# Patient Record
Sex: MALE | Race: WHITE | NOT HISPANIC OR LATINO | Employment: FULL TIME | URBAN - METROPOLITAN AREA
[De-identification: names, ages, dates, MRNs, and addresses within clinical notes are randomized per-mention and may not be internally consistent; named-entity substitution may affect disease eponyms.]

---

## 2017-08-14 ENCOUNTER — SURGERY - HEALTHEAST (OUTPATIENT)
Dept: SURGERY | Facility: HOSPITAL | Age: 37
End: 2017-08-14

## 2017-08-14 ENCOUNTER — ANESTHESIA - HEALTHEAST (OUTPATIENT)
Dept: SURGERY | Facility: HOSPITAL | Age: 37
End: 2017-08-14

## 2017-08-15 ASSESSMENT — MIFFLIN-ST. JEOR
SCORE: 1648.79
SCORE: 1648.79
SCORE: 2502.01

## 2017-08-28 ENCOUNTER — AMBULATORY - HEALTHEAST (OUTPATIENT)
Dept: LAB | Facility: HOSPITAL | Age: 37
End: 2017-08-28

## 2017-08-28 ENCOUNTER — OFFICE VISIT - HEALTHEAST (OUTPATIENT)
Dept: SURGERY | Facility: CLINIC | Age: 37
End: 2017-08-28

## 2017-08-28 DIAGNOSIS — R10.31 ABDOMINAL PAIN, RLQ: ICD-10-CM

## 2017-08-28 DIAGNOSIS — Z48.89 POSTOPERATIVE VISIT: ICD-10-CM

## 2017-08-29 ENCOUNTER — COMMUNICATION - HEALTHEAST (OUTPATIENT)
Dept: SURGERY | Facility: CLINIC | Age: 37
End: 2017-08-29

## 2018-01-31 ENCOUNTER — PRE VISIT (OUTPATIENT)
Dept: UROLOGY | Facility: CLINIC | Age: 38
End: 2018-01-31

## 2021-05-15 ENCOUNTER — TRANSFERRED RECORDS (OUTPATIENT)
Dept: HEALTH INFORMATION MANAGEMENT | Facility: CLINIC | Age: 41
End: 2021-05-15

## 2021-05-15 ENCOUNTER — OFFICE VISIT (OUTPATIENT)
Dept: URGENT CARE | Facility: URGENT CARE | Age: 41
End: 2021-05-15
Payer: COMMERCIAL

## 2021-05-15 VITALS
DIASTOLIC BLOOD PRESSURE: 80 MMHG | TEMPERATURE: 96.5 F | WEIGHT: 315 LBS | HEART RATE: 65 BPM | SYSTOLIC BLOOD PRESSURE: 148 MMHG | OXYGEN SATURATION: 97 %

## 2021-05-15 DIAGNOSIS — M79.89 SWELLING OF LOWER LEG: Primary | ICD-10-CM

## 2021-05-15 PROCEDURE — 99205 OFFICE O/P NEW HI 60 MIN: CPT | Performed by: PHYSICIAN ASSISTANT

## 2021-05-15 RX ORDER — IBUPROFEN 200 MG
1 CAPSULE ORAL
Status: ON HOLD | COMMUNITY
End: 2024-02-05

## 2021-05-15 RX ORDER — ASPIRIN 81 MG
1 TABLET, DELAYED RELEASE (ENTERIC COATED) ORAL
Status: ON HOLD | COMMUNITY
End: 2024-02-05

## 2021-05-15 NOTE — PROGRESS NOTES
SUBJECTIVE:  Cody Bolton is a 40 year old male who presents to the clinic today for a rash.  Onset of rash was 3 day(s) ago.   Rash is sudden onset.  Location of the rash: lower leg.  Quality/symptoms of rash: burning, painful, red and swollen   Symptoms are moderate and rash seems to be worsening.  Previous history of a similar rash? No  Recent exposure history: none known    Associated symptoms include: nothing.    Father with MI at 47 YOA    No past medical history on file.  Current Outpatient Medications   Medication Sig Dispense Refill     calcium citrate (CITRACAL) 950 (200 Ca) MG tablet Take 1 tablet by mouth       cholecalciferol 50 MCG (2000 UT) tablet Take 2,000 Units by mouth       cyanocobalamin 1000 MCG SUBL Place 1,000 mcg under the tongue       multivitamin, therapeutic with minerals (THERA-VIT-M) TABS tablet Take 1 tablet by mouth       Social History     Tobacco Use     Smoking status: Not on file   Substance Use Topics     Alcohol use: Not on file       ROS:  10 point ROS negative except as listed above    EXAM:   BP (!) 148/80   Pulse 65   Temp 96.5  F (35.8  C)   Wt (!) 180.5 kg (398 lb)   SpO2 97%   GENERAL: alert, no acute distress.  SKIN: javier discoloration bilateral lower legs, nonblanching  MS: Swollen LL, tender posterior calf, warm  CIRC: significant varicosities of RLL   GENERAL APPEARANCE: healthy, alert and no distress  NEURO: Normal strength and tone, sensory exam grossly normal,  normal speech and mentation    ASSESSMENT:  (M79.89) Swelling of lower leg  (primary encounter diagnosis)  Comment:tender, swollen, varicosities, family history of clots  Plan: TO ED for US and clot rule out

## 2021-05-31 VITALS — BODY MASS INDEX: 41.75 KG/M2 | HEIGHT: 73 IN | WEIGHT: 315 LBS

## 2021-06-12 NOTE — PROGRESS NOTES
HPI: Pt is here for follow up of a lap appendectomy.  Feeling fairly poor. Having pain llq now and rlq that is worsening. JUIDT drain usually around 20-50 cc per day. Feeling sweaty. Denies fever, chills. No nausea, vomiting. Poor appetite. Having loose diarrhea ongoing.       /71 (Patient Site: Right Arm, Patient Position: Sitting, Cuff Size: Adult Large)  Pulse (!) 53  SpO2 97%    EXAM:  GENERAL:Appears well  ABDOMEN:  Soft, +BS. ltender llq ad rlq. No rebound. JUDIT serous fluid removed the judit   SURGICAL WOUNDS:  Incisions healing well, no enduration or drainage.    .lastlab[CASEREPORT    Assessment/Plan: . Checked cbc which was wnl. C diff to be checked. If symptoms worsen to let us know.   Milton Miles PA-C  [facility] Department of Surgery

## 2021-06-12 NOTE — ANESTHESIA PREPROCEDURE EVALUATION
Anesthesia Evaluation      Patient summary reviewed   No history of anesthetic complications     Airway   Mallampati: III  Neck ROM: full   Pulmonary - normal exam    breath sounds clear to auscultation  (+) sleep apnea on CPAP, ,                          Cardiovascular - negative ROS and normal exam  Exercise tolerance: good  Rhythm: regular  Rate: normal,         Neuro/Psych - negative ROS     Endo/Other    (+) obesity,      GI/Hepatic/Renal - negative ROS      Other findings:         Dental    (+) poor dentition and chipped                       Anesthesia Plan  Planned anesthetic: general endotracheal  -- RSI with Succ  -- PONV prophylaxis with Decadron 10 mg and Zofran 4 mg  -- Equal volume reversal of relaxant (esmolol for iatrogenic tachycardia)    ASA 3 - emergent   Induction: intravenous   Anesthetic plan and risks discussed with: patient and spouse  Anesthesia plan special considerations: rapid sequence induction, antiemetics,   Post-op plan: routine recovery

## 2021-06-12 NOTE — ANESTHESIA POSTPROCEDURE EVALUATION
Patient: Cody Bolton  APPENDECTOMY, LAPAROSCOPIC  Anesthesia type: general    Patient location: PACU  Last vitals:   Vitals:    08/15/17 0220   BP: 134/78   Pulse: 83   Resp: 16   Temp: 37.8  C (100.1  F)   SpO2: 96%     Post vital signs: stable  Level of consciousness: awake and responds to simple questions  Post-anesthesia pain: pain controlled  Post-anesthesia nausea and vomiting: no  Pulmonary: unassisted, return to baseline  Cardiovascular: stable and blood pressure at baseline  Hydration: adequate  Anesthetic events: no    QCDR Measures:  ASA# 11 - Phoebe-op Cardiac Arrest: ASA11B - Patient did NOT experience unanticipated cardiac arrest  ASA# 12 - Phoebe-op Mortality Rate: ASA12B - Patient did NOT die  ASA# 13 - PACU Re-Intubation Rate: ASA13B - Patient did NOT require a new airway mgmt  ASA# 10 - Composite Anes Safety: ASA10A - No serious adverse event  ASA# 38 - New Corneal Injury: ASA38A - No new exposure keratitis or corneal abrasion in PACU    Additional Notes:

## 2021-06-12 NOTE — ANESTHESIA CARE TRANSFER NOTE
Last vitals:   Vitals:    08/15/17 0142   BP: 175/87   Pulse: 94   Resp: 18   Temp: 37  C (98.6  F)   SpO2: 100%     Patient's level of consciousness is drowsy  Spontaneous respirations: yes  Maintains airway independently: yes  Dentition unchanged: yes  Oropharynx: oropharynx clear of all foreign objects    QCDR Measures:  ASA# 20 - Surgical Safety Checklist: WHO surgical safety checklist completed prior to induction  PQRS# 430 - Adult PONV Prevention: 4558F - Pt received => 2 anti-emetic agents (different classes) preop & intraop  ASA# 8 - Peds PONV Prevention: NA - Not pediatric patient, not GA or 2 or more risk factors NOT present  PQRS# 424 - Phoebe-op Temp Management: 4559F - At least one body temp DOCUMENTED => 35.5C or 95.9F within required timeframe  PQRS# 426 - PACU Transfer Protocol: - Transfer of care checklist used  ASA# 14 - Acute Post-op Pain: ASA14B - Patient did NOT experience pain >= 7 out of 10

## 2021-06-27 ENCOUNTER — HEALTH MAINTENANCE LETTER (OUTPATIENT)
Age: 41
End: 2021-06-27

## 2021-10-17 ENCOUNTER — HEALTH MAINTENANCE LETTER (OUTPATIENT)
Age: 41
End: 2021-10-17

## 2022-07-24 ENCOUNTER — HEALTH MAINTENANCE LETTER (OUTPATIENT)
Age: 42
End: 2022-07-24

## 2022-10-02 ENCOUNTER — HEALTH MAINTENANCE LETTER (OUTPATIENT)
Age: 42
End: 2022-10-02

## 2023-08-12 ENCOUNTER — HEALTH MAINTENANCE LETTER (OUTPATIENT)
Age: 43
End: 2023-08-12

## 2023-12-02 ENCOUNTER — TRANSFERRED RECORDS (OUTPATIENT)
Dept: MULTI SPECIALTY CLINIC | Facility: CLINIC | Age: 43
End: 2023-12-02

## 2023-12-22 LAB — RETINOPATHY: NORMAL

## 2024-02-03 ENCOUNTER — HOSPITAL ENCOUNTER (EMERGENCY)
Facility: CLINIC | Age: 44
Discharge: HOME | End: 2024-02-03
Attending: EMERGENCY MEDICINE | Admitting: EMERGENCY MEDICINE
Payer: COMMERCIAL

## 2024-02-03 ENCOUNTER — APPOINTMENT (OUTPATIENT)
Dept: ULTRASOUND IMAGING | Facility: CLINIC | Age: 44
End: 2024-02-03
Attending: EMERGENCY MEDICINE
Payer: COMMERCIAL

## 2024-02-03 ENCOUNTER — APPOINTMENT (OUTPATIENT)
Dept: GENERAL RADIOLOGY | Facility: CLINIC | Age: 44
End: 2024-02-03
Attending: EMERGENCY MEDICINE
Payer: COMMERCIAL

## 2024-02-03 VITALS
HEART RATE: 67 BPM | DIASTOLIC BLOOD PRESSURE: 99 MMHG | OXYGEN SATURATION: 97 % | WEIGHT: 315 LBS | HEIGHT: 73 IN | TEMPERATURE: 99.2 F | SYSTOLIC BLOOD PRESSURE: 190 MMHG | BODY MASS INDEX: 41.75 KG/M2 | RESPIRATION RATE: 22 BRPM

## 2024-02-03 DIAGNOSIS — L03.115 CELLULITIS OF RIGHT LOWER LEG: ICD-10-CM

## 2024-02-03 DIAGNOSIS — I82.890 SUPERFICIAL VEIN THROMBOSIS: ICD-10-CM

## 2024-02-03 DIAGNOSIS — R07.9 CHEST PAIN, UNSPECIFIED TYPE: ICD-10-CM

## 2024-02-03 LAB
ANION GAP SERPL CALCULATED.3IONS-SCNC: 11 MMOL/L (ref 7–15)
BASOPHILS # BLD AUTO: 0.1 10E3/UL (ref 0–0.2)
BASOPHILS NFR BLD AUTO: 1 %
BUN SERPL-MCNC: 9.3 MG/DL (ref 6–20)
CALCIUM SERPL-MCNC: 8.3 MG/DL (ref 8.6–10)
CHLORIDE SERPL-SCNC: 101 MMOL/L (ref 98–107)
CREAT SERPL-MCNC: 0.81 MG/DL (ref 0.67–1.17)
DEPRECATED HCO3 PLAS-SCNC: 25 MMOL/L (ref 22–29)
EGFRCR SERPLBLD CKD-EPI 2021: >90 ML/MIN/1.73M2
EOSINOPHIL # BLD AUTO: 0.3 10E3/UL (ref 0–0.7)
EOSINOPHIL NFR BLD AUTO: 4 %
ERYTHROCYTE [DISTWIDTH] IN BLOOD BY AUTOMATED COUNT: 15.2 % (ref 10–15)
FLUAV RNA SPEC QL NAA+PROBE: NEGATIVE
FLUBV RNA RESP QL NAA+PROBE: NEGATIVE
GLUCOSE SERPL-MCNC: 176 MG/DL (ref 70–99)
HCT VFR BLD AUTO: 40.2 % (ref 40–53)
HGB BLD-MCNC: 12.8 G/DL (ref 13.3–17.7)
IMM GRANULOCYTES # BLD: 0.1 10E3/UL
IMM GRANULOCYTES NFR BLD: 1 %
INR PPP: 1.19 (ref 0.85–1.15)
LACTATE SERPL-SCNC: 2 MMOL/L (ref 0.7–2)
LYMPHOCYTES # BLD AUTO: 1.7 10E3/UL (ref 0.8–5.3)
LYMPHOCYTES NFR BLD AUTO: 24 %
MCH RBC QN AUTO: 27.4 PG (ref 26.5–33)
MCHC RBC AUTO-ENTMCNC: 31.8 G/DL (ref 31.5–36.5)
MCV RBC AUTO: 86 FL (ref 78–100)
MONOCYTES # BLD AUTO: 0.7 10E3/UL (ref 0–1.3)
MONOCYTES NFR BLD AUTO: 10 %
NEUTROPHILS # BLD AUTO: 4.3 10E3/UL (ref 1.6–8.3)
NEUTROPHILS NFR BLD AUTO: 60 %
NRBC # BLD AUTO: 0 10E3/UL
NRBC BLD AUTO-RTO: 0 /100
PLATELET # BLD AUTO: 220 10E3/UL (ref 150–450)
POTASSIUM SERPL-SCNC: 4 MMOL/L (ref 3.4–5.3)
RBC # BLD AUTO: 4.67 10E6/UL (ref 4.4–5.9)
RSV RNA SPEC NAA+PROBE: NEGATIVE
SARS-COV-2 RNA RESP QL NAA+PROBE: NEGATIVE
SODIUM SERPL-SCNC: 137 MMOL/L (ref 135–145)
TROPONIN T SERPL HS-MCNC: 10 NG/L
WBC # BLD AUTO: 7.1 10E3/UL (ref 4–11)

## 2024-02-03 PROCEDURE — 85025 COMPLETE CBC W/AUTO DIFF WBC: CPT | Performed by: EMERGENCY MEDICINE

## 2024-02-03 PROCEDURE — 84484 ASSAY OF TROPONIN QUANT: CPT | Performed by: EMERGENCY MEDICINE

## 2024-02-03 PROCEDURE — 36415 COLL VENOUS BLD VENIPUNCTURE: CPT | Performed by: EMERGENCY MEDICINE

## 2024-02-03 PROCEDURE — 99285 EMERGENCY DEPT VISIT HI MDM: CPT | Mod: 25

## 2024-02-03 PROCEDURE — 93971 EXTREMITY STUDY: CPT | Mod: RT

## 2024-02-03 PROCEDURE — 93005 ELECTROCARDIOGRAM TRACING: CPT

## 2024-02-03 PROCEDURE — 87040 BLOOD CULTURE FOR BACTERIA: CPT | Performed by: EMERGENCY MEDICINE

## 2024-02-03 PROCEDURE — 80048 BASIC METABOLIC PNL TOTAL CA: CPT | Performed by: EMERGENCY MEDICINE

## 2024-02-03 PROCEDURE — 85610 PROTHROMBIN TIME: CPT | Performed by: EMERGENCY MEDICINE

## 2024-02-03 PROCEDURE — 71046 X-RAY EXAM CHEST 2 VIEWS: CPT

## 2024-02-03 PROCEDURE — 83605 ASSAY OF LACTIC ACID: CPT | Performed by: EMERGENCY MEDICINE

## 2024-02-03 PROCEDURE — 87637 SARSCOV2&INF A&B&RSV AMP PRB: CPT | Performed by: EMERGENCY MEDICINE

## 2024-02-03 RX ORDER — CEPHALEXIN 500 MG/1
500 CAPSULE ORAL 4 TIMES DAILY
Qty: 28 CAPSULE | Refills: 0 | Status: SHIPPED | OUTPATIENT
Start: 2024-02-03 | End: 2024-02-10

## 2024-02-03 RX ORDER — LIDOCAINE 40 MG/G
CREAM TOPICAL
Status: DISCONTINUED | OUTPATIENT
Start: 2024-02-03 | End: 2024-02-03 | Stop reason: HOSPADM

## 2024-02-03 ASSESSMENT — ACTIVITIES OF DAILY LIVING (ADL): ADLS_ACUITY_SCORE: 35

## 2024-02-03 NOTE — ED PROVIDER NOTES
"  History     Chief Complaint:  Leg Swelling       The history is provided by the patient.      Cody Bolton is a 43 year old male with a history of DVT who presents due to right leg swelling that began last night. The patient describes his pain as a tightness sensation with warmth that is located primarily to his calf but he does note some inner thigh pain. He also notes cough, congestion, shortness of breath, and chest pain that began at approximately one week ago. He rates his chest pain a 3/10 and describes it as a finger poking sensation. The patient injured his right ankle approximately one week ago.     Independent Historian:   None - Patient Only    Review of External Notes:   I reviewed the family medicine note from 11/28/2023. The patient has a history of DVT and is not anticoagulated. DVT was believed to be caused by travel.       Medications:    Zocor  Metformin   Wellbutrin   Catapres  Trazodone     Past Medical History:    Diabetes Mellitus   Depression   Anxiety   Hyperlipidemia   Cervical radiculopathy   DVT   CARLIE     Past Surgical History:    Appendectomy   Gastric bypass   Gastric bypass revision   Vasectomy   Left hand surgery    Physical Exam   Patient Vitals for the past 24 hrs:   BP Temp Temp src Pulse Resp SpO2 Height Weight   02/03/24 1447 (!) 190/99 -- -- 67 -- 97 % -- --   02/03/24 1137 117/89 99.2  F (37.3  C) Oral 82 22 98 % 1.854 m (6' 1\") (!) 183.6 kg (404 lb 12.2 oz)        Physical Exam  General: The patient is alert, in no respiratory distress.    HENT: Mucous membranes moist.    Cardiovascular: Regular rate and rhythm. Good pulses in all four extremities. Normal capillary refill and skin turgor.     Respiratory: Lungs are clear. No nasal flaring. No retractions. No wheezing, no crackles.    Gastrointestinal: Abdomen soft. No guarding, no rebound. No palpable hernias.     Musculoskeletal: No gross deformity. Left parasternal tenderness to palpation.     Skin: No petechiae. " Swelling and erythema with warmth to the right calf.     Neurologic: The patient is alert and oriented x3. GCS 15. No testable cranial nerve deficit. Follows commands with clear and appropriate speech. Gives appropriate answers. Good strength in all extremities. No gross neurologic deficit. Gross sensation intact. Pupils are round and reactive. No meningismus.     Lymphatic: No cervical adenopathy. No lower extremity swelling.    Psychiatric: The patient is non-tearful.    Emergency Department Course   ECG  ECG taken at 1145, ECG read at 1209  Normal sinus rhythm   Incomplete right bundle branch block   Borderline ECG    Rate 73 bpm. IN interval 152 ms. QRS duration 102 ms. QT/QTc 384/423 ms. P-R-T axes 26 16 28.     Imaging:  XR Chest 2 Views   Final Result   IMPRESSION: Heart size is normal. Lungs are clear bilaterally. Mediastinum and visualized bony structures are unremarkable.      US Lower Extremity Venous Duplex Right   Final Result   IMPRESSION:      1.  No deep venous thrombosis in the right lower extremity.      2.  Occlusive superficial varicosity thrombus measuring over 5 cm in length from the right knee to the ankle.         Report per radiology.      Laboratory:  Labs Ordered and Resulted from Time of ED Arrival to Time of ED Departure   INR - Abnormal       Result Value    INR 1.19 (*)    BASIC METABOLIC PANEL - Abnormal    Sodium 137      Potassium 4.0      Chloride 101      Carbon Dioxide (CO2) 25      Anion Gap 11      Urea Nitrogen 9.3      Creatinine 0.81      GFR Estimate >90      Calcium 8.3 (*)     Glucose 176 (*)    CBC WITH PLATELETS AND DIFFERENTIAL - Abnormal    WBC Count 7.1      RBC Count 4.67      Hemoglobin 12.8 (*)     Hematocrit 40.2      MCV 86      MCH 27.4      MCHC 31.8      RDW 15.2 (*)     Platelet Count 220      % Neutrophils 60      % Lymphocytes 24      % Monocytes 10      % Eosinophils 4      % Basophils 1      % Immature Granulocytes 1      NRBCs per 100 WBC 0       Absolute Neutrophils 4.3      Absolute Lymphocytes 1.7      Absolute Monocytes 0.7      Absolute Eosinophils 0.3      Absolute Basophils 0.1      Absolute Immature Granulocytes 0.1      Absolute NRBCs 0.0     TROPONIN T, HIGH SENSITIVITY - Normal    Troponin T, High Sensitivity 10     LACTIC ACID WHOLE BLOOD - Normal    Lactic Acid 2.0     INFLUENZA A/B, RSV, & SARS-COV2 PCR - Normal    Influenza A PCR Negative      Influenza B PCR Negative      RSV PCR Negative      SARS CoV2 PCR Negative     BLOOD CULTURE        Procedures       Emergency Department Course & Assessments:         Interventions:  Medications - No data to display       Assessments:  1211 I obtained history and examined the patient as noted above.     Independent Interpretation (X-rays, CTs, rhythm strip):  I reviewed the patient's chest x-ray and do not see signs of infiltrate.          Disposition:  The patient was discharged.     Impression & Plan        Medical Decision Making:  The patient does have a history of a previous DVT however it was provoked by travel and he is not currently currently anticoagulated.  However in the exam of his leg it appears more erythematous painful warm and I was concerned that this is more of a cellulitis.  Due to his history however a ultrasound was performed.  There is no signs of a DVT there is a superficial thrombus which I discussed with the patient we will hold on anticoagulation.  I think cellulitis might of triggered this.  We discussed that with him having some chest discomfort if there was a DVT we would need to look further for a PE.  I discussed performing a CT scan of the chest but due to the risk of radiation and no DVT will hold off.  His chest x-ray is clear his troponin is reassuring.  At this point I think it is more likely costochondritis especially with the location of his tenderness.  I stressed that this condition was to worsen he would need to return.  The patient was started on antibiotics we  discussed monitoring for the thrombophlebitis and the patient was discharged home in good condition.      Diagnosis:    ICD-10-CM    1. Cellulitis of right lower leg  L03.115       2. Superficial vein thrombosis  I82.890       3. Chest pain, unspecified type  R07.9            Discharge Medications:  Discharge Medication List as of 2/3/2024  2:40 PM        START taking these medications    Details   cephALEXin (KEFLEX) 500 MG capsule Take 1 capsule (500 mg) by mouth 4 times daily for 7 days, Disp-28 capsule, R-0, Local Print            Scribe Disclosure:  I, Alayna Bronson, am serving as a scribe at 12:21 PM on 2/3/2024 to document services personally performed by Robert Perry MD based on my observations and the provider's statements to me.     2/3/2024   Robert Perry MD Farnan, Christopher M, MD  02/03/24 6302

## 2024-02-03 NOTE — ED TRIAGE NOTES
Pt presents to the ED with complaint of right leg swelling and cough starting last night. Pts leg is swollen from knee to toes. Pt called triage line and told he could have a blood clot. Pt states he's also had some diarrhea this morning, and he's had on and off chest pain for the last week.     Triage Assessment (Adult)       Row Name 02/03/24 1138          Triage Assessment    Airway WDL WDL        Respiratory WDL    Respiratory WDL WDL        Skin Circulation/Temperature WDL    Skin Circulation/Temperature WDL X  right leg swelling        Cardiac WDL    Cardiac WDL WDL        Peripheral/Neurovascular WDL    Peripheral Neurovascular WDL WDL        Cognitive/Neuro/Behavioral WDL    Cognitive/Neuro/Behavioral WDL WDL

## 2024-02-04 ENCOUNTER — APPOINTMENT (OUTPATIENT)
Dept: CT IMAGING | Facility: CLINIC | Age: 44
End: 2024-02-04
Attending: EMERGENCY MEDICINE
Payer: COMMERCIAL

## 2024-02-04 ENCOUNTER — APPOINTMENT (OUTPATIENT)
Dept: ULTRASOUND IMAGING | Facility: CLINIC | Age: 44
End: 2024-02-04
Attending: EMERGENCY MEDICINE
Payer: COMMERCIAL

## 2024-02-04 ENCOUNTER — HOSPITAL ENCOUNTER (OUTPATIENT)
Facility: CLINIC | Age: 44
Setting detail: OBSERVATION
Discharge: HOME | End: 2024-02-06
Attending: EMERGENCY MEDICINE | Admitting: HOSPITALIST
Payer: COMMERCIAL

## 2024-02-04 ENCOUNTER — NURSE TRIAGE (OUTPATIENT)
Dept: NURSING | Facility: CLINIC | Age: 44
End: 2024-02-04
Payer: COMMERCIAL

## 2024-02-04 DIAGNOSIS — I80.01 THROMBOPHLEBITIS OF SUPERFICIAL VEINS OF RIGHT LOWER EXTREMITY: ICD-10-CM

## 2024-02-04 DIAGNOSIS — I26.99 ACUTE PULMONARY EMBOLISM WITHOUT ACUTE COR PULMONALE, UNSPECIFIED PULMONARY EMBOLISM TYPE (H): ICD-10-CM

## 2024-02-04 LAB
ALBUMIN SERPL BCG-MCNC: 4 G/DL (ref 3.5–5.2)
ALP SERPL-CCNC: 123 U/L (ref 40–150)
ALT SERPL W P-5'-P-CCNC: 32 U/L (ref 0–70)
ANION GAP SERPL CALCULATED.3IONS-SCNC: 9 MMOL/L (ref 7–15)
AST SERPL W P-5'-P-CCNC: 31 U/L (ref 0–45)
BASOPHILS # BLD AUTO: 0.1 10E3/UL (ref 0–0.2)
BASOPHILS NFR BLD AUTO: 1 %
BILIRUB SERPL-MCNC: 0.2 MG/DL
BUN SERPL-MCNC: 16.9 MG/DL (ref 6–20)
CALCIUM SERPL-MCNC: 8.5 MG/DL (ref 8.6–10)
CHLORIDE SERPL-SCNC: 102 MMOL/L (ref 98–107)
CREAT SERPL-MCNC: 0.89 MG/DL (ref 0.67–1.17)
CRP SERPL-MCNC: 16.22 MG/L
DEPRECATED HCO3 PLAS-SCNC: 23 MMOL/L (ref 22–29)
EGFRCR SERPLBLD CKD-EPI 2021: >90 ML/MIN/1.73M2
EOSINOPHIL # BLD AUTO: 0.5 10E3/UL (ref 0–0.7)
EOSINOPHIL NFR BLD AUTO: 6 %
ERYTHROCYTE [DISTWIDTH] IN BLOOD BY AUTOMATED COUNT: 15.1 % (ref 10–15)
GLUCOSE SERPL-MCNC: 216 MG/DL (ref 70–99)
HCT VFR BLD AUTO: 41.1 % (ref 40–53)
HGB BLD-MCNC: 12.9 G/DL (ref 13.3–17.7)
HOLD SPECIMEN: NORMAL
IMM GRANULOCYTES # BLD: 0 10E3/UL
IMM GRANULOCYTES NFR BLD: 0 %
LACTATE SERPL-SCNC: 1.3 MMOL/L (ref 0.7–2)
LYMPHOCYTES # BLD AUTO: 2.2 10E3/UL (ref 0.8–5.3)
LYMPHOCYTES NFR BLD AUTO: 27 %
MCH RBC QN AUTO: 27.6 PG (ref 26.5–33)
MCHC RBC AUTO-ENTMCNC: 31.4 G/DL (ref 31.5–36.5)
MCV RBC AUTO: 88 FL (ref 78–100)
MONOCYTES # BLD AUTO: 0.8 10E3/UL (ref 0–1.3)
MONOCYTES NFR BLD AUTO: 9 %
NEUTROPHILS # BLD AUTO: 4.7 10E3/UL (ref 1.6–8.3)
NEUTROPHILS NFR BLD AUTO: 57 %
NRBC # BLD AUTO: 0 10E3/UL
NRBC BLD AUTO-RTO: 0 /100
NT-PROBNP SERPL-MCNC: 145 PG/ML (ref 0–450)
PLATELET # BLD AUTO: 225 10E3/UL (ref 150–450)
POTASSIUM SERPL-SCNC: 4.2 MMOL/L (ref 3.4–5.3)
PROT SERPL-MCNC: 7.3 G/DL (ref 6.4–8.3)
RBC # BLD AUTO: 4.67 10E6/UL (ref 4.4–5.9)
SODIUM SERPL-SCNC: 134 MMOL/L (ref 135–145)
TROPONIN T SERPL HS-MCNC: 7 NG/L
WBC # BLD AUTO: 8.2 10E3/UL (ref 4–11)

## 2024-02-04 PROCEDURE — 93005 ELECTROCARDIOGRAM TRACING: CPT

## 2024-02-04 PROCEDURE — 83605 ASSAY OF LACTIC ACID: CPT | Performed by: EMERGENCY MEDICINE

## 2024-02-04 PROCEDURE — 250N000009 HC RX 250: Performed by: EMERGENCY MEDICINE

## 2024-02-04 PROCEDURE — 36415 COLL VENOUS BLD VENIPUNCTURE: CPT | Performed by: EMERGENCY MEDICINE

## 2024-02-04 PROCEDURE — 71275 CT ANGIOGRAPHY CHEST: CPT

## 2024-02-04 PROCEDURE — 99285 EMERGENCY DEPT VISIT HI MDM: CPT | Mod: 25

## 2024-02-04 PROCEDURE — 250N000011 HC RX IP 250 OP 636: Performed by: EMERGENCY MEDICINE

## 2024-02-04 PROCEDURE — 82040 ASSAY OF SERUM ALBUMIN: CPT | Performed by: EMERGENCY MEDICINE

## 2024-02-04 PROCEDURE — 83036 HEMOGLOBIN GLYCOSYLATED A1C: CPT | Performed by: HOSPITALIST

## 2024-02-04 PROCEDURE — 84484 ASSAY OF TROPONIN QUANT: CPT | Performed by: EMERGENCY MEDICINE

## 2024-02-04 PROCEDURE — 85025 COMPLETE CBC W/AUTO DIFF WBC: CPT | Performed by: EMERGENCY MEDICINE

## 2024-02-04 PROCEDURE — 83880 ASSAY OF NATRIURETIC PEPTIDE: CPT | Performed by: EMERGENCY MEDICINE

## 2024-02-04 PROCEDURE — 86140 C-REACTIVE PROTEIN: CPT | Performed by: EMERGENCY MEDICINE

## 2024-02-04 PROCEDURE — 93970 EXTREMITY STUDY: CPT

## 2024-02-04 RX ORDER — IOPAMIDOL 755 MG/ML
500 INJECTION, SOLUTION INTRAVASCULAR ONCE
Status: COMPLETED | OUTPATIENT
Start: 2024-02-04 | End: 2024-02-04

## 2024-02-04 RX ORDER — HEPARIN SODIUM 10000 [USP'U]/100ML
0-5000 INJECTION, SOLUTION INTRAVENOUS CONTINUOUS
Status: DISCONTINUED | OUTPATIENT
Start: 2024-02-04 | End: 2024-02-05

## 2024-02-04 RX ADMIN — SODIUM CHLORIDE 100 ML: 9 INJECTION, SOLUTION INTRAVENOUS at 23:26

## 2024-02-04 RX ADMIN — IOPAMIDOL 90 ML: 755 INJECTION, SOLUTION INTRAVENOUS at 22:55

## 2024-02-04 RX ADMIN — IOPAMIDOL 90 ML: 755 INJECTION, SOLUTION INTRAVENOUS at 23:26

## 2024-02-04 RX ADMIN — SODIUM CHLORIDE 100 ML: 9 INJECTION, SOLUTION INTRAVENOUS at 22:55

## 2024-02-04 ASSESSMENT — ACTIVITIES OF DAILY LIVING (ADL): ADLS_ACUITY_SCORE: 35

## 2024-02-05 PROBLEM — I26.99 ACUTE PULMONARY EMBOLISM WITHOUT ACUTE COR PULMONALE, UNSPECIFIED PULMONARY EMBOLISM TYPE (H): Status: ACTIVE | Noted: 2024-02-05

## 2024-02-05 PROBLEM — I80.01 THROMBOPHLEBITIS OF SUPERFICIAL VEINS OF RIGHT LOWER EXTREMITY: Status: ACTIVE | Noted: 2024-02-05

## 2024-02-05 LAB
ATRIAL RATE - MUSE: 70 BPM
ATRIAL RATE - MUSE: 73 BPM
CREAT SERPL-MCNC: 0.84 MG/DL (ref 0.67–1.17)
DIASTOLIC BLOOD PRESSURE - MUSE: NORMAL MMHG
DIASTOLIC BLOOD PRESSURE - MUSE: NORMAL MMHG
EGFRCR SERPLBLD CKD-EPI 2021: >90 ML/MIN/1.73M2
GLUCOSE BLDC GLUCOMTR-MCNC: 146 MG/DL (ref 70–99)
GLUCOSE BLDC GLUCOMTR-MCNC: 158 MG/DL (ref 70–99)
GLUCOSE BLDC GLUCOMTR-MCNC: 165 MG/DL (ref 70–99)
GLUCOSE BLDC GLUCOMTR-MCNC: 169 MG/DL (ref 70–99)
GLUCOSE BLDC GLUCOMTR-MCNC: 174 MG/DL (ref 70–99)
GLUCOSE BLDC GLUCOMTR-MCNC: 186 MG/DL (ref 70–99)
HBA1C MFR BLD: 9.1 %
INR PPP: 1.15 (ref 0.85–1.15)
INR PPP: 1.19 (ref 0.85–1.15)
INTERPRETATION ECG - MUSE: NORMAL
INTERPRETATION ECG - MUSE: NORMAL
P AXIS - MUSE: 26 DEGREES
P AXIS - MUSE: 37 DEGREES
PR INTERVAL - MUSE: 152 MS
PR INTERVAL - MUSE: 152 MS
QRS DURATION - MUSE: 100 MS
QRS DURATION - MUSE: 102 MS
QT - MUSE: 380 MS
QT - MUSE: 384 MS
QTC - MUSE: 410 MS
QTC - MUSE: 423 MS
R AXIS - MUSE: 16 DEGREES
R AXIS - MUSE: 47 DEGREES
SYSTOLIC BLOOD PRESSURE - MUSE: NORMAL MMHG
SYSTOLIC BLOOD PRESSURE - MUSE: NORMAL MMHG
T AXIS - MUSE: 15 DEGREES
T AXIS - MUSE: 28 DEGREES
UFH PPP CHRO-ACNC: 0.23 IU/ML
UFH PPP CHRO-ACNC: 0.27 IU/ML
UFH PPP CHRO-ACNC: 0.43 IU/ML
VENTRICULAR RATE- MUSE: 70 BPM
VENTRICULAR RATE- MUSE: 73 BPM

## 2024-02-05 PROCEDURE — 250N000013 HC RX MED GY IP 250 OP 250 PS 637: Performed by: HOSPITALIST

## 2024-02-05 PROCEDURE — 96372 THER/PROPH/DIAG INJ SC/IM: CPT | Performed by: STUDENT IN AN ORGANIZED HEALTH CARE EDUCATION/TRAINING PROGRAM

## 2024-02-05 PROCEDURE — 85610 PROTHROMBIN TIME: CPT | Performed by: HOSPITALIST

## 2024-02-05 PROCEDURE — 82962 GLUCOSE BLOOD TEST: CPT

## 2024-02-05 PROCEDURE — 250N000013 HC RX MED GY IP 250 OP 250 PS 637: Performed by: STUDENT IN AN ORGANIZED HEALTH CARE EDUCATION/TRAINING PROGRAM

## 2024-02-05 PROCEDURE — 85520 HEPARIN ASSAY: CPT | Performed by: HOSPITALIST

## 2024-02-05 PROCEDURE — 250N000011 HC RX IP 250 OP 636: Performed by: HOSPITALIST

## 2024-02-05 PROCEDURE — 96365 THER/PROPH/DIAG IV INF INIT: CPT

## 2024-02-05 PROCEDURE — 36415 COLL VENOUS BLD VENIPUNCTURE: CPT | Performed by: STUDENT IN AN ORGANIZED HEALTH CARE EDUCATION/TRAINING PROGRAM

## 2024-02-05 PROCEDURE — 250N000012 HC RX MED GY IP 250 OP 636 PS 637: Performed by: HOSPITALIST

## 2024-02-05 PROCEDURE — 36415 COLL VENOUS BLD VENIPUNCTURE: CPT | Performed by: HOSPITALIST

## 2024-02-05 PROCEDURE — 82565 ASSAY OF CREATININE: CPT | Performed by: STUDENT IN AN ORGANIZED HEALTH CARE EDUCATION/TRAINING PROGRAM

## 2024-02-05 PROCEDURE — G0378 HOSPITAL OBSERVATION PER HR: HCPCS

## 2024-02-05 PROCEDURE — 250N000011 HC RX IP 250 OP 636: Performed by: STUDENT IN AN ORGANIZED HEALTH CARE EDUCATION/TRAINING PROGRAM

## 2024-02-05 PROCEDURE — 94660 CPAP INITIATION&MGMT: CPT

## 2024-02-05 PROCEDURE — 85610 PROTHROMBIN TIME: CPT | Performed by: STUDENT IN AN ORGANIZED HEALTH CARE EDUCATION/TRAINING PROGRAM

## 2024-02-05 PROCEDURE — 96366 THER/PROPH/DIAG IV INF ADDON: CPT

## 2024-02-05 PROCEDURE — 999N000157 HC STATISTIC RCP TIME EA 10 MIN

## 2024-02-05 PROCEDURE — 85520 HEPARIN ASSAY: CPT | Mod: 91 | Performed by: STUDENT IN AN ORGANIZED HEALTH CARE EDUCATION/TRAINING PROGRAM

## 2024-02-05 PROCEDURE — 99223 1ST HOSP IP/OBS HIGH 75: CPT | Mod: AI | Performed by: HOSPITALIST

## 2024-02-05 RX ORDER — CLONIDINE HYDROCHLORIDE 0.1 MG/1
0.1 TABLET ORAL AT BEDTIME
COMMUNITY
End: 2024-03-11

## 2024-02-05 RX ORDER — POLYETHYLENE GLYCOL 3350 17 G/17G
17 POWDER, FOR SOLUTION ORAL 2 TIMES DAILY PRN
Status: DISCONTINUED | OUTPATIENT
Start: 2024-02-05 | End: 2024-02-06 | Stop reason: HOSPADM

## 2024-02-05 RX ORDER — ONDANSETRON 4 MG/1
4 TABLET, ORALLY DISINTEGRATING ORAL EVERY 6 HOURS PRN
Status: DISCONTINUED | OUTPATIENT
Start: 2024-02-05 | End: 2024-02-06 | Stop reason: HOSPADM

## 2024-02-05 RX ORDER — AMOXICILLIN 250 MG
1 CAPSULE ORAL 2 TIMES DAILY PRN
Status: DISCONTINUED | OUTPATIENT
Start: 2024-02-05 | End: 2024-02-06 | Stop reason: HOSPADM

## 2024-02-05 RX ORDER — BUPROPION HYDROCHLORIDE 150 MG/1
150 TABLET ORAL EVERY MORNING
COMMUNITY
End: 2024-03-11

## 2024-02-05 RX ORDER — HEPARIN SODIUM 10000 [USP'U]/100ML
0-5000 INJECTION, SOLUTION INTRAVENOUS CONTINUOUS
Status: DISPENSED | OUTPATIENT
Start: 2024-02-05 | End: 2024-02-05

## 2024-02-05 RX ORDER — BUPROPION HYDROCHLORIDE 150 MG/1
150 TABLET ORAL EVERY MORNING
Status: DISCONTINUED | OUTPATIENT
Start: 2024-02-05 | End: 2024-02-06 | Stop reason: HOSPADM

## 2024-02-05 RX ORDER — SIMVASTATIN 20 MG
20 TABLET ORAL AT BEDTIME
Status: DISCONTINUED | OUTPATIENT
Start: 2024-02-05 | End: 2024-02-06 | Stop reason: HOSPADM

## 2024-02-05 RX ORDER — CLONIDINE HYDROCHLORIDE 0.1 MG/1
0.1 TABLET ORAL AT BEDTIME
Status: DISCONTINUED | OUTPATIENT
Start: 2024-02-05 | End: 2024-02-06 | Stop reason: HOSPADM

## 2024-02-05 RX ORDER — TRAZODONE HYDROCHLORIDE 100 MG/1
200 TABLET ORAL AT BEDTIME
Status: DISCONTINUED | OUTPATIENT
Start: 2024-02-05 | End: 2024-02-06 | Stop reason: HOSPADM

## 2024-02-05 RX ORDER — METFORMIN HCL 500 MG
1000 TABLET, EXTENDED RELEASE 24 HR ORAL
COMMUNITY
End: 2024-03-11

## 2024-02-05 RX ORDER — DEXTROSE MONOHYDRATE 25 G/50ML
25-50 INJECTION, SOLUTION INTRAVENOUS
Status: DISCONTINUED | OUTPATIENT
Start: 2024-02-05 | End: 2024-02-06 | Stop reason: HOSPADM

## 2024-02-05 RX ORDER — NICOTINE POLACRILEX 4 MG
15-30 LOZENGE BUCCAL
Status: DISCONTINUED | OUTPATIENT
Start: 2024-02-05 | End: 2024-02-06 | Stop reason: HOSPADM

## 2024-02-05 RX ORDER — SIMVASTATIN 20 MG
20 TABLET ORAL AT BEDTIME
COMMUNITY
End: 2024-03-11

## 2024-02-05 RX ORDER — WARFARIN SODIUM 5 MG/1
10 TABLET ORAL
Status: COMPLETED | OUTPATIENT
Start: 2024-02-05 | End: 2024-02-05

## 2024-02-05 RX ORDER — TRAZODONE HYDROCHLORIDE 100 MG/1
200 TABLET ORAL AT BEDTIME
COMMUNITY
End: 2024-03-11

## 2024-02-05 RX ORDER — ACETAMINOPHEN 650 MG/1
650 SUPPOSITORY RECTAL EVERY 4 HOURS PRN
Status: DISCONTINUED | OUTPATIENT
Start: 2024-02-05 | End: 2024-02-06 | Stop reason: HOSPADM

## 2024-02-05 RX ORDER — ENOXAPARIN SODIUM 150 MG/ML
0.75 INJECTION SUBCUTANEOUS EVERY 12 HOURS
Status: DISCONTINUED | OUTPATIENT
Start: 2024-02-05 | End: 2024-02-06 | Stop reason: HOSPADM

## 2024-02-05 RX ORDER — ACETAMINOPHEN 325 MG/1
650 TABLET ORAL EVERY 4 HOURS PRN
Status: DISCONTINUED | OUTPATIENT
Start: 2024-02-05 | End: 2024-02-06 | Stop reason: HOSPADM

## 2024-02-05 RX ORDER — OXYCODONE HYDROCHLORIDE 5 MG/1
5 TABLET ORAL EVERY 4 HOURS PRN
Status: DISCONTINUED | OUTPATIENT
Start: 2024-02-05 | End: 2024-02-06 | Stop reason: HOSPADM

## 2024-02-05 RX ORDER — AMOXICILLIN 250 MG
2 CAPSULE ORAL 2 TIMES DAILY PRN
Status: DISCONTINUED | OUTPATIENT
Start: 2024-02-05 | End: 2024-02-06 | Stop reason: HOSPADM

## 2024-02-05 RX ORDER — ONDANSETRON 2 MG/ML
4 INJECTION INTRAMUSCULAR; INTRAVENOUS EVERY 6 HOURS PRN
Status: DISCONTINUED | OUTPATIENT
Start: 2024-02-05 | End: 2024-02-06 | Stop reason: HOSPADM

## 2024-02-05 RX ADMIN — ACETAMINOPHEN 650 MG: 325 TABLET, FILM COATED ORAL at 00:27

## 2024-02-05 RX ADMIN — SIMVASTATIN 20 MG: 20 TABLET, FILM COATED ORAL at 22:09

## 2024-02-05 RX ADMIN — Medication 1 LOZENGE: at 05:54

## 2024-02-05 RX ADMIN — INSULIN ASPART 1 UNITS: 100 INJECTION, SOLUTION INTRAVENOUS; SUBCUTANEOUS at 11:48

## 2024-02-05 RX ADMIN — HEPARIN SODIUM 1950 UNITS/HR: 10000 INJECTION, SOLUTION INTRAVENOUS at 08:45

## 2024-02-05 RX ADMIN — ACETAMINOPHEN 650 MG: 325 TABLET, FILM COATED ORAL at 20:18

## 2024-02-05 RX ADMIN — OXYCODONE HYDROCHLORIDE 5 MG: 5 TABLET ORAL at 22:13

## 2024-02-05 RX ADMIN — Medication 1 LOZENGE: at 02:46

## 2024-02-05 RX ADMIN — BUPROPION 150 MG: 150 TABLET, EXTENDED RELEASE ORAL at 13:31

## 2024-02-05 RX ADMIN — INSULIN ASPART 1 UNITS: 100 INJECTION, SOLUTION INTRAVENOUS; SUBCUTANEOUS at 18:34

## 2024-02-05 RX ADMIN — TRAZODONE HYDROCHLORIDE 200 MG: 100 TABLET ORAL at 22:09

## 2024-02-05 RX ADMIN — ENOXAPARIN SODIUM 135 MG: 150 INJECTION SUBCUTANEOUS at 20:18

## 2024-02-05 RX ADMIN — HEPARIN SODIUM 1800 UNITS/HR: 10000 INJECTION, SOLUTION INTRAVENOUS at 00:20

## 2024-02-05 RX ADMIN — CLONIDINE HYDROCHLORIDE 0.1 MG: 0.1 TABLET ORAL at 22:09

## 2024-02-05 RX ADMIN — INSULIN ASPART 1 UNITS: 100 INJECTION, SOLUTION INTRAVENOUS; SUBCUTANEOUS at 08:49

## 2024-02-05 RX ADMIN — WARFARIN SODIUM 10 MG: 5 TABLET ORAL at 18:35

## 2024-02-05 RX ADMIN — OXYCODONE HYDROCHLORIDE 5 MG: 5 TABLET ORAL at 00:28

## 2024-02-05 RX ADMIN — Medication 1 LOZENGE: at 20:26

## 2024-02-05 ASSESSMENT — ACTIVITIES OF DAILY LIVING (ADL)
ADLS_ACUITY_SCORE: 23
ADLS_ACUITY_SCORE: 21
ADLS_ACUITY_SCORE: 23
ADLS_ACUITY_SCORE: 23
ADLS_ACUITY_SCORE: 22
ADLS_ACUITY_SCORE: 23
ADLS_ACUITY_SCORE: 35
ADLS_ACUITY_SCORE: 23
ADLS_ACUITY_SCORE: 21
ADLS_ACUITY_SCORE: 21
ADLS_ACUITY_SCORE: 23
ADLS_ACUITY_SCORE: 23

## 2024-02-05 NOTE — TELEPHONE ENCOUNTER
Nurse Triage SBAR    Situation: Leg pain    Background: Patient calling. Blood clot in his leg. Cellulitis of the right leg as well.     Assessment: Pain has traveled up his leg some. Leg swelling is still present - seems the same. Chest pain is present. Shortness of breath today - worse then yesterday. Congestion and cough.      Protocol Recommended Disposition: Emergency Department    Recommendation: According to the protocol, Patient should go to the ED now. Advised Patient that the patient needs to go to the ED now. Care advice given. Patient verbalizes understanding and agrees with plan of care. Reviewed concerning symptoms and when to call 911.      Manisha Fagan RN Nursing Advisor 2/4/2024 9:17 PM     Reason for Disposition   Chest pain   Difficulty breathing    Additional Information   Negative: Looks like a broken bone or dislocated joint (e.g., crooked or deformed)   Negative: Sounds like a life-threatening emergency to the triager   Negative: Followed a leg injury   Negative: Leg swelling is main symptom   Negative: Back pain radiating (shooting) into leg(s)   Negative: Knee pain is main symptom   Negative: Ankle pain is main symptom   Negative: Pregnant   Negative: Postpartum (from 0 to 6 weeks after delivery)    Protocols used: Leg Pain-A-

## 2024-02-05 NOTE — ED NOTES
"[facility]  ED Nurse Handoff Report    ED Chief complaint: Leg Pain and Shortness of Breath  . ED Diagnosis:   Final diagnoses:   Acute pulmonary embolism without acute cor pulmonale, unspecified pulmonary embolism type (H)   Thrombophlebitis of superficial veins of right lower extremity       Allergies: No Known Allergies    Code Status: Full Code    Activity level - Baseline/Home:  independent.  Activity Level - Current:   assist of 1.   Lift room needed: No.   Bariatric: Yes   Needed: No   Isolation: No.   Infection: Not Applicable.     Respiratory status: Room air    Vital Signs (within 30 minutes):   Vitals:    02/04/24 2146 02/04/24 2200 02/04/24 2216 02/04/24 2328   BP: (!) 198/103 (!) 180/101 (!) 173/90 (!) 175/78   Pulse: 81 72 73 68   Resp: 18      Temp: 98.2  F (36.8  C)      TempSrc: Oral      SpO2: 96% 96% 96% 95%   Weight: (!) 183.4 kg (404 lb 5.2 oz)      Height: 1.854 m (6' 1\")          Cardiac Rhythm:  ,      Pain level:    Patient confused: No.   Patient Falls Risk: nonskid shoes/slippers when out of bed and patient and family education.   Elimination Status: Has voided     Patient Report - Initial Complaint: leg pain/SOB.   Focused Assessment: Pt has known cellulitis and superficial blood clot in right lower leg, pt feels the pain is worse and visually the vein looks larger today to pt. Pt also endorses dyspnea on exertion, chest tightness that radiates into left arm, and pain spreading up from where blood clot is into upper thigh. Not on blood thinners. Is taking prescribed antibiotics. A&Ox4.   imaging shows mild bilateral PE.    Abnormal Results:   Labs Ordered and Resulted from Time of ED Arrival to Time of ED Departure   COMPREHENSIVE METABOLIC PANEL - Abnormal       Result Value    Sodium 134 (*)     Potassium 4.2      Carbon Dioxide (CO2) 23      Anion Gap 9      Urea Nitrogen 16.9      Creatinine 0.89      GFR Estimate >90      Calcium 8.5 (*)     Chloride " 102      Glucose 216 (*)     Alkaline Phosphatase 123      AST 31      ALT 32      Protein Total 7.3      Albumin 4.0      Bilirubin Total 0.2     CBC WITH PLATELETS AND DIFFERENTIAL - Abnormal    WBC Count 8.2      RBC Count 4.67      Hemoglobin 12.9 (*)     Hematocrit 41.1      MCV 88      MCH 27.6      MCHC 31.4 (*)     RDW 15.1 (*)     Platelet Count 225      % Neutrophils 57      % Lymphocytes 27      % Monocytes 9      % Eosinophils 6      % Basophils 1      % Immature Granulocytes 0      NRBCs per 100 WBC 0      Absolute Neutrophils 4.7      Absolute Lymphocytes 2.2      Absolute Monocytes 0.8      Absolute Eosinophils 0.5      Absolute Basophils 0.1      Absolute Immature Granulocytes 0.0      Absolute NRBCs 0.0     CRP INFLAMMATION - Abnormal    CRP Inflammation 16.22 (*)    TROPONIN T, HIGH SENSITIVITY - Normal    Troponin T, High Sensitivity 7     NT PROBNP INPATIENT - Normal    N terminal Pro BNP Inpatient 145     LACTIC ACID WHOLE BLOOD - Normal    Lactic Acid 1.3     GLUCOSE MONITOR NURSING POCT   HEMOGLOBIN A1C   GLUCOSE MONITOR NURSING POCT   GLUCOSE MONITOR NURSING POCT   GLUCOSE MONITOR NURSING POCT   GLUCOSE MONITOR NURSING POCT        CT Chest Pulmonary Embolism w Contrast   Final Result   IMPRESSION:   1.  Mild bilateral PE seen, please refer to above report for location of the PE.      2.  Mild prominence of central pulmonary artery measuring approximately 3.5 cm in greatest radial dimension (normal is 3.2 cm).      3.  No evidence for right heart strain.      4.  Mild mosaic perfusion seen in both lungs which is nonspecific, but most typical for air trapping associated with small airway inflammation.      5.  Pulmonary emboli is a critical finding and Dr. Ba was notified of the above findings at approximately 11:50 PM on 02/04/2024.      US Lower Extremity Venous Duplex Bilateral   Final Result   IMPRESSION:   1.  No deep venous thrombosis in the bilateral lower extremities.      2.   Occlusive superficial thrombophlebitis in the right calf unchanged.          Treatments provided: Labs/imaging/pain control/Heparin  Family Comments: no family present at this time  OBS brochure/video discussed/provided to patient:  Yes  ED Medications:   Medications   acetaminophen (TYLENOL) tablet 650 mg (650 mg Oral $Given 2/5/24 0027)     Or   acetaminophen (TYLENOL) Suppository 650 mg ( Rectal See Alternative 2/5/24 0027)   oxyCODONE IR (ROXICODONE) half-tab 2.5 mg (has no administration in time range)   oxyCODONE (ROXICODONE) tablet 5 mg (5 mg Oral $Given 2/5/24 0028)   senna-docusate (SENOKOT-S/PERICOLACE) 8.6-50 MG per tablet 1 tablet (has no administration in time range)     Or   senna-docusate (SENOKOT-S/PERICOLACE) 8.6-50 MG per tablet 2 tablet (has no administration in time range)   polyethylene glycol (MIRALAX) Packet 17 g (has no administration in time range)   ondansetron (ZOFRAN ODT) ODT tab 4 mg (has no administration in time range)     Or   ondansetron (ZOFRAN) injection 4 mg (has no administration in time range)   Patient is already receiving anticoagulation with heparin, enoxaparin (LOVENOX), warfarin (COUMADIN)  or other anticoagulant medication (has no administration in time range)   heparin 25,000 units in 0.45% NaCl 250 mL ANTICOAGULANT infusion (1,800 Units/hr Intravenous $New Bag 2/5/24 0020)   glucose gel 15-30 g (has no administration in time range)     Or   dextrose 50 % injection 25-50 mL (has no administration in time range)     Or   glucagon injection 1 mg (has no administration in time range)   insulin aspart (NovoLOG) injection (RAPID ACTING) (has no administration in time range)   insulin aspart (NovoLOG) injection (RAPID ACTING) (has no administration in time range)   iopamidol (ISOVUE-370) solution 500 mL (90 mLs Intravenous $Given 2/4/24 2255)   CT scan flush (100 mLs Intravenous $Given 2/4/24 2255)   iopamidol (ISOVUE-370) solution 500 mL (90 mLs Intravenous $Given 2/4/24 2326)    CT scan flush (100 mLs Intravenous $Given 2/4/24 6779)   heparin ANTICOAGULANT loading dose for  HIGH INTENSITY TREATMENT* Give BEFORE starting heparin infusion (8,000 Units Intravenous $Given 2/5/24 0019)       Drips infusing:  Yes  For the majority of the shift this patient was Green.   Interventions performed were none.    Sepsis treatment initiated: No    Cares/treatment/interventions/medications to be completed following ED care: see orders    ED Nurse Name: Echo Gonzales RN  12:31 AM     RECEIVING UNIT ED HANDOFF REVIEW    Above ED Nurse Handoff Report was reviewed: Yes  Reviewed by: Esau Avelar RN on February 5, 2024 at 1:25 AM

## 2024-02-05 NOTE — PHARMACY-ANTICOAGULATION SERVICE
Clinical Pharmacy - Warfarin Dosing Consult     Pharmacy has been consulted to manage this patient s warfarin therapy.  Indication: DVT/ PE Treatment  Therapy Goal: INR 2-3  Warfarin Prior to Admission: No    INR   Date Value Ref Range Status   02/05/2024 1.19 (H) 0.85 - 1.15 Final   02/05/2024 1.15 0.85 - 1.15 Final       Recommend warfarin 10 mg today.  Pharmacy will monitor Cody Bolton daily and order warfarin doses to achieve specified goal.      Please contact pharmacy as soon as possible if the warfarin needs to be held for a procedure or if the warfarin goals change.

## 2024-02-05 NOTE — H&P
[facility]  Hospitalist H&P    Name: Cody Bolton      MRN: [id number]  YOB: 1980    Age: 43 year old  Date of admission: 2/4/2024  Primary care provider: No Ref-Primary, Physician            Assessment and Plan:     Cody Bolton is a 43 year old male with a history of type 2 diabetes mellitus, obstructive sleep apnea, hyperlipidemia, depression and anxiety, and prior provoked DVT who presents with right lower extremity pain and swelling as well as chest pain and shortness of breath with workup notable for bilateral PE.    Problem list:  Unprovoked bilateral PE: Suspect he recently had a right lower extremity DVT that is now embolized and caused a bilateral PE.  CT shows mild bilateral PE with mild prominence of central pulmonary artery but no evidence of right heart strain.  He is not hypoxic nor hypotensive.  He is hemodynamically stable overall.  The standard of care in this situation is anticoagulation.  We will start IV heparin for now.  Ideally we would switch to a DOAC agent but he weighs greater than 400 pounds which could be a contraindication to use of these medications.  I would like to request pharmacy liaison consultation to price out the DOAC agents as well as enoxaparin/warfarin combination and see if his weight is appropriate for use of a DOAC.  His PE appears unprovoked.  He denies any history of tobacco use, recent surgery, personal history of malignancy, recent long travel, or family history of VTE.  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  I think a referral to hematology would be appropriate for genetic/hereditary testing.  I suspect he may require lifelong anticoagulation given the unprovoked nature of this clot and that it is his second VTE.  Possible right lower extremity cellulitis: Overall I suspect that his presumed recent DVT is causing the swelling and pain but he does report recent fevers and his leg is erythematous and  "warm.  He has no leukocytosis nor fever.  Nevertheless I think a short course of oral Keflex is appropriate to continue.  Diabetes mellitus: Hold metformin for now with recent contrast exposure.  Start medium sliding scale insulin.  Hyperlipidemia: Hold simvastatin with observation status.  Depression and anxiety: Resume psychiatric medication regimen.  Obstructive sleep apnea: Continue nocturnal CPAP.    Clinically Significant Risk Factors Present on Admission          # Hypocalcemia: Lowest Ca = 8.3 mg/dL in last 2 days, will monitor and replace as appropriate      # Coagulation Defect: INR = 1.19 (Ref range: 0.85 - 1.15) and/or PTT = N/A, will monitor for bleeding         # Severe Obesity: Estimated body mass index is 53.34 kg/m  as calculated from the following:    Height as of this encounter: 1.854 m (6' 1\").    Weight as of this encounter: 183.4 kg (404 lb 5.2 oz).              Code status: Full.  Admit to observation status.  Prophylaxis: Intravenous heparin.  Disposition: Home in 1 to 2 days.    80 MINUTES SPENT BY ME on the date of service doing chart review, history, exam, documentation & further activities per the note.          Chief Complaint:     Shortness of breath.         History of Present Illness:   Cody Bolton is a 43 year old male who presents with shortness of breath.  History was obtained from my discussion with the patient at the bedside.  I also discussed the case with the ED provider.  The electronic medical record was also reviewed.    The patient has a history of a prior DVT.  This was provoked in the context of the recent period of long travel.  He was on warfarin for several months but this was eventually discontinued and he is no longer on anticoagulation.  2 days ago the patient noticed right leg pain and swelling.  He also admits to some warmth and redness of the area.  He tells me that he had a fever but does not remember how high it was.  The discomfort is mostly in the calf.  " He was seen in the emergency department and diagnosed with cellulitis.  He was discharged home with antibiotics.    Earlier in the evening the patient noticed worsening shortness of breath.  He had a challenging time catching his breath.  With deep breathing he develops chest discomfort.  He denies any palpitations or coughing.  He does have some recent reports of congestion and rhinorrhea.  Ultimately due to his chest pain and shortness of breath he comes back to the hospital for evaluation.    Here in the ED his temperature is 98.2, heart rate 81, blood pressure 198/103, respirate 18 and oxygen saturation 96% on room air.  Labs show sodium of 134 but otherwise normal basic metabolic panel, liver function tests, lactate, BNP and troponin.  CBC only notable for hemoglobin of 12.9.  Lower extremity ultrasound shows an occlusive superficial thrombophlebitis in the right calf which is unchanged but no DVT.  CT of the chest shows mild bilateral PE with mild prominence of the central pulmonary artery without evidence of right heart strain.            Past Medical History:   DVT, provoked after a long period of travel  Type 2 diabetes mellitus  Anxiety and depression  Hyperlipidemia  Obstructive sleep apnea  Insomnia          Past Surgical History:     Past Surgical History:   Procedure Laterality Date    APPENDECTOMY  08/15/2017    Dr. Ferrer    GASTRIC BYPASS      DC LAP,APPENDECTOMY N/A 8/14/2017    Procedure: APPENDECTOMY, LAPAROSCOPIC;  Surgeon: Nba Ferrer MD;  Location: [facility];  Service: General    REVISION AKANKSHA-EN-Y  2014    Dr. Ranjeet Espinal @[facility]             Social History:     Social History     Tobacco Use    Smoking status: Never    Smokeless tobacco: Never   Substance Use Topics    Alcohol use: Yes     Comment: Alcoholic Drinks/day: occasionally             Family History:   The family history was fully reviewed and non-contributory in this case.         Allergies:   No Known  "Allergies          Medications:     Prior to Admission medications    Medication Sig Last Dose Taking? Auth Provider Long Term End Date   calcium citrate (CITRACAL) 950 (200 Ca) MG tablet Take 1 tablet by mouth   Reported, Patient     cephALEXin (KEFLEX) 500 MG capsule Take 1 capsule (500 mg) by mouth 4 times daily for 7 days   Robert Perry MD  2/10/24   cholecalciferol 50 MCG (2000 UT) tablet Take 2,000 Units by mouth   Reported, Patient     cyanocobalamin 1000 MCG SUBL Place 1,000 mcg under the tongue   Reported, Patient     multivitamin, therapeutic with minerals (THERA-VIT-M) TABS tablet Take 1 tablet by mouth   Reported, Patient               Review of Systems:     A Comprehensive greater than 10 system review of systems was carried out.  Pertinent positives and negatives are noted above.  Otherwise negative for contributory information.           Physical Exam:   Blood pressure (!) 175/78, pulse 68, temperature 98.2  F (36.8  C), temperature source Oral, resp. rate 18, height 1.854 m (6' 1\"), weight (!) 183.4 kg (404 lb 5.2 oz), SpO2 95%.  Wt Readings from Last 1 Encounters:   02/04/24 (!) 183.4 kg (404 lb 5.2 oz)     Exam:  GENERAL: No apparent distress. Awake, alert, and fully oriented.  HEENT: Normocephalic, atraumatic. Extraocular movements intact.  CARDIOVASCULAR: Regular rate and rhythm without murmurs or rubs. No S3.  PULMONARY: Clear to auscultation bilaterally.  ABDOMINAL: Soft, non-tender, non-distended. Bowel sounds normoactive.   EXTREMITIES: No cyanosis or clubbing. Bilateral lower extremity edema, right greater than left. Right lower extremity is red and slightly warm.  NEUROLOGICAL: CN 2-12 grossly intact, no focal neurological deficits.  DERMATOLOGICAL: No rash, ulcer, bruising, nor jaundice.          Data:   EKG:  Personally reviewed.   Normal sinus rhythm   Incomplete right bundle branch block   Borderline ECG    Rate 73 bpm. WA interval 152 ms. QRS duration 102 ms. QT/QTc 384/423 " "ms. P-R-T axes 26 16 28.     Laboratory:  Recent Labs   Lab 02/04/24 2209 02/03/24  1229   WBC 8.2 7.1   HGB 12.9* 12.8*   HCT 41.1 40.2   MCV 88 86    220     Recent Labs   Lab 02/04/24 2209 02/03/24  1229   * 137   POTASSIUM 4.2 4.0   CHLORIDE 102 101   CO2 23 25   ANIONGAP 9 11   * 176*   BUN 16.9 9.3   CR 0.89 0.81   GFRESTIMATED >90 >90   RAUL 8.5* 8.3*     Recent Labs   Lab 02/04/24  2209   AST 31   ALT 32   ALKPHOS 123   BILITOTAL 0.2     Recent Labs   Lab 02/04/24 2224 02/03/24  1229   LACT 1.3 2.0     No results for input(s): \"CULT\" in the last 168 hours.    Imaging:  Recent Results (from the past 24 hour(s))   US Lower Extremity Venous Duplex Bilateral    Narrative    EXAM: US LOWER EXTREMITY VENOUS DUPLEX BILATERAL  LOCATION: [facility]  DATE: 2/4/2024    INDICATION: leg swelling and pain  COMPARISON: 02/03/2024  TECHNIQUE: Venous Duplex ultrasound of bilateral lower extremities with and without compression, augmentation and duplex. Color flow and spectral Doppler with waveform analysis performed.    FINDINGS: Exam includes the common femoral, femoral, popliteal veins as well as segmentally visualized deep calf veins and greater saphenous vein.     RIGHT: No deep vein thrombosis. Superficial thrombophlebitis again seen in the varicosity in the proximal calf extending to the distal calf.. No popliteal cyst.    LEFT: No deep vein thrombosis. No superficial thrombophlebitis. No popliteal cyst.      Impression    IMPRESSION:  1.  No deep venous thrombosis in the bilateral lower extremities.    2.  Occlusive superficial thrombophlebitis in the right calf unchanged.   CT Chest Pulmonary Embolism w Contrast    Narrative    EXAM: CT CHEST PULMONARY EMBOLISM W CONTRAST  LOCATION: [facility]  DATE: 2/4/2024    INDICATION: chest pain, shortness of breath and lower ext swelling; shortness of breath and lower ext swelling  COMPARISON: " None.  TECHNIQUE: CT chest pulmonary angiogram during arterial phase injection of IV contrast. Multiplanar reformats and MIP reconstructions were performed. Dose reduction techniques were used.   CONTRAST: 180mL Isovue 370    FINDINGS:  ANGIOGRAM CHEST: There is some mild bilateral PE seen this would include a small/thin saddle embolus in the peripheral aspect of the left pulmonary artery which extends into the left lobar pulmonary arteries. There is mild PE seen in the right upper   lobar pulmonary artery and 2 segmental pulmonary arteries. There is mild PE seen involving the right lower lobe lobar pulmonary artery and 3 segmental and subsegmental pulmonary arteries. There is slight prominence of the main pulmonary artery measuring   approximately 3.5 cm in greatest radial dimension. There is no evidence for right heart strain.    LUNGS AND PLEURA: There is mild mosaic perfusion seen in both lungs which is nonspecific, but most typical for air trapping associated with small airway inflammation.    MEDIASTINUM/AXILLAE: Normal.    CORONARY ARTERY CALCIFICATION: Minimal    UPPER ABDOMEN: Prior postoperative changes of the GI tract with no complications identified.    MUSCULOSKELETAL: Mild to moderate scattered hypertrophic changes to include the spine.      Impression    IMPRESSION:  1.  Mild bilateral PE seen, please refer to above report for location of the PE.    2.  Mild prominence of central pulmonary artery measuring approximately 3.5 cm in greatest radial dimension (normal is 3.2 cm).    3.  No evidence for right heart strain.    4.  Mild mosaic perfusion seen in both lungs which is nonspecific, but most typical for air trapping associated with small airway inflammation.    5.  Pulmonary emboli is a critical finding and Dr. Ba was notified of the above findings at approximately 11:50 PM on 02/04/2024.       Zafar Vickers DO MPH  [facility] Hospitalist  [address] [facility] [place] [address]  02/05/2024

## 2024-02-05 NOTE — PROGRESS NOTES
A CPAP of  +10 @ 21% was applied to the pt via the mask for @noc use.  The bridge of the nose looks good and remains intact. Pt is tolerating it well. Will continue to monitor and assess the pt's current respiratory status and needs.

## 2024-02-05 NOTE — ED PROVIDER NOTES
"  History     Chief Complaint:  Leg Pain and Shortness of Breath    HPI   Cody Bolton is a 43 year old male with a history of diabetes mellitus, hyperlipidemia, and DVT who presents with shortness of breath and leg pain. The patient reports that he developed right leg pain on the evening of 2/2. He was seen in the ED yesterday for leg pain and was diagnosed with cellulitis of right lower leg and superficial vein thrombosis. He states that the pain is moving up towards his knee and is worse today. He has been taking abx as prescribed.  Although, he can still feel the same sensation in his ankle as yesterday. His ankle and his knee are moving okay. The patient notes that he sometimes gets medial left thigh pain. Alongside this, he has had some chest pain and tightness today that went into his arm.  He is also short of breath today. The patient denies falls, injuries, recent surgery, or recent travel. The patient reports that he had a DVT a couple of years ago.  He is not on anticoagulation.    Review of External notes    Medications:    Zocor  Metformin   Wellbutrin   Catapres  Trazodone    Past Medical History:    Diabetes Mellitus   Depression   Anxiety   Hyperlipidemia   Cervical radiculopathy   DVT   CARLIE     Past Surgical History:    Appendectomy   Gastric bypass   Gastric bypass revision   Vasectomy   Left hand surgery     Physical Exam   Patient Vitals for the past 24 hrs:   BP Temp Temp src Pulse Resp SpO2 Height Weight   02/04/24 2328 (!) 175/78 -- -- 68 -- 95 % -- --   02/04/24 2216 (!) 173/90 -- -- 73 -- 96 % -- --   02/04/24 2200 (!) 180/101 -- -- 72 -- 96 % -- --   02/04/24 2146 (!) 198/103 98.2  F (36.8  C) Oral 81 18 96 % 1.854 m (6' 1\") (!) 183.4 kg (404 lb 5.2 oz)     Physical Exam  Gen: alert  Neck: normal ROM  CV: RRR, 2+ distal pulses in all 4 extremities  Chest: no tenderness over the chest wall  Pulm: breath sounds equal, lungs clear  Abd: Soft, nontender  Back: no evidence of injury  MSK: " Right lower extremity redness and swelling of the lower leg. Full range of motion in ankle, knee, hip. RLE well perfused.   Skin: redness and warmth to right lower leg, no other rash   Neuro: alert, appropriate conversation and interaction    Emergency Department Course   ECG:  ECG taken at 2159, ECG read at 2207  Normal sinus rhythm  Incomplete right bundle branch block  Borderline ECG  No significant change compared to EKG dated 2/3/2024   Rate 70 bpm. MD interval 152 ms. QRS duration 100 ms. QT/QTc 380/410 ms. P-R-T axes 37 47 15.    Imaging:  CT Chest Pulmonary Embolism w Contrast   Final Result   IMPRESSION:   1.  Mild bilateral PE seen, please refer to above report for location of the PE.      2.  Mild prominence of central pulmonary artery measuring approximately 3.5 cm in greatest radial dimension (normal is 3.2 cm).      3.  No evidence for right heart strain.      4.  Mild mosaic perfusion seen in both lungs which is nonspecific, but most typical for air trapping associated with small airway inflammation.      5.  Pulmonary emboli is a critical finding and Dr. Ba was notified of the above findings at approximately 11:50 PM on 02/04/2024.      US Lower Extremity Venous Duplex Bilateral   Final Result   IMPRESSION:   1.  No deep venous thrombosis in the bilateral lower extremities.      2.  Occlusive superficial thrombophlebitis in the right calf unchanged.         Laboratory:  Labs Ordered and Resulted from Time of ED Arrival to Time of ED Departure   COMPREHENSIVE METABOLIC PANEL - Abnormal       Result Value    Sodium 134 (*)     Potassium 4.2      Carbon Dioxide (CO2) 23      Anion Gap 9      Urea Nitrogen 16.9      Creatinine 0.89      GFR Estimate >90      Calcium 8.5 (*)     Chloride 102      Glucose 216 (*)     Alkaline Phosphatase 123      AST 31      ALT 32      Protein Total 7.3      Albumin 4.0      Bilirubin Total 0.2     CBC WITH PLATELETS AND DIFFERENTIAL - Abnormal    WBC Count 8.2       RBC Count 4.67      Hemoglobin 12.9 (*)     Hematocrit 41.1      MCV 88      MCH 27.6      MCHC 31.4 (*)     RDW 15.1 (*)     Platelet Count 225      % Neutrophils 57      % Lymphocytes 27      % Monocytes 9      % Eosinophils 6      % Basophils 1      % Immature Granulocytes 0      NRBCs per 100 WBC 0      Absolute Neutrophils 4.7      Absolute Lymphocytes 2.2      Absolute Monocytes 0.8      Absolute Eosinophils 0.5      Absolute Basophils 0.1      Absolute Immature Granulocytes 0.0      Absolute NRBCs 0.0     CRP INFLAMMATION - Abnormal    CRP Inflammation 16.22 (*)    TROPONIN T, HIGH SENSITIVITY - Normal    Troponin T, High Sensitivity 7     NT PROBNP INPATIENT - Normal    N terminal Pro BNP Inpatient 145     LACTIC ACID WHOLE BLOOD - Normal    Lactic Acid 1.3        ED Course as of 02/05/24 0004   Sun Feb 04, 2024 2205 I obtained history and examined the patient as noted above.    2350 I spoke with [facility] who informed me of the patient's bilateral PE   Mon Feb 05, 2024   0001 I rechecked the patient and explained findings that he had blood clots in his lungs.    0004 I spoke with Dr. Vickers, hospitalist, regarding the patient's presentation and plan of care.        Interventions:  Medications   heparin ANTICOAGULANT loading dose for  HIGH INTENSITY TREATMENT* Give BEFORE starting heparin infusion (has no administration in time range)   heparin 25,000 units in 0.45% NaCl 250 mL ANTICOAGULANT infusion (has no administration in time range)   iopamidol (ISOVUE-370) solution 500 mL (90 mLs Intravenous $Given 2/4/24 2255)   CT scan flush (100 mLs Intravenous $Given 2/4/24 2255)   iopamidol (ISOVUE-370) solution 500 mL (90 mLs Intravenous $Given 2/4/24 2326)   CT scan flush (100 mLs Intravenous $Given 2/4/24 2326)     Impression & Plan    Independent Interpretation:      Medical Decision Making:  Patient resents for chest pain shortness of breath in the setting of known superficial thrombophlebitis but  evaluation today shows bilateral pulmonary emboli.  Troponin and BNP reassuring and no evidence of right heart strain on CT.  Patient, initial exam was tachypneic and complained of shortness of breath.  For this reason as well as patient morbidities, I feel he warrants admission for monitoring and heparinization.    Patient also with superficial thrombophlebitis to the right lower leg.  Diagnosis likely is yesterday however on my exam today I am less concerned for cellulitis and more suspicious that redness and symptoms are due to the superficial thrombophlebitis.  No fever or leukocytosis.  Minimally elevated CRP.  Will not continue antibiotics at this time as suspect less likely cellulitis.  Discussed with Dr. Vickers admitted for monitoring.    Disposition:  Admission was discussed with and accepted by Dr. Vickers    Diagnosis:    ICD-10-CM    1. Acute pulmonary embolism without acute cor pulmonale, unspecified pulmonary embolism type (H)  I26.99       2. Thrombophlebitis of superficial veins of right lower extremity  I80.01         Scribe Disclosure:  I, Jamie Bolton, am serving as a scribe at 11:11 PM on 2/4/2024 to document services personally performed by Concepcion Ba MD, based on my observations and the provider's statements to me.    Concepcion Ba  February 4, 2024     Concepcion Ba MD  02/05/24 0042

## 2024-02-05 NOTE — CONSULTS
Patient has [facility] ([facility]) through an employer.    Xarelto/Eliquis:  $75/mo. Upon receipt of RX Discharge Pharmacy can provide copay savings card from [employer] or [employer], respectively, to reduce this to $10/mo.    Please consult with floor pharmacist regarding contraindication questions.    Jenny Solis  Pharmacy Technician/Liaison, Discharge Pharmacy   [phone] (voice or text)  [email or web address]

## 2024-02-05 NOTE — PROGRESS NOTES
Patient seen and examined at bedside.  H&P reviewed.  Presented with unprovoked bilateral PE.  Given his weight he is not a candidate for DOACs and will transition him from heparin drip to Lovenox subcutaneous this evening and start Coumadin.  He will need to go home with Coumadin and Lovenox and will need lifelong anticoagulation.    Bharat Blake

## 2024-02-05 NOTE — PLAN OF CARE
Problem: Pain Acute  Goal: Optimal Pain Control and Function  Outcome: Progressing  Intervention: Prevent or Manage Pain  Recent Flowsheet Documentation  Taken 2/5/2024 0758 by Jesús Altamirano RN  Medication Review/Management: medications reviewed  Intervention: Optimize Psychosocial Wellbeing  Recent Flowsheet Documentation  Taken 2/5/2024 0758 by Jesús Altamirano RN  Supportive Measures:   active listening utilized   goal-setting facilitated   relaxation techniques promoted   self-care encouraged   self-reflection promoted     Problem: Comorbidity Management  Goal: Blood Glucose Levels Within Targeted Range  Outcome: Progressing  Intervention: Monitor and Manage Glycemia  Recent Flowsheet Documentation  Taken 2/5/2024 0758 by Jesús Altamirano RN  Glycemic Management: blood glucose monitored  Medication Review/Management: medications reviewed  Goal: Blood Pressure in Desired Range  Outcome: Progressing  Intervention: Maintain Blood Pressure Management  Recent Flowsheet Documentation  Taken 2/5/2024 0758 by Jesús Altamirano RN  Medication Review/Management: medications reviewed      Problem: Adult Inpatient Plan of Care  Goal: Absence of Hospital-Acquired Illness or Injury  Intervention: Identify and Manage Fall Risk  Recent Flowsheet Documentation  Taken 2/5/2024 0758 by Jesús Altamirano RN  Safety Promotion/Fall Prevention:   activity supervised   assistive device/personal items within reach   lighting adjusted   nonskid shoes/slippers when out of bed   patient and family education   safety round/check completed   room organization consistent  Intervention: Prevent Skin Injury  Recent Flowsheet Documentation  Taken 2/5/2024 0758 by Jesús Altamirano RN  Body Position: position changed independently  Device Skin Pressure Protection:   absorbent pad utilized/changed   positioning supports utilized  Intervention: Prevent and Manage VTE (Venous Thromboembolism) Risk  Recent Flowsheet  "Documentation  Taken 2/5/2024 0758 by Jesús Altamirano, RN  VTE Prevention/Management:   compression stockings off   SCDs (sequential compression devices) off   Goal Outcome Evaluation:  Vss A&Ox4 up A1, ambulates in room with staff. Up to chair with meals.  165. Insulin per iss. LS dim. Denies pain. RLE red/warm/hot. Cms intact. Tele SR. B/p elevated PRIMARY DIAGNOSIS: \"GENERIC\" NURSING  OUTPATIENT/OBSERVATION GOALS TO BE MET BEFORE DISCHARGE:  ADLs back to baseline: Yes    Activity and level of assistance: Up with standby assistance.    Pain status: Pain free.    Return to near baseline physical activity: Yes     Discharge Planner Nurse   Safe discharge environment identified: Yes  Barriers to discharge: Yes       Entered by: Jesús Altamirano RN 02/05/2024 1:57 PM     Please review provider order for any additional goals.   Nurse to notify provider when observation goals have been met and patient is ready for discharge.                        "

## 2024-02-05 NOTE — ED TRIAGE NOTES
Pt has known cellulitis and superficial blood clot in right lower leg, pt feels the pain is worse and visually the vein looks larger today to pt. Pt also endorses dyspnea on exertion, chest tightness that radiates into left arm, and pain spreading up from where blood clot is into upper thigh. Not on blood thinners. Is taking prescribed antibiotics. A&Ox4.

## 2024-02-05 NOTE — PROGRESS NOTES
PRIMARY DIAGNOSIS: DVT  OUTPATIENT/OBSERVATION GOALS TO BE MET BEFORE DISCHARGE:  1. Anticoagulant treatment initiated: Yes; Heparin gtt    2. Diagnostic testing complete (if applicable): Yes    3. Adequate home care or support arranged for LMWH administration: N/A    4. Return to near baseline physical activity: No    5. Pain Status: Improved-controlled with oral pain medications.    Discharge Planner Nurse   Safe discharge environment identified: No  Barriers to discharge: Yes       Entered by: Esau Avelar RN 02/05/2024 2:37 AM     Please review provider order for any additional goals.   Nurse to notify provider when observation goals have been met and patient is ready for discharge.

## 2024-02-05 NOTE — PHARMACY-ADMISSION MEDICATION HISTORY
Pharmacist Admission Medication History    Admission medication history is complete. The information provided in this note is only as accurate as the sources available at the time of the update.    Information Source(s): Patient and CareEverywhere/SureScripts via in-person    Pertinent Information: Patient recently refilled bupropion but had run out of it that is why the patient hadn't taken it for a couple of weeks.    Changes made to PTA medication list:  Added: all except Keflex  Deleted: calcium, vitamin D, cyanocobalamin, multivitamin  Changed: None    Allergies reviewed with patient and updates made in EHR: yes    Medication History Completed By: Reanna Bills [facility] 2/5/2024 10:04 AM    Prior to Admission medications    Medication Sig Last Dose Taking? Auth Provider Long Term End Date   buPROPion (WELLBUTRIN XL) 150 MG 24 hr tablet Take 150 mg by mouth every morning Past Month at - Yes Unknown, Entered By History Yes    cephALEXin (KEFLEX) 500 MG capsule Take 1 capsule (500 mg) by mouth 4 times daily for 7 days 2/4/2024 at x4 Yes Robert Perry MD  2/10/24   cloNIDine (CATAPRES) 0.1 MG tablet Take 0.1 mg by mouth at bedtime 2/3/2024 at pm Yes Unknown, Entered By History Yes    metFORMIN (GLUCOPHAGE XR) 500 MG 24 hr tablet Take 1,000 mg by mouth daily (with dinner) 2/3/2024 at pm Yes Unknown, Entered By History Yes    simvastatin (ZOCOR) 20 MG tablet Take 20 mg by mouth at bedtime 2/3/2024 at pm Yes Unknown, Entered By History Yes    traZODone (DESYREL) 100 MG tablet Take 200 mg by mouth at bedtime 2/3/2024 at pm Yes Unknown, Entered By History Yes

## 2024-02-05 NOTE — PLAN OF CARE
VSS except elevated BP on RA. A&Ox4. SBA. Refused bed alarm, call appropriately, c/o pain in right LE, warm and red. Also c/o slight tenderness in left LE and left flank. Report some SOB. LS dim. Heparin gtt 1800 units/ hr. CPAP at night. Tele, SR.     Plan: Observation, discharge TBD.     PRIMARY DIAGNOSIS: DVT  OUTPATIENT/OBSERVATION GOALS TO BE MET BEFORE DISCHARGE:  1. Anticoagulant treatment initiated: Yes; heparin gtt    2. Diagnostic testing complete (if applicable): Yes    3. Adequate home care or support arranged for LMWH administration: N/A    4. Return to near baseline physical activity: No    5. Pain Status: improved 2/10 pain, declined medication intervention    Discharge Planner Nurse   Safe discharge environment identified:  N/A  Barriers to discharge: Yes       Entered by: Esau Avelar RN 02/05/2024 6:15 AM     Please review provider order for any additional goals.   Nurse to notify provider when observation goals have been met and patient is ready for discharge.  Goal Outcome Evaluation:

## 2024-02-06 VITALS
HEART RATE: 66 BPM | TEMPERATURE: 97.8 F | WEIGHT: 315 LBS | OXYGEN SATURATION: 93 % | SYSTOLIC BLOOD PRESSURE: 155 MMHG | HEIGHT: 73 IN | BODY MASS INDEX: 41.75 KG/M2 | DIASTOLIC BLOOD PRESSURE: 79 MMHG | RESPIRATION RATE: 20 BRPM

## 2024-02-06 LAB
CREAT SERPL-MCNC: 0.93 MG/DL (ref 0.67–1.17)
EGFRCR SERPLBLD CKD-EPI 2021: >90 ML/MIN/1.73M2
GLUCOSE BLDC GLUCOMTR-MCNC: 146 MG/DL (ref 70–99)
GLUCOSE BLDC GLUCOMTR-MCNC: 154 MG/DL (ref 70–99)
GLUCOSE BLDC GLUCOMTR-MCNC: 158 MG/DL (ref 70–99)
HOLD SPECIMEN: NORMAL
INR PPP: 1.16 (ref 0.85–1.15)

## 2024-02-06 PROCEDURE — 250N000013 HC RX MED GY IP 250 OP 250 PS 637: Performed by: HOSPITALIST

## 2024-02-06 PROCEDURE — 82565 ASSAY OF CREATININE: CPT | Performed by: STUDENT IN AN ORGANIZED HEALTH CARE EDUCATION/TRAINING PROGRAM

## 2024-02-06 PROCEDURE — 250N000013 HC RX MED GY IP 250 OP 250 PS 637: Performed by: STUDENT IN AN ORGANIZED HEALTH CARE EDUCATION/TRAINING PROGRAM

## 2024-02-06 PROCEDURE — 96372 THER/PROPH/DIAG INJ SC/IM: CPT | Performed by: STUDENT IN AN ORGANIZED HEALTH CARE EDUCATION/TRAINING PROGRAM

## 2024-02-06 PROCEDURE — 99239 HOSP IP/OBS DSCHRG MGMT >30: CPT | Performed by: STUDENT IN AN ORGANIZED HEALTH CARE EDUCATION/TRAINING PROGRAM

## 2024-02-06 PROCEDURE — 250N000011 HC RX IP 250 OP 636: Performed by: STUDENT IN AN ORGANIZED HEALTH CARE EDUCATION/TRAINING PROGRAM

## 2024-02-06 PROCEDURE — 36415 COLL VENOUS BLD VENIPUNCTURE: CPT | Performed by: STUDENT IN AN ORGANIZED HEALTH CARE EDUCATION/TRAINING PROGRAM

## 2024-02-06 PROCEDURE — 82962 GLUCOSE BLOOD TEST: CPT

## 2024-02-06 PROCEDURE — G0378 HOSPITAL OBSERVATION PER HR: HCPCS

## 2024-02-06 PROCEDURE — 85610 PROTHROMBIN TIME: CPT | Performed by: STUDENT IN AN ORGANIZED HEALTH CARE EDUCATION/TRAINING PROGRAM

## 2024-02-06 RX ORDER — ENOXAPARIN SODIUM 150 MG/ML
0.75 INJECTION SUBCUTANEOUS EVERY 12 HOURS
Qty: 12.6 ML | Refills: 0 | Status: SHIPPED | OUTPATIENT
Start: 2024-02-06 | End: 2024-02-06

## 2024-02-06 RX ORDER — WARFARIN SODIUM 10 MG/1
10 TABLET ORAL DAILY
Qty: 10 TABLET | Refills: 0 | Status: SHIPPED | OUTPATIENT
Start: 2024-02-06 | End: 2024-02-06

## 2024-02-06 RX ORDER — ENOXAPARIN SODIUM 150 MG/ML
0.75 INJECTION SUBCUTANEOUS EVERY 12 HOURS
Qty: 12.6 ML | Refills: 0 | Status: SHIPPED | OUTPATIENT
Start: 2024-02-06 | End: 2024-02-09

## 2024-02-06 RX ORDER — WARFARIN SODIUM 10 MG/1
10 TABLET ORAL DAILY
Qty: 10 TABLET | Refills: 0 | Status: SHIPPED | OUTPATIENT
Start: 2024-02-06 | End: 2024-02-09

## 2024-02-06 RX ADMIN — BUPROPION 150 MG: 150 TABLET, EXTENDED RELEASE ORAL at 08:23

## 2024-02-06 RX ADMIN — ENOXAPARIN SODIUM 135 MG: 150 INJECTION SUBCUTANEOUS at 08:20

## 2024-02-06 RX ADMIN — INSULIN ASPART 1 UNITS: 100 INJECTION, SOLUTION INTRAVENOUS; SUBCUTANEOUS at 08:20

## 2024-02-06 RX ADMIN — ACETAMINOPHEN 650 MG: 325 TABLET, FILM COATED ORAL at 05:49

## 2024-02-06 RX ADMIN — ACETAMINOPHEN 650 MG: 325 TABLET, FILM COATED ORAL at 10:28

## 2024-02-06 ASSESSMENT — ACTIVITIES OF DAILY LIVING (ADL)
ADLS_ACUITY_SCORE: 23

## 2024-02-06 NOTE — PROGRESS NOTES
Alert and oriented x 4 VSS . Uses BiPAP/CPAP PRN . Reported right sided throbbing headache 6/10 that radiates to R jaw . Tylenol given for headache . Walked on the hallway with SBA and tolerated well . Denied chest pain , SOB or calf pain .   Tele : SR

## 2024-02-06 NOTE — DISCHARGE SUMMARY
[facility]  Discharge Summary  Name: Cody Bolton    MRN: [id number]  YOB: 1980    Age: 43 year old  Date of Discharge:  2/6/2024  Date of Admission: 2/4/2024  Primary Care Provider: No Ref-Primary, Physician  Discharge Physician:  Bharat Blake MD  Discharging Service:  Hospitalist      Hospital Course/Discharge Diagnoses:    Unprovoked bilateral PE  Right lower extremity cellulitis  Diabetes mellitus  Hyperlipidemia  Depression and anxiety  Sleep apnea, on CPAP.    Discharge Disposition:  Discharged to home     Allergies:  No Known Allergies     Discharge Medications:        Review of your medicines        START taking        Dose / Directions   enoxaparin ANTICOAGULANT 150 MG/ML syringe  Commonly known as: LOVENOX      Dose: 0.75 mg/kg  Inject 0.9 mLs (135 mg) Subcutaneous every 12 hours Stop Lovenox once INR is more than 2  Quantity: 12.6 mL  Refills: 0     warfarin ANTICOAGULANT 10 MG tablet  Commonly known as: COUMADIN      Dose: 10 mg  Take 1 tablet (10 mg) by mouth daily  Quantity: 10 tablet  Refills: 0            CONTINUE these medicines which have NOT CHANGED        Dose / Directions   buPROPion 150 MG 24 hr tablet  Commonly known as: WELLBUTRIN XL      Dose: 150 mg  Take 150 mg by mouth every morning  Refills: 0     cephALEXin 500 MG capsule  Commonly known as: KEFLEX      Dose: 500 mg  Take 1 capsule (500 mg) by mouth 4 times daily for 7 days  Quantity: 28 capsule  Refills: 0     cloNIDine 0.1 MG tablet  Commonly known as: CATAPRES      Dose: 0.1 mg  Take 0.1 mg by mouth at bedtime  Refills: 0     metFORMIN 500 MG 24 hr tablet  Commonly known as: GLUCOPHAGE XR      Dose: 1,000 mg  Take 1,000 mg by mouth daily (with dinner)  Refills: 0     simvastatin 20 MG tablet  Commonly known as: ZOCOR      Dose: 20 mg  Take 20 mg by mouth at bedtime  Refills: 0     traZODone 100 MG tablet  Commonly known as: DESYREL      Dose: 200 mg  Take 200 mg by mouth at bedtime  Refills: 0         "       Where to get your medicines        These medications were sent to [facility] [place], [place] - [address] [address]  [id number] [facility] [address]      Phone: [phone]   enoxaparin ANTICOAGULANT 150 MG/ML syringe  warfarin ANTICOAGULANT 10 MG tablet         Condition on Discharge:  Discharge condition: Stable       Code status on discharge: Full Code     History of Illness:    43-year-old male with history of diabetes mellitus type 2, sleep apnea, hyperlipidemia, depression and anxiety and previous provoked DVT who was seen in ER 2 days prior for right lower extremity leg swelling and pain and was diagnosed with cellulitis and was discharged with cephalexin.  Later patient noticed worsening shortness of breath with pleuritic chest pain and came back to ER.    Patient was hemodynamically stable but CT chest showed mild bilateral PE with prominence of the central pulmonary artery.  There was also occlusive superficial thrombophlebitis in the right calf but no DVT.  \  While in the hospital, the patient was treated with heparin drip and was transitioned to Lovenox/Coumadin for discharge.  Patient was not a candidate for DOAC's due to weight more than 120 kg.  Patient will need lifelong anticoagulation as this is a second DVT.    Patient has made an appointment with Lucrecia Kauffman on 2/6 and will get his INR checked.  He will be discharged with 1 week supply of Lovenox but he needs to stop Lovenox if his INR is more than 2.    He can finish his 1 week course of cephalexin.      Physical Exam:  Vital signs:  Temp: 97.8  F (36.6  C) Temp src: Oral BP: (!) 155/79 Pulse: 66   Resp: 20 SpO2: 93 % O2 Device: None (Room air)   Height: 185.4 cm (6' 1\") Weight: (!) 182.4 kg (402 lb 3.2 oz) (Simultaneous filing. User may not have seen previous data.)  Estimated body mass index is 53.06 kg/m  as calculated from the following:    Height as of this encounter: 1.854 m (6' 1\").    Weight " as of this encounter: 182.4 kg (402 lb 3.2 oz).    Wt Readings from Last 1 Encounters:   02/05/24 (!) 182.4 kg (402 lb 3.2 oz)     General: Alert, awake, no acute distress.  HEENT: NC/AT, eyes anicteric, external occular movements intact, face symmetric.  Dentition WNL, MM moist.  Cardiac: RRR, S1, S2.  No murmurs appreciated.  Pulmonary: Normal chest rise, normal work of breathing.  Lungs CTA BL  Abdomen: soft, non-tender, non-distended.  Bowel Sounds Present.  No guarding.  Extremities: no deformities.  Warm, well perfused.  Skin: no rashes or lesions noted.  Warm and Dry.  Neuro: No focal deficits noted.  Speech clear.  Coordination and strength grossly normal.  Psych: Appropriate affect.      Imaging:  Results for orders placed or performed during the hospital encounter of 02/04/24   CT Chest Pulmonary Embolism w Contrast    Narrative    EXAM: CT CHEST PULMONARY EMBOLISM W CONTRAST  LOCATION: [facility]  DATE: 2/4/2024    INDICATION: chest pain, shortness of breath and lower ext swelling; shortness of breath and lower ext swelling  COMPARISON: None.  TECHNIQUE: CT chest pulmonary angiogram during arterial phase injection of IV contrast. Multiplanar reformats and MIP reconstructions were performed. Dose reduction techniques were used.   CONTRAST: 180mL Isovue 370    FINDINGS:  ANGIOGRAM CHEST: There is some mild bilateral PE seen this would include a small/thin saddle embolus in the peripheral aspect of the left pulmonary artery which extends into the left lobar pulmonary arteries. There is mild PE seen in the right upper   lobar pulmonary artery and 2 segmental pulmonary arteries. There is mild PE seen involving the right lower lobe lobar pulmonary artery and 3 segmental and subsegmental pulmonary arteries. There is slight prominence of the main pulmonary artery measuring   approximately 3.5 cm in greatest radial dimension. There is no evidence for right heart strain.    LUNGS AND PLEURA:  There is mild mosaic perfusion seen in both lungs which is nonspecific, but most typical for air trapping associated with small airway inflammation.    MEDIASTINUM/AXILLAE: Normal.    CORONARY ARTERY CALCIFICATION: Minimal    UPPER ABDOMEN: Prior postoperative changes of the GI tract with no complications identified.    MUSCULOSKELETAL: Mild to moderate scattered hypertrophic changes to include the spine.      Impression    IMPRESSION:  1.  Mild bilateral PE seen, please refer to above report for location of the PE.    2.  Mild prominence of central pulmonary artery measuring approximately 3.5 cm in greatest radial dimension (normal is 3.2 cm).    3.  No evidence for right heart strain.    4.  Mild mosaic perfusion seen in both lungs which is nonspecific, but most typical for air trapping associated with small airway inflammation.    5.  Pulmonary emboli is a critical finding and Dr. Ba was notified of the above findings at approximately 11:50 PM on 02/04/2024.   US Lower Extremity Venous Duplex Bilateral    Narrative    EXAM: US LOWER EXTREMITY VENOUS DUPLEX BILATERAL  LOCATION: [facility]  DATE: 2/4/2024    INDICATION: leg swelling and pain  COMPARISON: 02/03/2024  TECHNIQUE: Venous Duplex ultrasound of bilateral lower extremities with and without compression, augmentation and duplex. Color flow and spectral Doppler with waveform analysis performed.    FINDINGS: Exam includes the common femoral, femoral, popliteal veins as well as segmentally visualized deep calf veins and greater saphenous vein.     RIGHT: No deep vein thrombosis. Superficial thrombophlebitis again seen in the varicosity in the proximal calf extending to the distal calf.. No popliteal cyst.    LEFT: No deep vein thrombosis. No superficial thrombophlebitis. No popliteal cyst.      Impression    IMPRESSION:  1.  No deep venous thrombosis in the bilateral lower extremities.    2.  Occlusive superficial thrombophlebitis  in the right calf unchanged.        Consultations:  No consultations were requested during this admission         Recent Lab Results:  Recent Labs   Lab 02/04/24  2209 02/03/24  1229   WBC 8.2 7.1   HGB 12.9* 12.8*   HCT 41.1 40.2   MCV 88 86    220          Lab Results   Component Value Date     02/04/2024     02/03/2024    Lab Results   Component Value Date    CHLORIDE 102 02/04/2024    CHLORIDE 101 02/03/2024    Lab Results   Component Value Date    BUN 16.9 02/04/2024    BUN 9.3 02/03/2024      Lab Results   Component Value Date    POTASSIUM 4.2 02/04/2024    POTASSIUM 4.0 02/03/2024    Lab Results   Component Value Date    CO2 23 02/04/2024    CO2 25 02/03/2024    Lab Results   Component Value Date    CR 0.93 02/06/2024    CR 0.84 02/05/2024    CR 0.89 02/04/2024             Pending Results:    Unresulted Labs Ordered in the Past 30 Days of this Admission       Date and Time Order Name Status Description    2/3/2024 12:19 PM Blood Culture Peripheral Blood Preliminary              Discharge Instructions and Follow-Up:   Discharge Procedure Orders   Reason for your hospital stay   Order Comments: Pulmonary embolism.     Follow-up and recommended labs and tests    Order Comments: Follow up with primary care provider, Physician No Ref-Primary, within 3 days for hospital follow- up.  The following labs/tests are recommended: CBC, BMP and INR.     Activity   Order Comments: Your activity upon discharge: activity as tolerated     Order Specific Question Answer Comments   Is discharge order? Yes      Diet   Order Comments: Follow this diet upon discharge: Orders Placed This Encounter      Regular Diet Adult     Order Specific Question Answer Comments   Is discharge order? Yes      Total time spent in face to face contact with the patient and coordinating discharge was:  35  Minutes.

## 2024-02-06 NOTE — CONSULTS
Care Management Discharge Note    Discharge Date: 02/06/2024       Discharge Disposition: home with spouse     Discharge Services:  follow up appt    Discharge DME:  none    Discharge Transportation: self     Patient/Family in Agreement with the Plan:  yes    Handoff Referral Completed: No    Additional Information:  CM following for discharge planning needs for new PCP. Per MD, patient needs follow up scheduled on Friday 2/9 to establish care and needs lab work. Met with patient at bedside to discuss need for follow up Friday, PCP and where he would like to follow up. He verified that he does not currently have a PCP. He states he has changed insurances and not sure where he wants to go. Explained that he needs follow up with labs scheduled on Friday. We discussed that he has [employer] insurance which is usually associated with [facility]. He would like to call his insurance to verify which clinic he should go to.     CM stopped back to verify clinic for follow up. Patient states he has made his own follow up clinic appt at [facility] in [place] on Friday 2/9 at 10:20am. He states he will call to establish PCP closer to home with [facility] on his own. Updated MD. Patient eager to discharge today.            Reanna Glover RN BSN CM  Inpatient Care Coordination  [facility]  [phone]

## 2024-02-06 NOTE — PHARMACY-ANTICOAGULATION SERVICE
Clinical Pharmacy- Warfarin Discharge Note  This patient is currently on warfarin for the treatment of  DVT/PE treatment .  INR Goal= 2-3  Expected length of therapy lifetime.    New start.      Anticoagulation Dose History          Latest Ref Rng & Units 2/3/2024 2/5/2024 2/6/2024   Recent Dosing and Labs   warfarin ANTICOAGULANT (COUMADIN) 5 MG tablet - - 10 mg, $Given -   INR 0.85 - 1.15 1.19  1.19  1.15  1.16        Vitamin K doses administered during the last 7 days: Zero  FFP administered during the last 7 days: Zero        Warfarin Discharge Plan:  Discussed with discharging provider, recommend to continue warfarin 10mg daily and enoxaparin 135mg BID (reduced weight-based dose d/t extreme obesity) and recheck INR to adjust dose in 2-3 days.  Appears an appointment was scheduled for Friday 02/09/24.      Mari Welsh.D., BCPS

## 2024-02-06 NOTE — PROGRESS NOTES
"PRIMARY DIAGNOSIS: \"DVT/PE  OUTPATIENT/OBSERVATION GOALS TO BE MET BEFORE DISCHARGE:  ADLs back to baseline: No    Activity and level of assistance: Up with standby assistance.    Pain status: Improved-controlled with oral pain medications.    Return to near baseline physical activity: Yes     Discharge Planner Nurse   Safe discharge environment identified: Yes  Barriers to discharge: No       Entered by: Makayla Elias RN 02/05/2024 7:01 PM     Please review provider order for any additional goals.   Nurse to notify provider when observation goals have been met and patient is ready for discharge.  "

## 2024-02-06 NOTE — DISCHARGE SUMMARY
[facility]  Discharge Summary  Name: Cody Bolton    MRN: [id number]  YOB: 1980    Age: 43 year old  Date of Discharge:  2/6/2024  Date of Admission: 2/4/2024  Primary Care Provider: No Ref-Primary, Physician  Discharge Physician:  Bharat Blake MD  Discharging Service:  Hospitalist      Hospital Course/Discharge Diagnoses:    Unprovoked bilateral PE  Right lower extremity cellulitis  Diabetes mellitus  Hyperlipidemia  Depression and anxiety  Sleep apnea, on CPAP.    Discharge Disposition:  Discharged to home     Allergies:  No Known Allergies     Discharge Medications:        Review of your medicines        START taking        Dose / Directions   enoxaparin ANTICOAGULANT 150 MG/ML syringe  Commonly known as: LOVENOX      Dose: 0.75 mg/kg  Inject 0.9 mLs (135 mg) Subcutaneous every 12 hours Stop Lovenox once INR is more than 2  Quantity: 12.6 mL  Refills: 0     warfarin ANTICOAGULANT 10 MG tablet  Commonly known as: COUMADIN      Dose: 10 mg  Take 1 tablet (10 mg) by mouth daily  Quantity: 10 tablet  Refills: 0            CONTINUE these medicines which have NOT CHANGED        Dose / Directions   buPROPion 150 MG 24 hr tablet  Commonly known as: WELLBUTRIN XL      Dose: 150 mg  Take 150 mg by mouth every morning  Refills: 0     cephALEXin 500 MG capsule  Commonly known as: KEFLEX      Dose: 500 mg  Take 1 capsule (500 mg) by mouth 4 times daily for 7 days  Quantity: 28 capsule  Refills: 0     cloNIDine 0.1 MG tablet  Commonly known as: CATAPRES      Dose: 0.1 mg  Take 0.1 mg by mouth at bedtime  Refills: 0     metFORMIN 500 MG 24 hr tablet  Commonly known as: GLUCOPHAGE XR      Dose: 1,000 mg  Take 1,000 mg by mouth daily (with dinner)  Refills: 0     simvastatin 20 MG tablet  Commonly known as: ZOCOR      Dose: 20 mg  Take 20 mg by mouth at bedtime  Refills: 0     traZODone 100 MG tablet  Commonly known as: DESYREL      Dose: 200 mg  Take 200 mg by mouth at bedtime  Refills: 0         "       Where to get your medicines        These medications were sent to [facility] [place], [place] - [address] [address]  [address] [address] [address]      Phone: [phone]   enoxaparin ANTICOAGULANT 150 MG/ML syringe  warfarin ANTICOAGULANT 10 MG tablet         Condition on Discharge:  Discharge condition: Stable       Code status on discharge: Full Code     History of Illness:    43-year-old male with history of diabetes mellitus type 2, sleep apnea, hyperlipidemia, depression and anxiety and previous provoked DVT who was seen in ER 2 days prior for right lower extremity leg swelling and pain and was diagnosed with cellulitis and was discharged with cephalexin.  Later patient noticed worsening shortness of breath with pleuritic chest pain and came back to ER.    Patient was hemodynamically stable but CT chest showed mild bilateral PE with prominence of the central pulmonary artery.  There was also occlusive superficial thrombophlebitis in the right calf but no DVT.  \  While in the hospital, the patient was treated with heparin drip and was transitioned to Lovenox/Coumadin for discharge.  Patient was not a candidate for DOAC's due to weight more than 120 kg.  Patient will need lifelong anticoagulation as this is a second DVT.    He can finish his 1 week course of cephalexin.      Physical Exam:  Vital signs:  Temp: 97.8  F (36.6  C) Temp src: Oral BP: (!) 155/79 Pulse: 66   Resp: 20 SpO2: 93 % O2 Device: None (Room air)   Height: 185.4 cm (6' 1\") Weight: (!) 182.4 kg (402 lb 3.2 oz) (Simultaneous filing. User may not have seen previous data.)  Estimated body mass index is 53.06 kg/m  as calculated from the following:    Height as of this encounter: 1.854 m (6' 1\").    Weight as of this encounter: 182.4 kg (402 lb 3.2 oz).    Wt Readings from Last 1 Encounters:   02/05/24 (!) 182.4 kg (402 lb 3.2 oz)     General: Alert, awake, no acute distress.  HEENT: NC/AT, eyes " anicteric, external occular movements intact, face symmetric.  Dentition WNL, MM moist.  Cardiac: RRR, S1, S2.  No murmurs appreciated.  Pulmonary: Normal chest rise, normal work of breathing.  Lungs CTA BL  Abdomen: soft, non-tender, non-distended.  Bowel Sounds Present.  No guarding.  Extremities: no deformities.  Warm, well perfused.  Skin: no rashes or lesions noted.  Warm and Dry.  Neuro: No focal deficits noted.  Speech clear.  Coordination and strength grossly normal.  Psych: Appropriate affect.      Imaging:  Results for orders placed or performed during the hospital encounter of 02/04/24   CT Chest Pulmonary Embolism w Contrast    Narrative    EXAM: CT CHEST PULMONARY EMBOLISM W CONTRAST  LOCATION: [facility]  DATE: 2/4/2024    INDICATION: chest pain, shortness of breath and lower ext swelling; shortness of breath and lower ext swelling  COMPARISON: None.  TECHNIQUE: CT chest pulmonary angiogram during arterial phase injection of IV contrast. Multiplanar reformats and MIP reconstructions were performed. Dose reduction techniques were used.   CONTRAST: 180mL Isovue 370    FINDINGS:  ANGIOGRAM CHEST: There is some mild bilateral PE seen this would include a small/thin saddle embolus in the peripheral aspect of the left pulmonary artery which extends into the left lobar pulmonary arteries. There is mild PE seen in the right upper   lobar pulmonary artery and 2 segmental pulmonary arteries. There is mild PE seen involving the right lower lobe lobar pulmonary artery and 3 segmental and subsegmental pulmonary arteries. There is slight prominence of the main pulmonary artery measuring   approximately 3.5 cm in greatest radial dimension. There is no evidence for right heart strain.    LUNGS AND PLEURA: There is mild mosaic perfusion seen in both lungs which is nonspecific, but most typical for air trapping associated with small airway inflammation.    MEDIASTINUM/AXILLAE: Normal.    CORONARY  ARTERY CALCIFICATION: Minimal    UPPER ABDOMEN: Prior postoperative changes of the GI tract with no complications identified.    MUSCULOSKELETAL: Mild to moderate scattered hypertrophic changes to include the spine.      Impression    IMPRESSION:  1.  Mild bilateral PE seen, please refer to above report for location of the PE.    2.  Mild prominence of central pulmonary artery measuring approximately 3.5 cm in greatest radial dimension (normal is 3.2 cm).    3.  No evidence for right heart strain.    4.  Mild mosaic perfusion seen in both lungs which is nonspecific, but most typical for air trapping associated with small airway inflammation.    5.  Pulmonary emboli is a critical finding and Dr. Ba was notified of the above findings at approximately 11:50 PM on 02/04/2024.   US Lower Extremity Venous Duplex Bilateral    Narrative    EXAM: US LOWER EXTREMITY VENOUS DUPLEX BILATERAL  LOCATION: [facility]  DATE: 2/4/2024    INDICATION: leg swelling and pain  COMPARISON: 02/03/2024  TECHNIQUE: Venous Duplex ultrasound of bilateral lower extremities with and without compression, augmentation and duplex. Color flow and spectral Doppler with waveform analysis performed.    FINDINGS: Exam includes the common femoral, femoral, popliteal veins as well as segmentally visualized deep calf veins and greater saphenous vein.     RIGHT: No deep vein thrombosis. Superficial thrombophlebitis again seen in the varicosity in the proximal calf extending to the distal calf.. No popliteal cyst.    LEFT: No deep vein thrombosis. No superficial thrombophlebitis. No popliteal cyst.      Impression    IMPRESSION:  1.  No deep venous thrombosis in the bilateral lower extremities.    2.  Occlusive superficial thrombophlebitis in the right calf unchanged.        Consultations:  No consultations were requested during this admission         Recent Lab Results:  Recent Labs   Lab 02/04/24  2209 02/03/24  1229   WBC 8.2  7.1   HGB 12.9* 12.8*   HCT 41.1 40.2   MCV 88 86    220          Lab Results   Component Value Date     02/04/2024     02/03/2024    Lab Results   Component Value Date    CHLORIDE 102 02/04/2024    CHLORIDE 101 02/03/2024    Lab Results   Component Value Date    BUN 16.9 02/04/2024    BUN 9.3 02/03/2024      Lab Results   Component Value Date    POTASSIUM 4.2 02/04/2024    POTASSIUM 4.0 02/03/2024    Lab Results   Component Value Date    CO2 23 02/04/2024    CO2 25 02/03/2024    Lab Results   Component Value Date    CR 0.93 02/06/2024    CR 0.84 02/05/2024    CR 0.89 02/04/2024             Pending Results:    Unresulted Labs Ordered in the Past 30 Days of this Admission       Date and Time Order Name Status Description    2/3/2024 12:19 PM Blood Culture Peripheral Blood Preliminary              Discharge Instructions and Follow-Up:   Discharge Procedure Orders   Reason for your hospital stay   Order Comments: Pulmonary embolism.     Follow-up and recommended labs and tests    Order Comments: Follow up with primary care provider, Physician No Ref-Primary, within 3 days for hospital follow- up.  The following labs/tests are recommended: CBC, BMP and INR.     Activity   Order Comments: Your activity upon discharge: activity as tolerated     Order Specific Question Answer Comments   Is discharge order? Yes      Diet   Order Comments: Follow this diet upon discharge: Orders Placed This Encounter      Regular Diet Adult     Order Specific Question Answer Comments   Is discharge order? Yes      Total time spent in face to face contact with the patient and coordinating discharge was:  35  Minutes.

## 2024-02-06 NOTE — PROGRESS NOTES
Discharge instructions reviewed with patient. New medications given to patient. Patient transported to [facility] via , patient able to drive himself home.

## 2024-02-06 NOTE — PLAN OF CARE
"  PRIMARY DIAGNOSIS: \" DVT/ PE  OUTPATIENT/OBSERVATION GOALS TO BE MET BEFORE DISCHARGE:  ADLs back to baseline: No    Activity and level of assistance: Up with standby assistance.    Pain status: Improved-controlled with oral pain medications.    Return to near baseline physical activity: Yes     Discharge Planner Nurse   Safe discharge environment identified: Yes  Barriers to discharge: No       Entered by: Makayla Elias RN 02/05/2024 11:21 PM     Please review provider order for any additional goals.   Nurse to notify provider when observation goals have been met and patient is ready for discharge.Goal Outcome Evaluation:    A&OX4. LS clear. VSS. Oxygen 94% on room air. R.Leg redness and hot to touch. Was on Heparin infusion at 2100 unit/hr. Heparin discontinued, Started patient on Lovenox and Coumadin.  at HS. Reg diet. Tylenol/Oxycodone given for headache. UP with SBA to the recliner. Continent of B&B. Tele SR/ST/BBB. Continue POC and monitoring.                         "

## 2024-02-07 ENCOUNTER — PATIENT OUTREACH (OUTPATIENT)
Dept: CARE COORDINATION | Facility: CLINIC | Age: 44
End: 2024-02-07
Payer: COMMERCIAL

## 2024-02-07 NOTE — PROGRESS NOTES
[facility]: [facility]: Post-Discharge Note  SITUATION                                                      Admission:    Admission Date: 02/04/24   Reason for Admission: worsening shortness of breath with pleuritic chest pain  Discharge:   Discharge Date: 02/06/24  Discharge Diagnosis: Unprovoked bilateral PE    BACKGROUND                                                      Per hospital discharge summary and inpatient provider notes:  Unprovoked bilateral PE  Right lower extremity cellulitis  Diabetes mellitus  Hyperlipidemia  Depression and anxiety  Sleep apnea, on CPAP      ASSESSMENT        Discharge Assessment  How are you doing now that you are home?: Okay.  Patient was quiet, gave mostly one word answers.  How are your symptoms? (Red Flag symptoms escalate to triage hotline per guidelines): Improved  Do you feel your condition is stable enough to be safe at home until your provider visit?: Yes  Does the patient have their discharge instructions? : Yes  Does the patient have questions regarding their discharge instructions? : No  Were you started on any new medications or were there changes to any of your previous medications? : Yes  Does the patient have all of their medications?: Yes  Do you have questions regarding any of your medications? : No  Do you have all of your needed medical supplies or equipment (DME)?  (i.e. oxygen tank, CPAP, cane, etc.): Yes  Discharge follow-up appointment scheduled within 14 calendar days? : Yes  Discharge Follow Up Appointment Date: 02/09/24  Discharge Follow Up Appointment Scheduled with?: Primary Care Provider    Post-op ([facility] Only)  If the patient had a surgery or procedure, do they have any questions for a nurse?: No      PLAN                                                      Outpatient Plan:  Follow up with primary care provider, Physician No Ref-Primary, within 3 days for hospital follow- up. The following labs/tests are recommended:  CBC, BMP and INR.     Future Appointments   Date Time Provider Department Center   2/9/2024 10:40 AM Fredy Veras MD [facility]         For any urgent concerns, please contact our 24 hour nurse triage line: [phone] ([phone])         BENSON Castelan  [phone]

## 2024-02-08 LAB — BACTERIA BLD CULT: NO GROWTH

## 2024-02-09 ENCOUNTER — ANTICOAGULATION THERAPY VISIT (OUTPATIENT)
Dept: ANTICOAGULATION | Facility: CLINIC | Age: 44
End: 2024-02-09

## 2024-02-09 ENCOUNTER — OFFICE VISIT (OUTPATIENT)
Dept: FAMILY MEDICINE | Facility: CLINIC | Age: 44
End: 2024-02-09
Payer: COMMERCIAL

## 2024-02-09 VITALS
TEMPERATURE: 98.4 F | WEIGHT: 315 LBS | RESPIRATION RATE: 20 BRPM | SYSTOLIC BLOOD PRESSURE: 144 MMHG | DIASTOLIC BLOOD PRESSURE: 82 MMHG | OXYGEN SATURATION: 98 % | HEART RATE: 62 BPM | HEIGHT: 73 IN | BODY MASS INDEX: 41.75 KG/M2

## 2024-02-09 DIAGNOSIS — I26.99 ACUTE PULMONARY EMBOLISM WITHOUT ACUTE COR PULMONALE, UNSPECIFIED PULMONARY EMBOLISM TYPE (H): ICD-10-CM

## 2024-02-09 DIAGNOSIS — Z09 HOSPITAL DISCHARGE FOLLOW-UP: Primary | ICD-10-CM

## 2024-02-09 DIAGNOSIS — E11.9 TYPE 2 DIABETES MELLITUS WITHOUT COMPLICATION, WITHOUT LONG-TERM CURRENT USE OF INSULIN (H): ICD-10-CM

## 2024-02-09 DIAGNOSIS — F10.10 ALCOHOL ABUSE, EPISODIC DRINKING BEHAVIOR: ICD-10-CM

## 2024-02-09 DIAGNOSIS — F33.1 MAJOR DEPRESSIVE DISORDER, RECURRENT EPISODE, MODERATE (H): ICD-10-CM

## 2024-02-09 DIAGNOSIS — R03.0 ELEVATED BP WITHOUT DIAGNOSIS OF HYPERTENSION: ICD-10-CM

## 2024-02-09 PROBLEM — E78.5 HYPERLIPIDEMIA: Status: ACTIVE | Noted: 2023-04-07

## 2024-02-09 PROBLEM — Z98.84 HISTORY OF GASTRIC BYPASS: Status: ACTIVE | Noted: 2022-08-05

## 2024-02-09 PROBLEM — G47.33 OBSTRUCTIVE SLEEP APNEA SYNDROME: Status: ACTIVE | Noted: 2017-08-15

## 2024-02-09 LAB
ANION GAP SERPL CALCULATED.3IONS-SCNC: 9 MMOL/L (ref 7–15)
BUN SERPL-MCNC: 9.3 MG/DL (ref 6–20)
CALCIUM SERPL-MCNC: 9 MG/DL (ref 8.6–10)
CHLORIDE SERPL-SCNC: 104 MMOL/L (ref 98–107)
CREAT SERPL-MCNC: 0.81 MG/DL (ref 0.67–1.17)
DEPRECATED HCO3 PLAS-SCNC: 25 MMOL/L (ref 22–29)
EGFRCR SERPLBLD CKD-EPI 2021: >90 ML/MIN/1.73M2
ERYTHROCYTE [DISTWIDTH] IN BLOOD BY AUTOMATED COUNT: 15 % (ref 10–15)
GLUCOSE SERPL-MCNC: 139 MG/DL (ref 70–99)
HCT VFR BLD AUTO: 40.4 % (ref 40–53)
HGB BLD-MCNC: 12.5 G/DL (ref 13.3–17.7)
INR BLD: 1.8 (ref 0.9–1.1)
MCH RBC QN AUTO: 27.5 PG (ref 26.5–33)
MCHC RBC AUTO-ENTMCNC: 30.9 G/DL (ref 31.5–36.5)
MCV RBC AUTO: 89 FL (ref 78–100)
PLATELET # BLD AUTO: 228 10E3/UL (ref 150–450)
POTASSIUM SERPL-SCNC: 4.5 MMOL/L (ref 3.4–5.3)
RBC # BLD AUTO: 4.55 10E6/UL (ref 4.4–5.9)
SODIUM SERPL-SCNC: 138 MMOL/L (ref 135–145)
WBC # BLD AUTO: 6.8 10E3/UL (ref 4–11)

## 2024-02-09 PROCEDURE — 36415 COLL VENOUS BLD VENIPUNCTURE: CPT | Performed by: FAMILY MEDICINE

## 2024-02-09 PROCEDURE — 85610 PROTHROMBIN TIME: CPT | Performed by: FAMILY MEDICINE

## 2024-02-09 PROCEDURE — 36416 COLLJ CAPILLARY BLOOD SPEC: CPT | Performed by: FAMILY MEDICINE

## 2024-02-09 PROCEDURE — 85027 COMPLETE CBC AUTOMATED: CPT | Performed by: FAMILY MEDICINE

## 2024-02-09 PROCEDURE — 99495 TRANSJ CARE MGMT MOD F2F 14D: CPT | Performed by: FAMILY MEDICINE

## 2024-02-09 PROCEDURE — 80048 BASIC METABOLIC PNL TOTAL CA: CPT | Performed by: FAMILY MEDICINE

## 2024-02-09 RX ORDER — ENOXAPARIN SODIUM 150 MG/ML
0.75 INJECTION SUBCUTANEOUS EVERY 12 HOURS
Qty: 7.2 ML | Refills: 0 | Status: SHIPPED | OUTPATIENT
Start: 2024-02-09 | End: 2024-02-13

## 2024-02-09 RX ORDER — TIRZEPATIDE 2.5 MG/.5ML
2.5 INJECTION, SOLUTION SUBCUTANEOUS WEEKLY
Qty: 2 ML | Refills: 0 | Status: SHIPPED | OUTPATIENT
Start: 2024-02-09 | End: 2024-03-02

## 2024-02-09 RX ORDER — WARFARIN SODIUM 10 MG/1
10 TABLET ORAL DAILY
Qty: 90 TABLET | Refills: 0 | Status: SHIPPED | OUTPATIENT
Start: 2024-02-09 | End: 2024-03-11 | Stop reason: DRUGHIGH

## 2024-02-09 NOTE — PROGRESS NOTES
Assessment & Plan   Problem List Items Addressed This Visit       Acute pulmonary embolism without acute cor pulmonale, unspecified pulmonary embolism type (H)     Second unprovoked thromboembolism.  Hospital team recommending chronic anticoagulation.  Novel anticoagulant medications not effective for an individual greater than 120 kg.  Warfarin initially tolerated.  INR subtherapeutic.  Will continue Lovenox dosing for an additional 4 days.  He will need an INR repeat on Monday or Tuesday next week.  I referred him for support from the anticoagulation team.  Will eventually need to transfer to a clinic in the [facility]         Relevant Medications    warfarin ANTICOAGULANT (COUMADIN) 10 MG tablet    enoxaparin ANTICOAGULANT (LOVENOX) 150 MG/ML syringe    Other Relevant Orders    INR    Anticoagulation Clinic Referral    CBC with platelets (Completed)    Basic metabolic panel  (Ca, Cl, CO2, Creat, Gluc, K, Na, BUN)    INR point of care (Completed)    Alcohol abuse, episodic drinking behavior     The patient describes episodes of excessive alcohol.  Leading up to his hospitalization he was drinking much heavier than he ever had before.  He is confident that at least in the short-term he can maintain sobriety.  Referral placed for psychotherapy.  Consider chemical dependency referral.  Patient was not interested in chemical dependency referral today.         Elevated BP without diagnosis of hypertension     Improved from hospitalization.  Recheck at establish care visit.         Relevant Orders    CBC with platelets (Completed)    Basic metabolic panel  (Ca, Cl, CO2, Creat, Gluc, K, Na, BUN)    Hospital discharge follow-up - Primary    Relevant Orders    INR    Anticoagulation Clinic Referral    CBC with platelets (Completed)    Basic metabolic panel  (Ca, Cl, CO2, Creat, Gluc, K, Na, BUN)    Type 2 diabetes mellitus without complication, without long-term current use of insulin (H)     History of  "diabetes that resolved with gastric bypass surgery a number of years ago.  His hemoglobin A1c has climbed.  This is likely contributory to the cellulitis he experienced as part of his hospitalization.  He takes a statin.  He is on an anticoagulant.  Blood pressure is not well-controlled.  Non-smoker.  - Given the weight that he needs to lose I think a GLP-1 receptor agonist is indicated.  He is concerned about cost.  Ozempic was prescribed last year but was prohibitively expensive.  He will meet his deductible given his recent health history.  Tirzepatide (Mounjaro) coverage unknown.  No personal/family history of thyroid cancer.  No personal history of pancreatitis.   -This patient needs primary care.  Referral to a more local clinic placed.  In the meantime, I will respond to any refill request.         Relevant Medications    tirzepatide (MOUNJARO) 2.5 MG/0.5ML pen    reason aspirin not prescribed, intentional,     Other Visit Diagnoses       Major depressive disorder, recurrent episode, moderate (H)        Relevant Orders    Adult Mental Health  Referral              MED REC REQUIRED  Post Medication Reconciliation Status:  Discharge medications reconciled and changed, see notes/orders  BMI  Estimated body mass index is 53 kg/m  as calculated from the following:    Height as of this encounter: 1.854 m (6' 1\").    Weight as of this encounter: 182.2 kg (401 lb 11.2 oz).   Weight management plan: Discussed healthy diet and exercise guidelines      Suhail Ross is a 43 year old, presenting for the following health issues:  Hospital F/U        2/9/2024    10:29 AM   Additional Questions   Roomed by josué         2/7/2024    12:10 PM   Post Discharge Outreach   Admission Date 2/4/2024   Reason for Admission worsening shortness of breath with pleuritic chest pain   Discharge Date 2/6/2024   Discharge Diagnosis Unprovoked bilateral PE   How are you doing now that you are home? Okay.  Patient was quiet, " "gave mostly one word answers.   How are your symptoms? (Red Flag symptoms escalate to triage hotline per guidelines) Improved   Do you feel your condition is stable enough to be safe at home until your provider visit? Yes   Does the patient have their discharge instructions?  Yes   Does the patient have questions regarding their discharge instructions?  No   Were you started on any new medications or were there changes to any of your previous medications?  Yes   Does the patient have all of their medications? Yes   Do you have questions regarding any of your medications?  No   Do you have all of your needed medical supplies or equipment (DME)?  (i.e. oxygen tank, CPAP, cane, etc.) Yes   Discharge follow-up appointment scheduled within 14 calendar days?  Yes   Discharge Follow Up Appointment Date 2/9/2024   Discharge Follow Up Appointment Scheduled with? Primary Care Provider     Hospital Follow-up Visit:    Hospital/Nursing Home/IP Rehab Facility: [facility]  Date of Admission: 2/4  Date of Discharge: 2/6  Reason(s) for Admission: PE, cellulitis    Was your hospitalization related to COVID-19? No   Problems taking medications regularly:  None  Medication changes since discharge: None  Problems adhering to non-medication therapy:  None    Summary of hospitalization:  [facility] discharge summary reviewed  Diagnostic Tests/Treatments reviewed.  Follow up needed: INR, BMP, CBC  Other Healthcare Providers Involved in Patient s Care:          psychotherapy  Update since discharge: improved.     Plan of care communicated with patient         Objective    BP (!) 144/82 (BP Location: Left arm, Patient Position: Sitting, Cuff Size: Adult Large)   Pulse 62   Temp 98.4  F (36.9  C) (Oral)   Resp 20   Ht 1.854 m (6' 1\")   Wt (!) 182.2 kg (401 lb 11.2 oz)   SpO2 98%   BMI 53.00 kg/m    Body mass index is 53 kg/m .  Physical Exam  Nursing note reviewed.   Constitutional:       " General: He is not in acute distress.     Appearance: Normal appearance. He is not ill-appearing.   HENT:      Head: Normocephalic and atraumatic.   Eyes:      Extraocular Movements: Extraocular movements intact.      Conjunctiva/sclera: Conjunctivae normal.   Pulmonary:      Effort: Pulmonary effort is normal.   Neurological:      Mental Status: He is alert and oriented to person, place, and time.   Psychiatric:         Attention and Perception: Attention normal.         Mood and Affect: Mood normal.         Speech: Speech normal.         Thought Content: Thought content normal.                  Signed Electronically by: Fredy Veras MD

## 2024-02-09 NOTE — PROGRESS NOTES
"ANTICOAGULATION  MANAGEMENT: NEW REFERRAL      SUBJECTIVE/OBJECTIVE     Cody Bolton, a 43 year old male  is newly referred to [facility].    Anticoagulation:    Previously on warfarin: Yes, does not recall dose (was over 2 years ago)  Warfarin initiation date (approximate): 2/5/24   Indication(s): PE   Goal Range: 2.0-3.0   Anticoagulation Bridge/Overlap: Yes, bridging with Enoxaparin until INR >= 2.0 per hospital discharge instructions   Referring provider: from PCP    General Dietary/Social Hx:    Typical vitamin K intake: consistent     Other dietary considerations: None     Social History:   Social History     Tobacco Use    Smoking status: Never    Smokeless tobacco: Never   Vaping Use    Vaping Use: Never used   Substance Use Topics    Alcohol use: Yes     Comment: Alcoholic Drinks/day: occasionally    Drug use: No       In the past 2 weeks, patient estimates taking medications as instructed % of time: 100%    Results:        Recent labs: (last 7 days)     02/09/24  1110   INR 1.8*       Wt Readings from Last 2 Encounters:   02/09/24 (!) 182.2 kg (401 lb 11.2 oz)   02/05/24 (!) 182.4 kg (402 lb 3.2 oz)      Estimated body mass index is 53 kg/m  as calculated from the following:    Height as of an earlier encounter on 2/9/24: 1.854 m (6' 1\").    Weight as of an earlier encounter on 2/9/24: 182.2 kg (401 lb 11.2 oz).  Lab Results   Component Value Date    AST 31 02/04/2024    ALT 32 02/04/2024    ALBUMIN 4.0 02/04/2024     Lab Results   Component Value Date    CR 0.93 02/06/2024     Estimated Creatinine Clearance: 171.4 mL/min (based on SCr of 0.93 mg/dL).    ASSESSMENT     Goal INR 2-3, standard for indication(s) above  Establishing initial warfarin maintenance dose (on warfarin < 30 days)   Factors that may increase sensitivity to warfarin: none  Factors that may reduce sensitivity to warfarin: Age <55, Male Gender, and Weight > 90 kg  Potential significant drug interactions " with home medications: None noted  subtherapeutic INR result 2/6/24   Warfarin doses taken: Warfarin taken as instructed  Diet: No new diet changes identified  Medication/supplement changes: Rx for Mounjaro sent to pharmacy for patient-- will start pending cost; if started, potential interaction with warfarin    Signs or symptoms of bleeding or clotting: No  Previous INR: Subtherapeutic      PLAN     Dosing Instructions: Continue your current warfarin dose Continue bridging with Enoxaparin with INR in 3 days       Summary  As of 2/9/2024      Full warfarin instructions:  10 mg every day   Next INR check:                 Education provided:   Taking warfarin: purpose of warfarin and how it works, take warfarin at same time each day; preferably in the evening, and prescribed tablet strength and color  Goal range and lab monitoring: goal range and significance of current result, Importance of therapeutic range, and frequency of lab work when starting warfarin and importance of following up when instructed (extends after stability established)  Dietary considerations: importance of consistent vitamin K intake and importance of notifying [facility] to changes in diet  Healthy lifestyle considerations: potential interaction between warfarin and alcohol  Interaction IS anticipated between warfarin and medications such as antibiotics and steroids; also anticipated interaction with Mounjaro if that is started  Symptom monitoring: monitoring for stroke signs and symptoms, when to seek medical attention/emergency care, and if you hit your head or have a bad fall seek emergency care  Importance of notifying anticoagulation clinic for: changes in medications; a sooner lab recheck maybe needed, diarrhea, nausea/vomiting, reduced intake, cold/flu, and/or infections; a sooner lab recheck maybe needed, upcoming surgeries and procedures 2 weeks in advance, and if you did not receive dosing instructions on the same day as your labs were  checked  Lovenox/Heparin education provided: monitoring for signs and symptoms of bruising and bleeding and monitoring for signs and symptoms of clotting   Contact [phone] with any changes, questions or concerns.     Education still needed:   None required      Telephone call with Cody who agrees to plan and repeated back plan correctly. New patient letter sent to patient via [employer]-- does not want a whole enrollment packet mailed.    Lab visit scheduled    Standing orders placed in Epic: Point of Care INR (Lab 5000)    Plan made per [facility] anticoagulation protocol    Rima Camilo, RN  Anticoagulation Clinic  2/9/2024

## 2024-02-09 NOTE — ASSESSMENT & PLAN NOTE
The patient describes episodes of excessive alcohol.  Leading up to his hospitalization he was drinking much heavier than he ever had before.  He is confident that at least in the short-term he can maintain sobriety.  Referral placed for psychotherapy.  Consider chemical dependency referral.  Patient was not interested in chemical dependency referral today.

## 2024-02-09 NOTE — ASSESSMENT & PLAN NOTE
History of diabetes that resolved with gastric bypass surgery a number of years ago.  His hemoglobin A1c has climbed.  This is likely contributory to the cellulitis he experienced as part of his hospitalization.  He takes a statin.  He is on an anticoagulant.  Blood pressure is not well-controlled.  Non-smoker.  - Given the weight that he needs to lose I think a GLP-1 receptor agonist is indicated.  He is concerned about cost.  Ozempic was prescribed last year but was prohibitively expensive.  He will meet his deductible given his recent health history.  Tirzepatide (Mounjaro) coverage unknown.  No personal/family history of thyroid cancer.  No personal history of pancreatitis.   -This patient needs primary care.  Referral to a more local clinic placed.  In the meantime, I will respond to any refill request.

## 2024-02-09 NOTE — ASSESSMENT & PLAN NOTE
Second unprovoked thromboembolism.  Hospital team recommending chronic anticoagulation.  Novel anticoagulant medications not effective for an individual greater than 120 kg.  Warfarin initially tolerated.  INR subtherapeutic.  Will continue Lovenox dosing for an additional 4 days.  He will need an INR repeat on Monday or Tuesday next week.  I referred him for support from the anticoagulation team.  Will eventually need to transfer to a clinic in the [facility] ([place]

## 2024-02-12 ENCOUNTER — LAB (OUTPATIENT)
Dept: LAB | Facility: CLINIC | Age: 44
End: 2024-02-12
Payer: COMMERCIAL

## 2024-02-12 ENCOUNTER — ANTICOAGULATION THERAPY VISIT (OUTPATIENT)
Dept: ANTICOAGULATION | Facility: CLINIC | Age: 44
End: 2024-02-12

## 2024-02-12 DIAGNOSIS — I26.99 ACUTE PULMONARY EMBOLISM WITHOUT ACUTE COR PULMONALE, UNSPECIFIED PULMONARY EMBOLISM TYPE (H): Primary | ICD-10-CM

## 2024-02-12 DIAGNOSIS — Z09 HOSPITAL DISCHARGE FOLLOW-UP: ICD-10-CM

## 2024-02-12 DIAGNOSIS — I26.99 ACUTE PULMONARY EMBOLISM WITHOUT ACUTE COR PULMONALE, UNSPECIFIED PULMONARY EMBOLISM TYPE (H): ICD-10-CM

## 2024-02-12 LAB — INR PPP: 2.33 (ref 0.85–1.15)

## 2024-02-12 PROCEDURE — 36415 COLL VENOUS BLD VENIPUNCTURE: CPT

## 2024-02-12 PROCEDURE — 85610 PROTHROMBIN TIME: CPT

## 2024-02-12 NOTE — PROGRESS NOTES
ANTICOAGULATION MANAGEMENT     Cody WALLACE Young 43 year old male is on warfarin with therapeutic INR result. (Goal INR 2.0-3.0)    Recent labs: (last 7 days)     02/12/24  0850   INR 2.33*       ASSESSMENT     Source(s): Chart Review and Patient/Caregiver Call     Warfarin doses taken: Warfarin taken as instructed  Diet: No new diet changes identified  Medication/supplement changes: None noted  New illness, injury, or hospitalization: No  Signs or symptoms of bleeding or clotting: No  Previous result: Subtherapeutic  Additional findings: New start on day 8 of warfarin and Bridging with Enoxaparin until INR >= 2.0       PLAN     Recommended plan for no diet, medication or health factor changes affecting INR     Dosing Instructions: Continue your current warfarin dose Stop bridging with Enoxaparin with next INR on Friday       Summary  As of 2/12/2024      Full warfarin instructions:  10 mg every day   Next INR check:  2/16/2024               Telephone call with Cody who verbalizes understanding and agrees to plan    Lab visit scheduled    Education provided:   Symptom monitoring: monitoring for bleeding signs and symptoms, monitoring for clotting signs and symptoms, and when to seek medical attention/emergency care  Contact [phone] with any changes, questions or concerns.     Plan made per [facility] anticoagulation protocol    Rima Camilo RN  Anticoagulation Clinic  2/12/2024    _______________________________________________________________________     Anticoagulation Episode Summary       Current INR goal:  2.0-3.0   TTR:  --   Target end date:  Indefinite   Send INR reminders to:  KEO WHITESIDE    Indications    Acute pulmonary embolism without acute cor pulmonale  unspecified pulmonary embolism type (H) [I26.99]             Comments:               Anticoagulation Care Providers       Provider Role Specialty Phone number    Fredy Veras MD Referring Family Medicine [phone]

## 2024-02-14 DIAGNOSIS — E11.9 TYPE 2 DIABETES MELLITUS WITHOUT COMPLICATION, WITHOUT LONG-TERM CURRENT USE OF INSULIN (H): ICD-10-CM

## 2024-02-14 RX ORDER — TIRZEPATIDE 5 MG/.5ML
5 INJECTION, SOLUTION SUBCUTANEOUS WEEKLY
Qty: 2 ML | Refills: 0 | Status: SHIPPED | OUTPATIENT
Start: 2024-02-14 | End: 2024-03-07

## 2024-02-16 ENCOUNTER — LAB (OUTPATIENT)
Dept: LAB | Facility: CLINIC | Age: 44
End: 2024-02-16
Payer: COMMERCIAL

## 2024-02-16 ENCOUNTER — ANTICOAGULATION THERAPY VISIT (OUTPATIENT)
Dept: ANTICOAGULATION | Facility: CLINIC | Age: 44
End: 2024-02-16

## 2024-02-16 DIAGNOSIS — I26.99 ACUTE PULMONARY EMBOLISM WITHOUT ACUTE COR PULMONALE, UNSPECIFIED PULMONARY EMBOLISM TYPE (H): Primary | ICD-10-CM

## 2024-02-16 DIAGNOSIS — I26.99 ACUTE PULMONARY EMBOLISM WITHOUT ACUTE COR PULMONALE, UNSPECIFIED PULMONARY EMBOLISM TYPE (H): ICD-10-CM

## 2024-02-16 LAB — INR PPP: 5.6 (ref 0.85–1.15)

## 2024-02-16 PROCEDURE — 85610 PROTHROMBIN TIME: CPT

## 2024-02-16 PROCEDURE — 36415 COLL VENOUS BLD VENIPUNCTURE: CPT

## 2024-02-16 RX ORDER — WARFARIN SODIUM 5 MG/1
TABLET ORAL
Qty: 60 TABLET | Refills: 1 | Status: SHIPPED | OUTPATIENT
Start: 2024-02-16 | End: 2024-03-11

## 2024-02-16 NOTE — PROGRESS NOTES
ANTICOAGULATION MANAGEMENT     Cody WALLACE Young 43 year old male is on warfarin with supratherapeutic INR result. (Goal INR 2.0-3.0)    Recent labs: (last 7 days)     02/16/24  1015   INR 5.60*       ASSESSMENT     Source(s): Chart Review and Patient/Caregiver Call     Warfarin doses taken: Warfarin taken as instructed, already took 10mg this AM  Diet: No new diet changes identified  Medication/supplement changes: None noted  New illness, injury, or hospitalization: 2/4-2/6 unprovoked bilateral PE  Signs or symptoms of bleeding or clotting: bruised from lovenox shots  Previous result: Therapeutic last visit; previously outside of goal range  Additional findings:  New start on 2/5/24.  Stopped Lovenox shots on 2/12/24.  Sending new script for Warfarin 5mg tabs.       PLAN     Recommended plan for no diet, medication or health factor changes affecting INR     Dosing Instructions: hold 1.5 doses then decrease your warfarin dose (14.3% change) with next INR in 3 days       Summary  As of 2/16/2024      Full warfarin instructions:  2/16: 10 mg; 2/17: Hold; 2/18: 5 mg; Otherwise 10 mg every Mon, Wed, Fri; 7.5 mg all other days   Next INR check:  2/19/2024               Telephone call with Cody who verbalizes understanding and agrees to plan.  Told patient to switch Warfarin to the evening.      Lab visit scheduled 2/19/24    Education provided:   Goal range and lab monitoring: goal range and significance of current result, Importance of therapeutic range, and Importance of following up at instructed interval  Symptom monitoring: monitoring for bleeding signs and symptoms, monitoring for clotting signs and symptoms, monitoring for stroke signs and symptoms, when to seek medical attention/emergency care, and if you hit your head or have a bad fall seek emergency care  Contact [phone] with any changes, questions or concerns.     Plan made with [facility] Pharmacist Tere Bradley RN  Anticoagulation  Clinic  2/16/2024    _______________________________________________________________________     Anticoagulation Episode Summary       Current INR goal:  2.0-3.0   TTR:  --   Target end date:  Indefinite   Send INR reminders to:  KEO WHITESIDE    Indications    Acute pulmonary embolism without acute cor pulmonale  unspecified pulmonary embolism type (H) [I26.99]             Comments:               Anticoagulation Care Providers       Provider Role Specialty Phone number    Fredy Veras MD Referring Family Medicine [phone]

## 2024-02-17 ENCOUNTER — OFFICE VISIT (OUTPATIENT)
Dept: URGENT CARE | Facility: URGENT CARE | Age: 44
End: 2024-02-17
Payer: COMMERCIAL

## 2024-02-17 VITALS
RESPIRATION RATE: 16 BRPM | DIASTOLIC BLOOD PRESSURE: 79 MMHG | HEART RATE: 79 BPM | TEMPERATURE: 97.9 F | SYSTOLIC BLOOD PRESSURE: 145 MMHG | OXYGEN SATURATION: 96 %

## 2024-02-17 DIAGNOSIS — L29.1 SCROTAL ITCHING: Primary | ICD-10-CM

## 2024-02-17 DIAGNOSIS — B37.2 CANDIDIASIS OF SKIN: ICD-10-CM

## 2024-02-17 DIAGNOSIS — R79.1 ELEVATED INR: ICD-10-CM

## 2024-02-17 PROCEDURE — 99213 OFFICE O/P EST LOW 20 MIN: CPT | Performed by: FAMILY MEDICINE

## 2024-02-17 RX ORDER — CLOTRIMAZOLE 1 %
CREAM (GRAM) TOPICAL 2 TIMES DAILY
Qty: 60 G | Refills: 0 | Status: SHIPPED | OUTPATIENT
Start: 2024-02-17 | End: 2024-03-11

## 2024-02-17 ASSESSMENT — PAIN SCALES - GENERAL: PAINLEVEL: NO PAIN (0)

## 2024-02-17 NOTE — PROGRESS NOTES
Chief Complaint   Patient presents with    Penis/Scrotum Problem     Skin swollen red itchy  pain no discharge for one week         Cody was seen today for penis/scrotum problem.    Diagnoses and all orders for this visit:    Scrotal itching    Candidiasis of skin  -     clotrimazole (LOTRIMIN) 1 % external cream; Apply topically 2 times daily    Elevated INR      D/d-candidiasis/cellulitis/balanitis/dermatitis    Reviewed with patient about the finding consider treating with clotrimazole twice a day alternate with bacitracin.  I did also discussed with patient the importance of tighter control of blood sugar.  Patient has not checked his blood sugar recently.  Reviewed recent elevated INR patient is aware of advised to seek help if notices any gum bleeding, rectal bleeding, or blood in the urine.        SUBJECTIVE:  Cody Bolton is a 43 year old male history of diabetes mellitus, recent PE and history of DVT on warfarin most recent INR 5.6 presents with a chief complaint of itching and some pain below the head of the penis he is circumcised.  Onset of symptoms was 1 week(s) ago.    Course of illness: worsening.  Severity moderate  Current and Associated symptoms: scrotal itching   Treatment measures tried include None tried.  Predisposing factors include None.    No past medical history on file.  Current Outpatient Medications   Medication Sig Dispense Refill    buPROPion (WELLBUTRIN XL) 150 MG 24 hr tablet Take 150 mg by mouth every morning      cloNIDine (CATAPRES) 0.1 MG tablet Take 0.1 mg by mouth at bedtime      clotrimazole (LOTRIMIN) 1 % external cream Apply topically 2 times daily 60 g 0    metFORMIN (GLUCOPHAGE XR) 500 MG 24 hr tablet Take 1,000 mg by mouth daily (with dinner)      simvastatin (ZOCOR) 20 MG tablet Take 20 mg by mouth at bedtime      tirzepatide (MOUNJARO) 2.5 MG/0.5ML pen Inject 2.5 mg Subcutaneous once a week for 4 doses 2 mL 0    traZODone (DESYREL) 100 MG tablet Take 200 mg by  mouth at bedtime      warfarin ANTICOAGULANT (COUMADIN) 5 MG tablet 10mg Monday, Wednesday, Friday and 7.5mg all other days as directed by INR clinic 60 tablet 1    reason aspirin not prescribed, intentional, Please choose reason not prescribed from choices below. (Patient not taking: Reported on 2/17/2024)      tirzepatide (MOUNJARO) 5 MG/0.5ML pen Inject 5 mg Subcutaneous once a week for 4 doses (Patient not taking: Reported on 2/17/2024) 2 mL 0    warfarin ANTICOAGULANT (COUMADIN) 10 MG tablet Take 1 tablet (10 mg) by mouth daily (Patient not taking: Reported on 2/17/2024) 90 tablet 0     Social History     Tobacco Use    Smoking status: Never    Smokeless tobacco: Never   Substance Use Topics    Alcohol use: Yes     Comment: Alcoholic Drinks/day: occasionally       ROS:  Review of systems negative except as stated above.    OBJECTIVE:   BP (!) 145/79 (BP Location: Right arm, Patient Position: Sitting, Cuff Size: Adult Large)   Pulse 79   Temp 97.9  F (36.6  C) (Oral)   Resp 16   SpO2 96%   GENERAL APPEARANCE: healthy, alert and no distress  EYES: EOMI,  PERRL, conjunctiva clear   mild erythema noted at the tip of the penis circumcised with no surrounding streaking or drainage noted.,  No penile discharge noted.  PSYCH: mentation appears normal    Ana Varela MD

## 2024-02-17 NOTE — PATIENT INSTRUCTIONS
Apply clotrimazole twice daily  Also apply bacitracin twice daily but not at the same time as clotrimazole   Do this for 7 days   Follow up if  symptoms fail to improve or worsens   Pt understood and agreed with plan      4

## 2024-02-19 ENCOUNTER — ANTICOAGULATION THERAPY VISIT (OUTPATIENT)
Dept: ANTICOAGULATION | Facility: CLINIC | Age: 44
End: 2024-02-19

## 2024-02-19 ENCOUNTER — DOCUMENTATION ONLY (OUTPATIENT)
Dept: LAB | Facility: CLINIC | Age: 44
End: 2024-02-19

## 2024-02-19 ENCOUNTER — LAB (OUTPATIENT)
Dept: LAB | Facility: CLINIC | Age: 44
End: 2024-02-19
Payer: COMMERCIAL

## 2024-02-19 ENCOUNTER — VIRTUAL VISIT (OUTPATIENT)
Dept: BEHAVIORAL HEALTH | Facility: CLINIC | Age: 44
End: 2024-02-19
Attending: FAMILY MEDICINE
Payer: COMMERCIAL

## 2024-02-19 DIAGNOSIS — F33.1 MAJOR DEPRESSIVE DISORDER, RECURRENT EPISODE, MODERATE (H): Primary | ICD-10-CM

## 2024-02-19 DIAGNOSIS — I26.99 ACUTE PULMONARY EMBOLISM WITHOUT ACUTE COR PULMONALE, UNSPECIFIED PULMONARY EMBOLISM TYPE (H): ICD-10-CM

## 2024-02-19 DIAGNOSIS — I26.99 ACUTE PULMONARY EMBOLISM WITHOUT ACUTE COR PULMONALE, UNSPECIFIED PULMONARY EMBOLISM TYPE (H): Primary | ICD-10-CM

## 2024-02-19 LAB — INR BLD: 3 (ref 0.9–1.1)

## 2024-02-19 PROCEDURE — 85610 PROTHROMBIN TIME: CPT

## 2024-02-19 PROCEDURE — 36416 COLLJ CAPILLARY BLOOD SPEC: CPT

## 2024-02-19 PROCEDURE — 90791 PSYCH DIAGNOSTIC EVALUATION: CPT | Mod: 95

## 2024-02-19 ASSESSMENT — PATIENT HEALTH QUESTIONNAIRE - PHQ9
SUM OF ALL RESPONSES TO PHQ QUESTIONS 1-9: 10
SUM OF ALL RESPONSES TO PHQ QUESTIONS 1-9: 10
10. IF YOU CHECKED OFF ANY PROBLEMS, HOW DIFFICULT HAVE THESE PROBLEMS MADE IT FOR YOU TO DO YOUR WORK, TAKE CARE OF THINGS AT HOME, OR GET ALONG WITH OTHER PEOPLE: SOMEWHAT DIFFICULT

## 2024-02-19 ASSESSMENT — COLUMBIA-SUICIDE SEVERITY RATING SCALE - C-SSRS
2. HAVE YOU ACTUALLY HAD ANY THOUGHTS OF KILLING YOURSELF?: NO
1. HAVE YOU WISHED YOU WERE DEAD OR WISHED YOU COULD GO TO SLEEP AND NOT WAKE UP?: YES
2. HAVE YOU ACTUALLY HAD ANY THOUGHTS OF KILLING YOURSELF?: YES
4. HAVE YOU HAD THESE THOUGHTS AND HAD SOME INTENTION OF ACTING ON THEM?: NO
REASONS FOR IDEATION PAST MONTH: DOES NOT APPLY
REASONS FOR IDEATION LIFETIME: MOSTLY TO END OR STOP THE PAIN (YOU COULDN'T GO ON LIVING WITH THE PAIN OR HOW YOU WERE FEELING)
5. HAVE YOU STARTED TO WORK OUT OR WORKED OUT THE DETAILS OF HOW TO KILL YOURSELF? DO YOU INTEND TO CARRY OUT THIS PLAN?: NO
3. HAVE YOU BEEN THINKING ABOUT HOW YOU MIGHT KILL YOURSELF?: NO
6. HAVE YOU EVER DONE ANYTHING, STARTED TO DO ANYTHING, OR PREPARED TO DO ANYTHING TO END YOUR LIFE?: NO
ATTEMPT LIFETIME: NO
TOTAL  NUMBER OF INTERRUPTED ATTEMPTS LIFETIME: NO
1. IN THE PAST MONTH, HAVE YOU WISHED YOU WERE DEAD OR WISHED YOU COULD GO TO SLEEP AND NOT WAKE UP?: NO
TOTAL  NUMBER OF ABORTED OR SELF INTERRUPTED ATTEMPTS LIFETIME: NO

## 2024-02-19 ASSESSMENT — ANXIETY QUESTIONNAIRES
7. FEELING AFRAID AS IF SOMETHING AWFUL MIGHT HAPPEN: SEVERAL DAYS
2. NOT BEING ABLE TO STOP OR CONTROL WORRYING: NOT AT ALL
GAD7 TOTAL SCORE: 2
GAD7 TOTAL SCORE: 2
3. WORRYING TOO MUCH ABOUT DIFFERENT THINGS: NOT AT ALL
7. FEELING AFRAID AS IF SOMETHING AWFUL MIGHT HAPPEN: SEVERAL DAYS
1. FEELING NERVOUS, ANXIOUS, OR ON EDGE: NOT AT ALL
GAD7 TOTAL SCORE: 2
IF YOU CHECKED OFF ANY PROBLEMS ON THIS QUESTIONNAIRE, HOW DIFFICULT HAVE THESE PROBLEMS MADE IT FOR YOU TO DO YOUR WORK, TAKE CARE OF THINGS AT HOME, OR GET ALONG WITH OTHER PEOPLE: NOT DIFFICULT AT ALL
5. BEING SO RESTLESS THAT IT IS HARD TO SIT STILL: SEVERAL DAYS
8. IF YOU CHECKED OFF ANY PROBLEMS, HOW DIFFICULT HAVE THESE MADE IT FOR YOU TO DO YOUR WORK, TAKE CARE OF THINGS AT HOME, OR GET ALONG WITH OTHER PEOPLE?: NOT DIFFICULT AT ALL
4. TROUBLE RELAXING: NOT AT ALL
6. BECOMING EASILY ANNOYED OR IRRITABLE: NOT AT ALL

## 2024-02-19 NOTE — PROGRESS NOTES
Patient has an upcoming lab only appt and currently has no future orders in their chart. Please place future orders for INR POC as needed. If this appointment is not needed please have your team contact patient to cancel.Thanks!

## 2024-02-19 NOTE — PROGRESS NOTES
ANTICOAGULATION MANAGEMENT     Cody WALLACE Young 43 year old male is on warfarin with therapeutic INR result. (Goal INR 2.0-3.0)    Recent labs: (last 7 days)     02/19/24  1131   INR 3.0*       ASSESSMENT     Source(s): Chart Review and Patient/Caregiver Call     Warfarin doses taken: Held for 1.5 doses  recently which may be affecting INR  Diet: No new diet changes identified  Medication/supplement changes:  Clotrimazole cream and Bacitracin to penis and scrotum bid x7 days - office visit 2/17/24.  No interaction. First dose of Mounjaro 2/13/24 which may cause altered absorption of Warfarin.  New illness, injury, or hospitalization: Yes: Penis/Scrotum itching and pain  Signs or symptoms of bleeding or clotting: Yes: Bruising from Lovenox and Mounjaro injections. No pain, redness, warmth; he will continue to monitor.  Previous result: Supratherapeutic-5.6 on 2/16/24. Advised to hold 1.5 doses and decrease dose 14%.  Additional findings: None       PLAN     Recommended plan for ongoing change(s) affecting INR     Dosing Instructions: Continue your current warfarin dose with next INR in 3 days       Summary  As of 2/19/2024      Full warfarin instructions:  10 mg every Mon, Wed, Fri; 7.5 mg all other days   Next INR check:  2/22/2024               Telephone call with Cody who verbalizes understanding and agrees to plan    Lab visit scheduled    Education provided:   Please call back if any changes to your diet, medications or how you've been taking warfarin  Goal range and lab monitoring: goal range and significance of current result  Symptom monitoring: monitoring for bleeding signs and symptoms  Written instructions provided  Contact [phone] with any changes, questions or concerns.     Plan made per [facility] anticoagulation protocol    Jeanie Desouza RN  Anticoagulation Clinic  2/19/2024    _______________________________________________________________________     Anticoagulation Episode Summary       Current INR  goal:  2.0-3.0   TTR:  --   Target end date:  Indefinite   Send INR reminders to:  KEO WHITESIDE    Indications    Acute pulmonary embolism without acute cor pulmonale  unspecified pulmonary embolism type (H) [I26.99]             Comments:               Anticoagulation Care Providers       Provider Role Specialty Phone number    Fredy Veras MD Referring Family Medicine [phone]

## 2024-02-19 NOTE — PROGRESS NOTES
"    [facility]      PATIENT'S NAME: Cody Bolton  PREFERRED NAME: Mu  PRONOUNS:    he/him   MRN: [id number]  : 1980  ADDRESS: [address] [place] [id number]  ACCT. NUMBER:  [id number]  DATE OF SERVICE: 24  START TIME: 1.01  END TIME: 1.36  PREFERRED PHONE: [phone]  May we leave a program related message: Yes  EMERGENCY CONTACT: was not obtained  .  SERVICE MODALITY:  Video Visit:      Provider verified identity through the following two step process.  Patient provided:  Patient  and Patient address    Telemedicine Visit: The patient's condition can be safely assessed and treated via synchronous audio and visual telemedicine encounter.      Reason for Telemedicine Visit: Patient has requested telehealth visit    Originating Site (Patient Location): Patient's home    Distant Site (Provider Location): [facility]    Consent:  The patient/guardian has verbally consented to: the potential risks and benefits of telemedicine (video visit) versus in person care; bill my insurance or make self-payment for services provided; and responsibility for payment of non-covered services.     Patient would like the video invitation sent by:  My Chart    Mode of Communication:  Video Conference via [facility]    Distant Location (Provider):  Off-site    As the provider I attest to compliance with applicable laws and regulations related to telemedicine.    UNIVERSAL ADULT Mental Health DIAGNOSTIC ASSESSMENT    Identifying Information:  Patient is a 43 year old,   individual.  Patient was referred for an assessment by [facility].  Patient attended the session alone.    Chief Complaint:   The reason for seeking services at this time is: \"depression\".  The problem(s) began 21.    Patient has attempted to resolve these concerns in the past through therapy and partial hospitalization .    Social/Family " "History:  Patient reported they grew up in other [facility].  They were raised by biological parents  .  Parents .  Patient reported that their childhood was \"some yelling and it wasn't that dysfunctional\".  Patient described their current relationships with family of origin as \"pretty close with my sister\".     The patient describes their cultural background as  and reports that [place] without [place] influences.  Cultural influences and impact on patient's life structure, values, norms, and healthcare: patient reports that he [place]..  Contextual influences on patient's health include: Contextual Factors: Community Factors  reports that he [place]. .    These factors will be addressed in the Preliminary Treatment plan. Patient identified their preferred language to be English. Patient reported they does not need the assistance of an  or other support involved in therapy.     Patient reported had no significant delays in developmental tasks.   Patient's highest education level was college graduate  .  Patient identified the following learning problems: none reported.  Modifications will not be used to assist communication in therapy.  Patient reports they are  able to understand written materials.    Patient reported the following relationship history including past dating and a current girlfriend.  Patient's current relationship status is  for 4 months.   Patient identified their sexual orientation as heterosexual.  Patient reported having 2 child(stephanie). Patient identified siblings; friends as part of their support system.  Patient identified the quality of these relationships as good,  .      Patient's current living/housing situation involves staying in own home/apartment.  The immediate members of family and household include Abraham Bolton, " 20,Son  and they report that housing is stable.    Patient is currently employed fulltime.  Patient reports their finances are obtained through employment. Patient does identify finances as a current stressor.      Patient reported that they have not been involved with the legal system.    . Patient does not report being under probation/ parole/ jurisdiction. They are not under any current court jurisdiction. .    Patient's Strengths and Limitations:  Patient identified the following strengths or resources that will help them succeed in treatment: insight and intelligence. Things that may interfere with the patient's success in treatment include: financial hardship.     Assessments:  The following assessments were completed by patient for this visit:  PHQ9:       2/19/2024    12:38 PM   PHQ-9 SCORE   PHQ-9 Total Score MyChart 10 (Moderate depression)   PHQ-9 Total Score 10     GAD7:       2/19/2024    12:39 PM   MERI-7 SCORE   Total Score 2 (minimal anxiety)   Total Score 2     PROMIS 10-Global Health (all questions and answers displayed):       2/19/2024    12:53 PM   PROMIS 10   In general, would you say your health is: Fair   In general, would you say your quality of life is: Good   In general, how would you rate your physical health? Fair   In general, how would you rate your mental health, including your mood and your ability to think? Good   In general, how would you rate your satisfaction with your social activities and relationships? Good   In general, please rate how well you carry out your usual social activities and roles Fair   To what extent are you able to carry out your everyday physical activities such as walking, climbing stairs, carrying groceries, or moving a chair? Completely   In the past 7 days, how often have you been bothered by emotional problems such as feeling anxious, depressed, or irritable? Sometimes   In the past 7 days, how would you rate your fatigue on average? Mild   In the past 7  "days, how would you rate your pain on average, where 0 means no pain, and 10 means worst imaginable pain? 5   In general, would you say your health is: 2   In general, would you say your quality of life is: 3   In general, how would you rate your physical health? 2   In general, how would you rate your mental health, including your mood and your ability to think? 3   In general, how would you rate your satisfaction with your social activities and relationships? 3   In general, please rate how well you carry out your usual social activities and roles. (This includes activities at home, at work and in your community, and responsibilities as a parent, child, spouse, employee, friend, etc.) 2   To what extent are you able to carry out your everyday physical activities such as walking, climbing stairs, carrying groceries, or moving a chair? 5   In the past 7 days, how often have you been bothered by emotional problems such as feeling anxious, depressed, or irritable? 3   In the past 7 days, how would you rate your fatigue on average? 2   In the past 7 days, how would you rate your pain on average, where 0 means no pain, and 10 means worst imaginable pain? 5   Global Mental Health Score 12    12   Global Physical Health Score 14    14   PROMIS TOTAL - SUBSCORES 26    26     [place] Suicide Severity Rating Scale (Lifetime/Recent)      2/19/2024     1:03 PM   [place] Suicide Severity Rating (Lifetime/Recent)   Q1 Wish to be Dead (Lifetime) Y   Wish to be Dead Description (Lifetime) \"I never attempted, but I kind of had a plan, but it comes and goes.\" Patient reports passive suicidal ideation.   1. Wish to be Dead (Past 1 Month) N   Q2 Non-Specific Active Suicidal Thoughts (Lifetime) Y   Non-Specific Active Suicidal Thought Description (Lifetime) \"I never attempted, but I kind of had a plan, but it comes and goes.\" Patient reports passive suicidal ideation.   2. Non-Specific Active Suicidal Thoughts (Past 1 Month) N   3. " Active Suicidal Ideation with any Methods (Not Plan) Without Intent to Act (Lifetime) N   Q4 Active Suicidal Ideation with Some Intent to Act, Without Specific Plan (Lifetime) N   Q5 Active Suicidal Ideation with Specific Plan and Intent (Lifetime) N   Most Severe Ideation Rating (Lifetime) 2   Description of Most Severe Ideation (Lifetime) Patient reports passive suicidal ideation.   Most Severe Ideation Rating (Past 1 Month) 1   Description of Most Severe Ideation (Past 1 Month) NA   Frequency (Lifetime) 1   Frequency (Past 1 Month) 1   Duration (Lifetime) 1   Duration (Past 1 Month) 1   Controllability (Lifetime) 2   Controllability (Past 1 Month) 1   Deterrents (Lifetime) 1   Deterrents (Past 1 Month) 1   Reasons for Ideation (Lifetime) 4   Reasons for Ideation (Past 1 Month) 0   Actual Attempt (Lifetime) N   Has subject engaged in non-suicidal self-injurious behavior? (Lifetime) N   Interrupted Attempts (Lifetime) N   Aborted or Self-Interrupted Attempt (Lifetime) N   Preparatory Acts or Behavior (Lifetime) N   Calculated C-SSRS Risk Score (Lifetime/Recent) No Risk Indicated       Personal and Family Medical History:  Patient does report a family history of mental health concerns.  Patient reports family history is not on file..     Patient does report Mental Health Diagnosis and/or Treatment.  Patient reported the following previous diagnoses which include(s): an anxiety disorder; depression .  Patient reported symptoms began in early adulthood.  Patient has received mental health services in the past:  therapy; day treatment; partial hospitalization program  .  Psychiatric Hospitalizations: none when   ,  ,  ,  ,  ,  ,  ,  ,  ,  ,  .    Patient denies a history of civil commitment.      Currently, patient none  is receiving other mental health services.  These include none.       Patient has had a physical exam to rule out medical causes for current symptoms.  Date of last physical exam was greater than a  year ago and client was encouraged to schedule an exam with PCP. The patient does not have a Primary Care Provider and was encouraged to establish care with a PCP..  Patient reports the following current medical concerns: asthma and diabetes .  Patient reports pain concerns including back and neck.  Patient does want help addressing pain concerns..   There are not significant appetite / nutritional concerns / weight changes.   Patient does not report a history of head injury / trauma / cognitive impairment.      Current Outpatient Medications   Medication    buPROPion (WELLBUTRIN XL) 150 MG 24 hr tablet    cloNIDine (CATAPRES) 0.1 MG tablet    clotrimazole (LOTRIMIN) 1 % external cream    metFORMIN (GLUCOPHAGE XR) 500 MG 24 hr tablet    reason aspirin not prescribed, intentional,    simvastatin (ZOCOR) 20 MG tablet    tirzepatide (MOUNJARO) 2.5 MG/0.5ML pen    tirzepatide (MOUNJARO) 5 MG/0.5ML pen    traZODone (DESYREL) 100 MG tablet    warfarin ANTICOAGULANT (COUMADIN) 10 MG tablet    warfarin ANTICOAGULANT (COUMADIN) 5 MG tablet     No current facility-administered medications for this visit.         Medication Adherence:  Patient reports taking.  taking prescribed medications as prescribed.    Patient Allergies:  No Known Allergies    Medical History:  No past medical history on file.      Current Mental Status Exam:   Appearance:  Appropriate    Eye Contact:  Good   Psychomotor:  Normal       Gait / station:  no problem  Attitude / Demeanor: Cooperative   Speech      Rate / Production: Normal/ Responsive      Volume:  Normal  volume      Language:  intact  Mood:   Depressed   Affect:   Appropriate    Thought Content: Clear   Thought Process: Logical       Associations: No loosening of associations  Insight:   Good   Judgment:  Intact   Orientation:  All  Attention/concentration: Fair    Substance Use:   Patient did not report a family history of substance use concerns; see medical history section for details.   "Patient has not received chemical dependency treatment in the past.  Patient has not ever been to detox.      Patient is not currently receiving any chemical dependency treatment.           Substance History of use Age of first use Date of last use     Pattern and duration of use (include amounts and frequency)   Alcohol used in the past   15 02/02/24 REPORTS SUBSTANCE USE: reports using substance 1 times per day and has 1 \"liter of 100 proof\" at a time.   Patient reports heaviest use was 2 weeks ago (late January 2024).   Cannabis   never used     REPORTS SUBSTANCE USE: N/A     Amphetamines   never used     REPORTS SUBSTANCE USE: N/A   Cocaine/crack    never used       REPORTS SUBSTANCE USE: N/A   Hallucinogens never used         REPORTS SUBSTANCE USE: N/A   Inhalants never used         REPORTS SUBSTANCE USE: N/A   Heroin never used         REPORTS SUBSTANCE USE: N/A   Other Opiates never used     REPORTS SUBSTANCE USE: N/A   Benzodiazepine   never used     REPORTS SUBSTANCE USE: N/A   Barbiturates never used     REPORTS SUBSTANCE USE: N/A   Over the counter meds never used     REPORTS SUBSTANCE USE: N/A   Caffeine currently use 8  2/19/24 REPORTS SUBSTANCE USE: reports using substance 1 times per day and has 3 cans of pop at a time.   Patient reports heaviest use is current use.   Nicotine  never used     REPORTS SUBSTANCE USE: N/A   Other substances not listed above:  Identify:  never used     REPORTS SUBSTANCE USE: N/A     Patient reported the following problems as a result of their substance use: family problems; relationship problems.    Substance Use: social problems related to substance use    Based on the positive CAGE score and clinical interview there  are indications of drug or alcohol abuse. Recommendation for substance abuse disorder evaluation with a substance use professional was given. Therapist did recommend client to reduce use or abstain from alcohol or substance use. Therapist did recommend " structured treatment and or community support (AA, 12 step group, etc.).   .    Significant Losses / Trauma / Abuse / Neglect Issues:   Patient did not  serve in the .  There are indications or report of significant loss, trauma, abuse or neglect issues related to: death of father when the patient was 15 and a friend who killed himself when the patient was in high school .  Concerns for possible neglect are not present.     Safety Assessment:   Patient denies current homicidal ideation and behaviors.  Patient denies current self-injurious ideation and behaviors.    Patient denied risk behaviors associated with substance use.   Patient denies any high risk behaviors associated with mental health symptoms.  Patient reports the following current concerns for their personal safety: None.  Patient reports there are not firearms in the house.       There are no firearms in the home..    History of Safety Concerns:  Patient denied a history of homicidal ideation.     Patient denied a history of personal safety concerns.    Patient denied a history of assaultive behaviors.    Patient denied a history of sexual assault behaviors.     Patient denied a history of risk behaviors associated with substance use.  Patient denies any history of high risk behaviors associated with mental health symptoms.  Patient reports the following protective factors: forward or future oriented thinking; dedication to family or friends; daily obligations; strong sense of self worth or esteem    Risk Plan:  See Recommendations for Safety and Risk Management Plan    Review of Symptoms per patient report:   Depression: Change in sleep, Lack of interest, Excessive or inappropriate guilt, Change in energy level, Difficulties concentrating, Feelings of hopelessness, and Feeling sad, down, or depressed  Pamela:  No Symptoms  Psychosis: No Symptoms  Anxiety: No Symptoms  Panic:  No symptoms  Post Traumatic Stress Disorder:  No Symptoms   Eating  Disorder: No Symptoms  ADD / ADHD:  No symptoms  Conduct Disorder: No symptoms  Autism Spectrum Disorder: No symptoms  Obsessive Compulsive Disorder: No Symptoms    Patient reports the following compulsive behaviors and treatment history:  none .      Diagnostic Criteria:   Major Depressive Disorder  CRITERIA (A-C) REPRESENT A MAJOR DEPRESSIVE EPISODE - SELECT THESE CRITERIA  A) Recurrent episode(s) - symptoms have been present during the same 2-week period and represent a change from previous functioning 5 or more symptoms (required for diagnosis)   - Depressed mood. Note: In children and adolescents, can be irritable mood.     - Diminished interest or pleasure in all, or almost all, activities.    - Decreased sleep.    - Fatigue or loss of energy.    - Feelings of worthlessness or excessive guilt.    - Diminished ability to think or concentrate, or indecisiveness.   B) The symptoms cause clinically significant distress or impairment in social, occupational, or other important areas of functioning  C) The episode is not attributable to the physiological effects of a substance or to another medical condition  D) The occurence of major depressive episode is not better explained by other thought / psychotic disorders  E) There has never been a manic episode or hypomanic episode    Functional Status:  Patient reports the following functional impairments:  relationship(s), self-care, and work / vocational responsibilities.     Nonprogrammatic care:  Patient is requesting basic services to address current mental health concerns.    Clinical Summary:  1. Psychosocial, Cultural and Contextual Factors: Patient identifies as a 43 year old heterosexual  male  .  2. Principal DSM5 Diagnoses  (Sustained by DSM5 Criteria Listed Above):   296.32 (F33.1) Major Depressive Disorder, Recurrent Episode, Moderate _.  3. Other Diagnoses that is relevant to services:   none  4. Provisional Diagnosis:  Client reports symptoms  that align with Alcohol Use Disorder. However, future clinicians will continue to evaluate for this diagnosis.  5. Prognosis: Expect Improvement.  6. Likely consequences of symptoms if not treated: Patient's condition will likely worsen, requiring a higher level of care..  7. Client strengths include:  insightful and intelligent .     Recommendations:     1. Plan for Safety and Risk Management:   Safety and Risk: Recommended that patient call 911 or go to the local ED should there be a change in any of these risk factors..          Report to child / adult protection services was NA.     2. Patient's identified  mental health concerns indicating individual therapy .     3. Initial Treatment will focus on:    Depressed Mood - reduce depression symptoms .     4. Resources/Service Plan:    services are not indicated.   Modifications to assist communication are not indicated.   Additional disability accommodations are not indicated.      5. Collaboration:   Collaboration / coordination of treatment will be initiated with the following  support professionals:  none .      6.  Referrals:   The following referral(s) will be initiated:  none .       A Release of Information has been obtained for the following:  none .     Clinical Substantiation/medical necessity for the above recommendations:  none.    7. MARCY:    MARCY:  Discussed the general effects of drugs and alcohol on health and well-being. Provider gave patient printed information about the  effects of chemical use on their health and well being. Recommendations:  Continue abstaining from alcohol, seek a substance use comprehensive assessment, and attend 12 step recovery groups as needed. Patient declined a substance use assessment at this time .     8. Records:   These were reviewed at time of assessment.   Information in this assessment was obtained from the medical record and  provided by patient who is a fair historian.    Patient will have open access  to their mental health medical record.    9.   Interactive Complexity: No    10. Safety Plan:  Patient has a past history of passive suicidal ideations, but denied any current history of suicidal ideation or behaviors and therefore declined creating a safety plan at this time.      Provider Name/ Credentials:  Lucy Rouse [facility]    February 19, 2024       Answers submitted by the patient for this visit:  Patient Health Questionnaire (Submitted on 2/19/2024)  If you checked off any problems, how difficult have these problems made it for you to do your work, take care of things at home, or get along with other people?: Somewhat difficult  PHQ9 TOTAL SCORE: 10  MERI-7 (Submitted on 2/19/2024)  MERI 7 TOTAL SCORE: 2

## 2024-02-22 ENCOUNTER — LAB (OUTPATIENT)
Dept: LAB | Facility: CLINIC | Age: 44
End: 2024-02-22
Payer: COMMERCIAL

## 2024-02-22 ENCOUNTER — ANTICOAGULATION THERAPY VISIT (OUTPATIENT)
Dept: ANTICOAGULATION | Facility: CLINIC | Age: 44
End: 2024-02-22

## 2024-02-22 DIAGNOSIS — I26.99 ACUTE PULMONARY EMBOLISM WITHOUT ACUTE COR PULMONALE, UNSPECIFIED PULMONARY EMBOLISM TYPE (H): ICD-10-CM

## 2024-02-22 DIAGNOSIS — I26.99 ACUTE PULMONARY EMBOLISM WITHOUT ACUTE COR PULMONALE, UNSPECIFIED PULMONARY EMBOLISM TYPE (H): Primary | ICD-10-CM

## 2024-02-22 LAB — INR BLD: 4 (ref 0.9–1.1)

## 2024-02-22 PROCEDURE — 36415 COLL VENOUS BLD VENIPUNCTURE: CPT

## 2024-02-22 PROCEDURE — 85610 PROTHROMBIN TIME: CPT

## 2024-02-22 NOTE — PROGRESS NOTES
ANTICOAGULATION MANAGEMENT     Cody WALLACE Young 43 year old male is on warfarin with supratherapeutic INR result. (Goal INR 2.0-3.0)    Recent labs: (last 7 days)     02/22/24  1316   INR 4.0*       ASSESSMENT     Source(s): Chart Review and Patient/Caregiver Call     Warfarin doses taken: Less warfarin taken than planned which may be affecting INR  Diet: No new diet changes identified  Medication/supplement changes: None noted  New illness, injury, or hospitalization: No  Signs or symptoms of bleeding or clotting: No  Previous result: Therapeutic last visit; previously outside of goal range  Additional findings: rapid rise of 0.33/day       PLAN     3:36 PM: consult with Carolina Adames [facility]       4:03 PM   Recommended plan for no diet, medication or health factor changes affecting INR     Dosing Instructions: hold dose then decrease your warfarin dose (28% change from last 7-day total) with next INR on Monday       Summary  As of 2/22/2024      Full warfarin instructions:  2/22: Hold; Otherwise 2.5 mg every Fri; 5 mg all other days   Next INR check:  2/26/2024               Telephone call with Cody who agrees to plan and repeated back plan correctly    Lab visit scheduled    Education provided:   Contact [phone] with any changes, questions or concerns.     Plan made with [facility] Pharmacist Carolina Camilo, RN  Anticoagulation Clinic  2/22/2024    _______________________________________________________________________     Anticoagulation Episode Summary       Current INR goal:  2.0-3.0   TTR:  0.0% (3 d)   Target end date:  Indefinite   Send INR reminders to:  KEO WHITESIDE    Indications    Acute pulmonary embolism without acute cor pulmonale  unspecified pulmonary embolism type (H) [I26.99]             Comments:               Anticoagulation Care Providers       Provider Role Specialty Phone number    Fredy Veras MD Referring Family Medicine [phone]

## 2024-02-26 ENCOUNTER — ANTICOAGULATION THERAPY VISIT (OUTPATIENT)
Dept: ANTICOAGULATION | Facility: CLINIC | Age: 44
End: 2024-02-26

## 2024-02-26 ENCOUNTER — VIRTUAL VISIT (OUTPATIENT)
Dept: BEHAVIORAL HEALTH | Facility: CLINIC | Age: 44
End: 2024-02-26
Payer: COMMERCIAL

## 2024-02-26 ENCOUNTER — LAB (OUTPATIENT)
Dept: LAB | Facility: CLINIC | Age: 44
End: 2024-02-26
Payer: COMMERCIAL

## 2024-02-26 DIAGNOSIS — I26.99 ACUTE PULMONARY EMBOLISM WITHOUT ACUTE COR PULMONALE, UNSPECIFIED PULMONARY EMBOLISM TYPE (H): Primary | ICD-10-CM

## 2024-02-26 DIAGNOSIS — F33.1 MAJOR DEPRESSIVE DISORDER, RECURRENT EPISODE, MODERATE (H): Primary | ICD-10-CM

## 2024-02-26 DIAGNOSIS — I26.99 ACUTE PULMONARY EMBOLISM WITHOUT ACUTE COR PULMONALE, UNSPECIFIED PULMONARY EMBOLISM TYPE (H): ICD-10-CM

## 2024-02-26 LAB — INR BLD: 4.6 (ref 0.9–1.1)

## 2024-02-26 PROCEDURE — 36416 COLLJ CAPILLARY BLOOD SPEC: CPT

## 2024-02-26 PROCEDURE — 90834 PSYTX W PT 45 MINUTES: CPT | Mod: 95

## 2024-02-26 PROCEDURE — 85610 PROTHROMBIN TIME: CPT

## 2024-02-26 NOTE — PROGRESS NOTES
ANTICOAGULATION MANAGEMENT     Cody WALLACE Young 43 year old male is on warfarin with supratherapeutic INR result. 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  Medication/supplement changes: None noted  New illness, injury, or hospitalization: No  Signs or symptoms of bleeding or clotting: No  Previous result: Supratherapeutic-1 day hold and 28% dose reduction  Additional findings: New start 2/5/24. Has had therapeutic INR only twice.       PLAN     Recommended plan for temporary change(s) affecting INR     Dosing Instructions: hold 1.5 doses then continue your current warfarin dose with next INR in 4 days       Summary  As of 2/26/2024      Full warfarin instructions:  2/26: Hold; 2/27: 2.5 mg; Otherwise 2.5 mg every Fri; 5 mg all other days   Next INR check:  3/1/2024               Telephone call with Cody who verbalizes understanding and agrees to plan and who agrees to plan and repeated back plan correctly  Sent "[employer] message with dosing and follow up instructions    Lab visit scheduled    Education provided:   Taking warfarin: prescribed tablet strength and color  Goal range and lab monitoring: goal range and significance of current result and Importance of following up at instructed interval  Healthy lifestyle considerations: potential interaction between warfarin and alcohol  Symptom monitoring: monitoring for bleeding signs and symptoms  Written instructions provided  Contact [phone] with any changes, questions or concerns.     Plan made per [facility] anticoagulation protocol    Jeanie Desouza RN  Anticoagulation Clinic  2/26/2024    _______________________________________________________________________     Anticoagulation Episode  Summary       Current INR goal:  2.0-3.0   TTR:  0.0% (1 wk)   Target end date:  Indefinite   Send INR reminders to:  KEO WHITESIDE    Indications    Acute pulmonary embolism without acute cor pulmonale  unspecified pulmonary embolism type (H) [I26.99]             Comments:               Anticoagulation Care Providers       Provider Role Specialty Phone number    Fredy Veras MD Referring Family Medicine [phone]

## 2024-02-26 NOTE — PROGRESS NOTES
[facility]                                     Progress Note    Patient Name: Cody Bolton  Date: 24           Service Type: Individual      Session Start Time: 8.00  Session End Time: 8.39     Session Length: 38-52 minutes    Session #: 1    Attendees: Client attended alone    Service Modality:  Video Visit:      Provider verified identity through the following two step process.  Patient provided:  Patient  and Patient address    Telemedicine Visit: The patient's condition can be safely assessed and treated via synchronous audio and visual telemedicine encounter.      Reason for Telemedicine Visit: Patient has requested telehealth visit    Originating Site (Patient Location): Patient's home    Distant Site (Provider Location): [facility]    Consent:  The patient/guardian has verbally consented to: the potential risks and benefits of telemedicine (video visit) versus in person care; bill my insurance or make self-payment for services provided; and responsibility for payment of non-covered services.     Patient would like the video invitation sent by:  My Chart    Mode of Communication:  Video Conference via [employer]    Distant Location (Provider):  Off-site    As the provider I attest to compliance with applicable laws and regulations related to telemedicine.    DATA  Interactive Complexity: No  Crisis: No        Progress Since Last Session (Related to Symptoms / Goals / Homework):   Symptoms: No change . Symptoms remain the same.    Homework: Partially completed      Episode of Care Goals: Satisfactory progress - CONTEMPLATION (Considering change and yet undecided); Intervened by assessing the negative and positive thinking (ambivalence) about behavior change     Current / Ongoing Stressors and Concerns:   Patient discussed relationship dynamics with his ex-wife who is also his landlord and neighbor. Patient discussed being a DJ.  Patient discussed a lack of sleep. Patient discussed his alcohol consumption. Patient discussed coping skills including going to the roller rink and calling friends. Patient discussed triggers to drinking including: feelings of hurt, self-blame, someone being mad at him, medical bills, the evening, being alone. Patient discussed relationship dynamics with his current partner and her struggles with trust and alcohol consumption.        Treatment Objective(s) Addressed in This Session:   Decrease frequency and intensity of feeling down, depressed, hopeless       Intervention:   Motivational Interviewing    MI Intervention: Expressed Empathy/Understanding, Supported Autonomy, Collaboration, Evocation, Open-ended questions, and Reflections: simple and complex     Change Talk Expressed by the Patient: Desire to change    Provider Response to Change Talk: E - Evoked more info from patient about behavior change, A - Affirmed patient's thoughts, decisions, or attempts at behavior change, and R - Reflected patient's change talk    Assessments completed prior to visit:  The following assessments were completed by patient for this visit:  PHQ9:       2/19/2024    12:38 PM   PHQ-9 SCORE   PHQ-9 Total Score MyChart 10 (Moderate depression)   PHQ-9 Total Score 10     GAD7:       2/19/2024    12:39 PM   MERI-7 SCORE   Total Score 2 (minimal anxiety)   Total Score 2     PROMIS 10-Global Health (all questions and answers displayed):       2/19/2024    12:53 PM   PROMIS 10   In general, would you say your health is: Fair   In general, would you say your quality of life is: Good   In general, how would you rate your physical health? Fair   In general, how would you rate your mental health, including your mood and your ability to think? Good   In general, how would you rate your satisfaction with your social activities and relationships? Good   In general, please rate how well you carry out your usual social activities and roles Fair  "  To what extent are you able to carry out your everyday physical activities such as walking, climbing stairs, carrying groceries, or moving a chair? Completely   In the past 7 days, how often have you been bothered by emotional problems such as feeling anxious, depressed, or irritable? Sometimes   In the past 7 days, how would you rate your fatigue on average? Mild   In the past 7 days, how would you rate your pain on average, where 0 means no pain, and 10 means worst imaginable pain? 5   In general, would you say your health is: 2   In general, would you say your quality of life is: 3   In general, how would you rate your physical health? 2   In general, how would you rate your mental health, including your mood and your ability to think? 3   In general, how would you rate your satisfaction with your social activities and relationships? 3   In general, please rate how well you carry out your usual social activities and roles. (This includes activities at home, at work and in your community, and responsibilities as a parent, child, spouse, employee, friend, etc.) 2   To what extent are you able to carry out your everyday physical activities such as walking, climbing stairs, carrying groceries, or moving a chair? 5   In the past 7 days, how often have you been bothered by emotional problems such as feeling anxious, depressed, or irritable? 3   In the past 7 days, how would you rate your fatigue on average? 2   In the past 7 days, how would you rate your pain on average, where 0 means no pain, and 10 means worst imaginable pain? 5   Global Mental Health Score 12    12   Global Physical Health Score 14    14   PROMIS TOTAL - SUBSCORES 26    26     [place] Suicide Severity Rating Scale (Lifetime/Recent)      2/19/2024     1:03 PM   [place] Suicide Severity Rating (Lifetime/Recent)   Q1 Wish to be Dead (Lifetime) Y   Wish to be Dead Description (Lifetime) \"I never attempted, but I kind of had a plan, but it comes and " "goes.\" Patient reports passive suicidal ideation.   1. Wish to be Dead (Past 1 Month) N   Q2 Non-Specific Active Suicidal Thoughts (Lifetime) Y   Non-Specific Active Suicidal Thought Description (Lifetime) \"I never attempted, but I kind of had a plan, but it comes and goes.\" Patient reports passive suicidal ideation.   2. Non-Specific Active Suicidal Thoughts (Past 1 Month) N   3. Active Suicidal Ideation with any Methods (Not Plan) Without Intent to Act (Lifetime) N   Q4 Active Suicidal Ideation with Some Intent to Act, Without Specific Plan (Lifetime) N   Q5 Active Suicidal Ideation with Specific Plan and Intent (Lifetime) N   Most Severe Ideation Rating (Lifetime) 2   Description of Most Severe Ideation (Lifetime) Patient reports passive suicidal ideation.   Most Severe Ideation Rating (Past 1 Month) 1   Description of Most Severe Ideation (Past 1 Month) NA   Frequency (Lifetime) 1   Frequency (Past 1 Month) 1   Duration (Lifetime) 1   Duration (Past 1 Month) 1   Controllability (Lifetime) 2   Controllability (Past 1 Month) 1   Deterrents (Lifetime) 1   Deterrents (Past 1 Month) 1   Reasons for Ideation (Lifetime) 4   Reasons for Ideation (Past 1 Month) 0   Actual Attempt (Lifetime) N   Has subject engaged in non-suicidal self-injurious behavior? (Lifetime) N   Interrupted Attempts (Lifetime) N   Aborted or Self-Interrupted Attempt (Lifetime) N   Preparatory Acts or Behavior (Lifetime) N   Calculated C-SSRS Risk Score (Lifetime/Recent) No Risk Indicated         ASSESSMENT: Current Emotional / Mental Status (status of significant symptoms):   Risk status (Self / Other harm or suicidal ideation)   Patient denies current fears or concerns for personal safety.   Patient denies current or recent suicidal ideation or behaviors.   Patient denies current or recent homicidal ideation or behaviors.   Patient denies current or recent self injurious behavior or ideation.   Patient denies other safety concerns.   Patient " "reports there has been no change in risk factors since their last session.     Patient reports there has been no change in protective factors since their last session.     Recommended that patient call 911 or go to the local ED should there be a change in any of these risk factors.     Appearance:   Appropriate    Eye Contact:   Good    Psychomotor Behavior: Normal    Attitude:   Cooperative    Orientation:   All   Speech    Rate / Production: Normal     Volume:  Normal    Mood:    Depressed    Affect:    Appropriate    Thought Content:  Clear    Thought Form:  Coherent  Logical    Insight:    Good      Medication Review:   No changes to current psychiatric medication(s)     Medication Compliance:   Yes     Changes in Health Issues:   None reported     Chemical Use Review:   Substance Use: Problem use continues with no change since last session, Provided encouragement towards sobriety        Tobacco Use: No current tobacco use.      Diagnosis:  1. Major depressive disorder, recurrent episode, moderate (H)        Collateral Reports Completed:   Not Applicable    PLAN: (Patient Tasks / Therapist Tasks / Other)   Patient will return in 2 weeks for next session. Patient will engage in self-care activities. Patient will work on incorporating more exercise and movement into his routine at least 2 times/week. Patient will work on coping skills to reduce his consumption of alcohol. Patient will work on making his living room brighter and \"less depressive\" feeling.        Lucy Rouse, [facility]                                                         ______________________________________________________________________    Individual Treatment Plan    Patient's Name: Cody Bolton  YOB: 1980    Date of Creation: 2/26/24  Date Treatment Plan Last Reviewed/Revised: 2/26/24    DSM5 Diagnoses:   Encounter Diagnosis   Name Primary?    Major depressive disorder, recurrent episode, moderate (H) Yes       Psychosocial / " Contextual Factors: Relationship stressors, occupational dynamics.  PROMIS (reviewed every 90 days):   Global Mental Health Score (P) 12   Global Physical Health Score (P) 14   PROMIS TOTAL - SUBSCORES (P) 26       Referral / Collaboration:  Referral to another professional/service is not indicated at this time..    Anticipated number of session for this episode of care: 9-12 sessions  Anticipation frequency of session: Weekly  Anticipated Duration of each session: 38-52 minutes  Treatment plan will be reviewed in 90 days or when goals have been changed.       MeasurableTreatment Goal(s) related to diagnosis / functional impairment(s)  Goal 1: Patient will reduce depression symptoms as evidenced by a reduction in PHQ9 score to a 7 or less in the next 2 months.    I will know I've met my goal when I'm not having the feeling as often that I just don't want to do life anymore.      Objective #A (Patient Action)    Patient will Decrease frequency and intensity of feeling down, depressed, hopeless.  Status: New - Date: 2/26/24      Intervention(s)  Therapist will teach  coping skills and self-care activities .    Objective #B  Patient will Feel less tired and more energy during the day .  Status: New - Date: 2/26/24      Intervention(s)  Therapist will  teach sleep hygiene and movement-based exercises .    Objective #C  Patient will Increase interest, engagement, and pleasure in doing things.  Status: New - Date: 2/26/24      Intervention(s)  Therapist will  teach distress-tolerance skills. .          Patient has reviewed and agreed to the above plan.      Lucy Rouse, [facility]  February 26, 2024

## 2024-03-01 ENCOUNTER — ANTICOAGULATION THERAPY VISIT (OUTPATIENT)
Dept: ANTICOAGULATION | Facility: CLINIC | Age: 44
End: 2024-03-01

## 2024-03-01 ENCOUNTER — LAB (OUTPATIENT)
Dept: LAB | Facility: CLINIC | Age: 44
End: 2024-03-01
Payer: COMMERCIAL

## 2024-03-01 DIAGNOSIS — I26.99 ACUTE PULMONARY EMBOLISM WITHOUT ACUTE COR PULMONALE, UNSPECIFIED PULMONARY EMBOLISM TYPE (H): ICD-10-CM

## 2024-03-01 DIAGNOSIS — I26.99 ACUTE PULMONARY EMBOLISM WITHOUT ACUTE COR PULMONALE, UNSPECIFIED PULMONARY EMBOLISM TYPE (H): Primary | ICD-10-CM

## 2024-03-01 LAB — INR BLD: 2.3 (ref 0.9–1.1)

## 2024-03-01 PROCEDURE — 36415 COLL VENOUS BLD VENIPUNCTURE: CPT

## 2024-03-01 PROCEDURE — 85610 PROTHROMBIN TIME: CPT

## 2024-03-01 NOTE — PROGRESS NOTES
ANTICOAGULATION MANAGEMENT     Cody WALLACE Young 43 year old male is on warfarin with therapeutic INR result. (Goal INR 2.0-3.0)    Recent labs: (last 7 days)     03/01/24  1143   INR 2.3*       ASSESSMENT     Source(s): Chart Review and Patient/Caregiver Call     Warfarin doses taken: More warfarin taken than planned which may be affecting INR  Diet: No new diet changes identified  Medication/supplement changes: None noted  New illness, injury, or hospitalization: No  Signs or symptoms of bleeding or clotting: No  Previous result: Supratherapeutic  Additional findings:  says he held Monday, then took 5 mg daily       PLAN     Recommended plan for no diet, medication or health factor changes affecting INR     Dosing Instructions: Continue your current warfarin dose with next INR in 5 days       Summary  As of 3/1/2024      Full warfarin instructions:  2.5 mg every Fri; 5 mg all other days   Next INR check:  3/6/2024               Telephone call with Cody who verbalizes understanding and agrees to plan    Lab visit scheduled    Education provided:   Please call back if any changes to your diet, medications or how you've been taking warfarin    Plan made per [facility] anticoagulation protocol    Caitlin Agosto RN  Anticoagulation Clinic  3/1/2024    _______________________________________________________________________     Anticoagulation Episode Summary       Current INR goal:  2.0-3.0   TTR:  10.3% (1.6 wk)   Target end date:  Indefinite   Send INR reminders to:  KEO WHITESIDE    Indications    Acute pulmonary embolism without acute cor pulmonale  unspecified pulmonary embolism type (H) [I26.99]             Comments:               Anticoagulation Care Providers       Provider Role Specialty Phone number    Fredy Veras MD Referring Family Medicine [phone]

## 2024-03-06 ENCOUNTER — DOCUMENTATION ONLY (OUTPATIENT)
Dept: ANTICOAGULATION | Facility: CLINIC | Age: 44
End: 2024-03-06

## 2024-03-06 ENCOUNTER — APPOINTMENT (OUTPATIENT)
Dept: LAB | Facility: CLINIC | Age: 44
End: 2024-03-06
Payer: COMMERCIAL

## 2024-03-06 ENCOUNTER — ANTICOAGULATION THERAPY VISIT (OUTPATIENT)
Dept: ANTICOAGULATION | Facility: CLINIC | Age: 44
End: 2024-03-06

## 2024-03-06 DIAGNOSIS — I26.99 ACUTE PULMONARY EMBOLISM WITHOUT ACUTE COR PULMONALE, UNSPECIFIED PULMONARY EMBOLISM TYPE (H): Primary | ICD-10-CM

## 2024-03-06 DIAGNOSIS — I80.01 THROMBOPHLEBITIS OF SUPERFICIAL VEINS OF RIGHT LOWER EXTREMITY: Primary | ICD-10-CM

## 2024-03-06 DIAGNOSIS — I26.99 ACUTE PULMONARY EMBOLISM WITHOUT ACUTE COR PULMONALE, UNSPECIFIED PULMONARY EMBOLISM TYPE (H): ICD-10-CM

## 2024-03-06 LAB — INR BLD: 2.6 (ref 0.9–1.1)

## 2024-03-06 PROCEDURE — 36415 COLL VENOUS BLD VENIPUNCTURE: CPT

## 2024-03-06 PROCEDURE — 85610 PROTHROMBIN TIME: CPT

## 2024-03-06 NOTE — PROGRESS NOTES
ANTICOAGULATION MANAGEMENT     Cody WALLACE Young 43 year old male is on warfarin with therapeutic INR result. (Goal INR 2.0-3.0)    Recent labs: (last 7 days)     03/06/24  1222   INR 2.6*       ASSESSMENT     Source(s): Chart Review and Patient/Caregiver Call     Warfarin doses taken: Missed dose(s) may be affecting INR-missed last night. Also took 5 mg daily (7.7% more than documented)  Diet:  Inconsistent alcohol and greens intake may be affecting diet and INR  Medication/supplement changes: None noted  New illness, injury, or hospitalization: No  Signs or symptoms of bleeding or clotting: No  Previous result: Therapeutic last visit; previously outside of goal range  Additional findings: [facility] referral was renewed 2/9/24. INR standing order renewed today.    [facility] is establishing care with [place] provider on 3/11/24. New referral and INR order should be signed by new provider after.       PLAN     Recommended plan for no diet, medication or health factor changes affecting INR     Dosing Instructions: Continue your current warfarin dose that's you've been taking with next INR in 5 days       Summary  As of 3/6/2024      Full warfarin instructions:  5 mg every day   Next INR check:  3/11/2024               Telephone call with Cody who verbalizes understanding and agrees to plan    Lab visit scheduled    Education provided:   Please call back if any changes to your diet, medications or how you've been taking warfarin  Taking warfarin: prescribed tablet strength and color and Importance of taking warfarin as instructed  Goal range and lab monitoring: goal range and significance of current result and Importance of following up at instructed interval  Dietary considerations: impact of vitamin K foods on INR  Healthy lifestyle considerations: potential interaction between warfarin and alcohol  Contact [phone] with any changes, questions or concerns.     Plan made per [facility] anticoagulation protocol    Jeanie Desouza  RN  Anticoagulation Clinic  3/6/2024    _______________________________________________________________________     Anticoagulation Episode Summary       Current INR goal:  2.0-3.0   TTR:  37.6% (2.3 wk)   Target end date:  Indefinite   Send INR reminders to:  KEO WHITESIDE    Indications    Acute pulmonary embolism without acute cor pulmonale  unspecified pulmonary embolism type (H) [I26.99]             Comments:               Anticoagulation Care Providers       Provider Role Specialty Phone number    rFedy Veras MD Referring Family Medicine [phone]

## 2024-03-06 NOTE — PROGRESS NOTES
One time INR order needed for today. Will send standing order request to provider.  Jeanie Desouza RN

## 2024-03-11 ENCOUNTER — OFFICE VISIT (OUTPATIENT)
Dept: FAMILY MEDICINE | Facility: CLINIC | Age: 44
End: 2024-03-11
Payer: COMMERCIAL

## 2024-03-11 ENCOUNTER — ORDERS ONLY (AUTO-RELEASED) (OUTPATIENT)
Dept: FAMILY MEDICINE | Facility: CLINIC | Age: 44
End: 2024-03-11

## 2024-03-11 ENCOUNTER — LAB (OUTPATIENT)
Dept: LAB | Facility: CLINIC | Age: 44
End: 2024-03-11
Payer: COMMERCIAL

## 2024-03-11 ENCOUNTER — ANTICOAGULATION THERAPY VISIT (OUTPATIENT)
Dept: ANTICOAGULATION | Facility: CLINIC | Age: 44
End: 2024-03-11

## 2024-03-11 VITALS
DIASTOLIC BLOOD PRESSURE: 90 MMHG | HEIGHT: 71 IN | HEART RATE: 79 BPM | WEIGHT: 315 LBS | TEMPERATURE: 98.2 F | BODY MASS INDEX: 44.1 KG/M2 | SYSTOLIC BLOOD PRESSURE: 148 MMHG | RESPIRATION RATE: 20 BRPM | OXYGEN SATURATION: 95 %

## 2024-03-11 DIAGNOSIS — I26.99 ACUTE PULMONARY EMBOLISM WITHOUT ACUTE COR PULMONALE, UNSPECIFIED PULMONARY EMBOLISM TYPE (H): ICD-10-CM

## 2024-03-11 DIAGNOSIS — E78.00 PURE HYPERCHOLESTEROLEMIA: ICD-10-CM

## 2024-03-11 DIAGNOSIS — F33.1 MAJOR DEPRESSIVE DISORDER, RECURRENT EPISODE, MODERATE (H): ICD-10-CM

## 2024-03-11 DIAGNOSIS — E11.9 TYPE 2 DIABETES MELLITUS WITHOUT COMPLICATION, WITHOUT LONG-TERM CURRENT USE OF INSULIN (H): ICD-10-CM

## 2024-03-11 DIAGNOSIS — Z11.59 NEED FOR HEPATITIS C SCREENING TEST: ICD-10-CM

## 2024-03-11 DIAGNOSIS — Z79.01 ANTICOAGULATED ON COUMADIN: ICD-10-CM

## 2024-03-11 DIAGNOSIS — I26.99 ACUTE PULMONARY EMBOLISM WITHOUT ACUTE COR PULMONALE, UNSPECIFIED PULMONARY EMBOLISM TYPE (H): Primary | ICD-10-CM

## 2024-03-11 DIAGNOSIS — I10 ESSENTIAL HYPERTENSION, BENIGN: ICD-10-CM

## 2024-03-11 DIAGNOSIS — Z11.4 SCREENING FOR HIV (HUMAN IMMUNODEFICIENCY VIRUS): ICD-10-CM

## 2024-03-11 DIAGNOSIS — G47.33 OBSTRUCTIVE SLEEP APNEA SYNDROME: ICD-10-CM

## 2024-03-11 DIAGNOSIS — Z12.11 SCREEN FOR COLON CANCER: ICD-10-CM

## 2024-03-11 DIAGNOSIS — F51.01 PRIMARY INSOMNIA: ICD-10-CM

## 2024-03-11 DIAGNOSIS — Z00.00 ROUTINE GENERAL MEDICAL EXAMINATION AT A HEALTH CARE FACILITY: Primary | ICD-10-CM

## 2024-03-11 DIAGNOSIS — Z86.711 HISTORY OF PULMONARY EMBOLISM: ICD-10-CM

## 2024-03-11 DIAGNOSIS — Z86.718 HISTORY OF DVT (DEEP VEIN THROMBOSIS): ICD-10-CM

## 2024-03-11 LAB
ALBUMIN SERPL BCG-MCNC: 4.1 G/DL (ref 3.5–5.2)
ALP SERPL-CCNC: 95 U/L (ref 40–150)
ALT SERPL W P-5'-P-CCNC: 36 U/L (ref 0–70)
ANION GAP SERPL CALCULATED.3IONS-SCNC: 9 MMOL/L (ref 7–15)
AST SERPL W P-5'-P-CCNC: 35 U/L (ref 0–45)
BILIRUB SERPL-MCNC: 0.4 MG/DL
BUN SERPL-MCNC: 11.1 MG/DL (ref 6–20)
CALCIUM SERPL-MCNC: 8.7 MG/DL (ref 8.6–10)
CHLORIDE SERPL-SCNC: 105 MMOL/L (ref 98–107)
CHOLEST SERPL-MCNC: 178 MG/DL
CREAT SERPL-MCNC: 0.94 MG/DL (ref 0.67–1.17)
CREAT UR-MCNC: 254 MG/DL
DEPRECATED HCO3 PLAS-SCNC: 25 MMOL/L (ref 22–29)
EGFRCR SERPLBLD CKD-EPI 2021: >90 ML/MIN/1.73M2
ERYTHROCYTE [DISTWIDTH] IN BLOOD BY AUTOMATED COUNT: 15 % (ref 10–15)
FASTING STATUS PATIENT QL REPORTED: YES
GLUCOSE SERPL-MCNC: 140 MG/DL (ref 70–99)
HCT VFR BLD AUTO: 41.2 % (ref 40–53)
HCV AB SERPL QL IA: NONREACTIVE
HDLC SERPL-MCNC: 47 MG/DL
HGB BLD-MCNC: 12.5 G/DL (ref 13.3–17.7)
HIV 1+2 AB+HIV1 P24 AG SERPL QL IA: NONREACTIVE
INR BLD: 1.9 (ref 0.9–1.1)
LDLC SERPL CALC-MCNC: 86 MG/DL
MCH RBC QN AUTO: 27.2 PG (ref 26.5–33)
MCHC RBC AUTO-ENTMCNC: 30.3 G/DL (ref 31.5–36.5)
MCV RBC AUTO: 90 FL (ref 78–100)
MICROALBUMIN UR-MCNC: 73.5 MG/L
MICROALBUMIN/CREAT UR: 28.94 MG/G CR (ref 0–17)
NONHDLC SERPL-MCNC: 131 MG/DL
PLATELET # BLD AUTO: 219 10E3/UL (ref 150–450)
POTASSIUM SERPL-SCNC: 4.4 MMOL/L (ref 3.4–5.3)
PROT SERPL-MCNC: 7.3 G/DL (ref 6.4–8.3)
RBC # BLD AUTO: 4.59 10E6/UL (ref 4.4–5.9)
SODIUM SERPL-SCNC: 139 MMOL/L (ref 135–145)
TRIGL SERPL-MCNC: 223 MG/DL
WBC # BLD AUTO: 5.5 10E3/UL (ref 4–11)

## 2024-03-11 PROCEDURE — 86803 HEPATITIS C AB TEST: CPT | Performed by: NURSE PRACTITIONER

## 2024-03-11 PROCEDURE — 82570 ASSAY OF URINE CREATININE: CPT | Performed by: NURSE PRACTITIONER

## 2024-03-11 PROCEDURE — 82043 UR ALBUMIN QUANTITATIVE: CPT | Performed by: NURSE PRACTITIONER

## 2024-03-11 PROCEDURE — 85027 COMPLETE CBC AUTOMATED: CPT | Performed by: NURSE PRACTITIONER

## 2024-03-11 PROCEDURE — 80053 COMPREHEN METABOLIC PANEL: CPT | Performed by: NURSE PRACTITIONER

## 2024-03-11 PROCEDURE — 80061 LIPID PANEL: CPT | Performed by: NURSE PRACTITIONER

## 2024-03-11 PROCEDURE — 36415 COLL VENOUS BLD VENIPUNCTURE: CPT | Performed by: NURSE PRACTITIONER

## 2024-03-11 PROCEDURE — 99396 PREV VISIT EST AGE 40-64: CPT | Performed by: NURSE PRACTITIONER

## 2024-03-11 PROCEDURE — 85610 PROTHROMBIN TIME: CPT

## 2024-03-11 PROCEDURE — 99214 OFFICE O/P EST MOD 30 MIN: CPT | Mod: 25 | Performed by: NURSE PRACTITIONER

## 2024-03-11 PROCEDURE — 87389 HIV-1 AG W/HIV-1&-2 AB AG IA: CPT | Performed by: NURSE PRACTITIONER

## 2024-03-11 RX ORDER — CLONIDINE HYDROCHLORIDE 0.1 MG/1
0.1 TABLET ORAL AT BEDTIME
Qty: 90 TABLET | Refills: 3 | Status: ON HOLD | OUTPATIENT
Start: 2024-03-11 | End: 2024-05-16

## 2024-03-11 RX ORDER — METFORMIN HCL 500 MG
1000 TABLET, EXTENDED RELEASE 24 HR ORAL
Qty: 180 TABLET | Refills: 3 | Status: SHIPPED | OUTPATIENT
Start: 2024-03-11

## 2024-03-11 RX ORDER — LISINOPRIL 20 MG/1
20 TABLET ORAL DAILY
Qty: 30 TABLET | Refills: 2 | Status: SHIPPED | OUTPATIENT
Start: 2024-03-11 | End: 2024-05-10

## 2024-03-11 RX ORDER — SIMVASTATIN 20 MG
20 TABLET ORAL AT BEDTIME
Qty: 90 TABLET | Refills: 3 | Status: SHIPPED | OUTPATIENT
Start: 2024-03-11

## 2024-03-11 RX ORDER — TRAZODONE HYDROCHLORIDE 100 MG/1
200 TABLET ORAL AT BEDTIME
Qty: 180 TABLET | Refills: 3 | Status: SHIPPED | OUTPATIENT
Start: 2024-03-11 | End: 2024-08-09

## 2024-03-11 RX ORDER — WARFARIN SODIUM 5 MG/1
5 TABLET ORAL DAILY
Qty: 90 TABLET | Refills: 3 | Status: ON HOLD | OUTPATIENT
Start: 2024-03-11 | End: 2024-05-16

## 2024-03-11 RX ORDER — BUPROPION HYDROCHLORIDE 150 MG/1
150 TABLET ORAL EVERY MORNING
Qty: 90 TABLET | Refills: 3 | Status: SHIPPED | OUTPATIENT
Start: 2024-03-11

## 2024-03-11 RX ORDER — TIRZEPATIDE 5 MG/.5ML
5 INJECTION, SOLUTION SUBCUTANEOUS
Qty: 2 ML | Refills: 3 | Status: ON HOLD | OUTPATIENT
Start: 2024-03-11 | End: 2024-05-16

## 2024-03-11 ASSESSMENT — PATIENT HEALTH QUESTIONNAIRE - PHQ9
10. IF YOU CHECKED OFF ANY PROBLEMS, HOW DIFFICULT HAVE THESE PROBLEMS MADE IT FOR YOU TO DO YOUR WORK, TAKE CARE OF THINGS AT HOME, OR GET ALONG WITH OTHER PEOPLE: SOMEWHAT DIFFICULT
SUM OF ALL RESPONSES TO PHQ QUESTIONS 1-9: 9
SUM OF ALL RESPONSES TO PHQ QUESTIONS 1-9: 9

## 2024-03-11 ASSESSMENT — PAIN SCALES - GENERAL: PAINLEVEL: MILD PAIN (2)

## 2024-03-11 NOTE — PROGRESS NOTES
ANTICOAGULATION MANAGEMENT     Cody WALLACE Young 43 year old male is on warfarin with subtherapeutic INR result. (Goal INR 2.0-3.0)    Recent labs: (last 7 days)     03/11/24  0900   INR 1.9*       ASSESSMENT     Source(s): Chart Review and Patient/Caregiver Call     Warfarin doses taken: Missed dose(s) may be affecting INR  Diet: No new diet changes identified  Medication/supplement changes: None noted  New illness, injury, or hospitalization: No  Signs or symptoms of bleeding or clotting: No  Previous result: Therapeutic last 2(+) visits  Additional findings: None       PLAN     Recommended plan for temporary change(s) affecting INR     Dosing Instructions: Continue your current warfarin dose with next INR in 2 weeks       Summary  As of 3/11/2024      Full warfarin instructions:  3/11: 7.5 mg; Otherwise 5 mg every day   Next INR check:  3/25/2024               Telephone call with Cody who verbalizes understanding and agrees to plan    Lab visit scheduled    Education provided:   Please call back if any changes to your diet, medications or how you've been taking warfarin  Goal range and lab monitoring: goal range and significance of current result, Importance of therapeutic range, and Importance of following up at instructed interval    Plan made per [facility] anticoagulation protocol    Anita Lainez, RN  Anticoagulation Clinic  3/11/2024    _______________________________________________________________________     Anticoagulation Episode Summary       Current INR goal:  2.0-3.0   TTR:  48.6% (3 wk)   Target end date:  Indefinite   Send INR reminders to:  KEO WHITESIDE    Indications    Acute pulmonary embolism without acute cor pulmonale  unspecified pulmonary embolism type (H) [I26.99]             Comments:               Anticoagulation Care Providers       Provider Role Specialty Phone number    Fredy Veras MD Referring Family Medicine [phone]

## 2024-03-11 NOTE — PROGRESS NOTES
Preventive Care Visit  [facility]  Gabrielle Chaudhary NP, Family Medicine  Mar 11, 2024    Assessment & Plan     Type 2 diabetes mellitus without complication, without long-term current use of insulin (H)  Last hgba1c uncontrolled, patient started on mounjaro, dose increased today, recheck in 3 month  - check labs  - medication refilled  - Albumin Random Urine Quantitative with Creat Ratio  - reason aspirin not prescribed, intentional,  - metFORMIN (GLUCOPHAGE XR) 500 MG 24 hr tablet  Dispense: 180 tablet; Refill: 3  - tirzepatide (MOUNJARO) 5 MG/0.5ML pen  Dispense: 2 mL; Refill: 3  - CBC with platelets  - Comprehensive metabolic panel (BMP + Alb, Alk Phos, ALT, AST, Total. Bili, TP)  - Albumin Random Urine Quantitative with Creat Ratio  - CBC with platelets  - Comprehensive metabolic panel (BMP + Alb, Alk Phos, ALT, AST, Total. Bili, TP)    Screen for colon cancer  Will do cologuard  - COLOGUARD(EX[employer] SCIENCES)    Screening for HIV (human immunodeficiency virus)  Check labs  - HIV Antigen Antibody Combo  - HIV Antigen Antibody Combo    Need for hepatitis C screening test  Check labs  - Hepatitis C Screen Reflex to HCV RNA Quant and Genotype  - Hepatitis C Screen Reflex to HCV RNA Quant and Genotype    Acute pulmonary embolism without acute cor pulmonale, unspecified pulmonary embolism type (H)  Hospital follow up complete, patient will be on coumadin for life due to 2 unprovoked DVT/PE, Followed by INR clinic  - warfarin ANTICOAGULANT (COUMADIN) 5 MG tablet  Dispense: 90 tablet; Refill: 3    Routine general medical examination at a health care facility  completed    Pure hypercholesterolemia  Check labs  Continue with medication  - simvastatin (ZOCOR) 20 MG tablet  Dispense: 90 tablet; Refill: 3  - Lipid panel reflex to direct LDL Fasting  - Lipid panel reflex to direct LDL Fasting    Obstructive sleep apnea syndrome  Stable on CPAP    History of pulmonary embolism  - INR and coumadin  -  Education given    History of DVT (deep vein thrombosis)  coumadin    Anticoagulated on Coumadin  coumadin    Primary insomnia  Medication refilled  - traZODone (DESYREL) 100 MG tablet  Dispense: 180 tablet; Refill: 3  - cloNIDine (CATAPRES) 0.1 MG tablet  Dispense: 90 tablet; Refill: 3    Major depressive disorder, recurrent episode, moderate (H)  Medication refilled  - buPROPion (WELLBUTRIN XL) 150 MG 24 hr tablet  Dispense: 90 tablet; Refill: 3    Essential hypertension, benign  New onset,   Will start medication and then patient is working on lifestyle changes, will follow up in   - discussed side effects of medication  - lisinopril (ZESTRIL) 20 MG tablet  Dispense: 30 tablet; Refill: 2      Suhail Ross is a 43 year old, presenting for the following:  Establish Care (Needs to establish care with new provider. Superficial clot in his right leg that passed to a pulmonary embolism back in the beginning of Feb. Continues to have swelling in his lower leg.  Past DVT in his right leg.  Would like to discuss future treatment options. )        3/11/2024     8:19 AM   Additional Questions   Roomed by Ekaterina Lindsey CMA   Accompanied by Self        Via the Health Maintenance questionnaire, the patient has reported the following services have been completed -Eye Exam, this information has been sent to the abstraction team.  Health Care Directive  Patient does not have a Health Care Directive or Living Will: Discussed advance care planning with patient; however, patient declined at this time.    History of Present Illness       Reason for visit:  Follow up from pulmonary embolisim    He eats 2-3 servings of fruits and vegetables daily.He consumes 0 sweetened beverage(s) daily.He exercises with enough effort to increase his heart rate 10 to 19 minutes per day.  He exercises with enough effort to increase his heart rate 3 or less days per week. He is missing 1 dose(s) of medications per week.          Hospital  Follow-up Visit:    Hospital/Nursing Home/IP Rehab Facility: [facility]  Date of Admission: 02/03/24  Date of Discharge: 02/04/24  Reason(s) for Admission: cellulitis, discharge pulmonary embolism     Was your hospitalization related to COVID-19? No   Problems taking medications regularly:  None  Medication changes since discharge: on warfarin and medication dose changes  Problems adhering to non-medication therapy:  None    Summary of hospitalization:  [facility] discharge summary reviewed  Diagnostic Tests/Treatments reviewed.  Follow up needed: none  Other Healthcare Providers Involved in Patient s Care:          INR clinic  Update since discharge: improved.         Plan of care communicated with patient                    No data to display                   No data to display                   No data to display                      No data to display                   Today's PHQ-9 Score:       3/11/2024     8:09 AM   PHQ-9 SCORE   PHQ-9 Total Score MyChart 9 (Mild depression)   PHQ-9 Total Score 9          No data to display              Social History     Tobacco Use    Smoking status: Never    Smokeless tobacco: Never   Vaping Use    Vaping Use: Never used   Substance Use Topics    Alcohol use: Yes     Comment: Alcoholic Drinks/day: occasionally    Drug use: No       ASCVD Risk   The ASCVD Risk score (Anastacia HASSAN, et al., 2019) failed to calculate for the following reasons:    Cannot find a previous HDL lab    Cannot find a previous total cholesterol lab         No data to display                 Reviewed and updated as needed this visit by Provider      Problems               No past medical history on file.  Past Surgical History:   Procedure Laterality Date    APPENDECTOMY  08/15/2017    Dr. Ferrer    GASTRIC BYPASS      [facility],APPEN[facility]OMY N/A 8/14/2017    Procedure: APPENDECTOMY, LAPAROSCOPIC;  Surgeon: Nba Ferrer MD;  Location: [facility]  "OR;  Service: General    REVISION AKANKSHA-EN-Y 2014    Dr. Ranjeet Espinal @[email or web address]     Lab work is in process      Review of Systems  Constitutional, HEENT, cardiovascular, pulmonary, gi and gu systems are negative, except as otherwise noted.     Objective    Exam  BP (!) 148/90 (BP Location: Right arm, Patient Position: Sitting, Cuff Size: Adult Large)   Pulse 79   Temp 98.2  F (36.8  C) (Oral)   Resp 20   Ht 1.803 m (5' 11\")   Wt (!) 176.9 kg (390 lb)   SpO2 95%   BMI 54.39 kg/m     Estimated body mass index is 54.39 kg/m  as calculated from the following:    Height as of this encounter: 1.803 m (5' 11\").    Weight as of this encounter: 176.9 kg (390 lb).    Physical Exam  GENERAL: alert and no distress  EYES: Eyes grossly normal to inspection, PERRL and conjunctivae and sclerae normal  HENT: ear canals and TM's normal, nose and mouth without ulcers or lesions  NECK: no adenopathy, no asymmetry, masses, or scars  RESP: lungs clear to auscultation - no rales, rhonchi or wheezes  CV: regular rate and rhythm, normal S1 S2, no S3 or S4, no murmur, click or rub, no peripheral edema  ABDOMEN: soft, nontender, no hepatosplenomegaly, no masses and bowel sounds normal  MS: no gross musculoskeletal defects noted, no edema  SKIN: no suspicious lesions or rashes  NEURO: Normal strength and tone, mentation intact and speech normal  PSYCH: mentation appears normal, affect normal/bright  Diabetic foot exam: normal DP and PT pulses, no trophic changes or ulcerative lesions, and normal sensory exam      Signed Electronically by: Gabrielle Chaudhary NP    "

## 2024-03-26 ENCOUNTER — VIRTUAL VISIT (OUTPATIENT)
Dept: BEHAVIORAL HEALTH | Facility: CLINIC | Age: 44
End: 2024-03-26
Payer: COMMERCIAL

## 2024-03-26 DIAGNOSIS — F33.1 MAJOR DEPRESSIVE DISORDER, RECURRENT EPISODE, MODERATE (H): Primary | ICD-10-CM

## 2024-03-26 PROCEDURE — 90834 PSYTX W PT 45 MINUTES: CPT | Mod: 95

## 2024-03-26 ASSESSMENT — PATIENT HEALTH QUESTIONNAIRE - PHQ9
10. IF YOU CHECKED OFF ANY PROBLEMS, HOW DIFFICULT HAVE THESE PROBLEMS MADE IT FOR YOU TO DO YOUR WORK, TAKE CARE OF THINGS AT HOME, OR GET ALONG WITH OTHER PEOPLE: SOMEWHAT DIFFICULT
SUM OF ALL RESPONSES TO PHQ QUESTIONS 1-9: 8
SUM OF ALL RESPONSES TO PHQ QUESTIONS 1-9: 8

## 2024-03-26 NOTE — PROGRESS NOTES
[facility]                                     Progress Note    Patient Name: Cody Bolton  Date: 3/26/24           Service Type: Individual      Session Start Time: 11.59  Session End Time: 12.40     Session Length: 38-52 minutes    Session #: 2    Attendees: Client attended alone    Service Modality:  Video Visit:      Provider verified identity through the following two step process.  Patient provided:  Patient  and Patient address    Telemedicine Visit: The patient's condition can be safely assessed and treated via synchronous audio and visual telemedicine encounter.      Reason for Telemedicine Visit: Patient has requested telehealth visit    Originating Site (Patient Location): Patient's home    Distant Site (Provider Location): [facility]    Consent:  The patient/guardian has verbally consented to: the potential risks and benefits of telemedicine (video visit) versus in person care; bill my insurance or make self-payment for services provided; and responsibility for payment of non-covered services.     Patient would like the video invitation sent by:  My Chart    Mode of Communication:  Video Conference via Amwell    Distant Location (Provider):  Off-site    As the provider I attest to compliance with applicable laws and regulations related to telemedicine.    DATA  Interactive Complexity: No  Crisis: No        Progress Since Last Session (Related to Symptoms / Goals / Homework):   Symptoms: No change . Symptoms remain the same.    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 Patient discussed binge drinking this weekend that was  triggered by relationship stressors. Patient discussed intimacy. Patient discussed communication struggles with his girlfriend. Patient discussed starting physical therapy to address back pain. Patient discussed finances as a trigger for depression. Patient and provider discussed the patient's passive suicidal ideations and co-created a Safety Plan on this date.        Treatment Objective(s) Addressed in This Session:   Decrease frequency and intensity of feeling down, depressed, hopeless       Intervention:   Motivational Interviewing    MI Intervention: Expressed Empathy/Understanding, Supported Autonomy, Collaboration, Evocation, Open-ended questions, and Reflections: simple and complex     Change Talk Expressed by the Patient: Desire to change    Provider Response to Change Talk: E - Evoked more info from patient about behavior change, A - Affirmed patient's thoughts, decisions, or attempts at behavior change, and R - Reflected patient's change talk    Assessments completed prior to visit:  The following assessments were completed by patient for this visit:  PHQ9:       2/19/2024    12:38 PM 3/11/2024     8:09 AM 3/26/2024    11:44 AM   PHQ-9 SCORE   PHQ-9 Total Score MyChart 10 (Moderate depression) 9 (Mild depression) 8 (Mild depression)   PHQ-9 Total Score 10 9 8     GAD7:       2/19/2024    12:39 PM   MERI-7 SCORE   Total Score 2 (minimal anxiety)   Total Score 2     PROMIS 10-Global Health (all questions and answers displayed):       2/19/2024    12:53 PM   PROMIS 10   In general, would you say your health is: Fair   In general, would you say your quality of life is: Good   In general, how would you rate your physical health? Fair   In general, how would you rate your mental health, including your mood and your ability to think? Good   In general, how would you rate your satisfaction with your social activities and relationships? Good   In general, please rate how well you carry out your usual social  "activities and roles Fair   To what extent are you able to carry out your everyday physical activities such as walking, climbing stairs, carrying groceries, or moving a chair? Completely   In the past 7 days, how often have you been bothered by emotional problems such as feeling anxious, depressed, or irritable? Sometimes   In the past 7 days, how would you rate your fatigue on average? Mild   In the past 7 days, how would you rate your pain on average, where 0 means no pain, and 10 means worst imaginable pain? 5   In general, would you say your health is: 2   In general, would you say your quality of life is: 3   In general, how would you rate your physical health? 2   In general, how would you rate your mental health, including your mood and your ability to think? 3   In general, how would you rate your satisfaction with your social activities and relationships? 3   In general, please rate how well you carry out your usual social activities and roles. (This includes activities at home, at work and in your community, and responsibilities as a parent, child, spouse, employee, friend, etc.) 2   To what extent are you able to carry out your everyday physical activities such as walking, climbing stairs, carrying groceries, or moving a chair? 5   In the past 7 days, how often have you been bothered by emotional problems such as feeling anxious, depressed, or irritable? 3   In the past 7 days, how would you rate your fatigue on average? 2   In the past 7 days, how would you rate your pain on average, where 0 means no pain, and 10 means worst imaginable pain? 5   Global Mental Health Score 12    12   Global Physical Health Score 14    14   PROMIS TOTAL - SUBSCORES 26    26     [place] Suicide Severity Rating Scale (Lifetime/Recent)      2/19/2024     1:03 PM   [place] Suicide Severity Rating (Lifetime/Recent)   Q1 Wish to be Dead (Lifetime) Y   Wish to be Dead Description (Lifetime) \"I never attempted, but I kind of had " "a plan, but it comes and goes.\" Patient reports passive suicidal ideation.   1. Wish to be Dead (Past 1 Month) N   Q2 Non-Specific Active Suicidal Thoughts (Lifetime) Y   Non-Specific Active Suicidal Thought Description (Lifetime) \"I never attempted, but I kind of had a plan, but it comes and goes.\" Patient reports passive suicidal ideation.   2. Non-Specific Active Suicidal Thoughts (Past 1 Month) N   3. Active Suicidal Ideation with any Methods (Not Plan) Without Intent to Act (Lifetime) N   Q4 Active Suicidal Ideation with Some Intent to Act, Without Specific Plan (Lifetime) N   Q5 Active Suicidal Ideation with Specific Plan and Intent (Lifetime) N   Most Severe Ideation Rating (Lifetime) 2   Description of Most Severe Ideation (Lifetime) Patient reports passive suicidal ideation.   Most Severe Ideation Rating (Past 1 Month) 1   Description of Most Severe Ideation (Past 1 Month) NA   Frequency (Lifetime) 1   Frequency (Past 1 Month) 1   Duration (Lifetime) 1   Duration (Past 1 Month) 1   Controllability (Lifetime) 2   Controllability (Past 1 Month) 1   Deterrents (Lifetime) 1   Deterrents (Past 1 Month) 1   Reasons for Ideation (Lifetime) 4   Reasons for Ideation (Past 1 Month) 0   Actual Attempt (Lifetime) N   Has subject engaged in non-suicidal self-injurious behavior? (Lifetime) N   Interrupted Attempts (Lifetime) N   Aborted or Self-Interrupted Attempt (Lifetime) N   Preparatory Acts or Behavior (Lifetime) N   Calculated C-SSRS Risk Score (Lifetime/Recent) No Risk Indicated         ASSESSMENT: Current Emotional / Mental Status (status of significant symptoms):   Risk status (Self / Other harm or suicidal ideation)   Patient denies current fears or concerns for personal safety.   Patient reports the following current or recent suicidal ideation or behaviors: Patient reports some passive suicidal ideation related to financial stressors.   Patient denies current or recent homicidal ideation or " behaviors.   Patient denies current or recent self injurious behavior or ideation.   Patient denies other safety concerns.   Patient reports there has been no change in risk factors since their last session.     Patient reports there has been no change in protective factors since their last session.     A safety and risk management plan has been developed including: Patient consented to co-developed safety plan.  Safety and risk management plan was completed - see below.  Patient agreed to use safety plan should any safety concerns arise.  A copy was given to the patient. [place] Safety Plan completed - see Screenings tab.     Appearance:   Appropriate    Eye Contact:   Good    Psychomotor Behavior: Normal    Attitude:   Cooperative    Orientation:   All   Speech    Rate / Production: Normal     Volume:  Normal    Mood:    Depressed    Affect:    Appropriate    Thought Content:  Clear    Thought Form:  Coherent  Logical    Insight:    Good      Medication Review:   No changes to current psychiatric medication(s)     Medication Compliance:   Yes     Changes in Health Issues:   None reported     Chemical Use Review:   Substance Use: Problem use continues with no change since last session, Reviewed information and resources for treatment and ongoing sobriety  Reviewed concerns related to health related substance abuse risk  Provided encouragement towards sobriety   . Patient declined a referral at this time for a chemical dependency evaluation and comprehensive assessment.     Tobacco Use: No current tobacco use.      Diagnosis:  1. Major depressive disorder, recurrent episode, moderate (H)        Collateral Reports Completed:   Not Applicable    PLAN: (Patient Tasks / Therapist Tasks / Other)  Patient will return in 2 weeks for next session. Patient will engage in self-care activities. Patient will work on incorporating more exercise and movement into his routine at least 2 times/week. Patient will work on open  "and direct communication skills, especially with his girlfriend. Patient will work on coping skills to reduce his consumption of alcohol. Patient will utilize the skill of \"delay, distract, decide\" when noticing alcohol cravings. Patient will work on making his living room brighter and \"less depressive\" feeling.       Lucy Rouse, [facility]                                                         ______________________________________________________________________    Individual Treatment Plan    Patient's Name: Cody Bolton  YOB: 1980    Date of Creation: 2/26/24  Date Treatment Plan Last Reviewed/Revised: 2/26/24    DSM5 Diagnoses:   Encounter Diagnosis   Name Primary?    Major depressive disorder, recurrent episode, moderate (H) Yes       Psychosocial / Contextual Factors: Relationship stressors, occupational dynamics.  PROMIS (reviewed every 90 days):   Global Mental Health Score (P) 12   Global Physical Health Score (P) 14   PROMIS TOTAL - SUBSCORES (P) 26       Referral / Collaboration:  Referral to another professional/service is not indicated at this time..    Anticipated number of session for this episode of care: 9-12 sessions  Anticipation frequency of session: Weekly  Anticipated Duration of each session: 38-52 minutes  Treatment plan will be reviewed in 90 days or when goals have been changed.       MeasurableTreatment Goal(s) related to diagnosis / functional impairment(s)  Goal 1: Patient will reduce depression symptoms as evidenced by a reduction in PHQ9 score to a 7 or less in the next 2 months.    I will know I've met my goal when I'm not having the feeling as often that I just don't want to do life anymore.      Objective #A (Patient Action)    Patient will Decrease frequency and intensity of feeling down, depressed, hopeless.  Status: New - Date: 2/26/24      Intervention(s)  Therapist will teach  coping skills and self-care activities .    Objective #B  Patient will Feel less " tired and more energy during the day .  Status: New - Date: 2/26/24      Intervention(s)  Therapist will  teach sleep hygiene and movement-based exercises .    Objective #C  Patient will Increase interest, engagement, and pleasure in doing things.  Status: New - Date: 2/26/24      Intervention(s)  Therapist will  teach distress-tolerance skills. .          Patient has reviewed and agreed to the above plan.      Lucy Rouse, [facility]  February 26, 2024

## 2024-03-27 ENCOUNTER — DOCUMENTATION ONLY (OUTPATIENT)
Dept: ANTICOAGULATION | Facility: CLINIC | Age: 44
End: 2024-03-27

## 2024-03-27 ENCOUNTER — LAB (OUTPATIENT)
Dept: LAB | Facility: CLINIC | Age: 44
End: 2024-03-27
Payer: COMMERCIAL

## 2024-03-27 ENCOUNTER — ANTICOAGULATION THERAPY VISIT (OUTPATIENT)
Dept: ANTICOAGULATION | Facility: CLINIC | Age: 44
End: 2024-03-27

## 2024-03-27 DIAGNOSIS — I26.99 ACUTE PULMONARY EMBOLISM WITHOUT ACUTE COR PULMONALE, UNSPECIFIED PULMONARY EMBOLISM TYPE (H): Primary | ICD-10-CM

## 2024-03-27 DIAGNOSIS — I80.01 THROMBOPHLEBITIS OF SUPERFICIAL VEINS OF RIGHT LOWER EXTREMITY: ICD-10-CM

## 2024-03-27 DIAGNOSIS — Z86.718 HISTORY OF DVT (DEEP VEIN THROMBOSIS): Primary | ICD-10-CM

## 2024-03-27 DIAGNOSIS — I26.99 ACUTE PULMONARY EMBOLISM WITHOUT ACUTE COR PULMONALE, UNSPECIFIED PULMONARY EMBOLISM TYPE (H): ICD-10-CM

## 2024-03-27 DIAGNOSIS — Z86.718 HISTORY OF DVT (DEEP VEIN THROMBOSIS): ICD-10-CM

## 2024-03-27 LAB — INR BLD: 2.7 (ref 0.9–1.1)

## 2024-03-27 PROCEDURE — 36415 COLL VENOUS BLD VENIPUNCTURE: CPT

## 2024-03-27 PROCEDURE — 85610 PROTHROMBIN TIME: CPT

## 2024-03-27 NOTE — PROGRESS NOTES
He just established care with ROLAN Chaudhary NP. New referral and INR order needs to be signed by her please.  Jeanie Desouza RN

## 2024-03-27 NOTE — PROGRESS NOTES
ANTICOAGULATION MANAGEMENT     Cody WALLACE Young 43 year old male is on warfarin with therapeutic INR result. (Goal INR 2.0-3.0)    Recent labs: (last 7 days)     03/27/24  1142   INR 2.7*       ASSESSMENT     Source(s): Chart Review     Warfarin doses taken: Reviewed in chart  Diet: No new diet changes identified  Medication/supplement changes: None noted  New illness, injury, or hospitalization: No  Signs or symptoms of bleeding or clotting: No  Previous result: Subtherapeutic  Additional findings: He established care with new PCP on 3/11/24. New referral and INR order sent to ROLAN Chaudhary NP to sign and further results will be managed by [facility].       PLAN     Recommended plan for no diet, medication or health factor changes affecting INR     Dosing Instructions: Continue your current warfarin dose with next INR in 3 weeks       Summary  As of 3/27/2024      Full warfarin instructions:  5 mg every day   Next INR check:  4/17/2024               Detailed voice message left for Cody with dosing instructions and follow up date.     Contact [phone] to schedule and with any changes, questions or concerns.     Education provided:   Please call back if any changes to your diet, medications or how you've been taking warfarin  Goal range and lab monitoring: goal range and significance of current result and Importance of following up at instructed interval  Contact [phone] with any changes, questions or concerns.     Plan made per [facility] anticoagulation protocol    Jeanie Desouza RN  Anticoagulation Clinic  3/27/2024    _______________________________________________________________________     Anticoagulation Episode Summary       Current INR goal:  2.0-3.0   TTR:  65.3% (1.2 mo)   Target end date:  Indefinite   Send INR reminders to:  KEO GARAY    Indications    Acute pulmonary embolism without acute cor pulmonale  unspecified pulmonary embolism type (H) [I26.99]  History of DVT (deep vein thrombosis)  [C64.345]             Comments:               Anticoagulation Care Providers       Provider Role Specialty Phone number    Fredy Veras MD Referring Family Medicine [phone]    Aaseby-Aguilera, Ramona Ann, PA-C Referring Family Medicine [phone]

## 2024-03-27 NOTE — PROGRESS NOTES
ANTICOAGULATION CLINIC REFERRAL RENEWAL REQUEST       An annual renewal order is required for all patients referred to [facility].?  Please review and sign the pended referral order for Cody Bolton.       ANTICOAGULATION SUMMARY      Warfarin indication(s)   DVT and PE    Mechanical heart valve present?  NO       Current goal range   INR: 2.0-3.0     Goal appropriate for indication? Goal INR 2-3, standard for indication(s) above     Time in Therapeutic Range (TTR)  (Goal > 60%) 65.3%       Office visit with referring provider's group within last year yes on 3/11/24       Jeanie Desouza RN  [facility]

## 2024-03-28 ENCOUNTER — OFFICE VISIT (OUTPATIENT)
Dept: URGENT CARE | Facility: URGENT CARE | Age: 44
End: 2024-03-28
Payer: COMMERCIAL

## 2024-03-28 VITALS
OXYGEN SATURATION: 98 % | SYSTOLIC BLOOD PRESSURE: 156 MMHG | TEMPERATURE: 97.9 F | RESPIRATION RATE: 14 BRPM | BODY MASS INDEX: 54.39 KG/M2 | WEIGHT: 315 LBS | DIASTOLIC BLOOD PRESSURE: 84 MMHG | HEART RATE: 77 BPM

## 2024-03-28 DIAGNOSIS — R03.0 ELEVATED BP WITHOUT DIAGNOSIS OF HYPERTENSION: ICD-10-CM

## 2024-03-28 DIAGNOSIS — R05.1 ACUTE COUGH: ICD-10-CM

## 2024-03-28 DIAGNOSIS — R07.0 THROAT PAIN: Primary | ICD-10-CM

## 2024-03-28 DIAGNOSIS — Z86.718 HISTORY OF DVT (DEEP VEIN THROMBOSIS): ICD-10-CM

## 2024-03-28 DIAGNOSIS — J01.90 ACUTE SINUSITIS WITH COEXISTING CONDITION REQUIRING PROPHYLACTIC TREATMENT: ICD-10-CM

## 2024-03-28 DIAGNOSIS — E11.9 TYPE 2 DIABETES MELLITUS WITHOUT COMPLICATION, WITHOUT LONG-TERM CURRENT USE OF INSULIN (H): ICD-10-CM

## 2024-03-28 LAB — DEPRECATED S PYO AG THROAT QL EIA: NEGATIVE

## 2024-03-28 PROCEDURE — 99214 OFFICE O/P EST MOD 30 MIN: CPT | Performed by: NURSE PRACTITIONER

## 2024-03-28 PROCEDURE — 87651 STREP A DNA AMP PROBE: CPT | Performed by: NURSE PRACTITIONER

## 2024-03-28 RX ORDER — BENZONATATE 200 MG/1
200 CAPSULE ORAL 3 TIMES DAILY PRN
Qty: 20 CAPSULE | Refills: 0 | Status: SHIPPED | OUTPATIENT
Start: 2024-03-28 | End: 2024-05-04

## 2024-03-28 NOTE — PATIENT INSTRUCTIONS
Results for orders placed or performed in visit on 03/28/24   Streptococcus A Rapid Screen w/Reflex to PCR - Clinic Collect     Status: Normal    Specimen: Throat; Swab   Result Value Ref Range    Group A Strep antigen Negative Negative   Group A Streptococcus PCR Throat Swab     Status: Normal    Specimen: Throat; Swab   Result Value Ref Range    Group A strep by PCR Not Detected Not Detected    Narrative    The Xpert Xpress Strep A test, performed on the [employer]ument Systems, is a rapid, qualitative in vitro diagnostic test for the detection of Streptococcus pyogenes (Group A ß-hemolytic Streptococcus, Strep A) in throat swab specimens from patients with signs and symptoms of pharyngitis. The Xpert Xpress Strep A test can be used as an aid in the diagnosis of Group A Streptococcal pharyngitis. The assay is not intended to monitor treatment for Group A Streptococcus infections. The Xpert Xpress Strep A test utilizes an automated real-time polymerase chain reaction (PCR) to detect Streptococcus pyogenes DNA.     RST negative  Augmentin twice a day for 7 days with diabetes and HTN and sinusitis.    TC  swab pending.    Push fluids  Lots of handwashing.   Ibuprofen as needed for fever or pain  Delsym or dayquil/nyquil for cough as needed     Rest as able.   Will call if any other labs positive.    F/u in the clinic if symptoms persist or worsen.

## 2024-03-28 NOTE — PROGRESS NOTES
Assessment & Plan     Throat pain  - Streptococcus A Rapid Screen w/Reflex to PCR - Clinic Collect  - Group A Streptococcus PCR Throat Swab    Type 2 diabetes mellitus without complication, without long-term current use of insulin (H)  - amoxicillin-clavulanate (AUGMENTIN) 875-125 MG tablet  Dispense: 14 tablet; Refill: 0    Elevated BP without diagnosis of hypertension  - amoxicillin-clavulanate (AUGMENTIN) 875-125 MG tablet  Dispense: 14 tablet; Refill: 0    History of DVT (deep vein thrombosis)  - amoxicillin-clavulanate (AUGMENTIN) 875-125 MG tablet  Dispense: 14 tablet; Refill: 0    Acute sinusitis with coexisting condition requiring prophylactic treatment  - amoxicillin-clavulanate (AUGMENTIN) 875-125 MG tablet  Dispense: 14 tablet; Refill: 0    Acute cough     Patient Instructions     Results for orders placed or performed in visit on 03/28/24   Streptococcus A Rapid Screen w/Reflex to PCR - Clinic Collect     Status: Normal    Specimen: Throat; Swab   Result Value Ref Range    Group A Strep antigen Negative Negative   Group A Streptococcus PCR Throat Swab     Status: Normal    Specimen: Throat; Swab   Result Value Ref Range    Group A strep by PCR Not Detected Not Detected    Narrative    The Xpert Xpress Strep A test, performed on the [employer], is a rapid, qualitative in vitro diagnostic test for the detection of Streptococcus pyogenes (Group A ß-hemolytic Streptococcus, Strep A) in throat swab specimens from patients with signs and symptoms of pharyngitis. The Xpert Xpress Strep A test can be used as an aid in the diagnosis of Group A Streptococcal pharyngitis. The assay is not intended to monitor treatment for Group A Streptococcus infections. The Xpert Xpress Strep A test utilizes an automated real-time polymerase chain reaction (PCR) to detect Streptococcus pyogenes DNA.     RST negative  Augmentin twice a day for 7 days with diabetes and HTN and sinusitis.    TC  swab pending.     Push fluids  Lots of handwashing.   Ibuprofen as needed for fever or pain  Delsym or dayquil/nyquil for cough as needed     Rest as able.   Will call if any other labs positive.    F/u in the clinic if symptoms persist or worsen.            Return in about 1 week (around 4/4/2024) for with regular provider if symptoms persist.    LUIS ALBERTO Farmer CNP  [facility] JARROD Ross is a 43 year old male who presents to clinic today for the following health issues:  Chief Complaint   Patient presents with    Sick     Sore throat/Cough x 7 days -pain some pain meds at 8AM today     HPI    URI Adult    Onset of symptoms was 1 week(s) ago.  Course of illness is worsening.    Severity moderately severe  Current and Associated symptoms: runny nose, stuffy nose, cough - non-productive, ear pain , sore throat, facial pain/pressure, and headache  Treatment measures tried include Tylenol/Ibuprofen, OTC Cough med, Fluids, and Rest.  Predisposing factors include diabetes, poorly controlled, last A1c over 9.  .  Sugars running high, over 250.      Review of Systems  Constitutional, HEENT, cardiovascular, pulmonary, GI, , musculoskeletal, neuro, skin, endocrine and psych systems are negative, except as otherwise noted.      Objective    BP (!) 156/84 (BP Location: Right arm, Patient Position: Chair, Cuff Size: Adult Large)   Pulse 77   Temp 97.9  F (36.6  C) (Oral)   Resp 14   Wt (!) 176.9 kg (390 lb)   SpO2 98%   BMI 54.39 kg/m    Physical Exam   GENERAL: alert and no distress  EYES: Eyes grossly normal to inspection, PERRL and conjunctivae and sclerae normal  HENT: normal cephalic/atraumatic, ear canals and TM's normal, nose and mouth without ulcers or lesions, oropharynx clear, oral mucous membranes moist, and sinuses: maxillary, frontal tenderness on both sides  NECK: no adenopathy, no asymmetry, masses, or scars  RESP: lungs clear to auscultation - no rales, rhonchi or  wheezes  CV: regular rate and rhythm, normal S1 S2, no S3 or S4, no murmur, click or rub, no peripheral edema  ABDOMEN: soft, nontender, no hepatosplenomegaly, no masses and bowel sounds normal  MS: no gross musculoskeletal defects noted, no edema

## 2024-03-29 LAB — GROUP A STREP BY PCR: NOT DETECTED

## 2024-04-09 ENCOUNTER — VIRTUAL VISIT (OUTPATIENT)
Dept: BEHAVIORAL HEALTH | Facility: CLINIC | Age: 44
End: 2024-04-09
Payer: COMMERCIAL

## 2024-04-09 DIAGNOSIS — F33.1 MAJOR DEPRESSIVE DISORDER, RECURRENT EPISODE, MODERATE (H): Primary | ICD-10-CM

## 2024-04-09 PROCEDURE — 90834 PSYTX W PT 45 MINUTES: CPT | Mod: 95

## 2024-04-09 NOTE — PROGRESS NOTES
[facility]                                     Progress Note    Patient Name: Cody Bolton  Date: 24           Service Type: Individual      Session Start Time: 8.00  Session End Time: 8.44     Session Length: 38-52 minutes    Session #: 3    Attendees: Client attended alone    Service Modality:  Video Visit:      Provider verified identity through the following two step process.  Patient provided:  Patient  and Patient address    Telemedicine Visit: The patient's condition can be safely assessed and treated via synchronous audio and visual telemedicine encounter.      Reason for Telemedicine Visit: Patient has requested telehealth visit    Originating Site (Patient Location): Patient's home    Distant Site (Provider Location): [facility]    Consent:  The patient/guardian has verbally consented to: the potential risks and benefits of telemedicine (video visit) versus in person care; bill my insurance or make self-payment for services provided; and responsibility for payment of non-covered services.     Patient would like the video invitation sent by:  My Chart    Mode of Communication:  Video Conference via [employer]well    Distant Location (Provider):  Off-site    As the provider I attest to compliance with applicable laws and regulations related to telemedicine.    DATA  Interactive Complexity: No  Crisis: No        Progress Since Last Session (Related to Symptoms / Goals / Homework):   Symptoms: Worsening . Patient reports a worsening of symptoms this past week after not getting a job he wanted.     Homework: Partially completed      Episode of Care Goals: Satisfactory progress - CONTEMPLATION (Considering change and yet undecided); Intervened by assessing the negative and positive thinking (ambivalence) about behavior change     Current / Ongoing Stressors and Concerns:  Patient discussed disappointment after not getting the job he was  interviewing for. Patient discussed binge drinking this past weekend after getting the news. Patient discussed relationship dynamics with his girlfriend. Patient discussed going to [place] at the end of the month. Patient discussed challenging relationship dynamics with his ex-wife. Patient discussed boundary setting. Patient discussed back pain and starting physical therapy soon. Patient discussed sleep struggles.        Treatment Objective(s) Addressed in This Session:   Decrease frequency and intensity of feeling down, depressed, hopeless       Intervention:   Motivational Interviewing    MI Intervention: Expressed Empathy/Understanding, Supported Autonomy, Collaboration, Evocation, Open-ended questions, and Reflections: simple and complex     Change Talk Expressed by the Patient: Desire to change    Provider Response to Change Talk: E - Evoked more info from patient about behavior change, A - Affirmed patient's thoughts, decisions, or attempts at behavior change, and R - Reflected patient's change talk    Assessments completed prior to visit:  The following assessments were completed by patient for this visit:  PHQ9:       2/19/2024    12:38 PM 3/11/2024     8:09 AM 3/26/2024    11:44 AM   PHQ-9 SCORE   PHQ-9 Total Score MyChart 10 (Moderate depression) 9 (Mild depression) 8 (Mild depression)   PHQ-9 Total Score 10 9 8     GAD7:       2/19/2024    12:39 PM   MERI-7 SCORE   Total Score 2 (minimal anxiety)   Total Score 2     PROMIS 10-Global Health (all questions and answers displayed):       2/19/2024    12:53 PM   PROMIS 10   In general, would you say your health is: Fair   In general, would you say your quality of life is: Good   In general, how would you rate your physical health? Fair   In general, how would you rate your mental health, including your mood and your ability to think? Good   In general, how would you rate your satisfaction with your social activities and relationships? Good   In general, please  rate how well you carry out your usual social activities and roles Fair   To what extent are you able to carry out your everyday physical activities such as walking, climbing stairs, carrying groceries, or moving a chair? Completely   In the past 7 days, how often have you been bothered by emotional problems such as feeling anxious, depressed, or irritable? Sometimes   In the past 7 days, how would you rate your fatigue on average? Mild   In the past 7 days, how would you rate your pain on average, where 0 means no pain, and 10 means worst imaginable pain? 5   In general, would you say your health is: 2   In general, would you say your quality of life is: 3   In general, how would you rate your physical health? 2   In general, how would you rate your mental health, including your mood and your ability to think? 3   In general, how would you rate your satisfaction with your social activities and relationships? 3   In general, please rate how well you carry out your usual social activities and roles. (This includes activities at home, at work and in your community, and responsibilities as a parent, child, spouse, employee, friend, etc.) 2   To what extent are you able to carry out your everyday physical activities such as walking, climbing stairs, carrying groceries, or moving a chair? 5   In the past 7 days, how often have you been bothered by emotional problems such as feeling anxious, depressed, or irritable? 3   In the past 7 days, how would you rate your fatigue on average? 2   In the past 7 days, how would you rate your pain on average, where 0 means no pain, and 10 means worst imaginable pain? 5   Global Mental Health Score 12    12   Global Physical Health Score 14    14   PROMIS TOTAL - SUBSCORES 26    26     Palmer Suicide Severity Rating Scale (Lifetime/Recent)      2/19/2024     1:03 PM   Palmer Suicide Severity Rating (Lifetime/Recent)   Q1 Wish to be Dead (Lifetime) Y   Wish to be Dead Description  "(Lifetime) \"I never attempted, but I kind of had a plan, but it comes and goes.\" Patient reports passive suicidal ideation.   1. Wish to be Dead (Past 1 Month) N   Q2 Non-Specific Active Suicidal Thoughts (Lifetime) Y   Non-Specific Active Suicidal Thought Description (Lifetime) \"I never attempted, but I kind of had a plan, but it comes and goes.\" Patient reports passive suicidal ideation.   2. Non-Specific Active Suicidal Thoughts (Past 1 Month) N   3. Active Suicidal Ideation with any Methods (Not Plan) Without Intent to Act (Lifetime) N   Q4 Active Suicidal Ideation with Some Intent to Act, Without Specific Plan (Lifetime) N   Q5 Active Suicidal Ideation with Specific Plan and Intent (Lifetime) N   Most Severe Ideation Rating (Lifetime) 2   Description of Most Severe Ideation (Lifetime) Patient reports passive suicidal ideation.   Most Severe Ideation Rating (Past 1 Month) 1   Description of Most Severe Ideation (Past 1 Month) NA   Frequency (Lifetime) 1   Frequency (Past 1 Month) 1   Duration (Lifetime) 1   Duration (Past 1 Month) 1   Controllability (Lifetime) 2   Controllability (Past 1 Month) 1   Deterrents (Lifetime) 1   Deterrents (Past 1 Month) 1   Reasons for Ideation (Lifetime) 4   Reasons for Ideation (Past 1 Month) 0   Actual Attempt (Lifetime) N   Has subject engaged in non-suicidal self-injurious behavior? (Lifetime) N   Interrupted Attempts (Lifetime) N   Aborted or Self-Interrupted Attempt (Lifetime) N   Preparatory Acts or Behavior (Lifetime) N   Calculated C-SSRS Risk Score (Lifetime/Recent) No Risk Indicated         ASSESSMENT: Current Emotional / Mental Status (status of significant symptoms):   Risk status (Self / Other harm or suicidal ideation)   Patient denies current fears or concerns for personal safety.   Patient reports the following current or recent suicidal ideation or behaviors: Patient continues to endorse some passive suicidal ideations.   Patient denies current or recent " homicidal ideation or behaviors.   Patient denies current or recent self injurious behavior or ideation.   Patient denies other safety concerns.   Patient reports there has been no change in risk factors since their last session.     Patient reports there has been no change in protective factors since their last session.     A safety and risk management plan has been developed including: Patient consented to co-developed safety plan.  Safety and risk management plan was completed - see below.  Patient agreed to use safety plan should any safety concerns arise.  A copy was given to the patient. [place] Safety Plan completed - see Screenings tab.     Appearance:   Appropriate    Eye Contact:   Good    Psychomotor Behavior: Normal    Attitude:   Cooperative    Orientation:   All   Speech    Rate / Production: Normal     Volume:  Normal    Mood:    Anxious  Depressed    Affect:    Appropriate    Thought Content:  Clear    Thought Form:  Coherent  Logical    Insight:    Good      Medication Review:   No changes to current psychiatric medication(s)     Medication Compliance:   Yes     Changes in Health Issues:   None reported     Chemical Use Review:   Substance Use: Problem use continues with no change since last session, Reviewed information and resources for treatment and ongoing sobriety  Reviewed concerns related to health related substance abuse risk  Provided encouragement towards sobriety   . Patient declined a referral at this time for a chemical dependency evaluation and comprehensive assessment.     Tobacco Use: No current tobacco use.      Diagnosis:  1. Major depressive disorder, recurrent episode, moderate (H)        Collateral Reports Completed:   Not Applicable    PLAN: (Patient Tasks / Therapist Tasks / Other)  Patient will return in 2 weeks for next session. Patient will engage in self-care activities. Patient will work on incorporating more exercise and movement into his routine at least 2  "times/week. Patient will work on open and direct communication skills, especially with his girlfriend. Patient will work on coping skills to reduce his consumption of alcohol, including listening to music. Patient will utilize the skills of \"delay, distract, decide\" when noticing alcohol cravings. Patient will work on adding more light to his living room to make it \"less depressive\" feeling.      Lucywiliam Rouse, [facility]                                                         ______________________________________________________________________    Individual Treatment Plan    Patient's Name: Cody Bolton  YOB: 1980    Date of Creation: 2/26/24  Date Treatment Plan Last Reviewed/Revised: 2/26/24    DSM5 Diagnoses:   Encounter Diagnosis   Name Primary?    Major depressive disorder, recurrent episode, moderate (H) Yes       Psychosocial / Contextual Factors: Relationship stressors, occupational dynamics.  PROMIS (reviewed every 90 days):   Global Mental Health Score (P) 12   Global Physical Health Score (P) 14   PROMIS TOTAL - SUBSCORES (P) 26       Referral / Collaboration:  Referral to another professional/service is not indicated at this time..    Anticipated number of session for this episode of care: 9-12 sessions  Anticipation frequency of session: Weekly  Anticipated Duration of each session: 38-52 minutes  Treatment plan will be reviewed in 90 days or when goals have been changed.       MeasurableTreatment Goal(s) related to diagnosis / functional impairment(s)  Goal 1: Patient will reduce depression symptoms as evidenced by a reduction in PHQ9 score to a 7 or less in the next 2 months.    I will know I've met my goal when I'm not having the feeling as often that I just don't want to do life anymore.      Objective #A (Patient Action)    Patient will Decrease frequency and intensity of feeling down, depressed, hopeless.  Status: New - Date: 2/26/24      Intervention(s)  Therapist will teach  " coping skills and self-care activities .    Objective #B  Patient will Feel less tired and more energy during the day .  Status: New - Date: 2/26/24      Intervention(s)  Therapist will  teach sleep hygiene and movement-based exercises .    Objective #C  Patient will Increase interest, engagement, and pleasure in doing things.  Status: New - Date: 2/26/24      Intervention(s)  Therapist will  teach distress-tolerance skills. .          Patient has reviewed and agreed to the above plan.      Lucy Rouse, [facility]  February 26, 2024

## 2024-04-23 ENCOUNTER — VIRTUAL VISIT (OUTPATIENT)
Dept: BEHAVIORAL HEALTH | Facility: CLINIC | Age: 44
End: 2024-04-23
Payer: COMMERCIAL

## 2024-04-23 DIAGNOSIS — F33.1 MAJOR DEPRESSIVE DISORDER, RECURRENT EPISODE, MODERATE (H): Primary | ICD-10-CM

## 2024-04-23 PROCEDURE — 90834 PSYTX W PT 45 MINUTES: CPT | Mod: 95

## 2024-04-23 NOTE — PROGRESS NOTES
[facility]                                     Progress Note    Patient Name: Cody Bolton  Date: 24           Service Type: Individual      Session Start Time: 12.58  Session End Time: 1.37     Session Length: 38-52 minutes    Session #: 4    Attendees: Client attended alone    Service Modality:  Video Visit:      Provider verified identity through the following two step process.  Patient provided:  Patient  and Patient address    Telemedicine Visit: The patient's condition can be safely assessed and treated via synchronous audio and visual telemedicine encounter.      Reason for Telemedicine Visit: Patient has requested telehealth visit    Originating Site (Patient Location): Patient's home    Distant Site (Provider Location): [facility]    Consent:  The patient/guardian has verbally consented to: the potential risks and benefits of telemedicine (video visit) versus in person care; bill my insurance or make self-payment for services provided; and responsibility for payment of non-covered services.     Patient would like the video invitation sent by:  My Chart    Mode of Communication:  Video Conference via Amwell    Distant Location (Provider):  Off-site    As the provider I attest to compliance with applicable laws and regulations related to telemedicine.    DATA  Interactive Complexity: No  Crisis: No        Progress Since Last Session (Related to Symptoms / Goals / Homework):   Symptoms: No change . Symptoms remain the same.    Homework: Partially completed      Episode of Care Goals: Satisfactory progress - CONTEMPLATION (Considering change and yet undecided); Intervened by assessing the negative and positive thinking (ambivalence) about behavior change     Current / Ongoing Stressors and Concerns:  Patient discussed relationship dynamics with his girlfriend, Becca. Patient discussed his upcoming 2 year anniversary with his  Orders extended, pt informed    girlfriend. Patient discussed his girlfriend's alcohol consumption. Patient discussed intimacy. Patient discussed communication struggles. Patient discussed boundary setting. Patient discussed going to [place] with his girlfriend this coming week. Patient discussed financial stressors. Patient discussed workplace dynamics. Patient discussed applying for a new position within his company.        Treatment Objective(s) Addressed in This Session:   Decrease frequency and intensity of feeling down, depressed, hopeless       Intervention:   Motivational Interviewing    MI Intervention: Expressed Empathy/Understanding, Supported Autonomy, Collaboration, Evocation, Open-ended questions, and Reflections: simple and complex     Change Talk Expressed by the Patient: Desire to change    Provider Response to Change Talk: E - Evoked more info from patient about behavior change, A - Affirmed patient's thoughts, decisions, or attempts at behavior change, and R - Reflected patient's change talk    Assessments completed prior to visit:  The following assessments were completed by patient for this visit:  PHQ9:       2/19/2024    12:38 PM 3/11/2024     8:09 AM 3/26/2024    11:44 AM   PHQ-9 SCORE   PHQ-9 Total Score MyChart 10 (Moderate depression) 9 (Mild depression) 8 (Mild depression)   PHQ-9 Total Score 10 9 8     GAD7:       2/19/2024    12:39 PM   MERI-7 SCORE   Total Score 2 (minimal anxiety)   Total Score 2     PROMIS 10-Global Health (all questions and answers displayed):       2/19/2024    12:53 PM   PROMIS 10   In general, would you say your health is: Fair   In general, would you say your quality of life is: Good   In general, how would you rate your physical health? Fair   In general, how would you rate your mental health, including your mood and your ability to think? Good   In general, how would you rate your satisfaction with your social activities and relationships? Good   In general, please rate how well you carry out  "your usual social activities and roles Fair   To what extent are you able to carry out your everyday physical activities such as walking, climbing stairs, carrying groceries, or moving a chair? Completely   In the past 7 days, how often have you been bothered by emotional problems such as feeling anxious, depressed, or irritable? Sometimes   In the past 7 days, how would you rate your fatigue on average? Mild   In the past 7 days, how would you rate your pain on average, where 0 means no pain, and 10 means worst imaginable pain? 5   In general, would you say your health is: 2   In general, would you say your quality of life is: 3   In general, how would you rate your physical health? 2   In general, how would you rate your mental health, including your mood and your ability to think? 3   In general, how would you rate your satisfaction with your social activities and relationships? 3   In general, please rate how well you carry out your usual social activities and roles. (This includes activities at home, at work and in your community, and responsibilities as a parent, child, spouse, employee, friend, etc.) 2   To what extent are you able to carry out your everyday physical activities such as walking, climbing stairs, carrying groceries, or moving a chair? 5   In the past 7 days, how often have you been bothered by emotional problems such as feeling anxious, depressed, or irritable? 3   In the past 7 days, how would you rate your fatigue on average? 2   In the past 7 days, how would you rate your pain on average, where 0 means no pain, and 10 means worst imaginable pain? 5   Global Mental Health Score 12    12   Global Physical Health Score 14    14   PROMIS TOTAL - SUBSCORES 26    26     [place] Suicide Severity Rating Scale (Lifetime/Recent)      2/19/2024     1:03 PM   [place] Suicide Severity Rating (Lifetime/Recent)   Q1 Wish to be Dead (Lifetime) Y   Wish to be Dead Description (Lifetime) \"I never attempted, " "but I kind of had a plan, but it comes and goes.\" Patient reports passive suicidal ideation.   1. Wish to be Dead (Past 1 Month) N   Q2 Non-Specific Active Suicidal Thoughts (Lifetime) Y   Non-Specific Active Suicidal Thought Description (Lifetime) \"I never attempted, but I kind of had a plan, but it comes and goes.\" Patient reports passive suicidal ideation.   2. Non-Specific Active Suicidal Thoughts (Past 1 Month) N   3. Active Suicidal Ideation with any Methods (Not Plan) Without Intent to Act (Lifetime) N   Q4 Active Suicidal Ideation with Some Intent to Act, Without Specific Plan (Lifetime) N   Q5 Active Suicidal Ideation with Specific Plan and Intent (Lifetime) N   Most Severe Ideation Rating (Lifetime) 2   Description of Most Severe Ideation (Lifetime) Patient reports passive suicidal ideation.   Most Severe Ideation Rating (Past 1 Month) 1   Description of Most Severe Ideation (Past 1 Month) NA   Frequency (Lifetime) 1   Frequency (Past 1 Month) 1   Duration (Lifetime) 1   Duration (Past 1 Month) 1   Controllability (Lifetime) 2   Controllability (Past 1 Month) 1   Deterrents (Lifetime) 1   Deterrents (Past 1 Month) 1   Reasons for Ideation (Lifetime) 4   Reasons for Ideation (Past 1 Month) 0   Actual Attempt (Lifetime) N   Has subject engaged in non-suicidal self-injurious behavior? (Lifetime) N   Interrupted Attempts (Lifetime) N   Aborted or Self-Interrupted Attempt (Lifetime) N   Preparatory Acts or Behavior (Lifetime) N   Calculated C-SSRS Risk Score (Lifetime/Recent) No Risk Indicated         ASSESSMENT: Current Emotional / Mental Status (status of significant symptoms):   Risk status (Self / Other harm or suicidal ideation)   Patient denies current fears or concerns for personal safety.   Patient reports the following current or recent suicidal ideation or behaviors: Patient continues to endorse some passive suicidal ideations.   Patient denies current or recent homicidal ideation or " behaviors.   Patient denies current or recent self injurious behavior or ideation.   Patient denies other safety concerns.   Patient reports there has been no change in risk factors since their last session.     Patient reports there has been no change in protective factors since their last session.     A safety and risk management plan has been developed including: Patient consented to co-developed safety plan.  Safety and risk management plan was completed - see below.  Patient agreed to use safety plan should any safety concerns arise.  A copy was given to the patient. [place] Safety Plan completed - see Screenings tab.     Appearance:   Appropriate    Eye Contact:   Good    Psychomotor Behavior: Normal    Attitude:   Cooperative    Orientation:   All   Speech    Rate / Production: Normal     Volume:  Normal    Mood:    Anxious  Depressed    Affect:    Appropriate    Thought Content:  Clear    Thought Form:  Coherent  Logical    Insight:    Good      Medication Review:   No changes to current psychiatric medication(s)     Medication Compliance:   Yes     Changes in Health Issues:   None reported     Chemical Use Review:   Substance Use: Problem use continues with no change since last session, Provided encouragement towards sobriety   . Patient declined a referral at this time for a chemical dependency evaluation and comprehensive assessment.     Tobacco Use: No current tobacco use.      Diagnosis:  1. Major depressive disorder, recurrent episode, moderate (H)        Collateral Reports Completed:   Not Applicable    PLAN: (Patient Tasks / Therapist Tasks / Other)  Patient will return in 2 weeks for next session. Patient will engage in self-care activities. Patient will work on incorporating more exercise and movement into his routine at least 2 times/week. Patient will work on open communication skills and boundary setting, especially with his girlfriend. Patient will work on coping skills to reduce his  "consumption of alcohol, including listening to music. Patient will utilize the skills of \"delay, distract, decide\" when noticing alcohol cravings. Patient will work on adding more light to his living room to make it \"less depressive\" feeling.      Lucy Rouse, [facility]                                                         ______________________________________________________________________    Individual Treatment Plan    Patient's Name: Cody Bolton  YOB: 1980    Date of Creation: 2/26/24  Date Treatment Plan Last Reviewed/Revised: 2/26/24    DSM5 Diagnoses:   Encounter Diagnosis   Name Primary?    Major depressive disorder, recurrent episode, moderate (H) Yes       Psychosocial / Contextual Factors: Relationship stressors, occupational dynamics.  PROMIS (reviewed every 90 days):   Global Mental Health Score (P) 12   Global Physical Health Score (P) 14   PROMIS TOTAL - SUBSCORES (P) 26       Referral / Collaboration:  Referral to another professional/service is not indicated at this time..    Anticipated number of session for this episode of care: 9-12 sessions  Anticipation frequency of session: Weekly  Anticipated Duration of each session: 38-52 minutes  Treatment plan will be reviewed in 90 days or when goals have been changed.       MeasurableTreatment Goal(s) related to diagnosis / functional impairment(s)  Goal 1: Patient will reduce depression symptoms as evidenced by a reduction in PHQ9 score to a 7 or less in the next 2 months.    I will know I've met my goal when I'm not having the feeling as often that I just don't want to do life anymore.      Objective #A (Patient Action)    Patient will Decrease frequency and intensity of feeling down, depressed, hopeless.  Status: New - Date: 2/26/24      Intervention(s)  Therapist will teach  coping skills and self-care activities .    Objective #B  Patient will Feel less tired and more energy during the day .  Status: New - Date: 2/26/24  "     Intervention(s)  Therapist will  teach sleep hygiene and movement-based exercises .    Objective #C  Patient will Increase interest, engagement, and pleasure in doing things.  Status: New - Date: 2/26/24      Intervention(s)  Therapist will  teach distress-tolerance skills. .          Patient has reviewed and agreed to the above plan.      Lucy Rouse, [facility]  February 26, 2024

## 2024-04-24 ENCOUNTER — OFFICE VISIT (OUTPATIENT)
Dept: URGENT CARE | Facility: URGENT CARE | Age: 44
End: 2024-04-24
Payer: COMMERCIAL

## 2024-04-24 ENCOUNTER — ANTICOAGULATION THERAPY VISIT (OUTPATIENT)
Dept: ANTICOAGULATION | Facility: CLINIC | Age: 44
End: 2024-04-24

## 2024-04-24 ENCOUNTER — LAB (OUTPATIENT)
Dept: LAB | Facility: CLINIC | Age: 44
End: 2024-04-24
Payer: COMMERCIAL

## 2024-04-24 ENCOUNTER — TELEPHONE (OUTPATIENT)
Dept: ANTICOAGULATION | Facility: CLINIC | Age: 44
End: 2024-04-24

## 2024-04-24 VITALS
WEIGHT: 315 LBS | HEIGHT: 73 IN | HEART RATE: 74 BPM | BODY MASS INDEX: 41.75 KG/M2 | TEMPERATURE: 99.1 F | DIASTOLIC BLOOD PRESSURE: 96 MMHG | OXYGEN SATURATION: 96 % | SYSTOLIC BLOOD PRESSURE: 150 MMHG

## 2024-04-24 DIAGNOSIS — J02.9 ACUTE SORE THROAT: ICD-10-CM

## 2024-04-24 DIAGNOSIS — I26.99 ACUTE PULMONARY EMBOLISM WITHOUT ACUTE COR PULMONALE, UNSPECIFIED PULMONARY EMBOLISM TYPE (H): ICD-10-CM

## 2024-04-24 DIAGNOSIS — I26.99 ACUTE PULMONARY EMBOLISM WITHOUT ACUTE COR PULMONALE, UNSPECIFIED PULMONARY EMBOLISM TYPE (H): Primary | ICD-10-CM

## 2024-04-24 DIAGNOSIS — G89.29 CHRONIC BILATERAL LOW BACK PAIN WITH RIGHT-SIDED SCIATICA: Primary | ICD-10-CM

## 2024-04-24 DIAGNOSIS — Z86.718 HISTORY OF DVT (DEEP VEIN THROMBOSIS): ICD-10-CM

## 2024-04-24 DIAGNOSIS — M54.41 CHRONIC BILATERAL LOW BACK PAIN WITH RIGHT-SIDED SCIATICA: Primary | ICD-10-CM

## 2024-04-24 DIAGNOSIS — Z79.01 ANTICOAGULATED ON WARFARIN: ICD-10-CM

## 2024-04-24 DIAGNOSIS — E11.9 TYPE 2 DIABETES MELLITUS WITHOUT COMPLICATION, WITHOUT LONG-TERM CURRENT USE OF INSULIN (H): ICD-10-CM

## 2024-04-24 LAB
DEPRECATED S PYO AG THROAT QL EIA: NEGATIVE
INR BLD: 2.3 (ref 0.9–1.1)

## 2024-04-24 PROCEDURE — 85610 PROTHROMBIN TIME: CPT

## 2024-04-24 PROCEDURE — 87651 STREP A DNA AMP PROBE: CPT | Performed by: PHYSICIAN ASSISTANT

## 2024-04-24 PROCEDURE — 36415 COLL VENOUS BLD VENIPUNCTURE: CPT

## 2024-04-24 PROCEDURE — 99214 OFFICE O/P EST MOD 30 MIN: CPT | Performed by: PHYSICIAN ASSISTANT

## 2024-04-24 RX ORDER — METHYLPREDNISOLONE 4 MG
TABLET, DOSE PACK ORAL
Qty: 21 TABLET | Refills: 0 | Status: SHIPPED | OUTPATIENT
Start: 2024-04-24 | End: 2024-05-04

## 2024-04-24 NOTE — PROGRESS NOTES
ANTICOAGULATION MANAGEMENT     Cody WALLACE Young 43 year old male is on warfarin with therapeutic INR result. (Goal INR 2.0-3.0)    Recent labs: (last 7 days)     04/24/24  1212   INR 2.3*       ASSESSMENT     Source(s): Chart Review and Patient/Caregiver Call     Warfarin doses taken: Warfarin taken as instructed  Diet: No new diet changes identified  Medication/supplement changes: Tylenol prn - not touching the pain   Augmentin 7 day course (3/28/24-4/4/24) which likely increased INR during use  New illness, injury, or hospitalization: Yes: worsening back and hip pain ~ has been working with chiropractor who recommended [facility] for physical therapy, starts next Friday and referred to Rheumatology - ACRN advised ov for pain control, especially given upcoming long travel + recent acute sinusitis (treated 3/28/24)  Signs or symptoms of bleeding or clotting: No  Previous result: Therapeutic last visit; previously outside of goal range  Additional findings: Traveling to [place] 4/29/24-5/3/24        PLAN     Recommended plan for temporary change(s) and ongoing change(s) affecting INR     Dosing Instructions: Continue your current warfarin dose with next INR in 2 weeks       Summary  As of 4/24/2024      Full warfarin instructions:  5 mg every day   Next INR check:  5/8/2024               Telephone call with Cody who verbalizes understanding and agrees to plan    Lab visit scheduled    Education provided:   Please call back if any changes to your diet, medications or how you've been taking warfarin  Symptom monitoring: monitoring for bleeding signs and symptoms, monitoring for clotting signs and symptoms, and travel related clotting risk and prevention  Importance of notifying anticoagulation clinic for: changes in medications; a sooner lab recheck maybe needed    Plan made per [facility] anticoagulation protocol    Kera Deras, [facility]  Clinic  4/24/2024    _______________________________________________________________________     Anticoagulation Episode Summary       Current INR goal:  2.0-3.0   TTR:  80.1% (2.2 mo)   Target end date:  Indefinite   Send INR reminders to:  [facility]    Indications    Acute pulmonary embolism without acute cor pulmonale  unspecified pulmonary embolism type (H) [I26.99]  History of DVT (deep vein thrombosis) [Z86.718]             Comments:               Anticoagulation Care Providers       Provider Role Specialty Phone number    Fredy Veras MD Referring Family Medicine [phone]    Aaseby-Aguilera, Ramona Ann, PA-C Referring Family Medicine [phone]

## 2024-04-24 NOTE — PROGRESS NOTES
Assessment & Plan     Chronic bilateral low back pain with right-sided sciatica  Patient with ongoing bilateral low back pain for the past couple months.  Patient already has a referral for physical therapy, his appointment is not until next week.  Low back pain has been worsening in the past couple days.  Patient is on Coumadin, cannot take NSAIDs.  He has been taking Tylenol which is not helping.  Medrol Dosepak is prescribed today.  Recommended heat pads to the affected area.  No strenuous activities.  Follow-up with physical therapy at the spine clinic as scheduled.  He agrees with the plan.  - methylPREDNISolone (MEDROL DOSEPAK) 4 MG tablet therapy pack  Dispense: 21 tablet; Refill: 0    Type 2 diabetes mellitus without complication, without long-term current use of insulin (H)  We discussed Medrol Dosepak can cause a transient increase in baseline blood sugars.  He agrees.    Anticoagulated on warfarin  INR checked today was at 2.3. Continue with current plan of care.    Acute sore throat  No evidence of PTA. Rapid strep is negative today.  Throat culture is pending.  Supportive care measures advised: tylenol, motrin, throat lozenges.  We will communicate any positive finding on the throat culture result.  Follow-up if any worsening symptoms.  Patient understands and agrees with the plan.     - Streptococcus A Rapid Screen w/Reflex to PCR - Clinic Collect  - Group A Streptococcus PCR Throat Swab     32 minutes spent on the date of the encounter doing chart review, history and exam, patient education, documentation, and further activities per the note.      Return in about 1 week (around 5/1/2024) for Symptoms failing to improve.    Magdalene Pendleton PA-C  [facility] JARROD Ross is a 43 year old male who presents to clinic today for the following health issues:  Chief Complaint   Patient presents with    Urgent Care     Low back/bilat hip pain x over the last two  "months.    RECHECK     3/28/24; cough still persistent.     HPI  Patient with medical history significant for HTN, PE on warfarin, type 2 diabetes, depression, HLD presenting to urgent care today with complaint of bilateral low back pain.  Ongoing symptoms for the past couple months.  Patient reports a history of degenerative disc disease, and DISH (diffuse idiopathic skeletal hyperostosis).  He reports occasional numbness and tingling in the right lower extremity. Patient notes today he is being followed by a chiropractor.  He reports he was referred to physical therapy at a spine clinic by his chiropractor.  Appointment is next week May 3rd.  Patient notes the back pain has been worsening in the past couple days.  No loss of bowel or bladder function.  Pain is worse with movement.  Treatment tried: Tylenol, not helping.  He denies any recent trauma or injury to the affected site.  Patient also has a referral to rheumatology.  INR checked today was 2.3.    Second complaint is a sore throat and cough.  Patient was evaluated in urgent care a month ago for a cough and sore throat.  He completed a course of Augmentin.  He notes Tessalon Perles have helped.  He still has a residual cough.  Sore throat seemed to be worsening in the past couple days.  He otherwise denies fevers or chills.  Treatment tried: Tylenol.      Review of Systems  Constitutional, HEENT, cardiovascular, pulmonary, GI, , musculoskeletal, neuro, skin, endocrine and psych systems are negative, except as otherwise noted.      Objective    BP (!) 150/96   Pulse 74   Temp 99.1  F (37.3  C) (Tympanic)   Ht 1.854 m (6' 1\")   Wt (!) 179.2 kg (395 lb)   SpO2 96%   BMI 52.11 kg/m    Physical Exam   GENERAL: alert and no distress  HENT: ear canals and TM's normal,  mouth without ulcers or lesions, throat is moist and pink, uvula is midline  RESP: lungs clear to auscultation - no rales, rhonchi or wheezes  CV: regular rate and rhythm, normal S1 S2  MS: " no gross musculoskeletal defects noted, no edema, back exam: No gross deformities, swelling or ecchymosis noted.  Tenderness to palpation over paraspinal lumbar muscles, range of motion is limited due to pain. Pain reproduced with positional change.    Results for orders placed or performed in visit on 04/24/24 (from the past 24 hour(s))   Streptococcus A Rapid Screen w/Reflex to PCR - Clinic Collect    Specimen: Throat; Swab   Result Value Ref Range    Group A Strep antigen Negative Negative

## 2024-04-24 NOTE — TELEPHONE ENCOUNTER
ANTICOAGULATION     Cody Bolton is overdue for an INR check.     Spoke with Cody and scheduled lab appointment on 4/24/24    Kera Deras RN

## 2024-04-25 LAB — GROUP A STREP BY PCR: NOT DETECTED

## 2024-05-03 ENCOUNTER — APPOINTMENT (OUTPATIENT)
Dept: GENERAL RADIOLOGY | Facility: CLINIC | Age: 44
End: 2024-05-03
Attending: EMERGENCY MEDICINE
Payer: COMMERCIAL

## 2024-05-03 ENCOUNTER — APPOINTMENT (OUTPATIENT)
Dept: CT IMAGING | Facility: CLINIC | Age: 44
End: 2024-05-03
Attending: EMERGENCY MEDICINE
Payer: COMMERCIAL

## 2024-05-03 ENCOUNTER — HOSPITAL ENCOUNTER (EMERGENCY)
Facility: CLINIC | Age: 44
Discharge: HOME | End: 2024-05-03
Attending: EMERGENCY MEDICINE | Admitting: EMERGENCY MEDICINE
Payer: COMMERCIAL

## 2024-05-03 ENCOUNTER — TELEPHONE (OUTPATIENT)
Dept: ANTICOAGULATION | Facility: CLINIC | Age: 44
End: 2024-05-03

## 2024-05-03 VITALS
RESPIRATION RATE: 18 BRPM | DIASTOLIC BLOOD PRESSURE: 67 MMHG | HEART RATE: 71 BPM | HEIGHT: 72 IN | SYSTOLIC BLOOD PRESSURE: 111 MMHG | OXYGEN SATURATION: 93 % | WEIGHT: 315 LBS | TEMPERATURE: 98.9 F | BODY MASS INDEX: 42.66 KG/M2

## 2024-05-03 DIAGNOSIS — Z86.718 HISTORY OF DVT (DEEP VEIN THROMBOSIS): ICD-10-CM

## 2024-05-03 DIAGNOSIS — I26.99 ACUTE PULMONARY EMBOLISM WITHOUT ACUTE COR PULMONALE, UNSPECIFIED PULMONARY EMBOLISM TYPE (H): Primary | ICD-10-CM

## 2024-05-03 DIAGNOSIS — T07.XXXA MULTIPLE CONTUSIONS: ICD-10-CM

## 2024-05-03 DIAGNOSIS — S92.535A CLOSED NONDISPLACED FRACTURE OF DISTAL PHALANX OF LESSER TOE OF LEFT FOOT, INITIAL ENCOUNTER: ICD-10-CM

## 2024-05-03 DIAGNOSIS — R79.1 SUBTHERAPEUTIC INTERNATIONAL NORMALIZED RATIO (INR): ICD-10-CM
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
## 2024-05-03 LAB
ANION GAP SERPL CALCULATED.3IONS-SCNC: 16 MMOL/L (ref 7–15)
ATRIAL RATE - MUSE: 74 BPM
BASOPHILS # BLD AUTO: 0.1 10E3/UL (ref 0–0.2)
BASOPHILS NFR BLD AUTO: 1 %
BUN SERPL-MCNC: 13.1 MG/DL (ref 6–20)
CALCIUM SERPL-MCNC: 8.9 MG/DL (ref 8.6–10)
CHLORIDE SERPL-SCNC: 98 MMOL/L (ref 98–107)
CREAT SERPL-MCNC: 0.79 MG/DL (ref 0.67–1.17)
DEPRECATED HCO3 PLAS-SCNC: 16 MMOL/L (ref 22–29)
DIASTOLIC BLOOD PRESSURE - MUSE: NORMAL MMHG
EGFRCR SERPLBLD CKD-EPI 2021: >90 ML/MIN/1.73M2
EOSINOPHIL # BLD AUTO: 0.2 10E3/UL (ref 0–0.7)
EOSINOPHIL NFR BLD AUTO: 3 %
ERYTHROCYTE [DISTWIDTH] IN BLOOD BY AUTOMATED COUNT: 15.9 % (ref 10–15)
ETHANOL SERPL-MCNC: <0.01 G/DL
GLUCOSE SERPL-MCNC: 145 MG/DL (ref 70–99)
HCT VFR BLD AUTO: 42.1 % (ref 40–53)
HGB BLD-MCNC: 13.2 G/DL (ref 13.3–17.7)
HOLD SPECIMEN: NORMAL
IMM GRANULOCYTES # BLD: 0 10E3/UL
IMM GRANULOCYTES NFR BLD: 0 %
INR PPP: 1.64 (ref 0.85–1.15)
INTERPRETATION ECG - MUSE: NORMAL
LYMPHOCYTES # BLD AUTO: 2.2 10E3/UL (ref 0.8–5.3)
LYMPHOCYTES NFR BLD AUTO: 24 %
MCH RBC QN AUTO: 27 PG (ref 26.5–33)
MCHC RBC AUTO-ENTMCNC: 31.4 G/DL (ref 31.5–36.5)
MCV RBC AUTO: 86 FL (ref 78–100)
MONOCYTES # BLD AUTO: 0.9 10E3/UL (ref 0–1.3)
MONOCYTES NFR BLD AUTO: 11 %
NEUTROPHILS # BLD AUTO: 5.5 10E3/UL (ref 1.6–8.3)
NEUTROPHILS NFR BLD AUTO: 61 %
NRBC # BLD AUTO: 0 10E3/UL
NRBC BLD AUTO-RTO: 0 /100
P AXIS - MUSE: 14 DEGREES
PLATELET # BLD AUTO: 256 10E3/UL (ref 150–450)
POTASSIUM SERPL-SCNC: 4.4 MMOL/L (ref 3.4–5.3)
PR INTERVAL - MUSE: 164 MS
QRS DURATION - MUSE: 102 MS
QT - MUSE: 396 MS
QTC - MUSE: 439 MS
R AXIS - MUSE: 12 DEGREES
RBC # BLD AUTO: 4.88 10E6/UL (ref 4.4–5.9)
SODIUM SERPL-SCNC: 130 MMOL/L (ref 135–145)
SYSTOLIC BLOOD PRESSURE - MUSE: NORMAL MMHG
T AXIS - MUSE: 6 DEGREES
VENTRICULAR RATE- MUSE: 74 BPM
WBC # BLD AUTO: 8.9 10E3/UL (ref 4–11)

## 2024-05-03 PROCEDURE — 73610 X-RAY EXAM OF ANKLE: CPT | Mod: 50

## 2024-05-03 PROCEDURE — 70450 CT HEAD/BRAIN W/O DYE: CPT

## 2024-05-03 PROCEDURE — 73562 X-RAY EXAM OF KNEE 3: CPT | Mod: 50

## 2024-05-03 PROCEDURE — 85610 PROTHROMBIN TIME: CPT | Performed by: EMERGENCY MEDICINE

## 2024-05-03 PROCEDURE — 82077 ASSAY SPEC XCP UR&BREATH IA: CPT | Performed by: EMERGENCY MEDICINE

## 2024-05-03 PROCEDURE — 28510 TREATMENT OF TOE FRACTURE: CPT | Mod: LT

## 2024-05-03 PROCEDURE — 36415 COLL VENOUS BLD VENIPUNCTURE: CPT | Performed by: EMERGENCY MEDICINE

## 2024-05-03 PROCEDURE — 96376 TX/PRO/DX INJ SAME DRUG ADON: CPT

## 2024-05-03 PROCEDURE — 80048 BASIC METABOLIC PNL TOTAL CA: CPT | Performed by: EMERGENCY MEDICINE

## 2024-05-03 PROCEDURE — 96375 TX/PRO/DX INJ NEW DRUG ADDON: CPT

## 2024-05-03 PROCEDURE — 73630 X-RAY EXAM OF FOOT: CPT | Mod: 50

## 2024-05-03 PROCEDURE — 99285 EMERGENCY DEPT VISIT HI MDM: CPT | Mod: 25

## 2024-05-03 PROCEDURE — 85025 COMPLETE CBC W/AUTO DIFF WBC: CPT | Performed by: EMERGENCY MEDICINE

## 2024-05-03 PROCEDURE — 96361 HYDRATE IV INFUSION ADD-ON: CPT

## 2024-05-03 PROCEDURE — 250N000013 HC RX MED GY IP 250 OP 250 PS 637: Performed by: EMERGENCY MEDICINE

## 2024-05-03 PROCEDURE — 258N000003 HC RX IP 258 OP 636: Performed by: EMERGENCY MEDICINE

## 2024-05-03 PROCEDURE — 93005 ELECTROCARDIOGRAM TRACING: CPT

## 2024-05-03 PROCEDURE — 96374 THER/PROPH/DIAG INJ IV PUSH: CPT | Mod: 59

## 2024-05-03 PROCEDURE — 250N000011 HC RX IP 250 OP 636: Performed by: EMERGENCY MEDICINE

## 2024-05-03 RX ORDER — HYDROCODONE BITARTRATE AND ACETAMINOPHEN 5; 325 MG/1; MG/1
2 TABLET ORAL ONCE
Status: COMPLETED | OUTPATIENT
Start: 2024-05-03 | End: 2024-05-03

## 2024-05-03 RX ORDER — DIAZEPAM 10 MG/2ML
5 INJECTION, SOLUTION INTRAMUSCULAR; INTRAVENOUS ONCE
Status: COMPLETED | OUTPATIENT
Start: 2024-05-03 | End: 2024-05-03

## 2024-05-03 RX ORDER — HYDROMORPHONE HYDROCHLORIDE 1 MG/ML
0.5 INJECTION, SOLUTION INTRAMUSCULAR; INTRAVENOUS; SUBCUTANEOUS
Status: DISCONTINUED | OUTPATIENT
Start: 2024-05-03 | End: 2024-05-03 | Stop reason: HOSPADM

## 2024-05-03 RX ORDER — HYDROCODONE BITARTRATE AND ACETAMINOPHEN 5; 325 MG/1; MG/1
1 TABLET ORAL EVERY 6 HOURS PRN
Qty: 8 TABLET | Refills: 0 | Status: ON HOLD | OUTPATIENT
Start: 2024-05-03 | End: 2024-05-06

## 2024-05-03 RX ORDER — ONDANSETRON HYDROCHLORIDE 4 MG/5ML
4 SOLUTION ORAL ONCE
Status: DISCONTINUED | OUTPATIENT
Start: 2024-05-03 | End: 2024-05-03 | Stop reason: HOSPADM

## 2024-05-03 RX ORDER — ONDANSETRON 2 MG/ML
4 INJECTION INTRAMUSCULAR; INTRAVENOUS ONCE
Status: COMPLETED | OUTPATIENT
Start: 2024-05-03 | End: 2024-05-03

## 2024-05-03 RX ADMIN — HYDROCODONE BITARTRATE AND ACETAMINOPHEN 2 TABLET: 5; 325 TABLET ORAL at 12:31

## 2024-05-03 RX ADMIN — DIAZEPAM 5 MG: 10 INJECTION, SOLUTION INTRAMUSCULAR; INTRAVENOUS at 10:49

## 2024-05-03 RX ADMIN — HYDROMORPHONE HYDROCHLORIDE 0.5 MG: 1 INJECTION, SOLUTION INTRAMUSCULAR; INTRAVENOUS; SUBCUTANEOUS at 10:31

## 2024-05-03 RX ADMIN — SODIUM CHLORIDE 1000 ML: 9 INJECTION, SOLUTION INTRAVENOUS at 09:14

## 2024-05-03 RX ADMIN — ONDANSETRON 4 MG: 2 INJECTION INTRAMUSCULAR; INTRAVENOUS at 09:11

## 2024-05-03 RX ADMIN — HYDROMORPHONE HYDROCHLORIDE 0.5 MG: 1 INJECTION, SOLUTION INTRAMUSCULAR; INTRAVENOUS; SUBCUTANEOUS at 09:12

## 2024-05-03 ASSESSMENT — ACTIVITIES OF DAILY LIVING (ADL)
ADLS_ACUITY_SCORE: 36
ADLS_ACUITY_SCORE: 34
ADLS_ACUITY_SCORE: 36

## 2024-05-03 ASSESSMENT — COLUMBIA-SUICIDE SEVERITY RATING SCALE - C-SSRS
1. IN THE PAST MONTH, HAVE YOU WISHED YOU WERE DEAD OR WISHED YOU COULD GO TO SLEEP AND NOT WAKE UP?: NO
6. HAVE YOU EVER DONE ANYTHING, STARTED TO DO ANYTHING, OR PREPARED TO DO ANYTHING TO END YOUR LIFE?: NO
2. HAVE YOU ACTUALLY HAD ANY THOUGHTS OF KILLING YOURSELF IN THE PAST MONTH?: NO

## 2024-05-03 NOTE — ED PROVIDER NOTES
Emergency Center Note      History of Present Illness     Chief Complaint  Fall    HPI  Cody Bolton is a 43 year old male on warfarin with a history of type 2 diabetes mellitus and hyperlipidemia who presents to the ED for evaluation after a fall. The patient reports that when he was in [place] yesterday, when his trazodone kicked in too fast and caused him to faint. He fell to the ground and then fell a second time when trying to stand back up. He doesn't think that he hit his head. He endorses pain bilaterally in his back, ankles, knees, and feet. He notes that his leg pain is worse on the left side. He took tylenol on the flight back for the pain and arrives to the ED straight from the airport.     Independent Historian  None    Review of External Notes  None  Past Medical History   Medical History and Problem List  Thrombophlebitis of superficial veins of right lower extremity  Acute pulmonary embolism without acute cor pulmonale  Type 2 diabetes mellitus   Hyperlipidemia  Obstructive sleep apnea syndrome  Elevated BP without diagnosis of hypertension  Alcohol abuse, episodic drinking behavior  Major depressive disorder, recurrent episode  DVT    Medications  Benzonatate  Bupropion  Clonidine  Lisinopril  Metformin  Methylprednisolone  Simvastatin  Tirzepatide  Trazodone  Warfarin    Surgical History   Appendectomy  Gastric bypass    Physical Exam   Patient Vitals for the past 24 hrs:   BP Temp Temp src Pulse Resp SpO2 Height Weight   05/03/24 1142 -- -- -- -- -- 98 % -- --   05/03/24 1130 -- -- -- -- -- 97 % -- --   05/03/24 1129 -- -- -- -- -- 98 % -- --   05/03/24 1127 -- -- -- -- -- 99 % -- --   05/03/24 1115 126/76 -- -- 69 -- 97 % -- --   05/03/24 1102 -- -- -- -- 18 97 % -- --   05/03/24 1101 -- -- -- -- 20 (!) 86 % -- --   05/03/24 1100 128/87 -- -- 70 -- 96 % -- --   05/03/24 1055 -- -- -- -- -- (!) 85 % -- --   05/03/24 1046 -- -- -- -- -- 94 % -- --   05/03/24 1037 -- -- -- -- -- 94 % -- --    05/03/24 0930 119/66 -- -- 74 -- 92 % -- --   05/03/24 0929 -- -- -- -- -- 91 % -- --   05/03/24 0851 (!) 152/100 98.9  F (37.2  C) Oral 76 20 95 % 1.829 m (6') (!) 176.9 kg (390 lb)     Physical Exam  Constitutional: Vital signs reviewed as above  General: Alert, pleasant. Uncomfortable appearing.  HEENT: Moist mucous membranes  Eyes: Pupils are equal, round, and reactive to light.   Neck: Normal range of motion  Cardiovascular: normal rate, Regular rhythm and normal heart sounds.  Mo MRG  Pulmonary/Chest: Effort normal and breath sounds normal. No respiratory distress. Patient has no wheezes. Patient has no rales.   Gastrointestinal: Soft. Positive bowel sounds. No MRG.  Musculoskeletal/Extremities: Full ROM. No midline neck or back tenderness. No bony tenderness to the upper extremities or hips. Tenderness over the paraspinal musculoskeletal on the left. Pain with swelling and contusions on bilateral knees, ankle, and feet.   Endo: No pitting edema  Neurological: A/O x 3. CN-II-XII intact bilaterally. No pronator drift. Normal strength and sensation throughout all 4 extremities. GCS 15  Skin: Skin is warm and dry.   Psychiatric: Pleasant    Diagnostics   Lab Results   Labs Ordered and Resulted from Time of ED Arrival to Time of ED Departure   BASIC METABOLIC PANEL - Abnormal       Result Value    Sodium 130 (*)     Potassium 4.4      Chloride 98      Carbon Dioxide (CO2) 16 (*)     Anion Gap 16 (*)     Urea Nitrogen 13.1      Creatinine 0.79      GFR Estimate >90      Calcium 8.9      Glucose 145 (*)    INR - Abnormal    INR 1.64 (*)    CBC WITH PLATELETS AND DIFFERENTIAL - Abnormal    WBC Count 8.9      RBC Count 4.88      Hemoglobin 13.2 (*)     Hematocrit 42.1      MCV 86      MCH 27.0      MCHC 31.4 (*)     RDW 15.9 (*)     Platelet Count 256      % Neutrophils 61      % Lymphocytes 24      % Monocytes 11      % Eosinophils 3      % Basophils 1      % Immature Granulocytes 0      NRBCs per 100 WBC 0       Absolute Neutrophils 5.5      Absolute Lymphocytes 2.2      Absolute Monocytes 0.9      Absolute Eosinophils 0.2      Absolute Basophils 0.1      Absolute Immature Granulocytes 0.0      Absolute NRBCs 0.0     ETHYL ALCOHOL LEVEL - Normal    Alcohol ethyl <0.01         Imaging  XR Knee Bilateral 3 Views   Final Result   IMPRESSION:    On the right, anatomic alignment. No acute displaced fracture. Mild   patellofemoral compartment right knee osteoarthritis. No right knee   joint effusion. Chronic ossicle is seen along the tibial tubercle.      On the left, anatomic alignment. No acute displaced fracture. Mild   patellofemoral compartment left knee osteoarthritis. No left knee   joint effusion.          SULEIMAN CACERES MD            SYSTEM ID:  [id number]      XR Foot Bilateral G/E 3 Views   Final Result   IMPRESSION: On the right, no acute displaced right foot fracture or   dislocation is identified. Multifocal mild arthrosis right foot.   Mainly ankle and proximal foot soft tissue swelling. Polyostotic   dorsal marginal spurring. Moderate to large sized enthesophyte at the   Achilles insertion on the calcaneus with heel spur and mineralization   in the region of the proximal plantar fascia.      On the left, comminuted intra-articular mildly displaced fracture at   the base of the proximal phalanx fifth toe extending into the fifth   MTP joint. No additional left foot fracture or dislocation identified.   Multifocal mild arthrosis left foot. Ankle and proximal foot soft   tissue edema. Heel spur with distal Achilles insertional enthesophyte.   Tiny ossicle in the region of the proximal plantar fascia.      NOTE: ABNORMAL REPORT      THE DICTATION ABOVE DESCRIBES AN ABNORMAL REPORT FOR WHICH FOLLOW-UP   IS NEEDED.      SULEIMAN CACERES MD            SYSTEM ID:  [id number]      XR Ankle Bilateral G/E 3 Views   Final Result   IMPRESSION: Bilateral distal leg and bimalleolar ankle soft tissue   swelling, right is slightly  worse than left. Each ankle mortise and   distal syndesmosis appears intact. No acute displaced ankle fracture   is identified on either side. Polyostotic dorsal spurring right   proximal foot involving at least the navicular and talar head with   multifocal arthrosis in each imaged foot. Bilateral heel spurs and   mineralization in the region of each proximal plantar fascia with   Achilles insertional enthesophytes on each side.          SULEIMAN CACERES MD            SYSTEM ID:  [id number]      Head CT w/o contrast   Final Result   IMPRESSION: Normal head CT.             KAROL GUARDADO MD            SYSTEM ID:  [id number]        EKG   ECG taken at 0901, ECG read at 0910  Normal sinus rhythm  Minimal voltage criteria for LVH, may be normal variant (R in aVL)  Borderline ECG   Rate 74 bpm. KS interval 164 ms. QRS duration 102 ms. QT/QTc 396/439 ms. P-R-T axes 14 12 6.    Independent Interpretation  Head CT was negative for any acute blood per my interpretation.  Bilateral knee x-rays were negative for any acute fracture or dislocation.  Bilateral ankle x-rays were negative for any acute fracture, dislocation or effusion  Right foot x-ray showed no acute fracture  Left foot x-ray shows a comminuted mildly displaced fracture of the base of the proximal fifth phalanx extending into the fifth MTP joint  ED Course    Medications Administered  Medications   ondansetron (ZOFRAN) solution 4 mg ( Oral Canceled Entry 5/3/24 0918)   HYDROmorphone (PF) (DILAUDID) injection 0.5 mg (0.5 mg Intravenous $Given 5/3/24 1031)   sodium chloride 0.9% BOLUS 1,000 mL (0 mLs Intravenous Stopped 5/3/24 1052)   ondansetron (ZOFRAN) injection 4 mg (4 mg Intravenous $Given 5/3/24 0911)   diazepam (VALIUM) injection 5 mg (5 mg Intravenous $Given 5/3/24 1049)     Procedures  Procedures     Discussion of Management  None    Social Determinants of Health adding to complexity of care  None    ED Course  ED Course as of 05/03/24 1221   Fri May 03, 2024    0905 I obtained history and examined the patient as noted above.      Medical Decision Making / Diagnosis   CMS Diagnoses: None    MIPS     None    [facility]  Cody Bolton is a 43 year old male who presents after sustaining a fall in [place] yesterday.  He took the redeye home and presents here.  He complains of headache as well as back pain and bilateral knee ankle and foot pain.  He has multiple contusions on the lower extremities.  He is having hard time putting weight on the left leg.  There is no midline neck or back tenderness.  His back tenderness is mostly in the paraspinous musculature.  CT scan of the head was negative for any evidence of fracture.  I was able to clinically clear his C-spine so scan was not needed.  He did receive pain medications here and is feeling much better.  EKG showed no acute concerning findings.  He is on warfarin and this came back subtherapeutic at 1.64.  He was notified of this finding.  Alcohol level was negative.  He has some mild electrolyte abnormalities.  CBC was nonconcerning.  He was feeling better after the above-mentioned interventions.  He does have a toe fracture as detailed above.  The rest of his x-rays showed no acute fractures.  He will be discharged home with a surgical shoe.  I will also give him a small amount of Norco for pain control.  Follow-up with his primary care doctor early next week.  He should also call his doctor regarding his low INR.    Disposition  The patient was discharged.     ICD-10 Codes:    ICD-10-CM    1. Closed nondisplaced fracture of distal phalanx of lesser toe of left foot, initial encounter  S92.535A Ankle/Foot Bracing Supplies Order Post-op Shoe; Left      2. Multiple contusions  T07.XXXA       3. Fall, initial encounter  W19.XXXA       4. Subtherapeutic international normalized ratio (INR)  R79.1            Discharge Medications  New Prescriptions    HYDROCODONE-ACETAMINOPHEN (NORCO) 5-325 MG TABLET    Take 1 tablet by mouth every 6  hours as needed for severe pain     Scribe Disclosure:  I, Jamie Hoang, am serving as a scribe at 9:13 AM on 5/3/2024 to document services personally performed by Albert Coon MD, based on my observations and the provider's statements to me.     [employer]      Albert Coon MD  05/03/24 [id number]

## 2024-05-03 NOTE — TELEPHONE ENCOUNTER
ANTICOAGULATION  MANAGEMENT: Discharge Review    Cody Bolton chart reviewed for anticoagulation continuity of care    Emergency room visit on 5/3/24 for toe fracture, post syncopal episode in [place].    Discharge disposition: Home    Results:    Recent labs: (last 7 days)     05/03/24  0908   INR 1.64*     Anticoagulation inpatient management:     not applicable     Anticoagulation discharge instructions:     Warfarin dosing: home regimen continued and instructed to call MD   Bridging: No   INR goal change: No      Medication changes affecting anticoagulation: Yes: Norco    Additional factors affecting anticoagulation: No     PLAN     Recommend to adjust dose to booster dose today  (he did miss a dose while traveling)    Spoke with Mu    Anticoagulation Calendar updated    Makayla Tamez RN

## 2024-05-03 NOTE — ED TRIAGE NOTES
Felt dizzy/lightheaded, fell and hit back on ground early Thursday morning. Reports he did pass out for a few seconds, unsure if he hit head. When trying to get up, he fell a second time. C/o neck, bilateral knees, low back, and right rib pain. Also reports left leg pain, knee down into toes, unable to bear weight. On thinners. ABCs intact. C-collar applied in triage.

## 2024-05-03 NOTE — ED NOTES
3  ED Notes Filed TH    Pain somewhat improved, pt resting on cot. Reports constant pain in back is less but there. A/o x 4.

## 2024-05-04 ENCOUNTER — APPOINTMENT (OUTPATIENT)
Dept: CT IMAGING | Facility: CLINIC | Age: 44
DRG: 917 | End: 2024-05-04
Attending: EMERGENCY MEDICINE
Payer: COMMERCIAL

## 2024-05-04 ENCOUNTER — HOSPITAL ENCOUNTER (INPATIENT)
Facility: CLINIC | Age: 44
LOS: 2 days | Discharge: HOME | DRG: 917 | End: 2024-05-06
Attending: EMERGENCY MEDICINE | Admitting: INTERNAL MEDICINE
Payer: COMMERCIAL

## 2024-05-04 DIAGNOSIS — N17.9 AKI (ACUTE KIDNEY INJURY) (H): ICD-10-CM

## 2024-05-04 DIAGNOSIS — M62.82 NON-TRAUMATIC RHABDOMYOLYSIS: ICD-10-CM

## 2024-05-04 DIAGNOSIS — R41.82 ALTERED MENTAL STATUS, UNSPECIFIED ALTERED MENTAL STATUS TYPE: ICD-10-CM

## 2024-05-04 LAB
ANION GAP SERPL CALCULATED.3IONS-SCNC: 16 MMOL/L (ref 7–15)
APAP SERPL-MCNC: <5 UG/ML (ref 10–30)
BASOPHILS # BLD AUTO: 0 10E3/UL (ref 0–0.2)
BASOPHILS NFR BLD AUTO: 0 %
BUN SERPL-MCNC: 14.8 MG/DL (ref 6–20)
CALCIUM SERPL-MCNC: 8.6 MG/DL (ref 8.6–10)
CHLORIDE SERPL-SCNC: 100 MMOL/L (ref 98–107)
CK SERPL-CCNC: 1413 U/L (ref 39–308)
CREAT SERPL-MCNC: 1.58 MG/DL (ref 0.67–1.17)
DEPRECATED HCO3 PLAS-SCNC: 20 MMOL/L (ref 22–29)
EGFRCR SERPLBLD CKD-EPI 2021: 55 ML/MIN/1.73M2
EOSINOPHIL # BLD AUTO: 0 10E3/UL (ref 0–0.7)
EOSINOPHIL NFR BLD AUTO: 0 %
ERYTHROCYTE [DISTWIDTH] IN BLOOD BY AUTOMATED COUNT: 15.9 % (ref 10–15)
ETHANOL SERPL-MCNC: <0.01 G/DL
GLUCOSE SERPL-MCNC: 218 MG/DL (ref 70–99)
HCT VFR BLD AUTO: 42.6 % (ref 40–53)
HGB BLD-MCNC: 12.8 G/DL (ref 13.3–17.7)
IMM GRANULOCYTES # BLD: 0.2 10E3/UL
IMM GRANULOCYTES NFR BLD: 1 %
INR PPP: 1.31 (ref 0.85–1.15)
INR PPP: 1.51 (ref 0.85–1.15)
LACTATE SERPL-SCNC: 1.2 MMOL/L (ref 0.7–2)
LYMPHOCYTES # BLD AUTO: 0.9 10E3/UL (ref 0.8–5.3)
LYMPHOCYTES NFR BLD AUTO: 8 %
MCH RBC QN AUTO: 27 PG (ref 26.5–33)
MCHC RBC AUTO-ENTMCNC: 30 G/DL (ref 31.5–36.5)
MCV RBC AUTO: 90 FL (ref 78–100)
MONOCYTES # BLD AUTO: 0.7 10E3/UL (ref 0–1.3)
MONOCYTES NFR BLD AUTO: 7 %
NEUTROPHILS # BLD AUTO: 9.1 10E3/UL (ref 1.6–8.3)
NEUTROPHILS NFR BLD AUTO: 84 %
NRBC # BLD AUTO: 0 10E3/UL
NRBC BLD AUTO-RTO: 0 /100
PLATELET # BLD AUTO: 273 10E3/UL (ref 150–450)
POTASSIUM SERPL-SCNC: 4.8 MMOL/L (ref 3.4–5.3)
RBC # BLD AUTO: 4.74 10E6/UL (ref 4.4–5.9)
SALICYLATES SERPL-MCNC: <0.3 MG/DL
SODIUM SERPL-SCNC: 136 MMOL/L (ref 135–145)
TROPONIN T SERPL HS-MCNC: 44 NG/L
TROPONIN T SERPL HS-MCNC: 53 NG/L
WBC # BLD AUTO: 10.9 10E3/UL (ref 4–11)

## 2024-05-04 PROCEDURE — 36415 COLL VENOUS BLD VENIPUNCTURE: CPT | Performed by: INTERNAL MEDICINE

## 2024-05-04 PROCEDURE — 82803 BLOOD GASES ANY COMBINATION: CPT

## 2024-05-04 PROCEDURE — 99285 EMERGENCY DEPT VISIT HI MDM: CPT | Mod: 25

## 2024-05-04 PROCEDURE — 84484 ASSAY OF TROPONIN QUANT: CPT | Performed by: EMERGENCY MEDICINE

## 2024-05-04 PROCEDURE — 96360 HYDRATION IV INFUSION INIT: CPT

## 2024-05-04 PROCEDURE — 99223 1ST HOSP IP/OBS HIGH 75: CPT | Mod: AI | Performed by: INTERNAL MEDICINE

## 2024-05-04 PROCEDURE — 70450 CT HEAD/BRAIN W/O DYE: CPT

## 2024-05-04 PROCEDURE — 82077 ASSAY SPEC XCP UR&BREATH IA: CPT | Performed by: EMERGENCY MEDICINE

## 2024-05-04 PROCEDURE — 93005 ELECTROCARDIOGRAM TRACING: CPT

## 2024-05-04 PROCEDURE — 120N000001 HC R&B MED SURG/OB

## 2024-05-04 PROCEDURE — 85610 PROTHROMBIN TIME: CPT | Performed by: INTERNAL MEDICINE

## 2024-05-04 PROCEDURE — 72125 CT NECK SPINE W/O DYE: CPT

## 2024-05-04 PROCEDURE — 83605 ASSAY OF LACTIC ACID: CPT | Performed by: INTERNAL MEDICINE

## 2024-05-04 PROCEDURE — 80047 BASIC METABLC PNL IONIZED CA: CPT

## 2024-05-04 PROCEDURE — 36415 COLL VENOUS BLD VENIPUNCTURE: CPT | Performed by: EMERGENCY MEDICINE

## 2024-05-04 PROCEDURE — 258N000003 HC RX IP 258 OP 636: Performed by: INTERNAL MEDICINE

## 2024-05-04 PROCEDURE — 258N000003 HC RX IP 258 OP 636: Performed by: EMERGENCY MEDICINE

## 2024-05-04 PROCEDURE — 80179 DRUG ASSAY SALICYLATE: CPT | Performed by: EMERGENCY MEDICINE

## 2024-05-04 PROCEDURE — 80143 DRUG ASSAY ACETAMINOPHEN: CPT | Performed by: EMERGENCY MEDICINE

## 2024-05-04 PROCEDURE — 96361 HYDRATE IV INFUSION ADD-ON: CPT

## 2024-05-04 PROCEDURE — 80048 BASIC METABOLIC PNL TOTAL CA: CPT | Performed by: EMERGENCY MEDICINE

## 2024-05-04 PROCEDURE — 85610 PROTHROMBIN TIME: CPT | Performed by: EMERGENCY MEDICINE

## 2024-05-04 PROCEDURE — 82550 ASSAY OF CK (CPK): CPT | Performed by: EMERGENCY MEDICINE

## 2024-05-04 PROCEDURE — 85025 COMPLETE CBC W/AUTO DIFF WBC: CPT | Performed by: EMERGENCY MEDICINE

## 2024-05-04 RX ORDER — LORAZEPAM 2 MG/ML
2 INJECTION INTRAMUSCULAR ONCE
Status: DISCONTINUED | OUTPATIENT
Start: 2024-05-04 | End: 2024-05-05

## 2024-05-04 RX ORDER — ONDANSETRON 2 MG/ML
4 INJECTION INTRAMUSCULAR; INTRAVENOUS EVERY 6 HOURS PRN
Status: DISCONTINUED | OUTPATIENT
Start: 2024-05-04 | End: 2024-05-06 | Stop reason: HOSPADM

## 2024-05-04 RX ORDER — ACETAMINOPHEN 325 MG/1
650 TABLET ORAL EVERY 4 HOURS PRN
Status: DISCONTINUED | OUTPATIENT
Start: 2024-05-04 | End: 2024-05-06 | Stop reason: HOSPADM

## 2024-05-04 RX ORDER — ONDANSETRON 4 MG/1
4 TABLET, ORALLY DISINTEGRATING ORAL EVERY 6 HOURS PRN
Status: DISCONTINUED | OUTPATIENT
Start: 2024-05-04 | End: 2024-05-06 | Stop reason: HOSPADM

## 2024-05-04 RX ORDER — AMOXICILLIN 250 MG
2 CAPSULE ORAL 2 TIMES DAILY PRN
Status: DISCONTINUED | OUTPATIENT
Start: 2024-05-04 | End: 2024-05-06 | Stop reason: HOSPADM

## 2024-05-04 RX ORDER — SODIUM CHLORIDE 9 MG/ML
INJECTION, SOLUTION INTRAVENOUS CONTINUOUS
Status: DISCONTINUED | OUTPATIENT
Start: 2024-05-04 | End: 2024-05-06

## 2024-05-04 RX ORDER — ACETAMINOPHEN 650 MG/1
650 SUPPOSITORY RECTAL EVERY 4 HOURS PRN
Status: DISCONTINUED | OUTPATIENT
Start: 2024-05-04 | End: 2024-05-06 | Stop reason: HOSPADM

## 2024-05-04 RX ORDER — AMOXICILLIN 250 MG
1 CAPSULE ORAL 2 TIMES DAILY PRN
Status: DISCONTINUED | OUTPATIENT
Start: 2024-05-04 | End: 2024-05-06 | Stop reason: HOSPADM

## 2024-05-04 RX ADMIN — SODIUM CHLORIDE 1000 ML: 9 INJECTION, SOLUTION INTRAVENOUS at 19:44

## 2024-05-04 RX ADMIN — SODIUM CHLORIDE 1000 ML: 9 INJECTION, SOLUTION INTRAVENOUS at 18:19

## 2024-05-04 RX ADMIN — SODIUM CHLORIDE: 9 INJECTION, SOLUTION INTRAVENOUS at 22:55

## 2024-05-04 ASSESSMENT — ACTIVITIES OF DAILY LIVING (ADL)
ADLS_ACUITY_SCORE: 36

## 2024-05-04 ASSESSMENT — COLUMBIA-SUICIDE SEVERITY RATING SCALE - C-SSRS
6. HAVE YOU EVER DONE ANYTHING, STARTED TO DO ANYTHING, OR PREPARED TO DO ANYTHING TO END YOUR LIFE?: YES
2. HAVE YOU ACTUALLY HAD ANY THOUGHTS OF KILLING YOURSELF IN THE PAST MONTH?: YES
1. IN THE PAST MONTH, HAVE YOU WISHED YOU WERE DEAD OR WISHED YOU COULD GO TO SLEEP AND NOT WAKE UP?: YES

## 2024-05-04 NOTE — ED NOTES
CG4 Performed:  Ph: 7.3  PCO2:45.2  PO2: 27  HCO3: 22.3  SO2% 44  Lac: 4.08    Chem 8 Performed:  Na: 138  K: 4.6  Cl: 104  Ica:4.9  TCO2: 23  Glu: 214  Bun: 16  Crea: 1.7  Hematocrit: 42  Hb: 14.3

## 2024-05-04 NOTE — ED TRIAGE NOTES
Arrives via EMS. LKWT 11 AM. Per EMS, Jessica MURPHY did compressions and gave narcan. Patient then became more alert and responsive. Reported taking his percocet in attempt to kill himself. 8 tablets in prescription         Preparing for Your Surgery      Name:  Reilly Borja   MRN:  [id number]   :  1953   Today's Date:  2023         Arriving for surgery:  Surgery date:  2023  Arrival time:  11:00 am    Restrictions due to COVID 19:    Please maintain social distance.  Masking is optional.      parking is available for anyone with mobility limitations or disabilities. (Monday- Friday 7 am- 5 pm)    Please come to:    [facility]  [address] [place] [phone]    Please check in on the 5th floor at the Ambulatory Surgery Center.      What can I eat or drink?    -  You may eat and drink normally until 8 hours prior to arrival  time. (Until 3 am)  -  You may have clear liquids until 2 hours prior to arrival  time. (Until 9 am)    Examples of clear liquids:  Water  Clear broth  Juices (apple, white grape, white cranberry  and cider) without pulp  Noncarbonated, powder based beverages  (lemonade and Gaetano-Aid)  Sodas (Sprite, 7-Up, ginger ale and seltzer)  Coffee or tea (without milk or cream)  Gatorade      Which medicines can I take?    Hold Aspirin for 7 days before surgery.   Hold Multivitamins for 7 days before surgery.  Hold Supplements for 7 days before surgery.  Hold Ibuprofen (Advil, Motrin) for 1 day before surgery--unless otherwise directed by surgeon.  Hold Naproxen (Aleve) for 4 days before surgery.    Hold Jardiance (empagliflozin) for 3 days before surgery     Hold Semaglutide for 7 days before surgery     No alcohol or cannabis products for 24 hours prior to procedure.      -  DO NOT take the following medications the day of surgery:  Metformin        -  PLEASE TAKE the following medications the day of surgery:   Acetaminophen (Tylenol) if needed  Hydrocodone if needed  Sacubitril-valsartan (Entresto)  Torsemide  Take 80% of usual am Lantus insulin dose ---(28 units)  Carvedilol  Spironolactone        How do I prepare myself?  - Please take 2 showers (one the  night prior to surgery and one the morning of surgery) using Scrubcare or Hibiclens soap.    Use this soap only from the neck to your toes.     Leave the soap on your skin for one minute--then rinse thoroughly.      You may use your own shampoo and conditioner. No other hair products.   - Please remove all jewelry and body piercings.  - No lotions, deodorants or fragrance.  - No makeup or fingernail polish.   - Bring your ID and insurance card.    -If you have a Deep Brain Stimulator, a Spinal Cord Stimulator, or any implanted Neuro Device, you must bring the remote to the Surgery Center.         ALL PATIENTS ARE REQUIRED TO HAVE A RESPONSIBLE ADULT TO DRIVE AND BE IN ATTENDANCE WITH THEM FOR 24 HOURS FOLLOWING SURGERY.     Covid testing policy as of 12/06/2022  Your surgeon will notify and schedule you for a COVID test if one is needed before surgery--please direct any questions or COVID symptoms to your surgeon      Questions or Concerns:    -For questions regarding the day of surgery, please contact the Ambulatory Surgery Center at [phone].    -If you have health changes between today and your surgery, please contact your surgeon.     - For questions after surgery, please contact your surgeon's office.

## 2024-05-04 NOTE — ED PROVIDER NOTES
History     Chief Complaint:  Ingestion       HPI   Cody Bolton is a 43 year old male, on warfarin, with a history of hyperlipidemia, DVT, PE, type 2 diabetes, and alcohol abuse who presents to the ED for evaluation of ingestion. Per EMS, police were called for a wellness check at about 1745 due to the patient not answering his phone and living alone. PD found the patient unresponsive and began compressions. During compressions a pulse was found. Upon EMS arrival, narcan was administered which resulted in emesis and snoring. Five doses of 0.5 mg narcan were given total before arrival to the ED and the patient remains only responsive to painful stimuli. En route a blood sugar of 165 was observed. Reports the patient was here yesterday for a toe fracture and was discharged with 8 hydrocodone tablets. The patient had 4 tablets left in the bottle when they arrived on scene. Notes a family history of heart disease and stroke. The patient's last known well was between 1100 and 1200.     Independent Historian:    EMS - They report as noted above.    Review of External Notes:  5/3/24: ED note reviewed      Medications:    Tessalon  Wellbutrin  Clonidine  Norco  Lisinopril  Metformin  Medrol Dosepak  Simvastatin  Trazodone  Warfarin     Past Medical History:    Acute PE  Diabetes, type 2  HLD  CARLIE  Alcohol abuse  Depression  DVT    Past Surgical History:    Appendectomy  Gastric bypass  Celestine-en-y revision    Physical Exam   Patient Vitals for the past 24 hrs:   BP Temp Temp src Pulse Resp SpO2 Weight   05/04/24 2212 (!) 148/66 98.9  F (37.2  C) Temporal 89 16 99 % (!) 168.4 kg (371 lb 4.1 oz)   05/04/24 2141 -- -- -- 75 (!) 9 100 % --   05/04/24 2140 110/72 -- -- 75 16 95 % --   05/04/24 2110 116/64 -- -- 79 13 95 % --   05/04/24 1930 114/79 -- -- 75 (!) 6 93 % --   05/04/24 1919 127/72 -- -- 82 11 95 % --   05/04/24 1910 (!) 97/92 -- -- 80 22 91 % --   05/04/24 1812 -- 100  F (37.8  C) -- -- -- -- --   05/04/24 1801  (!) 153/92 -- -- 101 14 94 % --        Physical Exam  General: No acute distress  Head: No obvious trauma to head.  Ears, Nose, Throat:  External ears normal.  Nose normal.  No pharyngeal erythema, swelling or exudate.  Midline uvula. Moist mucus membranes.  Eyes:  Conjunctivae clear. EOMI and PERRL. No nystagmus.   Neck: Normal range of motion.  Neck supple.   CV: Regular rate and rhythm.  No murmurs.      Respiratory: Effort normal and breath sounds normal.  No wheezing or crackles.   Gastrointestinal: Soft.  No distension. There is no tenderness.  There is no rigidity, no rebound and no guarding.   Musculoskeletal: Normal range of motion.  Non tender extremities to palpations. No lower extremity edema  Neuro: Alert. Moving upper extremities appropriately. Lower extremities are rigid bilaterally.  Pressured speech with frequent stuttering, difficult to understand. Following commands appropriately.   Skin: Skin is warm and dry.  No rash noted.   Psych: Normal mood and affect. Behavior is normal.      Emergency Department Course   ECG  ECG taken at 1807, ECG read at 1808  Sinus tachycardia   Nonspecific ST abnormality  When compared with prior ECG, dated 5/3/24, no significant change  Rate 103 bpm. CA interval 138 ms. QRS duration 108 ms. QT/QTc 342/448 ms. P-R-T axes 8 25 17.    Imaging:  CT Cervical Spine w/o Contrast   Final Result    IMPRESSION:   1.  No acute fracture.      2.  Degenerative changes described above.      Head CT w/o contrast   Final Result   IMPRESSION:   1.  No acute intracranial process.          Laboratory:  Labs Ordered and Resulted from Time of ED Arrival to Time of ED Departure   BASIC METABOLIC PANEL - Abnormal       Result Value    Sodium 136      Potassium 4.8      Chloride 100      Carbon Dioxide (CO2) 20 (*)     Anion Gap 16 (*)     Urea Nitrogen 14.8      Creatinine 1.58 (*)     GFR Estimate 55 (*)     Calcium 8.6      Glucose 218 (*)    TROPONIN T, HIGH SENSITIVITY - Abnormal     Troponin T, High Sensitivity 53 (*)    INR - Abnormal    INR 1.31 (*)    ACETAMINOPHEN LEVEL - Abnormal    Acetaminophen <5.0 (*)    CBC WITH PLATELETS AND DIFFERENTIAL - Abnormal    WBC Count 10.9      RBC Count 4.74      Hemoglobin 12.8 (*)     Hematocrit 42.6      MCV 90      MCH 27.0      MCHC 30.0 (*)     RDW 15.9 (*)     Platelet Count 273      % Neutrophils 84      % Lymphocytes 8      % Monocytes 7      % Eosinophils 0      % Basophils 0      % Immature Granulocytes 1      NRBCs per 100 WBC 0      Absolute Neutrophils 9.1 (*)     Absolute Lymphocytes 0.9      Absolute Monocytes 0.7      Absolute Eosinophils 0.0      Absolute Basophils 0.0      Absolute Immature Granulocytes 0.2      Absolute NRBCs 0.0     CK TOTAL - Abnormal    CK 1,413 (*)    TROPONIN T, HIGH SENSITIVITY - Abnormal    Troponin T, High Sensitivity 44 (*)    SALICYLATE LEVEL - Normal    Salicylate <0.3     ETHYL ALCOHOL LEVEL - Normal    Alcohol ethyl <0.01     ROUTINE UA WITH MICROSCOPIC REFLEX TO CULTURE          Emergency Department Course & Assessments:    Interventions:  Medications   LORazepam (ATIVAN) injection 2 mg (has no administration in time range)   senna-docusate (SENOKOT-S/PERICOLACE) 8.6-50 MG per tablet 1 tablet (has no administration in time range)     Or   senna-docusate (SENOKOT-S/PERICOLACE) 8.6-50 MG per tablet 2 tablet (has no administration in time range)   ondansetron (ZOFRAN ODT) ODT tab 4 mg (has no administration in time range)     Or   ondansetron (ZOFRAN) injection 4 mg (has no administration in time range)   Patient is already receiving anticoagulation with heparin, enoxaparin (LOVENOX), warfarin (COUMADIN)  or other anticoagulant medication (has no administration in time range)   Warfarin Dose Required Daily - Pharmacist Managed (has no administration in time range)   sodium chloride 0.9 % infusion ( Intravenous $New Bag 5/4/24 6916)   acetaminophen (TYLENOL) tablet 650 mg (has no administration in time range)      Or   acetaminophen (TYLENOL) Suppository 650 mg (has no administration in time range)   sodium chloride 0.9% BOLUS 1,000 mL (1,000 mLs Intravenous $New Bag 5/4/24 1819)   sodium chloride 0.9% BOLUS 1,000 mL (1,000 mLs Intravenous $New Bag 5/4/24 1944)        Assessments:  1802 Patient arrival to ED. I obtained history and performed a physical exam as noted above.   1924 I rechecked and updated the patient. The patient is more responsive but does not always respond appropriately to questions. He states he tripped and fell. He denies suicidal ideation.    Independent Interpretation (X-rays, CTs, rhythm strip):  Head CT negative for acute intracranial hemorrhage  CT C-spine negative for fracture    Consultations/Discussion of Management or Tests:  1952 I spoke with Dr. Mercer of the hospitalist service regarding admission.       Social Determinants of Health affecting care:  None     Disposition:  The patient was admitted to the hospital under the care of Dr. Mercer.    Impression & Plan         Medical Decision Making:  Patient presents via EMS after being found down.  Per report, CPR was briefly conducted.  Multiple doses of Narcan were given.  Per report, after the first dose, the patient became more alert, but he was still not acting appropriately.  After subsequent doses of Narcan, there was no greater improvement in his mentation.  EMS stated that they found 4 tablets of Percocet in the bottle near him.  He was just discharged yesterday with 8 tablets of Percocet.  It seems unlikely that his current clinical status is secondary to opioid overdose.  He is awake, but is not responding to questioning appropriately.  His lower extremities seem rigid.  This raises concern for possible serotonin syndrome, however he is not on any SSRIs or SNRIs.  EKG is unremarkable.  Tylenol, alcohol and salicylate levels are negative.  Head CT and CT C-spine are negative.  He does have an CATHLEEN and rhabdomyolysis.  Initial troponin is  elevated, and 2-hour recheck is slightly improved.  This is slightly elevated due to the CATHLEEN.  He is given 2 L normal saline bolus.  Upon reevaluation, he is more alert and is able to answer questions appropriately.  He denies any intentional overdose or any thoughts of self-harm.  I discussed the patient with the hospitalist, agrees to admit the patient to their service.  He remains in stable condition and is transferred to the floor.      Critical Care time:  was 10 minutes for this patient excluding procedures.    Diagnosis:    ICD-10-CM    1. Non-traumatic rhabdomyolysis  M62.82       2. CATHLEEN (acute kidney injury) (H24)  N17.9       3. Altered mental status, unspecified altered mental status type  R41.82            Discharge Medications:  Current Discharge Medication List             Scribe Disclosure:  I, Noemi Candelario, am serving as a scribe at 6:24 PM on 5/4/2024 to document services personally performed by Xu Chang MD based on my observations and the provider's statements to me.    5/4/2024   Xu Chang MD Peery, Stephen, MD  05/04/24 1055

## 2024-05-04 NOTE — ED NOTES
Bed: ED02  Expected date:   Expected time:   Means of arrival:   Comments:  BV 1 43 M unconscious/overdose

## 2024-05-05 ENCOUNTER — APPOINTMENT (OUTPATIENT)
Dept: CARDIOLOGY | Facility: CLINIC | Age: 44
DRG: 917 | End: 2024-05-05
Attending: INTERNAL MEDICINE
Payer: COMMERCIAL

## 2024-05-05 LAB
ALBUMIN SERPL BCG-MCNC: 3.5 G/DL (ref 3.5–5.2)
ALBUMIN UR-MCNC: 30 MG/DL
ALP SERPL-CCNC: 77 U/L (ref 40–150)
ALT SERPL W P-5'-P-CCNC: 45 U/L (ref 0–70)
AMPHETAMINES UR QL SCN: ABNORMAL
ANION GAP BLD CALCULATED.3IONS-SCNC: 17 MMOL/L (ref 3–14)
ANION GAP SERPL CALCULATED.3IONS-SCNC: 9 MMOL/L (ref 7–15)
APPEARANCE UR: CLEAR
AST SERPL W P-5'-P-CCNC: 138 U/L (ref 0–45)
ATRIAL RATE - MUSE: 103 BPM
BACTERIA #/AREA URNS HPF: ABNORMAL /HPF
BARBITURATES UR QL SCN: ABNORMAL
BENZODIAZ UR QL SCN: ABNORMAL
BILIRUB SERPL-MCNC: 0.3 MG/DL
BILIRUB UR QL STRIP: NEGATIVE
BUN SERPL-MCNC: 16 MG/DL (ref 7–30)
BUN SERPL-MCNC: 9.9 MG/DL (ref 6–20)
BZE UR QL SCN: ABNORMAL
CA-I BLD-MCNC: 4.9 MG/DL (ref 4.4–5.2)
CALCIUM SERPL-MCNC: 8.5 MG/DL (ref 8.6–10)
CANNABINOIDS UR QL SCN: ABNORMAL
CHLORIDE BLD-SCNC: 104 MMOL/L (ref 94–109)
CHLORIDE SERPL-SCNC: 107 MMOL/L (ref 98–107)
CO2 BLD-SCNC: 23 MMOL/L (ref 20–32)
COLOR UR AUTO: YELLOW
CREAT BLD-MCNC: 1.7 MG/DL (ref 0.7–1.3)
CREAT SERPL-MCNC: 0.95 MG/DL (ref 0.67–1.17)
DEPRECATED HCO3 PLAS-SCNC: 25 MMOL/L (ref 22–29)
DIASTOLIC BLOOD PRESSURE - MUSE: NORMAL MMHG
EGFRCR SERPLBLD CKD-EPI 2021: >90 ML/MIN/1.73M2
ERYTHROCYTE [DISTWIDTH] IN BLOOD BY AUTOMATED COUNT: 16 % (ref 10–15)
FENTANYL UR QL: ABNORMAL
GLUCOSE BLD-MCNC: 214 MG/DL (ref 70–99)
GLUCOSE BLDC GLUCOMTR-MCNC: 105 MG/DL (ref 70–99)
GLUCOSE SERPL-MCNC: 117 MG/DL (ref 70–99)
GLUCOSE UR STRIP-MCNC: 150 MG/DL
GRANULAR CAST: 3 /LPF
HCO3 BLDV-SCNC: 22 MMOL/L (ref 21–28)
HCT VFR BLD AUTO: 37.6 % (ref 40–53)
HCT VFR BLD CALC: 42 % (ref 40–53)
HGB BLD-MCNC: 11.3 G/DL (ref 13.3–17.7)
HGB BLD-MCNC: 14.3 G/DL (ref 13.3–17.7)
HGB UR QL STRIP: ABNORMAL
HYALINE CASTS: 32 /LPF
INR PPP: 1.64 (ref 0.85–1.15)
INTERPRETATION ECG - MUSE: NORMAL
KETONES UR STRIP-MCNC: NEGATIVE MG/DL
LACTATE BLD-SCNC: 4.1 MMOL/L
LEUKOCYTE ESTERASE UR QL STRIP: NEGATIVE
LVEF ECHO: NORMAL
MCH RBC QN AUTO: 27 PG (ref 26.5–33)
MCHC RBC AUTO-ENTMCNC: 30.1 G/DL (ref 31.5–36.5)
MCV RBC AUTO: 90 FL (ref 78–100)
MUCOUS THREADS #/AREA URNS LPF: PRESENT /LPF
NITRATE UR QL: NEGATIVE
OPIATES UR QL SCN: ABNORMAL
P AXIS - MUSE: 8 DEGREES
PCO2 BLDV: 45 MM HG (ref 40–50)
PCP QUAL URINE (ROCHE): ABNORMAL
PH BLDV: 7.3 [PH] (ref 7.32–7.43)
PH UR STRIP: 5.5 [PH] (ref 5–7)
PLATELET # BLD AUTO: 200 10E3/UL (ref 150–450)
PO2 BLDV: 27 MM HG (ref 25–47)
POTASSIUM BLD-SCNC: 4.6 MMOL/L (ref 3.4–5.3)
POTASSIUM SERPL-SCNC: 4.6 MMOL/L (ref 3.4–5.3)
PR INTERVAL - MUSE: 138 MS
PROT SERPL-MCNC: 6.2 G/DL (ref 6.4–8.3)
QRS DURATION - MUSE: 108 MS
QT - MUSE: 342 MS
QTC - MUSE: 448 MS
R AXIS - MUSE: 25 DEGREES
RBC # BLD AUTO: 4.19 10E6/UL (ref 4.4–5.9)
RBC URINE: 2 /HPF
SAO2 % BLDV: 44 % (ref 70–75)
SODIUM BLD-SCNC: 138 MMOL/L (ref 135–145)
SODIUM SERPL-SCNC: 141 MMOL/L (ref 135–145)
SP GR UR STRIP: 1.02 (ref 1–1.03)
SQUAMOUS EPITHELIAL: 1 /HPF
SYSTOLIC BLOOD PRESSURE - MUSE: NORMAL MMHG
T AXIS - MUSE: 17 DEGREES
UROBILINOGEN UR STRIP-MCNC: NORMAL MG/DL
VENTRICULAR RATE- MUSE: 103 BPM
WBC # BLD AUTO: 7.8 10E3/UL (ref 4–11)
WBC URINE: 6 /HPF

## 2024-05-05 PROCEDURE — 250N000013 HC RX MED GY IP 250 OP 250 PS 637: Performed by: INTERNAL MEDICINE

## 2024-05-05 PROCEDURE — 82040 ASSAY OF SERUM ALBUMIN: CPT | Performed by: INTERNAL MEDICINE

## 2024-05-05 PROCEDURE — 999N000208 ECHOCARDIOGRAM COMPLETE

## 2024-05-05 PROCEDURE — 93306 TTE W/DOPPLER COMPLETE: CPT | Mod: 26 | Performed by: INTERNAL MEDICINE

## 2024-05-05 PROCEDURE — 120N000001 HC R&B MED SURG/OB

## 2024-05-05 PROCEDURE — 255N000002 HC RX 255 OP 636: Performed by: INTERNAL MEDICINE

## 2024-05-05 PROCEDURE — 85027 COMPLETE CBC AUTOMATED: CPT | Performed by: INTERNAL MEDICINE

## 2024-05-05 PROCEDURE — 81001 URINALYSIS AUTO W/SCOPE: CPT | Performed by: EMERGENCY MEDICINE

## 2024-05-05 PROCEDURE — C8929 TTE W OR WO FOL WCON,DOPPLER: HCPCS

## 2024-05-05 PROCEDURE — 99233 SBSQ HOSP IP/OBS HIGH 50: CPT | Performed by: INTERNAL MEDICINE

## 2024-05-05 PROCEDURE — 80307 DRUG TEST PRSMV CHEM ANLYZR: CPT | Performed by: EMERGENCY MEDICINE

## 2024-05-05 PROCEDURE — 36415 COLL VENOUS BLD VENIPUNCTURE: CPT | Performed by: INTERNAL MEDICINE

## 2024-05-05 PROCEDURE — 85610 PROTHROMBIN TIME: CPT | Performed by: INTERNAL MEDICINE

## 2024-05-05 PROCEDURE — 258N000003 HC RX IP 258 OP 636: Performed by: INTERNAL MEDICINE

## 2024-05-05 RX ORDER — TRAZODONE HYDROCHLORIDE 100 MG/1
200 TABLET ORAL AT BEDTIME
Status: DISCONTINUED | OUTPATIENT
Start: 2024-05-05 | End: 2024-05-06 | Stop reason: HOSPADM

## 2024-05-05 RX ORDER — CLONIDINE HYDROCHLORIDE 0.1 MG/1
0.1 TABLET ORAL AT BEDTIME
Status: DISCONTINUED | OUTPATIENT
Start: 2024-05-05 | End: 2024-05-06 | Stop reason: HOSPADM

## 2024-05-05 RX ORDER — BUPROPION HYDROCHLORIDE 150 MG/1
150 TABLET ORAL EVERY MORNING
Status: DISCONTINUED | OUTPATIENT
Start: 2024-05-05 | End: 2024-05-06 | Stop reason: HOSPADM

## 2024-05-05 RX ORDER — LISINOPRIL 20 MG/1
20 TABLET ORAL DAILY
Status: DISCONTINUED | OUTPATIENT
Start: 2024-05-05 | End: 2024-05-06 | Stop reason: HOSPADM

## 2024-05-05 RX ORDER — WARFARIN SODIUM 5 MG/1
10 TABLET ORAL
Status: COMPLETED | OUTPATIENT
Start: 2024-05-05 | End: 2024-05-05

## 2024-05-05 RX ADMIN — SODIUM CHLORIDE: 9 INJECTION, SOLUTION INTRAVENOUS at 20:32

## 2024-05-05 RX ADMIN — BUPROPION HYDROCHLORIDE 150 MG: 150 TABLET, EXTENDED RELEASE ORAL at 09:01

## 2024-05-05 RX ADMIN — SODIUM CHLORIDE: 9 INJECTION, SOLUTION INTRAVENOUS at 09:09

## 2024-05-05 RX ADMIN — TRAZODONE HYDROCHLORIDE 200 MG: 100 TABLET ORAL at 22:46

## 2024-05-05 RX ADMIN — ACETAMINOPHEN 650 MG: 325 TABLET, FILM COATED ORAL at 01:18

## 2024-05-05 RX ADMIN — ACETAMINOPHEN 650 MG: 325 TABLET, FILM COATED ORAL at 11:30

## 2024-05-05 RX ADMIN — ACETAMINOPHEN 650 MG: 325 TABLET, FILM COATED ORAL at 20:38

## 2024-05-05 RX ADMIN — HUMAN ALBUMIN MICROSPHERES AND PERFLUTREN 3 ML: 10; .22 INJECTION, SOLUTION INTRAVENOUS at 10:10

## 2024-05-05 RX ADMIN — CLONIDINE HYDROCHLORIDE 0.1 MG: 0.1 TABLET ORAL at 22:46

## 2024-05-05 RX ADMIN — WARFARIN SODIUM 10 MG: 5 TABLET ORAL at 17:52

## 2024-05-05 RX ADMIN — LISINOPRIL 20 MG: 20 TABLET ORAL at 09:01

## 2024-05-05 ASSESSMENT — ACTIVITIES OF DAILY LIVING (ADL)
ADLS_ACUITY_SCORE: 39
ADLS_ACUITY_SCORE: 25
ADLS_ACUITY_SCORE: 36
ADLS_ACUITY_SCORE: 26
ADLS_ACUITY_SCORE: 25
ADLS_ACUITY_SCORE: 25
ADLS_ACUITY_SCORE: 36
ADLS_ACUITY_SCORE: 25
ADLS_ACUITY_SCORE: 25
ADLS_ACUITY_SCORE: 36
ADLS_ACUITY_SCORE: 25
ADLS_ACUITY_SCORE: 26
ADLS_ACUITY_SCORE: 25
ADLS_ACUITY_SCORE: 36
ADLS_ACUITY_SCORE: 26
ADLS_ACUITY_SCORE: 25

## 2024-05-05 NOTE — ED NOTES
DATE/TIME OF CALL RECEIVED FROM LAB:  05/04/24 at 7:29 PM   LAB TEST:  CK  LAB VALUE:  1413  PROVIDER NOTIFIED?: Yes  PROVIDER NAME: Bernardo  DATE/TIME LAB VALUE REPORTED TO PROVIDER: 5/4 1930  MECHANISM OF PROVIDER NOTIFICATION: Face-To-Face  PROVIDER RESPONSE: NA

## 2024-05-05 NOTE — PHARMACY-ANTICOAGULATION SERVICE
Clinical Pharmacy - Warfarin Dosing Consult     Pharmacy has been consulted to manage this patient s warfarin therapy.  Indication: DVT/ PE Treatment  Therapy Goal: INR 2-3  OP Anticoag Clinic: [facility]  Warfarin Prior to Admission: Yes  Warfarin PTA Regimen: 5mg daily  Recent documented change in oral intake/nutrition: No    INR   Date Value Ref Range Status   05/05/2024 1.64 (H) 0.85 - 1.15 Final   05/04/2024 1.51 (H) 0.85 - 1.15 Final       Of note, during medication history interview, patient mentioned he took 3x 5mg tablets on 05/04/24 due to missing doses prior 2 days.     Recommend warfarin 10 mg today.  Pharmacy will monitor Cody Bolton daily and order warfarin doses to achieve specified goal.      Please contact pharmacy as soon as possible if the warfarin needs to be held for a procedure or if the warfarin goals change.        Steve Bright, Pharm.D., BCPS

## 2024-05-05 NOTE — PLAN OF CARE
"Goal Outcome Evaluation:    Plan of Care Reviewed With: patient    Overall Patient Progress: improving    Outcome Evaluation: Ambulated to the bathroom with A2 GB/walker. PIV running NS at 100ml/hr. O2 at 1LPM via NC. Tele reading: SR, 70s. PRN tylenol given for pain.    Problem: Adult Inpatient Plan of Care  Goal: Plan of Care Review  Description: The Plan of Care Review/Shift note should be completed every shift.  The Outcome Evaluation is a brief statement about your assessment that the patient is improving, declining, or no change.  This information will be displayed automatically on your shift  note.  Outcome: Progressing  Flowsheets (Taken 5/5/2024 0702)  Outcome Evaluation: Ambulated to the bathroom with A2 GB/walker. PIV running NS at 100ml/hr. O2 at 1LPM via NC. Tele reading: SR, 70s. PRN tylenol given for pain.  Plan of Care Reviewed With: patient  Overall Patient Progress: improving  Goal: Patient-Specific Goal (Individualized)  Description: You can add care plan individualizations to a care plan. Examples of Individualization might be:  \"Parent requests to be called daily at 9am for status\", \"I have a hard time hearing out of my right ear\", or \"Do not touch me to wake me up as it startles  me\".  Outcome: Progressing  Goal: Absence of Hospital-Acquired Illness or Injury  Outcome: Progressing  Goal: Optimal Comfort and Wellbeing  Outcome: Progressing  Goal: Readiness for Transition of Care  Outcome: Progressing  Intervention: Mutually Develop Transition Plan  Recent Flowsheet Documentation  Taken 5/5/2024 0630 by Anant Anderson RN  Equipment Currently Used at Home: none     Problem: Suicide Risk  Goal: Absence of Self-Harm  Outcome: Progressing         "

## 2024-05-05 NOTE — PLAN OF CARE
"Goal Outcome Evaluation:    Patient A&O x4, lethargic, opens eyes spontaneously, arouses to voice, pleasant, denies suicidal thoughts/ideation  On 2 LPM NC not used at baseline  On continuous pulse oximetry   Pain managed with ice packs   Wife initially at bedside, left for evening, pt door open, bed alarm on high sensitivity  Diet order no caffeine, low fat, low sodium, with behavioral tray selection & safe tray/no utensils   NS running 100 mL/hr   On telemetry SR 71 per tele tech     Problem: Adult Inpatient Plan of Care  Goal: Plan of Care Review  Description: The Plan of Care Review/Shift note should be completed every shift.  The Outcome Evaluation is a brief statement about your assessment that the patient is improving, declining, or no change.  This information will be displayed automatically on your shift  note.  Outcome: Progressing  Flowsheets (Taken 5/4/2024 2246)  Plan of Care Reviewed With:   patient   spouse  Overall Patient Progress: no change  Goal: Patient-Specific Goal (Individualized)  Description: You can add care plan individualizations to a care plan. Examples of Individualization might be:  \"Parent requests to be called daily at 9am for status\", \"I have a hard time hearing out of my right ear\", or \"Do not touch me to wake me up as it startles  me\".  Outcome: Not Progressing  Goal: Absence of Hospital-Acquired Illness or Injury  Outcome: Not Progressing  Intervention: Identify and Manage Fall Risk  Recent Flowsheet Documentation  Taken 5/4/2024 2217 by Vicki Guy, RN  Safety Promotion/Fall Prevention:   room near nurse's station   room door open   increased rounding and observation  Intervention: Prevent Skin Injury  Recent Flowsheet Documentation  Taken 5/4/2024 2217 by Vicki Guy, RN  Skin Protection: adhesive use limited  Device Skin Pressure Protection: adhesive use limited  Taken 5/4/2024 2212 by Vicki Guy, RN  Body Position: position changed " independently  Intervention: Prevent and Manage VTE (Venous Thromboembolism) Risk  Recent Flowsheet Documentation  Taken 5/4/2024 2217 by Vicki Guy RN  VTE Prevention/Management: SCDs (sequential compression devices) off  Intervention: Prevent Infection  Recent Flowsheet Documentation  Taken 5/4/2024 2217 by Vicki uGy, RN  Infection Prevention: hand hygiene promoted  Goal: Optimal Comfort and Wellbeing  Outcome: Not Progressing  Goal: Readiness for Transition of Care  Outcome: Not Progressing         Plan of Care Reviewed With: patient, spouse    Overall Patient Progress: no changeOverall Patient Progress: no change

## 2024-05-05 NOTE — ED NOTES
RECEIVING UNIT ED HANDOFF REVIEW    Above ED Nurse Handoff Report was reviewed: Yes  Reviewed by: Vicki Guy RN on May 4, 2024 at 9:46 PM   EARLE Pool called the ED to inform them the note was read: Yes      [facility]  ED Nurse Handoff Report    ED Chief complaint: Ingestion  . ED Diagnosis:   Final diagnoses:   None       Allergies: No Known Allergies    Code Status: Full Code    Activity level - Baseline/Home:  walker. Has a hard sole shoe on the left foot due to a break.   Activity Level - Current:   assist of 2.   Lift room needed: No.   Bariatric: No   Needed: No   Isolation: No.   Infection: Not Applicable.     Respiratory status: Nasal cannula    Vital Signs (within 30 minutes):   Vitals:    05/04/24 1807 05/04/24 1812 05/04/24 1910   BP: (!) 153/92  (!) 97/92   Pulse: 101  80   Resp: 14  22   Temp:  100  F (37.8  C)    SpO2: 94%  91%       Cardiac Rhythm:  ,   Cardiac  Cardiac Rhythm: Sinus tachycardia  Pain level:    Patient confused: No.   Patient Falls Risk: patient and family education.   Elimination Status: Has voided     Patient Report - Initial Complaint: .Arrives via EMS. LKWT 11 AM. Per EMS, [place] PD did compressions and gave narcan. Patient then became more alert and responsive. Reported taking his percocet. 8 tablets in prescription    Focused Assessment: overdose on percocet. Pt reports at 1922 that he is not feeling suicidal now    Abnormal Results:   Labs Ordered and Resulted from Time of ED Arrival to Time of ED Departure   BASIC METABOLIC PANEL - Abnormal       Result Value    Sodium 136      Potassium 4.8      Chloride 100      Carbon Dioxide (CO2) 20 (*)     Anion Gap 16 (*)     Urea Nitrogen 14.8      Creatinine 1.58 (*)     GFR Estimate 55 (*)     Calcium 8.6      Glucose 218 (*)    TROPONIN T, HIGH SENSITIVITY - Abnormal    Troponin T, High Sensitivity 53 (*)    INR - Abnormal    INR 1.31 (*)    ACETAMINOPHEN LEVEL - Abnormal    Acetaminophen  <5.0 (*)    CBC WITH PLATELETS AND DIFFERENTIAL - Abnormal    WBC Count 10.9      RBC Count 4.74      Hemoglobin 12.8 (*)     Hematocrit 42.6      MCV 90      MCH 27.0      MCHC 30.0 (*)     RDW 15.9 (*)     Platelet Count 273      % Neutrophils 84      % Lymphocytes 8      % Monocytes 7      % Eosinophils 0      % Basophils 0      % Immature Granulocytes 1      NRBCs per 100 WBC 0      Absolute Neutrophils 9.1 (*)     Absolute Lymphocytes 0.9      Absolute Monocytes 0.7      Absolute Eosinophils 0.0      Absolute Basophils 0.0      Absolute Immature Granulocytes 0.2      Absolute NRBCs 0.0     SALICYLATE LEVEL - Normal    Salicylate <0.3     ETHYL ALCOHOL LEVEL - Normal    Alcohol ethyl <0.01     CK TOTAL        Head CT w/o contrast   Final Result   IMPRESSION:   1.  No acute intracranial process.      CT Cervical Spine w/o Contrast    (Results Pending)       Treatments provided: see MAR  Family Comments: ex-wife at bedside  OBS brochure/video discussed/provided to patient:  N/A  ED Medications:   Medications   LORazepam (ATIVAN) injection 2 mg (has no administration in time range)   sodium chloride 0.9% BOLUS 1,000 mL (1,000 mLs Intravenous $New Bag 5/4/24 7066)       Drips infusing:  No  For the majority of the shift this patient was Green.   Interventions performed were .    Sepsis treatment initiated: No    Cares/treatment/interventions/medications to be completed following ED care: all admit orders    ED Nurse Name: Laure Dietz RN  7:20 PM

## 2024-05-05 NOTE — CONSULTS
Psychiatry Initial Consultation    ID/HPI:   The patient is a 44 yo   male admitted after a second fall who is seen for evaluation of possible overdose.  The patient had been seen in ED , given a new prescription for hydrocodone and tells me he took an oxycodone of his  mother's he still had and fell again.  His ex-wife betty who is his landlord found him, had come over to tell him his sister was trying to reach him.  He had just returned from a 4 day trip to [place] with his girlfriend which he says went well.  Had dosed trazodone as usual in [place] and thinks the combination with the opioids were problematic.  Says Betty threw the older med away.  He has been in therapy and prior to this partial program in  for depression.  Denies acute depressive symptoms or any overdose or s/I.  Denies recent use of alcohol or abuse of his medications--has h/o heavy drinking in the past..  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  May look for a different apt after his lease is up-- ex wife and GF haven't met.  Understands that his ex and EMS were concerned about respiratory failure, etc.  He had positive response to the narcan.  He contracts for safety, says he has a good working relationship with therapist and can discuss these events with her.  He does not wish any change in his wellbutrin or trazodone but will discuss moving higher in the wellbutrin with his PMD as OP.    Past psychiatric history:  Denies suicide attempts, maxim or psychosis or CD.  See above.  States compliance.  Per chart has some h/o Alcohol abuse, no treatment. Has unspecified Eating Disorder with morbid obesity    PMH DISH syndrome, NSEMI II, CATHLEEN resolving, L toe fracture, Chronic back and joint pain, DVT    Allergies NKDA    FH  No suicides, known MI.    SH  as above.  No legal issues, no domestic abuse. Has children  19, 21 yo    VS /77, Temp 98.4, Pulse 71, RR 18, Weight 168 kg    MSE:  Alert, pleasant conversant 44 yo.  No agitation, acute pain today.  Fully oriented.  Remained cognitively clear.  Denies deathwish, s/I or SIB.  No hallucinations.  No paranoia.  I/J appears intact, regrets impulsive action.  Maintained good attention.  Gait not observed, has boot on left foot.  Normal intelligence. No memory disturbance. Mood is better, has some chronic sadness.  No panic attack symptoms      Diagnoses:  MDD, MERI, Eating Disorder, NOS, Chronic Pain Disorder, rule out Unspec. Personality Disorder, h/o remote Alcohol Abuse vs Use Disorder    Plan:  No change in PTA wellburin or trazodone.  No evidence of dangerousness to self, he denies suicide attempt and agrees to take meds compliantly.  F/U with PMD and therapist .  CAll prn.

## 2024-05-05 NOTE — H&P
[facility]    History and Physical - Hospitalist Service       Date of Admission:  5/4/2024    Assessment & Plan      Cody Bolton is a 43 year old male admitted on 5/4/2024.     Altered mental status  Probably on the basis of narcotic medication use improved with Narcan  Will repeat Narcan if the patient's mental status does not improve  Will monitor oxygen saturations closely    Acute kidney injury  IV fluids will be given and creatinine monitored    Anion gap metabolic acidosis  Probably on the basis of lactic acidosis will check lactic acid levels.  Acute kidney injury could also be the cause of acute anion gap metabolic acidosis  I doubt if this is due to DKA as the sugars are not very high    Type 2 diabetes mellitus  Metformin will be withheld that he gets 1000 mg daily at home until lactic acid is known  Sliding scale insulin will be given    Probable hypertension  Will hold lisinopril 20 mg daily that he gets at home  Patient is also on clonidine 0.1 mg at bedtime which will be started when the blood pressure is more than 140*6    Hypercholesterolemia  Will continue simvastatin after total CK and acute kidney injury associated creatinine improves    Pulmonary embolism  DVT  Will ask pharmacy to dose warfarin per protocol    Depression  On bupropion 150 mg by mouth in the morning  I doubt this is a suicide attempt nevertheless we will consult psychiatry in this regard  Will not continue trazodone 200 mg that he takes at nighttime for 1 day     will be consulted    Diet:  Diabetic diet  DVT Prophylaxis: Warfarin  Santos Catheter: Not present  Lines: None     Cardiac Monitoring: None  Code Status:  Full code    Clinically Significant Risk Factors Present on Admission               # Drug Induced Coagulation Defect: home medication list includes an anticoagulant medication   # Acute Kidney Injury, unspecified: based on a >150% or 0.3 mg/dL increase in last creatinine  compared to past 90 day average, will monitor renal function  # Hypertension: Home medication list includes antihypertensive(s)     # DMII: A1C = 9.1 % (Ref range: <5.7 %) within past 6 months    # Severe Obesity: Estimated body mass index is 52.89 kg/m  as calculated from the following:    Height as of 5/3/24: 1.829 m (6').    Weight as of 5/3/24: 176.9 kg (390 lb).              Disposition Plan     Medically Ready for Discharge: Anticipated Tomorrow           Renzo Mercer MD  Hospitalist Service  [facility]  Securely message with [employer] (more info)  Text page via [facility] Paging/Directory     ______________________________________________________________________    Chief Complaint   Altered mental status    History is obtained from the patient, his ex-wife Tiny who was present with him in the room, medical records and my discussion with emergency department physician      History of Present Illness   Cody Bolton is a morbidly obese 43 year old gentleman with history of obesity and gastric bypass, history of PE which was treated with warfarin, type 2 diabetes mellitus, hypercholesteremia, obstructive sleep apnea, history of episodic alcohol abuse, depression, as per record patient has [facility] safety plan in place    Patient was seen a day prior to admission in the emergency department following his arrival from [place] where he went with his girlfriend according to his ex-wife present in the room.  After coming to [facility] he drove to [facility] and was diagnosed with comminuted fracture of the left fourth as mentioned below and has a cast/brace on the left foot.  He was also prescribed 8 pills of Percocet.  Apparently patient states that he has pain in he has taken all the 8 Percocet pills.  His ex-wife says that he came to the house and found him unresponsive because of which she called 911 and the patient was brought to [facility] after [place]  "[employer] did chest compression and gave him Narcan.  He became more alert after receiving Narcan.  There is mention in the chart x 6 0 9 PM that \"he attempted to kill himself\" though the patient is denying this.  He is denying any other symptoms      Past Medical History    Thrombophlebitis of superficial veins of right lower extremity  Acute pulmonary embolism without acute cor pulmonale  Type 2 diabetes mellitus   Hyperlipidemia  Obstructive sleep apnea syndrome  Elevated BP without diagnosis of hypertension  Alcohol abuse, episodic drinking behavior  Major depressive disorder, recurrent episode  DVT    Past Surgical History   Past Surgical History:   Procedure Laterality Date    APPENDECTOMY  08/15/2017    Dr. Ferrer    GASTRIC BYPASS      HI LAP,APPENDECTOMY N/A 8/14/2017    Procedure: APPENDECTOMY, LAPAROSCOPIC;  Surgeon: Nba Ferrer MD;  Location: [facility];  Service: General    REVISION AKANKSHA-EN-Y  2014    Dr. Ranjeet Espinal [email or web address]       Prior to Admission Medications   Prior to Admission Medications   Prescriptions Last Dose Informant Patient Reported? Taking?   HYDROcodone-acetaminophen (NORCO) 5-325 MG tablet 5/4/2024 at ?  No Yes   Sig: Take 1 tablet by mouth every 6 hours as needed for severe pain   buPROPion (WELLBUTRIN XL) 150 MG 24 hr tablet 5/4/2024 at AM  No Yes   Sig: Take 1 tablet (150 mg) by mouth every morning   cloNIDine (CATAPRES) 0.1 MG tablet 5/3/2024 at HS  No Yes   Sig: Take 1 tablet (0.1 mg) by mouth at bedtime   lisinopril (ZESTRIL) 20 MG tablet 5/4/2024 at AM  No Yes   Sig: Take 1 tablet (20 mg) by mouth daily   metFORMIN (GLUCOPHAGE XR) 500 MG 24 hr tablet 5/3/2024 at PM  No Yes   Sig: Take 2 tablets (1,000 mg) by mouth daily (with dinner)   reason aspirin not prescribed, intentional,   Yes Yes   Sig: Please choose reason not prescribed from choices below.  Patient on warfarin lifelong   simvastatin (ZOCOR) 20 MG tablet 5/3/2024 at HS  No Yes   Sig: Take 1 " tablet (20 mg) by mouth at bedtime   tirzepatide (MOUNJARO) 5 MG/0.5ML pen Past Month at Ran out ~2 wks ago  No Yes   Sig: Inject 5 mg Subcutaneous every 7 days   traZODone (DESYREL) 100 MG tablet 5/3/2024 at HS  No Yes   Sig: Take 2 tablets (200 mg) by mouth at bedtime   warfarin ANTICOAGULANT (COUMADIN) 5 MG tablet 5/4/2024 at Took 3 tablets earlier today  No Yes   Sig: Take 1 tablet (5 mg) by mouth daily 5 MG DAILY - as directed by INR clinic      Facility-Administered Medications: None        Review of Systems    Unable to get reliable review of systems from the patient nevertheless he is denying any headache neck pain jaw pain or shoulder pain.  Denies any chest pain shortness of breath abdominal pain nausea or vomiting.  He has left foot pain    Social History   I have reviewed this patient's social history and updated it with pertinent information if needed.  Social History     Tobacco Use    Smoking status: Never    Smokeless tobacco: Never   Vaping Use    Vaping status: Never Used   Substance Use Topics    Alcohol use: Yes     Comment: Alcoholic Drinks/day: occasionally    Drug use: No         Family History   His father and brother had history of myocardial infarction/CVA mother had diabetes    Allergies   No Known Allergies     Physical Exam   Vital Signs: Temp: 100  F (37.8  C)   BP: 114/79 Pulse: 75   Resp: (!) 6 SpO2: 93 % O2 Device: None (Room air)    Weight: 0 lbs 0 oz    GENERAL: Patient does not look in any acute distress  HEENT: EOM+, Conjunctiva is clear pupils are round equal on both sides  NECK: I do not see a jugular Venous distention  HEART: S1 S2 regular  Rate and Rhythm,,    LUNGS: Respirations are not laboured, Lungs are clear to auscultation without Crepitations or Wheezing   ABDOMEN: Obese, there is no tenderness,  Bowel Sounds are Positive   LOWER LIMBS:  Left foot is in a brace no Pedal Edema Bilaterally   CNS: He is alert when awoken most of the time talking sentences but at times has  gibberish talk telling me numbers unrelated to the question, Moving all the Four Limbs          Data   Imaging results reviewed over the past 24 hrs:   Recent Results (from the past 24 hour(s))   Head CT w/o contrast    Narrative    EXAM: CT HEAD W/O CONTRAST  LOCATION: [facility]  DATE: 5/4/2024    INDICATION: found down, on warfarin  COMPARISON: None.  TECHNIQUE: Routine CT Head without IV contrast. Multiplanar reformats. Dose reduction techniques were used.    FINDINGS:  INTRACRANIAL CONTENTS: No intracranial hemorrhage, extraaxial collection, or mass effect.  No CT evidence of acute infarct. Normal parenchymal attenuation. Normal ventricles and sulci.     VISUALIZED ORBITS/SINUSES/MASTOIDS: No intraorbital abnormality. Mild to moderate mucosal thickening scattered about the paranasal sinuses. No middle ear or mastoid effusion.    BONES/SOFT TISSUES: No acute abnormality.      Impression    IMPRESSION:  1.  No acute intracranial process.   CT Cervical Spine w/o Contrast    Narrative    EXAM: CT CERVICAL SPINE W/O CONTRAST  LOCATION: [facility]  DATE: 5/4/2024    INDICATION: found down, on warfarin; Neck pain; Trauma; Significant trauma  COMPARISON: None.  TECHNIQUE: Routine CT Cervical Spine without IV contrast. Multiplanar reformats. Dose reduction techniques were used.    FINDINGS:  VERTEBRA: No acute fracture.  No acute post traumatic subluxations.   Normal vertebral body heights. No prevertebral soft tissue swelling.    CANAL/FORAMINA: Multilevel spondylosis. Superimposed diffuse idiopathic skeletal hyperostosis. Rigid spine with multiple bridging and near bridging osteophytes. Various levels and degrees of central canal stenosis.  C2-3, C3-4, C4-5 severe central canal   stenosis. Various levels and degrees of neural foraminal stenosis.  No significant subluxations.    PARASPINAL: No significant extraspinal abnormality.      Impression     IMPRESSION:  1.  No  acute fracture.    2.  Degenerative changes described above.     Most Recent 3 CBC's:  Recent Labs   Lab Test 05/04/24  1819 05/03/24  0908 03/11/24  0856   WBC 10.9 8.9 5.5   HGB 12.8* 13.2* 12.5*   MCV 90 86 90    256 219     Most Recent 3 BMP's:  Recent Labs   Lab Test 05/04/24 1819 05/03/24  0908 03/11/24  0856    130* 139   POTASSIUM 4.8 4.4 4.4   CHLORIDE 100 98 105   CO2 20* 16* 25   BUN 14.8 13.1 11.1   CR 1.58* 0.79 0.94   ANIONGAP 16* 16* 9   RAUL 8.6 8.9 8.7   * 145* 140*   Lactic acid 1.3,  CPK 1413  Troponin 53 and 44  Salicylate level less than 0.3  Acetaminophen level less than 5

## 2024-05-05 NOTE — PROGRESS NOTES
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  The patient presented to the emergency department on 5/3 after a near syncopal episode after taking his trazodone and was found to have a left toe fracture and discharged with hydrocodone.  EMS was called and the ex-wife performed CPR.  The patient was discharged with 8 hydrocodone tablets and 4 tablets were left in the bottle.  5 doses of Narcan were given with some improvement in his mentation.  In the emergency department, the patient was found to have a blood pressure of 153/92, heart rate 100  F, heart rate 101, respiratory rate 14, SpO2 94% on room air.  Initial lab work showed BUNs/creatinine 16/1.7, anion gap 17, lactic acid 4.1, CPK 1413, glucose 218, high-sensitivity troponin 53, hemoglobin 12.8, INR 1.31.  Blood alcohol level was less than 0.01.  EKG showed sinus tachycardia with ventricular rate 103.  CT head and CT cervical spine showed no acute issues.  The patient was started on IV fluids for the treatment of rhabdomyolysis.  Psychiatry was consulted to see the patient.    TODAY'S PLAN:  Pt doing well this morning.  Pleasant mood and cooperative.  Denies SI/SA.  Pt states he got home from the hospital and took a hydrocodone.  He did NOT take a trazodone yesterday.  Chest is quite sore today from the CPR his ex-wife and EMS did.  Trop elevated, likely secondary to rhabdo, though given his CPR and possible syncopal events, will check echocardiogram and monitor on telemetry.  IVF today.  Appreciate Psychiatry recommendations  "regarding medication doses and adjustments given his syncopal events.  Pt denies any alcohol use since coming home from [place].  Lives in a triplex with his ex-wife and son.  States overall the living situation is good.  Recommended he take Monday off work.  He works as a .    Problem List:   Altered Mental Status  Found Down  - EMS called by wife as pt found unresponsive.  CPR was performed by ex-wife and EMS, but pt found to have a pulse.  Pt given narcan in the field with some improvement in mentation.  Was still quite somnolent upon arrival to the ED.  - Improved    Rhabdomyolysis  NSTEMI - Likely type 2  - IVF today  - Check echocardiogram given CPR was performed.  Pt reports a near syncopal event after taking his trazodone on the way home from [place] and presented to the ED and found to have a L toe fx    Acute Kidney Injury  - Likely secondary to rhabdo  - Baseline Cr = 0.8  - IVF  - Daily BMP    Lactic Acidosis  - Likely secondary to unresponsive episode vs rhabdo  - IVF as above    L Toe Fracture  - Pain control  - Avoid narcotics as able    Hx of DVT/PE on Warfarin  Subtherapeutic INR  - Pt reports he was without his warfarin while in [place]  - Appreciate Pharmacy assistance with warfarin dosing  - Per outpatient PCP, pt on lifelong anticoagulation    Depression  Hx of Alcohol Use  - Resume PTA Bupropion and Trazodone for now  - Appreciate Psychiatry recommendations in regards to medication dosing adjustments, if needed  - Follows with Behavioral Health.  Pt denies any recent alcohol use stating \"there is no longer alcohol in the house\"    Chronic Medical Problems:  Type 2 Diabetes Mellitus  Hypertension  Morbid Obesity - BMI 50.35  CARLIE with CPAP  Insomnia    I spent 52 minutes in reviewing this patient's labs, imaging, medications, medical history.  In addition time was spent interviewing the patient, communicating with family, and medical decision making.  Time was also spent " reviewing notes of outpatient PCP, recent ED visit, behavioral health outpatient visit.    DVT Prophylaxis: Warfarin  Code Status: Full Code  Diet: Combination Diet No Caffeine Diet, Low Saturated Fat Na <2400mg Diet    Santos Catheter: Not present    Disposition: Medically Ready for Discharge: Anticipated Tomorrow    Goals to discharge include: mentation stable, echo normal, psychiatry evaluation complete  Family updated today: No     Interval History   Pt seen and examined.  Pt reports feeling well, apart from soreness in his chest and toe.  Denies SI/SA.    -Data reviewed today: I personally reviewed all new labs and imaging results over the last 24 hours.     Physical Exam   Temp: 98.4  F (36.9  C) Temp src: Temporal BP: (!) 164/77 Pulse: 71   Resp: 18 SpO2: 92 % O2 Device: Nasal cannula Oxygen Delivery: 1 LPM  Vitals:    05/04/24 2212   Weight: (!) 168.4 kg (371 lb 4.1 oz)     Vital Signs with Ranges  Temp:  [98.4  F (36.9  C)-100  F (37.8  C)] 98.4  F (36.9  C)  Pulse:  [] 71  Resp:  [6-22] 18  BP: ()/(64-92) 164/77  SpO2:  [91 %-100 %] 92 %  No intake/output data recorded.    GENERAL: No apparent distress. Awake, alert, and fully oriented.  HEENT: Normocephalic, atraumatic. Extraocular movements intact.  CARDIOVASCULAR: Regular rate and rhythm without murmurs or rubs. No S3.  PULMONARY: Clear bilaterally.  GASTROINTESTINAL: Soft, non-tender, non-distended. Bowel sounds normoactive.   EXTREMITIES: No cyanosis or clubbing. No edema.  NEUROLOGICAL: CN 2-12 grossly intact, no focal neurological deficits.  DERMATOLOGICAL: No rash, ulcer, bruising, nor jaundice.    Medications   Current Facility-Administered Medications   Medication Dose Route Frequency Provider Last Rate Last Admin    Patient is already receiving anticoagulation with heparin, enoxaparin (LOVENOX), warfarin (COUMADIN)  or other anticoagulant medication   Does not apply Continuous PRN Renzo Mercer MD        sodium chloride 0.9 % infusion  "  Intravenous Continuous Renzo Mercer  mL/hr at 05/04/24 2255 New Bag at 05/04/24 2255     Current Facility-Administered Medications   Medication Dose Route Frequency Provider Last Rate Last Admin    buPROPion (WELLBUTRIN XL) 24 hr tablet 150 mg  150 mg Oral QAM Jose Vickers DO        cloNIDine (CATAPRES) tablet 0.1 mg  0.1 mg Oral At Bedtime Jose Vickers DO        lisinopril (ZESTRIL) tablet 20 mg  20 mg Oral Daily Jose Vickers DO        traZODone (DESYREL) tablet 200 mg  200 mg Oral At Bedtime Jose Vickers DO        Warfarin Dose Required Daily - Pharmacist Managed  1 each Oral See Admin Instructions Renzo Mercer MD         Data     Laboratory:  Recent Labs   Lab 05/05/24  0638 05/04/24 1819 05/04/24 1809 05/03/24  0908   WBC 7.8 10.9  --  8.9   HGB 11.3* 12.8* 14.3 13.2*   HCT 37.6* 42.6 42 42.1   MCV 90 90  --  86    273  --  256     Recent Labs   Lab 05/05/24  0638 05/05/24  0154 05/04/24 1819 05/04/24 1809 05/03/24  0908     --  136 138 130*   POTASSIUM 4.6  --  4.8 4.6 4.4   CHLORIDE 107  --  100 104 98   CO2 25  --  20*  --  16*   ANIONGAP 9  --  16*  --  16*   * 105* 218* 214* 145*   BUN 9.9  --  14.8 16 13.1   CR 0.95  --  1.58* 1.7* 0.79   GFRESTIMATED >90  --  55*  --  >90   RAUL 8.5*  --  8.6  --  8.9     No results for input(s): \"CULT\" in the last 168 hours.  No results found for: \"TROPI\"    Imaging:  Recent Results (from the past 24 hour(s))   Head CT w/o contrast    Narrative    EXAM: CT HEAD W/O CONTRAST  LOCATION: [facility]  DATE: 5/4/2024    INDICATION: found down, on warfarin  COMPARISON: None.  TECHNIQUE: Routine CT Head without IV contrast. Multiplanar reformats. Dose reduction techniques were used.    FINDINGS:  INTRACRANIAL CONTENTS: No intracranial hemorrhage, extraaxial collection, or mass effect.  No CT evidence of acute infarct. Normal parenchymal attenuation. Normal ventricles and sulci. "     VISUALIZED ORBITS/SINUSES/MASTOIDS: No intraorbital abnormality. Mild to moderate mucosal thickening scattered about the paranasal sinuses. No middle ear or mastoid effusion.    BONES/SOFT TISSUES: No acute abnormality.      Impression    IMPRESSION:  1.  No acute intracranial process.   CT Cervical Spine w/o Contrast    Narrative    EXAM: CT CERVICAL SPINE W/O CONTRAST  LOCATION: [facility]  DATE: 5/4/2024    INDICATION: found down, on warfarin; Neck pain; Trauma; Significant trauma  COMPARISON: None.  TECHNIQUE: Routine CT Cervical Spine without IV contrast. Multiplanar reformats. Dose reduction techniques were used.    FINDINGS:  VERTEBRA: No acute fracture.  No acute post traumatic subluxations.   Normal vertebral body heights. No prevertebral soft tissue swelling.    CANAL/FORAMINA: Multilevel spondylosis. Superimposed diffuse idiopathic skeletal hyperostosis. Rigid spine with multiple bridging and near bridging osteophytes. Various levels and degrees of central canal stenosis.  C2-3, C3-4, C4-5 severe central canal   stenosis. Various levels and degrees of neural foraminal stenosis.  No significant subluxations.    PARASPINAL: No significant extraspinal abnormality.      Impression     IMPRESSION:  1.  No acute fracture.    2.  Degenerative changes described above.         Jose Vickers DO  [facility] Hospitalist  [address] [facility] [place] [address]  Securely message with Vocera (more info)  Text page via [employer] Paging/Directory   05/05/2024

## 2024-05-05 NOTE — PHARMACY-ADMISSION MEDICATION HISTORY
Pharmacist Admission Medication History    Admission medication history is complete. The information provided in this note is only as accurate as the sources available at the time of the update.    Information Source(s): Patient, Family member, and CareEverywhere/SureScripts via in-person    Pertinent Information: Patient missed warfarin doses on Thursday and Friday, so he mentioned he took all missed doses today (3 tablets = 15mg).     Changes made to PTA medication list:  Added: None  Deleted: Benzonatate, Medrol dosepak  Changed: None    Allergies reviewed with patient and updates made in EHR: yes    Medication History Completed By: Steve Bright [facility] 5/4/2024 7:47 PM    PTA Med List   Medication Sig Note Last Dose    buPROPion (WELLBUTRIN XL) 150 MG 24 hr tablet Take 1 tablet (150 mg) by mouth every morning  5/4/2024 at AM    cloNIDine (CATAPRES) 0.1 MG tablet Take 1 tablet (0.1 mg) by mouth at bedtime  5/3/2024 at HS    HYDROcodone-acetaminophen (NORCO) 5-325 MG tablet Take 1 tablet by mouth every 6 hours as needed for severe pain  5/4/2024 at ?    lisinopril (ZESTRIL) 20 MG tablet Take 1 tablet (20 mg) by mouth daily  5/4/2024 at AM    metFORMIN (GLUCOPHAGE XR) 500 MG 24 hr tablet Take 2 tablets (1,000 mg) by mouth daily (with dinner)  5/3/2024 at PM    reason aspirin not prescribed, intentional, Please choose reason not prescribed from choices below.  Patient on warfarin lifelong      simvastatin (ZOCOR) 20 MG tablet Take 1 tablet (20 mg) by mouth at bedtime  5/3/2024 at HS    tirzepatide (MOUNJARO) 5 MG/0.5ML pen Inject 5 mg Subcutaneous every 7 days 5/4/2024: Tuesday Past Month at Ran out ~2 wks ago    traZODone (DESYREL) 100 MG tablet Take 2 tablets (200 mg) by mouth at bedtime  5/3/2024 at HS    warfarin ANTICOAGULANT (COUMADIN) 5 MG tablet Take 1 tablet (5 mg) by mouth daily 5 MG DAILY - as directed by INR clinic  5/4/2024 at Took 3 tablets earlier today

## 2024-05-06 ENCOUNTER — TELEPHONE (OUTPATIENT)
Dept: ANTICOAGULATION | Facility: CLINIC | Age: 44
End: 2024-05-06
Payer: COMMERCIAL

## 2024-05-06 VITALS
RESPIRATION RATE: 16 BRPM | DIASTOLIC BLOOD PRESSURE: 69 MMHG | HEART RATE: 63 BPM | SYSTOLIC BLOOD PRESSURE: 135 MMHG | WEIGHT: 315 LBS | OXYGEN SATURATION: 97 % | BODY MASS INDEX: 50.35 KG/M2 | TEMPERATURE: 97.9 F

## 2024-05-06 DIAGNOSIS — Z86.718 HISTORY OF DVT (DEEP VEIN THROMBOSIS): ICD-10-CM

## 2024-05-06 DIAGNOSIS — I26.99 ACUTE PULMONARY EMBOLISM WITHOUT ACUTE COR PULMONALE, UNSPECIFIED PULMONARY EMBOLISM TYPE (H): Primary | ICD-10-CM

## 2024-05-06 LAB
ANION GAP SERPL CALCULATED.3IONS-SCNC: 9 MMOL/L (ref 7–15)
BUN SERPL-MCNC: 8 MG/DL (ref 6–20)
CALCIUM SERPL-MCNC: 8.6 MG/DL (ref 8.6–10)
CHLORIDE SERPL-SCNC: 106 MMOL/L (ref 98–107)
CREAT SERPL-MCNC: 0.73 MG/DL (ref 0.67–1.17)
DEPRECATED HCO3 PLAS-SCNC: 25 MMOL/L (ref 22–29)
EGFRCR SERPLBLD CKD-EPI 2021: >90 ML/MIN/1.73M2
ERYTHROCYTE [DISTWIDTH] IN BLOOD BY AUTOMATED COUNT: 15.9 % (ref 10–15)
GLUCOSE SERPL-MCNC: 116 MG/DL (ref 70–99)
HCT VFR BLD AUTO: 35.7 % (ref 40–53)
HGB BLD-MCNC: 10.9 G/DL (ref 13.3–17.7)
INR PPP: 1.93 (ref 0.85–1.15)
MCH RBC QN AUTO: 26.8 PG (ref 26.5–33)
MCHC RBC AUTO-ENTMCNC: 30.5 G/DL (ref 31.5–36.5)
MCV RBC AUTO: 88 FL (ref 78–100)
PLATELET # BLD AUTO: 204 10E3/UL (ref 150–450)
POTASSIUM SERPL-SCNC: 4.1 MMOL/L (ref 3.4–5.3)
RBC # BLD AUTO: 4.07 10E6/UL (ref 4.4–5.9)
SODIUM SERPL-SCNC: 140 MMOL/L (ref 135–145)
WBC # BLD AUTO: 6.7 10E3/UL (ref 4–11)

## 2024-05-06 PROCEDURE — 250N000013 HC RX MED GY IP 250 OP 250 PS 637: Performed by: INTERNAL MEDICINE

## 2024-05-06 PROCEDURE — 85610 PROTHROMBIN TIME: CPT | Performed by: INTERNAL MEDICINE

## 2024-05-06 PROCEDURE — 258N000003 HC RX IP 258 OP 636: Performed by: INTERNAL MEDICINE

## 2024-05-06 PROCEDURE — 36415 COLL VENOUS BLD VENIPUNCTURE: CPT | Performed by: INTERNAL MEDICINE

## 2024-05-06 PROCEDURE — 80048 BASIC METABOLIC PNL TOTAL CA: CPT | Performed by: INTERNAL MEDICINE

## 2024-05-06 PROCEDURE — 99239 HOSP IP/OBS DSCHRG MGMT >30: CPT | Performed by: INTERNAL MEDICINE

## 2024-05-06 PROCEDURE — 85027 COMPLETE CBC AUTOMATED: CPT | Performed by: INTERNAL MEDICINE

## 2024-05-06 RX ORDER — WARFARIN SODIUM 5 MG/1
5 TABLET ORAL
Status: DISCONTINUED | OUTPATIENT
Start: 2024-05-06 | End: 2024-05-06 | Stop reason: HOSPADM

## 2024-05-06 RX ADMIN — ACETAMINOPHEN 650 MG: 325 TABLET, FILM COATED ORAL at 10:03

## 2024-05-06 RX ADMIN — SODIUM CHLORIDE: 9 INJECTION, SOLUTION INTRAVENOUS at 08:00

## 2024-05-06 RX ADMIN — LISINOPRIL 20 MG: 20 TABLET ORAL at 10:03

## 2024-05-06 RX ADMIN — BUPROPION HYDROCHLORIDE 150 MG: 150 TABLET, EXTENDED RELEASE ORAL at 10:04

## 2024-05-06 RX ADMIN — ACETAMINOPHEN 650 MG: 325 TABLET, FILM COATED ORAL at 14:15

## 2024-05-06 ASSESSMENT — ACTIVITIES OF DAILY LIVING (ADL)
ADLS_ACUITY_SCORE: 26
ADLS_ACUITY_SCORE: 26
ADLS_ACUITY_SCORE: 25
ADLS_ACUITY_SCORE: 26

## 2024-05-06 NOTE — DISCHARGE SUMMARY
[facility]  Hospitalist Discharge Summary      Date of Admission:  5/4/2024  Date of Discharge:  5/6/2024  Discharging Provider: Preston Bah MD, MD  Discharge Service: Hospitalist Service    Discharge Diagnoses   Encephalopathy likely drug-induced improving resolved  Found to be unresponsive by EMS and underwent CPR at the field  High suspicion nonsuicidal, not unintentional opiate overdose  History of MDD  History of MERI  History of chronic pain disorder  History of remote alcohol abuse versus misuse disorder  Hypertension  Acute rhabdomyolysis  Type II AMI  Acute kidney injury  Lactic acidosis secondary to being unresponsive, rhabdomyolysis  Recent left toe fracture  Chronic anticoagulated state on warfarin with prior history of DVT and PE  Morbid obesity with a BMI of 50  CARLIE on appliance    Clinically Significant Risk Factors     # DMII: A1C = N/A within past 6 months  # Severe Obesity: Estimated body mass index is 50.35 kg/m  as calculated from the following:    Height as of 5/3/24: 1.829 m (6').    Weight as of this encounter: 168.4 kg (371 lb 4.1 oz).       Follow-ups Needed After Discharge   Follow-up Appointments     Follow-up and recommended labs and tests       Follow up with primary care provider, Physician No Ref-Primary, within 7   days to evaluate medication change, to evaluate treatment change, and for   hospital follow- up.  The following labs/tests are recommended: repeat INR   and adjust warfarin accordingly.            Unresulted Labs Ordered in the Past 30 Days of this Admission       No orders found from 4/4/2024 to 5/5/2024.        These results will be followed up by PCP    Discharge Disposition   Discharged to home  Condition at discharge: Stable    Hospital Course      I assumed medicine service care today.  Seen and examined.  Chart reviewed.  Case discussed with nursing service was gracious to be present at bedside during my encounter.  I met this young  individual while he is ambulating inside his room with assistance of a walking device.  He endorses no ongoing complaints overnight.  Continues to demonstrate stable hemodynamics.  Voiding freely.  Passing flatus.  Able to tolerate oral diet.  Mentation appears to be at baseline level as he is conversational, interactive, cooperative.  He is following simple verbal instructions.  No reported hypoxia.  Afebrile.  No bleeding tendencies.  Denies any suicidal intentions or homicidal ideations.  Recalled events that he has been having uncontrolled pain but cannot really recall events that transpired with EMS and and route to the hospital.  Still having high suspicion of underlying encephalopathy likely drug-induced from underlying unintentional opioid overdose due to uncontrolled pain.  Seen and optimized from psychiatry perspective.  No changes with his previous maintenance regimen.  Needs to follow-up closely with his established psychiatry service.  Needs to continue his warfarin for prior history of DVT and PE with follow-up with his PCP and adjust warfarin dosing accordingly.  Echocardiogram was pursued and showed no significant pathologies that needing urgent or continuous inpatient care.  Cardiac telemonitoring showed normal sinus rhythm with no malignant arrhythmias seen.    I believe he is optimized from medicine perspective for hospital disposition going home.  He is agreeable with this approach and discussed with him that no further opioids will be prescribed upon discharge given recent event and apparent unintentional drug overdose.  He will be continued on as needed APAP for pain control.  He is not also a candidate for NSAIDs given chronic anticoagulated state with warfarin.    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  The patient presented to the emergency department on 5/3 after a near syncopal episode after taking his trazodone and was found to have a left toe fracture and discharged with hydrocodone.  EMS was called and the ex-wife performed CPR.  The patient was discharged with 8 hydrocodone tablets and 4 tablets were left in the bottle.  5 doses of Narcan were given with some improvement in his mentation.  In the emergency department, the patient was found to have a blood pressure of 153/92, heart rate 100  F, heart rate 101, respiratory rate 14, SpO2 94% on room air.  Initial lab work showed BUNs/creatinine 16/1.7, anion gap 17, lactic acid 4.1, CPK 1413, glucose 218, high-sensitivity troponin 53, hemoglobin 12.8, INR 1.31.  Blood alcohol level was less than 0.01.  EKG showed sinus tachycardia with ventricular rate 103.  CT head and CT cervical spine showed no acute issues.  The patient was started on IV fluids for the treatment of rhabdomyolysis.  Psychiatry was consulted to see the patient.     TODAY'S PLAN:  Pt doing well this morning.  Pleasant mood and cooperative.  Denies SI/SA.  Pt states he got home from the hospital and took a hydrocodone.  He did NOT take a trazodone yesterday.  Chest is quite sore today from the CPR his ex-wife and EMS did.  Trop elevated, likely secondary to rhabdo, though given his CPR and possible syncopal events, will check echocardiogram and monitor on telemetry.  IVF today.  Appreciate Psychiatry recommendations regarding medication doses and adjustments given his syncopal events.  Pt denies any alcohol use since coming home from [place].  Lives in a triplex with his ex-wife and son.  States overall the living situation is good.  Recommended he take Monday off work.  He  "works as a .     Problem List:   Altered Mental Status  Found Down  - EMS called by wife as pt found unresponsive.  CPR was performed by ex-wife and EMS, but pt found to have a pulse.  Pt given narcan in the field with some improvement in mentation.  Was still quite somnolent upon arrival to the ED.  - Improved     Rhabdomyolysis  NSTEMI - Likely type 2  - IVF today  - Check echocardiogram given CPR was performed.  Pt reports a near syncopal event after taking his trazodone on the way home from [place] and presented to the ED and found to have a L toe fx     Acute Kidney Injury  - Likely secondary to rhabdo  - Baseline Cr = 0.8  - IVF  - Daily BMP     Lactic Acidosis  - Likely secondary to unresponsive episode vs rhabdo  - IVF as above     L Toe Fracture  - Pain control  - Avoid narcotics as able     Hx of DVT/PE on Warfarin  Subtherapeutic INR  - Pt reports he was without his warfarin while in [place]  - Appreciate Pharmacy assistance with warfarin dosing  - Per outpatient PCP, pt on lifelong anticoagulation     Depression  Hx of Alcohol Use  - Resume PTA Bupropion and Trazodone for now  - Appreciate Psychiatry recommendations in regards to medication dosing adjustments, if needed  - Follows with Behavioral Health.  Pt denies any recent alcohol use stating \"there is no longer alcohol in the house\"     Chronic Medical Problems:  Type 2 Diabetes Mellitus  Hypertension  Morbid Obesity - BMI 50.35  CARLIE with CPAP  Insomnia    Consultations This Hospital Stay   PSYCHIATRY IP CONSULT  PHARMACY TO DOSE WARFARIN    Code Status   Full Code    Time Spent on this Encounter   IPreston MD, MD, personally saw the patient today and spent greater than 30 minutes discharging this patient.       Preston Bah MD, MD  [facility]  [address] [place] [address]  Phone: [phone]  Fax: " [phone]  ______________________________________________________________________    Physical Exam   Vital Signs: Temp: 98.3  F (36.8  C) Temp src: Temporal BP: 136/72 Pulse: 59   Resp: 16 SpO2: 94 % O2 Device: None (Room air) Oxygen Delivery: 1 LPM  Weight: 371 lbs 4.07 oz  HEENT; Atraumatic, normocephalic, pinkish conjuctiva, pupils bilateral reactive   Morbidly obese  Skin: warm and moist, no rashes  Lymphatics: no cervical or axillary lymphandenopathy  Lungs: equal chest expansion, clear to auscultation, no wheezes, no stridor, no crackles,   Heart: normal rate, normal rhythm, no rubs or gallops.   Abdomen: normal bowel sounds, no tenderness, no peritoneal signs, no guarding  Extremities: no deformities, no edema   Presence of left foot boot  Neuro; follow commands, alert and oriented x3, spontaneous speech, coherent, moves all extremities spontaneously  Psych; no hallucination, euthymic mood, not agitated         Primary Care Physician   Physician No Ref-Primary    Discharge Orders      Reason for your hospital stay    Admitted in the hospital for AMS, found to be unresponsive with high suspicion of non suicidal opioid overdose.     Follow-up and recommended labs and tests     Follow up with primary care provider, Physician No Ref-Primary, within 7 days to evaluate medication change, to evaluate treatment change, and for hospital follow- up.  The following labs/tests are recommended: repeat INR and adjust warfarin accordingly.     Activity    Your activity upon discharge: activity as tolerated     Full Code     Diet    Follow this diet upon discharge: Regular       Significant Results and Procedures   Most Recent 3 CBC's:  Recent Labs   Lab Test 05/06/24  0620 05/05/24  0638 05/04/24  1819   WBC 6.7 7.8 10.9   HGB 10.9* 11.3* 12.8*   MCV 88 90 90    200 273     Most Recent 3 BMP's:  Recent Labs   Lab Test 05/06/24  0620 05/05/24  0638 05/05/24  0154 05/04/24  1819    141  --  136   POTASSIUM 4.1  4.6  --  4.8   CHLORIDE 106 107  --  100   CO2 25 25  --  20*   BUN 8.0 9.9  --  14.8   CR 0.73 0.95  --  1.58*   ANIONGAP 9 9  --  16*   RAUL 8.6 8.5*  --  8.6   * 117* 105* 218*     Most Recent 2 LFT's:  Recent Labs   Lab Test 05/05/24  0638 03/11/24  0856   * 35   ALT 45 36   ALKPHOS 77 95   BILITOTAL 0.3 0.4     Most Recent 3 INR's:  Recent Labs   Lab Test 05/06/24  0620 05/05/24  0638 05/04/24  2333   INR 1.93* 1.64* 1.51*     Most Recent Cholesterol Panel:  Recent Labs   Lab Test 03/11/24  0856   CHOL 178   LDL 86   HDL 47   TRIG 223*     7-Day Micro Results       No results found for the last 168 hours.          Most Recent TSH and T4:No lab results found.  Most Recent 6 glucoses:  Recent Labs   Lab Test 05/06/24  0620 05/05/24  0638 05/05/24  0154 05/04/24  1819 05/04/24  1809 05/03/24  0908   * 117* 105* 218* 214* 145*   ,   Results for orders placed or performed during the hospital encounter of 05/04/24   Head CT w/o contrast    Narrative    EXAM: CT HEAD W/O CONTRAST  LOCATION: [facility]  DATE: 5/4/2024    INDICATION: found down, on warfarin  COMPARISON: None.  TECHNIQUE: Routine CT Head without IV contrast. Multiplanar reformats. Dose reduction techniques were used.    FINDINGS:  INTRACRANIAL CONTENTS: No intracranial hemorrhage, extraaxial collection, or mass effect.  No CT evidence of acute infarct. Normal parenchymal attenuation. Normal ventricles and sulci.     VISUALIZED ORBITS/SINUSES/MASTOIDS: No intraorbital abnormality. Mild to moderate mucosal thickening scattered about the paranasal sinuses. No middle ear or mastoid effusion.    BONES/SOFT TISSUES: No acute abnormality.      Impression    IMPRESSION:  1.  No acute intracranial process.   CT Cervical Spine w/o Contrast    Narrative    EXAM: CT CERVICAL SPINE W/O CONTRAST  LOCATION: [facility]  DATE: 5/4/2024    INDICATION: found down, on warfarin; Neck pain; Trauma; Significant  trauma  COMPARISON: None.  TECHNIQUE: Routine CT Cervical Spine without IV contrast. Multiplanar reformats. Dose reduction techniques were used.    FINDINGS:  VERTEBRA: No acute fracture.  No acute post traumatic subluxations.   Normal vertebral body heights. No prevertebral soft tissue swelling.    CANAL/FORAMINA: Multilevel spondylosis. Superimposed diffuse idiopathic skeletal hyperostosis. Rigid spine with multiple bridging and near bridging osteophytes. Various levels and degrees of central canal stenosis.  C2-3, C3-4, C4-5 severe central canal   stenosis. Various levels and degrees of neural foraminal stenosis.  No significant subluxations.    PARASPINAL: No significant extraspinal abnormality.      Impression     IMPRESSION:  1.  No acute fracture.    2.  Degenerative changes described above.   Echocardiogram Complete     Value    LVEF  55-60%    Narrative    [id number]  [facility]  [id number]  [id number]^BERNADINE^WILLOW^ANNY     [facility]  Echocardiography Laboratory  [address] [place] [address]     Name: SAHARA HAMILTON  MRN: [id number]  : 1980  Study Date: 2024 09:16 AM  Age: 43 yrs  Gender: Male  Patient Location: [facility]  Reason For Study: MI  Ordering Physician: WILLOW COLINDRES  Performed By: Lindy Burris     BSA: 2.8 m2  Height: 72 in  Weight: 371 lb  BP: 130/69 mmHg  ______________________________________________________________________________  Procedure  Complete Portable Echo Adult. Optison (NDC #8411-4158) given intravenously.  ______________________________________________________________________________  Interpretation Summary     Left ventricular systolic function is normal. The visual ejection fraction is  55-60%. No regional wall motion abnormalities noted.  Right ventricular function and size cannot be assessed due to poor image  quality.  There is mild (1+) tricuspid regurgitation.  Mild aortic root dilatation is present (4.2 cm). Mild ascending  aorta  dilatation is present (4.3 cm).  No prior study for comparison. Technically adequate study.  ______________________________________________________________________________  Left Ventricle  The left ventricle is normal in size. There is normal left ventricular wall  thickness. Left ventricular systolic function is normal. The visual ejection  fraction is 55-60%. Diastolic Doppler findings (E/E' ratio and/or other  parameters) suggest left ventricular filling pressures are indeterminate. No  regional wall motion abnormalities noted.     Right Ventricle  The right ventricle is not well visualized. Right ventricular function cannot  be assessed due to poor image quality.     Atria  The left atrium is mildly dilated. Right atrial size is normal.     Mitral Valve  The mitral valve leaflets appear normal. There is no evidence of stenosis,  fluttering, or prolapse. There is trace mitral regurgitation.     Tricuspid Valve  Normal tricuspid valve. There is mild (1+) tricuspid regurgitation. The right  ventricular systolic pressure is approximated at 33.5 mmHg plus the right  atrial pressure.     Aortic Valve  Normal tricuspid aortic valve. No aortic stenosis is present.     Pulmonic Valve  The pulmonic valve is not well visualized. There is trace pulmonic valvular  regurgitation.     Vessels  Aortic root dilatation is present. Ascending aorta dilatation is present. IVC  diameter and respiratory changes fall into an intermediate range suggesting an  RA pressure of 8 mmHg.     Pericardium  There is no pericardial effusion.     Rhythm  Sinus rhythm was noted.  ______________________________________________________________________________  MMode/2D Measurements & Calculations  IVSd: 1.1 cm  LVIDd: 6.1 cm  LVIDs: 4.0 cm  LVPWd: 0.67 cm  FS: 35.0 %  LV mass(C)d: 220.9 grams  LV mass(C)dI: 79.7 grams/m2  Ao root diam: 4.2 cm  asc Aorta Diam: 4.3 cm  LVOT diam: 2.8 cm  LVOT area: 6.0 cm2  Ao root diam index Ht(cm/m): 2.3  Ao  root diam index BSA (cm/m2): 1.5  Asc Ao diam index BSA (cm/m2): 1.6  Asc Ao diam index Ht(cm/m): 2.4  LA Volume (BP): 105.0 ml     LA Volume Index (BP): 37.9 ml/m2  RWT: 0.22  TAPSE: 2.6 cm     Doppler Measurements & Calculations  MV E max tamiko: 88.0 cm/sec  MV A max tamiko: 70.7 cm/sec  MV E/A: 1.2  MV dec slope: 500.1 cm/sec2  MV dec time: 0.18 sec  PA acc time: 0.10 sec  TR max tamiko: 289.5 cm/sec  TR max P.5 mmHg  E/E' av.4  Lateral E/e': 4.8  Medial E/e': 10.0     ______________________________________________________________________________  Report approved by: Justin Correa 2024 12:03 PM             Discharge Medications   Current Discharge Medication List        CONTINUE these medications which have NOT CHANGED    Details   buPROPion (WELLBUTRIN XL) 150 MG 24 hr tablet Take 1 tablet (150 mg) by mouth every morning  Qty: 90 tablet, Refills: 3    Associated Diagnoses: Major depressive disorder, recurrent episode, moderate (H)      cloNIDine (CATAPRES) 0.1 MG tablet Take 1 tablet (0.1 mg) by mouth at bedtime  Qty: 90 tablet, Refills: 3    Associated Diagnoses: Primary insomnia      lisinopril (ZESTRIL) 20 MG tablet Take 1 tablet (20 mg) by mouth daily  Qty: 30 tablet, Refills: 2    Associated Diagnoses: Essential hypertension, benign      metFORMIN (GLUCOPHAGE XR) 500 MG 24 hr tablet Take 2 tablets (1,000 mg) by mouth daily (with dinner)  Qty: 180 tablet, Refills: 3    Associated Diagnoses: Type 2 diabetes mellitus without complication, without long-term current use of insulin (H)      simvastatin (ZOCOR) 20 MG tablet Take 1 tablet (20 mg) by mouth at bedtime  Qty: 90 tablet, Refills: 3    Associated Diagnoses: Pure hypercholesterolemia      tirzepatide (MOUNJARO) 5 MG/0.5ML pen Inject 5 mg Subcutaneous every 7 days  Qty: 2 mL, Refills: 3    Associated Diagnoses: Type 2 diabetes mellitus without complication, without long-term current use of insulin (H)      traZODone (DESYREL) 100 MG tablet  Take 2 tablets (200 mg) by mouth at bedtime  Qty: 180 tablet, Refills: 3    Associated Diagnoses: Primary insomnia      warfarin ANTICOAGULANT (COUMADIN) 5 MG tablet Take 1 tablet (5 mg) by mouth daily 5 MG DAILY - as directed by INR clinic  Qty: 90 tablet, Refills: 3    Associated Diagnoses: Acute pulmonary embolism without acute cor pulmonale, unspecified pulmonary embolism type (H)           STOP taking these medications       HYDROcodone-acetaminophen (NORCO) 5-325 MG tablet Comments:   Reason for Stopping:         reason aspirin not prescribed, intentional, Comments:   Reason for Stopping:             Allergies   No Known Allergies

## 2024-05-06 NOTE — TELEPHONE ENCOUNTER
ANTICOAGULATION  MANAGEMENT: Discharge Review    Cody Bolton chart reviewed for anticoagulation continuity of care    Hospital Admission on 5/4/24 - 5/6/24 for encephalopathy, acute rhabdomyolysis after accidental opiate OD.    Discharge disposition: Home    Results:    Recent labs: (last 7 days)     05/03/24  0908 05/04/24  1819 05/04/24  2333 05/05/24  0638 05/06/24  0620   INR 1.64* 1.31* 1.51* 1.64* 1.93*     Anticoagulation inpatient management:     anticoagulation calendar updated with inpatient dosing    Anticoagulation discharge instructions:     Warfarin dosing: home regimen continued   Bridging: No   INR goal change: No      Medication changes affecting anticoagulation: No    Additional factors affecting anticoagulation: Yes: Patient no longer to take opioid pain relievers for broken toe - Tylenol only. CPR performed and patient still sore from this.     PLAN     No adjustment to anticoagulation plan needed  Agree with dosing adjustment on discharge  Recommend to check INR on 5/8/24, as already scheduled    Patient not contacted    Anticoagulation Calendar updated    Catalina Begum RN

## 2024-05-06 NOTE — PHARMACY-ANTICOAGULATION SERVICE
Clinical Pharmacy- Warfarin Discharge Note    Patient is discharging on prior to admission warfarin dose.    Anticoagulation Dose History  More data exists         Latest Ref Rng & Units 3/11/2024 3/27/2024 4/24/2024 5/3/2024 5/4/2024 5/5/2024 5/6/2024   Recent Dosing and Labs   warfarin ANTICOAGULANT (COUMADIN) 5 MG tablet - - - - - - 10 mg, $Given -   INR 0.85 - 1.15 1.9  2.7  2.3  1.64  1.51  1.31  1.64  1.93

## 2024-05-06 NOTE — PLAN OF CARE
Goal Outcome Evaluation:    Plan of Care Reviewed With: patient    Overall Patient Progress: no change    Outcome Evaluation: Tolerating no caffeine/ low fat diet. Psychiatry consulted.    1L NC. Denies SI - precautions maintained.     Problem: Adult Inpatient Plan of Care  Goal: Plan of Care Review  Flowsheets (Taken 5/5/2024 1900)  Outcome Evaluation: Tolerating no caffeine/ low fat diet. Psychiatry consulted.  Plan of Care Reviewed With: patient  Overall Patient Progress: no change  Goal: Absence of Hospital-Acquired Illness or Injury  Intervention: Identify and Manage Fall Risk  Recent Flowsheet Documentation  Taken 5/5/2024 0901 by Giovanna Ochoa RN  Safety Promotion/Fall Prevention:   activity supervised   assistive device/personal items within reach   clutter free environment maintained   increased rounding and observation   increase visualization of patient   lighting adjusted   mobility aid in reach   nonskid shoes/slippers when out of bed   patient and family education   room near nurse's station   room organization consistent   safety round/check completed   supervised activity  Intervention: Prevent Skin Injury  Recent Flowsheet Documentation  Taken 5/5/2024 0901 by Giovanna Ochoa RN  Body Position: position changed independently  Intervention: Prevent and Manage VTE (Venous Thromboembolism) Risk  Recent Flowsheet Documentation  Taken 5/5/2024 0901 by Giovanna Ochoa RN  VTE Prevention/Management: SCDs (sequential compression devices) off  Goal: Optimal Comfort and Wellbeing  Intervention: Monitor Pain and Promote Comfort  Recent Flowsheet Documentation  Taken 5/5/2024 1215 by Giovanna Ochoa RN  Pain Management Interventions:   rest   repositioned  Taken 5/5/2024 1130 by Giovanna Ochoa RN  Pain Management Interventions: medication (see MAR)  Taken 5/5/2024 0901 by Giovanna Ochoa RN  Pain Management Interventions: medication offered but refused     Problem: Suicide Risk  Goal: Absence of  Self-Harm  Intervention: Assess Risk to Self and Maintain Safety  Recent Flowsheet Documentation  Taken 5/5/2024 0901 by Giovanna Ochoa RN  Enhanced Safety Measures: pain management

## 2024-05-06 NOTE — PLAN OF CARE
"1545 discharge to home with friend. VSS. Awake and alert, in agreement of discharge plan.     1325 verbalized understanding to all discharge instructions.   Plan is home today. Following plan of care. Denies being suicidal as \"I wanted to take my back pain away so I took some of my moms (she has ) liquid oxycodone.... it must have yared too much.\"    Continues to c/o back and buttocks pain.   Ambulated to bathroom with SBA, walker, Gb, ortho shoe to left foot (toe fracture and bruising to bilateral feet).     Recent PEs one year ago and on coumadin.   Sleeping in the chair between all cares today. SL. Food well. Voiding.     Patient vital signs are at baseline: Yes  Patient able to ambulate as they were prior to admission or with assist devices provided by therapies during their stay:  Yes, walker, ortho shoe. Has a walker at home.   Patient MUST void prior to discharge:  Yes  Patient able to tolerate oral intake:  Yes  Pain has adequate pain control using Oral analgesics:  Yes. tylenol  Does patient have an identified :  Yes. Ex wife lives next door and GF.  Has goal D/C date and time been discussed with patient:  Yes. Patient in agreement with discharge plan home.   Mild swelling to bilateral legs and moderate swelling and bruising to left foot.      Problem: Adult Inpatient Plan of Care  Goal: Plan of Care Review  Description: The Plan of Care Review/Shift note should be completed every shift.  The Outcome Evaluation is a brief statement about your assessment that the patient is improving, declining, or no change.  This information will be displayed automatically on your shift  note.  Outcome: Progressing  Flowsheets (Taken 2024 1314)  Plan of Care Reviewed With: patient  Overall Patient Progress: improving  Goal: Patient-Specific Goal (Individualized)  Description: You can add care plan individualizations to a care plan. Examples of Individualization might be:  \"Parent requests to be called daily at " "9am for status\", \"I have a hard time hearing out of my right ear\", or \"Do not touch me to wake me up as it startles  me\".  Outcome: Progressing  Goal: Absence of Hospital-Acquired Illness or Injury  Outcome: Progressing  Intervention: Identify and Manage Fall Risk  Recent Flowsheet Documentation  Taken 5/6/2024 0950 by Jessica Caraballo, RN  Safety Promotion/Fall Prevention:   activity supervised   assistive device/personal items within reach   clutter free environment maintained   nonskid shoes/slippers when out of bed   patient and family education   room near nurse's station   safety round/check completed  Intervention: Prevent Skin Injury  Recent Flowsheet Documentation  Taken 5/6/2024 0950 by Jessica Caraballo, RN  Body Position: position changed independently  Skin Protection: adhesive use limited  Device Skin Pressure Protection: adhesive use limited  Intervention: Prevent and Manage VTE (Venous Thromboembolism) Risk  Recent Flowsheet Documentation  Taken 5/6/2024 0950 by Jessica Caraballo RN  VTE Prevention/Management: SCDs (sequential compression devices) off  Intervention: Prevent Infection  Recent Flowsheet Documentation  Taken 5/6/2024 0950 by Jessica Caraballo, RN  Infection Prevention:   rest/sleep promoted   single patient room provided  Goal: Optimal Comfort and Wellbeing  Outcome: Progressing  Intervention: Monitor Pain and Promote Comfort  Recent Flowsheet Documentation  Taken 5/6/2024 1005 by Jessica Caraballo, RN  Pain Management Interventions: medication (see MAR)  Goal: Readiness for Transition of Care  Outcome: Progressing     Problem: Suicide Risk  Goal: Absence of Self-Harm  Outcome: Progressing  Intervention: Assess Risk to Self and Maintain Safety  Recent Flowsheet Documentation  Taken 5/6/2024 0950 by Jessica Caraballo, RN  Self-Harm Prevention: environmental self-harm risks assessed  Enhanced Safety Measures: pain management  Intervention: Promote " Psychosocial Wellbeing  Recent Flowsheet Documentation  Taken 5/6/2024 0950 by Jessica Caraballo, RN  Supportive Measures: active listening utilized   Goal Outcome Evaluation:      Plan of Care Reviewed With: patient    Overall Patient Progress: improvingOverall Patient Progress: improving

## 2024-05-06 NOTE — PLAN OF CARE
"Goal Outcome Evaluation:      Plan of Care Reviewed With: patient    Overall Patient Progress: no changeOverall Patient Progress: no change           Patient alert and oriented. VSS on room air. Pleasant. No reports of suicidal ideations or thoughts. When prompted when the last time he had those thoughts, the patient replied \"oh a long time ago, I can't even remember how long ago.\" Chatting with patient about his support system. Tylenol given for buttocks pain. CPAP use at night. Assist of 1 walker gait belt boot. Pending plans.            Problem: Adult Inpatient Plan of Care  Goal: Plan of Care Review  Description: The Plan of Care Review/Shift note should be completed every shift.  The Outcome Evaluation is a brief statement about your assessment that the patient is improving, declining, or no change.  This information will be displayed automatically on your shift  note.  Outcome: Progressing  Flowsheets (Taken 5/6/2024 0432)  Plan of Care Reviewed With: patient  Overall Patient Progress: no change  Goal: Patient-Specific Goal (Individualized)  Description: You can add care plan individualizations to a care plan. Examples of Individualization might be:  \"Parent requests to be called daily at 9am for status\", \"I have a hard time hearing out of my right ear\", or \"Do not touch me to wake me up as it startles  me\".  Outcome: Progressing  Goal: Absence of Hospital-Acquired Illness or Injury  Outcome: Progressing  Intervention: Identify and Manage Fall Risk  Recent Flowsheet Documentation  Taken 5/5/2024 2032 by Bridgette Lambert, RN  Safety Promotion/Fall Prevention:   activity supervised   assistive device/personal items within reach   clutter free environment maintained   nonskid shoes/slippers when out of bed   patient and family education   room near nurse's station   safety round/check completed  Intervention: Prevent Skin Injury  Recent Flowsheet Documentation  Taken 5/5/2024 2032 by Bridgette Lambert, RN  Body " Position: position changed independently  Skin Protection: adhesive use limited  Device Skin Pressure Protection: adhesive use limited  Intervention: Prevent and Manage VTE (Venous Thromboembolism) Risk  Recent Flowsheet Documentation  Taken 5/5/2024 2032 by Birdgette Lambert RN  VTE Prevention/Management: SCDs (sequential compression devices) off  Intervention: Prevent Infection  Recent Flowsheet Documentation  Taken 5/5/2024 2032 by Bridgette Lambert RN  Infection Prevention:   rest/sleep promoted   single patient room provided  Goal: Optimal Comfort and Wellbeing  Outcome: Progressing  Intervention: Monitor Pain and Promote Comfort  Recent Flowsheet Documentation  Taken 5/5/2024 2038 by Bridgette Lambert RN  Pain Management Interventions: (offered ice, refused)   medication (see MAR)   pillow support provided   repositioned   other (see comments)  Goal: Readiness for Transition of Care  Outcome: Progressing     Problem: Suicide Risk  Goal: Absence of Self-Harm  Outcome: Progressing  Intervention: Assess Risk to Self and Maintain Safety  Recent Flowsheet Documentation  Taken 5/5/2024 2032 by Bridgette Lambert RN  Self-Harm Prevention: environmental self-harm risks assessed  Enhanced Safety Measures: pain management  Intervention: Promote Psychosocial Wellbeing  Recent Flowsheet Documentation  Taken 5/5/2024 2032 by Brigdette Lambert RN  Supportive Measures: active listening utilized

## 2024-05-07 ENCOUNTER — VIRTUAL VISIT (OUTPATIENT)
Dept: BEHAVIORAL HEALTH | Facility: CLINIC | Age: 44
End: 2024-05-07
Payer: COMMERCIAL

## 2024-05-07 ENCOUNTER — PATIENT OUTREACH (OUTPATIENT)
Dept: CARE COORDINATION | Facility: CLINIC | Age: 44
End: 2024-05-07
Payer: COMMERCIAL

## 2024-05-07 DIAGNOSIS — F33.1 MAJOR DEPRESSIVE DISORDER, RECURRENT EPISODE, MODERATE (H): Primary | ICD-10-CM

## 2024-05-07 PROCEDURE — 90834 PSYTX W PT 45 MINUTES: CPT | Mod: 95

## 2024-05-07 ASSESSMENT — PATIENT HEALTH QUESTIONNAIRE - PHQ9
10. IF YOU CHECKED OFF ANY PROBLEMS, HOW DIFFICULT HAVE THESE PROBLEMS MADE IT FOR YOU TO DO YOUR WORK, TAKE CARE OF THINGS AT HOME, OR GET ALONG WITH OTHER PEOPLE: SOMEWHAT DIFFICULT
SUM OF ALL RESPONSES TO PHQ QUESTIONS 1-9: 5
SUM OF ALL RESPONSES TO PHQ QUESTIONS 1-9: 5

## 2024-05-07 NOTE — PROGRESS NOTES
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  The patient presented to the emergency department on 5/3 after a near syncopal episode after taking his trazodone and was found to have a left toe fracture and discharged with hydrocodone.  EMS was called and the ex-wife performed CPR.  The patient was discharged with 8 hydrocodone tablets and 4 tablets were left in the bottle.  5 doses of Narcan were given with some improvement in his mentation.  In the emergency department, the patient was found to have a blood pressure of 153/92, heart rate 100  F, heart rate 101, respiratory rate 14, SpO2 94% on room air.  Initial lab work showed BUNs/creatinine 16/1.7, anion gap 17, lactic acid 4.1, CPK 1413, glucose 218, high-sensitivity troponin 53, hemoglobin 12.8, INR 1.31.  Blood alcohol level was less than 0.01.  EKG showed sinus tachycardia with ventricular rate 103.  CT head and CT cervical spine showed no acute issues.  The patient was started on IV fluids for the treatment of rhabdomyolysis.  Psychiatry was consulted to see the patient.       ASSESSMENT           Discharge Assessment  How are you doing now that you are home?: \" Doing okay \"  How are your symptoms? (Red Flag symptoms escalate to triage hotline per guidelines): Improved  Do " you feel your condition is stable enough to be safe at home until your provider visit?: Yes  Does the patient have their discharge instructions? : Yes  Does the patient have questions regarding their discharge instructions? : No  Were you started on any new medications or were there changes to any of your previous medications? : Yes  Does the patient have all of their medications?: Yes  Do you have questions regarding any of your medications? : No  Do you have all of your needed medical supplies or equipment (DME)?  (i.e. oxygen tank, CPAP, cane, etc.): Yes  Discharge follow-up appointment scheduled within 14 calendar days? : Yes  Discharge Follow Up Appointment Date: 05/07/24  Discharge Follow Up Appointment Scheduled with?: Specialty Care Provider    Post-op ([facility] Only)  If the patient had a surgery or procedure, do they have any questions for a nurse?: No             PLAN                                                      Outpatient Plan:    Follow up with primary care provider, Physician No Ref-Primary, within 7 days to evaluate medication change, to evaluate treatment change, and for hospital follow- up.  The following labs/tests are recommended: repeat INR and adjust warfarin accordingly.          Activity     Your activity upon discharge: activity as tolerated      Full Code          Diet     Follow this diet upon discharge: Regular        Future Appointments   Date Time Provider Department Center   5/7/2024 12:00 PM Lucy Rouse [facility]   5/8/2024 11:45 AM [facility]   6/11/2024 10:00 AM Yovana Felipe MD [facility]         For any urgent concerns, please contact our 24 hour nurse triage line: [phone] ([phone])         Lina Huizar MA

## 2024-05-07 NOTE — PROGRESS NOTES
[facility]                                     Progress Note    Patient Name: Cody Bolton  Date: 24           Service Type: Individual      Session Start Time: 11.59  Session End Time: 12.38     Session Length: 38-52 minutes    Session #: 5    Attendees: Client attended alone    Service Modality:  Video Visit:      Provider verified identity through the following two step process.  Patient provided:  Patient  and Patient address    Telemedicine Visit: The patient's condition can be safely assessed and treated via synchronous audio and visual telemedicine encounter.      Reason for Telemedicine Visit: Patient has requested telehealth visit    Originating Site (Patient Location): Patient's home    Distant Site (Provider Location): [facility]    Consent:  The patient/guardian has verbally consented to: the potential risks and benefits of telemedicine (video visit) versus in person care; bill my insurance or make self-payment for services provided; and responsibility for payment of non-covered services.     Patient would like the video invitation sent by:  My Chart    Mode of Communication:  Video Conference via Amwell    Distant Location (Provider):  Off-site    As the provider I attest to compliance with applicable laws and regulations related to telemedicine.    DATA  Interactive Complexity: No  Crisis: No        Progress Since Last Session (Related to Symptoms / Goals / Homework):   Symptoms: Improving . Patient's PHQ9 score has reduced from a 8 to a 5.     Homework: Partially completed      Episode of Care Goals: Satisfactory progress - CONTEMPLATION (Considering change and yet undecided); Intervened by assessing the negative and positive thinking (ambivalence) about behavior change     Current / Ongoing Stressors and Concerns:  Patient discussed health and pain stressors. Patient discussed a recent fall that resulted in him  spraining both of his ankles, breaking his pinky toe, and stressing both of his knees. Patient reports currently using a walker for mobility. Patient discussed his recent vacation to [place]. Patient discussed relationship dynamics with his girlfriend, Becca. Patient discussed thoughts about ending this relationship.        Treatment Objective(s) Addressed in This Session:   Decrease frequency and intensity of feeling down, depressed, hopeless       Intervention:   Motivational Interviewing    MI Intervention: Expressed Empathy/Understanding, Supported Autonomy, Collaboration, Evocation, Open-ended questions, and Reflections: simple and complex     Change Talk Expressed by the Patient: Desire to change    Provider Response to Change Talk: E - Evoked more info from patient about behavior change, A - Affirmed patient's thoughts, decisions, or attempts at behavior change, and R - Reflected patient's change talk    Assessments completed prior to visit:  The following assessments were completed by patient for this visit:  PHQ9:       2/19/2024    12:38 PM 3/11/2024     8:09 AM 3/26/2024    11:44 AM 5/7/2024    11:49 AM   PHQ-9 SCORE   PHQ-9 Total Score MyChart 10 (Moderate depression) 9 (Mild depression) 8 (Mild depression) 5 (Mild depression)   PHQ-9 Total Score 10 9 8 5     GAD7:       2/19/2024    12:39 PM   MERI-7 SCORE   Total Score 2 (minimal anxiety)   Total Score 2     PROMIS 10-Global Health (all questions and answers displayed):       2/19/2024    12:53 PM   PROMIS 10   In general, would you say your health is: Fair   In general, would you say your quality of life is: Good   In general, how would you rate your physical health? Fair   In general, how would you rate your mental health, including your mood and your ability to think? Good   In general, how would you rate your satisfaction with your social activities and relationships? Good   In general, please rate how well you carry out your usual social activities  and roles Fair   To what extent are you able to carry out your everyday physical activities such as walking, climbing stairs, carrying groceries, or moving a chair? Completely   In the past 7 days, how often have you been bothered by emotional problems such as feeling anxious, depressed, or irritable? Sometimes   In the past 7 days, how would you rate your fatigue on average? Mild   In the past 7 days, how would you rate your pain on average, where 0 means no pain, and 10 means worst imaginable pain? 5   In general, would you say your health is: 2   In general, would you say your quality of life is: 3   In general, how would you rate your physical health? 2   In general, how would you rate your mental health, including your mood and your ability to think? 3   In general, how would you rate your satisfaction with your social activities and relationships? 3   In general, please rate how well you carry out your usual social activities and roles. (This includes activities at home, at work and in your community, and responsibilities as a parent, child, spouse, employee, friend, etc.) 2   To what extent are you able to carry out your everyday physical activities such as walking, climbing stairs, carrying groceries, or moving a chair? 5   In the past 7 days, how often have you been bothered by emotional problems such as feeling anxious, depressed, or irritable? 3   In the past 7 days, how would you rate your fatigue on average? 2   In the past 7 days, how would you rate your pain on average, where 0 means no pain, and 10 means worst imaginable pain? 5   Global Mental Health Score 12    12   Global Physical Health Score 14    14   PROMIS TOTAL - SUBSCORES 26    26     [place] Suicide Severity Rating Scale (Lifetime/Recent)      2/19/2024     1:03 PM 5/3/2024     8:51 AM 5/4/2024     6:08 PM   [place] Suicide Severity Rating (Lifetime/Recent)   Q1 Wished to be Dead (Past Month)  0-->no 1-->yes   Q2 Suicidal Thoughts (Past  "Month)  0-->no 1-->yes   Q6 Suicide Behavior (Lifetime)  0-->no 1-->yes   Within the Past 3 Months?   1-->yes   Level of Risk per Screen  no risks indicated high risk   Q1 Wish to be Dead (Lifetime) Y     Wish to be Dead Description (Lifetime) \"I never attempted, but I kind of had a plan, but it comes and goes.\" Patient reports passive suicidal ideation.     1. Wish to be Dead (Past 1 Month) N     Q2 Non-Specific Active Suicidal Thoughts (Lifetime) Y     Non-Specific Active Suicidal Thought Description (Lifetime) \"I never attempted, but I kind of had a plan, but it comes and goes.\" Patient reports passive suicidal ideation.     2. Non-Specific Active Suicidal Thoughts (Past 1 Month) N     3. Active Suicidal Ideation with any Methods (Not Plan) Without Intent to Act (Lifetime) N     Q4 Active Suicidal Ideation with Some Intent to Act, Without Specific Plan (Lifetime) N     Q5 Active Suicidal Ideation with Specific Plan and Intent (Lifetime) N     Most Severe Ideation Rating (Lifetime) 2     Description of Most Severe Ideation (Lifetime) Patient reports passive suicidal ideation.     Most Severe Ideation Rating (Past 1 Month) 1     Description of Most Severe Ideation (Past 1 Month) NA     Frequency (Lifetime) 1     Frequency (Past 1 Month) 1     Duration (Lifetime) 1     Duration (Past 1 Month) 1     Controllability (Lifetime) 2     Controllability (Past 1 Month) 1     Deterrents (Lifetime) 1     Deterrents (Past 1 Month) 1     Reasons for Ideation (Lifetime) 4     Reasons for Ideation (Past 1 Month) 0     Actual Attempt (Lifetime) N     Has subject engaged in non-suicidal self-injurious behavior? (Lifetime) N     Interrupted Attempts (Lifetime) N     Aborted or Self-Interrupted Attempt (Lifetime) N     Preparatory Acts or Behavior (Lifetime) N     Calculated C-SSRS Risk Score (Lifetime/Recent) No Risk Indicated           ASSESSMENT: Current Emotional / Mental Status (status of significant symptoms):   Risk status " (Self / Other harm or suicidal ideation)   Patient denies current fears or concerns for personal safety.   Patient reports the following current or recent suicidal ideation or behaviors: Patient continues to endorse some passive suicidal ideations, but denies any plan or intent.   Patient denies current or recent homicidal ideation or behaviors.   Patient denies current or recent self injurious behavior or ideation.   Patient denies other safety concerns.   Patient reports there has been no change in risk factors since their last session.     Patient reports there has been no change in protective factors since their last session.     A safety and risk management plan has been developed including: Patient consented to co-developed safety plan.  Safety and risk management plan was completed - see below.  Patient agreed to use safety plan should any safety concerns arise.  A copy was given to the patient. [place] Safety Plan completed - see Screenings tab.     Appearance:   Appropriate    Eye Contact:   Good    Psychomotor Behavior: Normal    Attitude:   Cooperative    Orientation:   All   Speech    Rate / Production: Normal     Volume:  Normal    Mood:    Anxious  Depressed    Affect:    Appropriate    Thought Content:  Clear    Thought Form:  Coherent  Logical    Insight:    Good      Medication Review:   No changes to current psychiatric medication(s)     Medication Compliance:   Yes     Changes in Health Issues:   Yes: patient reports a recent fall that sprained both of his ankles, broke his pinky toe, and stressed both of his knees.       Chemical Use Review:   Substance Use: Problem use continues with no change since last session, Provided encouragement towards sobriety   . Patient continues to decline a referral at this time for a chemical dependency evaluation and comprehensive assessment.       Tobacco Use: No current tobacco use.      Diagnosis:  1. Major depressive disorder, recurrent episode, moderate  "(H)        Collateral Reports Completed:   Not Applicable    PLAN: (Patient Tasks / Therapist Tasks / Other)  Patient will return in 3 weeks for next session. Patient will engage in self-care activities. Patient will work on incorporating more exercise and walking into his routine at least 2 times/ week for 30 minutes. Patient will work on open communication skills and boundary setting, especially with his girlfriend. Patient will work on coping skills to reduce his consumption of alcohol, including listening to music. Patient will utilize the skills of \"delay, distract, decide\" when noticing alcohol cravings. Patient will work on adding more light to his living room to make it \"less depressive\" feeling.      Lucy Rouse, [facility]                                                         ______________________________________________________________________    Individual Treatment Plan    Patient's Name: Cody Bolton  YOB: 1980    Date of Creation: 2/26/24  Date Treatment Plan Last Reviewed/Revised: 2/26/24    DSM5 Diagnoses:   Encounter Diagnosis   Name Primary?    Major depressive disorder, recurrent episode, moderate (H) Yes       Psychosocial / Contextual Factors: Relationship stressors, occupational dynamics.  PROMIS (reviewed every 90 days):   Global Mental Health Score (P) 12   Global Physical Health Score (P) 14   PROMIS TOTAL - SUBSCORES (P) 26       Referral / Collaboration:  Referral to another professional/service is not indicated at this time..    Anticipated number of session for this episode of care: 9-12 sessions  Anticipation frequency of session: Weekly  Anticipated Duration of each session: 38-52 minutes  Treatment plan will be reviewed in 90 days or when goals have been changed.       MeasurableTreatment Goal(s) related to diagnosis / functional impairment(s)  Goal 1: Patient will reduce depression symptoms as evidenced by a reduction in PHQ9 score to a 7 or less in the next 2 " months.    I will know I've met my goal when I'm not having the feeling as often that I just don't want to do life anymore.      Objective #A (Patient Action)    Patient will Decrease frequency and intensity of feeling down, depressed, hopeless.  Status: New - Date: 2/26/24      Intervention(s)  Therapist will teach  coping skills and self-care activities .    Objective #B  Patient will Feel less tired and more energy during the day .  Status: New - Date: 2/26/24      Intervention(s)  Therapist will  teach sleep hygiene and movement-based exercises .    Objective #C  Patient will Increase interest, engagement, and pleasure in doing things.  Status: New - Date: 2/26/24      Intervention(s)  Therapist will  teach distress-tolerance skills. .          Patient has reviewed and agreed to the above plan.      Lucy Rouse, [facility]  February 26, 2024

## 2024-05-09 ENCOUNTER — ANTICOAGULATION THERAPY VISIT (OUTPATIENT)
Dept: ANTICOAGULATION | Facility: CLINIC | Age: 44
End: 2024-05-09

## 2024-05-09 ENCOUNTER — LAB (OUTPATIENT)
Dept: LAB | Facility: CLINIC | Age: 44
End: 2024-05-09
Payer: COMMERCIAL

## 2024-05-09 DIAGNOSIS — Z86.718 HISTORY OF DVT (DEEP VEIN THROMBOSIS): ICD-10-CM

## 2024-05-09 DIAGNOSIS — I26.99 ACUTE PULMONARY EMBOLISM WITHOUT ACUTE COR PULMONALE, UNSPECIFIED PULMONARY EMBOLISM TYPE (H): ICD-10-CM

## 2024-05-09 DIAGNOSIS — I26.99 ACUTE PULMONARY EMBOLISM WITHOUT ACUTE COR PULMONALE, UNSPECIFIED PULMONARY EMBOLISM TYPE (H): Primary | ICD-10-CM

## 2024-05-09 LAB — INR BLD: 2.7 (ref 0.9–1.1)

## 2024-05-09 PROCEDURE — 36415 COLL VENOUS BLD VENIPUNCTURE: CPT

## 2024-05-09 PROCEDURE — 85610 PROTHROMBIN TIME: CPT

## 2024-05-09 NOTE — PROGRESS NOTES
"ANTICOAGULATION MANAGEMENT     Cody WALLACE Young 43 year old male is on warfarin with therapeutic INR result. (Goal INR 2.0-3.0)    Recent labs: (last 7 days)     05/09/24  1135   INR 2.7*       ASSESSMENT     Source(s): Chart Review and Patient/Caregiver Call     Warfarin doses taken: Warfarin taken as instructed  Diet: No new diet changes identified. Doesn't eat many green veggies per his usual and \"I haven't had any alcohol in a while\"  Medication/supplement changes: None noted, only using Tylenol for pain.  New illness, injury, or hospitalization: Yes: fell twice over the last week. Sprained both ankles, broke \"pinky\" toe, and stressed both knees - currently walking with walker. States his trazodone kicked in while walking to the bathroom.  Signs or symptoms of bleeding or clotting: No  Previous result: Subtherapeutic this past week, but prior two therapeutic on this dose.  Additional findings: None       PLAN     Recommended plan for temporary change(s) affecting INR     Dosing Instructions: Continue your current warfarin dose with next INR in 2 weeks       Summary  As of 5/9/2024      Full warfarin instructions:  5 mg every day   Next INR check:  5/23/2024               Telephone call with Cody who verbalizes understanding and agrees to plan    Lab visit scheduled    Education provided:   Please call back if any changes to your diet, medications or how you've been taking warfarin  Interaction IS anticipated between warfarin and Tylenol if taking 3000 mg/day or more  Contact [phone]  with any changes, questions or concerns.     Plan made per [facility] anticoagulation protocol    Catalina Begum RN  Anticoagulation Clinic  5/9/2024    _______________________________________________________________________     Anticoagulation Episode Summary       Current INR goal:  2.0-3.0   TTR:  74.6% (2.5 mo)   Target end date:  Indefinite   Send INR reminders to:  KEO GARAY    Indications    Acute pulmonary " embolism without acute cor pulmonale  unspecified pulmonary embolism type (H) [I26.99]  History of DVT (deep vein thrombosis) [Z86.718]             Comments:               Anticoagulation Care Providers       Provider Role Specialty Phone number    Fredy Veras MD Referring Family Medicine [phone]    Aaseby-Aguilera, Ramona Ann, PA-C Referring Family Medicine [phone]

## 2024-05-10 DIAGNOSIS — I10 ESSENTIAL HYPERTENSION, BENIGN: ICD-10-CM

## 2024-05-14 RX ORDER — LISINOPRIL 20 MG/1
20 TABLET ORAL DAILY
Qty: 30 TABLET | Refills: 0 | Status: SHIPPED | OUTPATIENT
Start: 2024-05-14 | End: 2024-08-09

## 2024-05-16 ENCOUNTER — HOSPITAL ENCOUNTER (OUTPATIENT)
Facility: CLINIC | Age: 44
Setting detail: OBSERVATION
Discharge: HOME | End: 2024-05-18
Attending: EMERGENCY MEDICINE | Admitting: HOSPITALIST
Payer: COMMERCIAL

## 2024-05-16 ENCOUNTER — APPOINTMENT (OUTPATIENT)
Dept: CT IMAGING | Facility: CLINIC | Age: 44
End: 2024-05-16
Attending: EMERGENCY MEDICINE
Payer: COMMERCIAL

## 2024-05-16 DIAGNOSIS — R79.89 ELEVATED LACTIC ACID LEVEL: ICD-10-CM

## 2024-05-16 DIAGNOSIS — S22.41XA CLOSED FRACTURE OF MULTIPLE RIBS OF RIGHT SIDE, INITIAL ENCOUNTER: ICD-10-CM

## 2024-05-16 LAB
ALBUMIN SERPL BCG-MCNC: 4.4 G/DL (ref 3.5–5.2)
ALP SERPL-CCNC: 209 U/L (ref 40–150)
ALT SERPL W P-5'-P-CCNC: 49 U/L (ref 0–70)
ANION GAP SERPL CALCULATED.3IONS-SCNC: 17 MMOL/L (ref 7–15)
AST SERPL W P-5'-P-CCNC: 36 U/L (ref 0–45)
ATRIAL RATE - MUSE: 79 BPM
BASOPHILS # BLD AUTO: 0 10E3/UL (ref 0–0.2)
BASOPHILS NFR BLD AUTO: 1 %
BILIRUB SERPL-MCNC: 0.3 MG/DL
BUN SERPL-MCNC: 11.3 MG/DL (ref 6–20)
CALCIUM SERPL-MCNC: 8.8 MG/DL (ref 8.6–10)
CHLORIDE SERPL-SCNC: 103 MMOL/L (ref 98–107)
CK SERPL-CCNC: 92 U/L (ref 39–308)
CREAT SERPL-MCNC: 0.78 MG/DL (ref 0.67–1.17)
DEPRECATED HCO3 PLAS-SCNC: 19 MMOL/L (ref 22–29)
DIASTOLIC BLOOD PRESSURE - MUSE: NORMAL MMHG
EGFRCR SERPLBLD CKD-EPI 2021: >90 ML/MIN/1.73M2
EOSINOPHIL # BLD AUTO: 0.2 10E3/UL (ref 0–0.7)
EOSINOPHIL NFR BLD AUTO: 3 %
ERYTHROCYTE [DISTWIDTH] IN BLOOD BY AUTOMATED COUNT: 16.1 % (ref 10–15)
ETHANOL SERPL-MCNC: 0.16 G/DL
GLUCOSE BLDC GLUCOMTR-MCNC: 125 MG/DL (ref 70–99)
GLUCOSE BLDC GLUCOMTR-MCNC: 143 MG/DL (ref 70–99)
GLUCOSE SERPL-MCNC: 114 MG/DL (ref 70–99)
HCO3 BLDV-SCNC: 22 MMOL/L (ref 21–28)
HCT VFR BLD AUTO: 44.1 % (ref 40–53)
HGB BLD-MCNC: 13.8 G/DL (ref 13.3–17.7)
HOLD SPECIMEN: NORMAL
HOLD SPECIMEN: NORMAL
IMM GRANULOCYTES # BLD: 0 10E3/UL
IMM GRANULOCYTES NFR BLD: 0 %
INR PPP: 2.25 (ref 0.85–1.15)
INTERPRETATION ECG - MUSE: NORMAL
LACTATE BLD-SCNC: 3.1 MMOL/L
LACTATE SERPL-SCNC: 2.6 MMOL/L (ref 0.7–2)
LACTATE SERPL-SCNC: 3.3 MMOL/L (ref 0.7–2)
LYMPHOCYTES # BLD AUTO: 1.1 10E3/UL (ref 0.8–5.3)
LYMPHOCYTES NFR BLD AUTO: 15 %
MCH RBC QN AUTO: 26.7 PG (ref 26.5–33)
MCHC RBC AUTO-ENTMCNC: 31.3 G/DL (ref 31.5–36.5)
MCV RBC AUTO: 86 FL (ref 78–100)
MONOCYTES # BLD AUTO: 0.4 10E3/UL (ref 0–1.3)
MONOCYTES NFR BLD AUTO: 5 %
NEUTROPHILS # BLD AUTO: 5.3 10E3/UL (ref 1.6–8.3)
NEUTROPHILS NFR BLD AUTO: 76 %
NRBC # BLD AUTO: 0 10E3/UL
NRBC BLD AUTO-RTO: 0 /100
P AXIS - MUSE: 6 DEGREES
PCO2 BLDV: 38 MM HG (ref 40–50)
PH BLDV: 7.38 [PH] (ref 7.32–7.43)
PLATELET # BLD AUTO: 275 10E3/UL (ref 150–450)
PO2 BLDV: 35 MM HG (ref 25–47)
POTASSIUM SERPL-SCNC: 4 MMOL/L (ref 3.4–5.3)
PR INTERVAL - MUSE: 168 MS
PROT SERPL-MCNC: 7.7 G/DL (ref 6.4–8.3)
QRS DURATION - MUSE: 98 MS
QT - MUSE: 378 MS
QTC - MUSE: 433 MS
R AXIS - MUSE: 11 DEGREES
RBC # BLD AUTO: 5.16 10E6/UL (ref 4.4–5.9)
SAO2 % BLDV: 66 % (ref 70–75)
SODIUM SERPL-SCNC: 139 MMOL/L (ref 135–145)
SYSTOLIC BLOOD PRESSURE - MUSE: NORMAL MMHG
T AXIS - MUSE: 14 DEGREES
TROPONIN T SERPL HS-MCNC: 14 NG/L
VENTRICULAR RATE- MUSE: 79 BPM
WBC # BLD AUTO: 7 10E3/UL (ref 4–11)

## 2024-05-16 PROCEDURE — 250N000009 HC RX 250: Performed by: EMERGENCY MEDICINE

## 2024-05-16 PROCEDURE — 96361 HYDRATE IV INFUSION ADD-ON: CPT

## 2024-05-16 PROCEDURE — 250N000013 HC RX MED GY IP 250 OP 250 PS 637: Performed by: PHYSICIAN ASSISTANT

## 2024-05-16 PROCEDURE — 36415 COLL VENOUS BLD VENIPUNCTURE: CPT | Performed by: EMERGENCY MEDICINE

## 2024-05-16 PROCEDURE — 84484 ASSAY OF TROPONIN QUANT: CPT | Performed by: EMERGENCY MEDICINE

## 2024-05-16 PROCEDURE — 93005 ELECTROCARDIOGRAM TRACING: CPT

## 2024-05-16 PROCEDURE — G0378 HOSPITAL OBSERVATION PER HR: HCPCS

## 2024-05-16 PROCEDURE — 84155 ASSAY OF PROTEIN SERUM: CPT | Performed by: EMERGENCY MEDICINE

## 2024-05-16 PROCEDURE — 82962 GLUCOSE BLOOD TEST: CPT

## 2024-05-16 PROCEDURE — 82077 ASSAY SPEC XCP UR&BREATH IA: CPT | Performed by: EMERGENCY MEDICINE

## 2024-05-16 PROCEDURE — 999N000157 HC STATISTIC RCP TIME EA 10 MIN

## 2024-05-16 PROCEDURE — 94150 VITAL CAPACITY TEST: CPT

## 2024-05-16 PROCEDURE — 250N000011 HC RX IP 250 OP 636: Performed by: EMERGENCY MEDICINE

## 2024-05-16 PROCEDURE — 258N000003 HC RX IP 258 OP 636: Performed by: EMERGENCY MEDICINE

## 2024-05-16 PROCEDURE — 85610 PROTHROMBIN TIME: CPT | Performed by: PHYSICIAN ASSISTANT

## 2024-05-16 PROCEDURE — 82550 ASSAY OF CK (CPK): CPT | Performed by: EMERGENCY MEDICINE

## 2024-05-16 PROCEDURE — 36415 COLL VENOUS BLD VENIPUNCTURE: CPT | Performed by: PHYSICIAN ASSISTANT

## 2024-05-16 PROCEDURE — 96360 HYDRATION IV INFUSION INIT: CPT | Mod: 59

## 2024-05-16 PROCEDURE — 250N000013 HC RX MED GY IP 250 OP 250 PS 637: Performed by: EMERGENCY MEDICINE

## 2024-05-16 PROCEDURE — 99223 1ST HOSP IP/OBS HIGH 75: CPT | Mod: AI | Performed by: PHYSICIAN ASSISTANT

## 2024-05-16 PROCEDURE — 82803 BLOOD GASES ANY COMBINATION: CPT

## 2024-05-16 PROCEDURE — 83605 ASSAY OF LACTIC ACID: CPT | Performed by: EMERGENCY MEDICINE

## 2024-05-16 PROCEDURE — 36415 COLL VENOUS BLD VENIPUNCTURE: CPT

## 2024-05-16 PROCEDURE — 83605 ASSAY OF LACTIC ACID: CPT

## 2024-05-16 PROCEDURE — 84460 ALANINE AMINO (ALT) (SGPT): CPT | Performed by: EMERGENCY MEDICINE

## 2024-05-16 PROCEDURE — 99204 OFFICE O/P NEW MOD 45 MIN: CPT | Performed by: SURGERY

## 2024-05-16 PROCEDURE — 71260 CT THORAX DX C+: CPT

## 2024-05-16 PROCEDURE — 99285 EMERGENCY DEPT VISIT HI MDM: CPT | Mod: 25

## 2024-05-16 PROCEDURE — 85025 COMPLETE CBC W/AUTO DIFF WBC: CPT | Performed by: EMERGENCY MEDICINE

## 2024-05-16 RX ORDER — FLUMAZENIL 0.1 MG/ML
0.2 INJECTION, SOLUTION INTRAVENOUS
Status: DISCONTINUED | OUTPATIENT
Start: 2024-05-16 | End: 2024-05-18 | Stop reason: HOSPADM

## 2024-05-16 RX ORDER — DEXTROSE MONOHYDRATE 25 G/50ML
25-50 INJECTION, SOLUTION INTRAVENOUS
Status: DISCONTINUED | OUTPATIENT
Start: 2024-05-16 | End: 2024-05-18 | Stop reason: HOSPADM

## 2024-05-16 RX ORDER — HALOPERIDOL 5 MG/ML
2.5-5 INJECTION INTRAMUSCULAR EVERY 6 HOURS PRN
Status: DISCONTINUED | OUTPATIENT
Start: 2024-05-16 | End: 2024-05-18 | Stop reason: HOSPADM

## 2024-05-16 RX ORDER — ONDANSETRON 4 MG/1
4 TABLET, ORALLY DISINTEGRATING ORAL EVERY 6 HOURS PRN
Status: DISCONTINUED | OUTPATIENT
Start: 2024-05-16 | End: 2024-05-18 | Stop reason: HOSPADM

## 2024-05-16 RX ORDER — ONDANSETRON 2 MG/ML
4 INJECTION INTRAMUSCULAR; INTRAVENOUS EVERY 6 HOURS PRN
Status: DISCONTINUED | OUTPATIENT
Start: 2024-05-16 | End: 2024-05-18 | Stop reason: HOSPADM

## 2024-05-16 RX ORDER — OLANZAPINE 5 MG/1
5-10 TABLET, ORALLY DISINTEGRATING ORAL EVERY 6 HOURS PRN
Status: DISCONTINUED | OUTPATIENT
Start: 2024-05-16 | End: 2024-05-18 | Stop reason: HOSPADM

## 2024-05-16 RX ORDER — AMOXICILLIN 250 MG
1 CAPSULE ORAL 2 TIMES DAILY PRN
Status: DISCONTINUED | OUTPATIENT
Start: 2024-05-16 | End: 2024-05-18 | Stop reason: HOSPADM

## 2024-05-16 RX ORDER — LORAZEPAM 1 MG/1
1-2 TABLET ORAL EVERY 30 MIN PRN
Status: DISCONTINUED | OUTPATIENT
Start: 2024-05-16 | End: 2024-05-18 | Stop reason: HOSPADM

## 2024-05-16 RX ORDER — TRAZODONE HYDROCHLORIDE 100 MG/1
200 TABLET ORAL AT BEDTIME
Status: DISCONTINUED | OUTPATIENT
Start: 2024-05-16 | End: 2024-05-18 | Stop reason: HOSPADM

## 2024-05-16 RX ORDER — CLONIDINE HYDROCHLORIDE 0.1 MG/1
0.1 TABLET ORAL PRN
COMMUNITY

## 2024-05-16 RX ORDER — AMOXICILLIN 250 MG
2 CAPSULE ORAL 2 TIMES DAILY PRN
Status: DISCONTINUED | OUTPATIENT
Start: 2024-05-16 | End: 2024-05-18 | Stop reason: HOSPADM

## 2024-05-16 RX ORDER — FOLIC ACID 1 MG/1
1 TABLET ORAL DAILY
Status: DISCONTINUED | OUTPATIENT
Start: 2024-05-16 | End: 2024-05-18 | Stop reason: HOSPADM

## 2024-05-16 RX ORDER — METFORMIN HCL 500 MG
1000 TABLET, EXTENDED RELEASE 24 HR ORAL
Status: DISCONTINUED | OUTPATIENT
Start: 2024-05-16 | End: 2024-05-18 | Stop reason: HOSPADM

## 2024-05-16 RX ORDER — NICOTINE POLACRILEX 4 MG
15-30 LOZENGE BUCCAL
Status: DISCONTINUED | OUTPATIENT
Start: 2024-05-16 | End: 2024-05-18 | Stop reason: HOSPADM

## 2024-05-16 RX ORDER — BUPROPION HYDROCHLORIDE 150 MG/1
150 TABLET ORAL EVERY MORNING
Status: DISCONTINUED | OUTPATIENT
Start: 2024-05-17 | End: 2024-05-18 | Stop reason: HOSPADM

## 2024-05-16 RX ORDER — LIDOCAINE 4 G/G
1 PATCH TOPICAL ONCE
Status: COMPLETED | OUTPATIENT
Start: 2024-05-16 | End: 2024-05-17

## 2024-05-16 RX ORDER — HYDRALAZINE HYDROCHLORIDE 10 MG/1
10 TABLET, FILM COATED ORAL EVERY 4 HOURS PRN
Status: DISCONTINUED | OUTPATIENT
Start: 2024-05-16 | End: 2024-05-18 | Stop reason: HOSPADM

## 2024-05-16 RX ORDER — HYDROXYZINE HYDROCHLORIDE 25 MG/1
25 TABLET, FILM COATED ORAL 3 TIMES DAILY PRN
Status: DISCONTINUED | OUTPATIENT
Start: 2024-05-16 | End: 2024-05-18 | Stop reason: HOSPADM

## 2024-05-16 RX ORDER — LIDOCAINE 40 MG/G
CREAM TOPICAL
Status: DISCONTINUED | OUTPATIENT
Start: 2024-05-16 | End: 2024-05-18 | Stop reason: HOSPADM

## 2024-05-16 RX ORDER — ACETAMINOPHEN 325 MG/1
975 TABLET ORAL 3 TIMES DAILY
Status: DISCONTINUED | OUTPATIENT
Start: 2024-05-16 | End: 2024-05-18 | Stop reason: HOSPADM

## 2024-05-16 RX ORDER — HYDRALAZINE HYDROCHLORIDE 20 MG/ML
10 INJECTION INTRAMUSCULAR; INTRAVENOUS EVERY 4 HOURS PRN
Status: DISCONTINUED | OUTPATIENT
Start: 2024-05-16 | End: 2024-05-18 | Stop reason: HOSPADM

## 2024-05-16 RX ORDER — MULTIPLE VITAMINS W/ MINERALS TAB 9MG-400MCG
1 TAB ORAL DAILY
Status: DISCONTINUED | OUTPATIENT
Start: 2024-05-16 | End: 2024-05-18 | Stop reason: HOSPADM

## 2024-05-16 RX ORDER — LORAZEPAM 2 MG/ML
1-2 INJECTION INTRAMUSCULAR EVERY 30 MIN PRN
Status: DISCONTINUED | OUTPATIENT
Start: 2024-05-16 | End: 2024-05-18 | Stop reason: HOSPADM

## 2024-05-16 RX ORDER — CLONIDINE HYDROCHLORIDE 0.1 MG/1
0.1 TABLET ORAL
Status: DISCONTINUED | OUTPATIENT
Start: 2024-05-16 | End: 2024-05-18 | Stop reason: HOSPADM

## 2024-05-16 RX ORDER — GABAPENTIN 300 MG/1
300 CAPSULE ORAL 3 TIMES DAILY
Status: DISCONTINUED | OUTPATIENT
Start: 2024-05-16 | End: 2024-05-18 | Stop reason: HOSPADM

## 2024-05-16 RX ORDER — LIDOCAINE 4 G/G
1 PATCH TOPICAL
Status: DISCONTINUED | OUTPATIENT
Start: 2024-05-17 | End: 2024-05-17

## 2024-05-16 RX ORDER — WARFARIN SODIUM 5 MG/1
5 TABLET ORAL ONCE
Status: COMPLETED | OUTPATIENT
Start: 2024-05-16 | End: 2024-05-16

## 2024-05-16 RX ORDER — LISINOPRIL 20 MG/1
20 TABLET ORAL DAILY
Status: DISCONTINUED | OUTPATIENT
Start: 2024-05-17 | End: 2024-05-18 | Stop reason: HOSPADM

## 2024-05-16 RX ORDER — ACETAMINOPHEN 500 MG
500 TABLET ORAL EVERY 4 HOURS PRN
Status: ON HOLD | COMMUNITY
End: 2024-05-18

## 2024-05-16 RX ORDER — IOPAMIDOL 755 MG/ML
500 INJECTION, SOLUTION INTRAVASCULAR ONCE
Status: COMPLETED | OUTPATIENT
Start: 2024-05-16 | End: 2024-05-16

## 2024-05-16 RX ORDER — WARFARIN SODIUM 5 MG/1
5 TABLET ORAL AT BEDTIME
Status: ON HOLD | COMMUNITY
End: 2024-08-20

## 2024-05-16 RX ORDER — METHOCARBAMOL 500 MG/1
500 TABLET, FILM COATED ORAL EVERY 6 HOURS PRN
Status: DISCONTINUED | OUTPATIENT
Start: 2024-05-16 | End: 2024-05-17

## 2024-05-16 RX ADMIN — SODIUM CHLORIDE 1000 ML: 9 INJECTION, SOLUTION INTRAVENOUS at 16:04

## 2024-05-16 RX ADMIN — FOLIC ACID 1 MG: 1 TABLET ORAL at 19:42

## 2024-05-16 RX ADMIN — Medication 1 TABLET: at 19:41

## 2024-05-16 RX ADMIN — SODIUM CHLORIDE 1000 ML: 9 INJECTION, SOLUTION INTRAVENOUS at 10:36

## 2024-05-16 RX ADMIN — SODIUM CHLORIDE 65 ML: 9 INJECTION, SOLUTION INTRAVENOUS at 11:40

## 2024-05-16 RX ADMIN — IOPAMIDOL 100 ML: 755 INJECTION, SOLUTION INTRAVENOUS at 11:40

## 2024-05-16 RX ADMIN — THIAMINE HCL TAB 100 MG 100 MG: 100 TAB at 19:42

## 2024-05-16 RX ADMIN — GABAPENTIN 300 MG: 300 CAPSULE ORAL at 19:41

## 2024-05-16 RX ADMIN — ACETAMINOPHEN 975 MG: 325 TABLET, FILM COATED ORAL at 18:45

## 2024-05-16 RX ADMIN — WARFARIN SODIUM 5 MG: 5 TABLET ORAL at 21:51

## 2024-05-16 RX ADMIN — TRAZODONE HYDROCHLORIDE 200 MG: 100 TABLET ORAL at 23:44

## 2024-05-16 RX ADMIN — SODIUM CHLORIDE 1000 ML: 9 INJECTION, SOLUTION INTRAVENOUS at 14:21

## 2024-05-16 RX ADMIN — LIDOCAINE 1 PATCH: 4 PATCH TOPICAL at 14:31

## 2024-05-16 ASSESSMENT — ACTIVITIES OF DAILY LIVING (ADL)
ADLS_ACUITY_SCORE: 38
ADLS_ACUITY_SCORE: 25
ADLS_ACUITY_SCORE: 38
ADLS_ACUITY_SCORE: 25
ADLS_ACUITY_SCORE: 38
ADLS_ACUITY_SCORE: 38
ADLS_ACUITY_SCORE: 25
ADLS_ACUITY_SCORE: 38

## 2024-05-16 ASSESSMENT — COLUMBIA-SUICIDE SEVERITY RATING SCALE - C-SSRS
2. HAVE YOU ACTUALLY HAD ANY THOUGHTS OF KILLING YOURSELF IN THE PAST MONTH?: NO
1. IN THE PAST MONTH, HAVE YOU WISHED YOU WERE DEAD OR WISHED YOU COULD GO TO SLEEP AND NOT WAKE UP?: NO
6. HAVE YOU EVER DONE ANYTHING, STARTED TO DO ANYTHING, OR PREPARED TO DO ANYTHING TO END YOUR LIFE?: NO

## 2024-05-16 NOTE — ED PROVIDER NOTES
History     Chief Complaint:  Fall       HPI   Cody Bolton is a 43 year old male presenting via EMS after falling out of bed this morning.  He endorses right-sided pain.  He denies headache, neck pain, numbness/tingling or weakness in the extremities.  He was recently admitted to the hospital after returning from [place] where it was presumed that he took too much of his narcotics that he was using for lower extremity pain.  He has been experiencing continued lower extremity pain after a fall while in [place].  He endorses drinking some alcohol to help with the pain.  He last drank last night.  He denies any history of alcohol withdrawal, and he denies drinking every day.  No fever, chills, chest pain, shortness of breath, nausea, vomiting.        Independent Historian:    Patient only    Review of External Notes:  5/4/24: ED note reviewed      Medications:    buPROPion (WELLBUTRIN XL) 150 MG 24 hr tablet  cloNIDine (CATAPRES) 0.1 MG tablet  lisinopril (ZESTRIL) 20 MG tablet  metFORMIN (GLUCOPHAGE XR) 500 MG 24 hr tablet  simvastatin (ZOCOR) 20 MG tablet  tirzepatide (MOUNJARO) 5 MG/0.5ML pen  traZODone (DESYREL) 100 MG tablet  warfarin ANTICOAGULANT (COUMADIN) 5 MG tablet        Past Medical History:    No past medical history on file.    Past Surgical History:    Past Surgical History:   Procedure Laterality Date    APPENDECTOMY  08/15/2017    Dr. Ferrer    GASTRIC BYPASS      MD LAP,APPENDECTOMY N/A 8/14/2017    Procedure: APPENDECTOMY, LAPAROSCOPIC;  Surgeon: Nba Ferrer MD;  Location: [facility];  Service: General    REVISION AKANKSHA-EN-Y  2014    Dr. Ranjeet Espinal @[email or web address]          Physical Exam   Patient Vitals for the past 24 hrs:   BP Temp Temp src Pulse Resp SpO2 Height Weight   05/16/24 1533 (!) 146/107 -- -- 89 -- -- -- --   05/16/24 1500 (!) 141/92 -- -- 86 -- 95 % -- --   05/16/24 1450 (!) 156/97 -- -- 86 -- 96 % -- --   05/16/24 1420 (!) 157/96 -- -- 77 -- 97 % -- --   05/16/24  "1345 -- -- -- -- -- 95 % -- --   05/16/24 1344 -- -- -- -- -- 95 % -- --   05/16/24 1336 -- -- -- -- -- 95 % -- --   05/16/24 1330 (!) 160/93 -- -- 82 -- 96 % -- --   05/16/24 1308 -- -- -- -- -- 96 % -- --   05/16/24 1303 -- -- -- -- -- 95 % -- --   05/16/24 1258 (!) 167/105 -- -- 81 -- 97 % -- --   05/16/24 1213 (!) 156/84 -- -- 80 -- 94 % -- --   05/16/24 1128 (!) 155/119 -- -- 81 -- 96 % -- --   05/16/24 1100 (!) 162/90 -- -- 73 -- 94 % -- --   05/16/24 1043 (!) 160/84 -- -- 77 -- 92 % -- --   05/16/24 1026 (!) 154/84 -- -- 81 -- 91 % -- --   05/16/24 1017 (!) 164/88 -- -- 82 -- 92 % -- --   05/16/24 1011 (!) 162/91 -- -- 79 -- 95 % -- --   05/16/24 0956 -- -- -- 81 -- 96 % -- --   05/16/24 0948 (!) 156/97 98.4  F (36.9  C) Oral 80 22 96 % 1.854 m (6' 1\") (!) 168.3 kg (371 lb)        Physical Exam  General: No acute distress  Head: No obvious trauma to head.  Ears, Nose, Throat:  External ears normal.  Nose normal.  No pharyngeal erythema, swelling or exudate.  Midline uvula. Moist mucus membranes.  Eyes:  Conjunctivae clear.   Neck: Normal range of motion.  Neck supple.   CV: Regular rate and rhythm.  No murmurs.      Respiratory: Effort normal and breath sounds normal.  No wheezing or crackles. Right chest wall tenderness to palpation with no crepitus.  Gastrointestinal: Soft.  No distension. There is no tenderness.  There is no rigidity, no rebound and no guarding.   Musculoskeletal: Normal range of motion.  Non tender extremities to palpations. No lower extremity edema  Neuro: Alert. Moving all extremities appropriately.  Normal speech.    Skin: Skin is warm and dry.  No rash noted.   Psych: Normal mood and affect. Behavior is normal.       Emergency Department Course   ECG    ECG results from 05/16/24   EKG 12 lead     Value    Systolic Blood Pressure     Diastolic Blood Pressure     Ventricular Rate 79    Atrial Rate 79    DE Interval 168    QRS Duration 98        QTc 433    P Axis 6    R AXIS 11    " T Axis 14    Interpretation ECG      Sinus rhythm  Normal ECG  When compared with ECG of 04-MAY-2024 18:07,  No significant change was found           Imaging:  CT Chest/Abdomen/Pelvis w Contrast   Final Result   IMPRESSION:   1.  Nondisplaced fractures of the lateral 7th-10th ribs.   2.  No acute process demonstrated in the abdomen and pelvis.      MILLICENT VIGIL MD            SYSTEM ID:  [id number]          Laboratory:  Labs Ordered and Resulted from Time of ED Arrival to Time of ED Departure   COMPREHENSIVE METABOLIC PANEL - Abnormal       Result Value    Sodium 139      Potassium 4.0      Carbon Dioxide (CO2) 19 (*)     Anion Gap 17 (*)     Urea Nitrogen 11.3      Creatinine 0.78      GFR Estimate >90      Calcium 8.8      Chloride 103      Glucose 114 (*)     Alkaline Phosphatase 209 (*)     AST 36      ALT 49      Protein Total 7.7      Albumin 4.4      Bilirubin Total 0.3     CBC WITH PLATELETS AND DIFFERENTIAL - Abnormal    WBC Count 7.0      RBC Count 5.16      Hemoglobin 13.8      Hematocrit 44.1      MCV 86      MCH 26.7      MCHC 31.3 (*)     RDW 16.1 (*)     Platelet Count 275      % Neutrophils 76      % Lymphocytes 15      % Monocytes 5      % Eosinophils 3      % Basophils 1      % Immature Granulocytes 0      NRBCs per 100 WBC 0      Absolute Neutrophils 5.3      Absolute Lymphocytes 1.1      Absolute Monocytes 0.4      Absolute Eosinophils 0.2      Absolute Basophils 0.0      Absolute Immature Granulocytes 0.0      Absolute NRBCs 0.0     ETHYL ALCOHOL LEVEL - Abnormal    Alcohol ethyl 0.16 (*)    ISTAT GASES LACTATE VENOUS POCT - Abnormal    Lactic Acid POCT 3.1 (*)     Bicarbonate Venous POCT 22      O2 Sat, Venous POCT 66 (*)     pCO2 Venous POCT 38 (*)     pH Venous POCT 7.38      pO2 Venous POCT 35     LACTIC ACID WHOLE BLOOD - Abnormal    Lactic Acid 3.3 (*)    LACTIC ACID WHOLE BLOOD - Abnormal    Lactic Acid 2.6 (*)    TROPONIN T, HIGH SENSITIVITY - Normal    Troponin T, High Sensitivity 14      CK TOTAL - Normal    CK 92     ISTAT GASES LACTATE VENOUS POCT   ISTAT GASES LACTATE VENOUS POCT        Procedures   NA    Emergency Department Course & Assessments:    Interventions:  Medications   Lidocaine (LIDOCARE) 4 % Patch 1 patch (1 patch Transdermal $Patch/Med Applied 5/16/24 1431)   sodium chloride 0.9% BOLUS 1,000 mL (1,000 mLs Intravenous $New Bag 5/16/24 1604)   sodium chloride 0.9% BOLUS 1,000 mL (0 mLs Intravenous Stopped 5/16/24 1120)   CT Saline Flush (65 mLs Intravenous $Given 5/16/24 1140)   iopamidol (ISOVUE-370) solution 500 mL (100 mLs Intravenous $Given 5/16/24 1140)   sodium chloride 0.9% BOLUS 1,000 mL (1,000 mLs Intravenous $New Bag 5/16/24 1421)        Assessments:  0942 initial evaluation and assessment of patient  1231 I reevaluated the patient  1402 I reevaluated the patient    Independent Interpretation (X-rays, CTs, rhythm strip):  None    Consultations/Discussion of Management or Tests:  I spoke to LOLA Toro, hospitalist        Social Determinants of Health affecting care:  None     Disposition:  The patient was admitted to the hospital under the care of Dr. Bolton.    Impression & Plan    CMS Diagnoses: The Lactic acid level is elevated due to likely dehydration, at this time there is no sign of severe sepsis or septic shock. and None       Medical Decision Making:  Patient presents with right-sided pain after falling out of bed this morning.  He is neurologically intact and is not on blood thinners.  CT of the chest/abdomen/pelvis is obtained and reveals for fractured ribs on the right.  He is hemodynamically stable and breathing comfortably on room air.  A lidocaine patch is placed for comfort.  Lactate is elevated to 3.3.  1 L normal saline bolus is given and lactate minimally improved to 3.1.  A second liter of IV fluids are given, and repeat lactate is 2.6.  Ethanol level is mildly elevated.  Is currently  unclear the cause of the elevated lactate.  He endorses drinking  alcohol, but states he does not do this daily.  He is otherwise stable.  A 3rd L of fluids is ordered.  Given the for fractured ribs and the elevated lactate, I believe that he would benefit from further monitoring and pain control.        Diagnosis:    ICD-10-CM    1. Closed fracture of multiple ribs of right side, initial encounter  S22.41XA       2. Elevated lactic acid level  R79.89            Discharge Medications:  New Prescriptions    No medications on file          5/16/2024   Xu Chang MD Peery, Stephen, MD  05/16/24 1619

## 2024-05-16 NOTE — ED NOTES
[facility]  ED Nurse Handoff Report    ED Chief complaint: Fall  . ED Diagnosis:   Final diagnoses:   Closed fracture of multiple ribs of right side, initial encounter       Allergies: No Known Allergies    Code Status: Full Code    Activity level - Baseline/Home:  independent.  Activity Level - Current:   assist of 1.   Lift room needed: No.   Bariatric: Yes   Needed: No   Isolation: No.   Infection: Not Applicable.     Respiratory status: Room air    Vital Signs (within 30 minutes):   Vitals:    05/16/24 1420 05/16/24 1450 05/16/24 1500 05/16/24 1533   BP: (!) 157/96 (!) 156/97 (!) 141/92 (!) 146/107   Pulse: 77 86 86 89   Resp:       Temp:       TempSrc:       SpO2: 97% 96% 95%    Weight:       Height:           Cardiac Rhythm:  ,      Pain level:    Patient confused: No.   Patient Falls Risk: activity supervised.   Elimination Status: Has voided     Patient Report - Initial Complaint: fall.   Focused Assessment:  Cody Bolton is a 43 year old male presenting via EMS after falling out of bed this morning.  He endorses right-sided pain.  He denies headache, neck pain, numbness/tingling or weakness in the extremities.  He was recently admitted to the hospital after returning from [place] where it was presumed that he took too much of his narcotics that he was using for lower extremity pain.  He has been experiencing continued lower extremity pain after a fall while in [place].  He endorses drinking some alcohol to help with the pain.  He last drank last night.  He denies any history of alcohol withdrawal, and he denies drinking every day.  No fever, chills, chest pain, shortness of breath, nausea, vomiting.       Abnormal Results:   Labs Ordered and Resulted from Time of ED Arrival to Time of ED Departure   COMPREHENSIVE METABOLIC PANEL - Abnormal       Result Value    Sodium 139      Potassium 4.0      Carbon Dioxide (CO2) 19 (*)     Anion Gap 17 (*)     Urea Nitrogen 11.3       Creatinine 0.78      GFR Estimate >90      Calcium 8.8      Chloride 103      Glucose 114 (*)     Alkaline Phosphatase 209 (*)     AST 36      ALT 49      Protein Total 7.7      Albumin 4.4      Bilirubin Total 0.3     CBC WITH PLATELETS AND DIFFERENTIAL - Abnormal    WBC Count 7.0      RBC Count 5.16      Hemoglobin 13.8      Hematocrit 44.1      MCV 86      MCH 26.7      MCHC 31.3 (*)     RDW 16.1 (*)     Platelet Count 275      % Neutrophils 76      % Lymphocytes 15      % Monocytes 5      % Eosinophils 3      % Basophils 1      % Immature Granulocytes 0      NRBCs per 100 WBC 0      Absolute Neutrophils 5.3      Absolute Lymphocytes 1.1      Absolute Monocytes 0.4      Absolute Eosinophils 0.2      Absolute Basophils 0.0      Absolute Immature Granulocytes 0.0      Absolute NRBCs 0.0     ETHYL ALCOHOL LEVEL - Abnormal    Alcohol ethyl 0.16 (*)    ISTAT GASES LACTATE VENOUS POCT - Abnormal    Lactic Acid POCT 3.1 (*)     Bicarbonate Venous POCT 22      O2 Sat, Venous POCT 66 (*)     pCO2 Venous POCT 38 (*)     pH Venous POCT 7.38      pO2 Venous POCT 35     LACTIC ACID WHOLE BLOOD - Abnormal    Lactic Acid 3.3 (*)    LACTIC ACID WHOLE BLOOD - Abnormal    Lactic Acid 2.6 (*)    TROPONIN T, HIGH SENSITIVITY - Normal    Troponin T, High Sensitivity 14     CK TOTAL - Normal    CK 92     ISTAT GASES LACTATE VENOUS POCT   ISTAT GASES LACTATE VENOUS POCT        CT Chest/Abdomen/Pelvis w Contrast   Final Result   IMPRESSION:   1.  Nondisplaced fractures of the lateral 7th-10th ribs.   2.  No acute process demonstrated in the abdomen and pelvis.      MILLICENT VIGIL MD            SYSTEM ID:  [id number]          Treatments provided: see above see mar   Family Comments: none   OBS brochure/video discussed/provided to patient:  Yes  ED Medications:   Medications   Lidocaine (LIDOCARE) 4 % Patch 1 patch (1 patch Transdermal $Patch/Med Applied 5/16/24 8193)   sodium chloride 0.9% BOLUS 1,000 mL (1,000 mLs Intravenous $New Bag  5/16/24 1604)   sodium chloride 0.9% BOLUS 1,000 mL (0 mLs Intravenous Stopped 5/16/24 1120)   CT Saline Flush (65 mLs Intravenous $Given 5/16/24 1140)   iopamidol (ISOVUE-370) solution 500 mL (100 mLs Intravenous $Given 5/16/24 1140)   sodium chloride 0.9% BOLUS 1,000 mL (1,000 mLs Intravenous $New Bag 5/16/24 1421)       Drips infusing:  No  For the majority of the shift this patient was Green.   Interventions performed were none .    Sepsis treatment initiated: No    Cares/treatment/interventions/medications to be completed following ED care: see above     ED Nurse Name: Luciana Mena RN  4:05 PM    RECEIVING UNIT ED HANDOFF REVIEW    Above ED Nurse Handoff Report was reviewed: Yes  Reviewed by: Lili Almonte RN on May 16, 2024 at 6:02 PM

## 2024-05-16 NOTE — ED NOTES
Bed: ED13  Expected date: 5/16/24  Expected time: 9:37 AM  Means of arrival:   Comments:  A525- 10Y

## 2024-05-16 NOTE — PLAN OF CARE
ROOM # 221    Living Situation (if not independent, order SW consult): home alone  Facility name:  : [facility]    Activity level at baseline: independent with walker  Activity level on admit: 1 assist, GB/W    Who will be transporting you at discharge: [facility]    Patient registered to observation; given Patient Bill of Rights; given the opportunity to ask questions about observation status and their plan of care.  Patient has been oriented to the observation room, bathroom and call light is in place.    Discussed discharge goals and expectations with patient/family.     OBSERVATION patient IN TIME: 1833 pm

## 2024-05-16 NOTE — H&P
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    Traumatic Rib fractures 2/2 Mechanical Fall  Patient presented by EMS after a fall out of his bed early the morning of 5/16.  He had immediate right-sided lateral and anterior chest wall pain from the fall.  No head injury, loss of consciousness.  At baseline requires a walker from previous foot fracture.  States that he fell due to mattress moving off of the bed frame when he had attempted to get out of bed.  Not hypoxia or respiratory distress on admission. Etoh level detectable. CT Chest abdomen pelvis with nondisplaced fractures of the right lateral 7th-10th ribs, no other acute trauma.  Poor candidate for opioids given recent unresponsive episode treated with Narcan thought to be opioid induced.  He is on warfarin which limits use of NSAIDs.  -[place] observation  -Rib fracture order set  -Consulted general surgery for trauma evaluation  -Repeat CXR in AM, currently ordered daily as per rib fracture protocol   -Multimodal analgesia: Scheduled Tylenol, ordered as needed muscle relaxer and Atarax with hold parameters.  Gabapentin 300 mg TID. Topical therapies.  If pain remains uncontrolled with these measures will consider opioid therapy but am hesitant to start as he is not a good candidate for use of these medications.   -Consulted anesthesiology for consideration of localized analgesia for rib fractures if this is a possibility here at  ridges can address anticoagulation to facilitate  -monitor puls ox  -continue CPAP use  -PT/OT/SW       Alcohol Intoxication  History of alcohol dependence  Patient had 750 cc hard alcohol the evening of 5/15.  He endorsed cutting down on daily alcohol use of 750 cc a day over the past month.  He currently has been drinking biweekly.  Binge alcohol use may that contributed to his fall and difficulty getting out of bed.  -Monitor on CIWA for tonight, anticipate will not have an issue with withdrawal and can discontinue this  -Counseled on alcohol cessation  -Continue gabapentin for pain will not start loading gabapentin protocol   -Recently seen by psychiatry during last hospitalization and medications were adjusted  -consider Chemical dependency evaluation prior to discharge    Elevated Lactic Acid  Lab work with Elevated lactic acid 3.1, 3.3, 2.6.  Given 3 L NS. Suspect this is due to chronic metformin use in conjunction with binge alcohol and not eating/drinking for almost 12 hours. VBG with normal pH, slightly low pCO2.  CMP otherwise with mildly elevated anion gap at 17, euglycemic. isolated elevation in alk phos. CBC normal.    No localizing infectious symptoms or fever.  No signs of sepsis.  -No need to recheck lactic acid at this time  -Hold metformin tonight and resume 5/17 if intake is normal  -no further IV fluids needed at this time    Borderline anion gap metabolic disturbance  VBG shows normal pH.  Glucose is 114.  Suspect due to low intake and binge alcohol use.  -Recheck BMP in a.m.    Hx of Recent Hospitalization for AMS, Unresponsive episode  Did receive CPR for unresponsive episode on 5/4 and Narcan.  Suspected that unresponsive episode was due to unintentional narcotic overdose and psychiatry was consulted during last hospital admission, medication adjustments were then made.  -Not a good candidate for narcotic use for pain     Chronic coagulopathy secondary to warfarin use for history of DVT and  "PE  Patient is maintained on chronic warfarin.  -Check INR  -Continue Coumadin for now, may need to hold or reverse if proceed with localized analgesia for rib fracture management    Type 2 diabetes mellitus  Glucose stable in ED.  -Point-of-care glucose checks, monitor on sliding scale insulin while we hold metformin for tonight    Hypertension  -resume lisinopril, clonidine at bedtime     Morbid Obesity - BMI 50.35    CARLIE  -continue CPAP     Insomnia  MDD  - Resume PTA medications pending pharmacy reconciliation  - Hold PTA trazodone for somnolence         Diet:  Regular     DVT Prophylaxis: Warfarin  Santos Catheter: Not present    Cardiac Monitoring: None    Code Status:  Full Code     Clinically Significant Risk Factors Present on Admission               # Drug Induced Coagulation Defect: home medication list includes an anticoagulant medication    # Hypertension: Home medication list includes antihypertensive(s)     # DMII: A1C = N/A within past 6 months    # Severe Obesity: Estimated body mass index is 48.95 kg/m  as calculated from the following:    Height as of this encounter: 1.854 m (6' 1\").    Weight as of this encounter: 168.3 kg (371 lb).              Disposition Plan      Expected Discharge Date: 05/17/2024                    Tati Toro PA-C    ______________________________________________________________________    Chief Complaint   Fall     History is obtained from the patient    History of Present Illness   Cody Bolton is a 43 year old male who was brought to ED from home via EMS after falling out of bed this morning.     He reports that the mattress on top of his bed frame at times falls off or collapses.  This occurred around 6 AM the morning prior to presentation when he was trying to get out of bed.  He fell to the ground injuring his right side.  He did not hit his head or lose consciousness.  He was unable to get up after the fall and was in pain.  His ex-wife who lives nearby " assisted him.  Ultimately police and EMS were called to help him be transferred to the ED to be evaluated.    Here in the ED he reports right-sided anterior and lateral chest wall pain.  His symptoms are worse with coughing or taking deep breaths, turning and bread.  He endorses a mild headache which he attributes to not being able to eat or drink today.  He has no neck pain, no numbness or tingling in his legs.    Of note he was recently hospitalized May 4-6, 2024 here at [facility] for encephalopathy and unresponsive episode by EMS where he underwent CPR in the field.  The cause was suspected to be secondary to unintentional narcotic overdose.  He was given Narcan with some improvement in mentation.  During hospitalization he was treated for rhabdomyolysis and psychiatry saw the patient.    During that hospitalization he did have chest pain from the CPR that he received although not as severe as the pain in his chest that he currently has.  He had been taking Tylenol alone since discharging from the hospital.    The night prior to the fall (5/15) he reported drinking 750 cc of hard alcohol.  He has been trying to cut down on alcohol use.  He drinks twice a week.  Denies history of alcohol withdrawal or withdrawal seizure.    VSS in ED on room air. Lab work with Elevated lactic acid 3.1, 3.3, 2.6.  VBG with normal pH, slightly low pCO2.  CMP otherwise with mildly elevated anion gap at 17, euglycemic. isolated elevation in alk phos. CBC normal. Etoh level detectable at 0.16. CT Chest abdomen pelvis with nondisplaced fractures of the right lateral 7th-10th ribs, no other acute trauma. EKG with NSR.    In the ED given 3 L Normal saline.     Admission was requested from hospitalist service for further cares and monitoring in the setting of multiple rib fractures, elevated lactic acid of unknown certain etiology.    Offered to update ex-wife with plan of care patient declined at this time.    Past Medical History    Reviewed  as above.     Past Surgical History   Past Surgical History:   Procedure Laterality Date    APPENDECTOMY  08/15/2017    Dr. Ferrer    GASTRIC BYPASS      IA LAP,APPENDECTOMY N/A 8/14/2017    Procedure: APPENDECTOMY, LAPAROSCOPIC;  Surgeon: Nba Ferrer MD;  Location: [facility];  Service: General    REVISION AKANKSHA-EN-Y  2014    Dr. Ranjeet Espinal @[email or web address]       Prior to Admission Medications   Prior to Admission Medications   Prescriptions Last Dose Informant Patient Reported? Taking?   buPROPion (WELLBUTRIN XL) 150 MG 24 hr tablet   No No   Sig: Take 1 tablet (150 mg) by mouth every morning   cloNIDine (CATAPRES) 0.1 MG tablet   No No   Sig: Take 1 tablet (0.1 mg) by mouth at bedtime   lisinopril (ZESTRIL) 20 MG tablet   No No   Sig: Take 1 tablet (20 mg) by mouth daily   metFORMIN (GLUCOPHAGE XR) 500 MG 24 hr tablet   No No   Sig: Take 2 tablets (1,000 mg) by mouth daily (with dinner)   simvastatin (ZOCOR) 20 MG tablet   No No   Sig: Take 1 tablet (20 mg) by mouth at bedtime   tirzepatide (MOUNJARO) 5 MG/0.5ML pen   No No   Sig: Inject 5 mg Subcutaneous every 7 days   traZODone (DESYREL) 100 MG tablet   No No   Sig: Take 2 tablets (200 mg) by mouth at bedtime   warfarin ANTICOAGULANT (COUMADIN) 5 MG tablet   No No   Sig: Take 1 tablet (5 mg) by mouth daily 5 MG DAILY - as directed by INR clinic      Facility-Administered Medications: None        Review of Systems    The 10 point Review of Systems is negative other than noted in the HPI or here.     Social History   I have reviewed this patient's social history and updated it with pertinent information if needed.  Lives with his ex-wife and son in a divided complex. He endorses   Social History     Tobacco Use    Smoking status: Never    Smokeless tobacco: Never   Vaping Use    Vaping status: Never Used   Substance Use Topics    Alcohol use: Yes     Comment: Alcoholic Drinks/day: occasionally    Drug use: No        Physical Exam   Vital Signs: Temp:  98.4  F (36.9  C) Temp src: Oral BP: (!) 146/107 Pulse: 89   Resp: 22 SpO2: 95 % O2 Device: None (Room air)    Weight: 371 lbs 0 oz    Constitutional: Awake, alert, uncomfortable with movement in bed.  Eyes: Conjunctiva and pupils examined and normal.  HEENT: Dry mucous membranes, normal dentition.  Respiratory: Clear to auscultation bilaterally, no crackles or wheezing.  Cardiovascular: Regular rate and rhythm, normal S1 and S2, and no murmur noted.  GI: Soft, non-distended, non-tender, bowel sounds present. No rebound tenderness or guarding.  Lymph/Hematologic: No anterior cervical or supraclavicular adenopathy.  Skin: No rashes, no cyanosis, no edema.  Musculoskeletal: Right-sided chest wall is tender to palpation of anterior and lateral ribs.  No ecchymosis or hematomas throughout back and abdomen.  Moving all extremities in bed. no deformities noted.    Neurologic: No focal deficits noted. Speech is clear. Coordination and strength grossly normal.   Psychiatric: Appropriate affect.       Medical Decision Making       75 MINUTES SPENT BY ME on the date of service doing chart review, history, exam, documentation & further activities per the note.      Data   ------------------------- PAST 24 HR DATA REVIEWED -----------------------------------------------

## 2024-05-16 NOTE — ED TRIAGE NOTES
Pt arrives from home by EMS. Rolled out of bed at 0630 this AM. Had fallen recently while traveling in [place] with BL knee and ankle pain, returned from [place] 5/02. Unintentionally overdosed on Oxycodone 05/04. Admitted 05/04-05/06. Has been drinking alcohol to deal with the pain. Last drink 12 hours PTA. Drank 175ml Rebel 100 proof. Denies HX of etoh withdrawal. Has been having ongoing chest pain since receiving chest compressions on 05/04. Pt c/o right sided abdominal pain after rolling out of bed. On warfarin. A&OX4.      Triage Assessment (Adult)       Row Name 05/16/24 0927          Triage Assessment    Airway WDL WDL        Respiratory WDL    Respiratory WDL X;rhythm/pattern     Rhythm/Pattern, Respiratory shortness of breath        Skin Circulation/Temperature WDL    Skin Circulation/Temperature WDL WDL        Cardiac WDL    Cardiac WDL X;chest pain        Peripheral/Neurovascular WDL    Peripheral Neurovascular WDL WDL        Cognitive/Neuro/Behavioral WDL    Cognitive/Neuro/Behavioral WDL X;mood/behavior     Level of Consciousness alert     Arousal Level opens eyes spontaneously     Orientation oriented x 4     Speech clear;spontaneous;logical     Mood/Behavior anxious

## 2024-05-17 ENCOUNTER — APPOINTMENT (OUTPATIENT)
Dept: GENERAL RADIOLOGY | Facility: CLINIC | Age: 44
End: 2024-05-17
Attending: PHYSICIAN ASSISTANT
Payer: COMMERCIAL

## 2024-05-17 ENCOUNTER — APPOINTMENT (OUTPATIENT)
Dept: PHYSICAL THERAPY | Facility: CLINIC | Age: 44
End: 2024-05-17
Attending: PHYSICIAN ASSISTANT
Payer: COMMERCIAL

## 2024-05-17 ENCOUNTER — APPOINTMENT (OUTPATIENT)
Dept: OCCUPATIONAL THERAPY | Facility: CLINIC | Age: 44
End: 2024-05-17
Attending: PHYSICIAN ASSISTANT
Payer: COMMERCIAL

## 2024-05-17 LAB
ANION GAP SERPL CALCULATED.3IONS-SCNC: 10 MMOL/L (ref 7–15)
BUN SERPL-MCNC: 11.8 MG/DL (ref 6–20)
CALCIUM SERPL-MCNC: 9.2 MG/DL (ref 8.6–10)
CHLORIDE SERPL-SCNC: 104 MMOL/L (ref 98–107)
CREAT SERPL-MCNC: 0.79 MG/DL (ref 0.67–1.17)
DEPRECATED HCO3 PLAS-SCNC: 24 MMOL/L (ref 22–29)
EGFRCR SERPLBLD CKD-EPI 2021: >90 ML/MIN/1.73M2
ERYTHROCYTE [DISTWIDTH] IN BLOOD BY AUTOMATED COUNT: 16.3 % (ref 10–15)
GLUCOSE BLDC GLUCOMTR-MCNC: 103 MG/DL (ref 70–99)
GLUCOSE BLDC GLUCOMTR-MCNC: 153 MG/DL (ref 70–99)
GLUCOSE BLDC GLUCOMTR-MCNC: 172 MG/DL (ref 70–99)
GLUCOSE BLDC GLUCOMTR-MCNC: 185 MG/DL (ref 70–99)
GLUCOSE SERPL-MCNC: 105 MG/DL (ref 70–99)
HCT VFR BLD AUTO: 40.3 % (ref 40–53)
HGB BLD-MCNC: 12.7 G/DL (ref 13.3–17.7)
INR PPP: 2.54 (ref 0.85–1.15)
MCH RBC QN AUTO: 27 PG (ref 26.5–33)
MCHC RBC AUTO-ENTMCNC: 31.5 G/DL (ref 31.5–36.5)
MCV RBC AUTO: 86 FL (ref 78–100)
PLATELET # BLD AUTO: 259 10E3/UL (ref 150–450)
POTASSIUM SERPL-SCNC: 3.9 MMOL/L (ref 3.4–5.3)
RBC # BLD AUTO: 4.7 10E6/UL (ref 4.4–5.9)
SODIUM SERPL-SCNC: 138 MMOL/L (ref 135–145)
WBC # BLD AUTO: 5.5 10E3/UL (ref 4–11)

## 2024-05-17 PROCEDURE — 97535 SELF CARE MNGMENT TRAINING: CPT | Mod: GO | Performed by: OCCUPATIONAL THERAPIST

## 2024-05-17 PROCEDURE — 85610 PROTHROMBIN TIME: CPT | Performed by: PHYSICIAN ASSISTANT

## 2024-05-17 PROCEDURE — 250N000013 HC RX MED GY IP 250 OP 250 PS 637: Performed by: PHYSICIAN ASSISTANT

## 2024-05-17 PROCEDURE — 97530 THERAPEUTIC ACTIVITIES: CPT | Mod: GO | Performed by: OCCUPATIONAL THERAPIST

## 2024-05-17 PROCEDURE — 94150 VITAL CAPACITY TEST: CPT

## 2024-05-17 PROCEDURE — G0378 HOSPITAL OBSERVATION PER HR: HCPCS

## 2024-05-17 PROCEDURE — 97166 OT EVAL MOD COMPLEX 45 MIN: CPT | Mod: GO | Performed by: OCCUPATIONAL THERAPIST

## 2024-05-17 PROCEDURE — 999N000157 HC STATISTIC RCP TIME EA 10 MIN

## 2024-05-17 PROCEDURE — 82962 GLUCOSE BLOOD TEST: CPT

## 2024-05-17 PROCEDURE — 99232 SBSQ HOSP IP/OBS MODERATE 35: CPT | Performed by: PHYSICIAN ASSISTANT

## 2024-05-17 PROCEDURE — 97162 PT EVAL MOD COMPLEX 30 MIN: CPT | Mod: GP

## 2024-05-17 PROCEDURE — 82374 ASSAY BLOOD CARBON DIOXIDE: CPT | Performed by: PHYSICIAN ASSISTANT

## 2024-05-17 PROCEDURE — 71045 X-RAY EXAM CHEST 1 VIEW: CPT

## 2024-05-17 PROCEDURE — 85027 COMPLETE CBC AUTOMATED: CPT | Performed by: PHYSICIAN ASSISTANT

## 2024-05-17 PROCEDURE — 36415 COLL VENOUS BLD VENIPUNCTURE: CPT | Performed by: PHYSICIAN ASSISTANT

## 2024-05-17 PROCEDURE — 97116 GAIT TRAINING THERAPY: CPT | Mod: GP

## 2024-05-17 RX ORDER — METHOCARBAMOL 500 MG/1
500 TABLET, FILM COATED ORAL 4 TIMES DAILY
Status: DISCONTINUED | OUTPATIENT
Start: 2024-05-17 | End: 2024-05-18 | Stop reason: HOSPADM

## 2024-05-17 RX ORDER — HYDROXYZINE HYDROCHLORIDE 25 MG/1
25 TABLET, FILM COATED ORAL 3 TIMES DAILY PRN
Status: DISCONTINUED | OUTPATIENT
Start: 2024-05-17 | End: 2024-05-17

## 2024-05-17 RX ORDER — LIDOCAINE 4 G/G
2-3 PATCH TOPICAL
Status: DISCONTINUED | OUTPATIENT
Start: 2024-05-17 | End: 2024-05-18 | Stop reason: HOSPADM

## 2024-05-17 RX ORDER — WARFARIN SODIUM 5 MG/1
5 TABLET ORAL
Status: COMPLETED | OUTPATIENT
Start: 2024-05-17 | End: 2024-05-17

## 2024-05-17 RX ADMIN — WARFARIN SODIUM 5 MG: 5 TABLET ORAL at 17:15

## 2024-05-17 RX ADMIN — LISINOPRIL 20 MG: 20 TABLET ORAL at 08:35

## 2024-05-17 RX ADMIN — ACETAMINOPHEN 975 MG: 325 TABLET, FILM COATED ORAL at 05:58

## 2024-05-17 RX ADMIN — Medication 1 TABLET: at 08:35

## 2024-05-17 RX ADMIN — ACETAMINOPHEN 975 MG: 325 TABLET, FILM COATED ORAL at 14:40

## 2024-05-17 RX ADMIN — BUPROPION HYDROCHLORIDE 150 MG: 150 TABLET, EXTENDED RELEASE ORAL at 08:35

## 2024-05-17 RX ADMIN — HYDROXYZINE HYDROCHLORIDE 25 MG: 25 TABLET, FILM COATED ORAL at 17:15

## 2024-05-17 RX ADMIN — METHOCARBAMOL 500 MG: 500 TABLET ORAL at 20:41

## 2024-05-17 RX ADMIN — GABAPENTIN 300 MG: 300 CAPSULE ORAL at 20:41

## 2024-05-17 RX ADMIN — FOLIC ACID 1 MG: 1 TABLET ORAL at 08:35

## 2024-05-17 RX ADMIN — THIAMINE HCL TAB 100 MG 100 MG: 100 TAB at 08:35

## 2024-05-17 RX ADMIN — GABAPENTIN 300 MG: 300 CAPSULE ORAL at 08:35

## 2024-05-17 RX ADMIN — GABAPENTIN 300 MG: 300 CAPSULE ORAL at 14:40

## 2024-05-17 RX ADMIN — LIDOCAINE 2 PATCH: 4 PATCH TOPICAL at 14:40

## 2024-05-17 RX ADMIN — TRAZODONE HYDROCHLORIDE 200 MG: 100 TABLET ORAL at 23:32

## 2024-05-17 RX ADMIN — METHOCARBAMOL 500 MG: 500 TABLET ORAL at 14:40

## 2024-05-17 RX ADMIN — LIDOCAINE 1 PATCH: 4 PATCH TOPICAL at 08:35

## 2024-05-17 RX ADMIN — ACETAMINOPHEN 975 MG: 325 TABLET, FILM COATED ORAL at 20:41

## 2024-05-17 ASSESSMENT — ACTIVITIES OF DAILY LIVING (ADL)
ADLS_ACUITY_SCORE: 27
ADLS_ACUITY_SCORE: 26
ADLS_ACUITY_SCORE: 27
ADLS_ACUITY_SCORE: 26
ADLS_ACUITY_SCORE: 27
ADLS_ACUITY_SCORE: 26
ADLS_ACUITY_SCORE: 27
ADLS_ACUITY_SCORE: 26
ADLS_ACUITY_SCORE: 27
ADLS_ACUITY_SCORE: 26
PREVIOUS_RESPONSIBILITIES: MEAL PREP;HOUSEKEEPING;LAUNDRY;SHOPPING;MEDICATION MANAGEMENT;FINANCES;DRIVING;WORK
ADLS_ACUITY_SCORE: 26
ADLS_ACUITY_SCORE: 27
ADLS_ACUITY_SCORE: 26
ADLS_ACUITY_SCORE: 26

## 2024-05-17 NOTE — PROGRESS NOTES
05/17/24 5267   Appointment Info   Signing Clinician's Name / Credentials (OT) Suzanne Vásquez OTR/L   Quick Adds   Quick Adds Certification   Living Environment   People in Home alone   Current Living Arrangements apartment   Home Accessibility stairs to enter home   Number of Stairs, Main Entrance 8   Stair Railings, Main Entrance railings on both sides of stairs   Transportation Anticipated car, drives self;family or friend will provide   Living Environment Comments Patient lives in a triplex. He lives alone but his ex-wife is in one unit and he has an adult son in the other. Patient has another son that is coming home from college today. Patient has 8 steps to unit, has bilateral rails but only able to reach one at a time. He has a walk-in shower but no grab bars in the bathroom.   Self-Care   Usual Activity Tolerance good   Current Activity Tolerance moderate   Regular Exercise No   Equipment Currently Used at Home dressing device;walker, rolling   Fall history within last six months yes   Number of times patient has fallen within last six months 5   Activity/Exercise/Self-Care Comment Patient is typically independent for dressing but reports some difficult donning socks. He is able to toilet independently but with difficulty. Reports some difficulty showering. Patient recently began using FWW for mobility after injuring L foot. Typically ambulates independently. Patient works full-time from home.   Instrumental Activities of Daily Living (IADL)   Previous Responsibilities meal prep;housekeeping;laundry;shopping;medication management;finances;driving;work   IADL Comments pt reports family can assist, prefers to complete himself   General Information   Onset of Illness/Injury or Date of Surgery 05/16/24   Referring Physician Tati Toro PA-C   Patient/Family Therapy Goal Statement (OT) home   Additional Occupational Profile Info/Pertinent History of Current Problem per chart: 43 year old male who  presents after a fall out of bed. He also had a fall two weeks ago and then CPR for an episode of unresponsiveness 2 weeks ago. The patient suspects that his rib fractures may actually be from the CPR because he has been having chest pain for ~ 2 weeks.   General Observations and Info activity order: ambulate with assist 4x daily   Cognitive Status Examination   Affect/Mental Status (Cognitive) WFL   Follows Commands follows two-step commands;over 90% accuracy   Pain Assessment   Patient Currently in Pain Yes, see Vital Sign flowsheet   Posture   Posture forward head position;protracted shoulders   Range of Motion Comprehensive   Comment, General Range of Motion BUE WFL, limited by body habitus   Strength Comprehensive (MMT)   Comment, General Manual Muscle Testing (MMT) Assessment BUE WFL, limited by rib pain   Coordination   Upper Extremity Coordination No deficits were identified   Bed Mobility   Comment (Bed Mobility) NT   Transfers   Transfers sit-stand transfer;toilet transfer;shower transfer   Sit-Stand Transfer   Sit-Stand [place] (Transfers) contact guard   Assistive Device (Sit-Stand Transfers) walker, front-wheeled   Shower Transfer   [place] Level (Shower Transfer) contact guard   Toilet Transfer   [place] Level (Toilet Transfer) contact guard   Balance   Balance Comments good seated; good ambulating in room with 2WW   Activities of Daily Living   BADL Assessment/Intervention bathing;lower body dressing;grooming;toileting   Bathing Assessment/Intervention   [place] Level (Bathing) minimum assist (75% patient effort)   Lower Body Dressing Assessment/Training   [place] Level (Lower Body Dressing) minimum assist (75% patient effort)   Grooming Assessment/Training   [place] Level (Grooming) supervision   Toileting   [place] Level (Toileting) contact guard assist   Clinical Impression   Criteria for Skilled Therapeutic Interventions Met (OT) Yes, treatment indicated   OT  Diagnosis impaired ADLs   OT Problem List-Impairments impacting ADL problems related to;activity tolerance impaired;range of motion (ROM);strength;pain   Assessment of Occupational Performance 3-5 Performance Deficits   Identified Performance Deficits dressing, toilet transfer, toileting, bathing, home chores   Planned Therapy Interventions (OT) ADL retraining;IADL retraining;transfer training   Clinical Decision Making Complexity (OT) detailed assessment/moderate complexity   Risk & Benefits of therapy have been explained evaluation/treatment results reviewed;patient   OT Total Evaluation Time   OT Eval, Moderate Complexity Minutes (35826) 8   Therapy Certification   Medical Diagnosis fall, pain   Start of Care Date 05/17/24   Certification date from 05/17/24   Certification date to 05/17/24   OT Goals   Therapy Frequency (OT) One time eval and treatment   OT Predicted Duration/Target Date for Goal Attainment 05/17/24   OT Goals Lower Body Dressing;Lower Body Bathing;Toilet Transfer/Toileting;OT Goal 1;OT Goal 2   OT: Lower Body Dressing Modified independent;using adaptive equipment   OT: Lower Body Bathing Modified independent;using adaptive equipment   OT: Toilet Transfer/Toileting Modified independent;toilet transfer;cleaning and garment management;using adaptive equipment   OT: Goal 1 George bathing   OT: Goal 2 Pt will identify 3 modifications to facilitate home chores George   Interventions   Interventions Quick Adds Therapeutic Activity   Self-Care/Home Management   Self-Care/Home Mgmt/ADL, Compensatory, Meal Prep Minutes (04236) 32   Treatment Detail/Skilled Intervention Pt able to dress R sock, but with increased effort/pain, using partial side-sitting on EOB.  Ed pt in using sock aide, pt completes [place] following. Pt dons R crock and L off-loading shoes seated EOB.  Pt able to don shorts seated EOB, dangling shorts.  Ed pt in using reacher (pt owns) for underwear, pt verbalizes understanding.  Pt ed in toilet  transfer/hand placement options, and pt completes [place]ola following.  Pt ed in toileting aides, tongs v cony v bidet, pt verbalizes understanding. Pt ed in grab bars, shower chair prn, for shower, LH brush/sponge, pt verbalizes understanding.  Ed pt in modifications for home chores to complete safely and with reduced pain, including groceries, laundry, bed linens, and pt able to identify 3 options he can incorporate.  AE/DME handout issued, community sources listed.   Therapeutic Activities   Therapeutic Activity Minutes (74407) 8   Treatment Detail/Skilled Intervention Pt completes STS SBA, ambulates in room a few feet 2x, SBA.  Pt uses 2WW for most room mobility and hallway, navigates hallway obstacles, SBA.   OT Discharge Planning   OT Plan dc   OT Discharge Recommendation (DC Rec) home with assist   OT Rationale for DC Rec Pt progressing well, anticipate he will be able to discharge to home with assist for IADLs prn, once medically stable.   OT Brief overview of current status Nola toilet transfer, Nola LE dressing   Total Session Time   Timed Code Treatment Minutes 40   Total Session Time (sum of timed and untimed services) 48        [facility]  OUTPATIENT OCCUPATIONAL THERAPY  EVALUATION  PLAN OF TREATMENT FOR OUTPATIENT REHABILITATION  (COMPLETE FOR INITIAL CLAIMS ONLY)  Patient's Last Name, First Name, M.I.  YOB: 1980  Cody Bolton                          Provider's Name  [facility] Medical Record No.  [id number]                             Onset Date:  05/16/24   Start of Care Date:  05/17/24   Type:     ___PT   _X_OT   ___SLP Medical Diagnosis:  fall, pain                    OT Diagnosis:  impaired ADLs Visits from SOC:  1     See note for plan of treatment, functional goals and certification details    I CERTIFY THE NEED FOR THESE SERVICES FURNISHED UNDER        THIS PLAN OF TREATMENT AND WHILE UNDER MY CARE     (Physician  co-signature of this document indicates review and certification of the therapy plan).

## 2024-05-17 NOTE — PLAN OF CARE
PRIMARY DIAGNOSIS: Fall/ Pain  OUTPATIENT/OBSERVATION GOALS TO BE MET BEFORE DISCHARGE:  ADLs back to baseline: No     Activity and level of assistance: Up with standby assistance.     Pain status: Improved-controlled with oral pain medications.     Return to near baseline physical activity: Yes             Discharge Planner Nurse  Safe discharge environment identified: Yes  Barriers to discharge: Yes       Entered by: Elissa Milian RN 05/17/2024         Pt is A&Ox4. VSS on RA. CIWA of 0. Regular diet. Pt wearing CPAP.  Pt has R side chest pain, feet/ankle pain. Scheduled Gabapentin given.

## 2024-05-17 NOTE — PROGRESS NOTES
[facility]    General Surgery Progress Note          Assessment and Plan:   Assessment/Plan:    Cody Bolton is a 43 year old male who presents after a fall out of bed. He also had a fall two weeks ago and then CPR for an episode of unresponsiveness 2 weeks ago. The patient suspects that his rib fractures may actually be from the CPR because he has been having chest pain for ~ 2 weeks.      Acute traumatic injuries include:    1. Rib fractures: Nondisplaced fractures of right lateral 7th-10th ribs.  - Ambulate 3x daily and out of bed to chair daily  - IS and Acapella performed by patient Q1H while awake  - Continuous pulse ox  - Pain control with ice packs, lidoderm patch, scheduled tylenol, gabapentin, robaxin, Trying to avoid NSAIDS and Opioids given anticoagulation and recent unresponsive episode from narcotics.     - CXR 5/17 - Negative chest. Nondisplaced right lateral seventh 10th rib fractures seen on prior CT are not demonstrated by this study     No other traumatic injuries identified, trauma service will sign off           Interval History:   Pt resting in chair, waiting for PT.  No new complaints.  Has mild pain right mid back/chest,currently well managed.  Has pain in bilateral knees, ankles, toes from previous fall 2 weeks ago, well controlled. Intermittent mild anterior chest wall pain, well controlled. Breathing comfortably, talking in full sentences.  Wanting to go home today.          Physical Exam:   Temp: 97.6  F (36.4  C) Temp src: Oral BP: (!) 154/95 Pulse: 84   Resp: 22   SpO2: 94 % O2 Device: None (Room air)      I/O last 3 completed shifts:  In: 800 [P.O.:800]  Out: -     Constitutional: alert and no distress             Data:   Data   Recent Labs   Lab 05/17/24  0658 05/17/24  0654 05/16/24  2146 05/16/24  1852 05/16/24  1842 05/16/24  1009   WBC 5.5  --   --   --   --  7.0   HGB 12.7*  --   --   --   --  13.8   MCV 86  --   --   --   --  86     --   --   --    --  275   INR 2.54*  --   --  2.25*  --   --      --   --   --   --  139   POTASSIUM 3.9  --   --   --   --  4.0   CHLORIDE 104  --   --   --   --  103   CO2 24  --   --   --   --  19*   BUN 11.8  --   --   --   --  11.3   CR 0.79  --   --   --   --  0.78   ANIONGAP 10  --   --   --   --  17*   RAUL 9.2  --   --   --   --  8.8   * 103* 143*  --    < > 114*   ALBUMIN  --   --   --   --   --  4.4   PROTTOTAL  --   --   --   --   --  7.7   BILITOTAL  --   --   --   --   --  0.3   ALKPHOS  --   --   --   --   --  209*   ALT  --   --   --   --   --  49   AST  --   --   --   --   --  36    < > = values in this interval not displayed.        LOLA Rodriguez [facility]  Text page: [phone]  8-4 pm   After 4 pm call [phone]

## 2024-05-17 NOTE — PROGRESS NOTES
05/17/24 1100   Appointment Info   Signing Clinician's Name / Credentials (PT) Virgie Ornelas, PT, DPT   Quick Adds   Quick Adds Certification   Living Environment   People in Home alone   Current Living Arrangements apartment   Home Accessibility stairs to enter home   Number of Stairs, Main Entrance 8   Stair Railings, Main Entrance railings on both sides of stairs   Transportation Anticipated car, drives self;family or friend will provide   Living Environment Comments Patient lives in a triplex.  He lives alone but his ex-wife is in one unit and he has an adult son in the other.  Patient has another son that is coming home from college today.  Patient has 8 steps to unit, has bilateral rails but only able to reach one at a time.  He has a walk-in shower but no grab bars in the bathroom.   Self-Care   Usual Activity Tolerance good   Current Activity Tolerance moderate   Regular Exercise No   Equipment Currently Used at Home walker, rolling   Fall history within last six months yes   Number of times patient has fallen within last six months 5  (Took sleeping pill while in [place], fell trying to get to bathroom.  Recently fell out of bed.)   Activity/Exercise/Self-Care Comment Patient is typically independent for dressing but reports some difficult donning socks.  He is able to toilet independently.  Reports some difficulty showering.  Patient recently began using FWW for mobility after injuring L foot.  Typically ambulates independently.  Patient works full-time from home.   General Information   Onset of Illness/Injury or Date of Surgery 05/16/24   Referring Physician Tati Toro PA-C   Patient/Family Therapy Goals Statement (PT) Patient anticipates returning home at discharge.   Pertinent History of Current Problem (include personal factors and/or comorbidities that impact the POC) 43 year old male who presents after a fall out of bed. He also had a fall two weeks ago and then CPR for an episode of  unresponsiveness 2 weeks ago. The patient suspects that his rib fractures may actually be from the CPR because he has been having chest pain for ~ 2 weeks.   Existing Precautions/Restrictions fall   Cognition   Affect/Mental Status (Cognition) WFL   Orientation Status (Cognition) oriented x 4   Follows Commands (Cognition) WFL   Pain Assessment   Patient Currently in Pain Yes, see Vital Sign flowsheet  (4/10 rib pain at rest, pain at L toe)   Integumentary/Edema   Integumentary/Edema Comments BLE edema   Posture    Posture Forward head position   Range of Motion (ROM)   Range of Motion ROM is WFL   Strength (Manual Muscle Testing)   Strength (Manual Muscle Testing) Able to perform R SLR;Able to perform L SLR;Deficits observed during functional mobility   Bed Mobility   Comment, (Bed Mobility) Not assessed, starting and ending session in chair.   Transfers   Comment, (Transfers) IND sit <> stand from recliner chair, B hands pushing from armrest to facilitate.   Gait/Stairs (Locomotion)   Comment, (Gait/Stairs) SBA 15' ambulation with doubled-wheeled front-wheeled walker.   Balance   Balance Comments Impaired dynamic balance, 5 falls in past month.   Sensory Examination   Sensory Perception Comments Describes tingling in fingers and numbness at R thigh   Clinical Impression   Criteria for Skilled Therapeutic Intervention Yes, treatment indicated   PT Diagnosis (PT) Impaired functional mobility   Influenced by the following impairments Pain from rib fractures and fractured toe L foot, generalized weakness and deconditioning, impaired balance, decreased activity tolerance   Functional limitations due to impairments Impaired independence with bed mobility, transfers, gait, and stairs   Clinical Presentation (PT Evaluation Complexity) evolving   Clinical Presentation Rationale Clinical judgement, PMH, social support   Clinical Decision Making (Complexity) moderate complexity   Planned Therapy Interventions (PT) balance  training;bed mobility training;cryotherapy;gait training;home exercise program;neuromuscular re-education;patient/family education;postural re-education;stair training;strengthening;transfer training;progressive activity/exercise   Risk & Benefits of therapy have been explained evaluation/treatment results reviewed;care plan/treatment goals reviewed;risks/benefits reviewed;participants voiced agreement with care plan;participants included;patient   PT Total Evaluation Time   PT Eval, Moderate Complexity Minutes (44665) 10   Therapy Certification   Start of care date 05/17/24   Certification date from 05/17/24   Certification date to 05/24/24   Medical Diagnosis Fall, pain   Physical Therapy Goals   PT Frequency Daily   PT Predicted Duration/Target Date for Goal Attainment 05/18/24   PT Goals Bed Mobility;Transfers;Gait;Stairs   PT: Bed Mobility Independent;Supine to/from sit;Rolling   PT: Transfers Modified independent;Sit to/from stand;Bed to/from chair;Assistive device   PT: Gait Modified independent;150 feet;Rolling walker   PT: Stairs Supervision/stand-by assist;8 stairs;Rail on left   Interventions   Interventions Quick Adds Therapeutic Activity;Gait Training   Therapeutic Activity   Therapeutic Activities: dynamic activities to improve functional performance Minutes (97267) 3   Symptoms Noted During/After Treatment Increased pain   Treatment Detail/Skilled Intervention Patient seated in recliner chair on arrival, NST recording vitals.  Rating rib pain 4/10 at rest.  Time needed to retrieve evita FWW due to weight >300 lbs.  Educated on ideal walker height to promote upright posture.  Patient IND for sit <> stand from recliner chair, requires UE support to come to standing.  Increased pain with use of UEs.  Returning to chair at end of session, call light in reach.   Gait Training   Gait Training Minutes (82749) 24   Symptoms Noted During/After Treatment (Gait Training) increased pain   Treatment Detail/Skilled  Intervention Patient ambulates total of 125' with FWW and SBA.  Patient ambulates with step-through gait pattern, slow gait speed but no overt loss of balance.  Patient able to walk and talk with no shortness of breath.  Increased rib pain with deep breathing and coughing.  Patient negotiates x10 steps with B hands on L rail, step-to pattern with cues to ascend leading with R foot to offload painful L toe.  Patient descending stairs with L rail plus light support at R rail, step-to pattern with cues to descend with painful L foot.  Patient encouraged to use post-surgical shoe on L foot and consistently use walker at discharge to offload LLE and maximize gait stability.   Distance in Feet 125'   PT Discharge Planning   PT Plan Assess bed mob, repeated STS from various surface heights, progress gait with walker, review flight of stairs.   PT Discharge Recommendation (DC Rec) home with assist   PT Rationale for DC Rec Patient presents below his independent baseline, limited by rib and L toe pain from recent falls.  Patient lives in [facility], alone in his unit but with ex-wife and adult son living in other two units.  Anticipate with assist from family, patient appropriate to discharge home.  Recommend use of surgical shoe on L foot and consistent use of FWW to maximize gait stability while recovering.   PT Brief overview of current status SBA FWW   PT Equipment Needed at Discharge walker, rolling  (Owns FWW)   Total Session Time   Timed Code Treatment Minutes 27   Total Session Time (sum of timed and untimed services) 37     [facility]  OUTPATIENT PHYSICAL THERAPY EVALUATION  PLAN OF TREATMENT FOR OUTPATIENT REHABILITATION  (COMPLETE FOR INITIAL CLAIMS ONLY)  Patient's Last Name, First Name, M.I.  YOB: 1980  Cody Bolton                        Provider's Name  [facility] Medical Record No.  [id number]                             Onset  Date:  05/16/24   Start of Care Date:  05/17/24   Type:     _X_PT   ___OT   ___SLP Medical Diagnosis:  Fall, pain              PT Diagnosis:  Impaired functional mobility Visits from SOC:  1     See note for plan of treatment, functional goals and certification details    I CERTIFY THE NEED FOR THESE SERVICES FURNISHED UNDER        THIS PLAN OF TREATMENT AND WHILE UNDER MY CARE     (Physician co-signature of this document indicates review and certification of the therapy plan).

## 2024-05-17 NOTE — PLAN OF CARE
Acute Traumatic Pain       1.  Pain controlled with oral analgesia: Yes      2.  Vital signs stable: Yes      3.  Diagnotic testing complete: Yes      4.  Cleared from consultants (if applicable): No      5. Return to near baseline physical activity: Yes    Goal Outcome Evaluation:    Plan of Care Reviewed With: patient    Overall Patient Progress: improving    Outcome Evaluation: Continues to report pain in knee, foot, and upper back/ribs. PT/OT OKd for home. Respiratory following.

## 2024-05-17 NOTE — PLAN OF CARE
"PRIMARY DIAGNOSIS: Fall/ Pain  OUTPATIENT/OBSERVATION GOALS TO BE MET BEFORE DISCHARGE:  ADLs back to baseline: No    Activity and level of assistance: Up with standby assistance.    Pain status: Improved-controlled with oral pain medications.    Return to near baseline physical activity: Yes     Discharge Planner Nurse   Safe discharge environment identified: Yes  Barriers to discharge: Yes       Entered by: Elissa Milian RN 05/17/2024       Pt is A&Ox4. VSS on RA. CIWA of 1. Regular diet.     Please review provider order for any additional goals.   Nurse to notify provider when observation goals have been met and patient is ready for discharge.  Problem: Adult Inpatient Plan of Care  Goal: Plan of Care Review  Description: The Plan of Care Review/Shift note should be completed every shift.  The Outcome Evaluation is a brief statement about your assessment that the patient is improving, declining, or no change.  This information will be displayed automatically on your shift  note.  5/17/2024 0054 by Elissa Milian RN  Outcome: Progressing  5/17/2024 0054 by Elissa Milian RN  Outcome: Progressing  Goal: Patient-Specific Goal (Individualized)  Description: You can add care plan individualizations to a care plan. Examples of Individualization might be:  \"Parent requests to be called daily at 9am for status\", \"I have a hard time hearing out of my right ear\", or \"Do not touch me to wake me up as it startles  me\".  5/17/2024 0054 by Elissa Milian RN  Outcome: Progressing  5/17/2024 0054 by Elissa Milian RN  Outcome: Progressing  Goal: Absence of Hospital-Acquired Illness or Injury  5/17/2024 0054 by Elissa Milian RN  Outcome: Progressing  5/17/2024 0054 by Elissa Milian RN  Outcome: Progressing  Intervention: Identify and Manage Fall Risk  Recent Flowsheet Documentation  Taken 5/16/2024 1932 by Elissa Milian RN  Safety Promotion/Fall Prevention: safety round/check completed  Intervention: Prevent and Manage VTE (Venous " Thromboembolism) Risk  Recent Flowsheet Documentation  Taken 5/16/2024 1932 by Elissa Milian, RN  VTE Prevention/Management: SCDs (sequential compression devices) off  Goal: Optimal Comfort and Wellbeing  5/17/2024 0054 by Elissa Milian, RN  Outcome: Progressing  5/17/2024 0054 by Elissa Milian, RN  Outcome: Progressing  Goal: Readiness for Transition of Care  5/17/2024 0054 by Elissa Milian, RN  Outcome: Progressing  5/17/2024 0054 by Elissa Milian, RN  Outcome: Progressing

## 2024-05-17 NOTE — CONSULTS
"[facility]   Trauma Surgical Consultation           Assessment and Plan:   Assessment/Plan:   Cody Bolton is a 43 year old male who presents after a fall out of bed. He also had a fall two weeks ago and then CPR for an episode of unresponsiveness 2 weeks ago. The patient suspects that his rib fractures may actually be from the CPR because he has been having chest pain for ~ 2 weeks.     Acute traumatic injuries include:    1. Rib fractures: Nondisplaced fractures of right lateral 7th-10th ribs.  - Ambulate 3x daily and out of bed to chair daily  - Elevate HOB 30 degrees  - Encourage PO intake  - Pain assessments and IS assessment Q2H while awake, notify provider if pain >7 or IS volume less than 1500 ml. If patient cannot acheive this volume: CPAP treatment  - IS and Acapella performed by patient Q1H while awake  - Continuous pulse ox  - Pain control with ice packs, lidoderm patch, scheduled tylenol, gabapentin, robaxin, Trying to avoid NSAIDS and Opioids given anticoagulation and recent unresponsive episode from narcotics.   - Daily CXR  - PT, OT, and nutition consults    Chronic medical condtions include:  DM2   Chronic coagulopathy with history of VTE  Obesity  CARLIE  Depression  Hypertension     Will be ready for discharge when   - Pain controlled on oral medications     - IS > 1500 ml  - Adequate mobility   - PT/OT Evaluations complete and has safe disposition   - Oxygenation at baseline   - Likely in 1 day     Melissa Baltazar MD          Chief Complaint:   Fall from bed, right chest pain     History is obtained from the patient.         History of Present Illness:   Cody Bolton is a 43 year old male who presented to the ED after fall out of his bed this morning.  He reports that he actually injured himself 2 weeks ago when he was on vacation in [place].  He had taken his sleeping medication and it \"hit him really hard\" to the point where he fell over when he tried to stand up he injured his.  " Foot as he fell and actually broke a bone in his left fifth toe.  He strained bilateral knees and ankles during this fall as well.  He did not seek medical attention at this time because his flight was the next day.  He was able to make at home and came to the hospital on 5/3/2024 for evaluation.  X-rays confirmed a broken fifth digit on his left foot and there were no bony fractures of either ankle or knee.  He has been using a walker since that time.  He was given narcotic pain medication and sent home.  The next day he was found unresponsive despite using his narcotics reportedly as prescribed.  EMS was initially unable to obtain a pulse and CPR was performed.  Narcan was administered which resulted in emesis and snoring.  A total of 5 doses of Narcan were given prior to arrival to the ED on 5/4/2024.  He has had chest pain in his right anterior lateral chest since that time.      This morning, he reports that he was too close to the edge of the bed and that his mattress slipped off the edge of the bed and he fell off the bed.  He had increased pain in his right chest. due to the pain in his feet and knees from his previous injury 2 weeks ago he was not able to get up on his own and call EMS for assistance.  He did have some alcohol last night which he was using to try to help him with his acute on chronic lower extremity pain.     Denies shortness of breath, abdominal pain, nausea, emesis, dizziness, visual changes, headache, neck pain, back pain.          Past Medical History:   DM2   Chronic coagulopathy with history of VTE  Obesity  CARLIE  Depression  Hypertension         Past Surgical History:     Past Surgical History:   Procedure Laterality Date    APPENDECTOMY  08/15/2017    Dr. Ferrer    GASTRIC BYPASS      PA LAP,APPENDECTOMY N/A 8/14/2017    Procedure: APPENDECTOMY, LAPAROSCOPIC;  Surgeon: Nba Ferrer MD;  Location: [facility] OR;  Service: General    REVISION AKANKSHA-EN-Y  2014    Dr. Pollack  [email or web address]            Social History:     Social History     Tobacco Use    Smoking status: Never    Smokeless tobacco: Never   Substance Use Topics    Alcohol use: Yes     Comment: Alcoholic Drinks/day: occasionally             Family History:   Family history reviewed and not pertinent.         Allergies:   No Known Allergies          Medications:     Current Facility-Administered Medications   Medication Dose Route Frequency Provider Last Rate Last Admin    acetaminophen (TYLENOL) tablet 975 mg  975 mg Oral TID Tati Toro PA-C   975 mg at 05/16/24 1845    [START ON 5/17/2024] buPROPion (WELLBUTRIN XL) 24 hr tablet 150 mg  150 mg Oral QAM Tati Toro PA-C        cloNIDine (CATAPRES) tablet 0.1 mg  0.1 mg Oral QPM PRN Tati Toro PA-C        glucose gel 15-30 g  15-30 g Oral Q15 Min PRN Tati Toro PA-C        Or    dextrose 50 % injection 25-50 mL  25-50 mL Intravenous Q15 Min PRN Tati Toro PA-C        Or    glucagon injection 1 mg  1 mg Subcutaneous Q15 Min PRN Tati Toro PA-C        flumazenil (ROMAZICON) injection 0.2 mg  0.2 mg Intravenous q1 min prn Tati Toro PA-C        folic acid (FOLVITE) tablet 1 mg  1 mg Oral Daily Tati Toro PA-C   1 mg at 05/16/24 1942    gabapentin (NEURONTIN) capsule 300 mg  300 mg Oral TID Tati Toro PA-C   300 mg at 05/16/24 1941    OLANZapine zydis (zyPREXA) ODT tab 5-10 mg  5-10 mg Oral Q6H PRN Tati Toro PA-C        Or    haloperidol lactate (HALDOL) injection 2.5-5 mg  2.5-5 mg Intravenous Q6H PRN Tati Toro PA-C        hydrALAZINE (APRESOLINE) tablet 10 mg  10 mg Oral Q4H PRN Tati Toro PA-C        Or    hydrALAZINE (APRESOLINE) injection 10 mg  10 mg Intravenous Q4H PRN Tati Toro PA-C        hydrOXYzine HCl (ATARAX) tablet 25 mg  25 mg Oral TID PRN Tati Toro PA-C        insulin aspart (NovoLOG) injection (RAPID  ACTING)  1-7 Units Subcutaneous TID AC Tati Toro PA-C        insulin aspart (NovoLOG) injection (RAPID ACTING)  1-5 Units Subcutaneous At Bedtime Tati Toro PA-C        Lidocaine (LIDOCARE) 4 % Patch 1 patch  1 patch Transdermal Once Xu Chang MD   1 patch at 05/16/24 1431    [START ON 5/17/2024] Lidocaine (LIDOCARE) 4 % Patch 1 patch  1 patch Transdermal Q24H Tati Toro PA-C        lidocaine (LMX4) cream   Topical Q1H PRN Tati Toro PA-C        lidocaine 1 % 0.1-1 mL  0.1-1 mL Other Q1H PRN Tati Toro PA-C        [START ON 5/17/2024] lisinopril (ZESTRIL) tablet 20 mg  20 mg Oral Daily Tati Toro PA-C        LORazepam (ATIVAN) tablet 1-2 mg  1-2 mg Oral Q30 Min PRN Tati Toro PA-C        Or    LORazepam (ATIVAN) injection 1-2 mg  1-2 mg Intravenous Q30 Min PRN Tati Toro PA-C        melatonin tablet 5 mg  5 mg Oral QPM PRN Tati Toro PA-C        [Held by provider] metFORMIN (GLUCOPHAGE XR) 24 hr tablet 1,000 mg  1,000 mg Oral Daily with supper Tati Toro PA-C        methocarbamol (ROBAXIN) tablet 500 mg  500 mg Oral Q6H PRN Tati Toro PA-C        multivitamin w/minerals (THERA-VIT-M) tablet 1 tablet  1 tablet Oral Daily Tati Toro PA-C   1 tablet at 05/16/24 1941    ondansetron (ZOFRAN ODT) ODT tab 4 mg  4 mg Oral Q6H PRN Tati Toro PA-C        Or    ondansetron (ZOFRAN) injection 4 mg  4 mg Intravenous Q6H PRN Tati Toro PA-C        Patient is already receiving anticoagulation with heparin, enoxaparin (LOVENOX), warfarin (COUMADIN)  or other anticoagulant medication   Does not apply Continuous PRN Tati Toro PA-C        senna-docusate (SENOKOT-S/PERICOLACE) 8.6-50 MG per tablet 1 tablet  1 tablet Oral BID PRN Tati Toro PA-C        Or    senna-docusate (SENOKOT-S/PERICOLACE) 8.6-50 MG per tablet 2 tablet  2 tablet Oral BID PRWILDA Toro,  Tati Ramos PA-C        sodium chloride (PF) 0.9% PF flush 3 mL  3 mL Intracatheter q1 min prn Tati Toro PA-C        sodium chloride (PF) 0.9% PF flush 3 mL  3 mL Intracatheter Q8H Tati Toro PA-C        thiamine (B-1) tablet 100 mg  100 mg Oral Daily Tati Toro PA-C   100 mg at 05/16/24 1942    traZODone (DESYREL) tablet 200 mg  200 mg Oral At Bedtime Tati Toro PA-C        warfarin ANTICOAGULANT (COUMADIN) tablet 5 mg  5 mg Oral Once Tati Toro PA-C        Warfarin Dose Required Daily - Pharmacist Managed  1 each Oral See Admin Instructions Tati Toro PA-C         Current Facility-Administered Medications   Medication Dose Route Frequency Provider Last Rate Last Admin    acetaminophen (TYLENOL) tablet 975 mg  975 mg Oral TID Tati Toro PA-C   975 mg at 05/16/24 1845    [START ON 5/17/2024] buPROPion (WELLBUTRIN XL) 24 hr tablet 150 mg  150 mg Oral QAM Tati Toro PA-C        folic acid (FOLVITE) tablet 1 mg  1 mg Oral Daily Tati Toro PA-C   1 mg at 05/16/24 1942    gabapentin (NEURONTIN) capsule 300 mg  300 mg Oral TID Tati Toro PA-C   300 mg at 05/16/24 1941    insulin aspart (NovoLOG) injection (RAPID ACTING)  1-7 Units Subcutaneous TID AC Tati Toro PA-C        insulin aspart (NovoLOG) injection (RAPID ACTING)  1-5 Units Subcutaneous At Bedtime Tati Toro PA-C        Lidocaine (LIDOCARE) 4 % Patch 1 patch  1 patch Transdermal Once Xu Chang MD   1 patch at 05/16/24 1431    [START ON 5/17/2024] Lidocaine (LIDOCARE) 4 % Patch 1 patch  1 patch Transdermal Q24H Tati Toro PA-C        [START ON 5/17/2024] lisinopril (ZESTRIL) tablet 20 mg  20 mg Oral Daily Tati Toro PA-C        [Held by provider] metFORMIN (GLUCOPHAGE XR) 24 hr tablet 1,000 mg  1,000 mg Oral Daily with supper Tati Toro PA-C        multivitamin w/minerals (THERA-VIT-M) tablet 1  "tablet  1 tablet Oral Daily Tati Toro PA-C   1 tablet at 05/16/24 1941    sodium chloride (PF) 0.9% PF flush 3 mL  3 mL Intracatheter Q8H Tati Toro PA-C        thiamine (B-1) tablet 100 mg  100 mg Oral Daily Tati Toro PA-C   100 mg at 05/16/24 1942    traZODone (DESYREL) tablet 200 mg  200 mg Oral At Bedtime Tati Toro PA-C        warfarin ANTICOAGULANT (COUMADIN) tablet 5 mg  5 mg Oral Once Tati Toro PA-C        Warfarin Dose Required Daily - Pharmacist Managed  1 each Oral See Admin Instructions Tati Toro PA-C                Review of Systems:   The 10 point review of systems is negative other than noted in the HPI.           Physical Exam:   BP (!) 145/90 (BP Location: Right arm)   Pulse 88   Temp 98.6  F (37  C) (Oral)   Resp 20   Ht 1.854 m (6' 1\")   Wt (!) 165 kg (363 lb 11.2 oz)   SpO2 92%   BMI 47.98 kg/m    General appearance: well-nourished, no apparent distress  HEENT: Pupils are equal and round.  Head is normocephalic and atraumatic.  Neck is without thyromegaly, masses, swelling, ecchymosis.  No tenderness of c spine  Chest:  Clear to auscultation bilaterally.  Heart with regular rate and rhythm, no murmurs.  No palpable swelling, masses, ecchymosis, no crepitus, no visible deformity. Tender to palpation right anteriolateral chest.  Abdomen:  obese, Nondistended, soft, nontender to palpation.  No masses.  Back: no palpable masses or visible deformity, nontender  Extremities: Strength intact. Mild tenderness with palpation to bilateral knees which patient reports is not new. Ecchymosis of left 5th toe.  Neurologic: nonfocal, grossly intact times four extremities, alert and oriented times three.  Cranial nerves II through XII intact grossly.  Psychiatric: mood and affect are appropriate.  Skin: without jaundice, lesions, rashes. Ecchymosis as noted above.           Data:   WBC -   WBC Count   Date Value Ref Range Status "   05/16/2024 7.0 4.0 - 11.0 10e3/uL Final   ], HgB -   Hemoglobin   Date Value Ref Range Status   05/16/2024 13.8 13.3 - 17.7 g/dL Final   ]   Liver Function Studies -   Recent Labs   Lab Test 05/16/24  1009   PROTTOTAL 7.7   ALBUMIN 4.4   BILITOTAL 0.3   ALKPHOS 209*   AST 36   ALT 49         IMAGING:  Results for orders placed or performed during the hospital encounter of 05/16/24   CT Chest/Abdomen/Pelvis w Contrast    Narrative    CT CHEST/ABDOMEN/PELVIS WITH CONTRAST 5/16/2024 12:02 PM    CLINICAL HISTORY: Fall, right-sided pain.    TECHNIQUE: CT scan of the chest, abdomen, and pelvis was performed  following injection of IV contrast. Multiplanar reformats were  obtained. Dose reduction techniques were used.     CONTRAST: 100mL [id number]    COMPARISON: February 4, 2024    FINDINGS:   LUNGS AND PLEURA: Minimal dependent probable atelectasis which is less  likely infiltrate. No pneumothorax. No effusions.    MEDIASTINUM/AXILLAE: No adenopathy or aneurysm.    CORONARY ARTERY CALCIFICATIONS: Mild.    HEPATOBILIARY: No significant mass or bile duct dilatation. No  calcified gallstones.     PANCREAS: No significant mass, duct dilatation, or inflammatory  change.    SPLEEN: Normal size.    ADRENAL GLANDS: No significant nodules.    KIDNEYS/BLADDER: No significant mass, stones, or hydronephrosis.    BOWEL: No obstruction or inflammatory change.    PELVIC ORGANS: No pelvic masses.    ADDITIONAL FINDINGS: Nonaneurysmal aorta. No adenopathy or free fluid.    MUSCULOSKELETAL: No frankly destructive bony lesions. Nondisplaced  fractures of the lateral 7th-10th ribs.      Impression    IMPRESSION:  1.  Nondisplaced fractures of the lateral 7th-10th ribs.  2.  No acute process demonstrated in the abdomen and pelvis.    MILLICENT VIGIL MD         SYSTEM ID:  [id number]       This note was created using voice recognition software. Undetected word substitutions or other errors may have occurred.     Melissa Baltazar MD    Time  spent with the patient, reviewing the EMR, reviewing laboratory and imaging studies, more than 50% of which was counseling and coordinating care:  40 minutes.

## 2024-05-17 NOTE — PLAN OF CARE
Occupational Therapy Discharge Summary    Reason for therapy discharge:    Goals met.     Progress towards therapy goal(s). See goals on Care Plan in [facility] electronic health record for goal details.  Goals met    Therapy recommendation(s):    No further therapy is recommended.

## 2024-05-17 NOTE — PHARMACY-ANTICOAGULATION SERVICE
Clinical Pharmacy - Warfarin Dosing Consult     Pharmacy has been consulted to manage this patient s warfarin therapy.  Indication: DVT/PE Prophylaxis  Therapy Goal: INR 2-3  Warfarin Prior to Admission: Yes  Warfarin PTA Regimen: 5 mg daily  Recent documented change in oral intake/nutrition: Unknown  Dose Comments: INR 2.25, warfarin 5 mg today    INR   Date Value Ref Range Status   05/16/2024 2.25 (H) 0.85 - 1.15 Final   05/09/2024 2.7 (H) 0.9 - 1.1 Final       Recommend warfarin 5 mg today.  Pharmacy will monitor Cody WALLACE Hoang daily and order warfarin doses to achieve specified goal.      Please contact pharmacy as soon as possible if the warfarin needs to be held for a procedure or if the warfarin goals change.

## 2024-05-17 NOTE — PHARMACY-ADMISSION MEDICATION HISTORY
Pharmacy Intern Admission Medication History    Admission medication history is complete. The information provided in this note is only as accurate as the sources available at the time of the update.    Information Source(s): Patient and CareEverywhere/SureScripts via in-person    Pertinent Information: None    Changes made to PTA medication list:  Added: Tylenol  Deleted: None  Changed: Clonidine    Allergies reviewed with patient and updates made in EHR: yes    Medication History Completed By: Aidan Rodriguez 5/16/2024 7:46 PM    PTA Med List   Medication Sig Last Dose    acetaminophen (TYLENOL) 500 MG tablet Take 500 mg by mouth every 4 hours as needed for mild pain 5/15/2024 at pm    buPROPion (WELLBUTRIN XL) 150 MG 24 hr tablet Take 1 tablet (150 mg) by mouth every morning Past Week at -    cloNIDine (CATAPRES) 0.1 MG tablet Take 0.1 mg by mouth as needed (Sleep) Past Month at -    lisinopril (ZESTRIL) 20 MG tablet Take 1 tablet (20 mg) by mouth daily Past Week at -    metFORMIN (GLUCOPHAGE XR) 500 MG 24 hr tablet Take 2 tablets (1,000 mg) by mouth daily (with dinner) 5/15/2024 at pm    simvastatin (ZOCOR) 20 MG tablet Take 1 tablet (20 mg) by mouth at bedtime 5/15/2024 at pm    tirzepatide (MOUNJARO) 5 MG/0.5ML pen Inject 5 mg Subcutaneous every 7 days On Tuesdays Past Month at OUT    traZODone (DESYREL) 100 MG tablet Take 2 tablets (200 mg) by mouth at bedtime 5/15/2024 at PM    warfarin ANTICOAGULANT (COUMADIN) 5 MG tablet Take 5 mg by mouth at bedtime 5/15/2024 at pm

## 2024-05-17 NOTE — CONSULTS
Care Management Discharge Note    Discharge Date: 05/17/2024     Discharge Disposition: Home    Discharge Services: None anticipated     Discharge Transportation: Family or friend will provide    Private pay costs discussed: Not applicable    Does the patient's insurance plan have a 3 day qualifying hospital stay waiver?  No    Patient/Family in Agreement with the Plan: Yes     Handoff Referral Completed: No    Additional Information:  SW consulted for discharge planning. Patient identified as an elevated unplanned readmission risk of 24%. Patient was admitted under observation status after a fall, rib fractures. Surgery consulted. PT evaluated patient and recommend home. Pt lives alone in an apartment but patient's ex-spouse and son live in units within the building. No CC/SW needs identified this admission. Consult cleared. Please call if urgent needs arise prior to discharge.     JOCE Gillette, [facility]   Inpatient Care Coordination  [facility]   [phone]

## 2024-05-17 NOTE — PLAN OF CARE
Acute Traumatic Pain       1.  Pain controlled with oral analgesia: Yes      2.  Vital signs stable: Yes      3.  Diagnotic testing complete: Yes      4.  Cleared from consultants (if applicable): No      5. Return to near baseline physical activity: Yes    Vitals: /81  Pulse 73  Temp 98.3  F  Resp 21  SpO2 95%      Pain managed with gabapentin, lidocaine patches, atarax, tylenol. Patient reports improvement in pain with current regimen, but does continue to have aching pain in knees, upper back / rib cage. Wearing CPAP for sleep including naps or when drowsy. Incentive spirometer encouraged; patient able to get to 3500. Up with SBA, GB, post-op shoe, walker. Tolerating diet. , 172, 153 - patient refused sliding scale insulin as he does not use at home. PT &OT saw, recommending home with assist.     Goal Outcome Evaluation:    Plan of Care Reviewed With: patient    Overall Patient Progress: improving    Outcome Evaluation: Continues to report pain in knee, foot, and upper back/ribs. PT/OT OKd for home. Respiratory following.

## 2024-05-17 NOTE — PLAN OF CARE
PRIMARY DIAGNOSIS: Fall/ Rib Fx  OUTPATIENT/OBSERVATION GOALS TO BE MET BEFORE DISCHARGE:  ADLs back to baseline: No     Activity and level of assistance: Up with standby assistance.     Pain status: Improved-controlled with oral pain medications.     Return to near baseline physical activity: Yes             Discharge Planner Nurse  Safe discharge environment identified: Yes  Barriers to discharge: Yes       Entered by: Elissa Milian RN 05/17/2024         Pt is A&Ox4. Ax1 w/ walker. VSS on RA. CIWA of 0. Regular diet.  Pt wearing CPAP while sleeping. Consults: PT/OT/SW/ General Surgery/ Anesthesiology.  Repeat Chest X-ray in AM.

## 2024-05-17 NOTE — CONSULTS
"Pt seen at bedside at request of Dr. Toro. Asked to eval pt for poss. intercostal nerve blocks.    Pt has hx of acute rib fxs R side 7 - 10. Pt had been c/o significant chest pain over site of rib fxs.     Pt was sitting up in chair and on room air. O2 sats were not being monitored.Pt says pain is 5/10 on deep breaths. He stated \"overall I feel much better.\"    Pts INR was 2.54. This is an absolute contraindication to intercostal nerve blocks as well as any kind of neuraxial anesthesia. The risk of correcting his INR for any kind of intervention is outweighed by the benefit (if any) that he would experience.     We recommend and agree with the multimodal pain control approach (sans nerve blocks) outlined by Dr. Toro in the note entered on 5/16/2024 @ 4:14pm.    Thank you for the consult.    Josiah Tian M.D.  "

## 2024-05-17 NOTE — PROGRESS NOTES
[facility]    Medicine Progress Note - Hospitalist Service    Date of Admission:  5/16/2024    Assessment & Plan   Cody Bolton is a 43 year old male with past medical history of depression, alcohol dependence, VTE on lifelong anticoagulation with warfarin, type 2 diabetes mellitus, hyperlipidemia, hypertension, morbid obesity, CARLIE with CPAP, insomnia, recent admission here at [facility] for AMS/unresponsiveness thought to be drug induced, who was admitted on 5/16/2024 after presented to the ED with right-sided chest wall pain after a mechanical fall.     VSS in ED on room air. Lab work with Elevated lactic acid 3.1, 3.3, 2.6.  VBG with normal pH, slightly low pCO2.  CMP otherwise with mildly elevated anion gap at 17, euglycemic. isolated elevation in alk phos. CBC normal. Etoh level detectable at 0.16. CT Chest abdomen pelvis with nondisplaced fractures of the right lateral 7th-10th ribs, no other acute trauma. EKG with NSR. In the ED given 3 L Normal saline. Admission was requested from hospitalist service for further cares and monitoring in the setting of multiple rib fractures and elevated lactic acid of unknown certain etiology.    Since admission we have started multimodal pain regimen non including narcotics given recent respiratory arrest. He was seen by anesthesiology for consideration of local nerve block but they do not recommend given proximity to arteries/veins. Seen by general surgery for trauma evaluation and seen by PT who is recommending home when pain is controlled. Patient would like to stay tonight to further optimize pain control.    #Traumatic Rib fractures 2/2 Mechanical Fall  -Patient presented by EMS after a fall out of his bed early the morning of 5/16.  He had immediate right-sided lateral and anterior chest wall pain from the fall.  No head injury, loss of consciousness.  At baseline requires a walker from previous foot fracture.  States that he fell due to mattress  moving off of the bed frame when he had attempted to get out of bed.  -Rib fracture order set  -General surgery following given trauma  -Daily CXR  -Multimodal analgesia: Scheduled Tylenol, muscle relaxer and as needed Atarax with hold parameters.  Gabapentin 300 mg TID. Topical therapies.  If pain remains uncontrolled with these measures will consider opioid therapy but am hesitant to start as he is not a good candidate for use of these medications given unresponsive episode  -Anesthesiology consulted for consideration of local nerve block but since he is on Warfarin they do not think this would be safe given nerve proximity to vein/artery  -monitor puls ox  -continue CPAP use  -PT/OT/SW      #Alcohol Intoxication  #History of alcohol dependence  -Patient had 750 cc hard alcohol the evening of 5/15.  He endorsed cutting down on daily alcohol use of 750 cc a day over the past month.  He currently has been drinking biweekly.  Binge alcohol use may that contributed to his fall and difficulty getting out of bed.  -Counseled on alcohol cessation  -Continue gabapentin for pain will not start loading gabapentin protocol unless he is in withdrawal  -Recently seen by psychiatry during last hospitalization and medications were adjusted  -consider Chemical dependency evaluation prior to discharge    #Hx of Recent Hospitalization for AMS, Unresponsive episode  -Did receive CPR for unresponsive episode on 5/4 and Narcan.  Suspected that unresponsive episode was due to unintentional narcotic overdose and psychiatry was consulted during last hospital admission, medication adjustments were then made.  -Not a good candidate for narcotic use for pain      #Elevated Lactic Acid  -Lab work with Elevated lactic acid 3.1, 3.3, 2.6.  Given 3 L NS. Suspect this is due to chronic metformin use in conjunction with binge alcohol and not eating/drinking for almost 12 hours. VBG with normal pH, slightly low pCO2.  CMP otherwise with mildly  "elevated anion gap at 17, euglycemic. isolated elevation in alk phos. CBC normal.    No localizing infectious symptoms or fever.  No signs of sepsis.  -No need to recheck lactic acid at this time  -Hold metformin. Can resume at discharge  -no further IV fluids needed at this time     #Borderline anion gap metabolic disturbance-resolved  -VBG shows normal pH.  Glucose is 114.  -Suspect due to low intake and binge alcohol use.  -Recheck BMP in a.m.     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  -Check INR  -Continue Coumadin for now, may need to hold or reverse if proceed with localized analgesia for rib fracture management     #Type 2 diabetes mellitus  Glucose stable in ED.  -Point-of-care glucose checks, monitor on sliding scale insulin while we hold metformin for tonight     #Hypertension  -resume lisinopril, clonidine at bedtime      #Morbid Obesity   - BMI 50.35     #CARLIE  -continue CPAP      #Insomnia  #MDD  - Resume PTA medications pending pharmacy reconciliation             Observation Goals: -diagnostic tests and consults completed and resulted, -vital signs normal or at patient baseline, -adequate pain control on oral analgesics, Nurse to notify provider when observation goals have been met and patient is ready for discharge.  Diet: Regular Diet Adult    DVT Prophylaxis: Warfarin  Santos Catheter: Not present  Lines: None     Cardiac Monitoring: None  Code Status: Full Code      Clinically Significant Risk Factors Present on Admission               # Drug Induced Coagulation Defect: home medication list includes an anticoagulant medication    # Hypertension: Home medication list includes antihypertensive(s)     # DMII: A1C = N/A within past 6 months    # Severe Obesity: Estimated body mass index is 47.98 kg/m  as calculated from the following:    Height as of this encounter: 1.854 m (6' 1\").    Weight as of this encounter: 165 kg (363 " lb 11.2 oz).              Disposition Plan     Medically Ready for Discharge: Anticipated Tomorrow           The patient's care was discussed with the Bedside Nurse, Patient, and Anesthesiology Consultant(s).    Zeinab Martines PA-C  Hospitalist Service  [facility]  Securely message with [employer] (more info)  Text page via [facility] Paging/Directory   ______________________________________________________________________    Interval History   Patient reports pain on the center of his chest that has been present since he had respiratory arrest and received compressions. He also has pain in the right mid back that is worse with movement and deep breaths. Denies fever, chills, nausea, vomiting, diarrhea, shortness of breath and cough.     Physical Exam   Vital Signs: Temp: 97.9  F (36.6  C) Temp src: Oral BP: (!) 140/79 Pulse: 77   Resp: 20 SpO2: 98 % O2 Device: None (Room air)    Weight: 363 lbs 11.2 oz    General Appearance: Alert and orientated x 3  Respiratory: CTAB  Cardiovascular: RRR without murmur. No ecchymosis of chest wall  GI: Bowel sounds are present without tenderness  Skin: No rash, open sores are areas of bruising/ecchymosis      Medical Decision Making       45 MINUTES SPENT BY ME on the date of service doing chart review, history, exam, documentation & further activities per the note.      Data     I have personally reviewed the following data over the past 24 hrs:    5.5  \   12.7 (L)   / 259     138 104 11.8 /  172 (H)   3.9 24 0.79 \     Procal: N/A CRP: N/A Lactic Acid: 2.6 (H)       INR:  2.54 (H) PTT:  N/A   D-dimer:  N/A Fibrinogen:  N/A       Imaging results reviewed over the past 24 hrs:   Recent Results (from the past 24 hour(s))   XR Chest 1 View    Narrative    EXAM: XR CHEST 1 VIEW  LOCATION: [facility]  DATE: 5/17/2024    INDICATION: Trauma Patient with Rib Fracture(s)  COMPARISON: CT on 5/16/2024      Impression    IMPRESSION: Negative  chest. Nondisplaced right lateral seventh 10th rib fractures seen on prior CT are not demonstrated by this study.

## 2024-05-18 ENCOUNTER — HEALTH MAINTENANCE LETTER (OUTPATIENT)
Age: 44
End: 2024-05-18

## 2024-05-18 ENCOUNTER — APPOINTMENT (OUTPATIENT)
Dept: GENERAL RADIOLOGY | Facility: CLINIC | Age: 44
End: 2024-05-18
Attending: PHYSICIAN ASSISTANT
Payer: COMMERCIAL

## 2024-05-18 VITALS
DIASTOLIC BLOOD PRESSURE: 65 MMHG | WEIGHT: 315 LBS | SYSTOLIC BLOOD PRESSURE: 112 MMHG | HEIGHT: 73 IN | OXYGEN SATURATION: 94 % | HEART RATE: 63 BPM | BODY MASS INDEX: 41.75 KG/M2 | TEMPERATURE: 97.5 F | RESPIRATION RATE: 18 BRPM

## 2024-05-18 LAB
GLUCOSE BLDC GLUCOMTR-MCNC: 108 MG/DL (ref 70–99)
GLUCOSE BLDC GLUCOMTR-MCNC: 117 MG/DL (ref 70–99)
INR PPP: 2.86 (ref 0.85–1.15)

## 2024-05-18 PROCEDURE — 250N000013 HC RX MED GY IP 250 OP 250 PS 637: Performed by: PHYSICIAN ASSISTANT

## 2024-05-18 PROCEDURE — 85610 PROTHROMBIN TIME: CPT | Performed by: PHYSICIAN ASSISTANT

## 2024-05-18 PROCEDURE — G0378 HOSPITAL OBSERVATION PER HR: HCPCS

## 2024-05-18 PROCEDURE — 82962 GLUCOSE BLOOD TEST: CPT

## 2024-05-18 PROCEDURE — 71045 X-RAY EXAM CHEST 1 VIEW: CPT

## 2024-05-18 PROCEDURE — 36415 COLL VENOUS BLD VENIPUNCTURE: CPT | Performed by: PHYSICIAN ASSISTANT

## 2024-05-18 PROCEDURE — 99239 HOSP IP/OBS DSCHRG MGMT >30: CPT | Performed by: PHYSICIAN ASSISTANT

## 2024-05-18 RX ORDER — GABAPENTIN 300 MG/1
300 CAPSULE ORAL 3 TIMES DAILY
Qty: 45 CAPSULE | Refills: 1 | Status: SHIPPED | OUTPATIENT
Start: 2024-05-18 | End: 2024-08-09

## 2024-05-18 RX ORDER — LIDOCAINE 4 G/G
2-3 PATCH TOPICAL EVERY 24 HOURS
Qty: 30 PATCH | Refills: 2 | Status: SHIPPED | OUTPATIENT
Start: 2024-05-18 | End: 2024-06-11

## 2024-05-18 RX ORDER — HYDROXYZINE HYDROCHLORIDE 25 MG/1
25 TABLET, FILM COATED ORAL 3 TIMES DAILY PRN
Qty: 30 TABLET | Refills: 1 | Status: SHIPPED | OUTPATIENT
Start: 2024-05-18 | End: 2024-08-09

## 2024-05-18 RX ORDER — ACETAMINOPHEN 500 MG
1000 TABLET ORAL 3 TIMES DAILY
Status: SHIPPED
Start: 2024-05-18

## 2024-05-18 RX ORDER — METHOCARBAMOL 500 MG/1
500 TABLET, FILM COATED ORAL 4 TIMES DAILY
Qty: 56 TABLET | Refills: 1 | Status: SHIPPED | OUTPATIENT
Start: 2024-05-18 | End: 2024-08-09

## 2024-05-18 RX ADMIN — LIDOCAINE 2 PATCH: 4 PATCH TOPICAL at 08:56

## 2024-05-18 RX ADMIN — HYDROXYZINE HYDROCHLORIDE 25 MG: 25 TABLET, FILM COATED ORAL at 11:00

## 2024-05-18 RX ADMIN — THIAMINE HCL TAB 100 MG 100 MG: 100 TAB at 08:56

## 2024-05-18 RX ADMIN — Medication 1 TABLET: at 08:56

## 2024-05-18 RX ADMIN — HYDROXYZINE HYDROCHLORIDE 25 MG: 25 TABLET, FILM COATED ORAL at 03:06

## 2024-05-18 RX ADMIN — FOLIC ACID 1 MG: 1 TABLET ORAL at 08:56

## 2024-05-18 RX ADMIN — GABAPENTIN 300 MG: 300 CAPSULE ORAL at 07:19

## 2024-05-18 RX ADMIN — METHOCARBAMOL 500 MG: 500 TABLET ORAL at 07:19

## 2024-05-18 RX ADMIN — LISINOPRIL 20 MG: 20 TABLET ORAL at 08:56

## 2024-05-18 RX ADMIN — METHOCARBAMOL 500 MG: 500 TABLET ORAL at 11:00

## 2024-05-18 RX ADMIN — ACETAMINOPHEN 975 MG: 325 TABLET, FILM COATED ORAL at 07:19

## 2024-05-18 RX ADMIN — BUPROPION HYDROCHLORIDE 150 MG: 150 TABLET, EXTENDED RELEASE ORAL at 08:56

## 2024-05-18 ASSESSMENT — ACTIVITIES OF DAILY LIVING (ADL)
ADLS_ACUITY_SCORE: 28
ADLS_ACUITY_SCORE: 26
ADLS_ACUITY_SCORE: 28
ADLS_ACUITY_SCORE: 26

## 2024-05-18 NOTE — PLAN OF CARE
Patient's After Visit Summary was reviewed with patient.   Patient verbalized understanding of After Visit Summary, recommended follow up and was given an opportunity to ask questions.   Discharge medications sent home with patient/family: to be picked up at [facility]  Discharged with: ex-wife Betty    OBSERVATION patient END time: 1150

## 2024-05-18 NOTE — DISCHARGE SUMMARY
"[facility]  Hospitalist Discharge Summary      Date of Admission:  5/16/2024  Date of Discharge:  5/18/2024  Discharging Provider: Zeinab Martines PA-C  Discharge Service: Hospitalist Service    Discharge Diagnoses   Right lateral rib fractures after fall    Clinically Significant Risk Factors     # DMII: A1C = N/A within past 6 months  # Severe Obesity: Estimated body mass index is 47.98 kg/m  as calculated from the following:    Height as of this encounter: 1.854 m (6' 1\").    Weight as of this encounter: 165 kg (363 lb 11.2 oz).       Follow-ups Needed After Discharge   Follow-up Appointments     Follow-up and recommended labs and tests       Follow up with primary care provider, Physician No Ref-Primary, within 7   days for hospital follow- up.  No follow up labs or test are needed.            Unresulted Labs Ordered in the Past 30 Days of this Admission       No orders found from 4/16/2024 to 5/17/2024.            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     He reports that the mattress on top of his bed frame at times falls off or collapses.  This occurred around 6 AM the morning prior to presentation when he was trying to get out of bed.  He fell to the ground injuring his right side.  He did not hit his head or lose consciousness.  He was unable to get up after the fall and was in pain.  His ex-wife who lives nearby assisted him.  Ultimately police and EMS were called to help him be " transferred to the ED to be evaluated.     Here in the ED he reports right-sided anterior and lateral chest wall pain.  His symptoms are worse with coughing or taking deep breaths, turning and bread.  He endorses a mild headache which he attributes to not being able to eat or drink today.  He has no neck pain, no numbness or tingling in his legs.     Of note he was recently hospitalized May 4-6, 2024 here at [facility] for encephalopathy and unresponsive episode by EMS where he underwent CPR in the field.  The cause was suspected to be secondary to unintentional narcotic overdose.  He was given Narcan with some improvement in mentation.  During hospitalization he was treated for rhabdomyolysis and psychiatry saw the patient.    During that hospitalization he did have chest pain from the CPR that he received although not as severe as the pain in his chest that he currently has.  He had been taking Tylenol alone since discharging from the hospital.     The night prior to the fall (5/15) he reported drinking 750 cc of hard alcohol.  He has been trying to cut down on alcohol use.  He drinks twice a week.  Denies history of alcohol withdrawal or withdrawal seizure.    VSS in ED on room air. Lab work with Elevated lactic acid 3.1, 3.3, 2.6.  VBG with normal pH, slightly low pCO2.  CMP otherwise with mildly elevated anion gap at 17, euglycemic. isolated elevation in alk phos. CBC normal. Etoh level detectable at 0.16. CT Chest abdomen pelvis with nondisplaced fractures of the right lateral 7th-10th ribs, no other acute trauma. EKG with NSR. In the ED given 3 L Normal saline. Admission was requested from hospitalist service for further cares and monitoring in the setting of multiple rib fractures and elevated lactic acid of unknown certain etiology.     Since admission we have started multimodal pain regimen not including narcotics given recent respiratory arrest. He was seen by anesthesiology for consideration of local nerve  block but they do not recommend given proximity to arteries/veins. Seen by general surgery for trauma evaluation and seen by PT who is recommending home when pain is controlled. Discharged with plans to use Tylenol, gabapentin, hydroxyzine, Robaxin and Lidoderm patches.    Suspect  elevated lactic acid related to metformin use as well as chronic alcohol use.  His metformin has been resumed at discharge.        Consultations This Hospital Stay   SURGERY GENERAL IP CONSULT  ANESTHESIOLOGY IP CONSULT  CARE MANAGEMENT / SOCIAL WORK IP CONSULT  PHYSICAL THERAPY ADULT IP CONSULT  OCCUPATIONAL THERAPY ADULT IP CONSULT  PHARMACY TO DOSE WARFARIN    Code Status   Full Code    Time Spent on this Encounter   Zeinab OG PA-C, personally saw the patient today and spent greater than 30 minutes discharging this patient.       Zeinab Martines PA-C  [facility] OBSERVATION DEPT  [address] [place] [address]  Phone: [phone]  ______________________________________________________________________    Physical Exam   Vital Signs: Temp: 97.5  F (36.4  C) Temp src: Oral BP: 112/65 Pulse: 63   Resp: 18 SpO2: 94 % O2 Device: None (Room air)    Weight: 363 lbs 11.2 oz  General Appearance: Alert and oriented x 3  Respiratory: Clear to auscultation bilaterally  Cardiovascular: RRR without murmur  GI: Bowel sounds are present without tenderness  Skin: No rashes or open sores are noted  Other: No significant ecchymosis noted on anterior chest wall or right lateral chest wall       Primary Care Physician   Physician No Ref-Primary    Discharge Orders      Reason for your hospital stay    You were admitted for concerns of right sided back/lateral chest pain after a fall out of bed. You  fractures multiple ribs and likely bruised your middle chest wall a couple of weeks ago when you have chest compressions.    For pain we would like you to do the following:  -Ice frequently (20 minutes 4-5  times a day)  -Schedule Tylenol 1000 mg three times a day for the next 1 week and then start using as needed  -Schedule Robaxin for the next week and then start using as needed  -Schedule Gabapentin for the next 2 weeks and then start slowly reducing the dose to two times a day, then one time a day and then done  -Use Lidocaine patches as needed    Please avoid drinking alcohol     Follow-up and recommended labs and tests     Follow up with primary care provider, Physician No Ref-Primary, within 7 days for hospital follow- up.  No follow up labs or test are needed.     Activity    Your activity upon discharge: activity as tolerated     Diet    Follow this diet upon discharge: Regular       Significant Results and Procedures   Most Recent 3 CBC's:  Recent Labs   Lab Test 05/17/24  0658 05/16/24  1009 05/06/24  0620   WBC 5.5 7.0 6.7   HGB 12.7* 13.8 10.9*   MCV 86 86 88    275 204     Most Recent 3 BMP's:  Recent Labs   Lab Test 05/18/24  0751 05/18/24  0223 05/17/24  2109 05/17/24  1126 05/17/24  0658 05/16/24  1842 05/16/24  1009 05/06/24  0620   NA  --   --   --   --  138  --  139 140   POTASSIUM  --   --   --   --  3.9  --  4.0 4.1   CHLORIDE  --   --   --   --  104  --  103 106   CO2  --   --   --   --  24  --  19* 25   BUN  --   --   --   --  11.8  --  11.3 8.0   CR  --   --   --   --  0.79  --  0.78 0.73   ANIONGAP  --   --   --   --  10  --  17* 9   RAUL  --   --   --   --  9.2  --  8.8 8.6   * 108* 185*   < > 105*   < > 114* 116*    < > = values in this interval not displayed.   ,   Results for orders placed or performed during the hospital encounter of 05/16/24   CT Chest/Abdomen/Pelvis w Contrast    Narrative    CT CHEST/ABDOMEN/PELVIS WITH CONTRAST 5/16/2024 12:02 PM    CLINICAL HISTORY: Fall, right-sided pain.    TECHNIQUE: CT scan of the chest, abdomen, and pelvis was performed  following injection of IV contrast. Multiplanar reformats were  obtained. Dose reduction techniques were used.      CONTRAST: 100mL [id number]    COMPARISON: February 4, 2024    FINDINGS:   LUNGS AND PLEURA: Minimal dependent probable atelectasis which is less  likely infiltrate. No pneumothorax. No effusions.    MEDIASTINUM/AXILLAE: No adenopathy or aneurysm.    CORONARY ARTERY CALCIFICATIONS: Mild.    HEPATOBILIARY: No significant mass or bile duct dilatation. No  calcified gallstones.     PANCREAS: No significant mass, duct dilatation, or inflammatory  change.    SPLEEN: Normal size.    ADRENAL GLANDS: No significant nodules.    KIDNEYS/BLADDER: No significant mass, stones, or hydronephrosis.    BOWEL: No obstruction or inflammatory change.    PELVIC ORGANS: No pelvic masses.    ADDITIONAL FINDINGS: Nonaneurysmal aorta. No adenopathy or free fluid.    MUSCULOSKELETAL: No frankly destructive bony lesions. Nondisplaced  fractures of the lateral 7th-10th ribs.      Impression    IMPRESSION:  1.  Nondisplaced fractures of the lateral 7th-10th ribs.  2.  No acute process demonstrated in the abdomen and pelvis.    MILLICENT VIGIL MD         SYSTEM ID:  [id number]   XR Chest 1 View    Narrative    EXAM: XR CHEST 1 VIEW  LOCATION: [facility]  DATE: 5/17/2024    INDICATION: Trauma Patient with Rib Fracture(s)  COMPARISON: CT on 5/16/2024      Impression    IMPRESSION: Negative chest. Nondisplaced right lateral seventh 10th rib fractures seen on prior CT are not demonstrated by this study.   XR Chest Port 1 View    Narrative    EXAM: XR CHEST PORT 1 VIEW  LOCATION: [facility]  DATE: 5/18/2024    INDICATION: Trauma Patient with Rib Fracture(s)  COMPARISON: 5/17/2024.    FINDINGS: The heart size is normal. The lungs are clear. There is no pneumothorax. Known rib fractures not seen.      Impression    IMPRESSION: No pneumothorax.       Discharge Medications   Current Discharge Medication List        START taking these medications    Details   gabapentin (NEURONTIN) 300 MG capsule Take 1  capsule (300 mg) by mouth 3 times daily  Qty: 45 capsule, Refills: 1    Associated Diagnoses: Closed fracture of multiple ribs of right side, initial encounter      hydrOXYzine HCl (ATARAX) 25 MG tablet Take 1 tablet (25 mg) by mouth 3 times daily as needed for anxiety or other (pain)  Qty: 30 tablet, Refills: 1    Associated Diagnoses: Closed fracture of multiple ribs of right side, initial encounter      Lidocaine (LIDOCARE) 4 % Patch Place 2-3 patches onto the skin every 24 hours To prevent lidocaine toxicity, patient should be patch free for 12 hrs daily.  Qty: 30 patch, Refills: 2    Associated Diagnoses: Closed fracture of multiple ribs of right side, initial encounter      methocarbamol (ROBAXIN) 500 MG tablet Take 1 tablet (500 mg) by mouth 4 times daily  Qty: 56 tablet, Refills: 1    Associated Diagnoses: Closed fracture of multiple ribs of right side, initial encounter           CONTINUE these medications which have CHANGED    Details   acetaminophen (TYLENOL) 500 MG tablet Take 2 tablets (1,000 mg) by mouth 3 times daily    Associated Diagnoses: Closed fracture of multiple ribs of right side, initial encounter           CONTINUE these medications which have NOT CHANGED    Details   buPROPion (WELLBUTRIN XL) 150 MG 24 hr tablet Take 1 tablet (150 mg) by mouth every morning  Qty: 90 tablet, Refills: 3    Associated Diagnoses: Major depressive disorder, recurrent episode, moderate (H)      cloNIDine (CATAPRES) 0.1 MG tablet Take 0.1 mg by mouth as needed (Sleep)      lisinopril (ZESTRIL) 20 MG tablet Take 1 tablet (20 mg) by mouth daily  Qty: 30 tablet, Refills: 0    Associated Diagnoses: Essential hypertension, benign      metFORMIN (GLUCOPHAGE XR) 500 MG 24 hr tablet Take 2 tablets (1,000 mg) by mouth daily (with dinner)  Qty: 180 tablet, Refills: 3    Associated Diagnoses: Type 2 diabetes mellitus without complication, without long-term current use of insulin (H)      simvastatin (ZOCOR) 20 MG tablet Take  1 tablet (20 mg) by mouth at bedtime  Qty: 90 tablet, Refills: 3    Associated Diagnoses: Pure hypercholesterolemia      tirzepatide (MOUNJARO) 5 MG/0.5ML pen Inject 5 mg Subcutaneous every 7 days On Tuesdays      traZODone (DESYREL) 100 MG tablet Take 2 tablets (200 mg) by mouth at bedtime  Qty: 180 tablet, Refills: 3    Associated Diagnoses: Primary insomnia      warfarin ANTICOAGULANT (COUMADIN) 5 MG tablet Take 5 mg by mouth at bedtime           Allergies   No Known Allergies

## 2024-05-18 NOTE — PLAN OF CARE
"PRIMARY DIAGNOSIS: ACUTE PAIN  OUTPATIENT/OBSERVATION GOALS TO BE MET BEFORE DISCHARGE:  1. Pain Status: Improved with use of alternative comfort measures i.e.: ice, repositioning     2. Return to near baseline physical activity: Yes    3. Cleared for discharge by consultants (if involved): Yes    Discharge Planner Nurse   Safe discharge environment identified: Yes  Barriers to discharge: No       Entered by: Maine Blanco RN 05/18/2024 8:30AM     Please review provider order for any additional goals.   Nurse to notify provider when observation goals have been met and patient is ready for discharge.      Goal Outcome Evaluation:      Plan of Care Reviewed With: patient    Overall Patient Progress: improvingOverall Patient Progress: improving    Outcome Evaluation: reports pain in right upper back/ribs - ice pack given, on scheduled meds, prn to be given when available and before dc, A&Ox4, up with SBA with walker/GB, ok to DC per provider. POC reviewed.      Problem: Adult Inpatient Plan of Care  Goal: Plan of Care Review  Description: The Plan of Care Review/Shift note should be completed every shift.  The Outcome Evaluation is a brief statement about your assessment that the patient is improving, declining, or no change.  This information will be displayed automatically on your shift  note.  Outcome: Progressing  Flowsheets (Taken 5/18/2024 0830)  Outcome Evaluation: reports pain in right upper back/ribs - ice pack given, on scheduled meds, prn to be given when available and before dc, A&Ox4, up with SBA with walker/GB, ok to DC per provider. POC reviewed.  Plan of Care Reviewed With: patient  Overall Patient Progress: improving  Goal: Patient-Specific Goal (Individualized)  Description: You can add care plan individualizations to a care plan. Examples of Individualization might be:  \"Parent requests to be called daily at 9am for status\", \"I have a hard time hearing out of my right ear\", or \"Do not touch " "me to wake me up as it startles  me\".  Outcome: Progressing  Goal: Absence of Hospital-Acquired Illness or Injury  Outcome: Progressing  Intervention: Identify and Manage Fall Risk  Recent Flowsheet Documentation  Taken 5/18/2024 0830 by Maine Blanco RN  Safety Promotion/Fall Prevention:   patient and family education   nonskid shoes/slippers when out of bed   mobility aid in reach   lighting adjusted   room near nurse's station   safety round/check completed   supervised activity  Intervention: Prevent Skin Injury  Recent Flowsheet Documentation  Taken 5/18/2024 0830 by Maine Blanco RN  Body Position: position changed independently  Intervention: Prevent and Manage VTE (Venous Thromboembolism) Risk  Recent Flowsheet Documentation  Taken 5/18/2024 0830 by Maine Blanco RN  VTE Prevention/Management: SCDs (sequential compression devices) off  Intervention: Prevent Infection  Recent Flowsheet Documentation  Taken 5/18/2024 0830 by Maine Blanco RN  Infection Prevention: rest/sleep promoted  Goal: Optimal Comfort and Wellbeing  Outcome: Progressing  Intervention: Monitor Pain and Promote Comfort  Recent Flowsheet Documentation  Taken 5/18/2024 0830 by Maine Blanco RN  Pain Management Interventions: cold applied  Goal: Readiness for Transition of Care  Outcome: Progressing     "

## 2024-05-18 NOTE — PLAN OF CARE
PRIMARY DIAGNOSIS: ACUTE PAIN  OUTPATIENT/OBSERVATION GOALS TO BE MET BEFORE DISCHARGE:  1. Pain Status: Improved-controlled with oral pain medications.    2. Return to near baseline physical activity: No    3. Cleared for discharge by consultants (if involved): No    Discharge Planner Nurse   Safe discharge environment identified: Yes  Barriers to discharge: Yes       Entered by: Tenisha Strong RN 05/18/2024 6:56 AM     Asleep in between cares. PRN Atarax given. CPAP while asleep. OT and PT following.    Please review provider order for any additional goals.   Nurse to notify provider when observation goals have been met and patient is ready for discharge.

## 2024-05-18 NOTE — PLAN OF CARE
Physical Therapy Discharge Summary    Reason for therapy discharge:    Discharged to home.    Progress towards therapy goal(s). See goals on Care Plan in [facility] electronic health record for goal details.  Goals partially met.  Barriers to achieving goals:   discharge from facility.    Therapy recommendation(s):    Patient presents below his independent baseline, limited by rib and L toe pain from recent falls. Patient lives in [facility], alone in his unit but with ex-wife and adult son living in other two units. Anticipate with assist from family, patient appropriate to discharge home. Recommend use of surgical shoe on L foot and consistent use of FWW to maximize gait stability while recovering.

## 2024-05-18 NOTE — PLAN OF CARE
PRIMARY DIAGNOSIS: ACUTE PAIN  OUTPATIENT/OBSERVATION GOALS TO BE MET BEFORE DISCHARGE:  1. Pain Status: Improved-controlled with oral pain medications.    2. Return to near baseline physical activity: Yes    3. Cleared for discharge by consultants (if involved): No    Discharge Planner Nurse   Safe discharge environment identified: Yes  Barriers to discharge: Yes       Entered by: Tenisha Strong RN 05/18/2024 3:23 AM     Scheduled pain medication given. Independent with IS. Blood sugar checked no coverage needed.     Please review provider order for any additional goals.   Nurse to notify provider when observation goals have been met and patient is ready for discharge.

## 2024-05-18 NOTE — PLAN OF CARE
PRIMARY DIAGNOSIS: ACUTE PAIN  OUTPATIENT/OBSERVATION GOALS TO BE MET BEFORE DISCHARGE:  1. Pain Status: Improved-controlled with oral pain medications.    2. Return to near baseline physical activity: Yes    3. Cleared for discharge by consultants (if involved): No    Discharge Planner Nurse   Safe discharge environment identified: Yes  Barriers to discharge: Yes       Entered by: Tenisha Strong RN 05/18/2024 3:21 AM     Alert and oriented. Siting on the chair.     Please review provider order for any additional goals.   Nurse to notify provider when observation goals have been met and patient is ready for discharge.

## 2024-05-18 NOTE — PHARMACY-AMINOGLYCOSIDE DOSING SERVICE
Clinical Pharmacy- Warfarin Discharge Note  This patient is currently on warfarin for the treatment of  DVT/PE prophylaxis .  INR Goal= 2-3  Expected length of therapy  Per PCP .    Warfarin PTA Regimen: 5 mg daily      Anticoagulation Dose History  More data exists         Latest Ref Rng & Units 5/4/2024 5/5/2024 5/6/2024 5/9/2024 5/16/2024 5/17/2024 5/18/2024   Recent Dosing and Labs   warfarin ANTICOAGULANT (COUMADIN) 5 MG tablet - - 10 mg, $Given - - 5 mg, $Given 5 mg, $Given -   INR 0.85 - 1.15 1.51  1.31  1.64  1.93  2.7  2.25  2.54  2.86          Agree with provider on discharging the patient on a warfarin regimen of 5 mg, patient to rsumed PTA supply.    The patient should have an INR checked on 5/23/2024.     Huong Rausch, PharmD  May 18, 2024

## 2024-05-20 ENCOUNTER — DOCUMENTATION ONLY (OUTPATIENT)
Dept: ANTICOAGULATION | Facility: CLINIC | Age: 44
End: 2024-05-20
Payer: COMMERCIAL

## 2024-05-20 DIAGNOSIS — Z86.718 HISTORY OF DVT (DEEP VEIN THROMBOSIS): ICD-10-CM

## 2024-05-20 DIAGNOSIS — I26.99 ACUTE PULMONARY EMBOLISM WITHOUT ACUTE COR PULMONALE, UNSPECIFIED PULMONARY EMBOLISM TYPE (H): Primary | ICD-10-CM

## 2024-05-20 NOTE — PROGRESS NOTES
ANTICOAGULATION  MANAGEMENT: Discharge Review    Cody Bolton chart reviewed for anticoagulation continuity of care    Hospital Admission on 5/16-5/18/24 for right lateral rib fracture.    Discharge disposition: Home    Results:    Recent labs: (last 7 days)     05/16/24  1852 05/17/24  0658 05/18/24  0542   INR 2.25* 2.54* 2.86*     Anticoagulation inpatient management:     home regimen continued    Anticoagulation discharge instructions:     Warfarin dosing: home regimen continued   Bridging: No   INR goal change: No      Medication changes affecting anticoagulation: Yes: 3000mg tylenol daily    Additional factors affecting anticoagulation: Yes: pain/inflammation/alcohol dependence     PLAN     No adjustment to anticoagulation plan needed    Recommended follow up is scheduled  Patient not contacted    Anticoagulation Calendar updated    Corrine Randall RN

## 2024-05-23 ENCOUNTER — LAB (OUTPATIENT)
Dept: LAB | Facility: CLINIC | Age: 44
End: 2024-05-23
Payer: COMMERCIAL

## 2024-05-23 ENCOUNTER — ANTICOAGULATION THERAPY VISIT (OUTPATIENT)
Dept: ANTICOAGULATION | Facility: CLINIC | Age: 44
End: 2024-05-23

## 2024-05-23 DIAGNOSIS — Z86.718 HISTORY OF DVT (DEEP VEIN THROMBOSIS): ICD-10-CM

## 2024-05-23 DIAGNOSIS — I26.99 ACUTE PULMONARY EMBOLISM WITHOUT ACUTE COR PULMONALE, UNSPECIFIED PULMONARY EMBOLISM TYPE (H): Primary | ICD-10-CM

## 2024-05-23 DIAGNOSIS — I26.99 ACUTE PULMONARY EMBOLISM WITHOUT ACUTE COR PULMONALE, UNSPECIFIED PULMONARY EMBOLISM TYPE (H): ICD-10-CM

## 2024-05-23 LAB — INR BLD: 1.9 (ref 0.9–1.1)

## 2024-05-23 PROCEDURE — 36415 COLL VENOUS BLD VENIPUNCTURE: CPT

## 2024-05-23 PROCEDURE — 85610 PROTHROMBIN TIME: CPT

## 2024-05-23 NOTE — PROGRESS NOTES
ANTICOAGULATION MANAGEMENT     Cody WALLACE Young 43 year old male is on warfarin with subtherapeutic INR result. (Goal INR 2.0-3.0)    Recent labs: (last 7 days)     05/23/24  1128   INR 1.9*       ASSESSMENT     Source(s): Chart Review and Patient/Caregiver Call     Warfarin doses taken: Missed dose(s) may be affecting INR  Diet: No new diet changes identified  Medication/supplement changes:  prescribed Tylenol 3000 mg/day x1 week, then PRN, Robaxin scheduled x1 week then PRN, gabapentin taper, and lidocaine patches   New illness, injury, or hospitalization: Yes: in hospital 5/16-5/18 after fall, multiple rib fractures on right side, no head strike  Signs or symptoms of bleeding or clotting: Yes: bruising right arm, but resolving as to be expected.   Previous result: Therapeutic last 2(+) visits  Additional findings:  ETOH use from night before the fall may have been a factor, patient was advised in hospital to avoid.       PLAN     Recommended plan for temporary change(s) affecting INR     Dosing Instructions: Continue your current warfarin dose with next INR in 2 weeks       Summary  As of 5/23/2024      Full warfarin instructions:  5/23: 10 mg; Otherwise 5 mg every day   Next INR check:  6/6/2024               Telephone call with Cody who verbalizes understanding and agrees to plan    Lab visit scheduled    Education provided:   Please call back if any changes to your diet, medications or how you've been taking warfarin  Healthy lifestyle considerations: avoid excessive alcohol drinking (binge drinking) while on warfarin due to increased risk of bleeding from effect on INR and fall risk  Symptom monitoring: monitoring for bleeding signs and symptoms, when to seek medical attention/emergency care, and if you hit your head or have a bad fall seek emergency care  Contact [phone]  with any changes, questions or concerns.     Plan made per [facility] anticoagulation protocol    Catalina Begum RN  Anticoagulation  Clinic  5/23/2024    _______________________________________________________________________     Anticoagulation Episode Summary       Current INR goal:  2.0-3.0   TTR:  78.0% (3 mo)   Target end date:  Indefinite   [facility]    Indications    Acute pulmonary embolism without acute cor pulmonale  unspecified pulmonary embolism type (H) [I26.99]  History of DVT (deep vein thrombosis) [Z86.718]             Comments:               Anticoagulation Care Providers       Provider Role Specialty Phone number    Fredy Veras MD Referring Family Medicine [phone]    Aaseby-Aguilera, Ramona Ann, PA-C Referring Family Medicine [phone]

## 2024-05-28 ENCOUNTER — TRANSFERRED RECORDS (OUTPATIENT)
Dept: HEALTH INFORMATION MANAGEMENT | Facility: CLINIC | Age: 44
End: 2024-05-28
Payer: COMMERCIAL

## 2024-06-04 ENCOUNTER — VIRTUAL VISIT (OUTPATIENT)
Dept: BEHAVIORAL HEALTH | Facility: CLINIC | Age: 44
End: 2024-06-04
Payer: COMMERCIAL

## 2024-06-04 DIAGNOSIS — F33.1 MAJOR DEPRESSIVE DISORDER, RECURRENT EPISODE, MODERATE (H): Primary | ICD-10-CM

## 2024-06-04 PROCEDURE — 90834 PSYTX W PT 45 MINUTES: CPT | Mod: 95

## 2024-06-04 ASSESSMENT — COLUMBIA-SUICIDE SEVERITY RATING SCALE - C-SSRS
SUICIDE, SINCE LAST CONTACT: NO
TOTAL  NUMBER OF INTERRUPTED ATTEMPTS SINCE LAST CONTACT: NO
6. HAVE YOU EVER DONE ANYTHING, STARTED TO DO ANYTHING, OR PREPARED TO DO ANYTHING TO END YOUR LIFE?: NO
1. SINCE LAST CONTACT, HAVE YOU WISHED YOU WERE DEAD OR WISHED YOU COULD GO TO SLEEP AND NOT WAKE UP?: NO
2. HAVE YOU ACTUALLY HAD ANY THOUGHTS OF KILLING YOURSELF?: NO
TOTAL  NUMBER OF ABORTED OR SELF INTERRUPTED ATTEMPTS SINCE LAST CONTACT: NO
ATTEMPT SINCE LAST CONTACT: NO

## 2024-06-04 ASSESSMENT — ANXIETY QUESTIONNAIRES
1. FEELING NERVOUS, ANXIOUS, OR ON EDGE: SEVERAL DAYS
5. BEING SO RESTLESS THAT IT IS HARD TO SIT STILL: SEVERAL DAYS
3. WORRYING TOO MUCH ABOUT DIFFERENT THINGS: SEVERAL DAYS
7. FEELING AFRAID AS IF SOMETHING AWFUL MIGHT HAPPEN: SEVERAL DAYS
GAD7 TOTAL SCORE: 7
7. FEELING AFRAID AS IF SOMETHING AWFUL MIGHT HAPPEN: SEVERAL DAYS
IF YOU CHECKED OFF ANY PROBLEMS ON THIS QUESTIONNAIRE, HOW DIFFICULT HAVE THESE PROBLEMS MADE IT FOR YOU TO DO YOUR WORK, TAKE CARE OF THINGS AT HOME, OR GET ALONG WITH OTHER PEOPLE: SOMEWHAT DIFFICULT
8. IF YOU CHECKED OFF ANY PROBLEMS, HOW DIFFICULT HAVE THESE MADE IT FOR YOU TO DO YOUR WORK, TAKE CARE OF THINGS AT HOME, OR GET ALONG WITH OTHER PEOPLE?: SOMEWHAT DIFFICULT
2. NOT BEING ABLE TO STOP OR CONTROL WORRYING: SEVERAL DAYS
GAD7 TOTAL SCORE: 7
4. TROUBLE RELAXING: SEVERAL DAYS
6. BECOMING EASILY ANNOYED OR IRRITABLE: SEVERAL DAYS
GAD7 TOTAL SCORE: 7

## 2024-06-04 NOTE — PROGRESS NOTES
[facility]                                     Progress Note    Patient Name: Cody Bolton  Date: 24           Service Type: Individual      Session Start Time: 12.00    Session End Time: 12.40     Session Length: 38-52 minutes    Session #: 6    Attendees: Client attended alone    Service Modality:  Video Visit:      Provider verified identity through the following two step process.  Patient provided:  Patient  and Patient address    Telemedicine Visit: The patient's condition can be safely assessed and treated via synchronous audio and visual telemedicine encounter.      Reason for Telemedicine Visit: Patient has requested telehealth visit    Originating Site (Patient Location): Patient's home    Distant Site (Provider Location): [facility]    Consent:  The patient/guardian has verbally consented to: the potential risks and benefits of telemedicine (video visit) versus in person care; bill my insurance or make self-payment for services provided; and responsibility for payment of non-covered services.     Patient would like the video invitation sent by:  My Chart    Mode of Communication:  Video Conference via [employer]    Distant Location (Provider):  Off-site    As the provider I attest to compliance with applicable laws and regulations related to telemedicine.    DATA  Interactive Complexity: No  Crisis: No        Progress Since Last Session (Related to Symptoms / Goals / Homework):   Symptoms: Improving . Patient's PHQ9 score has reduced from a 8 to a 5.     Homework: Partially completed      Episode of Care Goals: Satisfactory progress - CONTEMPLATION (Considering change and yet undecided); Intervened by assessing the negative and positive thinking (ambivalence) about behavior change     Current / Ongoing Stressors and Concerns:  Patient reports a recent fall out of bed that resulted in a trip to the emergency room. Patient  self-rated their current pain level at a 4 out of 10. Patient and provider updated patient's safety plan. Patient discussed his alcohol use and feeling like its worsening. Patient discussed using alcohol as a form of escapism. Patient discussed boredom and loneliness as triggers to alcohol use. Provider offered resources for 12 step recovery meetings and encouraged the patient to get a chemical dependency assessment, however patient declined at this time. Patient discussed relationship dynamics with his girlfriend, Becca. Patient discussed going to Spiritism recently.        Treatment Objective(s) Addressed in This Session:   Decrease frequency and intensity of feeling down, depressed, hopeless       Intervention:   Motivational Interviewing    MI Intervention: Expressed Empathy/Understanding, Supported Autonomy, Collaboration, Evocation, Open-ended questions, and Reflections: simple and complex     Change Talk Expressed by the Patient: Desire to change    Provider Response to Change Talk: E - Evoked more info from patient about behavior change, A - Affirmed patient's thoughts, decisions, or attempts at behavior change, and R - Reflected patient's change talk    Assessments completed prior to visit:  The following assessments were completed by patient for this visit:  PHQ9:       2/19/2024    12:38 PM 3/11/2024     8:09 AM 3/26/2024    11:44 AM 5/7/2024    11:49 AM   PHQ-9 SCORE   PHQ-9 Total Score MyChart 10 (Moderate depression) 9 (Mild depression) 8 (Mild depression) 5 (Mild depression)   PHQ-9 Total Score 10 9 8 5     GAD7:       2/19/2024    12:39 PM 6/4/2024    11:39 AM   MERI-7 SCORE   Total Score 2 (minimal anxiety) 7 (mild anxiety)   Total Score 2 7     PROMIS 10-Global Health (all questions and answers displayed):       2/19/2024    12:53 PM 6/4/2024    11:40 AM   PROMIS 10   In general, would you say your health is: Fair Fair   In general, would you say your quality of life is: Good Poor   In general, how  would you rate your physical health? Fair Fair   In general, how would you rate your mental health, including your mood and your ability to think? Good Fair   In general, how would you rate your satisfaction with your social activities and relationships? Good Fair   In general, please rate how well you carry out your usual social activities and roles Fair Poor   To what extent are you able to carry out your everyday physical activities such as walking, climbing stairs, carrying groceries, or moving a chair? Completely A little   In the past 7 days, how often have you been bothered by emotional problems such as feeling anxious, depressed, or irritable? Sometimes Often   In the past 7 days, how would you rate your fatigue on average? Mild Severe   In the past 7 days, how would you rate your pain on average, where 0 means no pain, and 10 means worst imaginable pain? 5 8   In general, would you say your health is: 2 2   In general, would you say your quality of life is: 3 1   In general, how would you rate your physical health? 2 2   In general, how would you rate your mental health, including your mood and your ability to think? 3 2   In general, how would you rate your satisfaction with your social activities and relationships? 3 2   In general, please rate how well you carry out your usual social activities and roles. (This includes activities at home, at work and in your community, and responsibilities as a parent, child, spouse, employee, friend, etc.) 2 1   To what extent are you able to carry out your everyday physical activities such as walking, climbing stairs, carrying groceries, or moving a chair? 5 2   In the past 7 days, how often have you been bothered by emotional problems such as feeling anxious, depressed, or irritable? 3 4   In the past 7 days, how would you rate your fatigue on average? 2 4   In the past 7 days, how would you rate your pain on average, where 0 means no pain, and 10 means worst  "imaginable pain? 5 8   Global Mental Health Score 12    12 7   Global Physical Health Score 14    14 8   PROMIS TOTAL - SUBSCORES 26    26 15     [place] Suicide Severity Rating Scale (Lifetime/Recent)      2/19/2024     1:03 PM 5/3/2024     8:51 AM 5/4/2024     6:08 PM 5/16/2024     9:52 AM   [place] Suicide Severity Rating (Lifetime/Recent)   Q1 Wished to be Dead (Past Month)  0-->no 1-->yes 0-->no   Q2 Suicidal Thoughts (Past Month)  0-->no 1-->yes 0-->no   Q6 Suicide Behavior (Lifetime)  0-->no 1-->yes 0-->no   Within the Past 3 Months?   1-->yes    Level of Risk per Screen  no risks indicated high risk no risks indicated   Q1 Wish to be Dead (Lifetime) Y      Wish to be Dead Description (Lifetime) \"I never attempted, but I kind of had a plan, but it comes and goes.\" Patient reports passive suicidal ideation.      1. Wish to be Dead (Past 1 Month) N      Q2 Non-Specific Active Suicidal Thoughts (Lifetime) Y      Non-Specific Active Suicidal Thought Description (Lifetime) \"I never attempted, but I kind of had a plan, but it comes and goes.\" Patient reports passive suicidal ideation.      2. Non-Specific Active Suicidal Thoughts (Past 1 Month) N      3. Active Suicidal Ideation with any Methods (Not Plan) Without Intent to Act (Lifetime) N      Q4 Active Suicidal Ideation with Some Intent to Act, Without Specific Plan (Lifetime) N      Q5 Active Suicidal Ideation with Specific Plan and Intent (Lifetime) N      Most Severe Ideation Rating (Lifetime) 2      Description of Most Severe Ideation (Lifetime) Patient reports passive suicidal ideation.      Most Severe Ideation Rating (Past 1 Month) 1      Description of Most Severe Ideation (Past 1 Month) NA      Frequency (Lifetime) 1      Frequency (Past 1 Month) 1      Duration (Lifetime) 1      Duration (Past 1 Month) 1      Controllability (Lifetime) 2      Controllability (Past 1 Month) 1      Deterrents (Lifetime) 1      Deterrents (Past 1 Month) 1      Reasons " for Ideation (Lifetime) 4      Reasons for Ideation (Past 1 Month) 0      Actual Attempt (Lifetime) N      Has subject engaged in non-suicidal self-injurious behavior? (Lifetime) N      Interrupted Attempts (Lifetime) N      Aborted or Self-Interrupted Attempt (Lifetime) N      Preparatory Acts or Behavior (Lifetime) N      Calculated C-SSRS Risk Score (Lifetime/Recent) No Risk Indicated            ASSESSMENT: Current Emotional / Mental Status (status of significant symptoms):   Risk status (Self / Other harm or suicidal ideation)   Patient denies current fears or concerns for personal safety.   Patient reports the following current or recent suicidal ideation or behaviors: Patient continues to endorse some passive suicidal ideations, but denies any plan or intent. Patient and provider updated patient's safety plan and patient committed to safety.   Patient denies current or recent homicidal ideation or behaviors.   Patient denies current or recent self injurious behavior or ideation.   Patient denies other safety concerns.   Patient reports there has been no change in risk factors since their last session.     Patient reports there has been no change in protective factors since their last session.     A safety and risk management plan has been developed including: Patient consented to co-developed safety plan.  Safety and risk management plan was completed - see below.  Patient agreed to use safety plan should any safety concerns arise.  A copy was given to the patient. [facility] Safety Plan completed - see Screenings tab.     Appearance:   Appropriate    Eye Contact:   Fair    Psychomotor Behavior: Normal    Attitude:   Cooperative  Friendly   Orientation:   All   Speech    Rate / Production: Normal/ Responsive    Volume:  Normal    Mood:    Anxious  Depressed    Affect:    Appropriate    Thought Content:  Clear    Thought Form:  Coherent  Logical    Insight:    Fair      Medication Review:   No changes to  "current psychiatric medication(s)     Medication Compliance:   Yes     Changes in Health Issues:   Yes: patient reports a recent fall out of bed that resulted in a trip to the emergency room. Patient rated his current level of pain at a 4/10.       Chemical Use Review:   Substance Use: Problem use continues with no change since last session, Reviewed concerns related to health related substance abuse risk  Provided encouragement towards sobriety     . Patient continues to decline a referral at this time for a chemical dependency evaluation and comprehensive assessment.       Tobacco Use: No current tobacco use.      Diagnosis:  1. Major depressive disorder, recurrent episode, moderate (H)        Collateral Reports Completed:   Not Applicable    PLAN: (Patient Tasks / Therapist Tasks / Other)  Patient will return in 2 weeks for next session. Patient will engage in self-care activities. Patient will work on incorporating more exercise and walking into his routine at least 2 times/ week for 30 minutes. Patient will work on open communication skills and boundary setting, especially with his girlfriend. Patient will work on coping skills to reduce his consumption of alcohol, including listening to music. Patient will utilize the skills of \"delay, distract, decide\" when noticing alcohol cravings. Patient will evaluate his HALT (hungry, angry, lonely, tired) cues that lead to alcohol cravings.       Lucy Rouse, [facility]                                                         ______________________________________________________________________    Individual Treatment Plan    Patient's Name: Cody Bolton  YOB: 1980    Date of Creation: 2/26/24  Date Treatment Plan Last Reviewed/Revised: 6/4/24    DSM5 Diagnoses:   Encounter Diagnosis   Name Primary?    Major depressive disorder, recurrent episode, moderate (H) Yes       Psychosocial / Contextual Factors: Relationship stressors, occupational " dynamics.  PROMIS-10 from encounters over the past 365 days    Encounter date  Last reading 6/4/24  11:40 AM 2/26/24 2/19/2024 12:53 PM 2/19/24  12:53 PM   Global Mental Health Score (P) 7 (P) 12 (P) 12   Global Physical Health Score (P) 8 (P) 14 (P) 14   PROMIS TOTAL - SUBSCORES (P) 15 (P) 26 (P) 26       Referral / Collaboration:  Referral to another professional/service is not indicated at this time..    Anticipated number of session for this episode of care: 9-12 sessions  Anticipation frequency of session: Weekly  Anticipated Duration of each session: 38-52 minutes  Treatment plan will be reviewed in 90 days or when goals have been changed.       MeasurableTreatment Goal(s) related to diagnosis / functional impairment(s)  Goal 1: Patient will reduce depression symptoms as evidenced by a reduction in PHQ9 score to a 7 or less in the next 2 months.    I will know I've met my goal when I'm not having the feeling as often that I just don't want to do life anymore.      Objective #A (Patient Action)    Patient will Decrease frequency and intensity of feeling down, depressed, hopeless.  Status: Continued - Date(s):6/4/24     Intervention(s)  Therapist will teach  coping skills and self-care activities .    Objective #B  Patient will Feel less tired and more energy during the day .  Status: Continued - Date(s):6/4/24     Intervention(s)  Therapist will  teach sleep hygiene and movement-based exercises .    Objective #C  Patient will Increase interest, engagement, and pleasure in doing things.  Status: Continued - Date(s):6/4/24     Intervention(s)  Therapist will  teach distress-tolerance skills. .          Patient has reviewed and agreed to the above plan.      Lucy Rouse, KATRIN on 6/4/2024 at 12:02 PM

## 2024-06-06 ENCOUNTER — LAB (OUTPATIENT)
Dept: LAB | Facility: CLINIC | Age: 44
End: 2024-06-06
Payer: COMMERCIAL

## 2024-06-06 ENCOUNTER — ANTICOAGULATION THERAPY VISIT (OUTPATIENT)
Dept: ANTICOAGULATION | Facility: CLINIC | Age: 44
End: 2024-06-06

## 2024-06-06 DIAGNOSIS — I26.99 ACUTE PULMONARY EMBOLISM WITHOUT ACUTE COR PULMONALE, UNSPECIFIED PULMONARY EMBOLISM TYPE (H): ICD-10-CM

## 2024-06-06 DIAGNOSIS — Z86.718 HISTORY OF DVT (DEEP VEIN THROMBOSIS): ICD-10-CM

## 2024-06-06 DIAGNOSIS — I26.99 ACUTE PULMONARY EMBOLISM WITHOUT ACUTE COR PULMONALE, UNSPECIFIED PULMONARY EMBOLISM TYPE (H): Primary | ICD-10-CM

## 2024-06-06 LAB — INR BLD: 1.2 (ref 0.9–1.1)

## 2024-06-06 PROCEDURE — 36416 COLLJ CAPILLARY BLOOD SPEC: CPT

## 2024-06-06 PROCEDURE — 85610 PROTHROMBIN TIME: CPT

## 2024-06-06 NOTE — PROGRESS NOTES
ANTICOAGULATION MANAGEMENT     Cody WALLACE Young 43 year old male is on warfarin with subtherapeutic INR result. (Goal INR 2.0-3.0)    Recent labs: (last 7 days)     06/06/24  1143   INR 1.2*       ASSESSMENT     Source(s): Chart Review and Patient/Caregiver Call     Warfarin doses taken: Missed dose(s) may be affecting INR  Diet: No new diet changes identified  Medication/supplement changes: None noted  New illness, injury, or hospitalization: No  Signs or symptoms of bleeding or clotting: No  Previous result: Subtherapeutic  Additional findings: None       PLAN     Recommended plan for temporary change(s) affecting INR     Dosing Instructions: booster dose then continue your current warfarin dose with next INR in 5 days       Summary  As of 6/6/2024      Full warfarin instructions:  6/6: 10 mg; Otherwise 5 mg every day   Next INR check:  6/11/2024               Telephone call with Cody who verbalizes understanding and agrees to plan    Check at provider office visit    Education provided:   Taking warfarin: Importance of taking warfarin as instructed  Goal range and lab monitoring: goal range and significance of current result  Contact [phone]  with any changes, questions or concerns.     Plan made per [facility] anticoagulation protocol    Deedee Mccord, RN  Anticoagulation Clinic  6/6/2024    _______________________________________________________________________     Anticoagulation Episode Summary       Current INR goal:  2.0-3.0   TTR:  67.4% (3.4 mo)   Target end date:  Indefinite   Send INR reminders to:  [facility]    Indications    Acute pulmonary embolism without acute cor pulmonale  unspecified pulmonary embolism type (H) [I26.99]  History of DVT (deep vein thrombosis) [Z86.718]             Comments:               Anticoagulation Care Providers       Provider Role Specialty Phone number    Fredy Veras MD Referring Family Medicine [phone]    Aaseby-Aguilera, Ramona Ann, PA-C  [facility] [phone]

## 2024-06-08 ENCOUNTER — APPOINTMENT (OUTPATIENT)
Dept: ULTRASOUND IMAGING | Facility: CLINIC | Age: 44
End: 2024-06-08
Attending: EMERGENCY MEDICINE
Payer: COMMERCIAL

## 2024-06-08 ENCOUNTER — HOSPITAL ENCOUNTER (EMERGENCY)
Facility: CLINIC | Age: 44
Discharge: HOME | End: 2024-06-08
Attending: EMERGENCY MEDICINE | Admitting: EMERGENCY MEDICINE
Payer: COMMERCIAL

## 2024-06-08 VITALS
HEIGHT: 72 IN | TEMPERATURE: 98 F | RESPIRATION RATE: 20 BRPM | BODY MASS INDEX: 42.66 KG/M2 | HEART RATE: 84 BPM | OXYGEN SATURATION: 96 % | WEIGHT: 315 LBS | SYSTOLIC BLOOD PRESSURE: 148 MMHG | DIASTOLIC BLOOD PRESSURE: 101 MMHG

## 2024-06-08 DIAGNOSIS — Z86.718 PERSONAL HISTORY OF DVT (DEEP VEIN THROMBOSIS): ICD-10-CM

## 2024-06-08 DIAGNOSIS — I80.01 THROMBOPHLEBITIS OF SUPERFICIAL VEINS OF RIGHT LOWER EXTREMITY: ICD-10-CM

## 2024-06-08 DIAGNOSIS — R79.1 SUBTHERAPEUTIC INTERNATIONAL NORMALIZED RATIO (INR): ICD-10-CM

## 2024-06-08 LAB
HOLD SPECIMEN: NORMAL
HOLD SPECIMEN: NORMAL
INR PPP: 1.09 (ref 0.85–1.15)

## 2024-06-08 PROCEDURE — 36415 COLL VENOUS BLD VENIPUNCTURE: CPT | Performed by: EMERGENCY MEDICINE

## 2024-06-08 PROCEDURE — 96372 THER/PROPH/DIAG INJ SC/IM: CPT | Performed by: EMERGENCY MEDICINE

## 2024-06-08 PROCEDURE — 85610 PROTHROMBIN TIME: CPT | Performed by: EMERGENCY MEDICINE

## 2024-06-08 PROCEDURE — 93970 EXTREMITY STUDY: CPT

## 2024-06-08 PROCEDURE — 250N000011 HC RX IP 250 OP 636: Mod: JZ | Performed by: EMERGENCY MEDICINE

## 2024-06-08 PROCEDURE — 99285 EMERGENCY DEPT VISIT HI MDM: CPT | Mod: 25

## 2024-06-08 RX ORDER — ENOXAPARIN SODIUM 150 MG/ML
1 INJECTION SUBCUTANEOUS ONCE
Status: COMPLETED | OUTPATIENT
Start: 2024-06-08 | End: 2024-06-08

## 2024-06-08 RX ADMIN — ENOXAPARIN SODIUM 150 MG: 150 INJECTION SUBCUTANEOUS at 05:49

## 2024-06-08 ASSESSMENT — COLUMBIA-SUICIDE SEVERITY RATING SCALE - C-SSRS
1. IN THE PAST MONTH, HAVE YOU WISHED YOU WERE DEAD OR WISHED YOU COULD GO TO SLEEP AND NOT WAKE UP?: NO
2. HAVE YOU ACTUALLY HAD ANY THOUGHTS OF KILLING YOURSELF IN THE PAST MONTH?: NO
6. HAVE YOU EVER DONE ANYTHING, STARTED TO DO ANYTHING, OR PREPARED TO DO ANYTHING TO END YOUR LIFE?: NO

## 2024-06-08 ASSESSMENT — ACTIVITIES OF DAILY LIVING (ADL)
ADLS_ACUITY_SCORE: 38
ADLS_ACUITY_SCORE: 38

## 2024-06-08 NOTE — ED TRIAGE NOTES
Pt reports bilateral swelling in his ankle, sharp stabbing pain in the bottom of his feet and pain in his knees.

## 2024-06-08 NOTE — ED PROVIDER NOTES
"    History     Chief Complaint:  Leg pain and swelling       HPI   Cody Bolton is a 43 year old male with history of DVT on Coumadin and superficial thrombophlebitis of the right lower leg who presents with right lower leg swelling and pain.  He notes that he has been ambulating more frequently and has felt soreness in his ankles, but that in the last several days he has noted right lower leg swelling and pain.  He notes that he has missed \"a few doses\" of Coumadin but has otherwise been compliant.  He denies chest pain, shortness of breath, or any other concerns.        Independent Historian:   none    Review of External Notes:   Reviewed 5/16/2024 hospitalization    ROS:  Review of Systems    Allergies:  No Known Allergies     Medications:    acetaminophen (TYLENOL) 500 MG tablet  buPROPion (WELLBUTRIN XL) 150 MG 24 hr tablet  cloNIDine (CATAPRES) 0.1 MG tablet  gabapentin (NEURONTIN) 300 MG capsule  hydrOXYzine HCl (ATARAX) 25 MG tablet  Lidocaine (LIDOCARE) 4 % Patch  lisinopril (ZESTRIL) 20 MG tablet  metFORMIN (GLUCOPHAGE XR) 500 MG 24 hr tablet  methocarbamol (ROBAXIN) 500 MG tablet  simvastatin (ZOCOR) 20 MG tablet  tirzepatide (MOUNJARO) 5 MG/0.5ML pen  traZODone (DESYREL) 100 MG tablet  warfarin ANTICOAGULANT (COUMADIN) 5 MG tablet        Past History:    Rib fractures  VTE      Physical Exam     Patient Vitals for the past 24 hrs:  Vitals:    06/08/24 0311   BP: (!) 148/101   Pulse: 84   Resp: 20   Temp: 98  F (36.7  C)   TempSrc: Temporal   SpO2: 96%   Weight: (!) 176.6 kg (389 lb 5.3 oz)   Height: 1.829 m (6')         Physical Exam  Constitutional: Alert, attentive  HENT:    Nose: Nose normal.    Mouth/Throat: Oropharynx is clear, mucous membranes are moist  Eyes: EOM are normal.    CV: regular rate and rhythm; no murmurs, rubs or gallups; 2+ DP and PT pulses, brisk distal cap refill  Chest: Effort normal and breath sounds normal.   GI:  There is no tenderness. No distension. Normal bowel " sounds  MSK: Normal range of motion.  Trace pedal edema bilaterally, slightly more prominent on the right lower extremity including the lower leg.  No asymmetric warmth or erythema.  No tenderness to palpation.  No palpable cord.  Neurological: Alert, attentive  5/5 strength to the DF, PF, EHL and FHL motor functions; sensation intact to the DP, SP, T, S and S distributions  Skin: Skin is warm and dry.      Emergency Department Course       Results for orders placed or performed during the hospital encounter of 06/08/24   US Lower Extremity Venous Duplex Bilateral     Status: None    Narrative    EXAM: US LOWER EXTREMITY VENOUS DUPLEX BILATERAL  LOCATION: [facility]  DATE: 6/8/2024    INDICATION: bilateral leg swelling, subtherapeutic INR  COMPARISON: None.  TECHNIQUE: Venous Duplex ultrasound of bilateral lower extremities with and without compression, augmentation and duplex. Color flow and spectral Doppler with waveform analysis performed.    FINDINGS: Exam includes the common femoral, femoral, popliteal veins as well as segmentally visualized deep calf veins and greater saphenous vein.     RIGHT: No deep vein thrombosis. Partially thrombosed superficial venous varicosity in the right lower leg, now appearing partially compressible. No popliteal cyst.    LEFT: No deep vein thrombosis. No superficial thrombophlebitis. No popliteal cyst.      Impression    IMPRESSION:  1.  No deep venous thrombosis in the bilateral lower extremities.   2.  Residual but less bulky right lower leg superficial venous thrombus.   INR     Status: Normal   Result Value Ref Range    INR 1.09 0.85 - 1.15   [place] Draw     Status: None (In process)    Narrative    The following orders were created for panel order [place] Draw.  Procedure                               Abnormality         Status                     ---------                               -----------         ------                     Extra Green Top  (Lithium...[[id number]]                      In process                 Extra Purple Top Tube[[id number]]                            In process                   Please view results for these tests on the individual orders.       Emergency Department Course & Assessments:             Interventions:  Medications   enoxaparin ANTICOAGULANT (LOVENOX) injection 150 mg (has no administration in time range)         Disposition:  The patient was discharged to home.     Impression & Plan        Medical Decision Making:  This a pleasant 43-year-old male with history of VTE on Coumadin who presents for evaluation of right lower extremity swelling and pain.  Exam shows asymmetric swelling to the right lower leg but no erythema, warmth, or other findings suggest cellulitis.  Lower extremity ultrasound is negative for DVT but does show ongoing right lower extremity superficial thrombophlebitis.  INR is clearly subtherapeutic.  I emphasized the importance of compliance with this medication and the dangerous nature of VTE.  Lovenox given in the emergency department.  Plan continue supportive cares for superficial thrombophlebitis and resuming Coumadin at home.  Primary care follow-up in 3 to 5 days return precaution for worse pain, swelling, or any other concerns.      Diagnosis:  Visit Diagnosis, Associated Orders, and Comments     ICD-10-CM    1. Thrombophlebitis of superficial veins of right lower extremity  I80.01       2. Subtherapeutic international normalized ratio (INR)  R79.1       3. Personal history of DVT (deep vein thrombosis)  Z86.718            Zafar Anne MD  06/08/24 0458

## 2024-06-08 NOTE — DISCHARGE INSTRUCTIONS
It was a pleasure taking care of you today. I hope you feel much better soon.  You have a superficial clot in the right lower leg.  Your INR was below therapeutic levels. Please restart coumadin today.  Please follow-up with your primary care doctor in 1 week.  Return immediately for worse pain, swelling, or any other concerns.

## 2024-06-10 ENCOUNTER — DOCUMENTATION ONLY (OUTPATIENT)
Dept: ANTICOAGULATION | Facility: CLINIC | Age: 44
End: 2024-06-10

## 2024-06-10 DIAGNOSIS — I26.99 ACUTE PULMONARY EMBOLISM WITHOUT ACUTE COR PULMONALE, UNSPECIFIED PULMONARY EMBOLISM TYPE (H): Primary | ICD-10-CM

## 2024-06-10 DIAGNOSIS — Z86.718 HISTORY OF DVT (DEEP VEIN THROMBOSIS): ICD-10-CM

## 2024-06-10 NOTE — PROGRESS NOTES
ANTICOAGULATION  MANAGEMENT: Discharge Review    Cody Bolton chart reviewed for anticoagulation continuity of care    Emergency room visit on 6/6/24 for leg pain.    Discharge disposition: Home    Results:    Recent labs: (last 7 days)     06/06/24  1143 06/08/24  0349   INR 1.2* 1.09     Anticoagulation inpatient management:     Single dose of lovenox given    Anticoagulation discharge instructions:     Warfarin dosing: home regimen continued   Bridging: No   INR goal change: No      Medication changes affecting anticoagulation: No    Additional factors affecting anticoagulation: Yes: ongoing right lower extremity superficial thrombophlebitis (negative for DVT and cellulitis)    3 missed doses this week leading to 1.2 POC INR on 6/6 (booster advised this date). Venous INR in ED reading slightly lower 2 days later. Follow up already scheduled for 6/11. Not advising an additional boost as result is already 2 days old at time of ADT completion and boost should be taking effect at this time.      PLAN     No adjustment to anticoagulation plan needed    Recommended follow up is scheduled  Patient not contacted    No adjustment to Anticoagulation Calendar was required    Rosangela Martines RN

## 2024-06-11 ENCOUNTER — ANTICOAGULATION THERAPY VISIT (OUTPATIENT)
Dept: ANTICOAGULATION | Facility: CLINIC | Age: 44
End: 2024-06-11

## 2024-06-11 ENCOUNTER — OFFICE VISIT (OUTPATIENT)
Dept: FAMILY MEDICINE | Facility: CLINIC | Age: 44
End: 2024-06-11
Payer: COMMERCIAL

## 2024-06-11 VITALS
WEIGHT: 315 LBS | OXYGEN SATURATION: 96 % | BODY MASS INDEX: 42.66 KG/M2 | HEART RATE: 73 BPM | TEMPERATURE: 99.2 F | DIASTOLIC BLOOD PRESSURE: 88 MMHG | RESPIRATION RATE: 16 BRPM | SYSTOLIC BLOOD PRESSURE: 138 MMHG | HEIGHT: 72 IN

## 2024-06-11 DIAGNOSIS — E66.01 MORBID OBESITY (H): ICD-10-CM

## 2024-06-11 DIAGNOSIS — F10.10 ALCOHOL ABUSE, EPISODIC DRINKING BEHAVIOR: ICD-10-CM

## 2024-06-11 DIAGNOSIS — Z86.711 HISTORY OF PULMONARY EMBOLISM: ICD-10-CM

## 2024-06-11 DIAGNOSIS — N17.9 AKI (ACUTE KIDNEY INJURY) (H): ICD-10-CM

## 2024-06-11 DIAGNOSIS — Z86.718 HISTORY OF DVT (DEEP VEIN THROMBOSIS): ICD-10-CM

## 2024-06-11 DIAGNOSIS — E78.2 MIXED HYPERLIPIDEMIA: ICD-10-CM

## 2024-06-11 DIAGNOSIS — E11.9 TYPE 2 DIABETES MELLITUS WITHOUT COMPLICATION, WITHOUT LONG-TERM CURRENT USE OF INSULIN (H): Primary | ICD-10-CM

## 2024-06-11 DIAGNOSIS — Z86.718 HISTORY OF DVT (DEEP VEIN THROMBOSIS): Primary | ICD-10-CM

## 2024-06-11 PROBLEM — M62.82 NON-TRAUMATIC RHABDOMYOLYSIS: Status: RESOLVED | Noted: 2024-05-04 | Resolved: 2024-06-11

## 2024-06-11 PROBLEM — Z09 HOSPITAL DISCHARGE FOLLOW-UP: Status: RESOLVED | Noted: 2024-02-09 | Resolved: 2024-06-11

## 2024-06-11 PROBLEM — I26.99 ACUTE PULMONARY EMBOLISM WITHOUT ACUTE COR PULMONALE, UNSPECIFIED PULMONARY EMBOLISM TYPE (H): Status: RESOLVED | Noted: 2024-02-05 | Resolved: 2024-06-11

## 2024-06-11 PROBLEM — S22.41XA CLOSED FRACTURE OF MULTIPLE RIBS OF RIGHT SIDE, INITIAL ENCOUNTER: Status: RESOLVED | Noted: 2024-05-16 | Resolved: 2024-06-11

## 2024-06-11 PROBLEM — I80.01 THROMBOPHLEBITIS OF SUPERFICIAL VEINS OF RIGHT LOWER EXTREMITY: Status: RESOLVED | Noted: 2024-02-05 | Resolved: 2024-06-11

## 2024-06-11 PROBLEM — R79.89 ELEVATED LACTIC ACID LEVEL: Status: RESOLVED | Noted: 2024-05-16 | Resolved: 2024-06-11

## 2024-06-11 PROBLEM — R41.82 ALTERED MENTAL STATUS, UNSPECIFIED ALTERED MENTAL STATUS TYPE: Status: RESOLVED | Noted: 2024-05-04 | Resolved: 2024-06-11

## 2024-06-11 LAB
HBA1C MFR BLD: 7.2 % (ref 0–5.6)
INR BLD: 2 (ref 0.9–1.1)

## 2024-06-11 PROCEDURE — 85610 PROTHROMBIN TIME: CPT | Performed by: FAMILY MEDICINE

## 2024-06-11 PROCEDURE — 36415 COLL VENOUS BLD VENIPUNCTURE: CPT | Performed by: FAMILY MEDICINE

## 2024-06-11 PROCEDURE — 80053 COMPREHEN METABOLIC PANEL: CPT | Performed by: FAMILY MEDICINE

## 2024-06-11 PROCEDURE — 99214 OFFICE O/P EST MOD 30 MIN: CPT | Performed by: FAMILY MEDICINE

## 2024-06-11 PROCEDURE — 83036 HEMOGLOBIN GLYCOSYLATED A1C: CPT | Performed by: FAMILY MEDICINE

## 2024-06-11 PROCEDURE — 80061 LIPID PANEL: CPT | Performed by: FAMILY MEDICINE

## 2024-06-11 RX ORDER — NALTREXONE HYDROCHLORIDE 50 MG/1
50 TABLET, FILM COATED ORAL DAILY
Qty: 90 TABLET | Refills: 3 | Status: SHIPPED | OUTPATIENT
Start: 2024-06-11 | End: 2024-08-09

## 2024-06-11 NOTE — PROGRESS NOTES
ANTICOAGULATION MANAGEMENT     Cody WALLACE Young 43 year old male is on warfarin with therapeutic INR result. (Goal INR 2.0-3.0)    Recent labs: (last 7 days)     06/11/24  1037   INR 2.0*       ASSESSMENT     Source(s): Chart Review and Patient/Caregiver Call     Warfarin doses taken: Warfarin taken as instructed  Diet: No new diet changes identified  Medication/supplement changes:  Has not been able to obtain mounjaro so RX sent for Ozempic today. Both can affect INR in unpredictable ways. RX also sent for Naltrexone to help with alcohol  cravings, no interaction anticipated  New illness, injury, or hospitalization: Yes: ED visit on 6/8 for leg pain which is an ongoing issue  Signs or symptoms of bleeding or clotting: No  Previous result: Subtherapeutic  Additional findings: None       PLAN     Recommended plan for temporary change(s) and ongoing change(s) affecting INR     Dosing Instructions: Continue your current warfarin dose with next INR in 1-2 weeks       Summary  As of 6/11/2024      Full warfarin instructions:  5 mg every day   Next INR check:                 Telephone call with Cody who verbalizes understanding and agrees to plan    Lab visit scheduled    Education provided:   Healthy lifestyle considerations: potential interaction between warfarin and alcohol  Interaction IS anticipated between warfarin and ozempic, but it is unpredictable  Contact [phone]  with any changes, questions or concerns.     Plan made per [facility] anticoagulation protocol    Renuka Rocha RN  Anticoagulation Clinic  6/11/2024    _______________________________________________________________________     Anticoagulation Episode Summary       Current INR goal:  2.0-3.0   TTR:  64.4% (3.6 mo)   Target end date:  Indefinite   Send INR reminders to:  [facility]    Indications    Acute pulmonary embolism without acute cor pulmonale  unspecified pulmonary embolism type (H) (Resolved) [I26.99]  History of DVT (deep vein  thrombosis) [Z86.768]             Comments:               Anticoagulation Care Providers       Provider Role Specialty Phone number    Fredy Veras MD Referring Family Medicine [phone]    Aaseby-Aguilera, Ramona Ann, PA-C Referring Family Medicine [phone]

## 2024-06-11 NOTE — PROGRESS NOTES
Assessment & Plan     Type 2 diabetes mellitus without complication, without long-term current use of insulin (H) - much improved. Continue metformin, resume GLP1 - sent ozempic since he has been unable to obtain mounjaro.   - semaglutide (OZEMPIC) 2 MG/3ML pen; Inject 0.5 mg Subcutaneous every 7 days for 90 days  - Hemoglobin A1c; Future  - Hemoglobin A1c    Alcohol abuse, episodic drinking behavior - offered naltrexone to help with alcohol cravings. Currently, he has no alcohol in his home.   - naltrexone (DEPADE/REVIA) 50 MG tablet; Take 1 tablet (50 mg) by mouth daily    CATHLEEN (acute kidney injury) (H24) - surveillance labs to ensure this has corrected.   - Comprehensive metabolic panel (BMP + Alb, Alk Phos, ALT, AST, Total. Bili, TP); Future  - Comprehensive metabolic panel (BMP + Alb, Alk Phos, ALT, AST, Total. Bili, TP)    Mixed hyperlipidemia  - Lipid panel reflex to direct LDL Fasting; Future  - Lipid panel reflex to direct LDL Fasting    History of DVT (deep vein thrombosis)  - INR point of care    History of pulmonary embolism  - INR point of care    Morbid obesity - encouraged weight loss to help with his chronic health conditions      Suhail Ross is a 43 year old, presenting for the following health issues:  RECHECK        6/11/2024     9:44 AM   Additional Questions   Roomed by Monico Cramer   Accompanied by self     History of Present Illness       Back Pain:  He presents for follow up of back pain. Patient's back pain is a chronic problem.  Location of back pain:  Left lower back, right upper back, right side of neck, left side of neck and right shoulder  Description of back pain: dull ache, sharp, shooting and stabbing  Back pain spreads: left thigh, right shoulder, right side of neck and left side of neck    Since patient first noticed back pain, pain is: gradually worsening  Does back pain interfere with his job:  Yes       Mental Health Follow-up:  Patient presents to follow-up on  Depression & Anxiety.Patient's depression since last visit has been:  Worse  The patient is not having other symptoms associated with depression.  Patient's anxiety since last visit has been:  Better  The patient is not having other symptoms associated with anxiety.  Any significant life events: relationship concerns, job concerns, financial concerns, grief or loss and health concerns  Patient is not feeling anxious or having panic attacks.  Patient has concerns about alcohol or drug use.    Diabetes:   He presents for follow up of diabetes.    He is not checking blood glucose.         He has no concerns regarding his diabetes at this time.  He is having numbness in feet.            Hyperlipidemia:  He presents for follow up of hyperlipidemia.   He is taking medication to lower cholesterol. He is not having myalgia or other side effects to statin medications.    Hypertension: He presents for follow up of hypertension.  He does not check blood pressure  regularly outside of the clinic. Outside blood pressures have been over 140/90. He does not follow a low salt diet.     Reason for visit:  Follow up on continuing care and hospital visits    He eats 0-1 servings of fruits and vegetables daily.He consumes 0 sweetened beverage(s) daily.He exercises with enough effort to increase his heart rate 9 or less minutes per day.  He exercises with enough effort to increase his heart rate 3 or less days per week. He is missing 2 dose(s) of medications per week.          Review of Systems  Constitutional, HEENT, cardiovascular, pulmonary, gi and gu systems are negative, except as otherwise noted.      Objective    /88 (BP Location: Right arm, Patient Position: Chair, Cuff Size: Adult Large)   Pulse 73   Temp 99.2  F (37.3  C) (Oral)   Resp 16   Ht 1.829 m (6')   Wt (!) 171.9 kg (379 lb)   SpO2 96%   BMI 51.40 kg/m    Body mass index is 51.4 kg/m .  Physical Exam   GENERAL: alert and no distress        Lab Results    Component Value Date    A1C 7.2 06/11/2024    A1C 9.1 02/04/2024    A1C 5.6 08/15/2017       Signed Electronically by: Yovana Felipe MD

## 2024-06-12 LAB
ALBUMIN SERPL BCG-MCNC: 4.1 G/DL (ref 3.5–5.2)
ALP SERPL-CCNC: 132 U/L (ref 40–150)
ALT SERPL W P-5'-P-CCNC: 33 U/L (ref 0–70)
ANION GAP SERPL CALCULATED.3IONS-SCNC: 10 MMOL/L (ref 7–15)
AST SERPL W P-5'-P-CCNC: 31 U/L (ref 0–45)
BILIRUB SERPL-MCNC: 0.4 MG/DL
BUN SERPL-MCNC: 11.6 MG/DL (ref 6–20)
CALCIUM SERPL-MCNC: 8.9 MG/DL (ref 8.6–10)
CHLORIDE SERPL-SCNC: 106 MMOL/L (ref 98–107)
CHOLEST SERPL-MCNC: 183 MG/DL
CREAT SERPL-MCNC: 0.76 MG/DL (ref 0.67–1.17)
DEPRECATED HCO3 PLAS-SCNC: 23 MMOL/L (ref 22–29)
EGFRCR SERPLBLD CKD-EPI 2021: >90 ML/MIN/1.73M2
FASTING STATUS PATIENT QL REPORTED: YES
FASTING STATUS PATIENT QL REPORTED: YES
GLUCOSE SERPL-MCNC: 138 MG/DL (ref 70–99)
HDLC SERPL-MCNC: 49 MG/DL
LDLC SERPL CALC-MCNC: 107 MG/DL
NONHDLC SERPL-MCNC: 134 MG/DL
POTASSIUM SERPL-SCNC: 4.1 MMOL/L (ref 3.4–5.3)
PROT SERPL-MCNC: 7 G/DL (ref 6.4–8.3)
SODIUM SERPL-SCNC: 139 MMOL/L (ref 135–145)
TRIGL SERPL-MCNC: 134 MG/DL

## 2024-06-12 NOTE — PROGRESS NOTES
Spoke with patient, he had to call back , if calls please help schedule follow up in 3 months with Dr Destinee Gutierrez/

## 2024-06-18 ENCOUNTER — VIRTUAL VISIT (OUTPATIENT)
Dept: BEHAVIORAL HEALTH | Facility: CLINIC | Age: 44
End: 2024-06-18
Payer: COMMERCIAL

## 2024-06-18 DIAGNOSIS — F33.1 MAJOR DEPRESSIVE DISORDER, RECURRENT EPISODE, MODERATE (H): Primary | ICD-10-CM

## 2024-06-18 PROCEDURE — 90834 PSYTX W PT 45 MINUTES: CPT | Mod: 95

## 2024-06-18 NOTE — PROGRESS NOTES
[facility]                                     Progress Note    Patient Name: Cody Bolton  Date: 24           Service Type: Individual      Session Start Time: 1.58    Session End Time: 2.41     Session Length: 38-52 minutes    Session #: 7    Attendees: Client attended alone    Service Modality:  Video Visit:      Provider verified identity through the following two step process.  Patient provided:  Patient  and Patient address    Telemedicine Visit: The patient's condition can be safely assessed and treated via synchronous audio and visual telemedicine encounter.      Reason for Telemedicine Visit: Patient has requested telehealth visit    Originating Site (Patient Location): Patient's home    Distant Site (Provider Location): [facility]    Consent:  The patient/guardian has verbally consented to: the potential risks and benefits of telemedicine (video visit) versus in person care; bill my insurance or make self-payment for services provided; and responsibility for payment of non-covered services.     Patient would like the video invitation sent by:  My Chart    Mode of Communication:  Video Conference via [employer]well    Distant Location (Provider):  Off-site    As the provider I attest to compliance with applicable laws and regulations related to telemedicine.    DATA  Interactive Complexity: No  Crisis: No        Progress Since Last Session (Related to Symptoms / Goals / Homework):   Symptoms: Worsening . Patient's PHQ9 score has recently increased from a 5 to a 8.    Homework: Partially completed      Episode of Care Goals: Satisfactory progress - CONTEMPLATION (Considering change and yet undecided); Intervened by assessing the negative and positive thinking (ambivalence) about behavior change     Current / Ongoing Stressors and Concerns:  Patient discussed boredom and loneliness being a trigger to alcohol use. Patient discussed  using alcohol as a form of escapism. Patient discussed starting Naltrexone for his alcohol use. Patient discussed attending physical therapy for his back pain. Patient discussed sleep struggles. Patient discussed ending his relationship with his girlfriend, Becca. Patient discussed continuing to text message his now ex-girlfriend. Patient discussed going to [employer] recently. Patient discussed workplace dynamics. Patient discussed his son being home from college for the Summer.       Treatment Objective(s) Addressed in This Session:   Decrease frequency and intensity of feeling down, depressed, hopeless       Intervention:   Motivational Interviewing    MI Intervention: Expressed Empathy/Understanding, Supported Autonomy, Collaboration, Evocation, Open-ended questions, and Reflections: simple and complex     Change Talk Expressed by the Patient: Desire to change    Provider Response to Change Talk: E - Evoked more info from patient about behavior change, A - Affirmed patient's thoughts, decisions, or attempts at behavior change, and R - Reflected patient's change talk    Assessments completed prior to visit:  The following assessments were completed by patient for this visit:  PHQ9:       2/19/2024    12:38 PM 3/11/2024     8:09 AM 3/26/2024    11:44 AM 5/7/2024    11:49 AM 6/18/2024     1:50 PM   PHQ-9 SCORE   PHQ-9 Total Score MyChart 10 (Moderate depression) 9 (Mild depression) 8 (Mild depression) 5 (Mild depression) 8 (Mild depression)   PHQ-9 Total Score 10 9 8 5 8     GAD7:       2/19/2024    12:39 PM 6/4/2024    11:39 AM   MERI-7 SCORE   Total Score 2 (minimal anxiety) 7 (mild anxiety)   Total Score 2 7     PROMIS 10-Global Health (all questions and answers displayed):       2/19/2024    12:53 PM 6/4/2024    11:40 AM   PROMIS 10   In general, would you say your health is: Fair Fair   In general, would you say your quality of life is: Good Poor   In general, how would you rate your physical health? Fair Fair   In  general, how would you rate your mental health, including your mood and your ability to think? Good Fair   In general, how would you rate your satisfaction with your social activities and relationships? Good Fair   In general, please rate how well you carry out your usual social activities and roles Fair Poor   To what extent are you able to carry out your everyday physical activities such as walking, climbing stairs, carrying groceries, or moving a chair? Completely A little   In the past 7 days, how often have you been bothered by emotional problems such as feeling anxious, depressed, or irritable? Sometimes Often   In the past 7 days, how would you rate your fatigue on average? Mild Severe   In the past 7 days, how would you rate your pain on average, where 0 means no pain, and 10 means worst imaginable pain? 5 8   In general, would you say your health is: 2 2   In general, would you say your quality of life is: 3 1   In general, how would you rate your physical health? 2 2   In general, how would you rate your mental health, including your mood and your ability to think? 3 2   In general, how would you rate your satisfaction with your social activities and relationships? 3 2   In general, please rate how well you carry out your usual social activities and roles. (This includes activities at home, at work and in your community, and responsibilities as a parent, child, spouse, employee, friend, etc.) 2 1   To what extent are you able to carry out your everyday physical activities such as walking, climbing stairs, carrying groceries, or moving a chair? 5 2   In the past 7 days, how often have you been bothered by emotional problems such as feeling anxious, depressed, or irritable? 3 4   In the past 7 days, how would you rate your fatigue on average? 2 4   In the past 7 days, how would you rate your pain on average, where 0 means no pain, and 10 means worst imaginable pain? 5 8   Global Mental Health Score 12    12  "7   Global Physical Health Score 14    14 8   PROMIS TOTAL - SUBSCORES 26    26 15     [place] Suicide Severity Rating Scale (Lifetime/Recent)      2/19/2024     1:03 PM 5/3/2024     8:51 AM 5/4/2024     6:08 PM 5/16/2024     9:52 AM 6/8/2024     3:11 AM   [place] Suicide Severity Rating (Lifetime/Recent)   Q1 Wished to be Dead (Past Month)  0-->no 1-->yes 0-->no 0-->no   Q2 Suicidal Thoughts (Past Month)  0-->no 1-->yes 0-->no 0-->no   Q6 Suicide Behavior (Lifetime)  0-->no 1-->yes 0-->no 0-->no   Within the Past 3 Months?   1-->yes     Level of Risk per Screen  no risks indicated high risk no risks indicated no risks indicated   Q1 Wish to be Dead (Lifetime) Y       Wish to be Dead Description (Lifetime) \"I never attempted, but I kind of had a plan, but it comes and goes.\" Patient reports passive suicidal ideation.       1. Wish to be Dead (Past 1 Month) N       Q2 Non-Specific Active Suicidal Thoughts (Lifetime) Y       Non-Specific Active Suicidal Thought Description (Lifetime) \"I never attempted, but I kind of had a plan, but it comes and goes.\" Patient reports passive suicidal ideation.       2. Non-Specific Active Suicidal Thoughts (Past 1 Month) N       3. Active Suicidal Ideation with any Methods (Not Plan) Without Intent to Act (Lifetime) N       Q4 Active Suicidal Ideation with Some Intent to Act, Without Specific Plan (Lifetime) N       Q5 Active Suicidal Ideation with Specific Plan and Intent (Lifetime) N       Most Severe Ideation Rating (Lifetime) 2       Description of Most Severe Ideation (Lifetime) Patient reports passive suicidal ideation.       Most Severe Ideation Rating (Past 1 Month) 1       Description of Most Severe Ideation (Past 1 Month) NA       Frequency (Lifetime) 1       Frequency (Past 1 Month) 1       Duration (Lifetime) 1       Duration (Past 1 Month) 1       Controllability (Lifetime) 2       Controllability (Past 1 Month) 1       Deterrents (Lifetime) 1       Deterrents (Past 1 " Month) 1       Reasons for Ideation (Lifetime) 4       Reasons for Ideation (Past 1 Month) 0       Actual Attempt (Lifetime) N       Has subject engaged in non-suicidal self-injurious behavior? (Lifetime) N       Interrupted Attempts (Lifetime) N       Aborted or Self-Interrupted Attempt (Lifetime) N       Preparatory Acts or Behavior (Lifetime) N       Calculated C-SSRS Risk Score (Lifetime/Recent) No Risk Indicated             ASSESSMENT: Current Emotional / Mental Status (status of significant symptoms):   Risk status (Self / Other harm or suicidal ideation)   Patient denies current fears or concerns for personal safety.   Patient reports the following current or recent suicidal ideation or behaviors: Patient continues to endorse some passive suicidal ideations, but denies any plan or intent. Patient and provider updated patient's safety plan and patient committed to safety.   Patient denies current or recent homicidal ideation or behaviors.   Patient denies current or recent self injurious behavior or ideation.   Patient denies other safety concerns.   Patient reports there has been no change in risk factors since their last session.     Patient reports there has been no change in protective factors since their last session.     A safety and risk management plan has been developed including: Patient consented to co-developed safety plan.  Safety and risk management plan was completed - see below.  Patient agreed to use safety plan should any safety concerns arise.  A copy was given to the patient. [facility] Safety Plan completed - see Screenings tab.     Appearance:   Appropriate    Eye Contact:   Fair    Psychomotor Behavior: Normal    Attitude:   Cooperative  Friendly   Orientation:   All   Speech    Rate / Production: Normal/ Responsive    Volume:  Normal    Mood:    Anxious  Depressed    Affect:    Appropriate    Thought Content:  Clear    Thought Form:  Coherent  Logical    Insight:    Fair  "     Medication Review:   Changes to psychiatric medications, see updated Medication List in EPIC.      Medication Compliance:   Yes     Changes in Health Issues:   None reported     Chemical Use Review:   Substance Use: decrease in alcohol .  Patient reports frequency of use : Patient reports that he has been sober from alcohol since 6/11/24 since starting his Naltrexone medication.  Stage of Change: Contemplation  Provided encouragement towards sobriety  Provided support and affirmation for steps taken towards sobriety    . Patient continues to decline a referral at this time for a chemical dependency evaluation and comprehensive assessment.       Tobacco Use: No current tobacco use.      Diagnosis:  1. Major depressive disorder, recurrent episode, moderate (H)        Collateral Reports Completed:   Not Applicable    PLAN: (Patient Tasks / Therapist Tasks / Other)  Patient will return in 2 weeks for next session. Patient will engage in self-care activities. Patient will work on incorporating more exercise and walking into his routine at least 2 times/ week for 30 minutes. Patient will work on open communication skills and boundary setting. Patient will work on coping skills to reduce his consumption of alcohol, including listening to music. Patient will utilize the skills of \"delay, distract, decide\" when noticing alcohol cravings. Patient will evaluate his HALT (hungry, angry, lonely, tired) cues that lead to alcohol cravings.      Lucy Rouse, [facility]                                                         ______________________________________________________________________    Individual Treatment Plan    Patient's Name: Cody Bolton  YOB: 1980    Date of Creation: 2/26/24  Date Treatment Plan Last Reviewed/Revised: 6/4/24    DSM5 Diagnoses:   Encounter Diagnosis   Name Primary?    Major depressive disorder, recurrent episode, moderate (H) Yes       Psychosocial / Contextual Factors: " Relationship stressors, occupational dynamics.  PROMIS-10 from encounters over the past 365 days    Encounter date  Last reading 6/4/24  11:40 AM 2/26/24 2/19/2024 12:53 PM 2/19/24  12:53 PM   Global Mental Health Score (P) 7 (P) 12 (P) 12   Global Physical Health Score (P) 8 (P) 14 (P) 14   PROMIS TOTAL - SUBSCORES (P) 15 (P) 26 (P) 26       Referral / Collaboration:  Referral to another professional/service is not indicated at this time..    Anticipated number of session for this episode of care: 9-12 sessions  Anticipation frequency of session: Weekly  Anticipated Duration of each session: 38-52 minutes  Treatment plan will be reviewed in 90 days or when goals have been changed.       MeasurableTreatment Goal(s) related to diagnosis / functional impairment(s)  Goal 1: Patient will reduce depression symptoms as evidenced by a reduction in PHQ9 score to a 7 or less in the next 2 months.    I will know I've met my goal when I'm not having the feeling as often that I just don't want to do life anymore.      Objective #A (Patient Action)    Patient will Decrease frequency and intensity of feeling down, depressed, hopeless.  Status: Continued - Date(s):6/4/24     Intervention(s)  Therapist will teach  coping skills and self-care activities .    Objective #B  Patient will Feel less tired and more energy during the day .  Status: Continued - Date(s):6/4/24     Intervention(s)  Therapist will  teach sleep hygiene and movement-based exercises .    Objective #C  Patient will Increase interest, engagement, and pleasure in doing things.  Status: Continued - Date(s):6/4/24     Intervention(s)  Therapist will  teach distress-tolerance skills. .          Patient has reviewed and agreed to the above plan.      Lucy Rouse, [facility] on 6/4/2024 at 12:02 PM

## 2024-06-25 ENCOUNTER — LAB (OUTPATIENT)
Dept: LAB | Facility: CLINIC | Age: 44
End: 2024-06-25
Payer: COMMERCIAL

## 2024-06-25 ENCOUNTER — ANTICOAGULATION THERAPY VISIT (OUTPATIENT)
Dept: ANTICOAGULATION | Facility: CLINIC | Age: 44
End: 2024-06-25

## 2024-06-25 DIAGNOSIS — Z86.718 HISTORY OF DVT (DEEP VEIN THROMBOSIS): Primary | ICD-10-CM

## 2024-06-25 DIAGNOSIS — Z86.711 HISTORY OF PULMONARY EMBOLISM: ICD-10-CM

## 2024-06-25 DIAGNOSIS — Z86.718 HISTORY OF DVT (DEEP VEIN THROMBOSIS): ICD-10-CM

## 2024-06-25 LAB — INR BLD: 1.9 (ref 0.9–1.1)

## 2024-06-25 PROCEDURE — 36416 COLLJ CAPILLARY BLOOD SPEC: CPT

## 2024-06-25 PROCEDURE — 85610 PROTHROMBIN TIME: CPT

## 2024-06-25 NOTE — PROGRESS NOTES
ANTICOAGULATION MANAGEMENT     Cody WALLACE Young 43 year old male is on warfarin with subtherapeutic INR result. (Goal INR 2.0-3.0)    Recent labs: (last 7 days)     06/25/24  1142   INR 1.9*       ASSESSMENT     Source(s): Chart Review and Patient/Caregiver Call     Warfarin doses taken: Warfarin taken as instructed  Diet: Change in alcohol intake may be affecting INR. Has not had alcohol in 2 weeks, plans to continue abstaining.    Medication/supplement changes: None noted  New illness, injury, or hospitalization: No  Signs or symptoms of bleeding or clotting: No  Previous result: Therapeutic last visit; previously outside of goal range  Additional findings: None       PLAN     Recommended plan for ongoing change(s) affecting INR     Dosing Instructions: Increase your warfarin dose (7.1% change) with next INR in 2 weeks       Summary  As of 6/25/2024      Full warfarin instructions:  7.5 mg every Tue; 5 mg all other days   Next INR check:  7/9/2024               Telephone call with Cody who verbalizes understanding and agrees to plan    Lab visit scheduled    Education provided:   Please call back if any changes to your diet, medications or how you've been taking warfarin    Plan made per [facility] anticoagulation protocol    Gerson Baker RN  Anticoagulation Clinic  6/25/2024    _______________________________________________________________________     Anticoagulation Episode Summary       Current INR goal:  2.0-3.0   TTR:  57.0% (4.1 mo)   Target end date:  Indefinite   Send INR reminders to:  [facility]    Indications    Acute pulmonary embolism without acute cor pulmonale  unspecified pulmonary embolism type (H) (Resolved) [I26.99]  History of DVT (deep vein thrombosis) [Z86.718]             Comments:               Anticoagulation Care Providers       Provider Role Specialty Phone number    Fredy Veras MD Referring Family Medicine [phone]    Aaseby-Aguilera, Ramona Ann, PA-C Referring  Family Medicine [phone]

## 2024-07-02 ENCOUNTER — VIRTUAL VISIT (OUTPATIENT)
Dept: BEHAVIORAL HEALTH | Facility: CLINIC | Age: 44
End: 2024-07-02
Payer: COMMERCIAL

## 2024-07-02 DIAGNOSIS — F33.1 MAJOR DEPRESSIVE DISORDER, RECURRENT EPISODE, MODERATE (H): Primary | ICD-10-CM

## 2024-07-02 PROCEDURE — 90834 PSYTX W PT 45 MINUTES: CPT | Mod: 95

## 2024-07-02 ASSESSMENT — PATIENT HEALTH QUESTIONNAIRE - PHQ9
SUM OF ALL RESPONSES TO PHQ QUESTIONS 1-9: 14
10. IF YOU CHECKED OFF ANY PROBLEMS, HOW DIFFICULT HAVE THESE PROBLEMS MADE IT FOR YOU TO DO YOUR WORK, TAKE CARE OF THINGS AT HOME, OR GET ALONG WITH OTHER PEOPLE: EXTREMELY DIFFICULT
SUM OF ALL RESPONSES TO PHQ QUESTIONS 1-9: 14

## 2024-07-02 NOTE — PROGRESS NOTES
[facility]                                     Progress Note    Patient Name: Cody Bolton  Date: 24           Service Type: Individual      Session Start Time: 11.55    Session End Time: 12.33     Session Length: 38-52 minutes    Session #: 8    Attendees: Client attended alone    Service Modality:  Video Visit:      Provider verified identity through the following two step process.  Patient provided:  Patient  and Patient address    Telemedicine Visit: The patient's condition can be safely assessed and treated via synchronous audio and visual telemedicine encounter.      Reason for Telemedicine Visit: Patient has requested telehealth visit    Originating Site (Patient Location): Patient's home    Distant Site (Provider Location): [facility]    Consent:  The patient/guardian has verbally consented to: the potential risks and benefits of telemedicine (video visit) versus in person care; bill my insurance or make self-payment for services provided; and responsibility for payment of non-covered services.     Patient would like the video invitation sent by:  My Chart    Mode of Communication:  Video Conference via [employer]well    Distant Location (Provider):  Off-site    As the provider I attest to compliance with applicable laws and regulations related to telemedicine.    DATA  Interactive Complexity: No  Crisis: No        Progress Since Last Session (Related to Symptoms / Goals / Homework):   Symptoms: Worsening . Patient's PHQ9 score has recently increased to a 14.    Homework: Partially completed      Episode of Care Goals: Satisfactory progress - CONTEMPLATION (Considering change and yet undecided); Intervened by assessing the negative and positive thinking (ambivalence) about behavior change     Current / Ongoing Stressors and Concerns:  Patient discussed the recent one year anniversary of his mother's passing. Patient discussed signing  up for an [employer] dating profile. Patient discussed continuing communication with his ex-girlfriend, Becca. Patient discussed boredom and loneliness being a trigger to alcohol use. Patient discussed returning to alcohol use. Patient discussed his back pain peaking at a 9/10. Patient discussed sleep struggles. Patient discussed his son being home from college until 7/13.        Treatment Objective(s) Addressed in This Session:   Decrease frequency and intensity of feeling down, depressed, hopeless       Intervention:   Motivational Interviewing    MI Intervention: Expressed Empathy/Understanding, Supported Autonomy, Collaboration, Evocation, Open-ended questions, and Reflections: simple and complex     Change Talk Expressed by the Patient: Desire to change    Provider Response to Change Talk: E - Evoked more info from patient about behavior change, A - Affirmed patient's thoughts, decisions, or attempts at behavior change, and R - Reflected patient's change talk    Assessments completed prior to visit:  The following assessments were completed by patient for this visit:  PHQ9:       2/19/2024    12:38 PM 3/11/2024     8:09 AM 3/26/2024    11:44 AM 5/7/2024    11:49 AM 6/18/2024     1:50 PM 7/2/2024    11:46 AM   PHQ-9 SCORE   PHQ-9 Total Score MyChart 10 (Moderate depression) 9 (Mild depression) 8 (Mild depression) 5 (Mild depression) 8 (Mild depression) 14 (Moderate depression)   PHQ-9 Total Score 10 9 8 5 8 14     GAD7:       2/19/2024    12:39 PM 6/4/2024    11:39 AM   MERI-7 SCORE   Total Score 2 (minimal anxiety) 7 (mild anxiety)   Total Score 2 7     PROMIS 10-Global Health (all questions and answers displayed):       2/19/2024    12:53 PM 6/4/2024    11:40 AM   PROMIS 10   In general, would you say your health is: Fair Fair   In general, would you say your quality of life is: Good Poor   In general, how would you rate your physical health? Fair Fair   In general, how would you rate your mental health, including  your mood and your ability to think? Good Fair   In general, how would you rate your satisfaction with your social activities and relationships? Good Fair   In general, please rate how well you carry out your usual social activities and roles Fair Poor   To what extent are you able to carry out your everyday physical activities such as walking, climbing stairs, carrying groceries, or moving a chair? Completely A little   In the past 7 days, how often have you been bothered by emotional problems such as feeling anxious, depressed, or irritable? Sometimes Often   In the past 7 days, how would you rate your fatigue on average? Mild Severe   In the past 7 days, how would you rate your pain on average, where 0 means no pain, and 10 means worst imaginable pain? 5 8   In general, would you say your health is: 2 2   In general, would you say your quality of life is: 3 1   In general, how would you rate your physical health? 2 2   In general, how would you rate your mental health, including your mood and your ability to think? 3 2   In general, how would you rate your satisfaction with your social activities and relationships? 3 2   In general, please rate how well you carry out your usual social activities and roles. (This includes activities at home, at work and in your community, and responsibilities as a parent, child, spouse, employee, friend, etc.) 2 1   To what extent are you able to carry out your everyday physical activities such as walking, climbing stairs, carrying groceries, or moving a chair? 5 2   In the past 7 days, how often have you been bothered by emotional problems such as feeling anxious, depressed, or irritable? 3 4   In the past 7 days, how would you rate your fatigue on average? 2 4   In the past 7 days, how would you rate your pain on average, where 0 means no pain, and 10 means worst imaginable pain? 5 8   Global Mental Health Score 12    12 7   Global Physical Health Score 14    14 8   PROMIS TOTAL  "- SUBSCORES 26    26 15     [place] Suicide Severity Rating Scale (Lifetime/Recent)      2/19/2024     1:03 PM 5/3/2024     8:51 AM 5/4/2024     6:08 PM 5/16/2024     9:52 AM 6/8/2024     3:11 AM   [place] Suicide Severity Rating (Lifetime/Recent)   Q1 Wished to be Dead (Past Month)  0-->no 1-->yes 0-->no 0-->no   Q2 Suicidal Thoughts (Past Month)  0-->no 1-->yes 0-->no 0-->no   Q6 Suicide Behavior (Lifetime)  0-->no 1-->yes 0-->no 0-->no   If yes to Q6, within past 3 months?   1-->yes     Level of Risk per Screen  no risks indicated high risk no risks indicated no risks indicated   Q1 Wish to be Dead (Lifetime) Y       Wish to be Dead Description (Lifetime) \"I never attempted, but I kind of had a plan, but it comes and goes.\" Patient reports passive suicidal ideation.       1. Wish to be Dead (Past 1 Month) N       Q2 Non-Specific Active Suicidal Thoughts (Lifetime) Y       Non-Specific Active Suicidal Thought Description (Lifetime) \"I never attempted, but I kind of had a plan, but it comes and goes.\" Patient reports passive suicidal ideation.       2. Non-Specific Active Suicidal Thoughts (Past 1 Month) N       3. Active Suicidal Ideation with any Methods (Not Plan) Without Intent to Act (Lifetime) N       Q4 Active Suicidal Ideation with Some Intent to Act, Without Specific Plan (Lifetime) N       Q5 Active Suicidal Ideation with Specific Plan and Intent (Lifetime) N       Most Severe Ideation Rating (Lifetime) 2       Description of Most Severe Ideation (Lifetime) Patient reports passive suicidal ideation.       Most Severe Ideation Rating (Past 1 Month) 1       Description of Most Severe Ideation (Past 1 Month) NA       Frequency (Lifetime) 1       Frequency (Past 1 Month) 1       Duration (Lifetime) 1       Duration (Past 1 Month) 1       Controllability (Lifetime) 2       Controllability (Past 1 Month) 1       Deterrents (Lifetime) 1       Deterrents (Past 1 Month) 1       Reasons for Ideation (Lifetime) 4  "      Reasons for Ideation (Past 1 Month) 0       Actual Attempt (Lifetime) N       Has subject engaged in non-suicidal self-injurious behavior? (Lifetime) N       Interrupted Attempts (Lifetime) N       Aborted or Self-Interrupted Attempt (Lifetime) N       Preparatory Acts or Behavior (Lifetime) N       Calculated C-SSRS Risk Score (Lifetime/Recent) No Risk Indicated             ASSESSMENT: Current Emotional / Mental Status (status of significant symptoms):   Risk status (Self / Other harm or suicidal ideation)   Patient denies current fears or concerns for personal safety.   Patient reports the following current or recent suicidal ideation or behaviors: Patient continues to endorse some passive suicidal ideations, but denies any plan or intent. Patient and provider updated patient's safety plan and patient committed to safety.   Patient denies current or recent homicidal ideation or behaviors.   Patient denies current or recent self injurious behavior or ideation.   Patient denies other safety concerns.   Patient reports there has been no change in risk factors since their last session.     Patient reports there has been no change in protective factors since their last session.     A safety and risk management plan has been developed including: Patient consented to co-developed safety plan.  Safety and risk management plan was completed - see below.  Patient agreed to use safety plan should any safety concerns arise.  A copy was given to the patient. [facility] - see Screenings tab.     Appearance:   Appropriate    Eye Contact:   Fair    Psychomotor Behavior: Normal    Attitude:   Cooperative  Friendly   Orientation:   All   Speech    Rate / Production: Normal/ Responsive    Volume:  Normal    Mood:    Anxious  Depressed    Affect:    Appropriate    Thought Content:  Clear    Thought Form:  Coherent  Logical    Insight:    Fair      Medication Review:   No changes to current psychiatric  "medication(s)     Medication Compliance:   Yes     Changes in Health Issues:   None reported     Chemical Use Review:   Substance Use: increase in alcohol .  Patient reports frequency of use : Patient reports that he recently started drinking again after 2 weeks of sobriety. Patient reports starting Naltrexone medication on 6/11/24.  Stage of Change: Contemplation  Reviewed information and resources for treatment and ongoing sobriety  Provided encouragement towards sobriety     . Provider encouraged patient to attend a 12 step meeting, such as AA. Patient continues to decline a referral at this time for a chemical dependency evaluation and comprehensive assessment.       Tobacco Use: No current tobacco use.      Diagnosis:  1. Major depressive disorder, recurrent episode, moderate (H)        Collateral Reports Completed:   Not Applicable    PLAN: (Patient Tasks / Therapist Tasks / Other)  Patient will return in 3 weeks for next session. Patient will engage in self-care activities. Patient will work on incorporating more exercise and walking into his routine at least 2x/ week for 30 minutes. Patient will work on open communication skills and boundary setting. Patient will work on coping skills to reduce his consumption of alcohol, including listening to music. Patient will evaluate his HALT (hungry, angry, lonely, tired) cues that lead to alcohol cravings. Patient will utilize the \"delay, distract, decide\" skill when noticing alcohol cravings. Patient will look into taking a daily Vitamin D supplement as approved by his PCP.       Lucy Rouse, [facility]                                                         ______________________________________________________________________    Individual Treatment Plan    Patient's Name: Cody Bolton  YOB: 1980    Date of Creation: 2/26/24  Date Treatment Plan Last Reviewed/Revised: 6/4/24    DSM5 Diagnoses:   Encounter Diagnosis   Name Primary?    Major depressive " disorder, recurrent episode, moderate (H) Yes       Psychosocial / Contextual Factors: Relationship stressors, occupational dynamics.  PROMIS-10 from encounters over the past 365 days    Encounter date  Last reading 6/4/24  11:40 AM 2/26/24 2/19/2024 12:53 PM 2/19/24  12:53 PM   Global Mental Health Score (P) 7 (P) 12 (P) 12   Global Physical Health Score (P) 8 (P) 14 (P) 14   PROMIS TOTAL - SUBSCORES (P) 15 (P) 26 (P) 26       Referral / Collaboration:  Referral to another professional/service is not indicated at this time..    Anticipated number of session for this episode of care: 9-12 sessions  Anticipation frequency of session: Weekly  Anticipated Duration of each session: 38-52 minutes  Treatment plan will be reviewed in 90 days or when goals have been changed.       MeasurableTreatment Goal(s) related to diagnosis / functional impairment(s)  Goal 1: Patient will reduce depression symptoms as evidenced by a reduction in PHQ9 score to a 7 or less in the next 2 months.    I will know I've met my goal when I'm not having the feeling as often that I just don't want to do life anymore.      Objective #A (Patient Action)    Patient will Decrease frequency and intensity of feeling down, depressed, hopeless.  Status: Continued - Date(s):6/4/24     Intervention(s)  Therapist will teach  coping skills and self-care activities .    Objective #B  Patient will Feel less tired and more energy during the day .  Status: Continued - Date(s):6/4/24     Intervention(s)  Therapist will  teach sleep hygiene and movement-based exercises .    Objective #C  Patient will Increase interest, engagement, and pleasure in doing things.  Status: Continued - Date(s):6/4/24     Intervention(s)  Therapist will  teach distress-tolerance skills. .          Patient has reviewed and agreed to the above plan.      KATRIN Salvador on 6/4/2024 at 12:02 PM

## 2024-07-11 ENCOUNTER — LAB (OUTPATIENT)
Dept: LAB | Facility: CLINIC | Age: 44
End: 2024-07-11
Payer: COMMERCIAL

## 2024-07-11 ENCOUNTER — ANTICOAGULATION THERAPY VISIT (OUTPATIENT)
Dept: ANTICOAGULATION | Facility: CLINIC | Age: 44
End: 2024-07-11

## 2024-07-11 DIAGNOSIS — Z86.718 HISTORY OF DVT (DEEP VEIN THROMBOSIS): Primary | ICD-10-CM

## 2024-07-11 DIAGNOSIS — Z86.711 HISTORY OF PULMONARY EMBOLISM: ICD-10-CM

## 2024-07-11 DIAGNOSIS — Z86.718 HISTORY OF DVT (DEEP VEIN THROMBOSIS): ICD-10-CM

## 2024-07-11 LAB — INR BLD: 2 (ref 0.9–1.1)

## 2024-07-11 PROCEDURE — 36416 COLLJ CAPILLARY BLOOD SPEC: CPT

## 2024-07-11 PROCEDURE — 85610 PROTHROMBIN TIME: CPT

## 2024-07-11 NOTE — PROGRESS NOTES
ANTICOAGULATION MANAGEMENT     Cody WALLACE Young 43 year old male is on warfarin with therapeutic INR result. (Goal INR 2.0-3.0)    Recent labs: (last 7 days)     07/11/24  1021   INR 2.0*       ASSESSMENT     Source(s): Chart Review  Previous INR was Subtherapeutic  Medication, diet, health changes since last INR chart reviewed; none identified         PLAN     Unable to reach Mu today.    Left message to continue current dose of warfarin 5 mg tonight. Request call back for assessment.    Follow up required to confirm warfarin dose taken and assess for changes    Kera Deras RN  Anticoagulation Clinic  7/11/2024

## 2024-07-11 NOTE — PROGRESS NOTES
ANTICOAGULATION MANAGEMENT     Cody WALLACE Young 43 year old male is on warfarin with therapeutic INR result. (Goal INR 2.0-3.0)    Recent labs: (last 7 days)     07/11/24  1021   INR 2.0*       ASSESSMENT     Source(s): Chart Review and Patient/Caregiver Call     Warfarin doses taken: Warfarin taken as instructed + MD previously increased by 7.1%  Diet:  Intermittent alcohol use - had a few drinks on Tuesday 7/9/24  Medication/supplement changes: None noted  New illness, injury, or hospitalization: No  Signs or symptoms of bleeding or clotting: No  Previous result: Subtherapeutic  Additional findings: None       PLAN     Recommended plan for ongoing change(s) affecting INR     Dosing Instructions: Continue your current warfarin dose with next INR in 3 weeks       Summary  As of 7/11/2024      Full warfarin instructions:  7.5 mg every Tue; 5 mg all other days   Next INR check:  8/1/2024               Telephone call with Cody who verbalizes understanding and agrees to plan    Lab visit scheduled    Education provided: Please call back if any changes to your diet, medications or how you've been taking warfarin  Symptom monitoring: monitoring for bleeding signs and symptoms and monitoring for clotting signs and symptoms    Plan made per [facility] anticoagulation protocol    Kera Deras RN  Anticoagulation Clinic  7/11/2024    _______________________________________________________________________     Anticoagulation Episode Summary       Current INR goal:  2.0-3.0   TTR:  50.4% (4.6 mo)   Target end date:  Indefinite   Send INR reminders to:  [facility]    Indications    Acute pulmonary embolism without acute cor pulmonale  unspecified pulmonary embolism type (H) (Resolved) [I26.99]  History of DVT (deep vein thrombosis) [Z86.718]             Comments:               Anticoagulation Care Providers       Provider Role Specialty Phone number    Fredy Veras MD Referring Family Medicine [phone]     Aaseby-Aguilera, Ramona Ann, PA-C [facility] [phone]

## 2024-07-16 ENCOUNTER — NURSE TRIAGE (OUTPATIENT)
Dept: FAMILY MEDICINE | Facility: CLINIC | Age: 44
End: 2024-07-16
Payer: COMMERCIAL

## 2024-07-16 ENCOUNTER — HOSPITAL ENCOUNTER (EMERGENCY)
Facility: CLINIC | Age: 44
Discharge: HOME | End: 2024-07-16
Attending: EMERGENCY MEDICINE | Admitting: EMERGENCY MEDICINE
Payer: COMMERCIAL

## 2024-07-16 VITALS
RESPIRATION RATE: 22 BRPM | BODY MASS INDEX: 42.66 KG/M2 | TEMPERATURE: 97.1 F | HEIGHT: 72 IN | HEART RATE: 91 BPM | WEIGHT: 315 LBS | OXYGEN SATURATION: 97 % | DIASTOLIC BLOOD PRESSURE: 94 MMHG | SYSTOLIC BLOOD PRESSURE: 158 MMHG

## 2024-07-16 DIAGNOSIS — I48.0 PAROXYSMAL ATRIAL FIBRILLATION (H): ICD-10-CM

## 2024-07-16 LAB
ANION GAP SERPL CALCULATED.3IONS-SCNC: 15 MMOL/L (ref 7–15)
BASOPHILS # BLD AUTO: 0.1 10E3/UL (ref 0–0.2)
BASOPHILS NFR BLD AUTO: 1 %
BUN SERPL-MCNC: 10.7 MG/DL (ref 6–20)
CALCIUM SERPL-MCNC: 8.6 MG/DL (ref 8.8–10.4)
CHLORIDE SERPL-SCNC: 104 MMOL/L (ref 98–107)
CREAT SERPL-MCNC: 0.71 MG/DL (ref 0.67–1.17)
EGFRCR SERPLBLD CKD-EPI 2021: >90 ML/MIN/1.73M2
EOSINOPHIL # BLD AUTO: 0.3 10E3/UL (ref 0–0.7)
EOSINOPHIL NFR BLD AUTO: 4 %
ERYTHROCYTE [DISTWIDTH] IN BLOOD BY AUTOMATED COUNT: 15 % (ref 10–15)
GLUCOSE SERPL-MCNC: 151 MG/DL (ref 70–99)
HCO3 SERPL-SCNC: 20 MMOL/L (ref 22–29)
HCT VFR BLD AUTO: 42.7 % (ref 40–53)
HGB BLD-MCNC: 13.8 G/DL (ref 13.3–17.7)
HOLD SPECIMEN: NORMAL
HOLD SPECIMEN: NORMAL
IMM GRANULOCYTES # BLD: 0 10E3/UL
IMM GRANULOCYTES NFR BLD: 0 %
INR PPP: 2.09 (ref 0.85–1.15)
LYMPHOCYTES # BLD AUTO: 2.4 10E3/UL (ref 0.8–5.3)
LYMPHOCYTES NFR BLD AUTO: 31 %
MAGNESIUM SERPL-MCNC: 1.9 MG/DL (ref 1.7–2.3)
MCH RBC QN AUTO: 27.5 PG (ref 26.5–33)
MCHC RBC AUTO-ENTMCNC: 32.3 G/DL (ref 31.5–36.5)
MCV RBC AUTO: 85 FL (ref 78–100)
MONOCYTES # BLD AUTO: 0.6 10E3/UL (ref 0–1.3)
MONOCYTES NFR BLD AUTO: 8 %
NEUTROPHILS # BLD AUTO: 4.4 10E3/UL (ref 1.6–8.3)
NEUTROPHILS NFR BLD AUTO: 56 %
NRBC # BLD AUTO: 0 10E3/UL
NRBC BLD AUTO-RTO: 0 /100
PLATELET # BLD AUTO: 264 10E3/UL (ref 150–450)
POTASSIUM SERPL-SCNC: 3.8 MMOL/L (ref 3.4–5.3)
RBC # BLD AUTO: 5.01 10E6/UL (ref 4.4–5.9)
SODIUM SERPL-SCNC: 139 MMOL/L (ref 135–145)
TROPONIN T SERPL HS-MCNC: 13 NG/L
TSH SERPL DL<=0.005 MIU/L-ACNC: 3.23 UIU/ML (ref 0.3–4.2)
WBC # BLD AUTO: 7.8 10E3/UL (ref 4–11)

## 2024-07-16 PROCEDURE — 250N000011 HC RX IP 250 OP 636: Performed by: EMERGENCY MEDICINE

## 2024-07-16 PROCEDURE — 85025 COMPLETE CBC W/AUTO DIFF WBC: CPT | Performed by: EMERGENCY MEDICINE

## 2024-07-16 PROCEDURE — 84443 ASSAY THYROID STIM HORMONE: CPT | Performed by: EMERGENCY MEDICINE

## 2024-07-16 PROCEDURE — 250N000009 HC RX 250: Performed by: EMERGENCY MEDICINE

## 2024-07-16 PROCEDURE — 999N000157 HC STATISTIC RCP TIME EA 10 MIN

## 2024-07-16 PROCEDURE — 85610 PROTHROMBIN TIME: CPT | Performed by: EMERGENCY MEDICINE

## 2024-07-16 PROCEDURE — 36415 COLL VENOUS BLD VENIPUNCTURE: CPT | Performed by: EMERGENCY MEDICINE

## 2024-07-16 PROCEDURE — 99291 CRITICAL CARE FIRST HOUR: CPT | Mod: 25

## 2024-07-16 PROCEDURE — 84484 ASSAY OF TROPONIN QUANT: CPT | Performed by: EMERGENCY MEDICINE

## 2024-07-16 PROCEDURE — 99152 MOD SED SAME PHYS/QHP 5/>YRS: CPT

## 2024-07-16 PROCEDURE — 82374 ASSAY BLOOD CARBON DIOXIDE: CPT | Performed by: EMERGENCY MEDICINE

## 2024-07-16 PROCEDURE — 999N000055 HC STATISTIC END TITIAL CO2 MONITORING

## 2024-07-16 PROCEDURE — 258N000003 HC RX IP 258 OP 636: Performed by: EMERGENCY MEDICINE

## 2024-07-16 PROCEDURE — 92960 CARDIOVERSION ELECTRIC EXT: CPT

## 2024-07-16 PROCEDURE — 272N000240 HC CARDIOVERT/DEFIB/PACER SUPP

## 2024-07-16 PROCEDURE — 93005 ELECTROCARDIOGRAM TRACING: CPT | Mod: XU

## 2024-07-16 PROCEDURE — 96361 HYDRATE IV INFUSION ADD-ON: CPT

## 2024-07-16 PROCEDURE — 96374 THER/PROPH/DIAG INJ IV PUSH: CPT

## 2024-07-16 PROCEDURE — 83735 ASSAY OF MAGNESIUM: CPT | Performed by: EMERGENCY MEDICINE

## 2024-07-16 RX ORDER — SODIUM CHLORIDE 9 MG/ML
INJECTION, SOLUTION INTRAVENOUS CONTINUOUS
Status: DISCONTINUED | OUTPATIENT
Start: 2024-07-16 | End: 2024-07-16 | Stop reason: HOSPADM

## 2024-07-16 RX ORDER — PROPOFOL 10 MG/ML
200 INJECTION, EMULSION INTRAVENOUS ONCE
Status: COMPLETED | OUTPATIENT
Start: 2024-07-16 | End: 2024-07-16

## 2024-07-16 RX ORDER — METOPROLOL TARTRATE 1 MG/ML
5 INJECTION, SOLUTION INTRAVENOUS ONCE
Status: COMPLETED | OUTPATIENT
Start: 2024-07-16 | End: 2024-07-16

## 2024-07-16 RX ADMIN — SODIUM CHLORIDE: 9 INJECTION, SOLUTION INTRAVENOUS at 17:11

## 2024-07-16 RX ADMIN — METOPROLOL TARTRATE 5 MG: 5 INJECTION INTRAVENOUS at 17:12

## 2024-07-16 RX ADMIN — PROPOFOL 100 MG: 10 INJECTION, EMULSION INTRAVENOUS at 18:13

## 2024-07-16 ASSESSMENT — COLUMBIA-SUICIDE SEVERITY RATING SCALE - C-SSRS
6. HAVE YOU EVER DONE ANYTHING, STARTED TO DO ANYTHING, OR PREPARED TO DO ANYTHING TO END YOUR LIFE?: NO
2. HAVE YOU ACTUALLY HAD ANY THOUGHTS OF KILLING YOURSELF IN THE PAST MONTH?: NO
1. IN THE PAST MONTH, HAVE YOU WISHED YOU WERE DEAD OR WISHED YOU COULD GO TO SLEEP AND NOT WAKE UP?: NO

## 2024-07-16 ASSESSMENT — ACTIVITIES OF DAILY LIVING (ADL)
ADLS_ACUITY_SCORE: 38

## 2024-07-16 NOTE — ED TRIAGE NOTES
Presents to triage with c/o palpitations and SOB that started earlier today. Upon entering the triage room patient became very dizzy. Hx PE, currently on warfarin. Patient radial pulse very irregular, rates .

## 2024-07-16 NOTE — SEDATION DOCUMENTATION
Pt reports that the last time he ate any solid food was 11am today. Pt states that he sipped some water on the way to the ED around 3:30 pm.

## 2024-07-16 NOTE — ED PROVIDER NOTES
Emergency Department Note      History of Present Illness     Chief Complaint   Palpitations, Dizziness, and Shortness of Breath      HPI   Cody Bolton is a 43 year old male with a history of type 2 diabetes, hyperlipidemia, PE, DVT on warfarin who presents with palpitations, dizziness and shortness of breath. The patient reports that his heart palpitations and shortness of breath started today while sitting on the couch.  Since the onset, his symptoms have been present and are exacerbated with exertion but still present at rest.  He also endorses dizziness, weakness in his legs and poor sleep over the past week. He denies fevers, loss of consciousness, cough, and diarrhea. He reports his last alcoholic drink was 2 days ago on 7-14-24. He also reports having sleep apnea and uses a CPAP at night.  No history of atrial fibrillation.  No missed anticoagulant doses.  INR has been around 2 the last 2 checks.       Independent Historian   None    Review of External Notes   I reviewed the primary care triage note sending the patient to the ER today.  I reviewed the primary care office visit from 6/11/2024 reviewing his chronic medical problems including diabetes, alcohol abuse, CATHLEEN, hyperlipidemia, DVT/PE, obesity.    Past Medical History     Medical History and Problem List   Type 2 diabetes  Gastric bypass  Hyperlipidemia  Obstructive sleep apnea  Alcohol abuse  Depression  DVT  Pulmonary embolism  Morbid obesity    Medications   Wellbutrin  Neurontin  Atarax  Zestril  Glucophage  Robaxin  Depade  Ozempic  Zocor  Desyrel  Catapres  Coumadin     Surgical History   Laparoscopic Appendectomy  Gastric bypass  Revision breanne-en-y    Physical Exam     Patient Vitals for the past 24 hrs:   BP Temp Temp src Pulse Resp SpO2 Height Weight   07/16/24 1956 -- -- -- 91 22 -- -- --   07/16/24 1955 -- -- -- 90 25 -- -- --   07/16/24 1900 (!) 158/94 -- -- 76 13 97 % -- --   07/16/24 1813 (!) 143/95 -- -- 73 20 97 % -- --   07/16/24  1800 (!) 128/113 -- -- (!) 124 19 98 % -- --   07/16/24 1754 -- -- -- (!) 121 23 96 % -- --   07/16/24 1744 -- -- -- (!) 128 14 96 % -- --   07/16/24 1734 (!) 131/106 -- -- (!) 124 30 96 % -- --   07/16/24 1700 129/88 -- -- (!) 142 (!) 8 95 % -- --   07/16/24 1659 -- -- -- (!) 128 20 95 % -- --   07/16/24 1658 -- -- -- (!) 124 11 96 % -- --   07/16/24 1657 -- -- -- (!) 126 11 95 % -- --   07/16/24 1630 -- -- -- (!) 129 15 98 % -- --   07/16/24 1629 -- -- -- (!) 128 17 97 % -- --   07/16/24 1620 -- -- -- (!) 152 15 97 % -- --   07/16/24 1619 (!) 173/93 -- -- (!) 156 22 97 % -- --   07/16/24 1608 (!) 145/100 97.1  F (36.2  C) Temporal (!) 130 20 97 % 1.829 m (6') (!) 171.9 kg (378 lb 15.5 oz)     Physical Exam  Vitals and nursing note reviewed.   Constitutional:       General: He is not in acute distress.     Appearance: He is obese. He is not ill-appearing.   HENT:      Head: Normocephalic and atraumatic.      Right Ear: External ear normal.      Left Ear: External ear normal.      Nose: Nose normal.   Eyes:      Conjunctiva/sclera: Conjunctivae normal.   Cardiovascular:      Rate and Rhythm: Tachycardia present. Rhythm irregular.      Heart sounds: No murmur heard.  Pulmonary:      Effort: Pulmonary effort is normal. No respiratory distress.      Breath sounds: No wheezing, rhonchi or rales.   Abdominal:      General: Abdomen is flat. There is no distension.      Palpations: Abdomen is soft.      Tenderness: There is no abdominal tenderness. There is no guarding or rebound.   Musculoskeletal:         General: Swelling (BLE) present. No deformity.      Cervical back: Normal range of motion and neck supple.   Skin:     General: Skin is warm and dry.      Findings: No rash.   Neurological:      Mental Status: He is alert and oriented to person, place, and time.   Psychiatric:         Behavior: Behavior normal.           Diagnostics     Lab Results   Labs Ordered and Resulted from Time of ED Arrival to Time of ED  Departure   BASIC METABOLIC PANEL - Abnormal       Result Value    Sodium 139      Potassium 3.8      Chloride 104      Carbon Dioxide (CO2) 20 (*)     Anion Gap 15      Urea Nitrogen 10.7      Creatinine 0.71      GFR Estimate >90      Calcium 8.6 (*)     Glucose 151 (*)    INR - Abnormal    INR 2.09 (*)    TROPONIN T, HIGH SENSITIVITY - Normal    Troponin T, High Sensitivity 13     TSH WITH FREE T4 REFLEX - Normal    TSH 3.23     MAGNESIUM - Normal    Magnesium 1.9     CBC WITH PLATELETS AND DIFFERENTIAL    WBC Count 7.8      RBC Count 5.01      Hemoglobin 13.8      Hematocrit 42.7      MCV 85      MCH 27.5      MCHC 32.3      RDW 15.0      Platelet Count 264      % Neutrophils 56      % Lymphocytes 31      % Monocytes 8      % Eosinophils 4      % Basophils 1      % Immature Granulocytes 0      NRBCs per 100 WBC 0      Absolute Neutrophils 4.4      Absolute Lymphocytes 2.4      Absolute Monocytes 0.6      Absolute Eosinophils 0.3      Absolute Basophils 0.1      Absolute Immature Granulocytes 0.0      Absolute NRBCs 0.0       Imaging   No orders to display     ECG  ECG results from 07/16/24   EKG 12-lead, tracing only     Value    Systolic Blood Pressure     Diastolic Blood Pressure     Ventricular Rate 142    Atrial Rate     SD Interval     QRS Duration 96        QTc 479    P Axis     R AXIS 24    T Axis -28    Interpretation ECG      Atrial fibrillation with rapid ventricular response  Incomplete right bundle branch block  Nonspecific ST abnormality    Read by: Dr. Henry Caal MD       ECG  ECG taken at 1804, ECG read at 1809  Normal sinus rhythm  Incomplete right bundle branch block  Normal sinus from A-fib as compared to today  Rate 67 bpm. SD interval 150 ms. QRS duration 96 ms. QT/QTc 374/395 ms. P-R-T axes 4 11 20.       Independent Interpretation   None    ED Course      Medications Administered   Medications   sodium chloride 0.9 % infusion (0 mLs Intravenous Stopped 7/16/24 2001)   metoprolol  (LOPRESSOR) injection 5 mg (5 mg Intravenous $Given 7/16/24 1712)   propofol (DIPRIVAN) injection 10 mg/mL vial (100 mg Intravenous $Given 7/16/24 1813)       Procedures     Sedation     Procedure: Procedural Sedation    Sedation Level: Moderate    Indication: Cardioversion    Consent: Written from Patient     Universal protocol: Universal protocol was followed and time out conducted just prior to starting procedure, confirming patient identity, site/side, procedure, patient position, and availability of correct equipment and implants.     Last PO Intake: Emergent procedure    ASA Class: Class 3 - A patient with severe systemic disease     Exam:  Mallampati:  Grade 1 - Soft palate, uvula, and pillars visible    Lungs: Clear   Heart: irregularly irregular rate and rhythm    Medication: Propofol  Dose: 100 mg     Monitoring:  Monitoring consisted of heart rate, cardiac, continuous pulse oximetry, frequent blood pressure checks.   There was constant attendance by RN until patient recovered and constant attendance by physician until patient stable.   Intubation and emergency airway equipment available.     Response: Vital signs stable, oxygen saturations greater than 92%       Patient Status: Post procedure patient was alert.     Total Physician Drug Administration / Monitoring Time: 6 minutes.     Patient was monitored during recovery and returned to pre-procedure baseline.     Electrical Cardioversion         Procedure: Electrical Cardioversion        Indication: Atrial Fibrillation     Consent: Written from Patient     Universal Protocol: Universal protocol was followed and time out conducted just prior to starting procedure, confirming patient identity, site/side, procedure, patient position, and availability of correct equipment and implants.      Anesthesia/Sedation: Sedation: The patient was sedated, see separate procedure note for details.      Procedure Detail:   Electrodes were placed:  Anterior/posterior  Trial #1: Synchronized Cardioversion at 200 Joules   Cardioversion was successful      Patient Status:  The patient tolerated the procedure well: Yes. There were no complications.      Discussion of Management   None    ED Course   ED Course as of 07/16/24 2020 Tue Jul 16, 2024   1640 I obtained history and examined the patient as noted above     1759 Rechecked.  Performed cardioversion     Optional/Additional Documentation  None    Medical Decision Making / Diagnosis     CMS Diagnoses: None    MIPS   None    MDM   Cody Bolton is a 43 year old male who presents with palpitations, dizziness, shortness of breath, fatigue.  He is noted to be in atrial fibrillation with rapid ventricular rate which I suspect is the etiology of his symptoms.  His symptoms do not sound consistent with ACS and he has no ischemic changes on his EKG.  His troponin is normal.  Low suspicion for PE given that he is already on warfarin and is therapeutic on this.  Workup shows no signs of infectious etiology or symptomatic anemia.  His electrolytes are also fairly unremarkable.  He is a good candidate for cardioversion given that he is anticoagulated and he had the symptoms for less than 24 hours.  I consented the patient to the procedure and we sedated him with propofol and performed electrical cardioversion as noted above.  This was successful on the first attempt and he remained in sinus rhythm while he is in the ED.  Once he was fully recovered from the sedation, he was able to tolerate p.o. and ambulate and he noted the symptoms had significantly improved and essentially resolved.  We will plan for discharge and referred to cardiology for follow-up.  We discussed return precautions.    Disposition   The patient was discharged.     Diagnosis     ICD-10-CM    1. Paroxysmal atrial fibrillation (H)  I48.0 Adult Cardiology al [facility] Referral           Discharge Medications   Discharge Medication List as of  7/16/2024  8:01 PM        Scribe Disclosure:  I, Javier Darinel, am serving as a scribe at 4:45 PM on 7/16/2024 to document services personally performed by Henry Caal MD based on my observations and the provider's statements to me.     Scribe Disclosure:  I, CATHERINE BLACKSER, am serving as a scribe  at 4:52 PM on 7/16/2024 to document services personally performed by Henry Caal MD based on my observations and the provider's statements to me.         Henry Caal MD  07/16/24 2023

## 2024-07-16 NOTE — TELEPHONE ENCOUNTER
Patient states he has been short of breath all day.  He also feels hot and having trouble cooling down.    He has not been outside.  No chest pain.  Patient audibly sounded short of breath to triager.    Per protocol patient advised to go to the ER. Patient agrees with the plan.    Sarah Blanc RN on 7/16/2024 at 3:29 PM    Reason for Disposition   MODERATE difficulty breathing (e.g., speaks in phrases, SOB even at rest, pulse 100-120) of new-onset or worse than normal    Additional Information   Negative: SEVERE difficulty breathing (e.g., struggling for each breath, speaks in single words, pulse > 120)   Negative: Breathing stopped and hasn't returned   Negative: Choking on something   Negative: Bluish (or gray) lips or face   Negative: Difficult to awaken or acting confused (e.g., disoriented, slurred speech)   Negative: Passed out (i.e., fainted, collapsed and was not responding)   Negative: Wheezing started suddenly after medicine, an allergic food, or bee sting   Negative: Stridor (harsh sound while breathing in)   Negative: Slow, shallow and weak breathing   Negative: Sounds like a life-threatening emergency to the triager   Negative: Chest pain   Negative: Wheezing (high pitched whistling sound) and previous asthma attacks or use of asthma medicines   Negative: Difficulty breathing and within 14 days of COVID-19 Exposure   Negative: Difficulty breathing and only present when coughing   Negative: Difficulty breathing and only from stuffy nose   Negative: Difficulty breathing and only from stuffy nose or runny nose from common cold    Protocols used: Breathing Difficulty-A-[place]

## 2024-07-16 NOTE — PROGRESS NOTES
Assisted with conscious sedation. An ETCO2 monitor was placed on the pt with 3 LPM bled in. The Ambu bag, suction, and airways were setup and present in the room. Pt was able to maintain airway throughout the procedure with no intervention needed. Respirations, heart rate, SpO2 97-99% and EtCO2 30-32 monitored throughout procedure, respiratory status maintained without incident.     Asmita Pedraza, RT, RT  7/16/2024 6:17 PM

## 2024-07-17 ENCOUNTER — TELEPHONE (OUTPATIENT)
Dept: ANTICOAGULATION | Facility: CLINIC | Age: 44
End: 2024-07-17
Payer: COMMERCIAL

## 2024-07-17 DIAGNOSIS — Z86.718 HISTORY OF DVT (DEEP VEIN THROMBOSIS): Primary | ICD-10-CM

## 2024-07-17 LAB
ATRIAL RATE - MUSE: 67 BPM
ATRIAL RATE - MUSE: NORMAL BPM
DIASTOLIC BLOOD PRESSURE - MUSE: NORMAL MMHG
DIASTOLIC BLOOD PRESSURE - MUSE: NORMAL MMHG
INTERPRETATION ECG - MUSE: NORMAL
INTERPRETATION ECG - MUSE: NORMAL
P AXIS - MUSE: 4 DEGREES
P AXIS - MUSE: NORMAL DEGREES
PR INTERVAL - MUSE: 150 MS
PR INTERVAL - MUSE: NORMAL MS
QRS DURATION - MUSE: 96 MS
QRS DURATION - MUSE: 96 MS
QT - MUSE: 312 MS
QT - MUSE: 374 MS
QTC - MUSE: 395 MS
QTC - MUSE: 479 MS
R AXIS - MUSE: 11 DEGREES
R AXIS - MUSE: 24 DEGREES
SYSTOLIC BLOOD PRESSURE - MUSE: NORMAL MMHG
SYSTOLIC BLOOD PRESSURE - MUSE: NORMAL MMHG
T AXIS - MUSE: -28 DEGREES
T AXIS - MUSE: 20 DEGREES
VENTRICULAR RATE- MUSE: 142 BPM
VENTRICULAR RATE- MUSE: 67 BPM

## 2024-07-17 NOTE — TELEPHONE ENCOUNTER
ANTICOAGULATION  MANAGEMENT: Discharge Review    Cody Bolton chart reviewed for anticoagulation continuity of care    Emergency room visit on 7/16/24 for palpitations, dizziness, shortness of breath.    Discharge disposition: Home    Prior to Admission:     Summary  As of 7/11/2024      Full warfarin instructions:  7.5 mg every Tue; 5 mg all other days   Next INR check:  8/1/2024          Recent Results:  Recent labs: (last 7 days)     07/11/24  1021 07/16/24  1626   INR 2.0* 2.09*       Anticoagulation inpatient management:     not applicable     Anticoagulation discharge instructions:     Warfarin dosing: home regimen continued   Bridging: No    INR goal change: No      Medication changes affecting anticoagulation: No    Additional factors affecting anticoagulation: No    ASSESSMENT/ PLAN     Agree with continuing warfarin maintenance dose at discharge    Check INR next in 3-4 weeks. Lab visit scheduled    Spoke with Cody who verbalizes understanding and agrees to plan    Anticoagulation Calendar updated       Rima Camilo RN

## 2024-07-23 ENCOUNTER — OFFICE VISIT (OUTPATIENT)
Dept: CARDIOLOGY | Facility: CLINIC | Age: 44
End: 2024-07-23
Attending: EMERGENCY MEDICINE
Payer: COMMERCIAL

## 2024-07-23 VITALS
WEIGHT: 315 LBS | SYSTOLIC BLOOD PRESSURE: 160 MMHG | BODY MASS INDEX: 42.66 KG/M2 | HEART RATE: 80 BPM | DIASTOLIC BLOOD PRESSURE: 84 MMHG | HEIGHT: 72 IN

## 2024-07-23 DIAGNOSIS — I10 BENIGN ESSENTIAL HYPERTENSION: Primary | ICD-10-CM

## 2024-07-23 DIAGNOSIS — I48.0 PAROXYSMAL ATRIAL FIBRILLATION (H): ICD-10-CM

## 2024-07-23 PROCEDURE — 99203 OFFICE O/P NEW LOW 30 MIN: CPT | Performed by: INTERNAL MEDICINE

## 2024-07-23 PROCEDURE — 93000 ELECTROCARDIOGRAM COMPLETE: CPT | Performed by: INTERNAL MEDICINE

## 2024-07-23 RX ORDER — CARVEDILOL 12.5 MG/1
12.5 TABLET ORAL 2 TIMES DAILY WITH MEALS
Qty: 60 TABLET | Refills: 11 | Status: SHIPPED | OUTPATIENT
Start: 2024-07-23

## 2024-07-23 NOTE — PROGRESS NOTES
HISTORY:    Cody Bolton is a pleasant 43-year-old gentleman who works at a sedentary job in customer relations.  He has a history of morbid obesity with previous gastric bypass, hyperlipidemia, previous pulmonary embolism on long-term warfarin therapy, hypertension, obstructive sleep apnea using CPAP, and type 2 diabetes.  Recently experienced an episode of atrial fibrillation and was asked to see cardiology for follow-up.    Cody has been struggling with some binge drinking.  On the weekend of July 13 and July 14 he drank heavily, consuming by his own report 2 L of vodka.  He presented to the emergency room on 16 July with a sense of dizziness shortness of breath and a mild sense of palpitations.  He reported that he had been sleeping poorly and had some dizziness and weakness in his legs dating back a week.  He was found to be in atrial fibrillation with rapid ventricular response and underwent successful cardioversion in the emergency room.  His symptoms have resolved.  Cody states that since that visit a week ago he has had a couple of drinks a day, but has not completed a full additional liter of vodka.    An echocardiogram was completed and showed normal LV function with normal valves and some mild enlargement of his aortic root and ascending aorta, probably normal for his size.    Today's ECG shows that he remains in sinus rhythm.  His examination is essentially normal other than his obesity and elevated blood pressure 160/84.    Cody denies exertional chest arm neck shoulder or jaw discomfort, palpitations, PND/orthopnea, syncope or near syncope, or symptoms of claudication.      ASSESSMENT/PLAN:    1.  Atrial fibrillation.  The exact onset of this was unclear with gradual increase in symptoms over at least a couple of days.  This was almost certainly associated with his binge drinking on the weekend, consuming a liter of vodka per day over a Saturday and Sunday.  Of course his baseline  hypertension and obesity are also contributing factors.  He is fully anticoagulated because of a history of DVTs.  There is no particular action we need to take after a single atrial fibrillation episode.  I warned him to try to avoid heavy alcohol use.  We will also start him on a beta-blocker to try to suppress future episodes of atrial fibs and slow down heart rate should A-fib occur.  2.  Hypertension.  Blood pressure is poorly controlled.  This is at least in part because of medical noncompliance.  The patient reports that he frequently forgets his blood pressure occasions and estimates he probably takes more no more than half of them.  He has not used blood pressure medicines today.  However, none of his blood pressure readings in the recent past are in the normal range.  Today I have started him on carvedilol using 12.5 mg twice daily.  His target dose would be 50 mg twice daily if tolerated and I will leave that to his primary care doctor to adjust that dose upward.  3.  Excessive alcohol use.  The patient admits to binge drinking and the fact that he wants to stop but is not having difficulty.  He will work with his primary caregiver on this issue as well.    Thank you for inviting me to participate in the care of your patient.  Please don't hesitate to call if I can be of further assistance.  38 minutes were spent today reviewing the chart and other records, interviewing and examining the patient, and documenting our visit.  At this point I am going to leave further cardiology visits up in the air.  If he does not have further episodes of atrial fibrillation he does not need to follow-up with me and can see his primary caregiver for management of his alcohol issues and his hypertension.  Of course I would be happy to see him again should atrial fibrillation become a recurring issue.    Chart documentation was completed, in part, with [employer] voice-recognition software. Even though reviewed, some  grammatical, spelling, and word errors may remain.       Orders Placed This Encounter   Procedures    EKG 12-lead complete w/read - Clinics (performed today)     Orders Placed This Encounter   Medications    carvedilol (COREG) 12.5 MG tablet     Sig: Take 1 tablet (12.5 mg) by mouth 2 times daily (with meals)     Dispense:  60 tablet     Refill:  11     There are no discontinued medications.    10 year ASCVD risk: The ASCVD Risk score (Anastacia DK, et al., 2019) failed to calculate for the following reasons:    The patient has a prior MI or stroke diagnosis    Encounter Diagnoses   Name Primary?    Paroxysmal atrial fibrillation (H)     Benign essential hypertension Yes       CURRENT MEDICATIONS:  Current Outpatient Medications   Medication Sig Dispense Refill    acetaminophen (TYLENOL) 500 MG tablet Take 2 tablets (1,000 mg) by mouth 3 times daily (Patient taking differently: Take 1,000 mg by mouth every 8 hours as needed)      buPROPion (WELLBUTRIN XL) 150 MG 24 hr tablet Take 1 tablet (150 mg) by mouth every morning 90 tablet 3    carvedilol (COREG) 12.5 MG tablet Take 1 tablet (12.5 mg) by mouth 2 times daily (with meals) 60 tablet 11    cloNIDine (CATAPRES) 0.1 MG tablet Take 0.1 mg by mouth as needed (Sleep)      lisinopril (ZESTRIL) 20 MG tablet Take 1 tablet (20 mg) by mouth daily 30 tablet 0    metFORMIN (GLUCOPHAGE XR) 500 MG 24 hr tablet Take 2 tablets (1,000 mg) by mouth daily (with dinner) 180 tablet 3    naltrexone (DEPADE/REVIA) 50 MG tablet Take 1 tablet (50 mg) by mouth daily 90 tablet 3    semaglutide (OZEMPIC) 2 MG/3ML pen Inject 0.5 mg Subcutaneous every 7 days for 90 days 9 mL 0    simvastatin (ZOCOR) 20 MG tablet Take 1 tablet (20 mg) by mouth at bedtime 90 tablet 3    traZODone (DESYREL) 100 MG tablet Take 2 tablets (200 mg) by mouth at bedtime (Patient taking differently: Take 200 mg by mouth as needed) 180 tablet 3    warfarin ANTICOAGULANT (COUMADIN) 5 MG tablet Take 5 mg by mouth at bedtime       gabapentin (NEURONTIN) 300 MG capsule Take 1 capsule (300 mg) by mouth 3 times daily (Patient not taking: Reported on 7/23/2024) 45 capsule 1    hydrOXYzine HCl (ATARAX) 25 MG tablet Take 1 tablet (25 mg) by mouth 3 times daily as needed for anxiety or other (pain) (Patient not taking: Reported on 7/23/2024) 30 tablet 1    methocarbamol (ROBAXIN) 500 MG tablet Take 1 tablet (500 mg) by mouth 4 times daily (Patient not taking: Reported on 7/23/2024) 56 tablet 1       ALLERGIES   No Known Allergies    PAST MEDICAL HISTORY:  No past medical history on file.    PAST SURGICAL HISTORY:  Past Surgical History:   Procedure Laterality Date    APPENDECTOMY  08/15/2017    Dr. Ferrer    GASTRIC BYPASS      RI LAP,APPENDECTOMY N/A 8/14/2017    Procedure: APPENDECTOMY, LAPAROSCOPIC;  Surgeon: Nba Ferrer MD;  Location: [facility];  Service: General    REVISION AKANKSHA-EN-Y  2014    Dr. Ranjeet Espinal [facility]       FAMILY HISTORY:  No family history on file.    SOCIAL HISTORY:  Social History     Socioeconomic History    Marital status:      Spouse name: None    Number of children: None    Years of education: None    Highest education level: None   Tobacco Use    Smoking status: Never    Smokeless tobacco: Never   Vaping Use    Vaping status: Never Used   Substance and Sexual Activity    Alcohol use: Yes     Comment: Alcoholic Drinks/day: occasionally, trying to quit    Drug use: No     Social Determinants of Health     Financial Resource Strain: Low Risk  (11/28/2023)    [facility]    Financial Resource Strain     Difficulty of Paying Living Expenses: 3   Food Insecurity: No Food Insecurity (11/28/2023)    [facility], [facility]    Food Insecurity     Worried About Running Out of Food in the Last Year: 1   Transportation Needs: No  Transportation Needs (11/28/2023)    Received from [facility], [facility]    Transportation Needs     Lack of Transportation (Medical): 1   Social Connections: Socially Integrated (11/28/2023)    Received from [facility], [facility]    Social Connections     Frequency of Communication with Friends and Family: 0   Interpersonal Safety: Low Risk  (2/9/2024)    Interpersonal Safety     Do you feel physically and emotionally safe where you currently live?: Yes     Within the past 12 months, have you been hit, slapped, kicked or otherwise physically hurt by someone?: No     Within the past 12 months, have you been humiliated or emotionally abused in other ways by your partner or ex-partner?: No   Housing Stability: Low Risk  (11/28/2023)    Received from [facility], [facility]    Housing Stability     Unable to Pay for Housing in the Last Year: 1       Review of Systems:  Skin:  not assessed     Eyes:  not assessed    ENT:  not assessed    Respiratory:  Positive for sleep apnea;CPAP  Cardiovascular:    palpitations;Positive for;edema;lightheadedness;dizziness  Gastroenterology: not assessed    Genitourinary:  not assessed    Musculoskeletal:  not assessed    Neurologic:  not assessed    Psychiatric:  not assessed    Heme/Lymph/Imm:  not assessed    Endocrine:  not assessed      Physical Exam:  Vitals: BP (!) 160/84 (BP Location: Right arm, Patient Position: Sitting, Cuff Size: Adult Large)   Pulse 80   Ht 1.829 m (6')   Wt (!) 170.5 kg (375 lb 12.8 oz)   BMI 50.97 kg/m      Constitutional:           Skin:           Head:           Eyes:           ENT:           Neck:           Chest:           Cardiac:                    Abdomen:           Vascular:                                        Extremities and Muscular  Skeletal:              Neurological:           Psych:        Recent Lab Results:  LIPID RESULTS:  Lab Results   Component Value Date    CHOL 183 06/11/2024    HDL 49 06/11/2024     (H) 06/11/2024    TRIG 134 06/11/2024       LIVER ENZYME RESULTS:  Lab Results   Component Value Date    AST 31 06/11/2024    ALT 33 06/11/2024       CBC RESULTS:  Lab Results   Component Value Date    WBC 7.8 07/16/2024    RBC 5.01 07/16/2024    HGB 13.8 07/16/2024    HCT 42.7 07/16/2024    MCV 85 07/16/2024    MCH 27.5 07/16/2024    MCHC 32.3 07/16/2024    RDW 15.0 07/16/2024     07/16/2024       BMP RESULTS:  Lab Results   Component Value Date     07/16/2024    POTASSIUM 3.8 07/16/2024    POTASSIUM 4.6 05/04/2024    CHLORIDE 104 07/16/2024    CHLORIDE 104 05/04/2024    CO2 20 (L) 07/16/2024    ANIONGAP 15 07/16/2024     (H) 07/16/2024     (H) 05/18/2024    BUN 10.7 07/16/2024    BUN 16 05/04/2024    CR 0.71 07/16/2024    GFRESTIMATED >90 07/16/2024    RAUL 8.6 (L) 07/16/2024        A1C RESULTS:  Lab Results   Component Value Date    A1C 7.2 (H) 06/11/2024       INR RESULTS:  Lab Results   Component Value Date    INR 2.09 (H) 07/16/2024    INR 2.0 (H) 07/11/2024    INR 1.9 (H) 06/25/2024    INR 1.09 06/08/2024         Cody Harvey MD, FACC    CC  Henry Caal MD  [facility]  [address]  [place] [address]

## 2024-07-23 NOTE — LETTER
7/23/2024    Yovana Felipe MD  [id number]  [place] [id number]    RE: Cody Bolton       Dear Colleague,     I had the pleasure of seeing Cody Bolton in the [facility].  HISTORY:    Cody Bolton is a pleasant 43-year-old gentleman who works at a sedentary job in customer relations.  He has a history of morbid obesity with previous gastric bypass, hyperlipidemia, previous pulmonary embolism on long-term warfarin therapy, hypertension, obstructive sleep apnea using CPAP, and type 2 diabetes.  Recently experienced an episode of atrial fibrillation and was asked to see cardiology for follow-up.    Cody has been struggling with some binge drinking.  On the weekend of July 13 and July 14 he drank heavily, consuming by his own report 2 L of vodka.  He presented to the emergency room on 16 July with a sense of dizziness shortness of breath and a mild sense of palpitations.  He reported that he had been sleeping poorly and had some dizziness and weakness in his legs dating back a week.  He was found to be in atrial fibrillation with rapid ventricular response and underwent successful cardioversion in the emergency room.  His symptoms have resolved.  Cody states that since that visit a week ago he has had a couple of drinks a day, but has not completed a full additional liter of vodka.    An echocardiogram was completed and showed normal LV function with normal valves and some mild enlargement of his aortic root and ascending aorta, probably normal for his size.    Today's ECG shows that he remains in sinus rhythm.  His examination is essentially normal other than his obesity and elevated blood pressure 160/84.    Cody denies exertional chest arm neck shoulder or jaw discomfort, palpitations, PND/orthopnea, syncope or near syncope, or symptoms of claudication.      ASSESSMENT/PLAN:    1.  Atrial fibrillation.  The exact onset of this was unclear with gradual increase in  symptoms over at least a couple of days.  This was almost certainly associated with his binge drinking on the weekend, consuming a liter of vodka per day over a Saturday and Sunday.  Of course his baseline hypertension and obesity are also contributing factors.  He is fully anticoagulated because of a history of DVTs.  There is no particular action we need to take after a single atrial fibrillation episode.  I warned him to try to avoid heavy alcohol use.  We will also start him on a beta-blocker to try to suppress future episodes of atrial fibs and slow down heart rate should A-fib occur.  2.  Hypertension.  Blood pressure is poorly controlled.  This is at least in part because of medical noncompliance.  The patient reports that he frequently forgets his blood pressure occasions and estimates he probably takes more no more than half of them.  He has not used blood pressure medicines today.  However, none of his blood pressure readings in the recent past are in the normal range.  Today I have started him on carvedilol using 12.5 mg twice daily.  His target dose would be 50 mg twice daily if tolerated and I will leave that to his primary care doctor to adjust that dose upward.  3.  Excessive alcohol use.  The patient admits to binge drinking and the fact that he wants to stop but is not having difficulty.  He will work with his primary caregiver on this issue as well.    Thank you for inviting me to participate in the care of your patient.  Please don't hesitate to call if I can be of further assistance.  38 minutes were spent today reviewing the chart and other records, interviewing and examining the patient, and documenting our visit.  At this point I am going to leave further cardiology visits up in the air.  If he does not have further episodes of atrial fibrillation he does not need to follow-up with me and can see his primary caregiver for management of his alcohol issues and his hypertension.  Of course I  would be happy to see him again should atrial fibrillation become a recurring issue.    Chart documentation was completed, in part, with [employer] voice-recognition software. Even though reviewed, some grammatical, spelling, and word errors may remain.       Orders Placed This Encounter   Procedures    EKG 12-lead complete w/read - Clinics (performed today)     Orders Placed This Encounter   Medications    carvedilol (COREG) 12.5 MG tablet     Sig: Take 1 tablet (12.5 mg) by mouth 2 times daily (with meals)     Dispense:  60 tablet     Refill:  11     There are no discontinued medications.    10 year ASCVD risk: The ASCVD Risk score (Anastacia HASSAN, et al., 2019) failed to calculate for the following reasons:    The patient has a prior MI or stroke diagnosis    Encounter Diagnoses   Name Primary?    Paroxysmal atrial fibrillation (H)     Benign essential hypertension Yes       CURRENT MEDICATIONS:  Current Outpatient Medications   Medication Sig Dispense Refill    acetaminophen (TYLENOL) 500 MG tablet Take 2 tablets (1,000 mg) by mouth 3 times daily (Patient taking differently: Take 1,000 mg by mouth every 8 hours as needed)      buPROPion (WELLBUTRIN XL) 150 MG 24 hr tablet Take 1 tablet (150 mg) by mouth every morning 90 tablet 3    carvedilol (COREG) 12.5 MG tablet Take 1 tablet (12.5 mg) by mouth 2 times daily (with meals) 60 tablet 11    cloNIDine (CATAPRES) 0.1 MG tablet Take 0.1 mg by mouth as needed (Sleep)      lisinopril (ZESTRIL) 20 MG tablet Take 1 tablet (20 mg) by mouth daily 30 tablet 0    metFORMIN (GLUCOPHAGE XR) 500 MG 24 hr tablet Take 2 tablets (1,000 mg) by mouth daily (with dinner) 180 tablet 3    naltrexone (DEPADE/REVIA) 50 MG tablet Take 1 tablet (50 mg) by mouth daily 90 tablet 3    semaglutide (OZEMPIC) 2 MG/3ML pen Inject 0.5 mg Subcutaneous every 7 days for 90 days 9 mL 0    simvastatin (ZOCOR) 20 MG tablet Take 1 tablet (20 mg) by mouth at bedtime 90 tablet 3    traZODone (DESYREL) 100 MG  tablet Take 2 tablets (200 mg) by mouth at bedtime (Patient taking differently: Take 200 mg by mouth as needed) 180 tablet 3    warfarin ANTICOAGULANT (COUMADIN) 5 MG tablet Take 5 mg by mouth at bedtime      gabapentin (NEURONTIN) 300 MG capsule Take 1 capsule (300 mg) by mouth 3 times daily (Patient not taking: Reported on 7/23/2024) 45 capsule 1    hydrOXYzine HCl (ATARAX) 25 MG tablet Take 1 tablet (25 mg) by mouth 3 times daily as needed for anxiety or other (pain) (Patient not taking: Reported on 7/23/2024) 30 tablet 1    methocarbamol (ROBAXIN) 500 MG tablet Take 1 tablet (500 mg) by mouth 4 times daily (Patient not taking: Reported on 7/23/2024) 56 tablet 1       ALLERGIES   No Known Allergies    PAST MEDICAL HISTORY:  No past medical history on file.    PAST SURGICAL HISTORY:  Past Surgical History:   Procedure Laterality Date    APPENDECTOMY  08/15/2017    Dr. Ferrer    GASTRIC BYPASS      NE LAP,APPENDECTOMY N/A 8/14/2017    Procedure: APPENDECTOMY, LAPAROSCOPIC;  Surgeon: Nba Ferrer MD;  Location: [facility];  Service: General    REVISION AKANKSHA-EN-Y  2014    Dr. Ranjeet Espinal [facility]       FAMILY HISTORY:  No family history on file.    SOCIAL HISTORY:  Social History     Socioeconomic History    Marital status:      Spouse name: None    Number of children: None    Years of education: None    Highest education level: None   Tobacco Use    Smoking status: Never    Smokeless tobacco: Never   Vaping Use    Vaping status: Never Used   Substance and Sexual Activity    Alcohol use: Yes     Comment: Alcoholic Drinks/day: occasionally, trying to quit    Drug use: No     Social Determinants of Health     Financial Resource Strain: Low Risk  (11/28/2023)    [facility], [facility]    Financial Resource Strain     Difficulty of Paying Living Expenses: 3   Food Insecurity: No Food Insecurity (11/28/2023)     [facility]    Food Insecurity     Worried About Running Out of Food in the Last Year: 1   Transportation Needs: No Transportation Needs (11/28/2023)    [facility]    Transportation Needs     Lack of Transportation (Medical): 1   Social Connections: Socially Integrated (11/28/2023)    [facility]    Social Connections     Frequency of Communication with Friends and Family: 0   Interpersonal Safety: Low Risk  (2/9/2024)    Interpersonal Safety     Do you feel physically and emotionally safe where you currently live?: Yes     Within the past 12 months, have you been hit, slapped, kicked or otherwise physically hurt by someone?: No     Within the past 12 months, have you been humiliated or emotionally abused in other ways by your partner or ex-partner?: No   Housing Stability: Low Risk  (11/28/2023)    [facility]    Housing Stability     Unable to Pay for Housing in the Last Year: 1       Review of Systems:  Skin:  not assessed     Eyes:  not assessed    ENT:  not assessed    Respiratory:  Positive for sleep apnea;CPAP  Cardiovascular:    palpitations;Positive for;edema;lightheadedness;dizziness  Gastroenterology: not assessed    Genitourinary:  not assessed    Musculoskeletal:  not assessed    Neurologic:  not assessed    Psychiatric:  not assessed    Heme/Lymph/Imm:  not assessed    Endocrine:  not assessed      Physical Exam:  Vitals: BP (!) 160/84 (BP Location: Right arm, Patient Position: Sitting, Cuff Size: Adult Large)   Pulse 80   Ht 1.829 m (6')   Wt (!) 170.5 kg (375 lb 12.8 oz)   BMI 50.97 kg/m      Constitutional:           Skin:            Head:           Eyes:           ENT:           Neck:           Chest:           Cardiac:                    Abdomen:           Vascular:                                        Extremities and Muscular Skeletal:              Neurological:           Psych:        Recent Lab Results:  LIPID RESULTS:  Lab Results   Component Value Date    CHOL 183 06/11/2024    HDL 49 06/11/2024     (H) 06/11/2024    TRIG 134 06/11/2024       LIVER ENZYME RESULTS:  Lab Results   Component Value Date    AST 31 06/11/2024    ALT 33 06/11/2024       CBC RESULTS:  Lab Results   Component Value Date    WBC 7.8 07/16/2024    RBC 5.01 07/16/2024    HGB 13.8 07/16/2024    HCT 42.7 07/16/2024    MCV 85 07/16/2024    MCH 27.5 07/16/2024    MCHC 32.3 07/16/2024    RDW 15.0 07/16/2024     07/16/2024       BMP RESULTS:  Lab Results   Component Value Date     07/16/2024    POTASSIUM 3.8 07/16/2024    POTASSIUM 4.6 05/04/2024    CHLORIDE 104 07/16/2024    CHLORIDE 104 05/04/2024    CO2 20 (L) 07/16/2024    ANIONGAP 15 07/16/2024     (H) 07/16/2024     (H) 05/18/2024    BUN 10.7 07/16/2024    BUN 16 05/04/2024    CR 0.71 07/16/2024    GFRESTIMATED >90 07/16/2024    RAUL 8.6 (L) 07/16/2024        A1C RESULTS:  Lab Results   Component Value Date    A1C 7.2 (H) 06/11/2024       INR RESULTS:  Lab Results   Component Value Date    INR 2.09 (H) 07/16/2024    INR 2.0 (H) 07/11/2024    INR 1.9 (H) 06/25/2024    INR 1.09 06/08/2024         Cody Harvey MD, FACC    CC  Henry Caal MD  [facility]YSICIAN [facility]  [address]  [place] [address]      Thank you for allowing me to participate in the care of your patient.      Sincerely,     Cody Harvey MD     [facility]

## 2024-07-25 ENCOUNTER — VIRTUAL VISIT (OUTPATIENT)
Dept: BEHAVIORAL HEALTH | Facility: CLINIC | Age: 44
End: 2024-07-25
Payer: COMMERCIAL

## 2024-07-25 DIAGNOSIS — F33.1 MAJOR DEPRESSIVE DISORDER, RECURRENT EPISODE, MODERATE (H): Primary | ICD-10-CM

## 2024-07-25 PROCEDURE — 90834 PSYTX W PT 45 MINUTES: CPT | Mod: 95

## 2024-07-25 NOTE — PROGRESS NOTES
[facility]                                     Progress Note    Patient Name: Cody Bolton  Date: 24           Service Type: Individual      Session Start Time: 1.01    Session End Time: 1.39     Session Length: 38-52 minutes    Session #: 9    Attendees: Client attended alone    Service Modality:  Video Visit:      Provider verified identity through the following two step process.  Patient provided:  Patient  and Patient address    Telemedicine Visit: The patient's condition can be safely assessed and treated via synchronous audio and visual telemedicine encounter.      Reason for Telemedicine Visit: Patient has requested telehealth visit    Originating Site (Patient Location): Patient's home    Distant Site (Provider Location): [facility]    Consent:  The patient/guardian has verbally consented to: the potential risks and benefits of telemedicine (video visit) versus in person care; bill my insurance or make self-payment for services provided; and responsibility for payment of non-covered services.     Patient would like the video invitation sent by:  My Chart    Mode of Communication:  Video Conference via [employer]well    Distant Location (Provider):  Off-site    As the provider I attest to compliance with applicable laws and regulations related to telemedicine.    DATA  Interactive Complexity: No  Crisis: No        Progress Since Last Session (Related to Symptoms / Goals / Homework):   Symptoms: Worsening . Patient's PHQ9 score has increased to a 14.    Homework: Partially completed      Episode of Care Goals: Minimal progress - CONTEMPLATION (Considering change and yet undecided); Intervened by assessing the negative and positive thinking (ambivalence) about behavior change     Current / Ongoing Stressors and Concerns:  Patient reports returning to drinking alcohol on a daily basis. Patient discussed drinking 2 liters of alcohol on  7/15 that likely led to him going to the ER on 7/16 for atrial fibrillation, stating that he had a resting heart rate of 140 bpm. Patient reports getting back together with his ex-girlfriend, Becca. Patient discussed this relationship leading to more financial stressors. Provider and patient discussed the severe health implications of his alcohol use. Due to this, this provider informed the patient that she will not continue working with the patient if he does not follow through on her referral for a chemical dependency evaluation and comprehensive assessment.       Treatment Objective(s) Addressed in This Session:   Decrease frequency and intensity of feeling down, depressed, hopeless       Intervention:   Motivational Interviewing    MI Intervention: Expressed Empathy/Understanding, Supported Autonomy, Collaboration, Evocation, Open-ended questions, and Reflections: simple and complex     Change Talk Expressed by the Patient: Desire to change    Provider Response to Change Talk: E - Evoked more info from patient about behavior change, A - Affirmed patient's thoughts, decisions, or attempts at behavior change, and R - Reflected patient's change talk    Assessments completed prior to visit:  The following assessments were completed by patient for this visit:  PHQ9:       2/19/2024    12:38 PM 3/11/2024     8:09 AM 3/26/2024    11:44 AM 5/7/2024    11:49 AM 6/18/2024     1:50 PM 7/2/2024    11:46 AM   PHQ-9 SCORE   PHQ-9 Total Score MyChart 10 (Moderate depression) 9 (Mild depression) 8 (Mild depression) 5 (Mild depression) 8 (Mild depression) 14 (Moderate depression)   PHQ-9 Total Score 10 9 8 5 8 14     GAD7:       2/19/2024    12:39 PM 6/4/2024    11:39 AM   MERI-7 SCORE   Total Score 2 (minimal anxiety) 7 (mild anxiety)   Total Score 2 7     PROMIS 10-Global Health (all questions and answers displayed):       2/19/2024    12:53 PM 6/4/2024    11:40 AM   PROMIS 10   In general, would you say your health is: Fair  Fair   In general, would you say your quality of life is: Good Poor   In general, how would you rate your physical health? Fair Fair   In general, how would you rate your mental health, including your mood and your ability to think? Good Fair   In general, how would you rate your satisfaction with your social activities and relationships? Good Fair   In general, please rate how well you carry out your usual social activities and roles Fair Poor   To what extent are you able to carry out your everyday physical activities such as walking, climbing stairs, carrying groceries, or moving a chair? Completely A little   In the past 7 days, how often have you been bothered by emotional problems such as feeling anxious, depressed, or irritable? Sometimes Often   In the past 7 days, how would you rate your fatigue on average? Mild Severe   In the past 7 days, how would you rate your pain on average, where 0 means no pain, and 10 means worst imaginable pain? 5 8   In general, would you say your health is: 2 2   In general, would you say your quality of life is: 3 1   In general, how would you rate your physical health? 2 2   In general, how would you rate your mental health, including your mood and your ability to think? 3 2   In general, how would you rate your satisfaction with your social activities and relationships? 3 2   In general, please rate how well you carry out your usual social activities and roles. (This includes activities at home, at work and in your community, and responsibilities as a parent, child, spouse, employee, friend, etc.) 2 1   To what extent are you able to carry out your everyday physical activities such as walking, climbing stairs, carrying groceries, or moving a chair? 5 2   In the past 7 days, how often have you been bothered by emotional problems such as feeling anxious, depressed, or irritable? 3 4   In the past 7 days, how would you rate your fatigue on average? 2 4   In the past 7 days, how  "would you rate your pain on average, where 0 means no pain, and 10 means worst imaginable pain? 5 8   Global Mental Health Score 12    12 7   Global Physical Health Score 14    14 8   PROMIS TOTAL - SUBSCORES 26    26 15     Forest Suicide Severity Rating Scale (Lifetime/Recent)      2/19/2024     1:03 PM 5/3/2024     8:51 AM 5/4/2024     6:08 PM 5/16/2024     9:52 AM 6/8/2024     3:11 AM 7/16/2024     4:09 PM   Forest Suicide Severity Rating (Lifetime/Recent)   Q1 Wished to be Dead (Past Month)  0-->no 1-->yes 0-->no 0-->no 0-->no   Q2 Suicidal Thoughts (Past Month)  0-->no 1-->yes 0-->no 0-->no 0-->no   Q6 Suicide Behavior (Lifetime)  0-->no 1-->yes 0-->no 0-->no 0-->no   If yes to Q6, within past 3 months?   1-->yes      Level of Risk per Screen  no risks indicated high risk no risks indicated no risks indicated no risks indicated   Q1 Wish to be Dead (Lifetime) Y        Wish to be Dead Description (Lifetime) \"I never attempted, but I kind of had a plan, but it comes and goes.\" Patient reports passive suicidal ideation.        1. Wish to be Dead (Past 1 Month) N        Q2 Non-Specific Active Suicidal Thoughts (Lifetime) Y        Non-Specific Active Suicidal Thought Description (Lifetime) \"I never attempted, but I kind of had a plan, but it comes and goes.\" Patient reports passive suicidal ideation.        2. Non-Specific Active Suicidal Thoughts (Past 1 Month) N        3. Active Suicidal Ideation with any Methods (Not Plan) Without Intent to Act (Lifetime) N        Q4 Active Suicidal Ideation with Some Intent to Act, Without Specific Plan (Lifetime) N        Q5 Active Suicidal Ideation with Specific Plan and Intent (Lifetime) N        Most Severe Ideation Rating (Lifetime) 2        Description of Most Severe Ideation (Lifetime) Patient reports passive suicidal ideation.        Most Severe Ideation Rating (Past 1 Month) 1        Description of Most Severe Ideation (Past 1 Month) NA        Frequency (Lifetime) " 1        Frequency (Past 1 Month) 1        Duration (Lifetime) 1        Duration (Past 1 Month) 1        Controllability (Lifetime) 2        Controllability (Past 1 Month) 1        Deterrents (Lifetime) 1        Deterrents (Past 1 Month) 1        Reasons for Ideation (Lifetime) 4        Reasons for Ideation (Past 1 Month) 0        Actual Attempt (Lifetime) N        Has subject engaged in non-suicidal self-injurious behavior? (Lifetime) N        Interrupted Attempts (Lifetime) N        Aborted or Self-Interrupted Attempt (Lifetime) N        Preparatory Acts or Behavior (Lifetime) N        Calculated C-SSRS Risk Score (Lifetime/Recent) No Risk Indicated              ASSESSMENT: Current Emotional / Mental Status (status of significant symptoms):   Risk status (Self / Other harm or suicidal ideation)   Patient denies current fears or concerns for personal safety.   Patient reports the following current or recent suicidal ideation or behaviors: patient reports passive SI, but denies any plan or intent. Patient's safety plan was updated.    Patient denies current or recent homicidal ideation or behaviors.   Patient denies current or recent self injurious behavior or ideation.   Patient denies other safety concerns.   Patient reports there has been no change in risk factors since their last session.     Patient reports there has been no change in protective factors since their last session.     A safety and risk management plan has been developed including: Patient consented to co-developed safety plan.  Safety and risk management plan was completed - see below.  Patient agreed to use safety plan should any safety concerns arise.  A copy was given to the patient.      Appearance:   Appropriate    Eye Contact:   Fair    Psychomotor Behavior: Normal    Attitude:   Cooperative  Friendly   Orientation:   All   Speech    Rate / Production: Normal/ Responsive    Volume:  Normal    Mood:    Anxious  Depressed     Affect:    Appropriate    Thought Content:  Clear    Thought Form:  Coherent  Logical    Insight:    Fair      Medication Review:   No changes to current psychiatric medication(s)     Medication Compliance:   Yes     Changes in Health Issues:   Yes: patient reports going to the ER on 7/16 for atrial fibrillation, stating that he had a resting heart beat of 140 bpm.     Chemical Use Review:   Substance Use: increase in alcohol .  Patient reports frequency of use : Patient reports drinking alcohol daily. Patient reports drinking 2 liters of alcohol on 7/15 that likely led to him going to the ER on 7/16 for atrial fibrillation .  Stage of Change: Pre-contemplation  Reviewed information and resources for treatment and ongoing sobriety  Reviewed concerns related to health related substance abuse risk  Provided encouragement towards sobriety  Discussed treatment options and encouraged patient to schedule an appointment with PCP  Referred client for formal CD evaluation     . Provider encouraged patient to attend a 12 step meeting, such as AA. Provider informed the patient that she will not continue working with the patient if he does not follow through on her referral for a chemical dependency evaluation and comprehensive assessment.       Tobacco Use: No current tobacco use.      Diagnosis:  1. Major depressive disorder, recurrent episode, moderate (H)    Provisional Diagnosis: Alcohol Use Disorder    Collateral Reports Completed:   Not Applicable    PLAN: (Patient Tasks / Therapist Tasks / Other)  Patient will return in 3 weeks for next session. Patient will engage in self-care activities. Patient will work on open communication skills and boundary setting. Patient will schedule a substance use disorder comprehensive assessment (as referred by this provider). Patient will work on coping skills to reduce his consumption of alcohol, including listening to music. Patient will evaluate his HALT (hungry, angry, lonely,  "tired) cues that lead to alcohol cravings. Patient will utilize the \"delay, distract, decide\" skill when noticing alcohol cravings. Patient will look into taking a daily Vitamin D supplement as approved by his PCP.      Lucy Rouse, [facility]                                                         ______________________________________________________________________    Individual Treatment Plan    Patient's Name: Cody Bolton  YOB: 1980    Date of Creation: 2/26/24  Date Treatment Plan Last Reviewed/Revised: 6/4/24    DSM5 Diagnoses:   Encounter Diagnosis   Name Primary?    Major depressive disorder, recurrent episode, moderate (H) Yes       Psychosocial / Contextual Factors: Relationship stressors, occupational dynamics.  PROMIS-10 from encounters over the past 365 days    Encounter date  Last reading 6/4/24  11:40 AM 2/26/24 2/19/2024 12:53 PM 2/19/24  12:53 PM   Global Mental Health Score (P) 7 (P) 12 (P) 12   Global Physical Health Score (P) 8 (P) 14 (P) 14   PROMIS TOTAL - SUBSCORES (P) 15 (P) 26 (P) 26       Referral / Collaboration:  Referral to another professional/service is not indicated at this time..    Anticipated number of session for this episode of care: 9-12 sessions  Anticipation frequency of session: Weekly  Anticipated Duration of each session: 38-52 minutes  Treatment plan will be reviewed in 90 days or when goals have been changed.       MeasurableTreatment Goal(s) related to diagnosis / functional impairment(s)  Goal 1: Patient will reduce depression symptoms as evidenced by a reduction in PHQ9 score to a 7 or less in the next 2 months.    I will know I've met my goal when I'm not having the feeling as often that I just don't want to do life anymore.      Objective #A (Patient Action)    Patient will Decrease frequency and intensity of feeling down, depressed, hopeless.  Status: Continued - Date(s):6/4/24     Intervention(s)  Therapist will teach  coping skills and self-care " activities .    Objective #B  Patient will Feel less tired and more energy during the day .  Status: Continued - Date(s):6/4/24     Intervention(s)  Therapist will  teach sleep hygiene and movement-based exercises .    Objective #C  Patient will Increase interest, engagement, and pleasure in doing things.  Status: Continued - Date(s):6/4/24     Intervention(s)  Therapist will  teach distress-tolerance skills. .          Patient has reviewed and agreed to the above plan.      KATRIN Salvador on 6/4/2024 at 12:02 PM

## 2024-08-07 ENCOUNTER — HOSPITAL ENCOUNTER (OUTPATIENT)
Dept: BEHAVIORAL HEALTH | Facility: CLINIC | Age: 44
Discharge: HOME | End: 2024-08-07
Attending: PSYCHIATRY & NEUROLOGY | Admitting: PSYCHIATRY & NEUROLOGY
Payer: COMMERCIAL

## 2024-08-07 VITALS — WEIGHT: 315 LBS | HEIGHT: 72 IN | BODY MASS INDEX: 42.66 KG/M2

## 2024-08-07 DIAGNOSIS — F10.20 ALCOHOL USE DISORDER, SEVERE, DEPENDENCE (H): Primary | ICD-10-CM

## 2024-08-07 DIAGNOSIS — F33.1 MAJOR DEPRESSIVE DISORDER, RECURRENT EPISODE, MODERATE (H): ICD-10-CM

## 2024-08-07 PROCEDURE — H0001 ALCOHOL AND/OR DRUG ASSESS: HCPCS

## 2024-08-07 ASSESSMENT — ANXIETY QUESTIONNAIRES
1. FEELING NERVOUS, ANXIOUS, OR ON EDGE: SEVERAL DAYS
5. BEING SO RESTLESS THAT IT IS HARD TO SIT STILL: SEVERAL DAYS
3. WORRYING TOO MUCH ABOUT DIFFERENT THINGS: MORE THAN HALF THE DAYS
7. FEELING AFRAID AS IF SOMETHING AWFUL MIGHT HAPPEN: NOT AT ALL
2. NOT BEING ABLE TO STOP OR CONTROL WORRYING: MORE THAN HALF THE DAYS
4. TROUBLE RELAXING: NOT AT ALL
GAD7 TOTAL SCORE: 6
6. BECOMING EASILY ANNOYED OR IRRITABLE: NOT AT ALL
GAD7 TOTAL SCORE: 6

## 2024-08-07 ASSESSMENT — PATIENT HEALTH QUESTIONNAIRE - PHQ9: SUM OF ALL RESPONSES TO PHQ QUESTIONS 1-9: 18

## 2024-08-07 ASSESSMENT — COLUMBIA-SUICIDE SEVERITY RATING SCALE - C-SSRS
1. SINCE LAST CONTACT, HAVE YOU WISHED YOU WERE DEAD OR WISHED YOU COULD GO TO SLEEP AND NOT WAKE UP?: NO
6. HAVE YOU EVER DONE ANYTHING, STARTED TO DO ANYTHING, OR PREPARED TO DO ANYTHING TO END YOUR LIFE?: NO
TOTAL  NUMBER OF ABORTED OR SELF INTERRUPTED ATTEMPTS SINCE LAST CONTACT: NO
SUICIDE, SINCE LAST CONTACT: NO
TOTAL  NUMBER OF INTERRUPTED ATTEMPTS SINCE LAST CONTACT: NO
ATTEMPT SINCE LAST CONTACT: NO
2. HAVE YOU ACTUALLY HAD ANY THOUGHTS OF KILLING YOURSELF?: NO

## 2024-08-07 ASSESSMENT — PAIN SCALES - GENERAL: PAINLEVEL: MILD PAIN (2)

## 2024-08-07 NOTE — PROGRESS NOTES
"[place] Safety Plan     Open Patient Version   Creation Date: 3/26/24 Created By: Lucy Rouse, [facility]   Last Update Date: 8/7/24 Last Updated By: Kush Kaye, [facility]      Step 1: Warning signs:  Warning Signs   \"When I'm reviewing medical bills.\"   Financial Stressors - \"When I feel like it is too expensive to live.\"   More physical pain.   Feelings of loneliness.   Boredom.   Drinking alcohol daily.      Step 2: Internal coping strategies - Things I can do to take my mind off my problems without contacting another person:  Strategies   Play records and listen to music.   Watch TV shows.      Step 3: People and social settings that provide distraction:  Name Contact Information   Becca (girlfriend) # in phone   [place] (friend) # in phone      Places   Go to the rink      Step 4: People whom I can ask for help during a crisis:  Name Contact Information   Betty (ex-wife) [phone]   [place] (friend) # in phone      Step 5: Professionals or agencies I can contact during a crisis:  Clinician/Agency Name Phone Emergency Contact   [facility] [phone] Betty [phone]   Local Emergency Department Emergency Department Address Emergency Department Phone   [facility]  [phone]   [facility] [address], [place] [address] [phone]      Suicide Prevention Lifeline Phone: Call or Text 141  Crisis Text Line: Text HOME to 248466     Step 6: Making the environment safer (plan for lethal means safety):  Did not identify any lethal methods     Optional: What is most important to me and worth living for?:  \"My kids, my sister, Becca.\"     [place] Safety Plan. Isela Niño and August Parks. Used with permission of the authors.  "
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
## 2024-08-08 PROBLEM — F10.20 ALCOHOL USE DISORDER, SEVERE, DEPENDENCE (H): Status: ACTIVE | Noted: 2024-08-08

## 2024-08-09 ENCOUNTER — ANTICOAGULATION THERAPY VISIT (OUTPATIENT)
Dept: ANTICOAGULATION | Facility: CLINIC | Age: 44
End: 2024-08-09

## 2024-08-09 ENCOUNTER — OFFICE VISIT (OUTPATIENT)
Dept: FAMILY MEDICINE | Facility: CLINIC | Age: 44
End: 2024-08-09
Payer: COMMERCIAL

## 2024-08-09 VITALS
HEIGHT: 72 IN | DIASTOLIC BLOOD PRESSURE: 88 MMHG | RESPIRATION RATE: 20 BRPM | WEIGHT: 315 LBS | TEMPERATURE: 98 F | BODY MASS INDEX: 42.66 KG/M2 | SYSTOLIC BLOOD PRESSURE: 138 MMHG | OXYGEN SATURATION: 97 % | HEART RATE: 64 BPM

## 2024-08-09 DIAGNOSIS — I48.0 PAROXYSMAL ATRIAL FIBRILLATION (H): ICD-10-CM

## 2024-08-09 DIAGNOSIS — F33.1 MAJOR DEPRESSIVE DISORDER, RECURRENT EPISODE, MODERATE (H): ICD-10-CM

## 2024-08-09 DIAGNOSIS — Z86.718 HISTORY OF DVT (DEEP VEIN THROMBOSIS): ICD-10-CM

## 2024-08-09 DIAGNOSIS — G47.33 OBSTRUCTIVE SLEEP APNEA SYNDROME: ICD-10-CM

## 2024-08-09 DIAGNOSIS — E11.9 TYPE 2 DIABETES MELLITUS WITHOUT COMPLICATION, WITHOUT LONG-TERM CURRENT USE OF INSULIN (H): ICD-10-CM

## 2024-08-09 DIAGNOSIS — Z86.711 HISTORY OF PULMONARY EMBOLISM: ICD-10-CM

## 2024-08-09 DIAGNOSIS — E66.01 MORBID OBESITY (H): ICD-10-CM

## 2024-08-09 DIAGNOSIS — I10 ESSENTIAL HYPERTENSION, BENIGN: ICD-10-CM

## 2024-08-09 DIAGNOSIS — F10.10 ALCOHOL ABUSE, EPISODIC DRINKING BEHAVIOR: Primary | ICD-10-CM

## 2024-08-09 DIAGNOSIS — Z86.718 HISTORY OF DVT (DEEP VEIN THROMBOSIS): Primary | ICD-10-CM

## 2024-08-09 LAB — INR BLD: 3.6 (ref 0.9–1.1)

## 2024-08-09 PROCEDURE — 99214 OFFICE O/P EST MOD 30 MIN: CPT | Performed by: FAMILY MEDICINE

## 2024-08-09 PROCEDURE — 85610 PROTHROMBIN TIME: CPT | Performed by: FAMILY MEDICINE

## 2024-08-09 PROCEDURE — 36416 COLLJ CAPILLARY BLOOD SPEC: CPT | Performed by: FAMILY MEDICINE

## 2024-08-09 RX ORDER — LISINOPRIL 20 MG/1
20 TABLET ORAL DAILY
Qty: 90 TABLET | Refills: 2 | Status: SHIPPED | OUTPATIENT
Start: 2024-08-09

## 2024-08-09 ASSESSMENT — ANXIETY QUESTIONNAIRES
IF YOU CHECKED OFF ANY PROBLEMS ON THIS QUESTIONNAIRE, HOW DIFFICULT HAVE THESE PROBLEMS MADE IT FOR YOU TO DO YOUR WORK, TAKE CARE OF THINGS AT HOME, OR GET ALONG WITH OTHER PEOPLE: SOMEWHAT DIFFICULT
GAD7 TOTAL SCORE: 7
7. FEELING AFRAID AS IF SOMETHING AWFUL MIGHT HAPPEN: SEVERAL DAYS
2. NOT BEING ABLE TO STOP OR CONTROL WORRYING: MORE THAN HALF THE DAYS
6. BECOMING EASILY ANNOYED OR IRRITABLE: NOT AT ALL
3. WORRYING TOO MUCH ABOUT DIFFERENT THINGS: SEVERAL DAYS
7. FEELING AFRAID AS IF SOMETHING AWFUL MIGHT HAPPEN: SEVERAL DAYS
GAD7 TOTAL SCORE: 7
4. TROUBLE RELAXING: NOT AT ALL
5. BEING SO RESTLESS THAT IT IS HARD TO SIT STILL: SEVERAL DAYS
GAD7 TOTAL SCORE: 7
8. IF YOU CHECKED OFF ANY PROBLEMS, HOW DIFFICULT HAVE THESE MADE IT FOR YOU TO DO YOUR WORK, TAKE CARE OF THINGS AT HOME, OR GET ALONG WITH OTHER PEOPLE?: SOMEWHAT DIFFICULT
1. FEELING NERVOUS, ANXIOUS, OR ON EDGE: MORE THAN HALF THE DAYS

## 2024-08-09 ASSESSMENT — PATIENT HEALTH QUESTIONNAIRE - PHQ9
10. IF YOU CHECKED OFF ANY PROBLEMS, HOW DIFFICULT HAVE THESE PROBLEMS MADE IT FOR YOU TO DO YOUR WORK, TAKE CARE OF THINGS AT HOME, OR GET ALONG WITH OTHER PEOPLE: VERY DIFFICULT
SUM OF ALL RESPONSES TO PHQ QUESTIONS 1-9: 15
SUM OF ALL RESPONSES TO PHQ QUESTIONS 1-9: 15

## 2024-08-09 NOTE — PROGRESS NOTES
ANTICOAGULATION MANAGEMENT     Cody WALLACE Young 43 year old male is on warfarin with supratherapeutic INR result. (Goal INR 2.0-3.0)    Recent labs: (last 7 days)     08/09/24  0734   INR 3.6*       ASSESSMENT     Source(s): Chart Review and Patient/Caregiver Call     Warfarin doses taken: Warfarin taken as instructed  Diet: Change in alcohol intake may be affecting INR. On 7/13 & 7/14 patient states he had 2 L of vodka, prior to that he was trying to stop. On 6/25 he had told [facility] RN he had quit, INR was subtherapeutic, so weekly warfarin dose was increased by 7%. Patient is now unable to control his alcohol intake and drinking nightly.   Medication/supplement changes:  carvedilol started on 7/23/24 by Cardiology No interaction anticipated  New illness, injury, or hospitalization: Yes: in ED on 7/16/23 for dizziness, weakness and shortness of breath. Found to be in a-fib and cardioverted  Signs or symptoms of bleeding or clotting: No  Previous result: Therapeutic last 2(+) visits  Additional findings:  Patient is calling today for inpatient detox at [facility] the [facility] followed by inpatient treatment at [facility]. It might be a week or two before he can get admitted.        PLAN     Recommended plan for ongoing change(s) affecting INR     Dosing Instructions: Since he is unsure when detox will take place and he is unable to stop drinking on his own, partial hold then decrease your warfarin dose (6.7% change) with next INR in 1 week       Summary  As of 8/9/2024      Full warfarin instructions:  8/9: 2.5 mg; Otherwise 5 mg every day   Next INR check:  8/16/2024               Telephone call with Cody who verbalizes understanding and agrees to plan    Lab visit scheduled    Education provided: Please call back if any changes to your diet, medications or how you've been taking warfarin  Healthy lifestyle considerations: potential interaction between warfarin and alcohol and avoid excessive alcohol  drinking (binge drinking) while on warfarin due to increased risk of bleeding from effect on INR and fall risk  Symptom monitoring: monitoring for bleeding signs and symptoms and when to seek medical attention/emergency care  Contact [phone] with any changes, questions or concerns.     Plan made per [facility] anticoagulation protocol    Catalina Begum RN  Anticoagulation Clinic  8/9/2024    _______________________________________________________________________     Anticoagulation Episode Summary       Current INR goal:  2.0-3.0   TTR:  53.4% (5.6 mo)   Target end date:  Indefinite   Send INR reminders to:  [facility]    Indications    Acute pulmonary embolism without acute cor pulmonale  unspecified pulmonary embolism type (H) (Resolved) [I26.99]  History of DVT (deep vein thrombosis) [Z86.718]             Comments:               Anticoagulation Care Providers       Provider Role Specialty Phone number    Fredy Veras MD Referring Family Medicine [phone]    Aaseby-Aguilera, Ramona Ann, PA-C Referring Family Medicine [phone]

## 2024-08-09 NOTE — PROGRESS NOTES
Assessment & Plan     Alcohol abuse, episodic drinking behavior - planning for inpatient treatment - will have staff call next week to offer assistance if needed    Essential hypertension, benign - discussed ways to rememeber AM meds better, sounds like he will try to set an alarm on his phone.   - lisinopril (ZESTRIL) 20 MG tablet; Take 1 tablet (20 mg) by mouth daily    Type 2 diabetes mellitus without complication, without long-term current use of insulin (H) - will increase ozempic to better help with weight  - Semaglutide, 1 MG/DOSE, (OZEMPIC) 4 MG/3ML pen; Inject 1 mg subcutaneously every 7 days    History of DVT (deep vein thrombosis)  - INR point of care    History of pulmonary embolism  - INR point of care    Major depressive disorder, recurrent episode, moderate (H) - continues to be an issue, contracts for safety today    Obstructive sleep apnea syndrome - using CPAP nightly    Morbid obesity (H) - increased dose of ozempic    Paroxysmal atrial fibrillation (H) - on coreg, forgetting AM dose. Discussed how he can remember better. No further episodes. Saw cardiology, suggested to maximize coreg but Cody is feeling fatigue since starting so will hold off for now.       MED REC REQUIRED  Post Medication Reconciliation Status:  Discharge medications reconciled, continue medications without change    BMI  Estimated body mass index is 51.54 kg/m  as calculated from the following:    Height as of this encounter: 1.829 m (6').    Weight as of this encounter: 172.4 kg (380 lb).       Subjective   Cody is a 43 year old, presenting for the following health issues:  ER F/U (Follow-up ER, was seen [facility] ER on 07/16/2024 for A-Fib)        8/9/2024     6:43 AM   Additional Questions   Roomed by Evelyn Noble     History of Present Illness       Reason for visit:  Afib follow up and addiction assessment    He eats 0-1 servings of fruits and vegetables daily.He consumes 0 sweetened beverage(s) daily.He  exercises with enough effort to increase his heart rate 9 or less minutes per day.  He exercises with enough effort to increase his heart rate 3 or less days per week. He is missing 1 dose(s) of medications per week.     ED/UC Followup:    Facility:  [facility]  Date of visit: 07/16/2024  Reason for visit: A-Fib  Current Status: stable      Chemical dependency assessment 2 days ago. Was holding off on their recommendation to proceed to inpatient detox followed by inpatient treatment but has decided this is the route he is going to take. Is working with his employer on this. Plans to call inpatient today.     Forgetting his AM medications often. This includes his coreg and lisinopril. Hasn't taken his AM meds yet today.          Review of Systems  Constitutional, HEENT, cardiovascular, pulmonary, gi and gu systems are negative, except as otherwise noted.      Objective    /88 (BP Location: Right arm, Patient Position: Chair, Cuff Size: Adult Large)   Pulse 64   Temp 98  F (36.7  C) (Oral)   Resp 20   Ht 1.829 m (6')   Wt (!) 172.4 kg (380 lb)   SpO2 97%   BMI 51.54 kg/m    Body mass index is 51.54 kg/m .  Physical Exam   GENERAL: alert and no distress  PSYCH: mentation appears normal, affect normal/bright          Signed Electronically by: Yovana Felipe MD

## 2024-08-13 ENCOUNTER — VIRTUAL VISIT (OUTPATIENT)
Dept: BEHAVIORAL HEALTH | Facility: CLINIC | Age: 44
End: 2024-08-13
Payer: COMMERCIAL

## 2024-08-13 DIAGNOSIS — F33.1 MAJOR DEPRESSIVE DISORDER, RECURRENT EPISODE, MODERATE (H): Primary | ICD-10-CM

## 2024-08-13 PROCEDURE — 90785 PSYTX COMPLEX INTERACTIVE: CPT | Mod: 95

## 2024-08-13 PROCEDURE — 90834 PSYTX W PT 45 MINUTES: CPT | Mod: 95

## 2024-08-13 ASSESSMENT — PATIENT HEALTH QUESTIONNAIRE - PHQ9
SUM OF ALL RESPONSES TO PHQ QUESTIONS 1-9: 16
SUM OF ALL RESPONSES TO PHQ QUESTIONS 1-9: 16
10. IF YOU CHECKED OFF ANY PROBLEMS, HOW DIFFICULT HAVE THESE PROBLEMS MADE IT FOR YOU TO DO YOUR WORK, TAKE CARE OF THINGS AT HOME, OR GET ALONG WITH OTHER PEOPLE: VERY DIFFICULT

## 2024-08-13 NOTE — PROGRESS NOTES
[facility]                                     Progress Note    Patient Name: Cody Bolton  Date: 24           Service Type: Individual      Session Start Time: 12.57    Session End Time: 1.40     Session Length: 38-52 minutes    Session #: 11    Attendees: Client attended alone    Service Modality:  Video Visit:      Provider verified identity through the following two step process.  Patient provided:  Patient  and Patient address    Telemedicine Visit: The patient's condition can be safely assessed and treated via synchronous audio and visual telemedicine encounter.      Reason for Telemedicine Visit: Patient has requested telehealth visit    Originating Site (Patient Location): Patient's home    Distant Site (Provider Location): [facility]    Consent:  The patient/guardian has verbally consented to: the potential risks and benefits of telemedicine (video visit) versus in person care; bill my insurance or make self-payment for services provided; and responsibility for payment of non-covered services.     Patient would like the video invitation sent by:  My Chart    Mode of Communication:  Video Conference via [employer]    Distant Location (Provider):  Off-site    As the provider I attest to compliance with applicable laws and regulations related to telemedicine.    DATA  Interactive Complexity: Yes, visit entailed Interactive Complexity evidenced by:  -The need to manage maladaptive communication (related to, e.g., high anxiety, high reactivity, repeated questions, or disagreement) among participants that complicates delivery of care. Patient was frequently distracted and interrupted by other tasks on his computer, requiring refocusing and repeated questions.  Crisis: No        Progress Since Last Session (Related to Symptoms / Goals / Homework):   Symptoms: Worsening . Patient's PHQ9 score has increased to a 16.    Homework:  Partially completed      Episode of Care Goals: Minimal progress - CONTEMPLATION (Considering change and yet undecided); Intervened by assessing the negative and positive thinking (ambivalence) about behavior change     Current / Ongoing Stressors and Concerns:  Patient reports getting a chemical dependency assessment completed on 8/7, as referred by this provider, and reports currently being on the waitlist to go to [facility] for chemical dependency inpatient treatment. Patient discussed stressful relationship dynamics with his girlfriend, Becca. Patient discussed struggles with trusting his girlfriend. Patient discussed his girlfriend's alcoholism. Patient discussed feeling undesirable by other's in a romantic relationship. Patient discussed going to the [employer] to celebrate his upcoming birthday.        Treatment Objective(s) Addressed in This Session:   Decrease frequency and intensity of feeling down, depressed, hopeless       Intervention:   Motivational Interviewing    MI Intervention: Expressed Empathy/Understanding, Supported Autonomy, Collaboration, Evocation, Open-ended questions, and Reflections: simple and complex     Change Talk Expressed by the Patient: Desire to change    Provider Response to Change Talk: E - Evoked more info from patient about behavior change, A - Affirmed patient's thoughts, decisions, or attempts at behavior change, and R - Reflected patient's change talk    Assessments completed prior to visit:  The following assessments were completed by patient for this visit:  PHQ9:       3/26/2024    11:44 AM 5/7/2024    11:49 AM 6/18/2024     1:50 PM 7/2/2024    11:46 AM 8/7/2024     4:00 PM 8/9/2024     6:39 AM 8/13/2024    12:44 PM   PHQ-9 SCORE   PHQ-9 Total Score MyChart 8 (Mild depression) 5 (Mild depression) 8 (Mild depression) 14 (Moderate depression)  15 (Moderately severe depression) 16 (Moderately severe depression)   PHQ-9 Total Score 8 5 8 14 18 15 16     GAD7:        2/19/2024    12:39 PM 6/4/2024    11:39 AM 8/7/2024     4:00 PM 8/9/2024     6:40 AM   MERI-7 SCORE   Total Score 2 (minimal anxiety) 7 (mild anxiety)  7 (mild anxiety)   Total Score 2 7 6 7     PROMIS 10-Global Health (all questions and answers displayed):       2/19/2024    12:53 PM 6/4/2024    11:40 AM 8/7/2024     4:00 PM   PROMIS 10   In general, would you say your health is: Fair Fair    In general, would you say your quality of life is: Good Poor    In general, how would you rate your physical health? Fair Fair    In general, how would you rate your mental health, including your mood and your ability to think? Good Fair    In general, how would you rate your satisfaction with your social activities and relationships? Good Fair    In general, please rate how well you carry out your usual social activities and roles Fair Poor    To what extent are you able to carry out your everyday physical activities such as walking, climbing stairs, carrying groceries, or moving a chair? Completely A little    In the past 7 days, how often have you been bothered by emotional problems such as feeling anxious, depressed, or irritable? Sometimes Often    In the past 7 days, how would you rate your fatigue on average? Mild Severe    In the past 7 days, how would you rate your pain on average, where 0 means no pain, and 10 means worst imaginable pain? 5 8    In general, would you say your health is: 2 2    In general, would you say your quality of life is: 3 1    In general, how would you rate your physical health? 2 2    In general, how would you rate your mental health, including your mood and your ability to think? 3 2    In general, how would you rate your satisfaction with your social activities and relationships? 3 2    In general, please rate how well you carry out your usual social activities and roles. (This includes activities at home, at work and in your community, and responsibilities as a parent, child, spouse,  "employee, friend, etc.) 2 1    To what extent are you able to carry out your everyday physical activities such as walking, climbing stairs, carrying groceries, or moving a chair? 5 2    In the past 7 days, how often have you been bothered by emotional problems such as feeling anxious, depressed, or irritable? 3 4    In the past 7 days, how would you rate your fatigue on average? 2 4    In the past 7 days, how would you rate your pain on average, where 0 means no pain, and 10 means worst imaginable pain? 5 8    Global Mental Health Score 12    12 7    Global Physical Health Score 14    14 8    PROMIS TOTAL - SUBSCORES 26    26 15        Information is confidential and restricted. Go to Review Flowsheets to unlock data.    Multiple values from one day are sorted in reverse-chronological order     [place] Suicide Severity Rating Scale (Lifetime/Recent)      2/19/2024     1:03 PM 5/3/2024     8:51 AM 5/4/2024     6:08 PM 5/16/2024     9:52 AM 6/8/2024     3:11 AM 7/16/2024     4:09 PM   P[place]on Suicide Severity Rating (Lifetime/Recent)   Q1 Wished to be Dead (Past Month)  0-->no 1-->yes 0-->no 0-->no 0-->no   Q2 Suicidal Thoughts (Past Month)  0-->no 1-->yes 0-->no 0-->no 0-->no   Q6 Suicide Behavior (Lifetime)  0-->no 1-->yes 0-->no 0-->no 0-->no   If yes to Q6, within past 3 months?   1-->yes      Level of Risk per Screen  no risks indicated high risk no risks indicated no risks indicated no risks indicated   Q1 Wish to be Dead (Lifetime) Y        Wish to be Dead Description (Lifetime) \"I never attempted, but I kind of had a plan, but it comes and goes.\" Patient reports passive suicidal ideation.        1. Wish to be Dead (Past 1 Month) N        Q2 Non-Specific Active Suicidal Thoughts (Lifetime) Y        Non-Specific Active Suicidal Thought Description (Lifetime) \"I never attempted, but I kind of had a plan, but it comes and goes.\" Patient reports passive suicidal ideation.        2. Non-Specific Active Suicidal " Thoughts (Past 1 Month) N        3. Active Suicidal Ideation with any Methods (Not Plan) Without Intent to Act (Lifetime) N        Q4 Active Suicidal Ideation with Some Intent to Act, Without Specific Plan (Lifetime) N        Q5 Active Suicidal Ideation with Specific Plan and Intent (Lifetime) N        Most Severe Ideation Rating (Lifetime) 2        Description of Most Severe Ideation (Lifetime) Patient reports passive suicidal ideation.        Most Severe Ideation Rating (Past 1 Month) 1        Description of Most Severe Ideation (Past 1 Month) NA        Frequency (Lifetime) 1        Frequency (Past 1 Month) 1        Duration (Lifetime) 1        Duration (Past 1 Month) 1        Controllability (Lifetime) 2        Controllability (Past 1 Month) 1        Deterrents (Lifetime) 1        Deterrents (Past 1 Month) 1        Reasons for Ideation (Lifetime) 4        Reasons for Ideation (Past 1 Month) 0        Actual Attempt (Lifetime) N        Has subject engaged in non-suicidal self-injurious behavior? (Lifetime) N        Interrupted Attempts (Lifetime) N        Aborted or Self-Interrupted Attempt (Lifetime) N        Preparatory Acts or Behavior (Lifetime) N        Calculated C-SSRS Risk Score (Lifetime/Recent) No Risk Indicated              ASSESSMENT: Current Emotional / Mental Status (status of significant symptoms):   Risk status (Self / Other harm or suicidal ideation)   Patient denies current fears or concerns for personal safety.   Patient reports the following current or recent suicidal ideation or behaviors: patient reports passive SI, but denies any plan or intent. Patient's safety plan was updated.    Patient denies current or recent homicidal ideation or behaviors.   Patient denies current or recent self injurious behavior or ideation.   Patient denies other safety concerns.   Patient reports there has been no change in risk factors since their last session.     Patient reports there has been no change in  protective factors since their last session.     A safety and risk management plan has been developed including: Patient consented to co-developed safety plan.  Safety and risk management plan was completed - see below.  Patient agreed to use safety plan should any safety concerns arise.  A copy was given to the patient.      Appearance:   Appropriate    Eye Contact:   Fair    Psychomotor Behavior: Normal    Attitude:   Cooperative  Friendly   Orientation:   All   Speech    Rate / Production: Normal/ Responsive    Volume:  Normal    Mood:    Anxious  Depressed    Affect:    Appropriate    Thought Content:  Clear    Thought Form:  Coherent  Logical    Insight:    Fair      Medication Review:   No changes to current psychiatric medication(s)     Medication Compliance:   Yes     Changes in Health Issues:   None reported     Chemical Use Review:   Substance Use: increase in alcohol .  Patient reports frequency of use : Patient reports drinking alcohol daily. Patient reports getting a chemical dependency assessment completed on 8/7, as referred by this provider, and reports currently being on the waitlist to go to [facility] for chemical dependency inpatient treatment .  Stage of Change: Contemplation  Reviewed information and resources for treatment and ongoing sobriety  Reviewed concerns related to health related substance abuse risk  Provided encouragement towards sobriety  Provided support and affirmation for steps taken towards sobriety    .   Provider encouraged patient to attend a 12 step meeting, such as AA.        Tobacco Use: No current tobacco use.      Diagnosis:  1. Major depressive disorder, recurrent episode, moderate (H)    Provisional Diagnosis: Alcohol Use Disorder    Collateral Reports Completed:   Not Applicable    PLAN: (Patient Tasks / Therapist Tasks / Other)  Patient will return in 6 weeks for next session. Patient will engage in self-care activities. Patient will work on open  "communication skills and boundary setting. Patient will work on coping skills to reduce his consumption of alcohol, including listening to music. Patient will evaluate his HALT cues that lead to alcohol cravings. Patient will utilize the \"delay, distract, decide\" skill when noticing alcohol cravings. Patient will look into taking a Vitamin D supplement as approved by his PCP. Patient will follow through on recommendations received from his chemical dependency assessment.      Lucy Rouse, [facility]                                                         ______________________________________________________________________    Individual Treatment Plan    Patient's Name: Cody Bolton  YOB: 1980    Date of Creation: 2/26/24  Date Treatment Plan Last Reviewed/Revised: 6/4/24    DSM5 Diagnoses:   Encounter Diagnosis   Name Primary?    Major depressive disorder, recurrent episode, moderate (H) Yes       Psychosocial / Contextual Factors: Relationship stressors, occupational dynamics.  PROMIS-10 from encounters over the past 365 days    Encounter date  Last reading 6/4/24  11:40 AM 2/26/24 2/19/2024 12:53 PM 2/19/24  12:53 PM   Global Mental Health Score (P) 7 (P) 12 (P) 12   Global Physical Health Score (P) 8 (P) 14 (P) 14   PROMIS TOTAL - SUBSCORES (P) 15 (P) 26 (P) 26       Referral / Collaboration:  Referral to another professional/service is not indicated at this time..    Anticipated number of session for this episode of care: 9-12 sessions  Anticipation frequency of session: Weekly  Anticipated Duration of each session: 38-52 minutes  Treatment plan will be reviewed in 90 days or when goals have been changed.       MeasurableTreatment Goal(s) related to diagnosis / functional impairment(s)  Goal 1: Patient will reduce depression symptoms as evidenced by a reduction in PHQ9 score to a 7 or less in the next 2 months.    I will know I've met my goal when I'm not having the feeling as often that I just " don't want to do life anymore.      Objective #A (Patient Action)    Patient will Decrease frequency and intensity of feeling down, depressed, hopeless.  Status: Continued - Date(s):6/4/24     Intervention(s)  Therapist will teach  coping skills and self-care activities .    Objective #B  Patient will Feel less tired and more energy during the day .  Status: Continued - Date(s):6/4/24     Intervention(s)  Therapist will  teach sleep hygiene and movement-based exercises .    Objective #C  Patient will Increase interest, engagement, and pleasure in doing things.  Status: Continued - Date(s):6/4/24     Intervention(s)  Therapist will  teach distress-tolerance skills. .          Patient has reviewed and agreed to the above plan.      KATRIN Salvador on 6/4/2024 at 12:02 PM

## 2024-08-15 ENCOUNTER — TELEPHONE (OUTPATIENT)
Dept: BEHAVIORAL HEALTH | Facility: CLINIC | Age: 44
End: 2024-08-15
Payer: COMMERCIAL

## 2024-08-15 ENCOUNTER — TELEPHONE (OUTPATIENT)
Dept: FAMILY MEDICINE | Facility: CLINIC | Age: 44
End: 2024-08-15
Payer: COMMERCIAL

## 2024-08-15 NOTE — TELEPHONE ENCOUNTER
Forms/Letter Request    Type of form/letter: Disability      Do we have the form/letter: Yes: placed in provider folder     Who is the form from? Insurance comp    Where did/will the form come from? form was faxed in    When is form/letter needed by: 09/1/2024    How would you like the form/letter returned: Fax : [phone]    Patient Notified form requests are processed in 5-7 business days:No    Could we send this information to you in [employer] or would you prefer to receive a phone call?:   Patient would prefer a phone call   Okay to leave a detailed message?: Yes at Cell number on file:    Telephone Information:   Mobile [phone]     Lili Gutierrez/

## 2024-08-15 NOTE — TELEPHONE ENCOUNTER
----- Message from Mason Mckay sent at 8/15/2024 10:53 AM CDT -----  Regarding: reopen referral  Please reopen the LP referral as pt made his [employer] deposit. Thanks!    Luis

## 2024-08-16 ENCOUNTER — LAB (OUTPATIENT)
Dept: LAB | Facility: CLINIC | Age: 44
End: 2024-08-16
Payer: COMMERCIAL

## 2024-08-16 ENCOUNTER — ANTICOAGULATION THERAPY VISIT (OUTPATIENT)
Dept: ANTICOAGULATION | Facility: CLINIC | Age: 44
End: 2024-08-16

## 2024-08-16 ENCOUNTER — MYC MEDICAL ADVICE (OUTPATIENT)
Dept: FAMILY MEDICINE | Facility: CLINIC | Age: 44
End: 2024-08-16

## 2024-08-16 DIAGNOSIS — Z86.711 HISTORY OF PULMONARY EMBOLISM: ICD-10-CM

## 2024-08-16 DIAGNOSIS — Z86.718 HISTORY OF DVT (DEEP VEIN THROMBOSIS): ICD-10-CM

## 2024-08-16 DIAGNOSIS — Z86.718 HISTORY OF DVT (DEEP VEIN THROMBOSIS): Primary | ICD-10-CM

## 2024-08-16 LAB — INR BLD: 1.5 (ref 0.9–1.1)

## 2024-08-16 PROCEDURE — 85610 PROTHROMBIN TIME: CPT

## 2024-08-16 PROCEDURE — 36416 COLLJ CAPILLARY BLOOD SPEC: CPT

## 2024-08-16 NOTE — PROGRESS NOTES
ANTICOAGULATION MANAGEMENT     Cody WALLACE Young 43 year old male is on warfarin with subtherapeutic INR result. (Goal INR 2.0-3.0)    Recent labs: (last 7 days)     08/16/24  1154   INR 1.5*       ASSESSMENT     Source(s): Chart Review and Patient/Caregiver Call     Warfarin doses taken: partial hold + 6.7% reduction advised previously, which may be affecting INR  Diet: Change in alcohol intake may be affecting INR. Decrease intake since last week - patient has a full weekend of plans as it is his birthday & does not plan to drink. He feels fairly confident he will not be drinking heavily, but understands there is the temptation. Writer applauded patient on his plans for detox/recovery.   Medication/supplement changes: Carvedilol started on 7/23/24 by Cardiology No interaction anticipated   New illness, injury, or hospitalization: No  Signs or symptoms of bleeding or clotting: No  Previous result: Supratherapeutic  Additional findings:  Writer spoke with team at [facility] and reviewed patient's plans for detox/rehab services on Monday 8/19/24 - team made a note that patient will require INR testing while in their care. ACC team will communicate with [facility] staff directly as patient will NOT have cell/computer with him.    Instead of a larger boost today (for ex. 10 mg), writer advised large MD d/t plans for detox and alcohol cessation next week       PLAN     Recommended plan for temporary change(s) and ongoing change(s) affecting INR     Dosing Instructions: Increase your warfarin dose (14.3% change) with next INR in 5-7 days       Summary  As of 8/16/2024      Full warfarin instructions:  7.5 mg every Tue, Fri; 5 mg all other days   Next INR check:  8/21/2024               Telephone call with Cody who verbalizes understanding and agrees to plan    Patient offered & declined to schedule next visit - next visit to be coordinated with [facility] staff - #[phone]    Education provided: Please call  back if any changes to your diet, medications or how you've been taking warfarin  Healthy lifestyle considerations: potential interaction between warfarin and alcohol, limit alcohol intake to no more than 1 to 2 drinks in 24 hours if you choose to drink alcohol, and avoid excessive alcohol drinking (binge drinking) while on warfarin due to increased risk of bleeding from effect on INR and fall risk  Symptom monitoring: monitoring for bleeding signs and symptoms, monitoring for clotting signs and symptoms, monitoring for stroke signs and symptoms, and when to seek medical attention/emergency care    Plan made with [facility] Pharmacist Tere Deras RN  Anticoagulation Clinic  8/16/2024    _______________________________________________________________________     Anticoagulation Episode Summary       Current INR goal:  2.0-3.0   TTR:  53.1% (5.8 mo)   Target end date:  Indefinite   Send INR reminders to:  [facility]    Indications    Acute pulmonary embolism without acute cor pulmonale  unspecified pulmonary embolism type (H) (Resolved) [I26.99]  History of DVT (deep vein thrombosis) [Z86.718]             Comments:               Anticoagulation Care Providers       Provider Role Specialty Phone number    Fredy Veras MD Referring Family Medicine [phone]    Aaseby-Aguilera, Ramona Ann, PA-C Referring Family Medicine [phone]

## 2024-08-16 NOTE — TELEPHONE ENCOUNTER
Form completed, notified patient he needs to stop in to sign form. Form left at .  Shelbie Cortes,

## 2024-08-16 NOTE — PROGRESS NOTES
ANTICOAGULATION MANAGEMENT     Cody WALLACE Young 43 year old male is on warfarin with subtherapeutic INR result. (Goal INR 2.0-3.0)    Recent labs: (last 7 days)     08/16/24  1154   INR 1.5*       ASSESSMENT     Source(s): Chart Review  Previous INR was Supratherapeutic  Medication, diet, health changes since last INR chart reviewed:  Partial hold last Friday 8/9/24 + 6.7% MD reduction advised  Per chart review, patient is planning for detox Mon 8/19/24 ~ warfarin need will likely increase with no alcohol intake         PLAN     Unable to reach Mu today.    Left message to take boost of 7.5 mg tonight and then 5 mg Sat/Sun this weekend. Request call back for assessment.    Follow up required to assess for changes  and discuss out of range result     Kera Deras RN  Anticoagulation Clinic  8/16/2024

## 2024-08-19 ENCOUNTER — HOSPITAL ENCOUNTER (INPATIENT)
Facility: CLINIC | Age: 44
LOS: 1 days | Discharge: OTHER INSTITUTION | DRG: 897 | End: 2024-08-20
Attending: FAMILY MEDICINE | Admitting: PSYCHIATRY & NEUROLOGY
Payer: COMMERCIAL

## 2024-08-19 DIAGNOSIS — F10.20 UNCOMPLICATED ALCOHOL DEPENDENCE (H): ICD-10-CM

## 2024-08-19 DIAGNOSIS — Z86.718 PERSONAL HISTORY OF DVT (DEEP VEIN THROMBOSIS): ICD-10-CM

## 2024-08-19 DIAGNOSIS — S22.41XA CLOSED FRACTURE OF MULTIPLE RIBS OF RIGHT SIDE, INITIAL ENCOUNTER: ICD-10-CM

## 2024-08-19 LAB
ALBUMIN SERPL BCG-MCNC: 4.2 G/DL (ref 3.5–5.2)
ALP SERPL-CCNC: 111 U/L (ref 40–150)
ALT SERPL W P-5'-P-CCNC: 34 U/L (ref 0–70)
AMPHETAMINES UR QL SCN: NORMAL
ANION GAP SERPL CALCULATED.3IONS-SCNC: 9 MMOL/L (ref 7–15)
AST SERPL W P-5'-P-CCNC: 30 U/L (ref 0–45)
BARBITURATES UR QL SCN: NORMAL
BASOPHILS # BLD AUTO: 0.1 10E3/UL (ref 0–0.2)
BASOPHILS NFR BLD AUTO: 1 %
BENZODIAZ UR QL SCN: NORMAL
BILIRUB SERPL-MCNC: 0.4 MG/DL
BUN SERPL-MCNC: 8.3 MG/DL (ref 6–20)
BZE UR QL SCN: NORMAL
CALCIUM SERPL-MCNC: 9 MG/DL (ref 8.8–10.4)
CANNABINOIDS UR QL SCN: NORMAL
CHLORIDE SERPL-SCNC: 106 MMOL/L (ref 98–107)
CREAT SERPL-MCNC: 0.7 MG/DL (ref 0.67–1.17)
EGFRCR SERPLBLD CKD-EPI 2021: >90 ML/MIN/1.73M2
EOSINOPHIL # BLD AUTO: 0.3 10E3/UL (ref 0–0.7)
EOSINOPHIL NFR BLD AUTO: 4 %
ERYTHROCYTE [DISTWIDTH] IN BLOOD BY AUTOMATED COUNT: 14.9 % (ref 10–15)
ETHANOL SERPL-MCNC: <0.01 G/DL
FENTANYL UR QL: NORMAL
GLUCOSE SERPL-MCNC: 109 MG/DL (ref 70–99)
HCO3 SERPL-SCNC: 22 MMOL/L (ref 22–29)
HCT VFR BLD AUTO: 38.6 % (ref 40–53)
HGB BLD-MCNC: 12.3 G/DL (ref 13.3–17.7)
IMM GRANULOCYTES # BLD: 0 10E3/UL
IMM GRANULOCYTES NFR BLD: 1 %
INR PPP: 1.91 (ref 0.85–1.15)
LIPASE SERPL-CCNC: 61 U/L (ref 13–60)
LYMPHOCYTES # BLD AUTO: 2.2 10E3/UL (ref 0.8–5.3)
LYMPHOCYTES NFR BLD AUTO: 34 %
MAGNESIUM SERPL-MCNC: 1.9 MG/DL (ref 1.7–2.3)
MCH RBC QN AUTO: 27.5 PG (ref 26.5–33)
MCHC RBC AUTO-ENTMCNC: 31.9 G/DL (ref 31.5–36.5)
MCV RBC AUTO: 86 FL (ref 78–100)
MONOCYTES # BLD AUTO: 0.5 10E3/UL (ref 0–1.3)
MONOCYTES NFR BLD AUTO: 8 %
NEUTROPHILS # BLD AUTO: 3.4 10E3/UL (ref 1.6–8.3)
NEUTROPHILS NFR BLD AUTO: 52 %
NRBC # BLD AUTO: 0 10E3/UL
NRBC BLD AUTO-RTO: 0 /100
OPIATES UR QL SCN: NORMAL
PCP QUAL URINE (ROCHE): NORMAL
PLATELET # BLD AUTO: 252 10E3/UL (ref 150–450)
POTASSIUM SERPL-SCNC: 4.1 MMOL/L (ref 3.4–5.3)
PROT SERPL-MCNC: 7.6 G/DL (ref 6.4–8.3)
RBC # BLD AUTO: 4.48 10E6/UL (ref 4.4–5.9)
SODIUM SERPL-SCNC: 137 MMOL/L (ref 135–145)
WBC # BLD AUTO: 6.4 10E3/UL (ref 4–11)

## 2024-08-19 PROCEDURE — 80307 DRUG TEST PRSMV CHEM ANLYZR: CPT | Performed by: FAMILY MEDICINE

## 2024-08-19 PROCEDURE — 99285 EMERGENCY DEPT VISIT HI MDM: CPT | Performed by: FAMILY MEDICINE

## 2024-08-19 PROCEDURE — 82977 ASSAY OF GGT: CPT | Performed by: PSYCHIATRY & NEUROLOGY

## 2024-08-19 PROCEDURE — 84155 ASSAY OF PROTEIN SERUM: CPT | Performed by: FAMILY MEDICINE

## 2024-08-19 PROCEDURE — 83690 ASSAY OF LIPASE: CPT | Performed by: FAMILY MEDICINE

## 2024-08-19 PROCEDURE — 85610 PROTHROMBIN TIME: CPT | Performed by: FAMILY MEDICINE

## 2024-08-19 PROCEDURE — 84443 ASSAY THYROID STIM HORMONE: CPT | Performed by: PSYCHIATRY & NEUROLOGY

## 2024-08-19 PROCEDURE — 83735 ASSAY OF MAGNESIUM: CPT | Performed by: FAMILY MEDICINE

## 2024-08-19 PROCEDURE — 85025 COMPLETE CBC W/AUTO DIFF WBC: CPT | Performed by: FAMILY MEDICINE

## 2024-08-19 PROCEDURE — 82077 ASSAY SPEC XCP UR&BREATH IA: CPT | Performed by: FAMILY MEDICINE

## 2024-08-19 PROCEDURE — 36415 COLL VENOUS BLD VENIPUNCTURE: CPT | Performed by: FAMILY MEDICINE

## 2024-08-19 RX ORDER — LISINOPRIL 20 MG/1
20 TABLET ORAL DAILY
Status: DISCONTINUED | OUTPATIENT
Start: 2024-08-20 | End: 2024-08-20 | Stop reason: HOSPADM

## 2024-08-19 RX ORDER — SIMVASTATIN 20 MG
20 TABLET ORAL AT BEDTIME
Status: DISCONTINUED | OUTPATIENT
Start: 2024-08-19 | End: 2024-08-20 | Stop reason: HOSPADM

## 2024-08-19 RX ORDER — BUPROPION HYDROCHLORIDE 150 MG/1
150 TABLET ORAL EVERY MORNING
Status: DISCONTINUED | OUTPATIENT
Start: 2024-08-20 | End: 2024-08-20 | Stop reason: HOSPADM

## 2024-08-19 RX ORDER — ACETAMINOPHEN 500 MG
1000 TABLET ORAL EVERY 8 HOURS PRN
Status: DISCONTINUED | OUTPATIENT
Start: 2024-08-19 | End: 2024-08-20 | Stop reason: HOSPADM

## 2024-08-19 RX ORDER — CARVEDILOL 12.5 MG/1
12.5 TABLET ORAL 2 TIMES DAILY WITH MEALS
Status: DISCONTINUED | OUTPATIENT
Start: 2024-08-20 | End: 2024-08-20 | Stop reason: HOSPADM

## 2024-08-19 RX ORDER — DIAZEPAM 5 MG
5-20 TABLET ORAL EVERY 30 MIN PRN
Status: DISCONTINUED | OUTPATIENT
Start: 2024-08-19 | End: 2024-08-20 | Stop reason: HOSPADM

## 2024-08-19 RX ORDER — WARFARIN SODIUM 5 MG/1
5 TABLET ORAL AT BEDTIME
Status: COMPLETED | OUTPATIENT
Start: 2024-08-20 | End: 2024-08-20

## 2024-08-19 RX ORDER — CLONIDINE HYDROCHLORIDE 0.1 MG/1
0.1 TABLET ORAL
Status: DISCONTINUED | OUTPATIENT
Start: 2024-08-19 | End: 2024-08-20 | Stop reason: HOSPADM

## 2024-08-19 RX ORDER — METFORMIN HCL 500 MG
1000 TABLET, EXTENDED RELEASE 24 HR ORAL
Status: DISCONTINUED | OUTPATIENT
Start: 2024-08-19 | End: 2024-08-20 | Stop reason: HOSPADM

## 2024-08-19 ASSESSMENT — ACTIVITIES OF DAILY LIVING (ADL)
ADLS_ACUITY_SCORE: 38
ADLS_ACUITY_SCORE: 36
ADLS_ACUITY_SCORE: 38
ADLS_ACUITY_SCORE: 38

## 2024-08-19 ASSESSMENT — COLUMBIA-SUICIDE SEVERITY RATING SCALE - C-SSRS
2. HAVE YOU ACTUALLY HAD ANY THOUGHTS OF KILLING YOURSELF IN THE PAST MONTH?: YES
4. HAVE YOU HAD THESE THOUGHTS AND HAD SOME INTENTION OF ACTING ON THEM?: NO
3. HAVE YOU BEEN THINKING ABOUT HOW YOU MIGHT KILL YOURSELF?: NO
5. HAVE YOU STARTED TO WORK OUT OR WORKED OUT THE DETAILS OF HOW TO KILL YOURSELF? DO YOU INTEND TO CARRY OUT THIS PLAN?: NO
6. HAVE YOU EVER DONE ANYTHING, STARTED TO DO ANYTHING, OR PREPARED TO DO ANYTHING TO END YOUR LIFE?: NO
1. IN THE PAST MONTH, HAVE YOU WISHED YOU WERE DEAD OR WISHED YOU COULD GO TO SLEEP AND NOT WAKE UP?: NO

## 2024-08-19 NOTE — ED TRIAGE NOTES
Patient presents seeking detox from alcohol, last drink 4 days ago. Patient will have a bed in [facility]. Patient is currently drinking 375 ML of hard alcohol per day. Patient denies history of seizures. Patient denies street drugs and tobacco use.    Triage Assessment (Adult)       Row Name 08/19/24 1608          Triage Assessment    Airway WDL WDL        Respiratory WDL    Respiratory WDL WDL        Skin Circulation/Temperature WDL    Skin Circulation/Temperature WDL WDL        Cardiac WDL    Cardiac WDL WDL        Peripheral/Neurovascular WDL    Peripheral Neurovascular WDL WDL        Cognitive/Neuro/Behavioral WDL    Cognitive/Neuro/Behavioral WDL WDL

## 2024-08-19 NOTE — ED PROVIDER NOTES
ED Provider Note  [facility]      History     Chief Complaint   Patient presents with    Drug / Alcohol Assessment     Patient presents seeking detox from alcohol, last drink 4 days ago. Patient will [facility] a bed [facility]. Patient is currently drinking 375 ML of hard alcohol per day. Patient denies history of seizures. Patient denies street drugs and tobacco use.      The history is provided by the patient and medical records.     Cody Bolton is a 44 year old male with history of major depressive disorder, type 2 diabetes, prior gastric bypass, prior history of DVT, prior episode of paroxysmal A-fib who presents seeking alcohol detox.  He has been drinking 375 mL of hard liquor daily, last drank 4 days prior.  He does [facility] a bed [facility].  No history of alcohol withdrawal seizures or DTs.  He denies any street drug use, no tobacco use.  No suicidal homicidal ideation.    Past Medical History  Past Medical History:   Diagnosis Date    Depressive disorder     High cholesterol     History of gastric bypass     History of pulmonary embolism     Hypertension     CARLIE (obstructive sleep apnea)     Personal history of DVT (deep vein thrombosis)     Type 2 diabetes mellitus (H)     Uncomplicated asthma      Past Surgical History:   Procedure Laterality Date    APPENDECTOMY  08/15/2017    Dr. Ferrer    GASTRIC BYPASS      OK LAP,APPENDECTOMY N/A 8/14/2017    Procedure: APPENDECTOMY, LAPAROSCOPIC;  Surgeon: Nba Ferrer MD;  Location: [facility] [place];  Service: General    REVISION AKANKSHA-EN-Y  2014    Dr. Ranjeet Espinal @[facility]     acetaminophen (TYLENOL) 500 MG tablet  buPROPion (WELLBUTRIN XL) 150 MG 24 hr tablet  carvedilol (COREG) 12.5 MG tablet  cloNIDine (CATAPRES) 0.1 MG tablet  lisinopril (ZESTRIL) 20 MG tablet  metFORMIN (GLUCOPHAGE XR) 500 MG 24 hr tablet  Semaglutide, 1 MG/DOSE, (OZEMPIC) 4 MG/3ML pen  simvastatin (ZOCOR) 20 MG tablet  warfarin ANTICOAGULANT  (COUMADIN) 5 MG tablet      No Known Allergies  Family History  Family History   Problem Relation Age of Onset    Substance Abuse Brother     Anxiety Disorder Sister     Depression Sister      Social History   Social History     Tobacco Use    Smoking status: Never     Passive exposure: Never    Smokeless tobacco: Never   Vaping Use    Vaping status: Never Used   Substance Use Topics    Alcohol use: Yes     Comment: Alcoholic Drinks/day: occasionally, trying to quit    Drug use: No        A medically appropriate review of systems was performed with pertinent positives and negatives noted in the HPI, and all other systems negative.    Physical Exam   BP: (!) 143/82  Pulse: 64  Temp: 98.2  F (36.8  C)  Resp: 18  Height: 182.9 cm (6')  Weight: (!) 171.5 kg (378 lb)  SpO2: 97 %  Physical Exam  Constitutional:       General: He is not in acute distress.     Appearance: Normal appearance. He is not toxic-appearing.   HENT:      Head: Atraumatic.   Eyes:      General: No scleral icterus.     Conjunctiva/sclera: Conjunctivae normal.   Cardiovascular:      Rate and Rhythm: Normal rate.      Heart sounds: Normal heart sounds.   Pulmonary:      Effort: Pulmonary effort is normal. No respiratory distress.      Breath sounds: Normal breath sounds.   Abdominal:      Palpations: Abdomen is soft.      Tenderness: There is no abdominal tenderness.   Musculoskeletal:         General: No deformity.      Cervical back: Neck supple.   Skin:     General: Skin is warm.   Neurological:      General: No focal deficit present.      Mental Status: He is alert and oriented to person, place, and time.      Sensory: No sensory deficit.      Motor: No weakness.      Coordination: Coordination normal.   Psychiatric:         Speech: Speech normal.         Behavior: Behavior is cooperative.         Thought Content: Thought content does not include homicidal or suicidal ideation.           ED Course, Procedures, & Data      Procedures     Results for  orders placed or performed during the hospital encounter of 08/19/24   INR     Status: Abnormal   Result Value Ref Range    INR 1.91 (H) 0.85 - 1.15   Comprehensive metabolic panel     Status: Abnormal   Result Value Ref Range    Sodium 137 135 - 145 mmol/L    Potassium 4.1 3.4 - 5.3 mmol/L    Carbon Dioxide (CO2) 22 22 - 29 mmol/L    Anion Gap 9 7 - 15 mmol/L    Urea Nitrogen 8.3 6.0 - 20.0 mg/dL    Creatinine 0.70 0.67 - 1.17 mg/dL    GFR Estimate >90 >60 mL/min/1.73m2    Calcium 9.0 8.8 - 10.4 mg/dL    Chloride 106 98 - 107 mmol/L    Glucose 109 (H) 70 - 99 mg/dL    Alkaline Phosphatase 111 40 - 150 U/L    AST 30 0 - 45 U/L    ALT 34 0 - 70 U/L    Protein Total 7.6 6.4 - 8.3 g/dL    Albumin 4.2 3.5 - 5.2 g/dL    Bilirubin Total 0.4 <=1.2 mg/dL   Lipase     Status: Abnormal   Result Value Ref Range    Lipase 61 (H) 13 - 60 U/L   Magnesium     Status: Normal   Result Value Ref Range    Magnesium 1.9 1.7 - 2.3 mg/dL   Ethyl Alcohol Level     Status: Normal   Result Value Ref Range    Alcohol ethyl <0.01 <=0.01 g/dL   CBC with platelets and differential     Status: Abnormal   Result Value Ref Range    WBC Count 6.4 4.0 - 11.0 10e3/uL    RBC Count 4.48 4.40 - 5.90 10e6/uL    Hemoglobin 12.3 (L) 13.3 - 17.7 g/dL    Hematocrit 38.6 (L) 40.0 - 53.0 %    MCV 86 78 - 100 fL    MCH 27.5 26.5 - 33.0 pg    MCHC 31.9 31.5 - 36.5 g/dL    RDW 14.9 10.0 - 15.0 %    Platelet Count 252 150 - 450 10e3/uL    % Neutrophils 52 %    % Lymphocytes 34 %    % Monocytes 8 %    % Eosinophils 4 %    % Basophils 1 %    % Immature Granulocytes 1 %    NRBCs per 100 WBC 0 <1 /100    Absolute Neutrophils 3.4 1.6 - 8.3 10e3/uL    Absolute Lymphocytes 2.2 0.8 - 5.3 10e3/uL    Absolute Monocytes 0.5 0.0 - 1.3 10e3/uL    Absolute Eosinophils 0.3 0.0 - 0.7 10e3/uL    Absolute Basophils 0.1 0.0 - 0.2 10e3/uL    Absolute Immature Granulocytes 0.0 <=0.4 10e3/uL    Absolute NRBCs 0.0 10e3/uL   Urine Drug Screen Panel     Status: Normal   Result Value Ref  Range    Amphetamines Urine Screen Negative Screen Negative    Barbituates Urine Screen Negative Screen Negative    Benzodiazepine Urine Screen Negative Screen Negative    Cannabinoids Urine Screen Negative Screen Negative    Cocaine Urine Screen Negative Screen Negative    Fentanyl Qual Urine Screen Negative Screen Negative    Opiates Urine Screen Negative Screen Negative    PCP Urine Screen Negative Screen Negative   CBC with platelets differential     Status: Abnormal    Narrative    The following orders were created for panel order CBC with platelets differential.  Procedure                               Abnormality         Status                     ---------                               -----------         ------                     CBC with platelets and d...[[id number]]  Abnormal            Final result                 Please view results for these tests on the individual orders.   Urine Drug Screen     Status: Normal    Narrative    The following orders were created for panel order Urine Drug Screen.  Procedure                               Abnormality         Status                     ---------                               -----------         ------                     Urine Drug Screen Panel[[id number]]      Normal              Final result                 Please view results for these tests on the individual orders.     Medications   diazepam (VALIUM) tablet 5-20 mg (has no administration in time range)     Labs Ordered and Resulted from Time of ED Arrival to Time of ED Departure   INR - Abnormal       Result Value    INR 1.91 (*)    COMPREHENSIVE METABOLIC PANEL - Abnormal    Sodium 137      Potassium 4.1      Carbon Dioxide (CO2) 22      Anion Gap 9      Urea Nitrogen 8.3      Creatinine 0.70      GFR Estimate >90      Calcium 9.0      Chloride 106      Glucose 109 (*)     Alkaline Phosphatase 111      AST 30      ALT 34      Protein Total 7.6      Albumin 4.2      Bilirubin Total 0.4     LIPASE -  Abnormal    Lipase 61 (*)    CBC WITH PLATELETS AND DIFFERENTIAL - Abnormal    WBC Count 6.4      RBC Count 4.48      Hemoglobin 12.3 (*)     Hematocrit 38.6 (*)     MCV 86      MCH 27.5      MCHC 31.9      RDW 14.9      Platelet Count 252      % Neutrophils 52      % Lymphocytes 34      % Monocytes 8      % Eosinophils 4      % Basophils 1      % Immature Granulocytes 1      NRBCs per 100 WBC 0      Absolute Neutrophils 3.4      Absolute Lymphocytes 2.2      Absolute Monocytes 0.5      Absolute Eosinophils 0.3      Absolute Basophils 0.1      Absolute Immature Granulocytes 0.0      Absolute NRBCs 0.0     MAGNESIUM - Normal    Magnesium 1.9     ETHYL ALCOHOL LEVEL - Normal    Alcohol ethyl <0.01     URINE DRUG SCREEN PANEL - Normal    Amphetamines Urine Screen Negative      Barbituates Urine Screen Negative      Benzodiazepine Urine Screen Negative      Cannabinoids Urine Screen Negative      Cocaine Urine Screen Negative      Fentanyl Qual Urine Screen Negative      Opiates Urine Screen Negative      PCP Urine Screen Negative       No orders to display          Critical care was not performed.     Medical Decision Making  The patient's presentation was of high complexity (an acute health issue posing potential threat to life or bodily function).    The patient's evaluation involved:  ordering and/or review of 3+ test(s) in this encounter (see separate area of note for details)    The patient's management necessitated high risk (a decision regarding hospitalization).    Assessment & Plan        I have reviewed the nursing notes. I have reviewed the findings, diagnosis, plan and need for follow up with the patient.    New Prescriptions    No medications on file       Final diagnoses:   Uncomplicated alcohol dependence (H)   Personal history of DVT (deep vein thrombosis)       Rashid Padgett  [facility] EMERGENCY DEPARTMENT  8/19/2024        Rashid Padgett MD  08/19/24 2053

## 2024-08-19 NOTE — PROGRESS NOTES
Per chart review, patient is currently in the ER as planned for Detox, then [facility] admission. ACC to reach out to [facility] team tomorrow (#[phone]) to ensure warfarin dosing & recheck plan is communicated. Kera Deras, KENNETHN, RN

## 2024-08-20 ENCOUNTER — DOCUMENTATION ONLY (OUTPATIENT)
Dept: ANTICOAGULATION | Facility: CLINIC | Age: 44
End: 2024-08-20
Payer: COMMERCIAL

## 2024-08-20 ENCOUNTER — TELEPHONE (OUTPATIENT)
Dept: FAMILY MEDICINE | Facility: CLINIC | Age: 44
End: 2024-08-20
Payer: COMMERCIAL

## 2024-08-20 ENCOUNTER — TELEPHONE (OUTPATIENT)
Dept: ANTICOAGULATION | Facility: CLINIC | Age: 44
End: 2024-08-20
Payer: COMMERCIAL

## 2024-08-20 ENCOUNTER — TELEPHONE (OUTPATIENT)
Dept: BEHAVIORAL HEALTH | Facility: CLINIC | Age: 44
End: 2024-08-20

## 2024-08-20 ENCOUNTER — TELEPHONE (OUTPATIENT)
Dept: BEHAVIORAL HEALTH | Facility: CLINIC | Age: 44
End: 2024-08-20
Payer: COMMERCIAL

## 2024-08-20 ENCOUNTER — HOSPITAL ENCOUNTER (OUTPATIENT)
Dept: BEHAVIORAL HEALTH | Facility: CLINIC | Age: 44
Discharge: HOME | End: 2024-08-20
Attending: FAMILY MEDICINE
Payer: COMMERCIAL

## 2024-08-20 VITALS
OXYGEN SATURATION: 92 % | WEIGHT: 315 LBS | DIASTOLIC BLOOD PRESSURE: 87 MMHG | SYSTOLIC BLOOD PRESSURE: 148 MMHG | HEART RATE: 60 BPM | HEIGHT: 72 IN | BODY MASS INDEX: 42.66 KG/M2 | RESPIRATION RATE: 16 BRPM | TEMPERATURE: 97.5 F

## 2024-08-20 DIAGNOSIS — Z86.718 HISTORY OF DVT (DEEP VEIN THROMBOSIS): Primary | ICD-10-CM

## 2024-08-20 DIAGNOSIS — Z72.51 HISTORY OF UNPROTECTED SEX: Primary | ICD-10-CM

## 2024-08-20 PROBLEM — F10.20 UNCOMPLICATED ALCOHOL DEPENDENCE (H): Status: ACTIVE | Noted: 2024-08-20

## 2024-08-20 LAB
CREAT UR-MCNC: 105 MG/DL
GGT SERPL-CCNC: 43 U/L (ref 8–61)
GLUCOSE BLDC GLUCOMTR-MCNC: 124 MG/DL (ref 70–99)
INR PPP: 1.77 (ref 0.85–1.15)
TSH SERPL DL<=0.005 MIU/L-ACNC: 1.33 UIU/ML (ref 0.3–4.2)

## 2024-08-20 PROCEDURE — H2035 A/D TX PROGRAM, PER HOUR: HCPCS

## 2024-08-20 PROCEDURE — 85610 PROTHROMBIN TIME: CPT | Performed by: PSYCHIATRY & NEUROLOGY

## 2024-08-20 PROCEDURE — HZ2ZZZZ DETOXIFICATION SERVICES FOR SUBSTANCE ABUSE TREATMENT: ICD-10-PCS | Performed by: PSYCHIATRY & NEUROLOGY

## 2024-08-20 PROCEDURE — 36415 COLL VENOUS BLD VENIPUNCTURE: CPT | Performed by: PSYCHIATRY & NEUROLOGY

## 2024-08-20 PROCEDURE — 128N000004 HC R&B CD ADULT

## 2024-08-20 PROCEDURE — 99222 1ST HOSP IP/OBS MODERATE 55: CPT | Performed by: PHYSICIAN ASSISTANT

## 2024-08-20 PROCEDURE — 250N000011 HC RX IP 250 OP 636: Performed by: PHYSICIAN ASSISTANT

## 2024-08-20 PROCEDURE — 80346 BENZODIAZEPINES1-12: CPT | Performed by: FAMILY MEDICINE

## 2024-08-20 PROCEDURE — 99418 PROLNG IP/OBS E/M EA 15 MIN: CPT | Performed by: PSYCHIATRY & NEUROLOGY

## 2024-08-20 PROCEDURE — 80365 DRUG SCREENING OXYCODONE: CPT | Performed by: FAMILY MEDICINE

## 2024-08-20 PROCEDURE — 99223 1ST HOSP IP/OBS HIGH 75: CPT | Performed by: PSYCHIATRY & NEUROLOGY

## 2024-08-20 PROCEDURE — 1002N00001 HC LODGING PLUS FACILITY CHARGE ADULT

## 2024-08-20 PROCEDURE — 250N000013 HC RX MED GY IP 250 OP 250 PS 637: Performed by: PSYCHIATRY & NEUROLOGY

## 2024-08-20 RX ORDER — MAGNESIUM HYDROXIDE/ALUMINUM HYDROXICE/SIMETHICONE 120; 1200; 1200 MG/30ML; MG/30ML; MG/30ML
30 SUSPENSION ORAL EVERY 4 HOURS PRN
Status: DISCONTINUED | OUTPATIENT
Start: 2024-08-20 | End: 2024-08-20 | Stop reason: HOSPADM

## 2024-08-20 RX ORDER — AMOXICILLIN 250 MG
2 CAPSULE ORAL DAILY PRN
COMMUNITY
End: 2024-09-13

## 2024-08-20 RX ORDER — ENOXAPARIN SODIUM 150 MG/ML
1 INJECTION SUBCUTANEOUS EVERY 12 HOURS
Status: DISCONTINUED | OUTPATIENT
Start: 2024-08-20 | End: 2024-08-20 | Stop reason: HOSPADM

## 2024-08-20 RX ORDER — WARFARIN SODIUM 5 MG/1
TABLET ORAL
COMMUNITY
Start: 2024-08-20 | End: 2024-09-04 | Stop reason: DRUGHIGH

## 2024-08-20 RX ORDER — AMOXICILLIN 250 MG
1 CAPSULE ORAL 2 TIMES DAILY PRN
Status: DISCONTINUED | OUTPATIENT
Start: 2024-08-20 | End: 2024-08-20 | Stop reason: HOSPADM

## 2024-08-20 RX ORDER — GUAIFENESIN/DEXTROMETHORPHAN 100-10MG/5
10 SYRUP ORAL EVERY 4 HOURS PRN
COMMUNITY
End: 2024-09-13

## 2024-08-20 RX ORDER — WARFARIN SODIUM 7.5 MG/1
7.5 TABLET ORAL
Status: DISCONTINUED | OUTPATIENT
Start: 2024-08-20 | End: 2024-08-20 | Stop reason: HOSPADM

## 2024-08-20 RX ORDER — LOPERAMIDE HCL 2 MG
2 CAPSULE ORAL 4 TIMES DAILY PRN
Status: DISCONTINUED | OUTPATIENT
Start: 2024-08-20 | End: 2024-08-20 | Stop reason: HOSPADM

## 2024-08-20 RX ORDER — LORATADINE 10 MG/1
10 TABLET ORAL DAILY
COMMUNITY
End: 2024-09-13

## 2024-08-20 RX ORDER — HYDROXYZINE HYDROCHLORIDE 25 MG/1
25 TABLET, FILM COATED ORAL EVERY 4 HOURS PRN
Status: DISCONTINUED | OUTPATIENT
Start: 2024-08-20 | End: 2024-08-20 | Stop reason: HOSPADM

## 2024-08-20 RX ORDER — MAGNESIUM HYDROXIDE/ALUMINUM HYDROXICE/SIMETHICONE 120; 1200; 1200 MG/30ML; MG/30ML; MG/30ML
30 SUSPENSION ORAL EVERY 6 HOURS PRN
COMMUNITY
End: 2024-09-13

## 2024-08-20 RX ORDER — ONDANSETRON 4 MG/1
4 TABLET, ORALLY DISINTEGRATING ORAL EVERY 6 HOURS PRN
Status: DISCONTINUED | OUTPATIENT
Start: 2024-08-20 | End: 2024-08-20 | Stop reason: HOSPADM

## 2024-08-20 RX ORDER — TRAZODONE HYDROCHLORIDE 100 MG/1
200 TABLET ORAL AT BEDTIME
COMMUNITY

## 2024-08-20 RX ADMIN — ACETAMINOPHEN 1000 MG: 500 TABLET ORAL at 10:38

## 2024-08-20 RX ADMIN — SIMVASTATIN 20 MG: 20 TABLET, FILM COATED ORAL at 00:10

## 2024-08-20 RX ADMIN — CARVEDILOL 12.5 MG: 12.5 TABLET, FILM COATED ORAL at 08:40

## 2024-08-20 RX ADMIN — LISINOPRIL 20 MG: 20 TABLET ORAL at 08:39

## 2024-08-20 RX ADMIN — WARFARIN SODIUM 5 MG: 5 TABLET ORAL at 00:51

## 2024-08-20 RX ADMIN — METFORMIN HYDROCHLORIDE 1000 MG: 500 TABLET, EXTENDED RELEASE ORAL at 00:10

## 2024-08-20 RX ADMIN — ENOXAPARIN SODIUM 150 MG: 150 INJECTION SUBCUTANEOUS at 12:20

## 2024-08-20 RX ADMIN — CLONIDINE HYDROCHLORIDE 0.1 MG: 0.1 TABLET ORAL at 00:10

## 2024-08-20 ASSESSMENT — ACTIVITIES OF DAILY LIVING (ADL)
ADLS_ACUITY_SCORE: 52
ADLS_ACUITY_SCORE: 52
ADLS_ACUITY_SCORE: 63
ADLS_ACUITY_SCORE: 52
ADLS_ACUITY_SCORE: 63
ADLS_ACUITY_SCORE: 52
HYGIENE/GROOMING: INDEPENDENT
ADLS_ACUITY_SCORE: 52
ADLS_ACUITY_SCORE: 52
ADLS_ACUITY_SCORE: 38
ADLS_ACUITY_SCORE: 38

## 2024-08-20 ASSESSMENT — ANXIETY QUESTIONNAIRES
1. FEELING NERVOUS, ANXIOUS, OR ON EDGE: SEVERAL DAYS
GAD7 TOTAL SCORE: 9
2. NOT BEING ABLE TO STOP OR CONTROL WORRYING: SEVERAL DAYS
7. FEELING AFRAID AS IF SOMETHING AWFUL MIGHT HAPPEN: SEVERAL DAYS
3. WORRYING TOO MUCH ABOUT DIFFERENT THINGS: SEVERAL DAYS
5. BEING SO RESTLESS THAT IT IS HARD TO SIT STILL: MORE THAN HALF THE DAYS
GAD7 TOTAL SCORE: 9
6. BECOMING EASILY ANNOYED OR IRRITABLE: SEVERAL DAYS
4. TROUBLE RELAXING: MORE THAN HALF THE DAYS
IF YOU CHECKED OFF ANY PROBLEMS ON THIS QUESTIONNAIRE, HOW DIFFICULT HAVE THESE PROBLEMS MADE IT FOR YOU TO DO YOUR WORK, TAKE CARE OF THINGS AT HOME, OR GET ALONG WITH OTHER PEOPLE: SOMEWHAT DIFFICULT

## 2024-08-20 ASSESSMENT — LIFESTYLE VARIABLES: SKIP TO QUESTIONS 9-10: 0

## 2024-08-20 ASSESSMENT — PATIENT HEALTH QUESTIONNAIRE - PHQ9: SUM OF ALL RESPONSES TO PHQ QUESTIONS 1-9: 13

## 2024-08-20 NOTE — PHARMACY-ANTICOAGULATION SERVICE
Clinical Pharmacy - Warfarin Dosing Consult     Pharmacy has been consulted to manage this patient s warfarin therapy.  Indication: DVT/ PE Treatment  Therapy Goal: INR 2-3  OP Anticoag Clinic: [facility]  Warfarin PTA Regimen: Warfarin 7.5 mg on Tuesday and Friday, warfarin 5 mg on Sunday, Monday, Wednesday, Thursday and Saturday  Recent documented change in oral intake/nutrition: Unknown    INR   Date Value Ref Range Status   08/20/2024 1.77 (H) 0.85 - 1.15 Final   08/19/2024 1.91 (H) 0.85 - 1.15 Final       Recommend warfarin 7.5 mg today.  Pharmacy will monitor Cody Bolton daily and order warfarin doses to achieve specified goal.      Please contact pharmacy as soon as possible if the warfarin needs to be held for a procedure or if the warfarin goals change.      Ashley Brown [facility]

## 2024-08-20 NOTE — PROGRESS NOTES
[facility] Nursing Health Assessment      Vital signs:     There were no vitals taken for this visit.      Transfer from      Counselor: Group [facility]   Drug of Choice: Alcohol   Last use: Thursday 8/15/24  Home clinic/MD: Yovana Felipe MD [facility]   Psychiatrist/therapist: [place], Dr. Bridgette Hitchcock. - no upcoming appointments     Medical history/current conditions:  High Cholesterol, Hypertension, DVT, CARLIE, Type 2 DM, Hx gastric bipass, hx Pulmonary embolism, Asthma       H&P Screen:  H&P within the last 90 days: Yes.  Date: 8/20 Location: ED      Mental Health diagnosis: Depression   Medication compliant?: Yes   Recent sucidal thoughts? Yes   When? Last couple weeks  Current thought of self-harm? No    Plan? None    Pain assessment:   Pt. Experiencing pain at this time?  Yes.  Rating on 0-10 scale: (1-10 scale): 4.  Location: Back and Neck Pain  Chronic  Result of: [facility].       LP Falls assessment    Has patient had a fall(s) in the last 6 months?  Yes  If yes, what were the circumstances surrounding the fall(s)? Tripped on the beach once, another fall pt got up to the bathroom after taking sleeping pills and was groggy and fell. Pt had multiple injuries to lower extremities and is still recovering from this.    Does patient have gait dysfunctions? (limping, dragging of toes, shuffling feet, unsteadiness, difficulty standing /walking)  No  Does patient appear to have deconditioning/muscle loss/malnutrition/fatigue? (due to inactivity, bedrest (long hospitalization), medical condition(s) Yes  Does patient experience confusion, dizziness or vertigo? No  Is patient visually impaired? Yes Wears Glasses   Does patient have any adaptive equipment (hearing aids, wheelchair, walker, prosthesis, crutches, cane, etc..) No  Is patient taking 2 or more of the following medications?  anticonvulsants, anti-hypertensives, diuretics, laxatives, sedatives, and psychotropics (single-select) Yes  Is patient  taking medication (s) that would cause urinary or bowel urgency (ex: lactulose/Furosemide)? No  Does patient have a medical condition (ex: Diabetes, Liver Disease, Respiratory Diseases, Chronic Pain, Heart Disease, Neuropathy, Seizure like Disorder, etc...) that could affect balance/gait or risk for falls? Yes    If yes to any of the above educate patient on fall prevention while at [facility].           Floors clutter/obstacle free           Proper footwear/[place] shoes          Encourage the use of appropriate lighting.            Adjust walking speed accordingly          Recommend caution going on longer walks. Try shorter walks and see how you do first.  Use elevators when appropriate.          Notify staff if you are experiencing fatigue, weakness, drowsiness/ dizziness or have had a fall.           Use adaptive equipment as recommended          Wheelchairs must be managed and propelled independently without staff assistance           Expectation of complete independence with all cares and compliance.  Report to staff if having difficulty     LP patient who poses a potential falls risk will be advised of the following:  Please follow the recommendations as listed above or it may affect your treatment plan.  Your treatment team would have to evaluate if this level of care is appropriate for you.    Patient acknowledges and verbalizes understanding of the above criteria? Yes  Support staff / counselors notified of pt Fall Screen and plan of prevention. LPRN to re-assess as appropriate. White board and SBAR updated Yes  ____________________________________________________________________________________________________________________________  RN Assessment of Infectious Disease concerns:    Infectious disease concerns None    _____________________________________________________________________________________________________________________________    [facility] Medical Screen for COVID-19    Do you have  any of the following NEW or worsening symptoms NOT attributed to pre-existing conditions?    No    Fever of 100.0  F (37.8 C) or over  Chills  Cough  Shortness of Breath  Loss of taste or smell  Generalized body aches  Persistent headache  Sore throat (or trouble eating or drinking in young children?)  Nausea, Vomiting, or diarrhea (loose stools)    If pt responds YES to any of the symptoms / Positive COVID-19  without symptoms pt will need to leave unit immediately and can return in 6 days from discharge date.      Did you test positive for COVID-19 in the last 10 days or are you waiting on the test results due to an exposure or symptoms?  No    Has anyone told you to self-quarantine due to exposure to someone with COVID-19?  No    COVID - 9 positive patients must quarantine for five days.  They can return to in-person therapy on day 6 following Duration of Special Precautions for COVID-19.      COVID-19 Test completed by LPRN ? No    COVID-19 - Pt informed of the following while at LP:    1) If pt has any of the symptoms below, notify staff immediately.    Fever   Cough   Shortness of breath or difficulty breathing   Chills   Repeated shaking with chills  Muscle pain   ____________________________________________________________________________________________________________________________    RN Assessment of Patient's Ability to Safely Manage and Self-Administer Respiratory Treatments    Has experience in the management of Respiratory (If NA, indicate and move to Integrative Therapies): Yes    Including knowledge and understanding of the importance of:    Does pt have any of the following Respiratory Illness/disorders?   Asthma, COPD, RAD, Bronchitis, Emphysema, Cystic fibrosis, CARLIE Yes    Which Acute or Chronic Respiratory Illness do you have which requires intervention? CARLIE, Asthma    Did pt bring all prescribed respiratory supplies? Oxygen Concentrator, CPAP, BiPap, Nebulizer, Nebulizer solution, scheduled  inhaler, Rescue Inhaler  Yes   Pt understands they must carry  Rescue Inhalers  at all times N/A   Alerting staff with respiratory symptoms?  Wheezing, SOB, Tightness or pain in chest, Excessive Daytime Sleepiness Yes     Does the patient have the physical and mental ability to:     Perform respiratory cares? Oxygen Concentrator, CPAP, BiPap, Nebulizer tx, scheduled inhaler, Rescue Inhaler tx, respiratory medicines Yes   Determine when and how often to use respiratory treatments? (Oxygen Concentrator, CPAP, BiPap, Nebulizer tx, scheduled inhaler, Rescue Inhaler tx, respiratory medicines Yes   Oxygen Concentrator, Nebulizer/CPAP/BiPAP accessories Yes   Oxygen Concentrator, CPAP/BiPAP Therapy settings Yes     Therefore does the patient, present a risk of harm to themselves or other clients in the facility if allowed to self-administer Respiratory treatments.  Consider factors above.  No    I have assessed the patient to be able to safely administer respiratory treatments.  Yes    Integrative Therapies: Essential Oils    Patient requesting essential oil inhaler to manage (Mood/Mental Health/Physical/Spiritual symptoms).     Discussed appropriate use of essential oil inhalers and instructed patient not to leave labeled product out on unit.     Patient was screened for kidney disease, asthma/reactive airway disease and rashes and wounds or 1st trimester of pregnancy    List Essential Oils requested by pt Peppermint  LIMIT ONE ESSENTIAL OIL PER PT    Patient verbalized and demonstrated understanding of how to use essential oil inhaler correctly and will notify LP RN with any concerns or side effects. Patient agrees not to share their essential oil inhaler with other clients.  Continue to support the patient in safely utilizing integrative therapies as able to manage symptoms during treatment.     Patient tobacco use:    Do you use tobacco? No        Nutritional Assessment:    Have you ever purged, binged or restricted  yourself as a way to control your weight?   No     Are you on a special diet?   No     Do you have any concerns regarding your nutritional status?   No     Have you had any appetite changes in the last 3 months?   No   Have you had weight loss or weight gain of more than 10 lbs in the last 3 months?   If patient gained or lost more than 10 lbs, then refer to program RN / attending Physician for assessment.   Yes, explain: Some weight gain    Was the patient informed of BMI?    Above,  Referral to primary care physician   Yes   Have you engaged in any risk-taking behavior that would put you at risk for exposure to blood-borne or sexually transmitted diseases?   Yes, explain: Pt would like STD testing    Do you have any dental problems?   No          Refer pt to walk-in Recovery Clinic? No  LPRN to review with pt:   Pt is expected to attend all required LP programming without staff prompting   Compliancy with all prescribed medication self-administration is required   Pt understands LP drug toxicology screening process Yes     LPRN reviewed with pt the following information:  Yes  Pt informed if leaving AMA, they will be directed to take medications with them.  Should pt's choose to leave medications at LP, ALL medications will be packaged and delivered to inpatient pharmacy for temporary storage.  Inpt pharmacy will follow protocol to reach out to pt.  If pharmacy unable to reach pt and/or pt does not retrieve medications, they will be destroyed per inpt pharmacy protocol.   In regards to 'medical concerns/medication refill expectations' while at LP:  Pt understands LP has no rounding/managing provider to assess medical issues or to refill medications.  Pt's instructed to make virtual/phone appt/s with community provider/s and notify LPRN of date and time  Pt's understand they may not leave LP to attend any medical appt's.   Pt's understand they are responsible for having a plan to refill medications and to allow  time to troubleshoot.      Pt verbalizes understanding of the above criteria YES     [facility]  Nurse Liaison / CD [facility]  O: [phone]  Fax:[phone]  [facility] [id number]  M-F: 7AM to 5:30PM   Sat-Sun: 7AM to 3PM  After hours: [phone]       Nursing Assessment Summary:  See Above     On-going nursing intervention required?   No    Acute care visit recommended: no

## 2024-08-20 NOTE — TELEPHONE ENCOUNTER
Received a vm from Yenni with [facility] stating that the patient will be admitted at their facility and is requesting a call back to discuss Anticoagulation management.    Will route to [place] for follow up

## 2024-08-20 NOTE — PROGRESS NOTES
Name: Cody Bolton  Date: 8/20/2024  Medical Record: [id number]    Envelope Number: [id number]    List of Contents (List each item separately in new row):   Ozempic (semaglutide) injection 4mg/3ml  Stored in inpatient refrigerator     Admission:  I am responsible for any personal items that are not sent to the safe or pharmacy.  [facility] is not responsible for loss, theft or damage of any property in my possession.    Patient Signature:  ___________________________________________       Date/Time:__________________________    Staff Signature: __________________________________       Date/Time:__________________________    [facility] Staff person, if patient is unable/unwilling to sign:      __________________________________________________________       Date/Time: __________________________    **All medications are packaged by LP staff and securely stored on [facility]. Medications left by patients at discharge will be packaged by LP staff and transported by LP staff to inpatient pharmacy for storage.**    Discharge:  [facility] has returned all of my personal belongings:    Patient Signature: ________________________________________     Date/Time: ____________________________________    Staff Signature: ______________________________________     Date/Time:_____________________________________

## 2024-08-20 NOTE — H&P
Psychiatry Combined History/Physical and Discharge Summary     Cody Bolton MRN# [id number]   Age: 44 year old YOB: 1980     Date of Admission:  8/19/2024  Date of Discharge:  8/20/2024  Admitting Physician:  Gabrielle Morton DO  Discharge Physician:  Gabrielle Morton DO          Assessment/Hospital Course   This patient is a 44 year old male with history of substance use and depression who presented to ED seeking detox from alcohol. Medically cleared in ED, admitted to  as voluntary patient. Drinking 1L vodka daily, EtOH <0.01, UDS negative, denies other substance use. MSSA with diazepam started for alcohol withdrawal. Withdrawal precautions in place, denies hx of seizures. Admission labs ordered and reviewed those resulted. IM consult placed. Reporting history of depression, denying safety concerns including SI and HI. PTA medications continued as appropriate, did hold PTA bupropion due to seizure risk, pt can resume on discharge. Pt reports goals for hospitalization are to complete detox and then discharge to [facility] who he has been coordinating with re: admission.     Cody Bolton did participate in groups and was visible in the milieu.     The patient's symptoms of alcohol withdrawal improved, he was accepted to discharge to [facility] on 8/20. Discussed pts hx of DVT/PE on warfarin with IM, PharmD and RN of [facility], plan in place for management on discharge to [facility]. Pt also agreeable to start antabuse on discharge to support sobriety.     Today Cody Bolton reports having no thoughts of harming self or others. In addition, he has notable risk factors for self-harm, including single status and substance abuse. However, risk is mitigated by commitment to family, absence of past attempts, ability to volunteer a safety plan, and history of seeking help when needed. Therefore, based on all available evidence including the factors cited above, he does not appear to be at imminent risk for  self-harm, does not meet criteria for a 72-hr hold, and therefore remains appropriate for ongoing outpatient level of care.     Cody Bolton was  discharged  to  [facility] . At the time of discharge Cody Bolton was determined to not be a danger to himself or others.          Diagnoses:   Alcohol dependence with withdrawal with complication   MDD, recurrent, mild  Unspecified eating disorder with morbid obesity  Unspecified personality disorder per chart  Hx of DVT/PE  CARLIE  HTN  Hx of Afib  T2DM  HLD    Clinically Significant Risk Factors Present on Admission               # Drug Induced Coagulation Defect: home medication list includes an anticoagulant medication    # Hypertension: Home medication list includes antihypertensive(s)        # DMII: A1C = 7.2 % (Ref range: 0.0 - 5.6 %) within past 6 months    # Severe Obesity: Estimated body mass index is 51.27 kg/m  as calculated from the following:    Height as of this encounter: 1.829 m (6').    Weight as of this encounter: 171.5 kg (378 lb).                             Discharge Plan:   Pt discharging to [facility].     Discussed pts anticoagulation needs with IM, PharmD and RN at , pt will not continue lovenox upon admission to  and will work with his INR outpatient team for ongoing management of warfarin, PharmD recs for discharge dose ordered on discharge until follow up with INR team.         Attestation:  Patient has been seen and evaluated by me, Gabrielle Morton DO on day of discharge. >90 minutes were spent in reviewing chart, completing admission assessment and coordination of discharge planning.          Chief Complaint:     Alcohol withdrawal          History of Present Illness/Event Leading to Hospitalization:     Cody is a 44 year old male with history of substance use and depression who presented to ED seeking detox from alcohol.     Chart review completed including ED notes from this encounter, recent INR clinic notes, PCP notes,  "behavioral health notes, recent medical admissions.      Per ED physician note: Cody Bolton is a 44 year old male with history of major depressive disorder, type 2 diabetes, prior gastric bypass, prior history of DVT, prior episode of paroxysmal A-fib who presents seeking alcohol detox.  He has been drinking 375 mL of hard liquor daily, last drank 4 days prior.  He does have a bed in [facility].  No history of alcohol withdrawal seizures or DTs.  He denies any street drug use, no tobacco use.  No suicidal homicidal ideation.      Upon interview: Patient confirms information above, reports that he for started drinking alcohol when he was 13, became problematic about a year ago after undergoing significant stressors including his cousin dying by suicide and his mother passing away along with marital issues and divorce.  He has tolerance to alcohol, drinking more than intended, difficulty cutting down and withdrawal symptoms when he stops drinking.  Reports he has been drinking about a bottle of vodka per day.  Denies other substance use.  Denies history of seizures or DTs.  Reports that he is currently having withdrawal symptoms of shaking, unsteadiness in his legs earlier during withdrawal, some anxiety.  Does have a history of depression and has engaged in outpatient supports including an outpatient program and has been taking medications for his mood.  He denies having any suicidal thoughts or history of suicide attempts or psychiatric admissions.  Denies SIB history.  Denies any psychosis history.  Reports that he is a \"over thinker\" but otherwise denies anxiety or panic attacks.  He has been working with [facility] program to arrange admission for CD treatment, reports that he was instructed to detox first in order to ensure that he has stopped drinking and is stable for admission to the unit.  [facility] has been coordinating with his outpatient care team that manages his warfarin for his " "anticoagulation.  He also had been talking with his outpatient provider about options for medications to help support his sobriety and is open to trial of Antabuse.  Reports he has taken naltrexone in the past and it was not helpful.  He reports goals for hospitalization are to complete detox and discharge to [facility] as soon as possible.            Psychiatric Review of Systems:   Depression:   Reports: low mood, poor sleep, low energy  Denies:  suicidal ideation, decreased interest, changes in appetite, guilt, hopelessness, helplessness, impaired concentration, , irritability.  Pamela:   Reports: none  Denies: sleeplessness, increased goal-directed activities, abrupt increase in energy pressured speech  Psychosis:   Reports none  Denies: visual hallucinations, auditory hallucinations, paranoia  Anxiety:   Reports: \"over thinking\"  Denies: worries that are difficult to control, panic attacks  PTSD:   Reports none  Denies: re-experiencing past trauma, nightmares, increased arousal, avoidance of traumatic stimuli, impaired function.  OCD:   Reports: none  Denies: obsessions, checking, symmetry, cleaning, skin picking.         Medical Review of Systems:     10 point review of systems is otherwise negative unless noted above.            Psychiatric History:   Psychiatric Hospitalizations: denies  History of Psychosis: denies  Prior ECT: denies  Court Commitment: denies  Suicide Attempts: denies  Self-injurious Behavior: denies  Violence toward others: denies  Use of Psychotropics: currently taking bupropion for mood, has taken naltrexone in past for AUD and did not find it helpful/effective          Substance Use History:   Alcohol: see HPI  Cannabis: none  Nicotine: none  Cocaine: none  Methamphetamine: none  Opiates/Heroin: none  Benzodiazepines: none  Hallucinogens: none  Inhalants: none         Social History:   Upbringing: Born and raised in [place]  Educational History: College " degree  Relationships:   Children: 2 ages 19 and 21  Current Living Situation: Lives in a triplex he bought with his wife before the divorce, his wife lives in 1 unit, he lives in one and one of his children lives in the other unit  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  Financial Support: Employment  Legal History: Denies  Abuse/Trauma History: None reported         Family History:     H/o completed suicides in family: Cousin  Reports brother with depression and a brother with substance use disorder         Past Medical History:     Past Medical History:   Diagnosis Date    Depressive disorder     High cholesterol     History of gastric bypass     History of pulmonary embolism     Hypertension     CARLIE (obstructive sleep apnea)     Personal history of DVT (deep vein thrombosis)     Type 2 diabetes mellitus (H)     Uncomplicated asthma        History of seizures: Denies         Past Surgical History:     Past Surgical History:   Procedure Laterality Date    APPENDECTOMY  08/15/2017    Dr. Ferrer    GASTRIC BYPASS      AK LAP,APPENDECTOMY N/A 8/14/2017    Procedure: APPENDECTOMY, LAPAROSCOPIC;  Surgeon: Nba Ferrer MD;  Location: [facility];  Service: General    REVISION AKANKSHA-EN-Y  2014    Dr. Ranjeet Espinal [facility]              Allergies:    No Known Allergies          Medications:   I have reviewed this patient's current medications  Medications Prior to Admission   Medication Sig Dispense Refill Last Dose    buPROPion (WELLBUTRIN XL) 150 MG 24 hr tablet Take 1 tablet (150 mg) by mouth every morning 90 tablet 3 8/19/2024    carvedilol (COREG) 12.5 MG tablet Take 1 tablet (12.5 mg) by mouth 2 times daily (with meals) 60 tablet 11 8/19/2024    cloNIDine (CATAPRES) 0.1 MG tablet Take 0.1 mg by mouth as needed (Sleep)   Past Week    lisinopril (ZESTRIL) 20 MG tablet Take 1 tablet (20 mg) by mouth daily 90 tablet 2 8/19/2024    metFORMIN  (GLUCOPHAGE XR) 500 MG 24 hr tablet Take 2 tablets (1,000 mg) by mouth daily (with dinner) 180 tablet 3 2024    Semaglutide, 1 MG/DOSE, (OZEMPIC) 4 MG/3ML pen Inject 1 mg subcutaneously every 7 days 3 mL 0 Past Week    simvastatin (ZOCOR) 20 MG tablet Take 1 tablet (20 mg) by mouth at bedtime 90 tablet 3 2024    acetaminophen (TYLENOL) 500 MG tablet Take 2 tablets (1,000 mg) by mouth 3 times daily (Patient taking differently: Take 1,000 mg by mouth every 8 hours as needed)       [DISCONTINUED] warfarin ANTICOAGULANT (COUMADIN) 5 MG tablet Take 5 mg by mouth at bedtime   2024            Discharge Medications:     Current Discharge Medication List        CONTINUE these medications which have CHANGED    Details   warfarin ANTICOAGULANT (COUMADIN) 5 MG tablet Full warfarin instructions, dosed in evenin.5 mg every Tue, Fri; 5 mg all other days Next INR check: 2024           CONTINUE these medications which have NOT CHANGED    Details   buPROPion (WELLBUTRIN XL) 150 MG 24 hr tablet Take 1 tablet (150 mg) by mouth every morning  Qty: 90 tablet, Refills: 3    Associated Diagnoses: Major depressive disorder, recurrent episode, moderate (H)      carvedilol (COREG) 12.5 MG tablet Take 1 tablet (12.5 mg) by mouth 2 times daily (with meals)  Qty: 60 tablet, Refills: 11    Associated Diagnoses: Paroxysmal atrial fibrillation (H); Benign essential hypertension      cloNIDine (CATAPRES) 0.1 MG tablet Take 0.1 mg by mouth as needed (Sleep)      lisinopril (ZESTRIL) 20 MG tablet Take 1 tablet (20 mg) by mouth daily  Qty: 90 tablet, Refills: 2    Associated Diagnoses: Essential hypertension, benign      metFORMIN (GLUCOPHAGE XR) 500 MG 24 hr tablet Take 2 tablets (1,000 mg) by mouth daily (with dinner)  Qty: 180 tablet, Refills: 3    Associated Diagnoses: Type 2 diabetes mellitus without complication, without long-term current use of insulin (H)      Semaglutide, 1 MG/DOSE, (OZEMPIC) 4 MG/3ML pen Inject 1 mg  subcutaneously every 7 days  Qty: 3 mL, Refills: 0    Associated Diagnoses: Type 2 diabetes mellitus without complication, without long-term current use of insulin (H)      simvastatin (ZOCOR) 20 MG tablet Take 1 tablet (20 mg) by mouth at bedtime  Qty: 90 tablet, Refills: 3    Associated Diagnoses: Pure hypercholesterolemia      acetaminophen (TYLENOL) 500 MG tablet Take 2 tablets (1,000 mg) by mouth 3 times daily    Associated Diagnoses: Closed fracture of multiple ribs of right side, initial encounter                    Consults:   [facility]  Consult Note - Hospitalist Service  Date of Admission:  8/19/2024  Consult Requested by: Dr. Morton  Reason for Consult: Medical co-management        Assessment & Plan  Mr. Cody Bolton is a pleasant 43 yo male with hx of HTN, NIDDM, DVT, unprovoked PE on chronic AC, HLD, CARLIE on CPAP, morbid obesity, and AUD admitted to  psychiatry detox unit after presenting to ED with alcohol abuse and desire for medically supervised etoh withdrawal.      # Etoh use disorder and sz withdrawal ppx: Management per psychiatry primary team.      # Hx of DVT/PE: Hospitalized at [facility] earlier this year (2/4-2/6) for acute dyspnea with CT chest revealing bilateral PE. Started on heparin gtt, transitioned to lifelong warfarin with bridging Lovenox as pt not candidate for DOAC based on wt >120 kg. Goal INR 2-3, currently 1.77 (1.91).  - Continue PTA warfarin with dosing deferred to pharmacy  - Start bridging therapeutic Lovenox until INR back at goal between 2-3.      # CARLIE: Continue home CPAP while sleeping     # HTN  # Hx of pAfib  TTE last May with normal LV function with est EF of 55-60%. Current BPs elevated this am prior to receiving PTA antihypertensives, pt asymptomatic. Pt is regular rhythm on exam with HR<100.   - Continue PTA antihypertensives including Coreg with parameters to hold.      # T2DM, non-insulin dependent: Most recent  A1C 7.2% on 6/11/24. BGs here well controlled.   - Continue PTA metformin and home supply of Ozempic (if allowed)  - Accuchecks BID     # HLD: Continue PTA statin           The patient's care was discussed with the Patient. Medicine will continue to follow peripherally. Please feel free to call or page with questions.      Elías Ness PA-C  Hospitalist Service          Labs:     Recent Results (from the past 24 hour(s))   INR    Collection Time: 08/19/24  5:02 PM   Result Value Ref Range    INR 1.91 (H) 0.85 - 1.15   Comprehensive metabolic panel    Collection Time: 08/19/24  5:02 PM   Result Value Ref Range    Sodium 137 135 - 145 mmol/L    Potassium 4.1 3.4 - 5.3 mmol/L    Carbon Dioxide (CO2) 22 22 - 29 mmol/L    Anion Gap 9 7 - 15 mmol/L    Urea Nitrogen 8.3 6.0 - 20.0 mg/dL    Creatinine 0.70 0.67 - 1.17 mg/dL    GFR Estimate >90 >60 mL/min/1.73m2    Calcium 9.0 8.8 - 10.4 mg/dL    Chloride 106 98 - 107 mmol/L    Glucose 109 (H) 70 - 99 mg/dL    Alkaline Phosphatase 111 40 - 150 U/L    AST 30 0 - 45 U/L    ALT 34 0 - 70 U/L    Protein Total 7.6 6.4 - 8.3 g/dL    Albumin 4.2 3.5 - 5.2 g/dL    Bilirubin Total 0.4 <=1.2 mg/dL   Lipase    Collection Time: 08/19/24  5:02 PM   Result Value Ref Range    Lipase 61 (H) 13 - 60 U/L   Magnesium    Collection Time: 08/19/24  5:02 PM   Result Value Ref Range    Magnesium 1.9 1.7 - 2.3 mg/dL   Ethyl Alcohol Level    Collection Time: 08/19/24  5:02 PM   Result Value Ref Range    Alcohol ethyl <0.01 <=0.01 g/dL   CBC with platelets and differential    Collection Time: 08/19/24  5:02 PM   Result Value Ref Range    WBC Count 6.4 4.0 - 11.0 10e3/uL    RBC Count 4.48 4.40 - 5.90 10e6/uL    Hemoglobin 12.3 (L) 13.3 - 17.7 g/dL    Hematocrit 38.6 (L) 40.0 - 53.0 %    MCV 86 78 - 100 fL    MCH 27.5 26.5 - 33.0 pg    MCHC 31.9 31.5 - 36.5 g/dL    RDW 14.9 10.0 - 15.0 %    Platelet Count 252 150 - 450 10e3/uL    % Neutrophils 52 %    % Lymphocytes 34 %    % Monocytes 8 %    %  "Eosinophils 4 %    % Basophils 1 %    % Immature Granulocytes 1 %    NRBCs per 100 WBC 0 <1 /100    Absolute Neutrophils 3.4 1.6 - 8.3 10e3/uL    Absolute Lymphocytes 2.2 0.8 - 5.3 10e3/uL    Absolute Monocytes 0.5 0.0 - 1.3 10e3/uL    Absolute Eosinophils 0.3 0.0 - 0.7 10e3/uL    Absolute Basophils 0.1 0.0 - 0.2 10e3/uL    Absolute Immature Granulocytes 0.0 <=0.4 10e3/uL    Absolute NRBCs 0.0 10e3/uL   GGT    Collection Time: 08/19/24  5:02 PM   Result Value Ref Range    GGT 43 8 - 61 U/L   TSH with free T4 reflex    Collection Time: 08/19/24  5:02 PM   Result Value Ref Range    TSH 1.33 0.30 - 4.20 uIU/mL   Urine Drug Screen Panel    Collection Time: 08/19/24  8:14 PM   Result Value Ref Range    Amphetamines Urine Screen Negative Screen Negative    Barbituates Urine Screen Negative Screen Negative    Benzodiazepine Urine Screen Negative Screen Negative    Cannabinoids Urine Screen Negative Screen Negative    Cocaine Urine Screen Negative Screen Negative    Fentanyl Qual Urine Screen Negative Screen Negative    Opiates Urine Screen Negative Screen Negative    PCP Urine Screen Negative Screen Negative   Glucose by meter    Collection Time: 08/20/24  3:53 AM   Result Value Ref Range    GLUCOSE BY METER POCT 124 (H) 70 - 99 mg/dL   INR    Collection Time: 08/20/24  6:32 AM   Result Value Ref Range    INR 1.77 (H) 0.85 - 1.15       BP (!) 174/97   Pulse 60   Temp 97  F (36.1  C) (Temporal)   Resp 16   Ht 1.829 m (6')   Wt (!) 171.5 kg (378 lb)   SpO2 97%   BMI 51.27 kg/m    Weight is 378 lbs 0 oz  Body mass index is 51.27 kg/m .         Psychiatric Mental Status Examination:   Appearance: awake, alert,adequately groomed  Attitude: cooperative and pleasant  Eye Contact: good  Mood:  \"okay\" some depression  Affect: mood congruent and full range  Speech:  clear, coherent and normal prosody  Language: fluent in English  Psychomotor Behavior:  no evidence of tardive dyskinesia, dystonia, or tics  Gait/Station: " normal  Thought Process:  linear, logical, goal oriented  Associations:  no loose associations  Thought Content:  Denying SI/HI/AVH; no evidence of psychotic thinking  Insight:  fair  Judgement: intact  Oriented to:  time, person, and place  Attention Span and Concentration:  intact  Recent and Remote Memory:  intact  Fund of Knowledge: appropriate         Physical Exam:   Please refer to physical exam completed by ED provider, Rashid Padgett MD, on 8/19/24 as below. I agree with the findings and assessment and have no additional findings to add at this time with exception that psychiatric findings now above in MSE.   Physical Exam  Constitutional:       General: He is not in acute distress.     Appearance: Normal appearance. He is not toxic-appearing.   HENT:      Head: Atraumatic.   Eyes:      General: No scleral icterus.     Conjunctiva/sclera: Conjunctivae normal.   Cardiovascular:      Rate and Rhythm: Normal rate.      Heart sounds: Normal heart sounds.   Pulmonary:      Effort: Pulmonary effort is normal. No respiratory distress.      Breath sounds: Normal breath sounds.   Abdominal:      Palpations: Abdomen is soft.      Tenderness: There is no abdominal tenderness.   Musculoskeletal:         General: No deformity.      Cervical back: Neck supple.   Skin:     General: Skin is warm.   Neurological:      General: No focal deficit present.      Mental Status: He is alert and oriented to person, place, and time.      Sensory: No sensory deficit.      Motor: No weakness.      Coordination: Coordination normal.   Psychiatric:         Speech: Speech normal.         Behavior: Behavior is cooperative.         Thought Content: Thought content does not include homicidal or suicidal ideation.

## 2024-08-20 NOTE — PROGRESS NOTES
Progress Note    This patient had a IP Substance Use Disorder assessment on 8/7/2024 completed by CARTER Workman, [facility].  This patient was seen for a face to face update of the IP Substance Use Disorder assessment on 8/20/2024 by ISA Spear.  INSIDE: The patient's IP Substance Use Disorder assessment completed on 8/7/2024 is in the patient's electronic medical record in Epic in the Chart Review section under the Notes/Trans Tab.    Alcohol/Drug use since the last CD evaluation (include date of last use):     Pt reports last date of use as 8/15/2024     Please note any other clinical changes since the last CD evaluation (such as medication changes, additional legal charges, detoxification admissions, overdoses, etc.)     No significant changes since the last CD evaluation       ASAM Dimensions Original scores Current Scores   I.) Intoxication and Withdrawal: 3 0   II.) Biomedical:  2 2   III.) Emotional and Behavioral:  2 2   IV.) Readiness to Change:  3 3   V.) Relapse Potential: 4 4   VI.) Recovery Environmental: 3 3     Please list clinical justifications for the above ASAM score changes since the original comprehensive assessment:     The patient's score on Dimension I changed from a 3 to a 0.  The patient had not consumed any alcohol or drugs since 8/15/2024 and he does not appear to have any current risk of having significant withdrawal symptoms.        Current IVAN: Current UA:     0.000     Pt was unable to produce at time of admission       PHQ-9, MERI-7   PHQ-9 on 8/20/2024 MERI-7 on 8/20/2024   The patient's PHQ-9 score was 13 out of 27, indicating moderate depression.   The patient's MERI-7 score was 9 out of 21, indicating mild anxiety.       Ector-Suicide Severity Rating Scale Reassessment   Have you ever wished you were dead or that you could go to sleep and not wake up?  Past Month:  Yes     Have you actually had any thoughts of killing yourself?  Past Month:  No     Have you been thinking about  how you might do this?     Past Month:  No   Lifetime:  No   Have you had these thoughts and had some intention of acting on them?     Past Month:  No   Lifetime:  No   Have you started to work out the details of how to kill yourself?   Past Month:  No   Lifetime:  No   Do you intend to carry out this plan?   No     When you have the thoughts how long do they last?  1-4 hours/a lot of time     Are there things - anyone or anything (i.e. family, Denominational, pain of death) that stopped you from wanting to die or acting on thoughts of suicide?  Uncertain that protective factors stopped you       2008  [employer]  Used with permission by Dayna Hernandez, PhD.       Guide to C-SSRS Risk Ratings   NO IDEATION:  with no active thoughts IDEATION: with a wish to die. IDEATION: with active thoughts. Risk Ratings   If Yes No No 0 - Very Low Risk   If NA Yes No 1 - Low Risk   If NA Yes Yes 2 - Low/moderate risk   IDEATION: associated thoughts of methods without intent or plan INTENT: Intent to follow through on suicide PLAN: Plan to follow through on suicide Risk Ratings cont...   If Yes No No 3 - Moderate Risk   If Yes Yes No 4 - High Risk   If Yes Yes Yes 5 - High Risk   The patient's ADDITIONAL RISK FACTORS and lack of PROTECTIVE FACTORS may increase their overall suicide risk ratings.     Additional Risk Factors:   Someone close to the patient (family member/friend) completed a suicide    Significant history of having untreated or poorly treated mental health symptoms    Significant history of physical illness or chronic medical problems    Significant history of untreated or poorly treated chronic pain issues    Tendency to be socially isolated and/or cut off from the support of others    A recent death of someone close to the patient and/or unresolved grief and loss issues    A recent loss that was significant to the patient, i.e. loss of job, loss of home, divorce, break-up, etc.     "History of impulsive or aggressive behaviors    No additional risk factors   Protective Factors:   Having people in his/her life that would prevent the patient from considering a suicide attempt (i.e. young children, spouse, parents, etc.)    A positive relationship with his/her clinical medical and/or mental health providers    Having easy access to supportive family members    Having a good community support network    No significant protective factors     Risk Status   1. - Low Risk: Evaluation Counselors:  Document in Epic / SBAR to counselor \"Low Risk\".      Treatment Counselors:  Reassess upon admission as applicable, assess weekly in progress notes under Dimension 3 and summarize in Discharge / Treatment summary under Dimension 3.     Additional information to support suicide risk rating: There was no additional information to provide at this time.      "

## 2024-08-20 NOTE — DISCHARGE INSTRUCTIONS
Behavioral Discharge Planning and Instructions  THANK YOU FOR CHOOSING [address]  [phone]    Summary: You were admitted to [facility] on 08/19/2024 for detoxification from alcohol.  A medical exam was performed that included lab work. You have met with a  and opted to attend residential treatment.  Please take care and make your recovery a daily priority, Cody!  It was a pleasure working with you and the entire treatment team here wishes you the very best in your recovery!     Recommendation:  Please attend your scheduled intake at [facility] on 8/20/24 at 3 pm and follow any and all recommendations.    [address] [address]    Main Diagnoses:  Per Dr. Gabrielle Morton MD;  303.90 (F10.20) Alcohol Use Disorder Severe    Major Treatments, Procedures and Findings:  You were treated for Alcohol detoxification and were prescribed diazepam but you did not have any doses of this medication.  You did not complete a chemical dependency assessment. You had labs drawn and those results were reviewed with you. Please take a copy of your lab work with you to your next primary care provider appointment.    Symptoms to Report:  If you experience more anxiety, confusion, sleeplessness, deep sadness or thoughts of suicide, notify your treatment team or notify your primary care provider. IF ANY OF THE SYMPTOMS YOU ARE EXPERIENCING ARE A MEDICAL EMERGENCY CALL 911 IMMEDIATELY.     Lifestyle Adjustment: Adjust your lifestyle to get enough sleep, relaxation, exercise and good nutrition. Continue to develop healthy coping skills to decrease stress and promote a sober living environment. Do not use mood altering substances including alcohol, illegal drugs or addictive medications other than what is currently prescribed.     Disposition: Residential treatment at [facility].    Facts about COVID19 at www.cdc.gov/COVID19 and www.[place].gov/covid19    Keeping hands clean is  one of the most important steps we can take to avoid getting sick and spreading germs to others.  Please wash your hands frequently and lather with soap for at least 20 seconds!    Follow-up Appointment:   Appointment Date/Time:     Psychiatrist/Primary Care Giver:     Address:        Phone Number:       Patient going to lodging plus, patient informed he should make appointment with PCP and psychiatrist.    Recovery apps for your phone for educational purposes and to locate in person and zoom recovery meetings  Everything AA -  darrius is a great resource  12 Step Toolkit - educational purpose learning about the 12 steps to recovery  Meade Cloud - meeting darrius  AA  - meeting darrius  Meeting guide - meeting darrius  Quick NA meeting - meeting darrius  Joselito- has various apps    Resources:  AA/NA meetings have returned to in-person or a hybrid combination of zoom/in-person therefore please check online to verify*  Need Support Now? If you or someone you know is struggling or in crisis, help is available. Call or text [employer] or chat AirDroids.Timely Network  AA meetings search for them at: https://aa-intergroup.org (worldwide meeting listings)  AA meetings for [place] area can be found online at: https://a[place].org (click local online meetings listings)  NA meetings for [place] area can be found online at: https://www.nam[place].org  (click find a meeting)  AA and NA Sponsors are excellent resources for support and you can find one at any support group meeting.   Alcoholics Anonymous (https://aa.org/): for information 24 hours/day  AA Intergroup service office in [place] (http://www.aastpaul.org/) 304.805.3319  AA Intergroup service office in [facility]: [phone]. (http://www.a[place].org/)  Narcotics Anonymous (www.nam[place].org) (762) 445-7195  https://aafairviewriverside.org/meetings  SMART Recovery - self management for addiction recovery:  www.smartrecovery.org  Pathways ~ A Health Crisis Resource & Support  "Center:  325.979.6720.  https://prescribetoprevent.org/patient-education/videos/  http://www.harmreduction.org  Crisis Text Line  Text 979698  You will be connected with a trained live crisis counselor to provide support. Por espanol, texto  AMANDA a 596841 o texto a 442-AYUDAME en WhatsApp  [place] Hope Line  1.800.SUICIDE [6039137]  [facility] 562-052-6229  Support Group:  AA/NA and Sponsor/support.  Fast Tracker  Linking people to mental health and substance use disorder resources  MyTwinPlacen.org   [place] Mental Health Warm Line  Peer to peer support  Monday thru Saturday, 12 pm to 10 pm  704.917.6858 or 0.396.202.9576  Text \"Support\" to 02655  National [place] on Mental Illness (LIZZETH)  463.507.1409 or 1.888.LIZZETH.HELPS  Alcoholics Anonymous (www.alcoholics-anonymous.org): Check your phone book for your local chapter.  Suicide Awareness Voices of Education (SAVE) (www.save.org): 308-415-OQHQ (7283)  National Suicide Prevention Line (www.mentalhealthmn.org): 978-464-LDNJ (8892)  Mental Health Consumer/Survivor Network of [place] (www.mhcsn.net): 839.550.9847 or 924-356-8061  Mental Health Association of [place] (www.mentalhealth.org): 815.740.2365 or 963-248-9145   Substance Abuse and Mental Health Services (www.samhsa.gov)  [place] Opioid Prevention Coalition: www.opioidcoalition.org    [place] Recovery Connection (MRC)  [facility] connects people seeking recovery to resources that help foster and sustain long-term recovery.  Whether you are seeking resources for treatment, transportation, housing, job training, education, health care or other pathways to recovery, [facility] is a great place to start.  111.624.2841.  www.[place]arecovery.org    Great Pod casts for nutrition and wellness  Listen on Apple Podcasts  Dishing Up Nutrition   N[employer] Well[employer]   Nutrition       Understand the connection between what you eat and how you feel. Hosted by licensed nutritionists and dietitians " from [employer] we share practical, real-life solutions for healthier living through nutrition.     General Medication Instructions:   See your medication sheet(s) for instructions.   Take all medications as prescribed.  Make no changes unless your primary care provider suggests them.   Go to all your primary care provider visits.  Be sure to have all your required lab tests. This way, your medicines can be refilled on time.  Do not use any forms of alcohol.    Please Note:  If you have any questions at anytime after you are discharged please call [facility] detox unit 3[facility] at [phone].  [facility], Behavioral Intake [phone]  Medical Records call [phone]  Outpatient Behavioral Intake call [phone]  LP+ Wait List/Bed Availability call [phone]    Please remember to take all of your behavioral discharge planning and lab paperwork to any follow up appointments, it contains your lab results, diagnosis, medication list and discharge recommendations.      THANK YOU FOR CHOOSING [facility]

## 2024-08-20 NOTE — PROGRESS NOTES
Initial Services Plan    Before your first treatment group, please do the following    Immediate health & safety concerns: Look for sober housing and a supportive social network.  Look for a support network (such as AA, NA, DBT group, a Taoism group, etc.)    Suggestions for client during the time between intake & completion of treatment plan:  Tour your treatment center (unit or outpatient clinic).  Introduce yourself to the treatment group.  Spend time getting to know your peers.  Complete your psycho-social paperwork.  Complete the problem list for your treatment plan.  Start drug and alcohol use history.  Review your patient or client handbook.    Client issues to be addressed in the first treatment sessions:  Other Pt denies at this time.    ISA Spear  8/20/2024

## 2024-08-20 NOTE — TELEPHONE ENCOUNTER
[facility] pt has history of high risk sexual activity and requests testing for sexually transmitted disease.    Requesting testing.     [facility]  Nurse Liaison / CD Adult [facility] (LP)  [address]  O:[phone]  F:[phone]  RN [place] [id number]  LP After hours(UC):[phone]  LP admin counselor:[phone]  CD assess:[phone]

## 2024-08-20 NOTE — PROGRESS NOTES
[address]     Case Management Encounter: Pt reported he was scheduled to intake at [facility] on 08/19/24 however did drink prior to admitting and [facility] requested he come to detox to be medically cleared. Pt reports he has been drinking 375 Ml of hard liquor daily. Writer spoke with [facility] and will admit him today at 3 pm.    Insurance: [employer]     Legal Status: Vol     SUDs Assessment Status: Not needed.      ROIs on file: Not at this time.      Living Situation: Unknown at this time.      Current Providers, Supports & Collateral:  Unknown at this time.    Current Plan/Referral Status: Residential treatment at [facility].      RN updated.    [facility]  [address] - Adult Inpatient Addiction Psychiatry Unit

## 2024-08-20 NOTE — TELEPHONE ENCOUNTER
I signed a form and we faxed back.     He can administer his own ozempic.     Do you need anything else?

## 2024-08-20 NOTE — DISCHARGE SUMMARY
"Patient has been calm, cooperative, and pleasant this shift. Patient denies SI, HI, AVH. Patient reported he had some thoughts today such as \"maybe I should have just kept drinking instead of coming here\". There was an issue with the toilet in patient's room. Patient weighs more than the maxium weight capacity of the toilets. Patient was given a commode but patient reported this does not work well as the basin in the commode moves around when he sits and he is afraid he will make a mess. Patient became tearful when talking about this and reported that he feels very embarrassed and stated \"this is just another example of how I feel like I don't fit into this world\". Patient reported he feels ready to discharge to [facility], but reports he would not be able to stay sober at home. Patient denied having any thoughts of harming himself while inpatient or at [facility]. Patient had MSSA at 0824 (score=2), and at 1224 (score 1). Patient reports his last drink was on Thursday. During the discharge meeting with patient at 1500, patient reported that he takes Ozempic once per week and today is the day that it is due. Writer informed [facility] RN of this. Patient left the locked unit at 1515 and was escorted by a staff member to [facility]ging plus.   "

## 2024-08-20 NOTE — PROGRESS NOTES
08/20/24 0331   Patient Belongings   Did you bring any home meds/supplements to the hospital?  Yes   Disposition of meds  Sent to security/pharmacy per site process;Other (see comment)   Patient Belongings remains on inpatient care area;sent to security per site process   Patient Belongings Put in Hospital Secure Location (Security or Locker, etc.) cell phone/electronics;cash/credit card;clothing;medical/assistive equipment;medical device;medication(s);money (see comment);shoes;suitcase;wallet   Belongings Search Yes   Clothing Search Yes       Items stored onsite:    Navy blue suitcase (8 tshirts, 4 pairs of shorts, one pair of black slacks, five pair of socks, five pair of underwear, arts and crafts supplies, books, coloring markers)     2 pairs of shoes, one navy robe, pink stuffed animal, bag of toiletries, tablet, sunkist soda, wallet, iphone (cracks at bottom of screen), black tote (hole on the bottom), Cpap machine, . Loose change ($2.34) two bracelets, fitbit wristband    Items Sent to security in envelope #[id number]    Items Sent to Pharmacy in envelope #[id number]  ..A               Admission:  I am responsible for any personal items that are not sent to the safe or pharmacy.  [facility] is not responsible for loss, theft or damage of any property in my possession.    Signature:  _________________________________ Date: _______  Time: _____                                              Staff Signature:  ____________________________ Date: ________  Time: _____      2nd Staff person, if patient is unable/unwilling to sign:    Signature: ________________________________ Date: ________  Time: _____     Discharge:  [facility] has returned all of my personal belongings:    Signature: _________________________________ Date: ________  Time: _____                                          Staff Signature:  ____________________________ Date: ________  Time: _____

## 2024-08-20 NOTE — CONSULTS
Behavioral Team Discussion: (8/20/2024)    Continued Stay Criteria/Rationale: Patient admitted for Chemical Use Issues.  Plan: The following services will be provided to the patient; psychiatric assessment, medication management, therapeutic milieu, individual and group support, and skills groups.   Participants: 3A Provider: Dr. Gabrielle Morton MD; 3A RN: Virgie Miller ; 3A CM's:  ISA Garcia .  Summary/Recommendation: Providers will assess today for treatment recommendations, discharge planning, and aftercare plans. CM will meet with pt for discharge planning.   Medical/Physical: No acute medical concerns reported.   Precautions:   Behavioral Orders   Procedures    Code 1 - Restrict to Unit    Routine Programming     As clinically indicated    Status 15     Every 15 minutes.    Withdrawal precautions     Rationale for change in precautions or plan: N/A  Progress: Initial.    ASAM Dimension Scale Ratings:  Dimension 1: 1 Client can tolerate and cope with withdrawal discomfort. The client displays mild to moderate intoxication or signs and symptoms interfering with daily functioning but does not immediately endanger self or others. Client poses minimal risk of severe withdrawal.  Dimension 2: 1 Client tolerates and susan with physical discomfort and is able to get the services that the client needs.  Dimension 3: 2 Client has difficulty with impulse control and lacks coping skills. Client has thoughts of suicide or harm to others without means; however, the thoughts may interfere with participation in some treatment activities. Client has difficulty functioning in significant life areas. Client has moderate symptoms of emotional, behavioral, or cognitive problems. Client is able to participate in most treatment activities.  Dimension 4: 2 Client displays verbal compliance, but lacks consistent behaviors; has low motivation for change; and is passively involved in treatment.  Dimension 5: 4 No awareness of  the negative impact of mental health problems or substance abuse. No coping skills to arrest mental health or addiction illnesses, or prevent relapse.  Dimension 6: 4 Client has (A) Chronically antagonistic significant other, living environment, family, peer group or long-term criminal justice involvement that is harmful to recovery or treatment progress, or (B) Client has an actively antagonistic significant other, family, work, or living environment with immediate threat to the client's safety and well-being.

## 2024-08-20 NOTE — PROGRESS NOTES
"Contacting your Doctor -   To contact a doctor, call  or: Dr. Kingston Monday-Friday 09:00-5:00 pm @ [phone]                               OR  [phone] and ask for the resident on call for ENT-Otolaryngology (answered 24 hours a day)   Emergency Department:  [facility]: [phone]  911 if you are in need of immediate or emergent help     Dr. Kingston's Surgical Discharge Instructions    1. Start rinsing the nasal cavities with ocean saline spray as needed for congestion and nasal saline rinse 2 or more times daily the day after surgery  2. Take medications as prescribed.    3. You have been prescribed a narcotic pain medication (oxycodone) to take as needed for pain not controlled with tylenol and a nasal saline spray. An antibiotic will be prescribed if there was evidence of infection during surgery and cultures were obtained at the time of surgery.  If needed we will also send you home on anti-nausea medication  4. Do not drive or operate machinery while taking narcotic pain medication.   5. For nasal bleeding that is bothersome or excessive, spray afrin (oxymetazoline) nasal spray (four sprays into each nostril) and pinch the tip of the nose closed for 10 minutes. This can be bought over the counter. If you find you have done this four times in one hour and are still having bothersome bleeding, please call your doctor. Sometimes you will be given a bottle of afrin at the time of discharge.  This was used at the end of your surgery in your own nose, so you will find that the bottle is already opened, and may have some small streaks of blood on the tip of the bottle. Please do not be alarmed, as this was used on you, and you alone!  6. There are no activity restrictions after surgery, but please \"listen to your body\". If you feel like you are doing too much, please reduce your activity level. The one restriction I have is that you avoid excessive nose-blowing, as this can lead to nasal bleeding.   7. " Name: Cody Bolton  Date: 8/20/2024  Medical Record: [id number]    Envelope Number: [id number]    List of Contents (List each item separately in new row):   Warfarin sodium 10 mg tablet    Admission:  I am responsible for any personal items that are not sent to the safe or pharmacy.  [facility] is not responsible for loss, theft or damage of any property in my possession.    Patient Signature:  ___________________________________________       Date/Time:__________________________    Staff Signature: __________________________________       Date/Time:__________________________    [facility] Staff person, if patient is unable/unwilling to sign:      __________________________________________________________       Date/Time: __________________________    **All medications are packaged by LP staff and securely stored on [facility]. Medications left by patients at discharge will be packaged by LP staff and transported by LP staff to inpatient pharmacy for storage.**    Discharge:  [facility] has returned all of my personal belongings:    Patient Signature: ________________________________________     Date/Time: ____________________________________    Staff Signature: ______________________________________     Date/Time:_____________________________________    Please call your doctor for any headache not controlled with pain medication, severe nausea or vomiting, fevers >100.4, changes in mental status, or any other concerning symptoms you may identify.      Bhaskar Kingston MD    [facility]  Rhinology  Endoscopic Skull Base Surgery  [facility]  Department of Otolaryngology - Head & Neck Surgery

## 2024-08-20 NOTE — PROGRESS NOTES
ANTICOAGULATION  MANAGEMENT: Discharge Review    Cody Bolton chart reviewed for anticoagulation continuity of care    Hospital Admission on 8/19-8/20/24 for uncomplicated alcohol dependence.    Discharge disposition:  [facility] [phone]    Results:    Recent labs: (last 7 days)     08/16/24  1154 08/19/24  1702 08/20/24  0632   INR 1.5* 1.91* 1.77*     Anticoagulation inpatient management:     home regimen continued    Anticoagulation discharge instructions:     Warfarin dosing: home regimen continued   Bridging: No   INR goal change: No      Medication changes affecting anticoagulation: No    Additional factors affecting anticoagulation: Yes: Alcohol treatment, was drinking 375 mL of hard liquor daily, last drink on 8/15/24     PLAN     No adjustment to anticoagulation plan needed    Recommended follow up is scheduled    Per 8/16/24 acc encounter, Anticoagulation clinic spoke with nurse Yenni at [facility] today and set up everything needed for 8/22/24 INR.    Anticoagulation Calendar updated    Jessi Brown RN

## 2024-08-20 NOTE — PROGRESS NOTES
Name: Cody Bolton  Date: 8/20/2024  Medical Record: [id number]    Envelope Number: [id number]    List of Contents (List each item separately in new row):   One cell phone  Admission:  I am responsible for any personal items that are not sent to the safe or pharmacy.  [facility] is not responsible for loss, theft or damage of any property in my possession.    Patient Signature:  ___________________________________________       Date/Time:__________________________    Staff Signature: __________________________________       Date/Time:__________________________    [facility] Staff person, if patient is unable/unwilling to sign:      __________________________________________________________       Date/Time: __________________________    **All medications are packaged by [facility] staff and securely stored on [facility]. Medications left by patients at discharge will be packaged by LP staff and transported by [facility] staff to inpatient pharmacy for storage.**    Discharge:  [facility] has returned all of my personal belongings:    Patient Signature: ________________________________________     Date/Time: ____________________________________    Staff Signature: ______________________________________     Date/Time:_____________________________________

## 2024-08-20 NOTE — TELEPHONE ENCOUNTER
Is it Ok if pt takes trazodone 200mg at bedtime, it was on his current med list and was prescribed by a previous mental health provider, he brought the bottle in with him at admission. Thank you!

## 2024-08-20 NOTE — CONSULTS
[facility]  Consult Note - Hospitalist Service  Date of Admission:  8/19/2024  Consult Requested by: Dr. Morton  Reason for Consult: Medical co-management    Assessment & Plan   Mr. Cody Bolton is a pleasant 43 yo male with hx of HTN, NIDDM, DVT, unprovoked PE on chronic AC, HLD, CARLIE on CPAP, morbid obesity, and AUD admitted to  psychiatry detox unit after presenting to ED with alcohol abuse and desire for medically supervised etoh withdrawal.     # Etoh use disorder and sz withdrawal ppx: Management per psychiatry primary team.     # Hx of DVT/PE: Hospitalized at [facility] earlier this year (2/4-2/6) for acute dyspnea with CT chest revealing bilateral PE. Started on heparin gtt, transitioned to lifelong warfarin with bridging Lovenox as pt not candidate for DOAC based on wt >120 kg. Goal INR 2-3, currently 1.77 (1.91).  - Continue PTA warfarin with dosing deferred to pharmacy  - Start bridging therapeutic Lovenox until INR back at goal between 2-3.     # CARLIE: Continue home CPAP while sleeping    # HTN  # Hx of pAfib  TTE last May with normal LV function with est EF of 55-60%. Current BPs elevated this am prior to receiving PTA antihypertensives, pt asymptomatic. Pt is regular rhythm on exam with HR<100.   - Continue PTA antihypertensives including Coreg with parameters to hold.     # T2DM, non-insulin dependent: Most recent A1C 7.2% on 6/11/24. BGs here well controlled.   - Continue PTA metformin and home supply of Ozempic (if allowed)  - Accuchecks BID    # HLD: Continue PTA statin       The patient's care was discussed with the Patient. Medicine will continue to follow peripherally. Please feel free to call or page with questions.        Elías Ness PA-C  Hospitalist Service  Securely message with [employer] (more info)  Text page via [facility] Paging/Directory   ______________________________________________________________________    Chief Complaint   Etoh  withdrawal    History is obtained from the patient and electronic health record    History of Present Illness   Please refer to ED note by Dr. Padgett dated 8/19/24 for full details. In brief, Mr. Cody Bolton is a pleasant 43 yo male with hx of HTN, NIDDM, DVT, unprovoked PE on chronic AC, HLD, CARLIE on CPAP, morbid obesity, and AUD admitted to IP psychiatry detox unit after presenting to ED with alcohol abuse and desire for medically supervised etoh withdrawal.     Currently pt denies acute physical concerns including fevers, chills, chest pain, SOB, nausea, abd pain, bowel and bladder concerns.     Past Medical History    Past Medical History:   Diagnosis Date    Depressive disorder     High cholesterol     History of gastric bypass     History of pulmonary embolism     Hypertension     CARLIE (obstructive sleep apnea)     Personal history of DVT (deep vein thrombosis)     Type 2 diabetes mellitus (H)     Uncomplicated asthma        Past Surgical History   Past Surgical History:   Procedure Laterality Date    APPENDECTOMY  08/15/2017    Dr. Ferrer    GASTRIC BYPASS      AK LAP,APPENDECTOMY N/A 8/14/2017    Procedure: APPENDECTOMY, LAPAROSCOPIC;  Surgeon: Nba Ferrer MD;  Location: [facility] [place];  Service: General    REVISION AKANKSHA-EN-Y  2014    Dr. Ranjeet Espinal [email or web address]       Medications   Medications Prior to Admission   Medication Sig Dispense Refill Last Dose    buPROPion (WELLBUTRIN XL) 150 MG 24 hr tablet Take 1 tablet (150 mg) by mouth every morning 90 tablet 3 8/19/2024    carvedilol (COREG) 12.5 MG tablet Take 1 tablet (12.5 mg) by mouth 2 times daily (with meals) 60 tablet 11 8/19/2024    cloNIDine (CATAPRES) 0.1 MG tablet Take 0.1 mg by mouth as needed (Sleep)   Past Week    lisinopril (ZESTRIL) 20 MG tablet Take 1 tablet (20 mg) by mouth daily 90 tablet 2 8/19/2024    metFORMIN (GLUCOPHAGE XR) 500 MG 24 hr tablet Take 2 tablets (1,000 mg) by mouth daily (with dinner) 180 tablet 3  8/18/2024    Semaglutide, 1 MG/DOSE, (OZEMPIC) 4 MG/3ML pen Inject 1 mg subcutaneously every 7 days 3 mL 0 Past Week    simvastatin (ZOCOR) 20 MG tablet Take 1 tablet (20 mg) by mouth at bedtime 90 tablet 3 8/18/2024    warfarin ANTICOAGULANT (COUMADIN) 5 MG tablet Take 5 mg by mouth at bedtime   8/18/2024    acetaminophen (TYLENOL) 500 MG tablet Take 2 tablets (1,000 mg) by mouth 3 times daily (Patient taking differently: Take 1,000 mg by mouth every 8 hours as needed)             Review of Systems    The 10 point Review of Systems is negative other than noted in the HPI or here.     Social History   I have reviewed this patient's social history and updated it with pertinent information if needed.  Social History     Tobacco Use    Smoking status: Never     Passive exposure: Never    Smokeless tobacco: Never   Vaping Use    Vaping status: Never Used   Substance Use Topics    Alcohol use: Yes     Comment: Alcoholic Drinks/day: occasionally, trying to quit    Drug use: No         Family History   I have reviewed this patient's family history and updated it with pertinent information if needed.  Family History   Problem Relation Age of Onset    Substance Abuse Brother     Anxiety Disorder Sister     Depression Sister          Allergies   No Known Allergies     Physical Exam   Vital Signs: Temp: 97  F (36.1  C) Temp src: Temporal BP: (!) 174/97 Pulse: 60   Resp: 16 SpO2: 97 % O2 Device: None (Room air)    Weight: 378 lbs 0 oz  GEN: In NAD  HEENT: NCAT; PERRL; sclerae non-icteric  LUNGS: CTAB  CV: RRR  ABD: +BSs; Obese SNTND  EXT: No BLE edema  SKIN: No acute rashes noted on exposed areas.  NEURO: AAOx3; CNs grossly intact; No acute focal deficits noted.      Medical Decision Making       60 MINUTES SPENT BY ME on the date of service doing chart review, history, exam, documentation & further activities per the note.      Data   CMP  Recent Labs   Lab 08/20/24  0353 08/19/24  1702   NA  --  137   POTASSIUM  --  4.1    CHLORIDE  --  106   CO2  --  22   ANIONGAP  --  9   * 109*   BUN  --  8.3   CR  --  0.70   GFRESTIMATED  --  >90   RAUL  --  9.0   MAG  --  1.9   PROTTOTAL  --  7.6   ALBUMIN  --  4.2   BILITOTAL  --  0.4   ALKPHOS  --  111   AST  --  30   ALT  --  34     CBC  Recent Labs   Lab 08/19/24  1702   WBC 6.4   RBC 4.48   HGB 12.3*   HCT 38.6*   MCV 86   MCH 27.5   MCHC 31.9   RDW 14.9        INR  Recent Labs   Lab 08/20/24  0632 08/19/24  1702 08/16/24  1154   INR 1.77* 1.91* 1.5*

## 2024-08-20 NOTE — PROGRESS NOTES
Yenni RN, RN at [facility] called to let [facility] RN know that they are expecting Cody to arrive soon from the hospital.   He will stay with them for the next 28 days.   He should come with his warfarin, but if not they will let us know and we can prescribe it to [facility].   Cody will not have his phone with him, so [facility] RN will be only communicating with the floor nurses for assessment, dosing and date of next lab, by calling [phone] once INR results are received in [facility]. They have access to EPIC, so we do not need to fax an AVS.  Cody will use their onsite lab, we do not need to schedule, just tell them when next is needed and he will go downstairs that morning at 0730. They cannot check POC, so a standing venous INR has been entered by this [facility] RN. Results are quick and should be expected same day.   Per chart review and discussion with Yenni, Patient did detox this past Thursday on his own at home. No medications required while in hospital. Providers did advise he bridge with Lovenox while INR subtherapeutic, but patient's cannot inject while at [facility], so they approved warfarin alone and advised he take 7.5 mg tonight (unchanged from maintenance dose instructions given 8/16/24).  Next INR 8/22/24.     Catalina WALLACE RN  Anticoagulation Team

## 2024-08-20 NOTE — TELEPHONE ENCOUNTER
----- Message from Bora Felder sent at 8/20/2024  3:55 PM CDT -----  Regarding: DA  Benefits request for diagnostic assessment while attending programming at [facility].   Create referral for an evaluation and review benefits to determine if authorization is needed for CPT 44520, 27303 and .  Update the authorization based on insurance payer requirements  Once the auth is approved, please update the referral
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
## 2024-08-20 NOTE — TELEPHONE ENCOUNTER
Patient is currently admitted to [facility], a 28-day residential [facility] treatment facility located inside of [facility]. This is not an inpatient unit. There is no rounding provider or access to hospital consult services.  [facility] nurses utilize community medical providers while patients are admitted for medical collaboration. Medication order(s) may be sent to [facility] on [address] [facility], [facility] [address], [place]  [address] and will be delivered to [facility]. If ordered, patients may have labs and other imaging completed while they are admitted to [facility].      We are reaching out because we need a form filled out un order for pt to self administer ozempic on our unit, He will also need supplies to be able to check his blood sugar    Yenni Power  Nurse Liaison / CD Adult [facility]   [address], [place]  O:[phone]  F:[phone]  [place] [id number]  LP After hours():[phone]

## 2024-08-20 NOTE — TELEPHONE ENCOUNTER
----- Message from Lynda HOLLINGSWORTH sent at 8/20/2024 10:34 AM CDT -----  Regarding: New LP Appt  Scheduling Request    Patient Name: Cody Bolton  Location of programming: [facility]  Start Date: August / 20 / 2024  Group: B on Tuesday at 3:00 PM   Attending Provider (MD): Lyle  Number of visits to be scheduled: 28  Duration of Appointment in minutes: 24/7  Visit Type: Treatment    Additional notes:

## 2024-08-21 ENCOUNTER — LAB (OUTPATIENT)
Dept: LAB | Facility: CLINIC | Age: 44
End: 2024-08-21
Payer: COMMERCIAL

## 2024-08-21 ENCOUNTER — HOSPITAL ENCOUNTER (OUTPATIENT)
Dept: BEHAVIORAL HEALTH | Facility: CLINIC | Age: 44
Discharge: HOME | End: 2024-08-21
Attending: FAMILY MEDICINE
Payer: COMMERCIAL

## 2024-08-21 DIAGNOSIS — Z72.51 HISTORY OF UNPROTECTED SEX: ICD-10-CM

## 2024-08-21 PROBLEM — F19.20 CHEMICAL DEPENDENCY (H): Status: ACTIVE | Noted: 2024-08-21

## 2024-08-21 LAB
HBV CORE AB SERPL QL IA: NONREACTIVE
HBV SURFACE AB SERPL IA-ACNC: <3.5 M[IU]/ML
HBV SURFACE AB SERPL IA-ACNC: NONREACTIVE M[IU]/ML
HBV SURFACE AG SERPL QL IA: NONREACTIVE
HCV AB SERPL QL IA: NONREACTIVE
HIV 1+2 AB+HIV1 P24 AG SERPL QL IA: NONREACTIVE
T PALLIDUM AB SER QL: NONREACTIVE

## 2024-08-21 PROCEDURE — H2035 A/D TX PROGRAM, PER HOUR: HCPCS | Mod: HQ

## 2024-08-21 PROCEDURE — 86803 HEPATITIS C AB TEST: CPT

## 2024-08-21 PROCEDURE — 36415 COLL VENOUS BLD VENIPUNCTURE: CPT

## 2024-08-21 PROCEDURE — 87591 N.GONORRHOEAE DNA AMP PROB: CPT

## 2024-08-21 PROCEDURE — 86706 HEP B SURFACE ANTIBODY: CPT

## 2024-08-21 PROCEDURE — 86704 HEP B CORE ANTIBODY TOTAL: CPT

## 2024-08-21 PROCEDURE — 87491 CHLMYD TRACH DNA AMP PROBE: CPT

## 2024-08-21 PROCEDURE — 1002N00001 HC LODGING PLUS FACILITY CHARGE ADULT

## 2024-08-21 PROCEDURE — 87340 HEPATITIS B SURFACE AG IA: CPT

## 2024-08-21 PROCEDURE — 87389 HIV-1 AG W/HIV-1&-2 AB AG IA: CPT

## 2024-08-21 PROCEDURE — 86780 TREPONEMA PALLIDUM: CPT

## 2024-08-21 NOTE — PROGRESS NOTES
Writer met with patient for an individual session to complete his comprehensive assessment and answer his questions about the treatment program.
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
## 2024-08-21 NOTE — GROUP NOTE
Group Therapy Documentation    PATIENT'S NAME: Cody Bolton  MRN:   [id number]  :   1980  ACCT. NUMBER: [id number]  DATE OF SERVICE: 24  START TIME:  9:45 AM  END TIME: 11:30 AM  FACILITATOR(S): Shannan Martines LADC  TOPIC: BEH Group Therapy  Number of patients attending the group:  10  Group Length:  2 Hours    Dimensions addressed: 4 and 6    Summary of Group / Topics Discussed:    Recovery Principles, Relationship/socialization, and Disease of addiction      Group Attendance:  Attended group session    Patient's response to the group topic/interactions:  cooperative with task    Patient appeared to be Actively participating, Attentive, and Engaged.        Client specific details: Cody was an active participant in morning group therapy session. The group took part in introductions of the new group members. He was excused for one hour to attend a lab appointment.

## 2024-08-21 NOTE — GROUP NOTE
Group Therapy Documentation    PATIENT'S NAME: Cody Bolton  MRN:   [id number]  :   1980  ACCT. NUMBER: [id number]  DATE OF SERVICE: 24  START TIME: 12:30 PM  END TIME:  2:30 PM  FACILITATOR(S): Aric Chiu LADC  TOPIC: BEH Group Therapy  Number of patients attending the group:  10  Group Length:  2 Hours    Dimensions addressed: 3, 4, and 5    Summary of Group / Topics Discussed:    Recovery Principles, Cognitive behavioral therapy skills, Co-occurring illnesses symptom management, Mindfulness/Relaxation, Coping/DBT informed care, Trauma informed care, Disease of addiction, and Emotions/expression      Group Attendance:  Attended group session    Patient's response to the group topic/interactions:  cooperative with task    Patient appeared to be Attentive and Engaged.        Client specific details:  Cody participated in afternoon group. He listened to and gave positive feedback on his peer's assignment. For the second half of group he took part in a discussion on taking medications, how using alcohol and drugs effects your mental health, and on why you should not get into relationships in early sobriety.

## 2024-08-21 NOTE — GROUP NOTE
Group Therapy Documentation    PATIENT'S NAME: Cody Bolton  MRN:   [id number]  :   1980  ACCT. NUMBER: [id number]  DATE OF SERVICE: 24  START TIME:  8:30 AM  END TIME:  9:30 AM  FACILITATOR(S): Shannan Martines LADC; Brian Escalona LADC  TOPIC: BEH Group Therapy  Number of patients attending the group:  21  Group Length:  1 Hours    Dimensions addressed: 3 and 5    Summary of Group / Topics Discussed:    Sober coping skills, Co-occurring illnesses symptom management, and Emotions/expression      Group Attendance:  Attended group session    Patient's response to the group topic/interactions:  cooperative with task    Patient appeared to be Attentive and Engaged.        Client specific details: Cody was an active participant in skills group on the topic of managing stress and anxiety in recovery.

## 2024-08-22 ENCOUNTER — ANTICOAGULATION THERAPY VISIT (OUTPATIENT)
Dept: ANTICOAGULATION | Facility: CLINIC | Age: 44
End: 2024-08-22

## 2024-08-22 ENCOUNTER — HOSPITAL ENCOUNTER (OUTPATIENT)
Dept: BEHAVIORAL HEALTH | Facility: CLINIC | Age: 44
Discharge: HOME | End: 2024-08-22
Attending: FAMILY MEDICINE
Payer: COMMERCIAL

## 2024-08-22 ENCOUNTER — LAB (OUTPATIENT)
Dept: LAB | Facility: CLINIC | Age: 44
End: 2024-08-22
Payer: COMMERCIAL

## 2024-08-22 DIAGNOSIS — Z86.718 HISTORY OF DVT (DEEP VEIN THROMBOSIS): ICD-10-CM

## 2024-08-22 DIAGNOSIS — Z86.718 HISTORY OF DVT (DEEP VEIN THROMBOSIS): Primary | ICD-10-CM

## 2024-08-22 LAB
C TRACH DNA SPEC QL NAA+PROBE: NEGATIVE
INR PPP: 1.71 (ref 0.85–1.15)
N GONORRHOEA DNA SPEC QL NAA+PROBE: NEGATIVE

## 2024-08-22 PROCEDURE — 36415 COLL VENOUS BLD VENIPUNCTURE: CPT

## 2024-08-22 PROCEDURE — 85610 PROTHROMBIN TIME: CPT

## 2024-08-22 PROCEDURE — H2035 A/D TX PROGRAM, PER HOUR: HCPCS | Mod: HQ

## 2024-08-22 PROCEDURE — 1002N00001 HC LODGING PLUS FACILITY CHARGE ADULT

## 2024-08-22 NOTE — PROGRESS NOTES
Acknowledgement of Current Treatment Plan - Initial Treatment Plan     INITIAL TREATMENT PLAN:     1. I have participated in creating my treatment plan with my therapist / counselor on 8/22/24. I agree with the plan as it is written in the electronic health record.    Name Signature/Date   Patient     Name of Therapist / Counselor   Signature/Date   Counselor      2. I have completed and reviewed my Safety Plan with my counselor and signed this on 8/22/24. I have been given the hard copy of this plan.    Patient signature/date:      _____________________________________________________________________________    3. Last Use Date: 8/15/24.    Patient signature/date:     _____________________________________________________________________________

## 2024-08-22 NOTE — GROUP NOTE
Psychoeducation Group Documentation    PATIENT'S NAME: Cody Bolton  MRN:   [id number]  :   1980  ACCT. NUMBER: [id number]  DATE OF SERVICE: 24  START TIME:  3:00 PM  END TIME:  4:00 PM  FACILITATOR(S): Tere Victor LADC; Mariana Herrera LADC  TOPIC: BEH Pyschoeducation  Number of patients attending the group:  10  Group Length:  1 Hours    Skills Group Therapy Type: Recovery skills    Summary of Group / Topics Discussed:    Balanced lifestyle skills      Patients attended a skills lecture on  The Brain  to help support focus their recovery goals. Patients gained knowledge on  how to be aware with this information.  Each patient had an opportunity to process the information, ask questions and provide constructive feedback.         Group Attendance:  Attended group session    Patient's response to the group topic/interactions:  cooperative with task    Patient appeared to be Engaged.         Client specific details:  Pt, participated in lecture discussion. No immediate concerns or issues. .

## 2024-08-22 NOTE — PROGRESS NOTES
ANTICOAGULATION MANAGEMENT     Cody WALLACE Young 44 year old male is on warfarin with subtherapeutic INR result. (Goal INR 2.0-3.0)    Recent labs: (last 7 days)     08/22/24  0749   INR 1.71*       ASSESSMENT     Source(s): Chart Review and Home Care/Facility Nurse     Warfarin doses taken: Warfarin taken as instructed  Diet: Change in alcohol intake may be affecting INR. No alcohol intake since admitted at the facility   Medication/supplement changes: None noted  New illness, injury, or hospitalization: Yes: Admitted on 8/19- 8/20 for uncomplicated alcohol dependence  Signs or symptoms of bleeding or clotting: No  Previous result: Subtherapeutic  Additional findings: None       PLAN     Recommended plan for temporary change(s) affecting INR     Dosing Instructions: Increase your warfarin dose (12.5% change) with next INR in 4 days       Summary  As of 8/22/2024      Full warfarin instructions:  5 mg every Mon, Wed, Fri; 7.5 mg all other days   Next INR check:  8/26/2024               Telephone call with [place] facility nurse who verbalizes understanding and agrees to plan and who agrees to plan and repeated back plan correctly    Orders given to  Homecare nurse/facility to recheck    Education provided: Contact [phone]  with any changes, questions or concerns.     Plan made with [facility] Pharmacist Izabella Payton, MARANDA  Anticoagulation Clinic  8/22/2024    _______________________________________________________________________     Anticoagulation Episode Summary       Current INR goal:  2.0-3.0   TTR:  51.4% (6 mo)   Target end date:  Indefinite   Send INR reminders to:  KEO PABLO    Indications    Acute pulmonary embolism without acute cor pulmonale  unspecified pulmonary embolism type (H) (Resolved) [I26.99]  History of DVT (deep vein thrombosis) [Z86.718]             Comments:               Anticoagulation Care Providers       Provider Role Specialty Phone number    Fredy Veras MD  Referring Family Medicine [phone]    Aaseby-Aguilera, Ramona Ann, PA-C Referring Family Medicine [phone]

## 2024-08-22 NOTE — GROUP NOTE
Group Therapy Documentation    PATIENT'S NAME: Cody Bolton  MRN:   [id number]  :   1980  ACCT. NUMBER: [id number]  DATE OF SERVICE: 24  START TIME:  9:00 AM  END TIME: 11:00 AM  FACILITATOR(S): Shannan Martines LADC  TOPIC: BEH Group Therapy  Number of patients attending the group:  9  Group Length:  2 Hours    Dimensions addressed: 3, 4, 5, and 6    Summary of Group / Topics Discussed:    Sober coping skills and Disease of addiction      Group Attendance:  Attended group session    Patient's response to the group topic/interactions:  cooperative with task    Patient appeared to be Actively participating, Attentive, and Engaged.        Client specific details: Cody was an active participant in morning group therapy session. He engaged in a discussion on avoidance and a peer graduation ceremony.

## 2024-08-22 NOTE — GROUP NOTE
Group Therapy Documentation    PATIENT'S NAME: Cody Bolton  MRN:   [id number]  :   1980  ACCT. NUMBER: [id number]  DATE OF SERVICE: 24  START TIME: 12:30 PM  END TIME:  2:30 PM  FACILITATOR(S): Aric Chiu LADC; Rosa Stack  TOPIC: BEH Group Therapy  Number of patients attending the group:  10  Group Length:  2 Hours    Dimensions addressed: 3, 4, and 5    Summary of Group / Topics Discussed:    Spirituality: Personal Growth and Development: Facilitated a group discussion around health and wellness focusing on the various aspects of spirituality. Facilitated a group discussion on spirituality and how our individual values impact our thoughts, feelings, and actions. Provided psychoeducation on the need for a balanced lifestyle and ways to achieve this. Engaged clients in a discussion around what is spirituality and their own experience to spirituality. Provided clients with handouts and worksheets.     Group Objectives/Goals:    Client will learn strategies to reduce stress and increase overall health and wellness.  Client will identify a personal understanding of spirituality.   Client will explore how their own spirituality can connect to their recovery and wellbeing.   Client will identify their personal values and how these values may influence their thoughts, feelings, and actions.   Client will implement new behaviors that facilitate ongoing wellness.       Group Attendance:  Attended group session    Patient's response to the group topic/interactions:  cooperative with task    Patient appeared to be Attentive and Engaged.        Client specific details:  Cody participated in the afternoon spirituality group.

## 2024-08-23 ENCOUNTER — HOSPITAL ENCOUNTER (OUTPATIENT)
Dept: BEHAVIORAL HEALTH | Facility: CLINIC | Age: 44
Discharge: HOME | End: 2024-08-23
Attending: FAMILY MEDICINE
Payer: COMMERCIAL

## 2024-08-23 ENCOUNTER — HOSPITAL ENCOUNTER (OUTPATIENT)
Dept: BEHAVIORAL HEALTH | Facility: CLINIC | Age: 44
Discharge: HOME | End: 2024-08-23
Attending: PSYCHIATRY & NEUROLOGY | Admitting: PSYCHIATRY & NEUROLOGY
Payer: COMMERCIAL

## 2024-08-23 PROCEDURE — H2035 A/D TX PROGRAM, PER HOUR: HCPCS | Mod: HQ

## 2024-08-23 PROCEDURE — 1002N00001 HC LODGING PLUS FACILITY CHARGE ADULT

## 2024-08-23 PROCEDURE — 90791 PSYCH DIAGNOSTIC EVALUATION: CPT

## 2024-08-23 ASSESSMENT — PATIENT HEALTH QUESTIONNAIRE - PHQ9: SUM OF ALL RESPONSES TO PHQ QUESTIONS 1-9: 13

## 2024-08-23 ASSESSMENT — ANXIETY QUESTIONNAIRES
5. BEING SO RESTLESS THAT IT IS HARD TO SIT STILL: SEVERAL DAYS
2. NOT BEING ABLE TO STOP OR CONTROL WORRYING: MORE THAN HALF THE DAYS
IF YOU CHECKED OFF ANY PROBLEMS ON THIS QUESTIONNAIRE, HOW DIFFICULT HAVE THESE PROBLEMS MADE IT FOR YOU TO DO YOUR WORK, TAKE CARE OF THINGS AT HOME, OR GET ALONG WITH OTHER PEOPLE: SOMEWHAT DIFFICULT
6. BECOMING EASILY ANNOYED OR IRRITABLE: SEVERAL DAYS
7. FEELING AFRAID AS IF SOMETHING AWFUL MIGHT HAPPEN: MORE THAN HALF THE DAYS
1. FEELING NERVOUS, ANXIOUS, OR ON EDGE: SEVERAL DAYS
4. TROUBLE RELAXING: SEVERAL DAYS
3. WORRYING TOO MUCH ABOUT DIFFERENT THINGS: SEVERAL DAYS
GAD7 TOTAL SCORE: 9
GAD7 TOTAL SCORE: 9

## 2024-08-23 ASSESSMENT — PAIN SCALES - GENERAL: PAINLEVEL: MILD PAIN (2)

## 2024-08-23 NOTE — GROUP NOTE
Group Therapy Documentation    PATIENT'S NAME: Cody Bolton  MRN:   [id number]  :   1980  ACCT. NUMBER: [id number]  DATE OF SERVICE: 24  START TIME:  9:00 AM  END TIME: 11:00 AM  FACILITATOR(S): Rosalva Velez LADC  TOPIC: BEH Group Therapy  Number of patients attending the group:  10    Group Length:  2 Hours    Dimensions addressed: 3, 4, and 5    Summary of Group / Topics Discussed:    Recovery Principles, Sober coping skills, Relationship/socialization, and Relapse prevention      Group Attendance:  Attended group session    Patient's response to the group topic/interactions:  cooperative with task    Patient appeared to be Actively participating, Attentive, and Engaged.        Client specific details:  Patient attended group session and was attentive and participative.

## 2024-08-23 NOTE — PROGRESS NOTES
RN Assessment of Patient's Ability to Safely Self-Administer Insulin Injections      Has experience in the management of diabetes: Yes    Including knowledge and understanding of the importance of:    Good Nutrition: Further Education Needed   Exercise: Further Education Needed   Blood Glucose Monitoring:  Yes     Does the patient have the physical and mental ability to:     Perform blood glucose monitoring Yes   Determine the amount of insulin needed Yes   Prepare the insulin for injection Yes   Self-Administer the insulin Yes   Safely dispose of the needle and syringe Yes   Properly store the insulin Yes     Therefore does the patient, present a risk of harm to themselves or other clients in the facility if allowed to self-administer insulin.  Consider factors above.  No    I have assessed the patient to be able to safely administer insulin injections.  Yes    I delegate the observation of these injections to [facility] staff who have received training in diabetes, blood glucose monitoring, and insulin self-administration.      [facility] RN Signature:  Yenni Power RN    8/23/2024  11:25 AM

## 2024-08-23 NOTE — GROUP NOTE
Group Therapy Documentation    PATIENT'S NAME: Cody Bolton  MRN:   [id number]  :   1980  ACCT. NUMBER: [id number]  DATE OF SERVICE: 24  START TIME: 12:30 PM  END TIME:  2:30 PM  FACILITATOR(S): Loren Che LADC; Aric Chiu LADC; Brian Escalona LADC  TOPIC: BEH Group Therapy  Number of patients attending the group:  26  Group Length:  2 Hours    Dimensions addressed: 5 and 6    Summary of Group / Topics Discussed:    Recovery Principles    Patients viewed an addiction/mental health based film. This film addressed: addiction as a disease, relationships and addiction, engagement in AA, and evaluating priorities when getting sober.   Patients engaged in a thorough discussion about the film, and shared personal experiences, and how the film helped them process their own life events.       Group Attendance:  Attended group session    Patient's response to the group topic/interactions:  cooperative with task    Patient appeared to be Actively participating.        Client specific details:  Cody Attended small group therapy. Patient engaged in discussion and participated in sharing feedback to their peers.   .

## 2024-08-23 NOTE — PROGRESS NOTES
"    [facility]        PATIENT'S NAME: Cody Bolton  PREFERRED NAME: Cody  PRONOUNS: he/him/his     MRN: [id number]  : 1980  ADDRESS: [address] [place] [id number]  ACCT. NUMBER:  [id number]  DATE OF SERVICE: 24  START TIME: 8:45 AM  END TIME: 10:00 AM  PREFERRED PHONE: [phone]  May we leave a program related message: Yes  EMERGENCY CONTACT: was obtained Rosalind Bolton, sister [phone] .  SERVICE MODALITY:  In-person    UNIVERSAL ADULT Mental Health DIAGNOSTIC ASSESSMENT    Identifying Information:  Patient is a 44 year old,   individual.  Patient was referred for an assessment by [facility].  Patient attended the session alone.    Chief Complaint:   The reason for seeking services at this time is: \" To better manage my mental health symptoms and obtain referrals, recommendations and information about other available resources\". The problem(s) began: \"Anxiety and depression got worse 2 and 1/2 years ago when my mother was transitioned to nursing home where she passed away in 2023 and my cousin committed suicide a day before she .  Problems with alcohol started 18 months ago\".  Patient has attempted to resolve these concerns in the past through therapy and psychiatry and [facility] programmatic care following IOP in the  2023, . Currently his PCP is prescribing his  medications .    Social/Family History:  Patient reported they grew up in  [facility] .  They were raised by biological parents.  Parents stayed .. Father passed away in  and mother in .    Patient reported that their childhood was pretty good, supportive, loving and good care from my parents. 2 siblings: 1 sister and 1 brother who passed away in   Patient described their current relationships with family of origin as: sister is very supportive and encouraging.       The patient describes " their cultural background as .  Patient  did not identified any concerns about cultural, contextual or socioeconomic influences that need to be addressed. Cultural, Contextual, and socioeconomic factors do not affect the patient's access to services.  These factors will be addressed in the Preliminary Treatment plan.  Patient identified their preferred language to be English. Patient reported they do not  need the assistance of an  or other support involved in therapy.     Patient reported had no significant delays in developmental tasks.   Patient's highest education level was college graduate. Patient identified the following learning problems: attention and concentration.  Modifications will not be used to assist communication in therapy.  Patient reports they are  able to understand written materials.    Patient reported the following relationship history  and in the relationship now.  Patient's current relationship status is in a relationship  for 10 months.   Patient identified their sexual orientation as heterosexual.  Patient reported having two child(stephanie): sons 19 and 21. Patient identified partner, siblings, adult child, friends, therapist, and ex-wife  as part of their support system.  Patient identified the quality of these relationships as stable and meaningful.     Patient's current living/housing situation involves staying in own home/apartment.  They live with by himself, ex-wife and son are living in the same building and they report that housing is stable.     Patient is currently employed full time and reports they are not able to function appropriately at work.. Currently on Short Term Disability.  Patient reports their finances are obtained through employment.  Patient does identify finances as a current stressor.      Patient reported that they have not been involved with the legal system.  Patient denies being on probation / parole / under the jurisdiction of the  court.    Patient's Strengths and Limitations:  Patient identified the following strengths or resources that will help them succeed in treatment: friends / good social support, family support, intelligence, positive work environment, motivation, sober support group / recovery support , strong social skills, and work ethic. Things that may interfere with the patient's success in treatment include: financial hardship.     Assessments:  The following assessments were completed by patient for this visit:  PHQ9:       6/18/2024     1:50 PM 7/2/2024    11:46 AM 8/7/2024     4:00 PM 8/9/2024     6:39 AM 8/13/2024    12:44 PM 8/20/2024     3:00 PM 8/23/2024     8:00 AM   PHQ-9 SCORE   PHQ-9 Total Score MyChart 8 (Mild depression) 14 (Moderate depression)  15 (Moderately severe depression) 16 (Moderately severe depression)     PHQ-9 Total Score 8 14 18 15 16 13 13     GAD7:       2/19/2024    12:39 PM 6/4/2024    11:39 AM 8/7/2024     4:00 PM 8/9/2024     6:40 AM 8/20/2024     3:00 PM 8/23/2024     8:00 AM   MERI-7 SCORE   Total Score 2 (minimal anxiety) 7 (mild anxiety)  7 (mild anxiety)     Total Score 2 7 6 7 9 9     CAGE-AID:       2/19/2024    12:54 PM 8/23/2024     8:00 AM   CAGE-AID Total Score   Total Score 2 3   Total Score MyChart 2 (A total score of 2 or greater is considered clinically significant)      PROMIS 10-Global Health (all questions and answers displayed):       2/19/2024    12:53 PM 6/4/2024    11:40 AM 8/7/2024     4:00 PM 8/23/2024     8:00 AM   PROMIS 10   In general, would you say your health is: Fair Fair     In general, would you say your quality of life is: Good Poor     In general, how would you rate your physical health? Fair Fair     In general, how would you rate your mental health, including your mood and your ability to think? Good Fair     In general, how would you rate your satisfaction with your social activities and relationships? Good Fair     In general, please rate how well you carry  out your usual social activities and roles Fair Poor     To what extent are you able to carry out your everyday physical activities such as walking, climbing stairs, carrying groceries, or moving a chair? Completely A little     In the past 7 days, how often have you been bothered by emotional problems such as feeling anxious, depressed, or irritable? Sometimes Often     In the past 7 days, how would you rate your fatigue on average? Mild Severe     In the past 7 days, how would you rate your pain on average, where 0 means no pain, and 10 means worst imaginable pain? 5 8     In general, would you say your health is: 2 2 3 2   In general, would you say your quality of life is: 3 1 3 4   In general, how would you rate your physical health? 2 2 3 2   In general, how would you rate your mental health, including your mood and your ability to think? 3 2 1 1   In general, how would you rate your satisfaction with your social activities and relationships? 3 2 3 4   In general, please rate how well you carry out your usual social activities and roles. (This includes activities at home, at work and in your community, and responsibilities as a parent, child, spouse, employee, friend, etc.) 2 1 4 3   To what extent are you able to carry out your everyday physical activities such as walking, climbing stairs, carrying groceries, or moving a chair? 5 2 3 2   In the past 7 days, how often have you been bothered by emotional problems such as feeling anxious, depressed, or irritable? 3 4 4 4   In the past 7 days, how would you rate your fatigue on average? 2 4 3 3   In the past 7 days, how would you rate your pain on average, where 0 means no pain, and 10 means worst imaginable pain? 5 8 2 7   Global Mental Health Score 12    12 7 9 11   Global Physical Health Score 14    14 8 13 9   PROMIS TOTAL - SUBSCORES 26    26 15 22 20     [place] Suicide Severity Rating Scale (Lifetime/Recent)      5/3/2024     8:51 AM 5/4/2024     6:08 PM  "2024     9:52 AM 2024     3:11 AM 2024     4:09 PM 2024     4:03 PM 2024     4:00 AM   [place] Suicide Severity Rating (Lifetime/Recent)   Q1 Wish to be Dead (Lifetime)       No   Q2 Non-Specific Active Suicidal Thoughts (Lifetime)       No   Q1 Wished to be Dead (Past Month) 0-->no 1-->yes 0-->no 0-->no 0-->no 0-->no 0-->no   Q2 Suicidal Thoughts (Past Month) 0-->no 1-->yes 0-->no 0-->no 0-->no 1-->yes 0-->no   Q3 Suicidal Thought Method      0-->no 0-->no   Q4 Suicidal Intent without Specific Plan      0-->no 0-->no   Q5 Suicide Intent with Specific Plan      0-->no 0-->no   Q6 Suicide Behavior (Lifetime) 0-->no 1-->yes 0-->no 0-->no 0-->no 0-->no 0-->no   If yes to Q6, within past 3 months?  1-->yes        Level of Risk per Screen no risks indicated high risk no risks indicated no risks indicated no risks indicated low risk no risks indicated       Personal and Family Medical History:  Patient does report a family history of mental health concerns.  Patient reports family history includes Anxiety Disorder in his sister; Depression in his sister; Substance Abuse in his brother..     Patient does report Mental Health Diagnosis and/or Treatment.  Patient Patient reported the following previous diagnoses which include(s): an Anxiety Disorder and Depression.  Patient reported symptoms began Anxiety and depression got worse 2 and 1/2 years ago when my mother was transitioned to nursing home where she passed away in 2023 and my cousin committed suicide a day before she \" . Problems with alcohol started 18 months ago.   Patient has received mental health services in the past: Therapy, psychiatry, medications management, PHP/IOP programs. Psychiatric Hospitalizations: None.  Patient denies a history of civil commitment.  Patient is receiving other mental health services.  These include psychotherapy with Bridgette HOLLINGSWORTH at [facility] and primary care provider at [facility], Yovana Patino who " also prescribed psychiatric medications.  For follow-up on upon discharge.       Patient has had a physical exam to rule out medical causes for current symptoms.  Date of last physical exam was within the past year. Client was encouraged to follow up with PCP if symptoms were to develop. The patient has a [facility] Primary Care Provider, who is named Yovana Felipe..  Patient reports no current medical and/or dental concerns.  Patient denies any issues with pain..   There are not significant appetite / nutritional concerns / weight changes.   Patient does not report a history of head injury / trauma / cognitive impairment.       Patient reports current meds as:   Current Outpatient Medications   Medication Sig Dispense Refill    acetaminophen (TYLENOL) 500 MG tablet Take 2 tablets (1,000 mg) by mouth 3 times daily      buPROPion (WELLBUTRIN XL) 150 MG 24 hr tablet Take 1 tablet (150 mg) by mouth every morning 90 tablet 3    carvedilol (COREG) 12.5 MG tablet Take 1 tablet (12.5 mg) by mouth 2 times daily (with meals) 60 tablet 11    cloNIDine (CATAPRES) 0.1 MG tablet Take 0.1 mg by mouth as needed (Sleep)      lisinopril (ZESTRIL) 20 MG tablet Take 1 tablet (20 mg) by mouth daily 90 tablet 2    metFORMIN (GLUCOPHAGE XR) 500 MG 24 hr tablet Take 2 tablets (1,000 mg) by mouth daily (with dinner) 180 tablet 3    Semaglutide, 1 MG/DOSE, (OZEMPIC) 4 MG/3ML pen Inject 1 mg subcutaneously every 7 days 3 mL 0    simvastatin (ZOCOR) 20 MG tablet Take 1 tablet (20 mg) by mouth at bedtime 90 tablet 3    traZODone (DESYREL) 100 MG tablet Take 200 mg by mouth at bedtime      warfarin ANTICOAGULANT (COUMADIN) 5 MG tablet Full warfarin instructions, dosed in evenin.5 mg every Tue, Fri; 5 mg all other days Next INR check: 2024      alum & mag hydroxide-simethicone (MAALOX) 200-200-20 MG/5ML SUSP suspension Take 30 mLs by mouth every 6 hours as needed for indigestion (Patient not taking: Reported on 2024)       benzocaine-menthol (CEPACOL) 15-3.6 MG lozenge Place 1 lozenge inside cheek every 2 hours as needed for sore throat (Patient not taking: Reported on 8/23/2024)      guaiFENesin-dextromethorphan (ROBITUSSIN DM) 100-10 MG/5ML syrup Take 10 mLs by mouth every 4 hours as needed for cough (Patient not taking: Reported on 8/23/2024)      loratadine (CLARITIN) 10 MG tablet Take 10 mg by mouth daily (Patient not taking: Reported on 8/23/2024)      melatonin 5 MG tablet Take 5 mg by mouth nightly as needed for sleep (Patient not taking: Reported on 8/23/2024)      senna-docusate (SENOKOT-S/PERICOLACE) 8.6-50 MG tablet Take 2 tablets by mouth daily as needed for constipation (Patient not taking: Reported on 8/23/2024)       No current facility-administered medications for this encounter.     Facility-Administered Medications Ordered in Other Encounters   Medication Dose Route Frequency Provider Last Rate Last Admin    Self Administer Medications: Behavioral Services   Does not apply See Admin Instructions Corrine Alvarenga MD        Self Administer Medications: Behavioral Services   Does not apply See Admin Instructions Corrine Alvarenga MD           Medication Adherence:  Patient reports taking.  taking prescribed medications as prescribed.    Patient Allergies:  No Known Allergies     Medical History:    Past Medical History:   Diagnosis Date    Depressive disorder     High cholesterol     History of gastric bypass     History of pulmonary embolism     Hypertension     CARLIE (obstructive sleep apnea)     Personal history of DVT (deep vein thrombosis)     Type 2 diabetes mellitus (H)     Uncomplicated asthma          Current Mental Status Exam:   Appearance:  Appropriate    Eye Contact:  Good   Psychomotor:  Normal       Gait / station:  no problem  Attitude / Demeanor: Cooperative  Friendly  Speech      Rate / Production: Normal/ Responsive      Volume:  Normal  volume      Language:  intact  Mood:   Anxious    Affect:   Appropriate    Thought Content: Clear   Thought Process: Coherent       Associations: No loosening of associations  Insight:   Good   Judgment:  Intact   Orientation:  All  Attention/concentration: Good    Substance Use:   Patient did report a family history of substance use concerns; see medical history section for details.  Patient has not received chemical dependency treatment in the past.  Patient has ever been to detox.      Patient is currently receiving the following services: [facility] at [facility] at [facility] . Patient reported the following problems as a result of their substance use:  occupational / vocational problems and relationship problems.    Patient denies using alcohol.  Patient denies using tobacco.  Patient denies using cannabis.  Patient denies using caffeine.  Patient reports using/abusing the following substance(s). None reported.    Substance Use: No symptoms    Based on the positive CAGE score and clinical interview there  are indications of drug or alcohol abuse. Diagnostic assessment for substance use disorder completed. Therapist did recommend client to reduce use or abstain from alcohol or substance use. Therapist did recommend structured treatment and or community support (AA, 12 step group, etc.).   .    Significant Losses / Trauma / Abuse / Neglect Issues:   Patient did not  serve in the .  There are indications or report of significant loss, trauma, abuse or neglect issues related to: death of mother in 2023 and cousin committed suicide one day ago .  Concerns for possible neglect are not present.     Safety Assessment:   Patient denies current homicidal ideation and behaviors.  Patient denies current self-injurious ideation and behaviors.    Patient denied risk behaviors associated with substance use.   Patient denies any high risk behaviors associated with mental health symptoms.  Patient reports the following current concerns for their  personal safety: None.  Patient reports there are no firearms in the house.           History of Safety Concerns:  Patient denied a history of homicidal ideation.     Patient denied a history of personal safety concerns.    Patient denied a history of assaultive behaviors.    Patient denied a history of sexual assault behaviors.     Patient denied a history of risk behaviors associated with substance use.  Patient reported a history of substance use associated with mental health symptoms.  Patient reports the following protective factors: desire to better manage his mental health symptoms and stay clean and sober, successfully return to work and improve the relationship and friendships, employment, intact domestic partnership, lives in a responsibly safe and stable environment, able to access care without barriers, and optimistic outlook - identification of future goals.    Risk Plan:  See Recommendations for Safety and Risk Management Plan    Review of Symptoms per patient report:   Depression: Change in sleep, Lack of interest, Excessive or inappropriate guilt, Change in energy level, Difficulties concentrating, Feelings of hopelessness, Feelings of helplessness, Low self-worth, Ruminations, and Feeling sad, down, or depressed  Pamela:  No Symptoms  Psychosis: No Symptoms  Anxiety: Excessive worry, Nervousness, Physical complaints, such as headaches, stomachaches, muscle tension, Separation anxiety, Sleep disturbance, Ruminations, and Poor concentration  Panic:  No symptoms  Post Traumatic Stress Disorder:  No Symptoms   Eating Disorder: No Symptoms  ADD / ADHD:  No symptoms  Conduct Disorder: No symptoms  Autism Spectrum Disorder: No symptoms  Obsessive Compulsive Disorder: No Symptoms    Patient reports the following compulsive behaviors and treatment history: None reported.      Diagnostic Criteria:   Generalized Anxiety Disorder  A. Excessive anxiety and worry about a number of events or activities (such as  work or school performance).   B. The person finds it difficult to control the worry.  C. Select 3 or more symptoms (required for diagnosis). Only one item is required in children.   - Restlessness or feeling keyed up or on edge.    - Being easily fatigued.    - Difficulty concentrating or mind going blank.    - Muscle tension.    - Sleep disturbance (difficulty falling or staying asleep, or restless unsatisfying sleep).  Major Depressive Disorder  CRITERIA (A-C) REPRESENT A MAJOR DEPRESSIVE EPISODE - SELECT THESE CRITERIA  A) Recurrent episode(s) - symptoms have been present during the same 2-week period and represent a change from previous functioning 5 or more symptoms (required for diagnosis)   - Depressed mood. Note: In children and adolescents, can be irritable mood.     - Diminished interest or pleasure in all, or almost all, activities.    - Decreased sleep.    - Fatigue or loss of energy.    - Feelings of worthlessness or inappropriate guilt.    - Diminished ability to think or concentrate, or indecisiveness.     Functional Status:  Patient reports the following functional impairments:  relationship(s), self-care, and work / vocational responsibilities.     Nonprogrammatic care:  Patient is requesting basic services to address current mental health concerns.    Clinical Summary:  1. Psychosocial, Cultural and Contextual Factors: worsened mental  health conditions, other life stressors, potential financial difficulties, helplessness/hopelessness, motivation and commitment to make changes.  2. Principal DSM5 Diagnoses  (Sustained by DSM5 Criteria Listed Above):   296.32 (F33.1) Major Depressive Disorder, Recurrent Episode, Moderate _ and With anxious distress  300.02 (F41.1) Generalized Anxiety Disorder.  3. Other Diagnoses that is relevant to services:   Substance-Related & Addictive Disorders Alcohol Use Disorder   303.90 (F10.20) Severe In a controlled environment.  4. Provisional Diagnosis:  NA  5.  Prognosis: Expect Improvement.  6. Likely consequences of symptoms if not treated: patient's ongoing symptoms are more than likely to get worse and experience a decreased daily in functioning and may require a higher level of care.  7. Client strengths include:  educated, empathetic, employed, good listener, has a previous history of therapy, intelligent, motivated, open to learning, open to suggestions / feedback, responsible parent, support of family, friends and providers, willing to ask questions, willing to relate to others, and work history .     Recommendations:     1. Plan for Safety and Risk Management:   Safety and Risk: Recommended that patient call 911 or go to the local ED should there be a change in any of these risk factors..          Report to child / adult protection services was NA.     2. Patient's did not identified any cultural, olivier / Gnosticist / spiritual influences that will need to be incorporated into care.     3. Initial Treatment will focus on:   Depressed Mood -    Anxiety -    MARCY, Alcohol .     4. Resources/Service Plan:    services are not indicated.   Modifications to assist communication are not indicated.   Additional disability accommodations are not indicated.      5. Collaboration:   Collaboration / coordination of treatment will be initiated with the following  support professionals: Targeted Case Management (TCM).      6.  Referrals:   The following referral(s) will be initiated: Upon Discharge: [facility] at [facility] .       A Release of Information has been obtained for the following: Targeted Case Management (TCM).     Clinical Substantiation/medical necessity for the above recommendations:  Patient is a 44-year-old single heterosexual  male with two adult children who presents with a history of Severe Alcohol Use Disorder, MDD and MERI.  Patient is currently attending [facility] programmatic care to  "address his mental health and substance abuse problems. Patient lacks long-term sober maintenance skills, lacks sober coping skills, lacks a sober peer support network, and has continued to use drugs and alcohol as a coping mechanism. The patent's protective factors are: desire to better manage his mental health symptoms and stay clean and sober, successfully return to work and improve the relationship and friendships, employment, intact domestic partnership, lives in a responsibly safe and stable environment, able to access care without barriers, and optimistic outlook - identification of future goals. Patient has indicated that group therapy and medications are helping him now, since he was [facility] to [facility].  Thus, patient would benefit from a robust aftercare plan which would include outpatient treatment and mental health services to better self. .    7. MARCY:    MARCY:  Discussed the general effects of drugs and alcohol on health and well-being. Provider gave patient printed information about the effects of chemical use on their health and well being. Recommendations:  Upon Discharge: Abstinence, AA groups, Sponsor and other available community resources.  .     8. Records:   These were reviewed at time of assessment.   Information in this assessment was obtained from the medical record and  provided by patient who is a good historian. Patient will have open access to their mental health medical record.    9.   Interactive Complexity: NA    10. Safety Plan:   [facility] Safety Plan      Creation Date: 3/26/24 Last Update Date: 8/13/24      Step 1: Warning signs:    Warning Signs    \"When I'm reviewing medical bills.\"    Financial Stressors - \"When I feel like it is too expensive to live.\"    More physical pain.    Feelings of loneliness.    Boredom.    Drinking alcohol daily.      Step 2: Internal coping strategies - Things I can do to take my mind off my problems without contacting another " "person:    Strategies    Play records and listen to music.    Watch TV shows.      Step 3: People and social settings that provide distraction:    Name Contact Information    Becca (girlfriend) # in phone    Aldair (friend) # in phone       Places    Go to the rink      Step 4: People whom I can ask for help during a crisis:    Name Contact Information    Betty (ex-wife) [phone]    Aldair (friend) # in phone      Step 5: Professionals or agencies I can contact during a crisis:    Clinician/Agency Name Phone Emergency Contact    [facility] [phone] Betty [phone]      Local Emergency Department Emergency Department Address Emergency Department Phone    [facility] Crisis Response Unit [phone]    [place] [address], [place] [address] [phone]      Suicide Prevention Lifeline Phone: Call or Text 870  Crisis Text Line: Text HOME to 059277     Step 6: Making the environment safer (plan for lethal means safety):   Did not identify any lethal methods     Optional: What is most important to me and worth living for?:   \"My kids, my sister, Becca.\"     [place] Safety Plan. Isela Niño and August Parks. Used with permission of the authors.           Provider Name/ Credentials:  Shady Lobo PhD, LPCC, LADC.   [facility]    Mental Health & Addiction Clinical Services  [address] [place] [address], [address] [address]   [email or web address]  Office: [phone] Fax: [phone]     August 23, 2024          "

## 2024-08-24 ENCOUNTER — HOSPITAL ENCOUNTER (OUTPATIENT)
Dept: BEHAVIORAL HEALTH | Facility: CLINIC | Age: 44
Discharge: HOME | End: 2024-08-24
Attending: FAMILY MEDICINE
Payer: COMMERCIAL

## 2024-08-24 PROCEDURE — H2035 A/D TX PROGRAM, PER HOUR: HCPCS | Mod: HQ

## 2024-08-24 PROCEDURE — 1002N00001 HC LODGING PLUS FACILITY CHARGE ADULT

## 2024-08-24 NOTE — GROUP NOTE
Group Therapy Documentation    PATIENT'S NAME: Cody Bolton  MRN:   [id number]  :   1980  ACCT. NUMBER: [id number]  DATE OF SERVICE: 24  START TIME: 12:30 PM  END TIME:  2:30 PM  FACILITATOR(S): Marely Grier LADC; Loren Che LADC  TOPIC: BEH Group Therapy  Number of patients attending the group:  16  Group Length:  2 Hours    Dimensions addressed: 5 and 6    Summary of Group / Topics Discussed:    Relationship/socialization and Relapse prevention    Group Attendance:  Attended group session    Patient's response to the group topic/interactions:  cooperative with task    Patient appeared to be Attentive and Engaged.        Client specific details: Pt listened respectfully to a presentation on communication skills and styles and took part in the related activity.

## 2024-08-24 NOTE — GROUP NOTE
Group Therapy Documentation    PATIENT'S NAME: Cody Bolton  MRN:   [id number]  :   1980  ACCT. NUMBER: [id number]  DATE OF SERVICE: 24  START TIME:  9:00 AM  END TIME: 11:00 AM  FACILITATOR(S): Marley Grier LADC; Nicole Huddleston LADC  TOPIC: BEH Group Therapy  Number of patients attending the group:  18  Group Length:  2 Hours    Dimensions addressed: 5 and 6    Summary of Group / Topics Discussed:    Balanced lifestyle and Relapse prevention    Group Attendance:  Attended group session    Patient's response to the group topic/interactions:  cooperative with task    Patient appeared to be Attentive and Engaged.        Client specific details: Pt listened respectfully to a presentation on gratitude and recovery and took part in the related activity.

## 2024-08-25 ENCOUNTER — HOSPITAL ENCOUNTER (OUTPATIENT)
Dept: BEHAVIORAL HEALTH | Facility: CLINIC | Age: 44
Discharge: HOME | End: 2024-08-25
Attending: FAMILY MEDICINE
Payer: COMMERCIAL

## 2024-08-25 PROCEDURE — H2035 A/D TX PROGRAM, PER HOUR: HCPCS | Mod: HQ

## 2024-08-25 PROCEDURE — 1002N00001 HC LODGING PLUS FACILITY CHARGE ADULT

## 2024-08-25 NOTE — GROUP NOTE
Group Therapy Documentation    PATIENT'S NAME: Cody Bolton  MRN:   [id number]  :   1980  ACCT. NUMBER: [id number]  DATE OF SERVICE: 24  START TIME:  9:00 AM  END TIME: 11:00 AM  FACILITATOR(S): Marley Grier LADC; Bora Felder LADC; Suzanne Britt RN  TOPIC: BEH Group Therapy  Number of patients attending the group:  26  Group Length:  2 Hours    Dimensions addressed: 2 and 5    Summary of Group / Topics Discussed:    Balanced lifestyle and Relapse prevention    Group Attendance:  Attended group session    Patient's response to the group topic/interactions:  cooperative with task    Patient appeared to be Actively participating, Attentive, and Engaged.        Client specific details: Pt listened respectfully to a presentation on TB and STIs, and took part in the related activity.

## 2024-08-25 NOTE — GROUP NOTE
Psychoeducation Group Documentation    PATIENT'S NAME: Cody Bolton  MRN:   [id number]  :   1980  ACCT. NUMBER: [id number]  DATE OF SERVICE: 24  START TIME: 12:15 PM  END TIME:  1:25 PM  FACILITATOR(S): Bora Felder LADC; Marley Grier LADC  TOPIC: BEH Pyschoeducation  Number of patients attending the group:  26  Group Length:  1 Hours    Skills Group Therapy Type: Healthy behaviors development and Relationship skills development    Summary of Group / Topics Discussed:    Relationship/social skills        Group Attendance:  Attended group session    Patient's response to the group topic/interactions:  listened actively    Patient appeared to be Attentive and Engaged.         Client specific details:  Pt was attentive and engaged during lecture.

## 2024-08-26 ENCOUNTER — HOSPITAL ENCOUNTER (OUTPATIENT)
Dept: BEHAVIORAL HEALTH | Facility: CLINIC | Age: 44
Discharge: HOME | End: 2024-08-26
Attending: FAMILY MEDICINE
Payer: COMMERCIAL

## 2024-08-26 ENCOUNTER — ANTICOAGULATION THERAPY VISIT (OUTPATIENT)
Dept: ANTICOAGULATION | Facility: CLINIC | Age: 44
End: 2024-08-26

## 2024-08-26 ENCOUNTER — LAB (OUTPATIENT)
Dept: LAB | Facility: CLINIC | Age: 44
End: 2024-08-26
Payer: COMMERCIAL

## 2024-08-26 DIAGNOSIS — Z86.718 HISTORY OF DVT (DEEP VEIN THROMBOSIS): ICD-10-CM

## 2024-08-26 DIAGNOSIS — Z86.718 HISTORY OF DVT (DEEP VEIN THROMBOSIS): Primary | ICD-10-CM

## 2024-08-26 LAB — INR PPP: 1.67 (ref 0.85–1.15)

## 2024-08-26 PROCEDURE — 36415 COLL VENOUS BLD VENIPUNCTURE: CPT

## 2024-08-26 PROCEDURE — 1002N00001 HC LODGING PLUS FACILITY CHARGE ADULT

## 2024-08-26 PROCEDURE — 85610 PROTHROMBIN TIME: CPT

## 2024-08-26 PROCEDURE — H2035 A/D TX PROGRAM, PER HOUR: HCPCS | Mod: HQ

## 2024-08-26 NOTE — PROGRESS NOTES
ANTICOAGULATION MANAGEMENT     Cody WALLACE Young 44 year old male is on warfarin with subtherapeutic INR result. (Goal INR 2.0-3.0)    Recent labs: (last 7 days)     08/26/24  0730   INR 1.67*       ASSESSMENT     Source(s): Chart Review and Home Care/Facility Nurse     Warfarin doses taken: Warfarin taken as instructed  Diet: No new diet changes identified. No ETOH since 8/17/24.  Medication/supplement changes: None noted  New illness, injury, or hospitalization: Currently in treatment for alcohol dependence.    Signs or symptoms of bleeding or clotting: No  Previous result: Subtherapeutic, INR continues to drop despite recent dose increases without high amounts of daily ETOH  Additional findings: None       PLAN     Recommended plan for ongoing change(s) affecting INR     Dosing Instructions: Increase your warfarin dose (11% change) with next INR in 9 days       Summary  As of 8/26/2024      Full warfarin instructions:  5 mg every Fri; 7.5 mg all other days   Next INR check:  9/4/2024               Telephone call with [facility] facility nurse who verbalizes understanding and agrees to plan    Orders given to  Homecare nurse/facility to recheck    Education provided: Goal range and lab monitoring: goal range and significance of current result  Contact [phone] with any changes, questions or concerns.     Plan made per [facility] anticoagulation protocol    Renuka Rocha RN  Anticoagulation Clinic  8/26/2024    _______________________________________________________________________     Anticoagulation Episode Summary       Current INR goal:  2.0-3.0   TTR:  50.3% (6.1 mo)   Target end date:  Indefinite   Send INR reminders to:  KEO PABLO    Indications    Acute pulmonary embolism without acute cor pulmonale  unspecified pulmonary embolism type (H) (Resolved) [I26.99]  History of DVT (deep vein thrombosis) [Z86.718]             Comments:               Anticoagulation Care Providers       Provider Role Specialty  Phone number    Fredy Veras MD Referring Family Medicine [phone]    Aaseby-Aguilera, Ramona Ann, PA-C Referring Family Medicine [phone]

## 2024-08-26 NOTE — GROUP NOTE
"Group Therapy Documentation    PATIENT'S NAME: Cody Bolton  MRN:   [id number]  :   1980  ACCT. NUMBER: [id number]  DATE OF SERVICE: 24  START TIME: 12:30 PM  END TIME:  2:30 PM  FACILITATOR(S): Aric Chiu LADC  TOPIC: BEH Group Therapy  Number of patients attending the group:  10  Group Length:  2 Hours    Dimensions addressed: 3, 4, 5, and 6    Summary of Group / Topics Discussed:    Recovery Principles, Mindfulness/Relaxation, Coping/DBT informed care, Trauma informed care, Disease of addiction, Emotions/expression, and Relapse prevention      Group Attendance:  Attended group session    Patient's response to the group topic/interactions:  cooperative with task    Patient appeared to be Attentive and Engaged.        Client specific details:  Cody participated in afternoon group. He listened to and gave positive feedback on his peer's assignments. He presented his \"First Step\" assignment to the group.    "

## 2024-08-26 NOTE — GROUP NOTE
Group Therapy Documentation    PATIENT'S NAME: Cody Bolton  MRN:   [id number]  :   1980  [id number]  DATE OF SERVICE: 24  START TIME:  9:00 AM  END TIME: 11:00 AM  FACILITATOR(S): Shannan Martines LADC  TOPIC: BEH Group Therapy  Number of patients attending the group:  10  Group Length:  2 Hours    Dimensions addressed: 3, 4, 5, and 6    Summary of Group / Topics Discussed:    Sober coping skills, Disease of addiction, and Relapse prevention      Group Attendance:  Attended group session    Patient's response to the group topic/interactions:  cooperative with task    Patient appeared to be Actively participating, Attentive, and Engaged.        Client specific details: Cody was an active participant in morning group therapy session. He took part in a goal-setting exercise and offered feedback to a peer who shared their relapse prevention plan.

## 2024-08-27 ENCOUNTER — HOSPITAL ENCOUNTER (OUTPATIENT)
Dept: BEHAVIORAL HEALTH | Facility: CLINIC | Age: 44
Discharge: HOME | End: 2024-08-27
Attending: FAMILY MEDICINE
Payer: COMMERCIAL

## 2024-08-27 PROCEDURE — 1002N00001 HC LODGING PLUS FACILITY CHARGE ADULT

## 2024-08-27 PROCEDURE — H2035 A/D TX PROGRAM, PER HOUR: HCPCS | Mod: HQ

## 2024-08-27 NOTE — GROUP NOTE
"Group Therapy Documentation    PATIENT'S NAME: Cody Bolton  MRN:   [id number]  :   1980  ACCT. NUMBER: [id number]  DATE OF SERVICE: 24  START TIME:  9:00 AM  END TIME: 11:00 AM  FACILITATOR(S): Marley Grier LADC  TOPIC: BEH Group Therapy  Number of patients attending the group:  10  Group Length:  2 Hours    Dimensions addressed: 4 and 5    Summary of Group / Topics Discussed:    Disease of addiction and Emotions/expression    Group Attendance:  Attended group session    Patient's response to the group topic/interactions:  cooperative with task    Patient appeared to be Actively participating, Attentive, and Engaged.        Client specific details: Pt reported feeling anxious, and shared that if he could change one thing about himself, it would be to heal from his DISH dx. He offered feedback to his peer on his \"Relapse Triggers and Cues\" assignment.  "

## 2024-08-27 NOTE — GROUP NOTE
Group Therapy Documentation    PATIENT'S NAME: oCdy Bolton  MRN:   [id number]  :   1980  ACCT. NUMBER: [id number]  DATE OF SERVICE: 24  START TIME: 12:30 PM  END TIME:  2:30 PM  FACILITATOR(S): Rosalva Velez LADC  TOPIC: BEH Group Therapy  Number of patients attending the group:  11    Group Length:  2 Hours    Dimensions addressed: 3, 4, and 5    Summary of Group / Topics Discussed:    Recovery Principles, Sober coping skills, Relationship/socialization, and Relapse prevention      Group Attendance:  Attended group session    Patient's response to the group topic/interactions:  cooperative with task    Patient appeared to be Actively participating, Attentive, and Engaged.        Client specific details:  Patient attended group session and was attentive and participative.

## 2024-08-27 NOTE — GROUP NOTE
Psychoeducation Group Documentation    PATIENT'S NAME: Cody Bolton  MRN:   [id number]  :   1980  ACCT. NUMBER: [id number]  DATE OF SERVICE: 24  START TIME:  3:00 PM  END TIME:  4:00 PM  FACILITATOR(S): Aric Chiu LADC; Cody Choi LADC  TOPIC: BEH Pyschoeducation  Number of patients attending the group:  11  Group Length:  1 Hours    Skills Group Therapy Type: Recovery skills    Summary of Group / Topics Discussed:    Balanced lifestyle skills        Group Attendance:  Attended group session    Patient's response to the group topic/interactions:  cooperative with task    Patient appeared to be Attentive and Engaged.         Client specific details:  The patient participated in the afternoon skills group on Gender Differences and Substance Use Disorder.

## 2024-08-27 NOTE — PROGRESS NOTES
[facility] Weekly Treatment Plan Review    Treatment plan review for the following date span: 8/20/24- 8/27/24    ATTENDANCE  Patient did not have any absences during this time period (list absence dates and reason for absence).        Weekly Treatment Plan Review     Treatment Plan initiated on: 8/22/24.    Dimension1: Acute Intoxication/Withdrawal Potential -   Date of Last Use: Patient reported his date of last use as 8/15/24.   Any reports of withdrawal symptoms - None reported.    Dimension 2: Biomedical Conditions & Complications -   Medical Concerns: None reported.  Vitals:   BP Readings from Last 3 Encounters:   08/20/24 (!) 148/87   08/09/24 138/88   07/23/24 (!) 160/84     Pulse Readings from Last 3 Encounters:   08/20/24 60   08/09/24 64   07/23/24 80     Wt Readings from Last 3 Encounters:   08/19/24 (!) 171.5 kg (378 lb)   08/09/24 (!) 172.4 kg (380 lb)   08/07/24 (!) 167.8 kg (370 lb)     Temp Readings from Last 3 Encounters:   08/20/24 97.5  F (36.4  C) (Temporal)   08/09/24 98  F (36.7  C) (Oral)   07/16/24 97.1  F (36.2  C) (Temporal)      Current Medications & Medication Changes:  Current Outpatient Medications   Medication Sig Dispense Refill    acetaminophen (TYLENOL) 500 MG tablet Take 2 tablets (1,000 mg) by mouth 3 times daily      alum & mag hydroxide-simethicone (MAALOX) 200-200-20 MG/5ML SUSP suspension Take 30 mLs by mouth every 6 hours as needed for indigestion (Patient not taking: Reported on 8/23/2024)      benzocaine-menthol (CEPACOL) 15-3.6 MG lozenge Place 1 lozenge inside cheek every 2 hours as needed for sore throat (Patient not taking: Reported on 8/23/2024)      buPROPion (WELLBUTRIN XL) 150 MG 24 hr tablet Take 1 tablet (150 mg) by mouth every morning 90 tablet 3    carvedilol (COREG) 12.5 MG tablet Take 1 tablet (12.5 mg) by mouth 2 times daily (with meals) 60 tablet 11    cloNIDine (CATAPRES) 0.1 MG tablet Take 0.1 mg by mouth as needed (Sleep)       "guaiFENesin-dextromethorphan (ROBITUSSIN DM) 100-10 MG/5ML syrup Take 10 mLs by mouth every 4 hours as needed for cough (Patient not taking: Reported on 2024)      lisinopril (ZESTRIL) 20 MG tablet Take 1 tablet (20 mg) by mouth daily 90 tablet 2    loratadine (CLARITIN) 10 MG tablet Take 10 mg by mouth daily (Patient not taking: Reported on 2024)      melatonin 5 MG tablet Take 5 mg by mouth nightly as needed for sleep (Patient not taking: Reported on 2024)      metFORMIN (GLUCOPHAGE XR) 500 MG 24 hr tablet Take 2 tablets (1,000 mg) by mouth daily (with dinner) 180 tablet 3    Semaglutide, 1 MG/DOSE, (OZEMPIC) 4 MG/3ML pen Inject 1 mg subcutaneously every 7 days 3 mL 0    senna-docusate (SENOKOT-S/PERICOLACE) 8.6-50 MG tablet Take 2 tablets by mouth daily as needed for constipation (Patient not taking: Reported on 2024)      simvastatin (ZOCOR) 20 MG tablet Take 1 tablet (20 mg) by mouth at bedtime 90 tablet 3    traZODone (DESYREL) 100 MG tablet Take 200 mg by mouth at bedtime      warfarin ANTICOAGULANT (COUMADIN) 5 MG tablet Full warfarin instructions, dosed in evenin.5 mg every Tue, Fri; 5 mg all other days Next INR check: 2024       No current facility-administered medications for this encounter.     Facility-Administered Medications Ordered in Other Encounters   Medication Dose Route Frequency Provider Last Rate Last Admin    Self Administer Medications: Behavioral Services   Does not apply See Admin Instructions Corrine Alvarenga MD        Self Administer Medications: Behavioral Services   Does not apply See Admin Instructions Corrine Alvarenga MD         Taking meds as prescribed? Yes  Medication side effects or concerns: None reported.  Outside medical appointments this week (list provider and reason for visit): Patient reported \"psych eval, STD test, and INR test.\"    Narrative: Patient seems fully functioning and seeks medical attention as needed. They attended lecture given by " "a LP nurse related to STD's/pregnancy on 8/25/24.    Dimension 3: Emotional/Behavioral Conditions & Complications -   Mental health diagnosis: The patient reported a history of major depressive disorder.     Date of last SIB: N/A  Date of  last SI: N/A  Date of last HI: N/A  Behavioral Targets:  Stabilize and maintain mental health.  Current MH Assignments:  \"Understanding Depression and Addiction\" \"Loneliness\" \"Ending Isolation\"     PHQ2:       8/27/2024     1:00 PM 2/9/2024    10:04 AM   PHQ-2 ( 1999 [employer])   Q1: Little interest or pleasure in doing things 1 1   Q2: Feeling down, depressed or hopeless 2 1   PHQ-2 Score 3 2   Q1: Little interest or pleasure in doing things  Several days   Q2: Feeling down, depressed or hopeless  Several days   PHQ-2 Score  2      GAD2:       2/19/2024    12:52 PM 6/4/2024    11:39 AM 8/27/2024     1:00 PM   MERI-2   Feeling nervous, anxious, or on edge 0 1 0   Not being able to stop or control worrying 0 2 0   MERI-2 Total Score 0    0 3 0       Additional Narrative:  Current Mental Health symptoms include: Patient reported \"that I can identify, I don't know. Active interventions to stabilize mental health symptoms this week: group therapy, lectures, and skills group. The patient reported that their mood had not significantly changed this past week. The patient reported that their stress level went down this past week. The patient reported that the coping skills they used to manage their stress and difficult emotions were \"socializing, getting quite time in my room, scrape booking, and sketching.\"        Dimension 4: Treatment Acceptance / Resistance -   MARCY Diagnosis:  Alcohol Use Disorder Severe (10.20) (303.90)   Commitment to tx process/Stage of change- The patient appears to be in the contemplation stage of change.  MARCY Assignments: \"First Step\" \"Drug Use History\" \"25 Sober Coping Skills\"   Additional Narrative - The patient reported that their main motivation to stay sober and " "in treatment this past week was \"to stay alive.\"       Dimension 5: Relapse / Continued Problem Potential -   Relapses this week - None  Urges to use - Yes  UA results - The patients last UA results on 8/20/24 were all negative.  Identified triggers - Patient reported \"just wanting to get out, feelings of depression.\"  Coping skills identified - Patient reported \"scrap booking, and sketching.\"  Patient is able to utilize these skills when needed.  Relapse Prevention Assignments: \"Own Reasons To Quit\" \"Boundaries For Co dependents\" \"Identifying Relapse Triggers and Cues\" \"Relapse Awareness and Prevention Plan\"     Additional Narrative- Patient reports having cravings this past week. Patient reports craving as a 4/10, with ten being high. Patient participated in the spirituality group, facilitated by Rosa Reed and  counseling staff was present during group. They participated in weekend workshop on relapse prevention and completed all activities.     Dimension 6: Recovery Environment -   Family Involvement - Patient reported \"kids, sister, girl friend, cousin, and friends.\"  Community support group attendance - Patient has been attending nightly 12-step meetings/lectures while at .  Recreational activities - Patient reported \"crafts and going outside.\"  Peer Relationships - Patient reported \"pretty well\" when asked if they had gotten along with staff and peers.    Additional Narrative - Narrative - Patient has been spending time with same gender-peers and connecting with/building a sober support network. Patient reports their aftercare plan will include \"therapy and meetings.\" They participated in weekend workshop on relationships and completed all activities.     Progress made on transition planning goals: Patient has begun completing and presenting his treatment assignments.    Justification for Continued Treatment at this Level of Care:  Risk ratings indicate continued need for this level of care. Patient " "has been unable to maintain abstinence from alcohol while at the outpatient level of care, lacks long-term sober maintenance skills, lacks sober coping skills and has mental health concerns which are exacerbated by substance use.  Treatment coordination activities this week: The patient met with their counselors and nursing staff.  Need for peer recovery support referral? Yes: Patient reported \"Recovery Community.\"    Discharge Planning:  Target Discharge Date/Timeframe: TBD   Med Mgmt Provider/Appt: TBD   Ind therapy Provider/Appt: TBD   Other referrals: TBD        Dimension Scale Review     Prior ratings: Dim1 - 0 DIM2 - 2 DIM3 - 2 DIM4 - 3 DIM5 - 4 DIM6 -3     Current ratings: Dim1 - 0 DIM2 - 2 DIM3 - 2 DIM4 - 2 DIM5 - 4 DIM6 -3       If client is 18 or older, has vulnerable adult status change? No    Are Treatment Plan goals/objectives effective? Yes  *If no, list changes to treatment plan:    Are the current goals meeting client's needs? Yes  *If no, list the changes to treatment plan.      *Client agrees with any changes to the treatment plan: N/A  *Client received copy of changes: N/A  *Client is aware of right to access a treatment plan review: Yes    ISA Novoa   "

## 2024-08-28 ENCOUNTER — HOSPITAL ENCOUNTER (OUTPATIENT)
Dept: BEHAVIORAL HEALTH | Facility: CLINIC | Age: 44
Discharge: HOME | End: 2024-08-28
Attending: FAMILY MEDICINE
Payer: COMMERCIAL

## 2024-08-28 ENCOUNTER — TELEPHONE (OUTPATIENT)
Dept: BEHAVIORAL HEALTH | Facility: CLINIC | Age: 44
End: 2024-08-28
Payer: COMMERCIAL

## 2024-08-28 DIAGNOSIS — Z72.51 HISTORY OF UNPROTECTED SEX: Primary | ICD-10-CM

## 2024-08-28 PROCEDURE — H2035 A/D TX PROGRAM, PER HOUR: HCPCS

## 2024-08-28 PROCEDURE — H2035 A/D TX PROGRAM, PER HOUR: HCPCS | Mod: HQ | Performed by: COUNSELOR

## 2024-08-28 PROCEDURE — 1002N00001 HC LODGING PLUS FACILITY CHARGE ADULT

## 2024-08-28 PROCEDURE — H2035 A/D TX PROGRAM, PER HOUR: HCPCS | Mod: HQ

## 2024-08-28 NOTE — GROUP NOTE
Group Therapy Documentation    PATIENT'S NAME: Cody Bolton  MRN:   [id number]  :   1980  ACCT. NUMBER: [id number]  DATE OF SERVICE: 24  START TIME:  9:45 AM  END TIME: 11:30 AM  FACILITATOR(S): Shannan Martines LADC  TOPIC: BEH Group Therapy  Number of patients attending the group:  10  Group Length:  2 Hours    Dimensions addressed: 3, 4, 5, and 6    Summary of Group / Topics Discussed:    Sober coping skills, Disease of addiction, and Emotions/expression      Group Attendance:  Attended group session    Patient's response to the group topic/interactions:  cooperative with task    Patient appeared to be Actively participating, Attentive, and Engaged.        Client specific details: Cody was an active participant in group discussion on [employer] Prayer and a peer graduation ceremony.

## 2024-08-28 NOTE — GROUP NOTE
Psychoeducation Group Documentation    PATIENT'S NAME: Cody Bolton  MRN:   [id number]  :   1980  ACCT. NUMBER: [id number]  DATE OF SERVICE: 24  START TIME:  8:30 AM  END TIME:  9:30 AM  FACILITATOR(S): Tere Victor LADC; Aric Chiu LADC  TOPIC: BEH Pyschoeducation  Number of patients attending the group:  29  Group Length:  1 Hours    Skills Group Therapy Type: Recovery skills and Emotion regulation skills    Summary of Group / Topics Discussed:    Relationship/social skills and Coping/DBT skills  Facilitator presented psychoeducational group related to DBT skills including emotional regulation, mindfulness, and distress tolerance skills that can be used to better regulate emotional overwhelm.  Patients listened attentively to group.          Group Attendance:  Attended group session    Patient's response to the group topic/interactions:  cooperative with task and listened actively    Patient appeared to be Attentive and Engaged.         Client specific details:  Patient presented as attentive and engaged during this DBT lecture.

## 2024-08-28 NOTE — GROUP NOTE
"Group Therapy Documentation    PATIENT'S NAME: Cody Bolton  MRN:   [id number]  :   1980  ACCT. NUMBER: [id number]  DATE OF SERVICE: 24  START TIME: 12:30 PM  END TIME:  2:30 PM  FACILITATOR(S): Marley Grier LADC  TOPIC: BEH Group Therapy  Number of patients attending the group:  10  Group Length:  2 Hours    Dimensions addressed: 5 and 6    Summary of Group / Topics Discussed:    Disease of addiction, Emotions/expression, and Relapse prevention    Group Attendance:  Attended group session    Patient's response to the group topic/interactions:  cooperative with task    Patient appeared to be Actively participating, Attentive, and Engaged.        Client specific details: Pt offered supportive feedback to his peer on his \"First Step\" assignment, and took part in a group conversation about recovery environments and making decisions about changes.  "
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
## 2024-08-29 ENCOUNTER — HOSPITAL ENCOUNTER (OUTPATIENT)
Dept: BEHAVIORAL HEALTH | Facility: CLINIC | Age: 44
Discharge: HOME | End: 2024-08-29
Attending: FAMILY MEDICINE
Payer: COMMERCIAL

## 2024-08-29 PROCEDURE — H2035 A/D TX PROGRAM, PER HOUR: HCPCS | Mod: HQ

## 2024-08-29 PROCEDURE — 1002N00001 HC LODGING PLUS FACILITY CHARGE ADULT

## 2024-08-29 NOTE — GROUP NOTE
Group Therapy Documentation    PATIENT'S NAME: Cody Bolton  MRN:   [id number]  :   1980  ACCT. NUMBER: [id number]  DATE OF SERVICE: 24  START TIME:  3:00 PM  END TIME:  4:00 PM  FACILITATOR(S): Brian Escalona LADC  TOPIC: BEH Group Therapy  Number of patients attending the group:  12  Group Length:  1 Hours    Dimensions addressed: 1, 2, 3, 4, 5, and 6    Summary of Group / Topics Discussed:    Recovery Principles      Group Attendance:  Attended group session    Patient's response to the group topic/interactions:  cooperative with task    Patient appeared to be Actively participating.        Client specific details:  Cody participated and interacted appropriately with peers and staff in Skills group. No triggers to use noted or discussed.

## 2024-08-29 NOTE — GROUP NOTE
Group Therapy Documentation    PATIENT'S NAME: Cody Bolton  MRN:   [id number]  :   1980  ACCT. NUMBER: [id number]  DATE OF SERVICE: 24  START TIME: 12:30 PM  END TIME:  2:30 PM  FACILITATOR(S): Shannan Martines LADC; Rosa Stack  TOPIC: BEH Group Therapy  Number of patients attending the group:  10  Group Length:  2 Hours    Dimensions addressed: 3, 5, and 6    Summary of Group / Topics Discussed:    Spiritual Care      Group Attendance:  Attended group session    Patient's response to the group topic/interactions:  cooperative with task    Patient appeared to be Actively participating, Attentive, and Engaged.        Client specific details: Cody was an active participant in spiritual care group session led by Rosa Stack.

## 2024-08-30 ENCOUNTER — HOSPITAL ENCOUNTER (EMERGENCY)
Facility: CLINIC | Age: 44
Discharge: HOME | End: 2024-08-30
Attending: EMERGENCY MEDICINE | Admitting: EMERGENCY MEDICINE
Payer: COMMERCIAL

## 2024-08-30 ENCOUNTER — DOCUMENTATION ONLY (OUTPATIENT)
Dept: ANTICOAGULATION | Facility: CLINIC | Age: 44
End: 2024-08-30

## 2024-08-30 ENCOUNTER — HOSPITAL ENCOUNTER (OUTPATIENT)
Dept: BEHAVIORAL HEALTH | Facility: CLINIC | Age: 44
Discharge: HOME | End: 2024-08-30
Attending: FAMILY MEDICINE
Payer: COMMERCIAL

## 2024-08-30 VITALS
RESPIRATION RATE: 18 BRPM | DIASTOLIC BLOOD PRESSURE: 88 MMHG | SYSTOLIC BLOOD PRESSURE: 177 MMHG | TEMPERATURE: 98.1 F | OXYGEN SATURATION: 98 % | HEART RATE: 68 BPM

## 2024-08-30 DIAGNOSIS — J02.9 VIRAL PHARYNGITIS: ICD-10-CM

## 2024-08-30 LAB
FLUAV RNA SPEC QL NAA+PROBE: NEGATIVE
FLUBV RNA RESP QL NAA+PROBE: NEGATIVE
GROUP A STREP BY PCR: NOT DETECTED
RSV RNA SPEC NAA+PROBE: NEGATIVE
SARS-COV-2 RNA RESP QL NAA+PROBE: NEGATIVE

## 2024-08-30 PROCEDURE — 1002N00001 HC LODGING PLUS FACILITY CHARGE ADULT

## 2024-08-30 PROCEDURE — H2035 A/D TX PROGRAM, PER HOUR: HCPCS | Mod: HQ | Performed by: COUNSELOR

## 2024-08-30 PROCEDURE — 87651 STREP A DNA AMP PROBE: CPT | Performed by: EMERGENCY MEDICINE

## 2024-08-30 PROCEDURE — 99283 EMERGENCY DEPT VISIT LOW MDM: CPT | Performed by: EMERGENCY MEDICINE

## 2024-08-30 PROCEDURE — 87637 SARSCOV2&INF A&B&RSV AMP PRB: CPT | Performed by: EMERGENCY MEDICINE

## 2024-08-30 ASSESSMENT — ACTIVITIES OF DAILY LIVING (ADL): ADLS_ACUITY_SCORE: 38

## 2024-08-30 NOTE — DISCHARGE INSTRUCTIONS
Your strep COVID and influenza test today were all negative.  Is likely another virus that is causing her symptoms.  Follow-up with your primary care and return as needed.

## 2024-08-30 NOTE — ED PROVIDER NOTES
ED Provider Note  [facility]      History     Chief Complaint   Patient presents with    Pharyngitis     HPI  Cody Bolton is a 44 year old male who presents to us with a chief complaint of sore throat.  He is currently a resident of [facility] and he woke up with a sore throat today.  No fevers.  No nausea or vomiting.  No coughing.    Past Medical History  Past Medical History:   Diagnosis Date    Depressive disorder     High cholesterol     History of gastric bypass     History of pulmonary embolism     Hypertension     CARLIE (obstructive sleep apnea)     Personal history of DVT (deep vein thrombosis)     Type 2 diabetes mellitus (H)     Uncomplicated asthma      Past Surgical History:   Procedure Laterality Date    APPENDECTOMY  08/15/2017    Dr. Ferrer    GASTRIC BYPASS      IA LAP,APPENDECTOMY N/A 8/14/2017    Procedure: APPENDECTOMY, LAPAROSCOPIC;  Surgeon: Nba Ferrer MD;  Location: [facility] [place];  Service: General    REVISION AKANKSHA-EN-Y  2014    Dr. Ranjeet Espinal @[facility]     acetaminophen (TYLENOL) 500 MG tablet  alum & mag hydroxide-simethicone (MAALOX) 200-200-20 MG/5ML SUSP suspension  benzocaine-menthol (CEPACOL) 15-3.6 MG lozenge  buPROPion (WELLBUTRIN XL) 150 MG 24 hr tablet  carvedilol (COREG) 12.5 MG tablet  cloNIDine (CATAPRES) 0.1 MG tablet  guaiFENesin-dextromethorphan (ROBITUSSIN DM) 100-10 MG/5ML syrup  lisinopril (ZESTRIL) 20 MG tablet  loratadine (CLARITIN) 10 MG tablet  melatonin 5 MG tablet  metFORMIN (GLUCOPHAGE XR) 500 MG 24 hr tablet  Semaglutide, 1 MG/DOSE, (OZEMPIC) 4 MG/3ML pen  senna-docusate (SENOKOT-S/PERICOLACE) 8.6-50 MG tablet  simvastatin (ZOCOR) 20 MG tablet  traZODone (DESYREL) 100 MG tablet  warfarin ANTICOAGULANT (COUMADIN) 5 MG tablet      No Known Allergies  Family History  Family History   Problem Relation Age of Onset    Substance Abuse Brother     Anxiety Disorder Sister     Depression Sister      Social History   Social  History     Tobacco Use    Smoking status: Never     Passive exposure: Never    Smokeless tobacco: Never   Vaping Use    Vaping status: Never Used   Substance Use Topics    Alcohol use: Yes     Comment: Alcoholic Drinks/day: occasionally, trying to quit    Drug use: No      A medically appropriate review of systems was performed with pertinent positives and negatives noted in the HPI, and all other systems negative.    Physical Exam   BP: (!) 177/88  Pulse: 68  Temp: 98.1  F (36.7  C)  Resp: 18  SpO2: 98 %  Physical Exam  Vitals and nursing note reviewed.   Constitutional:       General: He is not in acute distress.     Appearance: He is well-developed.   HENT:      Head: Normocephalic.      Right Ear: External ear normal.      Left Ear: External ear normal.      Nose: Nose normal.      Mouth/Throat:      Pharynx: Posterior oropharyngeal erythema present.   Eyes:      Extraocular Movements: Extraocular movements intact.      Conjunctiva/sclera: Conjunctivae normal.   Pulmonary:      Effort: Pulmonary effort is normal. No respiratory distress.   Abdominal:      General: Abdomen is flat. There is no distension.   Musculoskeletal:         General: No deformity. Normal range of motion.      Cervical back: Normal range of motion and neck supple.   Skin:     General: Skin is warm and dry.   Neurological:      Mental Status: He is alert. Mental status is at baseline.      Comments: Oriented   Psychiatric:         Mood and Affect: Mood normal.         Behavior: Behavior normal.           ED Course, Procedures, & Data      Procedures          Results for orders placed or performed during the hospital encounter of 08/30/24   Symptomatic Influenza A/B, RSV, & SARS-CoV2 PCR (COVID-19) Nose     Status: Normal    Specimen: Nose; Swab   Result Value Ref Range    Influenza A PCR Negative Negative    Influenza B PCR Negative Negative    RSV PCR Negative Negative    SARS CoV2 PCR Negative Negative    Narrative    Testing was  performed using the Xpert Xpress CoV2/Flu/RSV Assay on the Cepheid GeneXpert Instrument. This test should be ordered for the detection of SARS-CoV-2, influenza, and RSV viruses in individuals who meet clinical and/or epidemiological criteria. Test performance is unknown in asymptomatic patients. This test is for in vitro diagnostic use under the FDA EUA for laboratories certified under CLIA to perform high or moderate complexity testing. This test has not been FDA cleared or approved. A negative result does not rule out the presence of PCR inhibitors in the specimen or target RNA in concentration below the limit of detection for the assay. If only one viral target is positive but coinfection with multiple targets is suspected, the sample should be re-tested with another FDA cleared, approved, or authorized test, if coinfection would change clinical management. This test was validated by the [facility]. These laboratories are certified under the Clinical Laboratory Improvement Amendments of 1988 (CLIA-88) as qualified to perform high complexity laboratory testing.   Group A Streptococcus PCR Throat Swab     Status: Normal    Specimen: Throat; Swab   Result Value Ref Range    Group A strep by PCR Not Detected Not Detected    Narrative    The Xpert Xpress Strep A test, performed on the [employer]  Instrument Systems, is a rapid, qualitative in vitro diagnostic test for the detection of Streptococcus pyogenes (Group A ß-hemolytic Streptococcus, Strep A) in throat swab specimens from patients with signs and symptoms of pharyngitis. The Xpert Xpress Strep A test can be used as an aid in the diagnosis of Group A Streptococcal pharyngitis. The assay is not intended to monitor treatment for Group A Streptococcus infections. The Xpert Xpress Strep A test utilizes an automated real-time polymerase chain reaction (PCR) to detect Streptococcus pyogenes DNA.     Medications - No data to display  Labs Ordered  and Resulted from Time of ED Arrival to Time of ED Departure   INFLUENZA A/B, RSV, & SARS-COV2 PCR - Normal       Result Value    Influenza A PCR Negative      Influenza B PCR Negative      RSV PCR Negative      SARS CoV2 PCR Negative     GROUP A STREPTOCOCCUS PCR THROAT SWAB - Normal    Group A strep by PCR Not Detected       No orders to display          Assessment & Plan    44-year-old male presents to us with chief complaint of sore throat.  Strep COVID and influenza were negative.  We will discharge and follow-up with primary care.    I have reviewed the nursing notes. I have reviewed the findings, diagnosis, plan and need for follow up with the patient.    New Prescriptions    No medications on file       Final diagnoses:   Viral pharyngitis       Michael Hawk  [facility] EMERGENCY DEPARTMENT  8/30/2024     Michael Hawk DO  08/30/24 0946

## 2024-08-30 NOTE — PROGRESS NOTES
ANTICOAGULATION  MANAGEMENT: Discharge Review    Cody Bolton chart reviewed for anticoagulation continuity of care    Emergency room visit on 8/30/24 for C/O sore throat, diagnosis: Pharyngitis.    Discharge disposition:  Discharged back to treatment center    Results:    Recent labs: (last 7 days)     08/26/24  0730   INR 1.67*     Anticoagulation inpatient management:     not applicable     Anticoagulation discharge instructions:     Warfarin dosing: home regimen continued   Bridging: No   INR goal change: No      Medication changes affecting anticoagulation: No    Additional factors affecting anticoagulation: No     PLAN     No adjustment to anticoagulation plan needed    Patient not contacted, next INR date of 9/4 appropriate    No adjustment to Anticoagulation Calendar was required    Janet Cowan RN

## 2024-08-30 NOTE — PROGRESS NOTES
Patient and this worker discussed patient recent-onset sore throat, beginning on 8/29. He additionally mentioned a history of STREP.   It was agreed that patient should go to the ED for testing of above, and further assessment.  Treatment team updated accordingly as to above.  Will continue to monitor as feasible.

## 2024-08-30 NOTE — GROUP NOTE
"Group Therapy Documentation    PATIENT'S NAME: Cody Bolton  MRN:   [id number]  :   1980  ACCT. NUMBER: [id number]  DATE OF SERVICE: 24  START TIME:  9:00 AM  END TIME: 11:00 AM  FACILITATOR(S): Marley Grier LADC  TOPIC: BEH Group Therapy  Number of patients attending the group:  10  Group Length:  2 Hours    Dimensions addressed: 4 and 6    Summary of Group / Topics Discussed:    Balanced lifestyle and Emotions/expression    Group Attendance:  Attended group session    Patient's response to the group topic/interactions:  cooperative with task    Patient appeared to be Actively participating, Attentive, and Engaged.        Client specific details: Pt reported feeling depressed and embarrassed, and shared that one of his core memories is going to his dad's secret fishing spot. He offered supportive feedback to his peer on his \"First Step\" assignment.  "

## 2024-08-30 NOTE — ADDENDUM NOTE
Encounter addended by: Marley Grier [facility] on: 8/30/2024 10:42 AM   Actions taken: Clinical Note Signed, Charge Capture section accepted

## 2024-08-30 NOTE — ED TRIAGE NOTES
[facility], wants strep/covid/flu swab.  Patient Started with sore throat couple days ago; noticed it has gotten worse over the night. No body aches, fever/chills.

## 2024-08-30 NOTE — GROUP NOTE
Group Therapy Documentation    PATIENT'S NAME: Cody Bolton  MRN:   [id number]  :   1980  ACCT. NUMBER: [id number]  DATE OF SERVICE: 24  START TIME:  9:00 AM  END TIME: 11:00 AM  FACILITATOR(S): Shannan Martines LADC  TOPIC: BEH Group Therapy  Number of patients attending the group:  9  Group Length:  2 Hours    Dimensions addressed: 3, 4, 5, and 6    Summary of Group / Topics Discussed:    Recovery Principles, Disease of addiction, and Emotions/expression      Group Attendance:  Excused from group session      Client specific details:  Cody was excused from group, as he was in the emergency department.     57

## 2024-08-30 NOTE — GROUP NOTE
Psychoeducation Group Documentation    PATIENT'S NAME: Cody Bolton  MRN:   [id number]  :   1980  ACCT. NUMBER: [id number]  DATE OF SERVICE: 24  START TIME: 12:30 PM  END TIME:  2:30 PM  FACILITATOR(S): Tere Victor LADC; Loren hCe LADC  TOPIC: BEH Pyschoeducation  Number of patients attending the group:  28  Group Length:  2 Hours    Skills Group Therapy Type: Recovery skills, Healthy behaviors development, and Relationship skills development    Summary of Group / Topics Discussed:    Relationship/social skills, Balanced lifestyle skills, Symptom management skills, and Relapse prevention skills    Patients viewed an addiction/mental health based film. This film addressed: addiction as a disease, relationships and addiction, engagement in AA, and evaluating priorities when getting sober.   Patients engaged in a discussion about the film, shared personal experiences, and how the film helped them process their own life events.         Group Attendance:  Attended group session    Patient's response to the group topic/interactions:  cooperative with task and listened actively    Patient appeared to be Attentive and Engaged.         Client specific details:  Patient listened attentively and participated in afternoon discussion of insights into parallels in their own experience living with addiction.

## 2024-08-31 ENCOUNTER — HOSPITAL ENCOUNTER (OUTPATIENT)
Dept: BEHAVIORAL HEALTH | Facility: CLINIC | Age: 44
Discharge: HOME | End: 2024-08-31
Attending: FAMILY MEDICINE
Payer: COMMERCIAL

## 2024-08-31 PROCEDURE — 1002N00001 HC LODGING PLUS FACILITY CHARGE ADULT

## 2024-08-31 PROCEDURE — H2035 A/D TX PROGRAM, PER HOUR: HCPCS | Mod: HQ

## 2024-08-31 NOTE — GROUP NOTE
Group Therapy Documentation    PATIENT'S NAME: Cody Bolton  MRN:   [id number]  :   1980  ACCT. NUMBER: [id number]  DATE OF SERVICE: 24  START TIME: 12:30 PM  END TIME:  2:30 PM  FACILITATOR(S): Brian Escalona LADC  TOPIC: BEH Group Therapy  Number of patients attending the group:  9  Group Length:  2 Hours    Dimensions addressed: 1, 2, 3, 4, 5, and 6    Summary of Group / Topics Discussed:    Relationship/socialization      Group Attendance:  Attended group session    Patient's response to the group topic/interactions:  cooperative with task    Patient appeared to be Actively participating.        Client specific details:  Cody participated and interacted appropriately with peers and staff in PM lecture. No triggers to use noted or discussed.

## 2024-08-31 NOTE — GROUP NOTE
Group Therapy Documentation    PATIENT'S NAME: Cody Bolton  MRN:   [id number]  :   1980  ACCT. NUMBER: [id number]  DATE OF SERVICE: 24  START TIME:  9:00 AM  END TIME: 11:00 AM  FACILITATOR(S): Aric Chiu LADC  TOPIC: BEH Group Therapy  Number of patients attending the group:  9  Group Length:  2 Hours    Dimensions addressed: 5 and 6    Summary of Group / Topics Discussed:    Recovery Principles, Relationship/socialization, Relapse prevention, and Self-care activities      Group Attendance:  Attended group session    Patient's response to the group topic/interactions:  cooperative with task    Patient appeared to be Attentive and Engaged.        Client specific details:  The patient participated in the morning lecture on Recovery Resources and how to find them.

## 2024-09-01 ENCOUNTER — HOSPITAL ENCOUNTER (OUTPATIENT)
Dept: BEHAVIORAL HEALTH | Facility: CLINIC | Age: 44
Discharge: HOME | End: 2024-09-01
Attending: FAMILY MEDICINE
Payer: COMMERCIAL

## 2024-09-01 PROCEDURE — H2035 A/D TX PROGRAM, PER HOUR: HCPCS | Mod: HQ

## 2024-09-01 PROCEDURE — 1002N00001 HC LODGING PLUS FACILITY CHARGE ADULT

## 2024-09-01 NOTE — GROUP NOTE
Psychoeducation Group Documentation    PATIENT'S NAME: Cody Bolton  MRN:   [id number]  :   1980  ACCT. NUMBER: [id number]  DATE OF SERVICE: 24  START TIME: 12:30 PM  END TIME:  1:30 PM  FACILITATOR(S): Bora Felder LADC  TOPIC: BEH Pyschoeducation  Number of patients attending the group:  27  Group Length:  1 Hours    Skills Group Therapy Type: Emotion regulation skills    Summary of Group / Topics Discussed:    Mindfulness/Relaxation skills        Group Attendance:  Attended group session    Patient's response to the group topic/interactions:  listened actively    Patient appeared to be Attentive and Engaged.         Client specific details:  Pt was attentive and engaged.

## 2024-09-01 NOTE — GROUP NOTE
Psychoeducation Group Documentation    PATIENT'S NAME: Cody Bolton  MRN:   [id number]  :   1980  ACCT. NUMBER: [id number]  DATE OF SERVICE: 24  START TIME:  8:45 AM  END TIME: 10:45 AM  FACILITATOR(S): Aric Chiu LADC; Blair Tian RN  TOPIC: BEH Pyschoeducation  Number of patients attending the group:  9  Group Length:  2 Hours    Skills Group Therapy Type: Healthy behaviors development and Medication education    Summary of Group / Topics Discussed:    Symptom management skills and Medication management skills        Group Attendance:  Attended group session    Patient's response to the group topic/interactions:  cooperative with task    Patient appeared to be Attentive and Engaged.         Client specific details:  The patient participated in the morning nursing lecture on HIV/AIDS.

## 2024-09-02 ENCOUNTER — HOSPITAL ENCOUNTER (OUTPATIENT)
Dept: BEHAVIORAL HEALTH | Facility: CLINIC | Age: 44
Discharge: HOME | End: 2024-09-02
Attending: FAMILY MEDICINE
Payer: COMMERCIAL

## 2024-09-02 PROCEDURE — 1002N00001 HC LODGING PLUS FACILITY CHARGE ADULT

## 2024-09-02 PROCEDURE — H2035 A/D TX PROGRAM, PER HOUR: HCPCS | Mod: HQ

## 2024-09-02 NOTE — GROUP NOTE
"Group Therapy Documentation    PATIENT'S NAME: Cody Bolton  MRN:   [id number]  :   1980  ACCT. NUMBER: [id number]  DATE OF SERVICE: 24  START TIME:  9:00 AM  END TIME: 11:00 AM  FACILITATOR(S): Cody Choi LADC  TOPIC: BEH Group Therapy  Number of patients attending the group:  26  Group Length:  2 Hours    Dimensions addressed: 3, 4, 5, and 6    Summary of Group / Topics Discussed:    Sober coping skills, Relationship/socialization, and Balanced lifestyle    Group participants viewed the documentary \"Happy\", followed by a related discussion.       Group Attendance:  Attended group session    Patient's response to the group topic/interactions:  cooperative with task    Patient appeared to be Actively participating.        Client specific details:  Patient actively engaged in group discussion.    "

## 2024-09-02 NOTE — GROUP NOTE
Group Therapy Documentation    PATIENT'S NAME: Cody Bolton  MRN:   [id number]  :   1980  ACCT. NUMBER: [id number]  DATE OF SERVICE: 24  START TIME: 12:30 PM  END TIME:  2:30 PM  FACILITATOR(S): Marley Grier LADC; Cody Choi LADC  TOPIC: BEH Group Therapy  Number of patients attending the group:  26  Group Length:  2 Hours    Dimensions addressed: 4, 5, and 6    Summary of Group / Topics Discussed:    Sober coping skills    Group members participated in a recovery related scavenger hunt activity, followed by a related discussion.      Group Attendance:  Attended group session    Patient's response to the group topic/interactions:  cooperative with task    Patient appeared to be Actively participating.        Client specific details:  Patient actively engaged in group discussion.

## 2024-09-03 ENCOUNTER — LAB (OUTPATIENT)
Dept: LAB | Facility: CLINIC | Age: 44
End: 2024-09-03
Payer: COMMERCIAL

## 2024-09-03 ENCOUNTER — HOSPITAL ENCOUNTER (OUTPATIENT)
Dept: BEHAVIORAL HEALTH | Facility: CLINIC | Age: 44
Discharge: HOME | End: 2024-09-03
Attending: FAMILY MEDICINE
Payer: COMMERCIAL

## 2024-09-03 DIAGNOSIS — Z72.51 HISTORY OF UNPROTECTED SEX: ICD-10-CM

## 2024-09-03 LAB — T VAGINALIS DNA SPEC QL NAA+PROBE: NOT DETECTED

## 2024-09-03 PROCEDURE — H2035 A/D TX PROGRAM, PER HOUR: HCPCS | Mod: HQ

## 2024-09-03 PROCEDURE — 1002N00001 HC LODGING PLUS FACILITY CHARGE ADULT

## 2024-09-03 PROCEDURE — H2035 A/D TX PROGRAM, PER HOUR: HCPCS | Mod: HQ | Performed by: COUNSELOR

## 2024-09-03 PROCEDURE — 87661 TRICHOMONAS VAGINALIS AMPLIF: CPT

## 2024-09-03 NOTE — PROGRESS NOTES
[facility] Weekly Treatment Plan Review    Treatment plan review for the following date span: 8/28/24- 9/3/24    ATTENDANCE  Patient did not have any absences during this time period (list absence dates and reason for absence).        Weekly Treatment Plan Review     Treatment Plan initiated on: 8/22/24.    Dimension1: Acute Intoxication/Withdrawal Potential -   Date of Last Use: Patient reported his date of last use as 8/15/24.   Any reports of withdrawal symptoms - None reported.    Dimension 2: Biomedical Conditions & Complications -   Medical Concerns: None reported.  Vitals:   BP Readings from Last 3 Encounters:   08/30/24 (!) 177/88   08/20/24 (!) 148/87   08/09/24 138/88     Pulse Readings from Last 3 Encounters:   08/30/24 68   08/20/24 60   08/09/24 64     Wt Readings from Last 3 Encounters:   08/19/24 (!) 171.5 kg (378 lb)   08/09/24 (!) 172.4 kg (380 lb)   08/07/24 (!) 167.8 kg (370 lb)     Temp Readings from Last 3 Encounters:   08/30/24 98.1  F (36.7  C) (Oral)   08/20/24 97.5  F (36.4  C) (Temporal)   08/09/24 98  F (36.7  C) (Oral)      Current Medications & Medication Changes:  Current Outpatient Medications   Medication Sig Dispense Refill    acetaminophen (TYLENOL) 500 MG tablet Take 2 tablets (1,000 mg) by mouth 3 times daily      alum & mag hydroxide-simethicone (MAALOX) 200-200-20 MG/5ML SUSP suspension Take 30 mLs by mouth every 6 hours as needed for indigestion (Patient not taking: Reported on 8/23/2024)      benzocaine-menthol (CEPACOL) 15-3.6 MG lozenge Place 1 lozenge inside cheek every 2 hours as needed for sore throat (Patient not taking: Reported on 8/23/2024)      buPROPion (WELLBUTRIN XL) 150 MG 24 hr tablet Take 1 tablet (150 mg) by mouth every morning 90 tablet 3    carvedilol (COREG) 12.5 MG tablet Take 1 tablet (12.5 mg) by mouth 2 times daily (with meals) 60 tablet 11    cloNIDine (CATAPRES) 0.1 MG tablet Take 0.1 mg by mouth as needed (Sleep)       "guaiFENesin-dextromethorphan (ROBITUSSIN DM) 100-10 MG/5ML syrup Take 10 mLs by mouth every 4 hours as needed for cough (Patient not taking: Reported on 2024)      lisinopril (ZESTRIL) 20 MG tablet Take 1 tablet (20 mg) by mouth daily 90 tablet 2    loratadine (CLARITIN) 10 MG tablet Take 10 mg by mouth daily (Patient not taking: Reported on 2024)      melatonin 5 MG tablet Take 5 mg by mouth nightly as needed for sleep (Patient not taking: Reported on 2024)      metFORMIN (GLUCOPHAGE XR) 500 MG 24 hr tablet Take 2 tablets (1,000 mg) by mouth daily (with dinner) 180 tablet 3    Semaglutide, 1 MG/DOSE, (OZEMPIC) 4 MG/3ML pen Inject 1 mg subcutaneously every 7 days 3 mL 0    senna-docusate (SENOKOT-S/PERICOLACE) 8.6-50 MG tablet Take 2 tablets by mouth daily as needed for constipation (Patient not taking: Reported on 2024)      simvastatin (ZOCOR) 20 MG tablet Take 1 tablet (20 mg) by mouth at bedtime 90 tablet 3    traZODone (DESYREL) 100 MG tablet Take 200 mg by mouth at bedtime      warfarin ANTICOAGULANT (COUMADIN) 5 MG tablet Full warfarin instructions, dosed in evenin.5 mg every Tue, Fri; 5 mg all other days Next INR check: 2024       No current facility-administered medications for this encounter.     Facility-Administered Medications Ordered in Other Encounters   Medication Dose Route Frequency Provider Last Rate Last Admin    Self Administer Medications: Behavioral Services   Does not apply See Admin Instructions Corrine Alvarenga MD        Self Administer Medications: Behavioral Services   Does not apply See Admin Instructions Corrine Alvarenga MD         Taking meds as prescribed? Yes  Medication side effects or concerns: Patient reported \"E.D.\"  Outside medical appointments this week (list provider and reason for visit): Patient reported \"individual therapy and INR test.\"    Narrative: Patient seems fully functioning and seeks medical attention as needed. They attended lecture " "given by a LP nurse related to HIV/AIDS on 9/1/24.    Dimension 3: Emotional/Behavioral Conditions & Complications -   Mental health diagnosis: The patient reported a history of major depressive disorder.     Date of last SIB: N/A  Date of  last SI: N/A  Date of last HI: N/A  Behavioral Targets:  Stabilize and maintain mental health.  Current MH Assignments:  \"Understanding Depression and Addiction\" \"Loneliness\" \"Ending Isolation\"     PHQ2:       9/3/2024     9:00 AM 8/27/2024     1:00 PM 2/9/2024    10:04 AM   PHQ-2 ( 1999 [employer])   Q1: Little interest or pleasure in doing things 2 1 1   Q2: Feeling down, depressed or hopeless 3 2 1   PHQ-2 Score 5 3 2   Q1: Little interest or pleasure in doing things   Several days   Q2: Feeling down, depressed or hopeless   Several days   PHQ-2 Score   2      GAD2:       2/19/2024    12:52 PM 6/4/2024    11:39 AM 8/27/2024     1:00 PM 9/3/2024     9:00 AM   MERI-2   Feeling nervous, anxious, or on edge 0 1 0 1   Not being able to stop or control worrying 0 2 0 1   MERI-2 Total Score 0    0 3 0 2       Additional Narrative:  Current Mental Health symptoms include: Patient reported \"I don't know.\" Active interventions to stabilize mental health symptoms this week: group therapy, lectures, and skills group. The patient reported that their mood had significantly changed this past week and that \"I'm feeling very depressed, so down.\" The patient reported \"no change\" in their stress level went down this past week. The patient reported that the coping skills they used to manage their stress and difficult emotions were \"engage and don't isolate.\"        Dimension 4: Treatment Acceptance / Resistance -   MARCY Diagnosis:  Alcohol Use Disorder Severe (10.20) (303.90)   Commitment to tx process/Stage of change- The patient appears to be in the contemplation stage of change.  MARCY Assignments: \"First Step\" \"Drug Use History\" \"25 Sober Coping Skills\"   Additional Narrative - The patient reported " "that their main motivation to stay sober and in treatment this past week was his \"health.\"       Dimension 5: Relapse / Continued Problem Potential -   Relapses this week - None  Urges to use - Yes  UA results - The patients last UA results on 8/29/24 were all negative.  Identified triggers - Patient reported \"UA's, nothing makes me want to go home and drink more than this.\"  Coping skills identified - Patient reported \"be social.\"  Patient is able to utilize these skills when needed.  Relapse Prevention Assignments: \"Own Reasons To Quit\" \"Boundaries For Co dependents\" \"Identifying Relapse Triggers and Cues\" \"Relapse Awareness and Prevention Plan\"     Additional Narrative- Patient reports having cravings this past week. Patient reports craving as a 8/10, with ten being high. Patient participated in the spirituality group, facilitated by Rosa Reed and  counseling staff was present during group. They participated in weekend workshop on relapse prevention and completed all activities.     Dimension 6: Recovery Environment -   Family Involvement - Patient reported \"kids, sister, girl friend, cousin, and ex-wife.\"  Community support group attendance - Patient has been attending nightly 12-step meetings/lectures while at .  Recreational activities - Patient reported \"walks.\"  Peer Relationships - Patient reported \"yes\" when asked if they had gotten along with staff and peers.    Additional Narrative - Narrative - Patient has been spending time with same gender-peers and connecting with/building a sober support network. Patient reports their aftercare plan will include \"home.\" They participated in weekend workshop on relationships and completed all activities.     Progress made on transition planning goals: Patient has begun completing and presenting his treatment assignments.    Justification for Continued Treatment at this Level of Care:  Risk ratings indicate continued need for this level of care. Patient has " "been unable to maintain abstinence from alcohol while at the outpatient level of care, lacks long-term sober maintenance skills, lacks sober coping skills and has mental health concerns which are exacerbated by substance use.  Treatment coordination activities this week: The patient met with their counselors and nursing staff.  Need for peer recovery support referral? Yes: Patient reported \"[facility].\"    Discharge Planning:  Target Discharge Date/Timeframe: TBD   Med Mgmt Provider/Appt: TBD   Ind therapy Provider/Appt: TBD   Other referrals: TBD        Dimension Scale Review     Prior ratings: Dim1 - 0 DIM2 - 2 DIM3 - 2 DIM4 - 3 DIM5 - 4 DIM6 -3     Current ratings: Dim1 - 0 DIM2 - 2 DIM3 - 2 DIM4 - 2 DIM5 - 4 DIM6 -3       If client is 18 or older, has vulnerable adult status change? No    Are Treatment Plan goals/objectives effective? Yes  *If no, list changes to treatment plan:    Are the current goals meeting client's needs? Yes  *If no, list the changes to treatment plan.      *Client agrees with any changes to the treatment plan: N/A  *Client received copy of changes: N/A  *Client is aware of right to access a treatment plan review: Yes    ISA Novoa   "

## 2024-09-03 NOTE — GROUP NOTE
Group Therapy Documentation    PATIENT'S NAME: Cody Bolton  MRN:   [id number]  [facility]   1980  [id number]  DATE OF SERVICE: 24  START TIME:  9:00 AM  END TIME: 11:00 AM  FACILITATOR(S): Shannan Martines LADC  TOPIC: BEH Group Therapy  Number of patients attending the group:  8  Group Length:  2 Hours    Dimensions addressed: 3, 4, 5, and 6    Summary of Group / Topics Discussed:    Recovery Principles, Disease of addiction, Emotions/expression, and Relapse prevention      Group Attendance:  Attended group session    Patient's response to the group topic/interactions:  cooperative with task    Patient appeared to be Actively participating, Attentive, and Engaged.        Client specific details: Cody was an active participant in morning group therapy session. He engaged in a peer graduation ceremony and goal-setting exercise. He also took part in processing a peer's decision to leave treatment early.

## 2024-09-03 NOTE — GROUP NOTE
"Group Therapy Documentation    PATIENT'S NAME: Cody Bolton  MRN:   [id number]  :   1980  AC[facility]. NUMBER: [id number]  DATE OF SERVICE: 24  START TIME: 12:30 PM  END TIME:  2:30 PM  FACILITATOR(S): Rosalva Velez LADC  TOPIC: BEH Group Therapy  Number of patients attending the group:  9    Group Length:  2 Hours    Dimensions addressed: 3, 4, and 5    Summary of Group / Topics Discussed:    Recovery Principles, Sober coping skills, Relationship/socialization, and Relapse prevention      Group Attendance:  Attended group session    Patient's response to the group topic/interactions:  cooperative with task    Patient appeared to be Actively participating, Attentive, and Engaged.        Client specific details:  Patient attended group session and was attentive and participative. Patient completed his \"My Own .Reasons to Quit\" assignment and presented in group. Patient cited his personal reasons for wanting to get and remain sober. Patient received positive feedback from his peers.    "

## 2024-09-03 NOTE — GROUP NOTE
Psychoeducation Group Documentation    PATIENT'S NAME: Cody Bolton  MRN:   [id number]  :   1980  AC[facility]. NUMBER: [id number]  DATE OF SERVICE: 24  START TIME:  3:00 PM  END TIME:  4:00 PM  FACILITATOR(S): Tere Victor LADC  TOPIC: BEH Pyschoeducation  Number of patients attending the group:26  Group Length:  1 Hours    Skills Group Therapy Type: Recovery skills and Daily living/independence skills    Summary of Group / Topics Discussed:    Symptom management skills  Dr. Zamarripa provided psychoeducation related to research on substance use trends.  Participants listened attentively and asked relevant questions to increase insight.        Group Attendance:  Attended group session    Patient's response to the group topic/interactions:  cooperative with task and listened actively    Patient appeared to be Actively participating, Attentive, and Engaged.         Client specific details:  Patient actively participated in today's skills lecture.  Patient verbalized questions and reflected insights with peers.

## 2024-09-04 ENCOUNTER — HOSPITAL ENCOUNTER (OUTPATIENT)
Dept: BEHAVIORAL HEALTH | Facility: CLINIC | Age: 44
Discharge: HOME | End: 2024-09-04
Attending: FAMILY MEDICINE
Payer: COMMERCIAL

## 2024-09-04 ENCOUNTER — TELEPHONE (OUTPATIENT)
Dept: ANTICOAGULATION | Facility: CLINIC | Age: 44
End: 2024-09-04
Payer: COMMERCIAL

## 2024-09-04 ENCOUNTER — ANTICOAGULATION THERAPY VISIT (OUTPATIENT)
Dept: ANTICOAGULATION | Facility: CLINIC | Age: 44
End: 2024-09-04
Payer: COMMERCIAL

## 2024-09-04 DIAGNOSIS — Z86.718 HISTORY OF DVT (DEEP VEIN THROMBOSIS): ICD-10-CM

## 2024-09-04 DIAGNOSIS — Z86.718 HISTORY OF DVT (DEEP VEIN THROMBOSIS): Primary | ICD-10-CM

## 2024-09-04 LAB — INR PPP: 2.43 (ref 0.85–1.15)

## 2024-09-04 PROCEDURE — H2035 A/D TX PROGRAM, PER HOUR: HCPCS | Mod: HQ

## 2024-09-04 PROCEDURE — 85610 PROTHROMBIN TIME: CPT | Performed by: FAMILY MEDICINE

## 2024-09-04 PROCEDURE — H2035 A/D TX PROGRAM, PER HOUR: HCPCS | Mod: HQ | Performed by: COUNSELOR

## 2024-09-04 PROCEDURE — 36415 COLL VENOUS BLD VENIPUNCTURE: CPT | Performed by: FAMILY MEDICINE

## 2024-09-04 PROCEDURE — H2035 A/D TX PROGRAM, PER HOUR: HCPCS

## 2024-09-04 PROCEDURE — 1002N00001 HC LODGING PLUS FACILITY CHARGE ADULT

## 2024-09-04 RX ORDER — WARFARIN SODIUM 5 MG/1
5 TABLET ORAL EVERY EVENING
COMMUNITY

## 2024-09-04 NOTE — GROUP NOTE
Group Therapy Documentation    PATIENT'S NAME: Cody Bolton  MRN:   [id number]  :   1980  [facility]. NUMBER: [id number]  DATE OF SERVICE: 24  START TIME: 9:45 AM  END TIME: 11:15 AM  FACILITATOR(S): Rosalva Velez LADC  TOPIC: BEH Group Therapy  Number of patients attending the group:  9    Group Length:  1.5 Hours    Dimensions addressed: 3, 4, and 5    Summary of Group / Topics Discussed:    Recovery Principles, Sober coping skills, Relationship/socialization, and Relapse prevention      Group Attendance:  Attended group session    Patient's response to the group topic/interactions:  cooperative with task    Patient appeared to be Actively participating, Attentive, and Engaged.        Client specific details:  Patient attended group session and was attentive and participative.

## 2024-09-04 NOTE — GROUP NOTE
Group Therapy Documentation    PATIENT'S NAME: Cody Bolton  MRN:   [id number]  [facility]   1980  [id number]  DATE OF SERVICE: 24  START TIME:  8:30 AM  END TIME:  9:30 AM  FACILITATOR(S): Javier Aquino MD; [facility]  TOPIC: BEH Group Therapy  Number of patients attending the group:  26  Group Length:  2 Hours    Dimensions addressed: 1, 2, 3, 4, 5, and 6    Summary of Group / Topics Discussed:    Disease of addiction and Relapse prevention    Group lecture was presented by Medical Staff regarding Addiction Science and Neuroanatomy. Participants provided feedback throughout group session.      Group Attendance:  Attended group session    Patient's response to the group topic/interactions:  cooperative with task    Patient appeared to be Actively participating.        Client specific details:  Patient actively engaged in group discussion.

## 2024-09-04 NOTE — PROGRESS NOTES
INDIVIDUAL SESSION SUMMARY    D) Met with client on 9/4/24 from 12:30-1:30pm. Client is in the [facility]. Client discussed a difficult phone call with his ex wife earlier this week. Client shared that he may need to find a new place to live sooner than he had expected, and that living in his ex wife's rental unit has been contributing to hs drinking. Client spoke of his struggles with loneliness and rejection, low self-esteem and the suffering he has with his worsening back pain. 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 Client shared mixed feelings about his current relationship.     I)  Provided client with verbal interventions including validation, compassion, and support, Provided positive reinforcement for progress towards goals, gains in insight, and application of skills, and Provided psychoeducation on: coping skills for anxiety and depression .    A)   Client appears to have trouble identifying emotions and lacks healthy coping skills for managing stress, Client appears to lack a sober support network, and Client appears to lack a daily routine, meaningful activities, and a sense of purpose.    P) Next session is scheduled for 9/12/24.     Izabella Godfrey, JIM  9/4/2024

## 2024-09-04 NOTE — TELEPHONE ENCOUNTER
Kandice from [facility] called for INR. INR was done as a venous at 3:15 pm. Informed will send to [facility] for dosing for this evening.    She will need a call back with dosing.    Valerie Rose RN    [facility]

## 2024-09-04 NOTE — GROUP NOTE
Group Therapy Documentation    PATIENT'S NAME: Cody Bolton  MRN:   [id number]  :   1980  [id number]  DATE OF SERVICE: 24  START TIME: 12:30 PM  END TIME:  2:30 PM  FACILITATOR(S): Tere Victor LADC  TOPIC: BEH Group Therapy  Number of patients attending the group: 9  Group Length:  2 Hours    Dimensions addressed: 3, 4, 5, and 6    Summary of Group / Topics Discussed:    Recovery Principles, Spiritual Care, Relationship/socialization, Cognitive behavioral therapy skills, Coping/DBT informed care, Trauma informed care, Disease of addiction, Emotions/expression, and Relapse prevention    Writer facilitated psychotherapy group targeting the above topics.  Patients identified feelings, presented assignments highlighting goals and how addiction impacts those goals.  Patients also discussed triggers and warning signs for relapse.  Patients reported increased insight into coping strategies to manage substance use disorder following group.      Group Attendance:  Attended group session    Patient's response to the group topic/interactions:  cooperative with task and listened actively    Patient appeared to be Actively participating, Attentive, and Engaged.        Client specific details:  Patient actively participated in group.  Patient was absent for 1 hour of group.  Patient provided appropriate feedback to peers and presented personalized insights relating to topics.  Patient reported increased insight into coping skills to manage substance use disorder following group.

## 2024-09-04 NOTE — TELEPHONE ENCOUNTER
INR has resulted as of 4:22PM, see [facility] encounter for full dosing    Izabella Martines, PharmD BCACP  Anticoagulation Clinical Pharmacist

## 2024-09-04 NOTE — PROGRESS NOTES
ANTICOAGULATION MANAGEMENT     Cody WALLACE Young 44 year old male is on warfarin with therapeutic INR result. (Goal INR 2.0-3.0)    Recent labs: (last 7 days)     09/04/24  1517   INR 2.43*       ASSESSMENT     Source(s): Chart Review and Patient/Caregiver Call     Warfarin doses taken: Warfarin taken as instructed + 11% increased advised on 8/26/24  Diet: No new diet changes identified + continues sobriety   Medication/supplement changes: None noted  New illness, injury, or hospitalization: Currently in treatment for alcohol dependence at [facility] - discharge is scheduled for 9/17/24  Signs or symptoms of bleeding or clotting: No  Previous result: Subtherapeutic  Additional findings: None       PLAN     Recommended plan for ongoing change(s) affecting INR     Dosing Instructions: Continue your current warfarin dose with next INR in 12 days       Summary  As of 9/4/2024      Full warfarin instructions:  5 mg every Fri; 7.5 mg all other days   Next INR check:  9/16/2024               Telephone call with Blair at [facility] who verbalizes understanding and agrees to plan    Orders given to  Homecare nurse/facility to recheck    Education provided: Please call back if any changes to your diet, medications or how you've been taking warfarin  Symptom monitoring: monitoring for bleeding signs and symptoms and monitoring for clotting signs and symptoms    Plan made with [facility] Pharmacist Izabella Deras, RN  Anticoagulation Clinic  9/4/2024    _______________________________________________________________________     Anticoagulation Episode Summary       Current INR goal:  2.0-3.0   TTR:  50.6% (6.4 mo)   Target end date:  Indefinite   Send INR reminders to:  GUERO SAMY    Indications    Acute pulmonary embolism without acute cor pulmonale  unspecified pulmonary embolism type (H) (Resolved) [I26.99]  History of DVT (deep vein thrombosis) [Z86.708]             Comments:                Anticoagulation Care Providers       Provider Role Specialty Phone number    Fredy Veras MD Referring Family Medicine [phone]    Aaseby-Aguilera, Ramona Ann, PA-C Referring Family Medicine [phone]

## 2024-09-05 ENCOUNTER — HOSPITAL ENCOUNTER (OUTPATIENT)
Dept: BEHAVIORAL HEALTH | Facility: CLINIC | Age: 44
Discharge: HOME | End: 2024-09-05
Attending: FAMILY MEDICINE
Payer: COMMERCIAL

## 2024-09-05 PROCEDURE — 1002N00001 HC LODGING PLUS FACILITY CHARGE ADULT

## 2024-09-05 PROCEDURE — H2035 A/D TX PROGRAM, PER HOUR: HCPCS | Mod: HQ

## 2024-09-05 NOTE — GROUP NOTE
Group Therapy Documentation    PATIENT'S NAME: Cody Bolton  MRN:   [id number]  :   1980  AC[facility]. NUMBER: [id number]  DATE OF SERVICE: 24  START TIME:  3:00 PM  END TIME:  4:00 PM  FACILITATOR(S): Loren Che LADC  TOPIC: BEH Group Therapy  Number of patients attending the group:  26  Group Length:  1 Hours    Dimensions addressed: 3    Summary of Group / Topics Discussed:    Emotions/expression and Relationships and Boundaries      Group Attendance:  Attended group session    Patient's response to the group topic/interactions:  cooperative with task    Patient appeared to be Actively participating.        Client specific details:  Cody attended afternoon skills group and participated in processing discussion. Patient remained engaged and participated throughout group session.       ISA Ramos

## 2024-09-05 NOTE — GROUP NOTE
"Group Therapy Documentation    PATIENT'S NAME: Cody Bolton  MRN:   [id number]  :   1980  AC[facility]. NUMBER: [id number]  DATE OF SERVICE: 24  START TIME:  9:00 AM  END TIME: 11:00 AM  FACILITATOR(S): Marley Grier LADC  TOPIC: BEH Group Therapy  Number of patients attending the group:  8  Group Length:  2 Hours    Dimensions addressed: 4 and 5    Summary of Group / Topics Discussed:    Sober coping skills and Relapse prevention    Group Attendance:  Attended group session    Patient's response to the group topic/interactions:  cooperative with task    Patient appeared to be Actively participating, Attentive, and Engaged.        Client specific details: Pt reported feeling lonely and shared that he most wants to work on his tendency to deflect. He offered supportive feedback to his peer on his \"Drug Use History\" assignment.  "

## 2024-09-05 NOTE — GROUP NOTE
Group Therapy Documentation    PATIENT'S NAME: Cody Bolton  MRN:   [id number]  :   1980  AC[facility]. NUMBER: [id number]  DATE OF SERVICE: 24  START TIME: 12:30 PM  END TIME:  2:30 PM  FACILITATOR(S): Shannan Martines LADC  TOPIC: BEH Group Therapy  Number of patients attending the group:  8  Group Length:  2 Hours    Dimensions addressed: 3, 4, 5, and 6    Summary of Group / Topics Discussed:    Spiritual Care      Group Attendance:  Attended group session    Patient's response to the group topic/interactions:  cooperative with task    Patient appeared to be Actively participating, Attentive, and Engaged.        Client specific details: Cody was an active participant in spiritual care group therapy session led by Rosa Stack.

## 2024-09-06 ENCOUNTER — HOSPITAL ENCOUNTER (OUTPATIENT)
Dept: BEHAVIORAL HEALTH | Facility: CLINIC | Age: 44
Discharge: HOME | End: 2024-09-06
Attending: FAMILY MEDICINE
Payer: COMMERCIAL

## 2024-09-06 ENCOUNTER — OFFICE VISIT (OUTPATIENT)
Dept: ADDICTION MEDICINE | Facility: CLINIC | Age: 44
End: 2024-09-06
Attending: FAMILY MEDICINE
Payer: COMMERCIAL

## 2024-09-06 VITALS — DIASTOLIC BLOOD PRESSURE: 67 MMHG | SYSTOLIC BLOOD PRESSURE: 100 MMHG | HEART RATE: 56 BPM

## 2024-09-06 DIAGNOSIS — F10.20 ALCOHOL USE DISORDER, SEVERE, DEPENDENCE (H): Primary | ICD-10-CM

## 2024-09-06 PROCEDURE — H2035 A/D TX PROGRAM, PER HOUR: HCPCS | Mod: HQ

## 2024-09-06 PROCEDURE — 99204 OFFICE O/P NEW MOD 45 MIN: CPT | Performed by: NURSE PRACTITIONER

## 2024-09-06 PROCEDURE — 1002N00001 HC LODGING PLUS FACILITY CHARGE ADULT

## 2024-09-06 RX ORDER — ACAMPROSATE CALCIUM 333 MG/1
666 TABLET, DELAYED RELEASE ORAL 3 TIMES DAILY
Qty: 180 TABLET | Refills: 0 | Status: SHIPPED | OUTPATIENT
Start: 2024-09-06 | End: 2024-09-26

## 2024-09-06 NOTE — PATIENT INSTRUCTIONS
Patient Education   Addiction Medicine  What to Expect  Here's what to expect from our Addiction Medicine program.  About Addiction Medicine  Addiction Medicine clinics help you with substance use problems. You set your own goals. We try to help you reach your goals. Your care plan can include:  Medicine  Creating a recovery plan  Helping you find local resources  Helping with treatment options  Clinic phone number and addresses  Clinic Phone: [phone]  Mental Health and Addiction Clinic  [facility]  [address], [address]  [place] [address]  [facility]  [address] [facility] [address] [facility], [address]  [place] [address]  Walk-in services  We offer walk-in care for patients at the Recovery Clinic. This is only for patients with Opioid Use Disorder (OUD). Anyone with OUD is welcome. Our providers will refer you to the Recovery Clinic if you're struggling to keep up with your medicines or appointments.  Recovery Clinic ([facility])  [address], [address]  [place] [address]  Phone: [phone]  The Recovery Clinic is open for walk-ins Monday to Friday 9 a.m. to 11:30 a.m. and 12:30 p.m. to 3 p.m.  How it works  Come to your visits every time. The treatment works better when you do.   You can have as many visits as you need. When you're better, we'll refer you back to being cared for by your family doctor.   If you need it, we'll send you to doctors, psychiatrists, therapists, and other providers. We focus on treating addiction. We don't treat other problems, like managing other medicines or non-addiction issues.  About visits  Urine drug testing  We'll often test your pee (urine) for drugs. This is the only way we can know for sure whether or not you're using drugs. It helps us treat you without judgement.   Suboxone (buprenorphine)  If you're taking buprenorphine, you'll have a lot of visits at first. If your problem is getting worse, or you're  "using substances, we may schedule you for extra visits.   Cancelling visits  If you can't come to your visit, please call us right away at [phone]. If you don't cancel at least 24 hours (1 full day) before your visit, that's \"late cancellation.\"  Being late to visits  If you come late, you may not be seen. This will count as a \"no-show.\"  Please call the clinic if you're running late. This will help us plan, but it doesn't mean you'll be seen.   Being late is:  More than 15 minutes late for a return visit.  More than 30 minutes late for your first visit.  If you cancel late or don't show up 2 times within 6 months, we may transfer you to another clinic.   Getting help between visits  If you need help between visits, you can call us Monday to Friday from 8 a.m. to 4:30 p.m. at [phone]. You can also send us a message on [employer].  Medicine refills  If you miss or cancel a visit, you can still ask for a refill. But we can only refill your medicines if you've made a new appointment.  Please call your pharmacy for medicine refills. If you have a question about your refill, call us at [phone].  It takes up to 2 business days to refill your drugs. Let us know 2 to 3 days before you run out. Don't call more than 1 week before you run out. That's too early.   Please make sure we have your right phone number.  If we have a problem with your refill, we'll call you. If we call you, please call us back right away. If you don't, you may not get your medicines quickly.   Call your pharmacy to find out if your medicines are ready.   Keep your medicines in a safe place. Keep them away from pets and children. If your medicines are lost or stolen, we usually don't replace them. We recommend you file a police report if your medicines are stolen. Your insurance may not pay for early refills, even if you have a prescription.  Forms  Please give us at least 3 business days to fill out any forms. Bring the forms to your " visits if you can. We may refer you to other members of your care team to complete the forms.   Emergency care   Call 911 or go to the nearest emergency room if your life or someone else's life is in danger.  Call 988 anytime for the Suicide and Crisis Lifeline.  If you need care when we're closed, call your family doctor to see if they can help. You can also go to urgent care or an emergency room. [facility] emergency rooms may be able to give you buprenorphine or other medicine refills.  Thank you for choosing us for your care.  For informational purposes only. Not to replace the advice of your health care provider. Copyright   2023 [facility]. All rights reserved. [employer] [id number] - REV 05/23.  Alcohol Use Disorder  The diagnosis of alcohol use disorder is made using the DSM-V criteria for Substance Use Disorders - https://bit.ly/3DBJRId (link to Psychology Today report on these criteria). The FDA has 3 medications approved for alcohol use disorder - naltrexone (Vivitrol injection), acamprosate (Campral), and disulfiram (Antabuse).    For your reading purposes - a neurotransmitter is a chemical released in the brain that provides a signal directing the activity of nerves. The result is a predictable response based on the type of neurotransmitter. For example, serotonin, dopamine and norepinephrine (closely related to adrenaline) are all neurotransmitters.    Naltrexone (Vivitrol injection)  Naltrexone blocks the opioid receptors in the brain. It is basically naloxone (Narcan) in pill form. Why does an opioid blocker help with alcohol use? Our opioid receptors are part of the reward signaling during alcohol consumption, closely associated with dopamine release. Dopamine is a neurotransmitter synonymous with  reward . ALL substances that can cause addiction release dopamine at high levels. So, when naltrexone blocks opioid receptors, we feel less reward when consuming alcohol. This also leads  "to less craving BEFORE drinking alcohol, since craving is also associated with these neurotransmitters. The result is A) Fewer days drinking (higher chance of abstinence) and B) Less alcohol consumed if/when drinking occurs. This is prescribed 1 pill, 1 time per day. Please tell your provider if you have any liver damage, as this can change our management plan with naltrexone.    Naltrexone blocks the opioid receptor, so pain medications or illicit opioids will NOT provide the expected response. If you have an injury or a surgery, naltrexone will block the pain medications, so you need to talk to your provider to account for this.    Acamprosate (Campral)  Acamprosate provides the same results as naltrexone - A) Fewer days drinking (higher chance of abstinence) and B) Less alcohol consumed if/when drinking occurs. These two medications generally provide a similar chance of success. However, it acts differently in the brain/body. Alcohol directly impacts the activity of a pair of neurotransmitters - MAURICIO and glutamate. Symptoms of alcohol withdrawal, and post-acute withdrawal, are partly driven by changes in these chemicals. Acamprosate  stabilizes  the way MAURICIO and glutamate influence each other, largely by reducing the influence of glutamate (the \"excitatory\" factor, balanced by the \"inhibitory\" MAURICIO). The result is usually subtle but ultimately patients can experience some anxiety relief, less restlessness, and more  leveled out . There is often craving relief as well. This is prescribed 2 pills, 3 times per day. Please tell your doctor if you have any kidney damage, as this can change our management plan with naltrexone.       "

## 2024-09-06 NOTE — Clinical Note
Starting Campral, follow up in 2 weeks, then with PCP. Thanks! LUIS ALBERTO Maradiaga CNP on 9/6/2024 at 1:54 PM

## 2024-09-06 NOTE — PROGRESS NOTES
"    SUBJECTIVE                                                      Cody Bolton is a 44 year old male who presents for initial visit for addiction consultation and management referred by [facility] due to concerns for Alcohol Use Disorder (AUD).     Visit performed In Person, face-to-face    HPI: Cody Bolton is a 44 year old male with history of alcohol use disorder severe, major depressive disorder mild recurrent, DVT/PE on anticoagulation, obstructive sleep apnea, hypertension, A-fib, type 2 diabetes, Celestine-en-Y gastric bypass surgery, hyperlipidemia who presents for further evaluation of possible substance use disorder and management options.    Reports alcohol use starting at age 13 yo, very rare as a teenager. Did not drink again until 17 yo. Was able to buy alcohol at the liquor store, took months to finish 1 bottle. In college he drank rarely, about once per year. Throughout his lifetime he would go years without alcohol use. No alcohol use in physically hazardous situations. Drank 1-2 drinks in a year. 18 months ago alcohol use escalated. January of 2023 he and his wife started sleeping in separate rooms, started to drink alone, hiding alcohol. Caring for mother who was on hospice. Day before his mother passed cousin committed suicide. Mother passed away July of 2023. Continued to have marital struggles. Took time to talk about it.  in November of 2023. Was  for 23 years. Feels alcohol use really escalated in April 2023, \"regardless of size of bottle I bought it would be gone in the morning, I only remember drinking part of it\" Prior to admission was drinking up to 1.75 L of vodka per day, was able to taper use prior to admission. \"Drinking to fast forward time\" Drinking was not social. His therapist encouraged his to complete Comprehensive Assessment. Has had medical complications from alcohol use. Recent hospital admission from 8/19 - 8/20 for alcohol withdrawal management. Feels he " alcohol drinks to treat back pain. No h/o seizures or DT's. Tried Naltrexone, no reduction in cravings/use. Interested in alternative medication for AUD.     A problematic pattern of alcohol use leading to clinically significant impairment or distress, as manifested by at least 2 of the following, occurring within a 12-month period:  Alcohol is often taken in larger amounts or over a longer period than was intended. - YES   There is a persistent desire or unsuccessful efforts to cut down or control alcohol use. - YES   A great deal of time is spent in activities necessary to obtain alcohol, use alcohol, or recover from its effects. - YES   Craving, or a strong desire or urge to use alcohol. - YES   Recurrent alcohol use resulting in a failure to fulfill major role obligations at work, school, or home.- YES   Continued alcohol use despite having persistent or recurrent social or interpersonal problems caused or exacerbated by the effects of alcohol. - YES  Important social, occupational, or recreational activities are given up or reduced because of alcohol use. - YES   Recurrent alcohol use in situations in which it is physically hazardous. - NO  Alcohol use is continued despite knowledge of having a persistent or recurrent physical or psychological problem that is likely to have been caused or exacerbated by alcohol. - YES   Tolerance - YES   Withdrawal - YES    303.9 (F10.2) Severe: Presence of 6 or more symptoms.     Substance Use History:   ALCOHOL - last use 08/15/2024, h/o drinking up to 1.75 L per day.   CANNABIS - Denies  PRESCRIPTION STIMULANTS (includes Ritalin, Adderall, Vyvanse) - denies  COCAINE/CRACK - Denies  METH/AMPHETAMINES (includes ecstasy, MDMA/renee) - Denies  OPIATES - Patient has been prescribed hydrocodone and oxycodone   BENZODIAZEPINES (includes Ativan, Klonopin, Xanax) - Denies  KRATOM (mild opioid and stimulant effects) - denies  KETAMINE - denies  HALLUCINOGENS (includes DXM) -  denies  BEHAVIORAL (Gambling, Eating d/o, Compulsivity) - Denies  NICOTINE  Cigarettes: Denies  Chew/snus: Arturo  Vaping: Denies  Past NRT/medication use: Denies      Previous withdrawal treatment episodes (e.g. detox): Yes  Previous MARCY treatment programs: This is patients first M[facility] treatment program at [facility]   Hospitalizations or overdose: accidental overdose with hydrocodone in May 2024, took single dose of OxyContin and needed 2 doses of narcan and taken to ED    Medical complications from substance use: atrial fibrillation with RVR cardioverted to NSR, possible d/t alcohol withdrawal   IV Drug use?: Denies  Previous Medication for Addiction Tx: Naltrexone   Longest period of full abstinence: 2 weeks   Activities that have previously supported abstinence: Physical avoidance of liquor store and distraction   Current Recovery Activities: LP       Infectious disease screening  Hep C:    Hepatitis C Antibody   Date Value Ref Range Status   08/21/2024 Nonreactive Nonreactive Final     Comment:     A nonreactive screening test result does not exclude the possibility of exposure to or infection with HCV. Nonreactive screening test results in individuals with prior exposure to HCV may be due to antibody levels below the limit of detection of this assay or lack of reactivity to the HCV antigens used in this assay. Patients with recent HCV infections (<3 months from time of exposure) may have false-negative HCV antibody results due to the time needed for seroconversion (average of 8 to 9 weeks).       HIV:    HIV Antigen Antibody Combo   Date Value Ref Range Status   08/21/2024 Nonreactive Nonreactive Final     Comment:     Negative HIV-1 p24 antigen and HIV-1/2 antibody screening test results usually indicate the absence of HIV-1 and HIV-2 infection. However, such negative results do not rule-out acute HIV infection.  If acute HIV-1 or HIV-2 infection is suspected, detection of HIV-1 or HIV-2 RNA  is recommended.         Psychiatric History (per patient report and problem list review)  Past diagnoses -Depression   Current or past psychiatrist: yes, In he past , now following with PCP   Current or past therapist:  yes, Lucy Rouse [facility]  Hospitalizations/TMS/ECT - No  Suicide Attempts - none  Medication trials - No      PHQ-9 scores:      8/13/2024    12:44 PM 8/20/2024     3:00 PM 8/23/2024     8:00 AM   PHQ   PHQ-9 Total Score 16 13 13   Q9: Thoughts of better off dead/self-harm past 2 weeks Several days Not at all Not at all   F/U: Thoughts of suicide or self-harm Yes     F/U: Self harm-plan No     F/U: Self-harm action No     F/U: Safety concerns No       MERI-7 scores:      8/9/2024     6:40 AM 8/20/2024     3:00 PM 8/23/2024     8:00 AM   MERI-7 SCORE   Total Score 7 (mild anxiety)     Total Score 7 9 9         SOCIAL HISTORY:  Housing status: alone  Employment status: Employed full time - currently on short term disability. 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   Relationship status:  - Has SO   Children: 2 sons, 20 yo and 22 yo. Youngest [place] [facility] [place]  lives [place], wife in one unit, oldest son in the other, he has his own unit.   Legal concerns related to use: none  Contact information up to date? yes    3rd Party Involvement not today (please obtain ERICKA if pt would like to include)      Medical History:    Patient Active Problem List    Diagnosis Date Noted    Chemical dependency (H) 08/21/2024     Priority: Medium    Personal history of DVT (deep vein thrombosis) 08/20/2024     Priority: Medium    Uncomplicated alcohol dependence (H) 08/20/2024     Priority: Medium    Paroxysmal atrial fibrillation (H) 08/09/2024     Priority: Medium    Alcohol use disorder, severe, dependence (H) 08/08/2024     Priority: Medium    History of pulmonary embolism 06/11/2024     Priority: Medium    Morbid obesity (H) 06/11/2024     Priority: Medium    Major depressive disorder, recurrent  episode, moderate (H) 02/19/2024     Priority: Medium    Elevated BP without diagnosis of hypertension 02/09/2024     Priority: Medium    Alcohol abuse, episodic drinking behavior 02/09/2024     Priority: Medium    Hyperlipidemia 04/07/2023     Priority: Medium    History of gastric bypass 08/05/2022     Priority: Medium    History of DVT (deep vein thrombosis) 08/30/2021     Priority: Medium     Formatting of this note might be different from the original.   1st episode. presumed triggered by travel. no family or personal h/o VTE.      Type 2 diabetes mellitus without complication, without long-term current use of insulin (H) 11/20/2020     Priority: Medium    Obstructive sleep apnea syndrome 08/15/2017     Priority: Medium       Past Medical History:   Diagnosis Date    Depressive disorder     High cholesterol     History of gastric bypass     History of pulmonary embolism     Hypertension     CARLIE (obstructive sleep apnea)     Personal history of DVT (deep vein thrombosis)     Type 2 diabetes mellitus (H)     Uncomplicated asthma        Past Surgical History:   Procedure Laterality Date    APPENDECTOMY  08/15/2017    Dr. Ferrer    GASTRIC BYPASS      [facility],APPENDECTOMY N/A 8/14/2017    Procedure: APPENDECTOMY, LAPAROSCOPIC;  Surgeon: Nba Ferrer MD;  [facility]  Service: General    REVISION AKANKSHA-EN-Y  2014    Dr. Ranjeet Espinal [facility]         Family History   Problem Relation Age of Onset    Substance Abuse Brother     Anxiety Disorder Sister     Depression Sister        Current Outpatient Medications   Medication Sig Dispense Refill    acamprosate (CAMPRAL) 333 MG EC tablet Take 2 tablets (666 mg) by mouth 3 times daily. 180 tablet 0    acetaminophen (TYLENOL) 500 MG tablet Take 2 tablets (1,000 mg) by mouth 3 times daily      buPROPion (WELLBUTRIN XL) 150 MG 24 hr tablet Take 1 tablet (150 mg) by mouth every morning 90 tablet 3    carvedilol (COREG) 12.5 MG tablet Take 1 tablet  (12.5 mg) by mouth 2 times daily (with meals) 60 tablet 11    cloNIDine (CATAPRES) 0.1 MG tablet Take 0.1 mg by mouth as needed (Sleep)      lisinopril (ZESTRIL) 20 MG tablet Take 1 tablet (20 mg) by mouth daily 90 tablet 2    metFORMIN (GLUCOPHAGE XR) 500 MG 24 hr tablet Take 2 tablets (1,000 mg) by mouth daily (with dinner) 180 tablet 3    Semaglutide, 1 MG/DOSE, (OZEMPIC) 4 MG/3ML pen Inject 1 mg subcutaneously every 7 days 3 mL 0    simvastatin (ZOCOR) 20 MG tablet Take 1 tablet (20 mg) by mouth at bedtime 90 tablet 3    traZODone (DESYREL) 100 MG tablet Take 200 mg by mouth at bedtime      warfarin ANTICOAGULANT (COUMADIN) 5 MG tablet Take 5 mg by mouth every evening. Starting 9/4/24, take 1.5 tablets (7.5 mg)  every evening, except for Friday 9/6/24 and Friday 9/13/24, when patient is to take 1 tablet (5 mg) in the evening.  Next INR check scheduled for 9/16/24.      alum & mag hydroxide-simethicone (MAALOX) 200-200-20 MG/5ML SUSP suspension Take 30 mLs by mouth every 6 hours as needed for indigestion (Patient not taking: Reported on 8/23/2024)      benzocaine-menthol (CEPACOL) 15-3.6 MG lozenge Place 1 lozenge inside cheek every 2 hours as needed for sore throat (Patient not taking: Reported on 8/23/2024)      guaiFENesin-dextromethorphan (ROBITUSSIN DM) 100-10 MG/5ML syrup Take 10 mLs by mouth every 4 hours as needed for cough (Patient not taking: Reported on 8/23/2024)      loratadine (CLARITIN) 10 MG tablet Take 10 mg by mouth daily (Patient not taking: Reported on 8/23/2024)      melatonin 5 MG tablet Take 5 mg by mouth nightly as needed for sleep (Patient not taking: Reported on 8/23/2024)      senna-docusate (SENOKOT-S/PERICOLACE) 8.6-50 MG tablet Take 2 tablets by mouth daily as needed for constipation (Patient not taking: Reported on 8/23/2024)           No Known Allergies    OBJECTIVE                                                      EXAM    /67 (BP Location: Left arm, Patient Position:  "Sitting, Cuff Size: Adult Large)   Pulse 56     Physical Exam  Constitutional:       Appearance: Normal appearance. He is obese.   HENT:      Head: Normocephalic and atraumatic.      Nose: No rhinorrhea.   Eyes:      General: No scleral icterus.     Extraocular Movements: Extraocular movements intact.      Conjunctiva/sclera: Conjunctivae normal.   Cardiovascular:      Rate and Rhythm: Normal rate.   Pulmonary:      Effort: Pulmonary effort is normal.   Skin:     Coloration: Skin is not jaundiced.   Neurological:      Mental Status: He is alert and oriented to person, place, and time.      Motor: No tremor.   Psychiatric:         Attention and Perception: Attention and perception normal.         Mood and Affect: Mood and affect normal. Mood is not anxious or depressed. Affect is not flat.         Speech: Speech normal.         Behavior: Behavior is cooperative.         Thought Content: Thought content does not include suicidal ideation. Thought content does not include suicidal plan.         Cognition and Memory: Cognition and memory normal.         Judgment: Judgment normal. Judgment is not impulsive or inappropriate.             LAB  Recent UDS Labs (may not contain today's lab data)  No results found for: \"BUP\", \"BZO\", \"BAR\", \"JAYLENE\", \"MAMP\", \"AMP\", \"MDMA\", \"MTD\", \"MYC177\", \"OXY\", \"PCP\", \"THC\", \"TEMP\", \"SGPOCT\"    Hepatic Function  AST   Date Value Ref Range Status   08/19/2024 30 0 - 45 U/L Final     ALT   Date Value Ref Range Status   08/19/2024 34 0 - 70 U/L Final     Bilirubin Total   Date Value Ref Range Status   08/19/2024 0.4 <=1.2 mg/dL Final     Albumin   Date Value Ref Range Status   08/19/2024 4.2 3.5 - 5.2 g/dL Final     INR   Date Value Ref Range Status   09/04/2024 2.43 (H) 0.85 - 1.15 Final       CBC  WBC Count   Date Value Ref Range Status   08/19/2024 6.4 4.0 - 11.0 10e3/uL Final     RBC Count   Date Value Ref Range Status   08/19/2024 4.48 4.40 - 5.90 10e6/uL Final     Hemoglobin   Date Value Ref " Range Status   08/19/2024 12.3 (L) 13.3 - 17.7 g/dL Final     Hematocrit   Date Value Ref Range Status   08/19/2024 38.6 (L) 40.0 - 53.0 % Final     MCV   Date Value Ref Range Status   08/19/2024 86 78 - 100 fL Final     MCH   Date Value Ref Range Status   08/19/2024 27.5 26.5 - 33.0 pg Final     MCHC   Date Value Ref Range Status   08/19/2024 31.9 31.5 - 36.5 g/dL Final     Platelet Count   Date Value Ref Range Status   08/19/2024 252 150 - 450 10e3/uL Final     RDW   Date Value Ref Range Status   08/19/2024 14.9 10.0 - 15.0 % Final       Today's lab data  No results found for any visits on 09/06/24.    [facility] Board of Pharmacy Data Base Reviewed; Consistent with patient reports and Epic records.         A/P                                                      ASSESSMENT/PLAN:  Cody was seen today for intake.    Diagnoses and all orders for this visit:    Alcohol use disorder, severe, dependence (H)  -     Adult Mental Health [facility] Referral  -     acamprosate (CAMPRAL) 333 MG EC tablet; Take 2 tablets (666 mg) by mouth 3 times daily.      -Patient presents for initial visit referred by [facility] for history of alcohol use disorder. PMH significant for MDD, DVT/PE on anticoagulation, CARLIE, HTN, A-fib, type 2 diabetes, Celestine-en-Y gastric bypass surgery.  S/p alcohol withdrawal management admission from 8/19 - 8/20/24.  Reports approximately 18-month history of escalating alcohol use.  Prior to admission drinking up to 1.75 L/day.  Confirms date of last use was August 14, 2024.  Previous trial of naltrexone not effective.  Reviewed criteria met for alcohol use disorder severe.  Recommendation for combination of psychosocial interventions and pharmacotherapy.  Discussed FDA approved medications for AUD including naltrexone, Campral, or Antabuse.  Recommended trial of Campral at this time as alternative.  Reviewed MOA and common SE. No CKD history. CMP reviewed from 8/19/24  -[facility]  -Discussed importance of ongoing recovery supports, patient plans to continue AA meetings after completing programmatic care.  Reports he has a strong support network.  -Additional patient counseling as below        Counseled the patient on the importance of having a recovery program in addition to medication to manage recovery.  Components include avoiding isolating, having willingness to change, avoiding triggers and managing cravings. Encouraged having some type of sober network and practicing honesty with trusted support person(s). Encouraged other services such as counseling, 12 step or other self-help organizations.        RTC   2 weeks, then follow up with PCP for long term management     I sent a total of 45 minutes today, on the care of this patient. This consisted of face-to-face time as well as time spent on pre-visit and post-visit activities including coordination of care, chart review, results review, and documentation.     LUIS ALBERTO Maradiaga [facility] Addiction Medicine  [phone]

## 2024-09-06 NOTE — GROUP NOTE
"Group Therapy Documentation    PATIENT'S NAME: Cody Bolton  MRN:   [id number]  :   1980  ACCT. NUMBER: [id number]  DATE OF SERVICE: 24  START TIME:  9:00 AM  END TIME: 11:00 AM  FACILITATOR(S): Marley Grier LADC  TOPIC: BEH Group Therapy  Number of patients attending the group:  9  Group Length:  2 Hours    Dimensions addressed: 4    Summary of Group / Topics Discussed:    Sober coping skills, Disease of addiction, and Emotions/expression    Group Attendance:  Attended group session    Patient's response to the group topic/interactions:  cooperative with task    Patient appeared to be Actively participating, Attentive, and Engaged.        Client specific details: Pt reported feeling lonely, and shared that the last lie he told was telling a loved one \"I'm good.\" He welcomed a new peer and took part in a group conversation about the reasons for lying and other defense mechanisms.  "

## 2024-09-06 NOTE — GROUP NOTE
"Group Therapy Documentation    PATIENT'S NAME: Cody Bolton  MRN:   [id number]  :   1980  ACCT. NUMBER: [id number]  DATE OF SERVICE: 24  START TIME: 12:30 PM  END TIME:  2:30 PM  FACILITATOR(S): Loren Che LADC; Marley Grier LADC; Brian Escalona LADC  TOPIC: BEH Group Therapy  Number of patients attending the group:  27  Group Length:  2 Hours    Dimensions addressed: 6    Summary of Group / Topics Discussed:    Recovery Principles, Sober coping skills, Emotions/expression, and Sober Recreation    Patients viewed an addiction/mental health based film. \" [place] Day In [place]ighborhood\"  This film addressed: mental health, wellness, forgiveness, family dynamics, loss, and resentment)  Patients engaged in a thorough discussion about the film, and shared personal experiences, and how the film helped them process their own life events.       Group Attendance:  Attended group session    Patient's response to the group topic/interactions:  cooperative with task    Patient appeared to be Actively participating.        Client specific details:  Cody attended PM Small Group and viewed a MICD relevant film: addressed: mental health, wellness, forgiveness, family dynamics, loss, and resentment). Patients engaged in a  discussion about the film, and shared personal experiences, and how the film helped them process their own life events.       "

## 2024-09-07 ENCOUNTER — HOSPITAL ENCOUNTER (OUTPATIENT)
Dept: BEHAVIORAL HEALTH | Facility: CLINIC | Age: 44
Discharge: HOME | End: 2024-09-07
Attending: FAMILY MEDICINE
Payer: COMMERCIAL

## 2024-09-07 PROCEDURE — H2035 A/D TX PROGRAM, PER HOUR: HCPCS | Mod: HQ

## 2024-09-07 PROCEDURE — 1002N00001 HC LODGING PLUS FACILITY CHARGE ADULT

## 2024-09-07 NOTE — GROUP NOTE
Psychoeducation Group Documentation    PATIENT'S NAME: Cody Bolton  MRN:   [id number]  :   1980  ACCT. NUMBER: [id number]  DATE OF SERVICE: 24  START TIME: 12:30 PM  END TIME:  2:00 PM  FACILITATOR(S): Bora Felder LADC  TOPIC: BEH Pyschoeducation  Number of patients attending the group:  [facility]  Group Length:  2 Hours    Skills Group Therapy Type: Recovery skills and Relationship skills development    Summary of Group / Topics Discussed:    Relationship/social skills and Coping/DBT skills        Group Attendance:  Attended group session    Patient's response to the group topic/interactions:  listened actively    Patient appeared to be Attentive and Engaged.         Client specific details:  Pt was attentive and engaged.

## 2024-09-07 NOTE — GROUP NOTE
Group Therapy Documentation    PATIENT'S NAME: Cody Bolton  MRN:   [id number]  :   1980  ACCT. NUMBER: [id number]  DATE OF SERVICE: 24  START TIME:  9:00 AM  END TIME: 11:00 AM  FACILITATOR(S): Shannan Martines LADC; Cody Choi LADC; Bora Felder LADC  TOPIC: BEH Group Therapy  Number of patients attending the group:  27  Group Length:  2 Hours    Dimensions addressed: 3, 4, 5, and 6    Summary of Group / Topics Discussed:    Recovery Principles, Relationship/socialization, Disease of addiction, and Emotions/expression      Group Attendance:  Attended group session    Patient's response to the group topic/interactions:  cooperative with task    Patient appeared to be Actively participating, Attentive, and Engaged.        Client specific details: Cody was an active participant in weekend recovery-related workshop. They participated in an exercise on recovery behaviors as well as an exercise on emotions.

## 2024-09-08 ENCOUNTER — HOSPITAL ENCOUNTER (OUTPATIENT)
Dept: BEHAVIORAL HEALTH | Facility: CLINIC | Age: 44
Discharge: HOME | End: 2024-09-08
Attending: FAMILY MEDICINE
Payer: COMMERCIAL

## 2024-09-08 PROCEDURE — H2035 A/D TX PROGRAM, PER HOUR: HCPCS | Mod: HQ

## 2024-09-08 PROCEDURE — 1002N00001 HC LODGING PLUS FACILITY CHARGE ADULT

## 2024-09-08 NOTE — GROUP NOTE
Group Therapy Documentation    PATIENT'S NAME: Cody Bolton  MRN:   [id number]  :   1980  ACCT. NUMBER: [id number]  DATE OF SERVICE: 24  START TIME:  8:45 AM  END TIME: 10:30 AM  FACILITATOR(S): Shannan Martines LADC; Cody Choi LADC; Suzanne Britt RN  TOPIC: BEH Group Therapy  Number of patients attending the group:  27  Group Length:  2 Hours    Dimensions addressed: 2 and 4    Summary of Group / Topics Discussed:    Hepatitis A, B, and C. Laughter Yoga.      Group Attendance:  Attended group session    Patient's response to the group topic/interactions:  cooperative with task    Patient appeared to be Actively participating, Attentive, and Engaged.        Client specific details: Cody was an active participant in LP nursing lecture on Hepatitis C. They also participated in a laughter yoga exercise.

## 2024-09-08 NOTE — GROUP NOTE
Group Therapy Documentation    PATIENT'S NAME: Cody Bolton  MRN:   [id number]  :   1980  [id number]  DATE OF SERVICE: 24  START TIME: 12:30 PM  END TIME:  1:30 PM  FACILITATOR(S): Shannan Martines LADC; Cody Choi LADC  TOPIC: BEH Group Therapy  Number of patients attending the group:  27  Group Length:  2 Hours    Dimensions addressed: 4, 5, and 6    Summary of Group / Topics Discussed:    Relationship/socialization, Balanced lifestyle, Disease of addiction, and Relapse prevention    Group included a survey activity which was designed to help patient's learn more about their peers. Group concluded with feedback and a related discussion.       Group Attendance:  Attended group session    Patient's response to the group topic/interactions:  cooperative with task    Patient appeared to be Actively participating.        Client specific details:  Patient actively engaged in group discussion.

## 2024-09-09 ENCOUNTER — HOSPITAL ENCOUNTER (OUTPATIENT)
Dept: BEHAVIORAL HEALTH | Facility: CLINIC | Age: 44
Discharge: HOME | End: 2024-09-09
Attending: FAMILY MEDICINE
Payer: COMMERCIAL

## 2024-09-09 PROCEDURE — H2035 A/D TX PROGRAM, PER HOUR: HCPCS | Mod: HQ

## 2024-09-09 PROCEDURE — 1002N00001 HC LODGING PLUS FACILITY CHARGE ADULT

## 2024-09-09 NOTE — GROUP NOTE
Group Therapy Documentation    PATIENT'S NAME: Cody Bolton  MRN:   [id number]  :   1980  [id number]  DATE OF SERVICE: 24  START TIME:  9:00 AM  END TIME: 11:00 AM  FACILITATOR(S): Shannan Martines LADC  TOPIC: BEH Group Therapy  Number of patients attending the group:  7  Group Length:  2 Hours    Dimensions addressed: 3, 4, 5, and 6    Summary of Group / Topics Discussed:    Recovery Principles, Sober coping skills, and Disease of addiction      Group Attendance:  Attended group session    Patient's response to the group topic/interactions:  cooperative with task    Patient appeared to be Actively participating, Attentive, and Engaged.        Client specific details: Cody was an active participant in morning group therapy session. He participated in a goal-setting exercise as well as two peer graduation ceremonies.

## 2024-09-09 NOTE — PROGRESS NOTES
"[facility] Weekly Treatment Plan Review    Date span:  24 to 24    Patient did not have any absences during this time period (list absence dates and reason for absence).     Treatment Plan initiated on: 24.    Dimension1: Acute Intoxication/Withdrawal Potential -   Previous Dimension Ratin  Current Dimension Ratin  Date of Last Use: 8-15-24  Any reports of withdrawal symptoms - No    Dimension 2: Biomedical Conditions & Complications -   Previous Dimension Ratin  Current Dimension Ratin  Medical Concerns:  \"left leg goes tingly\"  Current Medications & Medication Changes:  Current Outpatient Medications   Medication Sig Dispense Refill    acamprosate (CAMPRAL) 333 MG EC tablet Take 2 tablets (666 mg) by mouth 3 times daily. 180 tablet 0    acetaminophen (TYLENOL) 500 MG tablet Take 2 tablets (1,000 mg) by mouth 3 times daily      alum & mag hydroxide-simethicone (MAALOX) 200-200-20 MG/5ML SUSP suspension Take 30 mLs by mouth every 6 hours as needed for indigestion (Patient not taking: Reported on 2024)      benzocaine-menthol (CEPACOL) 15-3.6 MG lozenge Place 1 lozenge inside cheek every 2 hours as needed for sore throat (Patient not taking: Reported on 2024)      buPROPion (WELLBUTRIN XL) 150 MG 24 hr tablet Take 1 tablet (150 mg) by mouth every morning 90 tablet 3    carvedilol (COREG) 12.5 MG tablet Take 1 tablet (12.5 mg) by mouth 2 times daily (with meals) 60 tablet 11    cloNIDine (CATAPRES) 0.1 MG tablet Take 0.1 mg by mouth as needed (Sleep)      guaiFENesin-dextromethorphan (ROBITUSSIN DM) 100-10 MG/5ML syrup Take 10 mLs by mouth every 4 hours as needed for cough (Patient not taking: Reported on 2024)      lisinopril (ZESTRIL) 20 MG tablet Take 1 tablet (20 mg) by mouth daily 90 tablet 2    loratadine (CLARITIN) 10 MG tablet Take 10 mg by mouth daily (Patient not taking: Reported on 2024)      melatonin 5 MG tablet Take 5 mg by mouth nightly as " needed for sleep (Patient not taking: Reported on 2024)      metFORMIN (GLUCOPHAGE XR) 500 MG 24 hr tablet Take 2 tablets (1,000 mg) by mouth daily (with dinner) 180 tablet 3    Semaglutide, 1 MG/DOSE, (OZEMPIC) 4 MG/3ML pen Inject 1 mg subcutaneously every 7 days 3 mL 0    senna-docusate (SENOKOT-S/PERICOLACE) 8.6-50 MG tablet Take 2 tablets by mouth daily as needed for constipation (Patient not taking: Reported on 2024)      simvastatin (ZOCOR) 20 MG tablet Take 1 tablet (20 mg) by mouth at bedtime 90 tablet 3    traZODone (DESYREL) 100 MG tablet Take 200 mg by mouth at bedtime      warfarin ANTICOAGULANT (COUMADIN) 5 MG tablet Take 5 mg by mouth every evening. Starting 24, take 1.5 tablets (7.5 mg)  every evening, except for 24 and 24, when patient is to take 1 tablet (5 mg) in the evening.  Next INR check scheduled for 24.       No current facility-administered medications for this encounter.     Facility-Administered Medications Ordered in Other Encounters   Medication Dose Route Frequency Provider Last Rate Last Admin    Self Administer Medications: Behavioral Services   Does not apply See Admin Instructions Corrine Alvarenga MD        Self Administer Medications: Behavioral Services   Does not apply See Admin Instructions Corrine Alvarenga MD         Medication Prescriber: see chart  Taking meds as prescribed? Yes  Medication side effects or concerns:  None reported.  Outside medical appointments this week (list provider and reason for visit):  None reported.    Narrative: Pt seems fully functioning and seeks medical attention as needed. He attended lecture given by  nurse on Hepatitis C on 24.     Dimension 3: Emotional/Behavioral Conditions & Complications -   Previous Dimension Ratin  Current Dimension Ratin  PHQ2:       9/3/2024     9:00 AM 2024     1:00 PM 2024    10:04 AM   PHQ-2 (  [employer])   Q1: Little interest or pleasure in doing  "things 2 1 1   Q2: Feeling down, depressed or hopeless 3 2 1   PHQ-2 Score 5 3 2   Q1: Little interest or pleasure in doing things   Several days   Q2: Feeling down, depressed or hopeless   Several days   PHQ-2 Score   2      GAD2:       2/19/2024    12:52 PM 6/4/2024    11:39 AM 8/27/2024     1:00 PM 9/3/2024     9:00 AM   MERI-2   Feeling nervous, anxious, or on edge 0 1 0 1   Not being able to stop or control worrying 0 2 0 1   MERI-2 Total Score 0    0 3 0 2     Mental health diagnosis: major depressive disorder.   Date of last SIB:  Patient denies.  Date of  last SI:  Patient denies.  Date of last HI: Patient denies.  Behavioral Targets: Follow recommendations of medical provider. Report any alcohol or drug use to counselor and any increase in withdrawal symptoms to nurse. Stabilize and maintain mental health.     Risk factors: The patient lacks relapse prevention, coping, and refusal skills, as well as a sober peer support network. He has dual issues of MI and CD, a tendency to isolate, and a significant history of trauma, guilt, shame, and loss issues.  Protective factors:  restricted access to lethal means (firearms), abstinence from substances, adherence with prescribed medication, agreement to use safety plan, living with other people, structured day, positive social skills, and access to a variety of clinical interventions  Current MH Assignments:  \"Co-occurring Disorders\"    Narrative: Current Mental Health symptoms include: none reported or observed. See below for suicide risk assessment. Pt does not endorse thoughts of self-harm or suicide ideation at this time. He reports that his stress level has not significantly changed this week; coping skills to manage stress used were \"socializing.\" Pt reported that his mood has not significantly changed this past week.    [place] Suicide Severity Rating Scale (Short Version)      6/4/2024    12:23 PM 6/8/2024     3:11 AM 7/16/2024     4:09 PM 8/7/2024     4:00 " "PM 2024     4:03 PM 2024     4:00 AM 2024     8:26 AM   [place] Suicide Severity Rating (Short Version)   Q1 Wished to be Dead (Past Month)  0-->no 0-->no  0-->no 0-->no 0-->no   Q2 Suicidal Thoughts (Past Month)  0-->no 0-->no  1-->yes 0-->no 0-->no   Q3 Suicidal Thought Method     0-->no 0-->no    Q4 Suicidal Intent without Specific Plan     0-->no 0-->no    Q5 Suicide Intent with Specific Plan     0-->no 0-->no    Q6 Suicide Behavior (Lifetime)  0-->no 0-->no  0-->no 0-->no 0-->no   Level of Risk per Screen  no risks indicated no risks indicated  low risk no risks indicated no risks indicated   1. Wish to be Dead (Since Last Contact) N   N      2. Non-Specific Active Suicidal Thoughts (Since Last Contact) N   N      Actual Attempt (Since Last Contact) N   N      Has subject engaged in non-suicidal self-injurious behavior? (Since Last Contact) N   N      Interrupted Attempts (Since Last Contact) N   N      Aborted or Self-Interrupted Attempt (Since Last Contact) N   N      Preparatory Acts or Behavior (Since Last Contact) N   N      Suicide (Since Last Contact) N   N      Calculated C-SSRS Risk Score (Since Last Contact) No Risk Indicated   No Risk Indicated          Dimension 4: Treatment Acceptance / Resistance -   Previous Dimension Ratin  Current Dimension Ratin  MARCY Diagnosis:  Alcohol Use Disorder Severe (10.20) (303.90)   Commitment to tx process/Stage of change- Pt consistently attends and participates in groups and lectures. He appears to be in the contemplative stage of change at this time.  MARCY assignments - \"Own Reasons to Quit\"    Narrative - Pt reports his motivation this week is \"my health.\" Pt reports that his aftercare plans include \"going home.\" He reports that he has gotten along with peers and staff this week.     Dimension 5: Relapse / Continued Problem Potential -   Previous Dimension Ratin  Current Dimension Ratin  Relapses this week - None  Urges to use - " "None  UA results - negative for all screened drugs    Narrative- Pt reports no cravings this past week. He reported not experiencing any triggers to use due and did not report using coping skill(s). Pt participated in the spirituality group, facilitated by [facility] and  counseling staff was present during group. He participated in weekend workshop on relapse prevention and completed all activities.     Dimension 6: Recovery Environment -   Previous Dimension Rating:  3  Current Dimension Rating:  3  Family Involvement - n/a  Summarize attendance at family groups and family sessions - n/a  Family supportive of treatment?  Yes  Recreational activities - \"walks\"    Narrative - Pt is spending free time with same-gender peers and attending at least 3 virtual 12-step meetings weekly while in [place]. He reports having had contact with his sister, girlfriend and children this past week.    Progress made on transition planning goals: none at this time    Justification for Continued Treatment at this Level of Care: Risk ratings indicate continued need for this level of care. Pt has been unable to maintain abstinence from alcohol while at the outpatient level of care, lacks long-term sober maintenance skills, lacks sober coping skills and has medical and  mental health concerns which are exacerbated by substance use.   Treatment coordination activities this week:   none at this time  Need for peer recovery support referral? No    Discharge Planning:  Target Discharge Date/Timeframe: 9-17-24  Med Mgmt Provider/Appt:  ABEL  Ind therapy Provider/Appt:  ABEL  Family therapy Provider/Appt:  ABEL  Other referrals:  none at this time.    Has vulnerable adult status changed? No    Interdisciplinary Clinical Supervision including: LADC and RN    Are Treatment Plan goals/objectives effective? Yes  *If no, list changes to treatment plan:    Are the current goals meeting client's needs? Yes  *If no, list the changes to " treatment plan.    Patient Input / Response: Pt contributed to treatment plan review.    *Client agrees with any changes to the treatment plan: N/A  *Client received copy of changes: N/A  *Client is aware of right to access a treatment plan review: Yes    ISA Guerra

## 2024-09-09 NOTE — ADDENDUM NOTE
Encounter addended by: Cody Choi LADC on: 9/9/2024 2:18 PM   Actions taken: Charge Capture section accepted

## 2024-09-09 NOTE — GROUP NOTE
"Group Therapy Documentation    PATIENT'S NAME: Cody Bolton  MRN:   [id number]  :   1980  ACCT. NUMBER: [id number]  DATE OF SERVICE: 24  START TIME: 12:30 PM  END TIME:  2:30 PM  FACILITATOR(S): Marley Grier LADC  TOPIC: BEH Group Therapy  Number of patients attending the group:  8  Group Length:  2 Hours    Dimensions addressed: 4 and 5    Summary of Group / Topics Discussed:    Recovery Principles, Disease of addiction, and Relapse prevention    Group Attendance:  Attended group session    Patient's response to the group topic/interactions:  cooperative with task    Patient appeared to be Actively participating, Attentive, and Engaged.        Client specific details: Pt took part in a group discussion of the importance of honesty in recovery, and offered supportive feedback to his peer on his \"First Step\" assignment.  "

## 2024-09-10 ENCOUNTER — HOSPITAL ENCOUNTER (OUTPATIENT)
Dept: BEHAVIORAL HEALTH | Facility: CLINIC | Age: 44
Discharge: HOME | End: 2024-09-10
Attending: FAMILY MEDICINE
Payer: COMMERCIAL

## 2024-09-10 PROCEDURE — 1002N00001 HC LODGING PLUS FACILITY CHARGE ADULT

## 2024-09-10 PROCEDURE — H2035 A/D TX PROGRAM, PER HOUR: HCPCS | Mod: HQ

## 2024-09-10 NOTE — GROUP NOTE
Group Therapy Documentation    PATIENT'S NAME: Cody Bolton  MRN:   [id number]  :   1980  ACCT. NUMBER: [id number]  DATE OF SERVICE: 9/10/24  START TIME: 12:30 PM  END TIME:  2:30 PM  FACILITATOR(S): Shannan Martines LADC  TOPIC: BEH Group Therapy  Number of patients attending the group:  6  Group Length:  2 Hours    Dimensions addressed: 3, 4, 5, and 6    Summary of Group / Topics Discussed:    Recovery Principles, Co-occurring illnesses symptom management, and Disease of addiction      Group Attendance:  Attended group session    Patient's response to the group topic/interactions:  cooperative with task    Patient appeared to be Actively participating, Attentive, and Engaged.        Client specific details: Cody was an active participant in afternoon group therapy session. He took part in a discussion on Rees's Theory of Multiple Intelligence. He also shared his Co-Occurring Depression and Addiction assignment.

## 2024-09-10 NOTE — GROUP NOTE
Group Therapy Documentation    PATIENT'S NAME: Cody Bolton  MRN:   [id number]  :   1980  ACCT. NUMBER: [id number]  DATE OF SERVICE: 9/10/24  START TIME:  3:00 PM  END TIME:  4:00 PM  FACILITATOR(S): Shannan Martines LADC; Rosalva Velez LADC; Aric Chiu LADC  TOPIC: BEH Group Therapy  Number of patients attending the group:  25  Group Length:  1 Hours    Dimensions addressed: 4, 5, and 6    Summary of Group / Topics Discussed:    Recovery Principles and Disease of addiction      Group Attendance:  Attended group session    Patient's response to the group topic/interactions:  cooperative with task    Patient appeared to be Attentive and Engaged.        Client specific details: Cody was an active participant in skills group. Participants viewed a short documentary and took part in a discussion on how they could relate to the individuals featured in the film.

## 2024-09-10 NOTE — GROUP NOTE
Group Therapy Documentation    PATIENT'S NAME: Cody Bolton  MRN:   [id number]  :   1980  ACCT. NUMBER: [id number]  DATE OF SERVICE: 9/10/24  START TIME:  9:00 AM  END TIME: 11:00 AM  FACILITATOR(S): Marley Grier LADC  TOPIC: BEH Group Therapy  Number of patients attending the group:  8  Group Length:  2 Hours    Dimensions addressed: 4 and 6    Summary of Group / Topics Discussed:    Disease of addiction, Emotions/expression, and Relapse prevention    Group Attendance:  Attended group session    Patient's response to the group topic/interactions:  cooperative with task    Patient appeared to be Actively participating, Attentive, and Engaged.        Client specific details: Pt reported feeling ashamed and hurt, and took part in a peer's graduation. He offered support and validation to a peer who disclosed a significant aspect of his life.

## 2024-09-11 ENCOUNTER — HOSPITAL ENCOUNTER (OUTPATIENT)
Dept: BEHAVIORAL HEALTH | Facility: CLINIC | Age: 44
Discharge: HOME | End: 2024-09-11
Attending: FAMILY MEDICINE
Payer: COMMERCIAL

## 2024-09-11 PROCEDURE — 1002N00001 HC LODGING PLUS FACILITY CHARGE ADULT

## 2024-09-11 PROCEDURE — H2035 A/D TX PROGRAM, PER HOUR: HCPCS | Mod: HQ

## 2024-09-11 NOTE — GROUP NOTE
Group Therapy Documentation    PATIENT'S NAME: Cody Bolton  MRN:   [id number]  :   1980  ACCT. NUMBER: [id number]  DATE OF SERVICE: 24  START TIME:  9:30 AM  END TIME: 11:15 AM  FACILITATOR(S): Marley Grier LADC  TOPIC: BEH Group Therapy  Number of patients attending the group:  8  Group Length:  2 Hours    Dimensions addressed: 4 and 6    Summary of Group / Topics Discussed:    Sober coping skills and Balanced lifestyle    Group Attendance:  Attended group session    Patient's response to the group topic/interactions:  cooperative with task    Patient appeared to be Actively participating, Attentive, and Engaged.        Client specific details: Pt reported feeling confused and anxious, and denied that he's been considering any loopholes lately. He offered support to a peer who was reported struggling with family issues and worries about the future.

## 2024-09-11 NOTE — GROUP NOTE
"Group Therapy Documentation    PATIENT'S NAME: Cody Bolton  MRN:   [id number]  :   1980  ACCT. NUMBER: [id number]  DATE OF SERVICE: 24  START TIME: 12:30 PM  END TIME:  2:30 PM  FACILITATOR(S): Aric Chiu LADC  TOPIC: BEH Group Therapy  Number of patients attending the group:  8  Group Length:  2 Hours    Dimensions addressed: 3, 4, 5, and 6    Summary of Group / Topics Discussed:    Recovery Principles, Coping/DBT informed care, Trauma informed care, Disease of addiction, and Emotions/expression      Group Attendance:  Attended group session    Patient's response to the group topic/interactions:  cooperative with task    Patient appeared to be Attentive and Engaged.        Client specific details:  Cody participated in afternoon group. He took part in a discussion on the Big Book of AA and a reading and discussion from it. He listened to and gave positive feedback on his peer's assignment. He took part in a discussion on the 12 steps of AA. He presented his \"Ending Isolation\" assignment to the group.    "

## 2024-09-11 NOTE — GROUP NOTE
Group Therapy Documentation    PATIENT'S NAME: Cody Bolton  MRN:   [id number]  :   1980  ACCT. NUMBER: [id number]  DATE OF SERVICE: 24  START TIME:  8:30 AM  END TIME:  9:30 AM  FACILITATOR(S): Marley Grier LADC; Loren Che LADC  TOPIC: BEH Group Therapy  Number of patients attending the group:  25  Group Length:  1 Hours    Dimensions addressed: 2 and 3    Summary of Group / Topics Discussed:    Co-occurring illnesses symptom management, Emotions/expression, and Medication management    Group Attendance:  Attended group session    Patient's response to the group topic/interactions:  cooperative with task    Patient appeared to be Attentive and Engaged.        Client specific details: Pt listened respectfully to Dr. Birmingham's presentation on addiction and negative emotions, and asked appropriate questions.

## 2024-09-12 ENCOUNTER — HOSPITAL ENCOUNTER (OUTPATIENT)
Dept: BEHAVIORAL HEALTH | Facility: CLINIC | Age: 44
Discharge: HOME | End: 2024-09-12
Attending: FAMILY MEDICINE
Payer: COMMERCIAL

## 2024-09-12 PROCEDURE — H2035 A/D TX PROGRAM, PER HOUR: HCPCS | Mod: HQ

## 2024-09-12 PROCEDURE — H2035 A/D TX PROGRAM, PER HOUR: HCPCS

## 2024-09-12 PROCEDURE — 1002N00001 HC LODGING PLUS FACILITY CHARGE ADULT

## 2024-09-12 NOTE — ADDENDUM NOTE
Encounter addended by: Izabella Godfrey on: 9/12/2024 4:41 PM   Actions taken: Flowsheet accepted, Clinical Note Signed, Charge Capture section accepted

## 2024-09-12 NOTE — GROUP NOTE
Psychoeducation Group Documentation    PATIENT'S NAME: Cody Bolton  MRN:   [id number]  :   1980  ACCT. NUMBER: [id number]  DATE OF SERVICE: 24  START TIME:  3:00 PM  END TIME:  4:00 PM  FACILITATOR(S): Su Dominique LADC; Tere Victor LADC; Cody Choi LADC  TOPIC: BEH Pyschoeducation  Number of patients attending the group:  22  Group Length:  1 Hours    Skills Group Therapy Type: Recovery skills, Healthy behaviors development, and Medication education    Summary of Group / Topics Discussed:    Patients attended a skills group with Dr. Quach (Professor from the [facility].) Patients developed knowledge on the disease of addiction from both a biological and environmental perspective, through addiction based research studies.  Patients identified ways to utilize treatment and recovery strategies to address their biological and environmental factors leading to use of substances. Each patient had an opportunity to process the information, ask questions of the facilitator, and provide constructive feedback to peers who shared.           Group Attendance:  Attended group session    Patient's response to the group topic/interactions:  cooperative with task    Patient appeared to be Engaged.         Client specific details:  Patient remained attentive in group and respectfully listened during discussion.

## 2024-09-12 NOTE — GROUP NOTE
"Group Therapy Documentation    PATIENT'S NAME: Cody Bolton  MRN:   [id number]  :   1980  ACCT. NUMBER: [id number]  DATE OF SERVICE: 24  START TIME:  9:00 AM  END TIME: 11:00 AM  FACILITATOR(S): Marley Grier LADC  TOPIC: BEH Group Therapy  Number of patients attending the group:  8  Group Length:  2 Hours    Dimensions addressed: 3 and 4    Summary of Group / Topics Discussed:    Sober coping skills, Emotions/expression, and Relapse prevention    Group Attendance:  Attended group session    Patient's response to the group topic/interactions:  cooperative with task    Patient appeared to be Actively participating, Attentive, and Engaged.        Client specific details: Pt reported feeling anxious and afraid, and shared that anger shows up in his life \"only about once every ten years.\" He offered his peer supportive feedback on his \"Relationship Assessment\" assignment.  "

## 2024-09-12 NOTE — ADDENDUM NOTE
Encounter addended by: Su Dominique LADC on: 9/12/2024 3:33 PM   Actions taken: Clinical Note Signed, Charge Capture section accepted

## 2024-09-12 NOTE — GROUP NOTE
Group Therapy Documentation    PATIENT'S NAME: Cody Bolton  MRN:   [id number]  :   1980  ACCT. NUMBER: [id number]  DATE OF SERVICE: 24  START TIME: 12:30 PM  END TIME:  2:30 PM  FACILITATOR(S): Shannan Martines LADC; Rosa Stack  TOPIC: BEH Group Therapy  Number of patients attending the group:  7  Group Length:  2 Hours    Dimensions addressed: 3, 4, 5, and 6    Summary of Group / Topics Discussed:    Spiritual Care      Group Attendance:  Attended group session    Patient's response to the group topic/interactions:  cooperative with task    Patient appeared to be Actively participating, Attentive, and Engaged.        Client specific details: Cody was an active participant in spiritual health group therapy session facilitated by Rosa Stack.

## 2024-09-12 NOTE — PROGRESS NOTES
INDIVIDUAL SESSION SUMMARY    D) Met with client on 9/12/24 from 9:00-10:00am. Client is in [facility]. Client and therapist discussed aftercare/outpatient options. Client shared details of his relationship that are complex and confusing. Client discussed his longing for a relationship and his desire to be sober. Client shared that he's been feeling some depression this week but that it is manageable and he's spending time with his peers and calling his GF for support.     I)  Provided client with verbal interventions including validation, compassion, and support, Engaged in cognitive restructuring/re-framing by highlighting cognitive distortions and negative thought patterns, and Provided positive reinforcement for progress towards goals, gains in insight, and application of skills.Provided information on the [facility] outpatient programs.     A)   Client appears to be gaining insights into their emotions and using healthy coping skills for managing stress, Client appears to be forming relationships with their peers, and Client appears to understand the importance of a sober support network.    P) Next session is scheduled for 9/16/24.       JIM Flores  9/12/2024

## 2024-09-13 ENCOUNTER — HOSPITAL ENCOUNTER (OUTPATIENT)
Dept: BEHAVIORAL HEALTH | Facility: CLINIC | Age: 44
Discharge: HOME | End: 2024-09-13
Attending: FAMILY MEDICINE
Payer: COMMERCIAL

## 2024-09-13 PROCEDURE — H2035 A/D TX PROGRAM, PER HOUR: HCPCS | Mod: HQ

## 2024-09-13 PROCEDURE — 1002N00001 HC LODGING PLUS FACILITY CHARGE ADULT

## 2024-09-13 NOTE — GROUP NOTE
Group Therapy Documentation    PATIENT'S NAME: Cody Bolton  MRN:   [id number]  :   1980  ACCT. NUMBER: [id number]  DATE OF SERVICE: 24  START TIME: 12:30 PM  END TIME:  2:30 PM  FACILITATOR(S): Loren Che LADC; Aric Chiu LADC  TOPIC: BEH Group Therapy  Number of patients attending the group:  15  Group Length:  2 Hours    Dimensions addressed: 6    Summary of Group / Topics Discussed:    Recovery Principles, Sober coping skills, Balanced lifestyle, and Sober Rec    Patients viewed an addiction/mental health based film. This film addressed: addiction as a disease, relationships and addiction, engagement in AA, and evaluating priorities when getting sober.   Patients engaged in a thorough discussion about the film, and shared personal experiences, and how the film helped them process their own life events.       Group Attendance:  Attended group session    Patient's response to the group topic/interactions:  cooperative with task    Patient appeared to be Actively participating.        Client specific details:  Cody Attended small group therapy. Patient engaged in discussion and participated in sharing feedback to their peers.   .

## 2024-09-13 NOTE — GROUP NOTE
"Group Therapy Documentation    PATIENT'S NAME: Cody Bolton  MRN:   [id number]  :   1980  ACCT. NUMBER: [id number]  DATE OF SERVICE: 24  START TIME:  9:00 AM  END TIME: 11:00 AM  FACILITATOR(S): Marley Grier LADC  TOPIC: BEH Group Therapy  Number of patients attending the group:  8  Group Length:  2 Hours    Dimensions addressed: 4 and 5    Summary of Group / Topics Discussed:    Sober coping skills, Emotions/expression, and Relapse prevention    Group Attendance:  Attended group session    Patient's response to the group topic/interactions:  cooperative with task    Patient appeared to be Actively participating, Attentive, and Engaged.        Client specific details: Pt reported feeling overwhelmed. He offered supportive feedback to his peers on their \"Drug Use History\" and \"Core Beliefs\" assignments.  "

## 2024-09-13 NOTE — ADDENDUM NOTE
Encounter addended by: Jesse Mckenzie on: 9/13/2024 1:49 AM   Actions taken: Charge Capture section accepted

## 2024-09-14 ENCOUNTER — HOSPITAL ENCOUNTER (OUTPATIENT)
Dept: BEHAVIORAL HEALTH | Facility: CLINIC | Age: 44
Discharge: HOME | End: 2024-09-14
Attending: FAMILY MEDICINE
Payer: COMMERCIAL

## 2024-09-14 PROCEDURE — 1002N00001 HC LODGING PLUS FACILITY CHARGE ADULT

## 2024-09-14 PROCEDURE — H2035 A/D TX PROGRAM, PER HOUR: HCPCS | Mod: HQ

## 2024-09-14 NOTE — GROUP NOTE
Psychoeducation Group Documentation    PATIENT'S NAME: Cody Bolton  MRN:   [id number]  :   1980  ACCT. NUMBER: [id number]  DATE OF SERVICE: 24  START TIME: 12:30 PM  END TIME:  2:30 PM  FACILITATOR(S): Rosalva Velez LADC; Tere Victor LADC; Nicole Huddleston LADC  TOPIC: BEH Pyschoeducation  Number of patients attending the group:  22  Group Length:  2 Hours    Skills Group Therapy Type: Recovery skills    Summary of Group / Topics Discussed:    Relapse prevention skills        Group Attendance:  Attended group session    Patient's response to the group topic/interactions:  cooperative with task    Patient appeared to be Actively participating.         Client specific details: Patient attended and participated in weekend relapse prevention workshop.

## 2024-09-14 NOTE — GROUP NOTE
Psychoeducation Group Documentation    PATIENT'S NAME: Cody Bolton  MRN:   [id number]  :   1980  ACCT. NUMBER: [id number]  DATE OF SERVICE: 24  START TIME:  9:00 AM  END TIME: 11:00 AM  FACILITATOR(S): Rosalva Velez LADC; Tere Victor LADC; Nicole Huddleston LADC  TOPIC: BEH Pyschoeducation  Number of patients attending the group:  23  Group Length:  2 hours    Skills Group Therapy Type: Recovery skills    Summary of Group / Topics Discussed:    Relapse prevention skills- Topic: The brain and addiction.        Group Attendance:  Attended group session    Patient's response to the group topic/interactions:  cooperative with task    Patient appeared to be Actively participating.         Client specific details: Patient attended and participated in weekend relapse prevention workshop.

## 2024-09-14 NOTE — PROGRESS NOTES
Nursing Discharge Planning Meeting    Writer completed discharge planning meeting with patient. Discharge is planned for 9/17/24    Discussed appropriate follow up care to manage MARCY, MI and medical  and to obtain medication refills. Patient given a copy of their current medications for reference. Questions were answered at this time and the patient verbalized an understanding of the post-discharge follow up plan.    Patient will f/u with PCP as recommended and/or is aware of upcoming appt: 9/26/24 2:10pm  Yovana Felipe MD General (Family Medicine) Phone: [phone]     Fax: [phone] [address] [facility] [address]         Patient will f/u with Addiction Medicine Provider as recommended and/or is aware of upcoming appt:  [facility]  [address] [place] [address], [address] [facility] [address] [phone]   Alayna weinstein     Continue to support patient in discharge planning as needed to assure appropriate continuity of care.     Tobacco Cessation  Patient participated in the nicotine replacement therapy for tobacco cessation or reduction during their treatment programming: No, pt does not use tobacco     The patient was provided with community resources for follow-up to continue tobacco cessation support once in the community. Also the patient was encouraged to discuss their tobacco cessation efforts with the primary care provider.    [facility]  Nurse Liaison / [facility]  O: [phone]  Fax:[phone]  [facility] [id number]  M-F: 7AM to 5:30PM   Sat-Sun: 7AM to 3PM  After hours: [phone]

## 2024-09-15 ENCOUNTER — HOSPITAL ENCOUNTER (OUTPATIENT)
Dept: BEHAVIORAL HEALTH | Facility: CLINIC | Age: 44
Discharge: HOME | End: 2024-09-15
Attending: FAMILY MEDICINE
Payer: COMMERCIAL

## 2024-09-15 PROCEDURE — 1002N00001 HC LODGING PLUS FACILITY CHARGE ADULT

## 2024-09-15 PROCEDURE — H2035 A/D TX PROGRAM, PER HOUR: HCPCS | Mod: HQ

## 2024-09-15 PROCEDURE — H2035 A/D TX PROGRAM, PER HOUR: HCPCS | Mod: HQ | Performed by: COUNSELOR

## 2024-09-15 NOTE — GROUP NOTE
Psychoeducation Group Documentation    PATIENT'S NAME: Cody Bolton  MRN:   [id number]  :   1980  ACCT. NUMBER: [id number]  DATE OF SERVICE: 9/15/24  START TIME:  8:45 AM  END TIME: 10:15 AM  FACILITATOR(S): Tere Victor LADC; Yenni Power RN  TOPIC: BEH Pyschoeducation  Number of patients attending the group:  23  Group Length:  1.5 Hours    Skills Group Therapy Type: Recovery skills, Daily living/independence skills, Healthy behaviors development, and Medication education    Summary of Group / Topics Discussed:    Balanced lifestyle skills, Symptom management skills, Medication management skills, and Relapse prevention skills    Writer sat in group while Nurse Tono provided psychoeducation lecture on nutrition, sleep, exercise, and substance use and pregnancy.  Patients asked thoughtful questions and attentively listened to the provided lecture.          Group Attendance:  Attended group session    Patient's response to the group topic/interactions:  cooperative with task and listened actively    Patient appeared to be Attentive and Engaged.         Client specific details:  Patient actively participated in today's lecture.  Patient listened as peers verbalized insights and questions related to the topic of nutrition, exercise, and sleep and actively attended to information on pregnancy and substance use.

## 2024-09-15 NOTE — GROUP NOTE
Group Therapy Documentation    PATIENT'S NAME: Cody Bolton  MRN:   [id number]  :   1980  ACCT. NUMBER: [id number]  DATE OF SERVICE: 9/15/24  START TIME: 12:30 PM  END TIME:  1:30 PM  FACILITATOR(S): Rosalva Velez LADC  TOPIC: BEH Group Therapy  Number of patients attending the group:  23    Group Length:  1 Hours    Dimensions addressed: 4 and 5    Summary of Group / Topics Discussed:    Self-care activities      Group Attendance:  {Group Attendance:194782}    Patient's response to the group topic/interactions:  {OPBEHCLIENTRESPONSE:454836}    Patient appeared to be {Engagement:913214}.        Client specific details:  ***.

## 2024-09-15 NOTE — GROUP NOTE
Group Therapy Documentation    PATIENT'S NAME: Cody Bolton  MRN:   [id number]  :   1980  ACCT. NUMBER: [id number]  DATE OF SERVICE: 9/15/24  START TIME: 12:30 PM  END TIME:  1:30 PM  FACILITATOR(S): Rosalva Velez LADC  TOPIC: BEH Group Therapy  Number of patients attending the group:  23    Group Length:  1 Hour    Dimensions addressed: 4 and 5    Summary of Group / Topics Discussed:    Self-care activities      Group Attendance:  Attended group session    Patient's response to the group topic/interactions:  cooperative with task    Patient appeared to be Actively participating, Attentive, and Engaged.        Client specific details:  Patient attended group session and was attentive and participative.

## 2024-09-15 NOTE — GROUP NOTE
Group Therapy Documentation    PATIENT'S NAME: Cody Bolton  MRN:   [id number]  :   1980  ACCT. NUMBER: [id number]  DATE OF SERVICE: 9/15/24  START TIME: 12:30 PM  END TIME:  1:30 PM  FACILITATOR(S): Rosalva Velez LADC  TOPIC: BEH Group Therapy  Number of patients attending the group:  23    Group Length:  1 Hour    Dimensions addressed: 4 and 5    Summary of Group / Topics Discussed:    Self-care activities      Group Attendance:  {Group Attendance:820565}    Patient's response to the group topic/interactions:  {OPBEHCLIENTRESPONSE:234355}    Patient appeared to be {Engagement:263496}.        Client specific details:  ***.

## 2024-09-16 ENCOUNTER — HOSPITAL ENCOUNTER (OUTPATIENT)
Dept: BEHAVIORAL HEALTH | Facility: CLINIC | Age: 44
Discharge: HOME | End: 2024-09-16
Attending: FAMILY MEDICINE
Payer: COMMERCIAL

## 2024-09-16 ENCOUNTER — LAB (OUTPATIENT)
Dept: LAB | Facility: CLINIC | Age: 44
End: 2024-09-16
Payer: COMMERCIAL

## 2024-09-16 ENCOUNTER — ANTICOAGULATION THERAPY VISIT (OUTPATIENT)
Dept: ANTICOAGULATION | Facility: CLINIC | Age: 44
End: 2024-09-16

## 2024-09-16 DIAGNOSIS — Z86.718 HISTORY OF DVT (DEEP VEIN THROMBOSIS): Primary | ICD-10-CM

## 2024-09-16 DIAGNOSIS — Z86.718 HISTORY OF DVT (DEEP VEIN THROMBOSIS): ICD-10-CM

## 2024-09-16 LAB — INR PPP: 2.34 (ref 0.85–1.15)

## 2024-09-16 PROCEDURE — 36415 COLL VENOUS BLD VENIPUNCTURE: CPT

## 2024-09-16 PROCEDURE — 1002N00001 HC LODGING PLUS FACILITY CHARGE ADULT

## 2024-09-16 PROCEDURE — H2035 A/D TX PROGRAM, PER HOUR: HCPCS | Mod: HQ

## 2024-09-16 PROCEDURE — 85610 PROTHROMBIN TIME: CPT

## 2024-09-16 NOTE — GROUP NOTE
"Group Therapy Documentation    PATIENT'S NAME: Cody Bolton  MRN:   [id number]  :   1980  ACCT. NUMBER: [id number]  DATE OF SERVICE: 24  START TIME: 12:30 PM  END TIME:  2:30 PM  FACILITATOR(S): Cody Choi LADC  TOPIC: BEH Group Therapy  Number of patients attending the group:  8  Group Length:  2 Hours    Dimensions addressed: 3, 4, and 5    Summary of Group / Topics Discussed:    Sober coping skills, Cognitive behavioral therapy skills, Disease of addiction, and Emotions/expression      Group participants shared assignments, followed by a discussion regarding re-framing automatic thoughts and core beliefs. Feedback was provided by participants throughout group session.       Group Attendance:  Attended group session    Patient's response to the group topic/interactions:  cooperative with task    Patient appeared to be Actively participating.        Client specific details:  Patient actively engaged in group discussion. Patient shared his \"Drug Use History\" assignment.     "

## 2024-09-16 NOTE — PROGRESS NOTES
ANTICOAGULATION MANAGEMENT     Cody Bolton 44 year old male is on warfarin with therapeutic INR result. (Goal INR 2.0-3.0)    Recent labs: (last 7 days)     09/16/24  0732   INR 2.34*       ASSESSMENT     Source(s): Chart Review  Previous INR was Therapeutic last visit; previously outside of goal range  Medication, diet, health changes since last INR chart reviewed; none identified      Per chart, pt has been at [facility] ([phone]- alcohol dependence rehab). The plan is to discharge tomorrow - will need to verify this.        PLAN     Unable to reach [facility] staff today.    VM left to call [facility] back. Will try again later.     Follow up required to confirm warfarin dose taken and assess for changes    Anita Lainez, RN  9/16/2024  Anticoagulation Clinic  [facility] for routing messages: fransisco PABLO  [facility] patient phone line: [phone]

## 2024-09-16 NOTE — PROGRESS NOTES
ANTICOAGULATION MANAGEMENT     Cody WALLACE Young 44 year old male is on warfarin with therapeutic INR result. (Goal INR 2.0-3.0)    Recent labs: (last 7 days)     09/16/24  0732   INR 2.34*       ASSESSMENT     Source(s): Chart Review and Home Care/Facility Nurse - rOal [facility] ([phone]- alcohol dependence rehab)    Warfarin doses taken: Warfarin taken as instructed  Diet: No new diet changes identified  Medication/supplement changes: None noted  New illness, injury, or hospitalization: No  Signs or symptoms of bleeding or clotting: No  Previous result: Therapeutic last visit; previously outside of goal range  Additional findings:  pt discharging from [facility] tomorrow, 9/17.        PLAN     Recommended plan for no diet, medication or health factor changes affecting INR     Dosing Instructions: Continue your current warfarin dose with next INR in 1 week       Summary  As of 9/16/2024      Full warfarin instructions:  5 mg every Fri; 7.5 mg all other days   Next INR check:  9/23/2024               Telephone call with [facility] nurse who verbalizes understanding and agrees to plan    Contact [phone] to schedule and with any changes, questions or concerns.     Education provided: Please call back if any changes to your diet, medications or how you've been taking warfarin    Plan made per [facility] anticoagulation protocol    Anita Lainez RN  9/16/2024  Anticoagulation Clinic  [employer] for routing messages: fransisco PABLO  [facility] patient phone line: [phone]        _______________________________________________________________________     Anticoagulation Episode Summary       Current INR goal:  2.0-3.0   TTR:  53.4% (6.8 mo)   Target end date:  Indefinite   Send INR reminders to:  KEO APBLO    Indications    Acute pulmonary embolism without acute cor pulmonale  unspecified pulmonary embolism type (H) (Resolved) [I26.99]  History of DVT (deep vein thrombosis) [Z86.718]              Comments:               Anticoagulation Care Providers       Provider Role Specialty Phone number    Fredy Veras MD Referring Family Medicine [phone]    Aaseby-Aguilera, Ramona Ann, PA-C Referring Family Medicine [phone]

## 2024-09-16 NOTE — GROUP NOTE
Group Therapy Documentation    PATIENT'S NAME: Cody Bolton  MRN:   [id number]  :   1980  ACCT. NUMBER: [id number]  DATE OF SERVICE: 24  START TIME:  9:00 AM  END TIME: 11:00 AM  FACILITATOR(S): Shannan Martines LADC  TOPIC: BEH Group Therapy  Number of patients attending the group:  6  Group Length:  2 Hours    Dimensions addressed: 3, 4, 5, and 6    Summary of Group / Topics Discussed:    Recovery Principles, Sober coping skills, Co-occurring illnesses symptom management, and Emotions/expression      Group Attendance:  Attended group session    Patient's response to the group topic/interactions:  cooperative with task    Patient appeared to be Actively participating, Attentive, and Engaged.        Client specific details: Cody was an active participant in morning group therapy session. He engaged in a goal-setting exercise and discussion on mental health symptoms and vulnerability.

## 2024-09-17 NOTE — ADDENDUM NOTE
Encounter addended by: Shannan Martines LADC on: 9/17/2024 8:51 AM   Actions taken: Clinical Note Signed

## 2024-09-17 NOTE — PROGRESS NOTES
[facility] Discharge Summary/Instructions   Client Name: Cody Bolton MR#:  [id number]  YOB: 1980        Age: 44 years old Sex: Male    Focus of Treatment / Discharge Recommendations  Personal Safety/ Management of Symptoms  * Follow your safety plan.  Report increased symptoms to your care team and /or go to the nearest Emergency Department.  * Call crisis lines as needed    [facility] [phone]                Crisis Connection [phone]  [facility] [phone]               [facility] [phone]  [facility] [phone]           National Suicide Prevention [phone] OR Text/Call 498  [facility] [phone]             Suicide Prevention [phone]     [place] [facility]: [phone]  [place] [facility]: [phone]  [place] [facility]: [phone]    Abstinence/Relapse Prevention  * Take all medicines as directed.  Carry a current list of medicines with you.  * Use coping skills: nutrition, rest, exercise, spirituality, journal, meditation, engage in activities you enjoy, and sober support resources.   * Remain abstinent from alcohol and non-prescribed, mood altering substances.     Develop/Improve Independent Living/Socialization Skills: Ensure living environment is conducive to sobriety.     Community Resources/Supports and Discharge Planning:    Follow up with medical appointments as needed and as given in nursing discharge instructions.    Follow up with your therapist for continued mental health support.    Attend 2-3 sober support meetings weekly and work with a sponsor.    Client Signature:_______________________   Date / Time:___________    Staff Signature:________________________   Date / Time:___________

## 2024-09-17 NOTE — PROGRESS NOTES
"COUNSELOR S DISCHARGE SUMMARY    Date: 2024     Program Name:  Adult [facility]    Client Name: Cody Bolton YOB: 1980      MR#:  [id number]    Referred by: Self       Release copies to: NA      Admit date: 24  Discharge Date:  24    # of Days Attended: 28  Date Last Attended: 24     Discharge Status:  With Staff Approval    PROBLEMS PRESENTED AT ADMISSION:  (Include reasons & circumstances for admission)  Cody Bolton is a 44 year old year old male who was referred to [facility] after seeking an assessment at [facility] on 24. Per this assessment: \"The reason for seeking services at this time is:  The patient reported the reason for participating in the substance use disorder assessment today on 2024 was due to the patient's own awareness he needed help, due to pressure from his family members for him to get help and due to pressure from his current 1:1 therapist for the patient to get help.  The patient reported having multiple significant stressors over the past 2 years, including his mother having a fall and then being transferred to a nursing home where she ended up dying, having an ongoing lawsuit against the nursing home where his mother had , having a cousin commit suicide in 2023, having marriage problems which had led to a divorce from his wife of 23 years in 2023 and having 5 Emergency Department and hospital admissions since 2024 for various medical issues, including having an acute pulmonary embolism in 2023, having an acute kidney injury secondary to having an accidental drug overdose with opioid pain medications in 2024, having a closed fracture of multiple ribs in 2024 when he had been measured at the hospital to have a 0.16 IVAN, having swelling from DVT in 2023 and having paroxysmal atrial fibrillation in 2024.  The patient first had a concern about having substance abuse issues in 2024.  The patient " "reported he had attempted to stop his use of alcohol on his own in the past, but he had been unsuccessful in his attempts to maintain abstinence from alcohol.  The patient denied having any history of participating in a substance use disorder treatment program.  The patient denied having any inpatient detoxification admissions or inpatient hospitalizations for withdrawal symptoms.  The patient is not currently receiving any substance use disorder treatment services.  The patient denied having any history of attending 12-step or other recovery support group meetings.  The patient does not appear to be in severe withdrawal, an imminent safety risk to self or others, or requiring immediate medical attention and may proceed with the assessment interview.\"       Admitting diagnosis: Alcohol Use Disorder Severe - 303.90 (F10.20)       PROGRAM PARTICIPATION:  While at [facility], Cody Bolton received the following services to address substance use and mental health disorders.  he participated in:  Group Therapy, Individual Therapy, Psychiatric Medication Management, Recreation Activities, Spirituality Groups, DBT Skills Groups, AA/NA meetings , Life Skills Groups, and Psych-education Groups      PROGRESS:     Dimension 1 - Acute Intoxication/Withdrawal Potential:  Admission ASAM Risk Ratin Client displays full functioning with good ability to tolerate and cope with withdrawal discomfort. No signs or symptoms of intoxication or withdrawal or resolving signs or symptoms.  Discharge ASAM Risk Ratin Client displays full functioning with good ability to tolerate and cope with withdrawal discomfort. No signs or symptoms of intoxication or withdrawal or resolving signs or symptoms.    Treatment goal(s):  Be able to manage mild to moderate withdrawal symptoms.     Progress toward goal(s):  Patient reports last use date was 8/15/24. Patient completed medical detoxification while admitted to unit " 3A. Patient was able to tolerate mild withdrawal symptoms throughout treatment and participate in programming. No concerns at time of discharge.     Dimension 2 - Biomedical Conditions & Complications:  Admission ASAM Risk Ratin Client has difficulty tolerating and coping with physical problems or has other biomedical problems that interfere with recovery and treatment. Client neglects or does not seek care for serious biomedical problems.  Discharge ASAM Risk Ratin Client has difficulty tolerating and coping with physical problems or has other biomedical problems that interfere with recovery and treatment. Client neglects or does not seek care for serious biomedical problems.    Treatment goal(s):  Follow recommendations of medical provider.     Progress toward goal(s): Patient reports biomedical concerns of High Cholesterol, Hypertension, DVT, CARLIE, Type 2 DM, Hx gastric bipass, hx Pulmonary embolism, and knee pain. Patient has a primary care provider at [facility] and an addiction medicine provider through [facility] and has follow-up appointments scheduled for after discharge. Patient maintained medication compliance and appears able to access medical aid independently.        Dimension 3 - Emotional/Behavioral Conditions & Complications:  Admission ASAM Risk Ratin Client has difficulty with impulse control and lacks coping skills. Client has thoughts of suicide or harm to others without means; however, the thoughts may interfere with participation in some treatment activities. Client has difficulty functioning in significant life areas. Client has moderate symptoms of emotional, behavioral, or cognitive problems. Client is able to participate in most treatment activities.  Discharge ASAM Risk Ratin Client has difficulty with impulse control and lacks coping skills. Client has thoughts of suicide or harm to others without means; however, the thoughts may  "interfere with participation in some treatment activities. Client has difficulty functioning in significant life areas. Client has moderate symptoms of emotional, behavioral, or cognitive problems. Client is able to participate in most treatment activities.    Treatment goal(s):  Understand the relationship between addiction and mental health issues. Patient will remain safe in treatment.     Progress toward goal(s):  Patient reported a mental health diagnosis of major depressive disorder.  Upon admission, patient's PHQ-9 initial score was 13/27, indicating moderate depression.  Patient's MERI-7 initial score was 9/21, indicating mild anxiety. To accomplish his above stated goals, patient completed the assignments \"Understanding Depression and Addiction\", \"Loneliness\", \"Ending Isolation\". He also participated in individual therapy weekly while at [facility]. Patient participated in a suicide risk screening as part of the CD assessment and was given a rating of Low Risk.  Patient completed a safety plan upon entering the [facility] Treatment Program. Upon discharge, patient denied any suicidal thoughts or ideations.      Dimension 4 - Treatment Acceptance/Resistance:  Admission ASAM Risk Ratin Client displays verbal compliance, but lacks consistent behaviors; has low motivation for change; and is passively involved in treatment.  Discharge ASAM Risk Ratin Client displays verbal compliance, but lacks consistent behaviors; has low motivation for change; and is passively involved in treatment.    Treatment goal(s):  Understand the impact your substance use has had on your family and significant relationships.       Progress toward goal(s):  Upon admission, patient had limited insight or acceptance of the first step and how his substance use has affected his life. Patient had never attended chemical dependency treatment to address his co-occurring concerns. To accomplish his above stated goals, patient " "completed the assignments \"First Step\", \"Drug Use History\", and \"25 Sober Coping Skills\". Patient's risk rating in this dimension remains unchanged due to patient's verbalization of motivation for recovery, but limited demonstration of follow-through behaviors. He appears the be in the contemplation stage of change at this time.      Dimension 5 - Relapse/Continued Problem Potential:  Admission ASAM Risk Ratin No awareness of the negative impact of mental health problems or substance abuse. No coping skills to arrest mental health or addiction illnesses, or prevent relapse.  Discharge ASAM Risk Rating: 3 Client has poor recognition and understanding of relapse and recidivism issues and displays moderately high vulnerability for further substance use or mental health problems. Client has few coping skills and rarely applies coping skills.    Treatment goal(s):  Identify personal triggers and relapse warning signs.     Progress toward goal(s):  Upon admission, patient had limited insight into his personal relapse process, triggers, and warning signs. Patient has minimal periods of sobriety and poor coping skills for managing difficult emotions, particularly loneliness, depression, and trauma.   To accomplish his above stated goals, patient completed the assignments \"Own Reasons to Quit\", \"Boundaries for Codependents\", \"Identifying Relapse Triggers and Cues\", and \"Relapse Awareness and Prevention Plan\". Patient declined any referrals for aftercare, but was offered resources on [place]'s outpatient programs should he change his mind. Patient was able to verbalize sober coping skills and relapse prevention strategies prior to discharge. His risk rating in this dimension has been lowered from a \"4\" to a \"3\".      Dimension 6 - Recovery Environment: (family, recreation, legal, education, etc.)  Admission ASAM Risk Rating: 3 Client is not engaged in structured, meaningful activity and the client's peers, family, " significant other, and living environment are unsupportive, or there is significant criminal justice system involvement.  Discharge ASAM Risk Rating: 3 Client is not engaged in structured, meaningful activity and the client's peers, family, significant other, and living environment are unsupportive, or there is significant criminal justice system involvement.    Treatment goal(s): Develop a sober support network.       Progress toward goal(s):  Patient developed healthy relationships with his peers while in treatment and participated in nightly sober support meetings. Prior to discharge, patient identified supportive resources in the community that include weekly 12-step meetings and sponsorship.      Client strengths identified during treatment were: Kind, willing, open-minded, help-seeking, and resilient      Client needs identified during treatment were: Mental and physical health concerns, relationship strain/dysfunction    Discharge Diagnosis:  Alcohol Use Disorder Severe - 303.90 (F10.20)     Discharge Medications:   Current Outpatient Medications   Medication Sig Dispense Refill    acamprosate (CAMPRAL) 333 MG EC tablet Take 2 tablets (666 mg) by mouth 3 times daily. 180 tablet 0    acetaminophen (TYLENOL) 500 MG tablet Take 2 tablets (1,000 mg) by mouth 3 times daily      buPROPion (WELLBUTRIN XL) 150 MG 24 hr tablet Take 1 tablet (150 mg) by mouth every morning 90 tablet 3    carvedilol (COREG) 12.5 MG tablet Take 1 tablet (12.5 mg) by mouth 2 times daily (with meals) 60 tablet 11    cloNIDine (CATAPRES) 0.1 MG tablet Take 0.1 mg by mouth as needed (Sleep)      lisinopril (ZESTRIL) 20 MG tablet Take 1 tablet (20 mg) by mouth daily 90 tablet 2    metFORMIN (GLUCOPHAGE XR) 500 MG 24 hr tablet Take 2 tablets (1,000 mg) by mouth daily (with dinner) 180 tablet 3    Semaglutide, 1 MG/DOSE, (OZEMPIC) 4 MG/3ML pen Inject 1 mg subcutaneously every 7 days 3 mL 0    simvastatin (ZOCOR) 20 MG tablet Take 1 tablet (20  mg) by mouth at bedtime 90 tablet 3    traZODone (DESYREL) 100 MG tablet Take 200 mg by mouth at bedtime      warfarin ANTICOAGULANT (COUMADIN) 5 MG tablet Take 5 mg by mouth every evening. Starting 9/4/24, take 1.5 tablets (7.5 mg)  every evening, except for Friday 9/6/24 and Friday 9/13/24, when patient is to take 1 tablet (5 mg) in the evening.  Next INR check scheduled for 9/16/24.       No current facility-administered medications for this encounter.     Facility-Administered Medications Ordered in Other Encounters   Medication Dose Route Frequency Provider Last Rate Last Admin    Self Administer Medications: Behavioral Services   Does not apply See Admin Instructions Corrine Alvarenga MD        Self Administer Medications: Behavioral Services   Does not apply See Admin Instructions Corrine Alvarenga MD           Discharge Plan and Recommendations:    Living arrangements at discharge: Patient is returning to his own home.  Patient terminated services due program completion. The following continued care recommendations have been made: Continued AA involvement. Patient was offered resources for outpatient programs through [place], but declined a referral being sent.    1.  Abstain from all mood-altering chemicals.   2.  Attend a minimum of three 12-step meetings per week.   3.  Obtain a sponsor and maintain regular contact with him.   4.  Monitor and comply with advice of doctors regarding physical health issues and take all medications as prescribed.   5.  Engage in individual therapy to continue addressing mental health concerns.  6.  Continue to invest in building a sober support network.   7.  Continue to monitor and gain understanding of relapse triggers and stressors through the use of development of healthy coping skills.     Prognosis:  Fair        Staff Signature: ISA Mccauley, [facility]

## 2024-09-17 NOTE — ADDENDUM NOTE
Encounter addended by: Shannan Martines LADC on: 9/17/2024 1:24 PM   Actions taken: Clinical Note Signed

## 2024-09-17 NOTE — PROGRESS NOTES
[address]  [place] [address]              Cody Bolton, 1980 : was admitted for evaluation/treatment of chemical dependency at [facility].  He took part in these program(s):     ______ The Inpatient Program   ______ The Outpatient Program   ___X___ The [facility]ging [facility] Program   ______ Lodging Day Outpatient         Date admitted: 8/20/24    Date discharged: 9/17/24     Type of discharge:     __X___ Satisfactory - completed evaluation / treatment   ______ Discharged without completing   ______ Behavioral discharge   ______ Transferred to another chemical dependency program   ______ Transferred to another type of service   ______ Left against medical advice (AMA) / Eloped               Clinicians: ISA Guerra & ISA Mccauley, [facility]     Date: 9/17/24           Time: 8:50am

## 2024-09-26 ENCOUNTER — TELEPHONE (OUTPATIENT)
Dept: ANTICOAGULATION | Facility: CLINIC | Age: 44
End: 2024-09-26

## 2024-09-26 ENCOUNTER — ANTICOAGULATION THERAPY VISIT (OUTPATIENT)
Dept: ANTICOAGULATION | Facility: CLINIC | Age: 44
End: 2024-09-26

## 2024-09-26 ENCOUNTER — OFFICE VISIT (OUTPATIENT)
Dept: FAMILY MEDICINE | Facility: CLINIC | Age: 44
End: 2024-09-26
Payer: COMMERCIAL

## 2024-09-26 VITALS
WEIGHT: 315 LBS | HEART RATE: 69 BPM | SYSTOLIC BLOOD PRESSURE: 113 MMHG | TEMPERATURE: 98.4 F | RESPIRATION RATE: 16 BRPM | HEIGHT: 72 IN | DIASTOLIC BLOOD PRESSURE: 74 MMHG | BODY MASS INDEX: 42.66 KG/M2 | OXYGEN SATURATION: 96 %

## 2024-09-26 DIAGNOSIS — E11.9 TYPE 2 DIABETES MELLITUS WITHOUT COMPLICATION, WITHOUT LONG-TERM CURRENT USE OF INSULIN (H): ICD-10-CM

## 2024-09-26 DIAGNOSIS — Z86.718 HISTORY OF DVT (DEEP VEIN THROMBOSIS): Primary | ICD-10-CM

## 2024-09-26 DIAGNOSIS — N52.01 ERECTILE DYSFUNCTION DUE TO ARTERIAL INSUFFICIENCY: ICD-10-CM

## 2024-09-26 DIAGNOSIS — Z86.718 PERSONAL HISTORY OF DVT (DEEP VEIN THROMBOSIS): ICD-10-CM

## 2024-09-26 DIAGNOSIS — E66.01 MORBID OBESITY (H): ICD-10-CM

## 2024-09-26 DIAGNOSIS — F10.21 ALCOHOL USE DISORDER, SEVERE, IN EARLY REMISSION (H): Primary | ICD-10-CM

## 2024-09-26 DIAGNOSIS — F33.1 MAJOR DEPRESSIVE DISORDER, RECURRENT EPISODE, MODERATE (H): ICD-10-CM

## 2024-09-26 PROBLEM — F10.10 ALCOHOL ABUSE, EPISODIC DRINKING BEHAVIOR: Status: RESOLVED | Noted: 2024-02-09 | Resolved: 2024-09-26

## 2024-09-26 PROBLEM — F10.20 ALCOHOL USE DISORDER, SEVERE, DEPENDENCE (H): Status: RESOLVED | Noted: 2024-08-08 | Resolved: 2024-09-26

## 2024-09-26 PROBLEM — F19.20 CHEMICAL DEPENDENCY (H): Status: RESOLVED | Noted: 2024-08-21 | Resolved: 2024-09-26

## 2024-09-26 PROBLEM — F10.20 UNCOMPLICATED ALCOHOL DEPENDENCE (H): Status: RESOLVED | Noted: 2024-08-20 | Resolved: 2024-09-26

## 2024-09-26 LAB
EST. AVERAGE GLUCOSE BLD GHB EST-MCNC: 157 MG/DL
HBA1C MFR BLD: 7.1 % (ref 0–5.6)
INR PPP: 3.58 (ref 0.85–1.15)

## 2024-09-26 PROCEDURE — 85610 PROTHROMBIN TIME: CPT | Performed by: FAMILY MEDICINE

## 2024-09-26 PROCEDURE — 83036 HEMOGLOBIN GLYCOSYLATED A1C: CPT | Performed by: FAMILY MEDICINE

## 2024-09-26 PROCEDURE — 36415 COLL VENOUS BLD VENIPUNCTURE: CPT | Performed by: FAMILY MEDICINE

## 2024-09-26 PROCEDURE — 90656 IIV3 VACC NO PRSV 0.5 ML IM: CPT | Performed by: FAMILY MEDICINE

## 2024-09-26 PROCEDURE — 99214 OFFICE O/P EST MOD 30 MIN: CPT | Mod: 25 | Performed by: FAMILY MEDICINE

## 2024-09-26 PROCEDURE — 90471 IMMUNIZATION ADMIN: CPT | Performed by: FAMILY MEDICINE

## 2024-09-26 RX ORDER — ACAMPROSATE CALCIUM 333 MG/1
666 TABLET, DELAYED RELEASE ORAL 3 TIMES DAILY
Qty: 180 TABLET | Refills: 1 | Status: SHIPPED | OUTPATIENT
Start: 2024-09-26

## 2024-09-26 RX ORDER — SILDENAFIL 100 MG/1
100 TABLET, FILM COATED ORAL DAILY PRN
Qty: 30 TABLET | Refills: 3 | Status: SHIPPED | OUTPATIENT
Start: 2024-09-26

## 2024-09-26 ASSESSMENT — PATIENT HEALTH QUESTIONNAIRE - PHQ9
SUM OF ALL RESPONSES TO PHQ QUESTIONS 1-9: 19
10. IF YOU CHECKED OFF ANY PROBLEMS, HOW DIFFICULT HAVE THESE PROBLEMS MADE IT FOR YOU TO DO YOUR WORK, TAKE CARE OF THINGS AT HOME, OR GET ALONG WITH OTHER PEOPLE: EXTREMELY DIFFICULT
SUM OF ALL RESPONSES TO PHQ QUESTIONS 1-9: 19

## 2024-09-26 ASSESSMENT — PAIN SCALES - GENERAL: PAINLEVEL: SEVERE PAIN (7)

## 2024-09-26 NOTE — LETTER
2024    Cody Bolton   1980        To Whom it May Concern;      Cody Bolton is my patient. He was evaluated today.     He is able to return to work with no restrictions on 2024.       Sincerely,        Yovana Felipe MD

## 2024-09-26 NOTE — PROGRESS NOTES
ANTICOAGULATION MANAGEMENT     Cody Bolton 44 year old male is on warfarin with supratherapeutic INR result. (Goal INR 2.0-3.0)    Recent labs: (last 7 days)     09/26/24  1510   INR 3.58*       ASSESSMENT     Source(s): Chart Review and Patient/Caregiver Call     Warfarin doses taken: Warfarin taken as instructed  Diet: Decreased greens/vitamin K in diet; ongoing change--patient reports he had been eating 2 servings of greens per week, he is now eating 1 serving per week and will continue with that regimen. Also, patient reports he has not had any alcohol since 8/15/24.  Medication/supplement changes:  has been taking campral --no interaction per [employer]; Mounjaro prescribed today, INR can either increase or decrease but reliability is fair.  New illness, injury, or hospitalization: Yes: recently discharged from [facility] for alcohol cessation.  Signs or symptoms of bleeding or clotting: No  Previous result: Therapeutic last 2(+) visits  Additional findings: patient agrees to eat spinach today. Patient prefers to decrease warfarin maintenance dose versus increasing greens.  Writer congratulated Cody on success of alcohol cessation.       PLAN     Recommended plan for ongoing change(s) affecting INR     Dosing Instructions: decrease your warfarin dose (5% change) with next INR in 2 weeks       Summary  As of 9/26/2024      Full warfarin instructions:  5 mg every Mon, Fri; 7.5 mg all other days   Next INR check:  10/10/2024               Telephone call with Cody who verbalizes understanding and agrees to plan and who agrees to plan and repeated back plan correctly    Lab visit scheduled    Education provided: Taking warfarin: Importance of taking warfarin as instructed  Dietary considerations: importance of consistent vitamin K intake, impact of vitamin K foods on INR, and vitamin K content of foods  Patient also informed that Mounjaro may or may not affect INR; patient verbalized understanding.    Plan  made per [facility] anticoagulation protocol    Corrine Randall RN  9/26/2024  Anticoagulation Clinic  Epic Coram for routing messages: fransisco KEO GARAY  ACC patient phone line: [phone]        _______________________________________________________________________     Anticoagulation Episode Summary       Current INR goal:  2.0-3.0   TTR:  53.4% (7.2 mo)   Target end date:  Indefinite   Send INR reminders to:  KEO GARAY    Indications    Acute pulmonary embolism without acute cor pulmonale  unspecified pulmonary embolism type (H) (Resolved) [I26.99]  History of DVT (deep vein thrombosis) [Z86.718]             Comments:  9/17/24- discharged from lodging plus             Anticoagulation Care Providers       Provider Role Specialty Phone number    Fredy Veras MD Referring Family Medicine [phone]    Aaseby-Aguilera, Ramona Ann, PA-C Referring Family Medicine [phone]

## 2024-09-26 NOTE — PROGRESS NOTES
Assessment & Plan     Alcohol use disorder, severe, in early remission (H) - using the campral, but hasn't had any alcohol since mid August. He is done with treatment, considering PHP for his MH in the near future. Letter written for RTW.   - acamprosate (CAMPRAL) 333 MG EC tablet; Take 2 tablets (666 mg) by mouth 3 times daily.    Type 2 diabetes mellitus without complication, without long-term current use of insulin (H) - surveillance labs today, will switch back to the mounjaro as he felt side effects were fewer and that it helped better with appetite.  - Hemoglobin A1c; Future  - tirzepatide (MOUNJARO) 7.5 MG/0.5ML pen; Inject 7.5 mg subcutaneously every 7 days.  - Hemoglobin A1c    Morbid obesity (H) - weight down since he eliminated alcohol and is using GLP1    Personal history of DVT (deep vein thrombosis)   - INR; Future  - INR    Erectile dysfunction due to arterial insufficiency - requesting med to help with difficulty obtaining erections. Reviewed potential side effects.   - sildenafil (VIAGRA) 100 MG tablet; Take 1 tablet (100 mg) by mouth daily as needed (erectile dysfunction).    Major depressive disorder, recurrent episode, moderate (H)        9/26/2024     1:57 PM   PHQ   PHQ-9 Total Score 19   Q9: Thoughts of better off dead/self-harm past 2 weeks More than half the days   F/U: Thoughts of suicide or self-harm Yes   F/U: Self harm-plan No   F/U: Self-harm action No   F/U: Safety concerns No       Contracts for safety today, will continue with his therapist - feels like this is helping. Declined medication change today.     The longitudinal plan of care for the diagnosis(es)/condition(s) as documented were addressed during this visit. Due to the added complexity in care, I will continue to support Cody in the subsequent management and with ongoing continuity of care.      Subjective   Cody is a 44 year old, presenting for the following health issues:  Recheck Medication (Return to work  authorization)        9/26/2024     2:11 PM   Additional Questions   Roomed by Zari   Accompanied by self     History of Present Illness       Reason for visit:  Going back to work    He eats 0-1 servings of fruits and vegetables daily.He consumes 0 sweetened beverage(s) daily.He exercises with enough effort to increase his heart rate 20 to 29 minutes per day.  He exercises with enough effort to increase his heart rate 3 or less days per week.   He is taking medications regularly.           Diabetes Follow-up    How often are you checking your blood sugar? Two times daily  Blood sugar testing frequency justification:  Uncontrolled diabetes  What time of day are you checking your blood sugars (select all that apply)?  Before meals  Have you had any blood sugars above 200?  No  Have you had any blood sugars below 70?  No  What symptoms do you notice when your blood sugar is low?  None  What concerns do you have today about your diabetes? None   Do you have any of these symptoms? (Select all that apply)  Numbness in feet          Hyperlipidemia Follow-Up    Are you regularly taking any medication or supplement to lower your cholesterol?   Yes- Simvastating  Are you having muscle aches or other side effects that you think could be caused by your cholesterol lowering medication?  No    Hypertension Follow-up    Do you check your blood pressure regularly outside of the clinic? No   Are you following a low salt diet? No  Are your blood pressures ever more than 140 on the top number (systolic) OR more   than 90 on the bottom number (diastolic), for example 140/90? No    BP Readings from Last 2 Encounters:   09/26/24 113/74   08/30/24 (!) 177/88     Hemoglobin A1C (%)   Date Value   06/11/2024 7.2 (H)   02/04/2024 9.1 (H)     LDL Cholesterol Calculated (mg/dL)   Date Value   06/11/2024 107 (H)   03/11/2024 86             Review of Systems  Constitutional, neuro, ENT, endocrine, pulmonary, cardiac, gastrointestinal,  genitourinary, musculoskeletal, integument and psychiatric systems are negative, except as otherwise noted.      Objective    /74 (BP Location: Right arm, Patient Position: Sitting, Cuff Size: Adult Large)   Pulse 69   Temp 98.4  F (36.9  C) (Oral)   Resp 16   Ht 1.829 m (6')   Wt (!) 159.5 kg (351 lb 9.6 oz)   SpO2 96%   BMI 47.69 kg/m    Body mass index is 47.69 kg/m .  Physical Exam   GENERAL: alert and no distress  PSYCH: mentation appears normal, affect normal/bright          Signed Electronically by: Yovana Felipe MD

## 2024-09-26 NOTE — TELEPHONE ENCOUNTER
ANTICOAGULATION     Cody Bolton is overdue for an INR check.     Spoke with Cody and scheduled lab appointment on today 9/26/24 at follow-up appt with Dr. Destinee Deras, RN  9/26/2024  Anticoagulation Clinic  [facility] for routing messages: fransisco GARAY  ACC patient phone line: [phone]

## 2024-10-02 ENCOUNTER — VIRTUAL VISIT (OUTPATIENT)
Dept: BEHAVIORAL HEALTH | Facility: CLINIC | Age: 44
End: 2024-10-02
Payer: COMMERCIAL

## 2024-10-02 DIAGNOSIS — F33.1 MAJOR DEPRESSIVE DISORDER, RECURRENT EPISODE, MODERATE (H): Primary | ICD-10-CM

## 2024-10-02 PROCEDURE — 90834 PSYTX W PT 45 MINUTES: CPT | Mod: 95

## 2024-10-02 PROCEDURE — 90785 PSYTX COMPLEX INTERACTIVE: CPT | Mod: 95

## 2024-10-02 ASSESSMENT — COLUMBIA-SUICIDE SEVERITY RATING SCALE - C-SSRS
2. HAVE YOU ACTUALLY HAD ANY THOUGHTS OF KILLING YOURSELF?: NO
1. SINCE LAST CONTACT, HAVE YOU WISHED YOU WERE DEAD OR WISHED YOU COULD GO TO SLEEP AND NOT WAKE UP?: NO

## 2024-10-02 NOTE — PROGRESS NOTES
[facility]                                     Progress Note    Patient Name: Cody Bolton  Date: 10/02/24           Service Type: Individual      Session Start Time: 3.53    Session End Time: 4.37     Session Length: 38-52 minutes    Session #: 12    Attendees: Client attended alone    Service Modality:  Video Visit:      Provider verified identity through the following two step process.  Patient provided:  Patient  and Patient address    Telemedicine Visit: The patient's condition can be safely assessed and treated via synchronous audio and visual telemedicine encounter.      Reason for Telemedicine Visit: Patient has requested telehealth visit    Originating Site (Patient Location): Patient's home    Distant Site (Provider Location): [facility]    Consent:  The patient/guardian has verbally consented to: the potential risks and benefits of telemedicine (video visit) versus in person care; bill my insurance or make self-payment for services provided; and responsibility for payment of non-covered services.     Patient would like the video invitation sent by:  My Chart    Mode of Communication:  Video Conference via Amwell    Distant Location (Provider):  Off-site    As the provider I attest to compliance with applicable laws and regulations related to telemedicine.    DATA  Interactive Complexity: Yes, visit entailed Interactive Complexity evidenced by:  -The need to manage maladaptive communication (related to, e.g., high anxiety, high reactivity, repeated questions, or disagreement) among participants that complicates delivery of care. Patient was frequently distracted and interrupted by other tasks, requiring refocusing and repeated questions.  Crisis: No        Progress Since Last Session (Related to Symptoms / Goals / Homework):   Symptoms: Worsening . Patient's PHQ9 score has increased to a 19.    Homework: Partially  completed      Episode of Care Goals: Minimal progress - CONTEMPLATION (Considering change and yet undecided); Intervened by assessing the negative and positive thinking (ambivalence) about behavior change     Current / Ongoing Stressors and Concerns:  Patient discussed successfully completing his chemical dependency treatment at [facility] for alcohol abuse. Patient discussed noticing an increase in his pain levels since being sober. Patient discussed not having many cravings. Patient discussed recently becoming engaged to his partner, Becca. Patient discussed his partner's active alcohol addiction. Patient discussed his recent birthday celebrations. Patient discussed struggles with trust. Patient discussed attending AA meetings and recently receiving his 30 day chip.        Treatment Objective(s) Addressed in This Session:   Decrease frequency and intensity of feeling down, depressed, hopeless       Intervention:   Motivational Interviewing    MI Intervention: Expressed Empathy/Understanding, Supported Autonomy, Collaboration, Evocation, Open-ended questions, and Reflections: simple and complex     Change Talk Expressed by the Patient: Desire to change    Provider Response to Change Talk: E - Evoked more info from patient about behavior change, A - Affirmed patient's thoughts, decisions, or attempts at behavior change, and R - Reflected patient's change talk    Assessments completed prior to visit:  The following assessments were completed by patient for this visit:  PHQ9:       7/2/2024    11:46 AM 8/7/2024     4:00 PM 8/9/2024     6:39 AM 8/13/2024    12:44 PM 8/20/2024     3:00 PM 8/23/2024     8:00 AM 9/26/2024     1:57 PM   PHQ-9 SCORE   PHQ-9 Total Score MyChart 14 (Moderate depression)  15 (Moderately severe depression) 16 (Moderately severe depression)   19 (Moderately severe depression)   PHQ-9 Total Score 14 18 15 16 13 13 19     GAD7:       2/19/2024    12:39 PM 6/4/2024    11:39 AM 8/7/2024      4:00 PM 8/9/2024     6:40 AM 8/20/2024     3:00 PM 8/23/2024     8:00 AM   MERI-7 SCORE   Total Score 2 (minimal anxiety) 7 (mild anxiety)  7 (mild anxiety)     Total Score 2 7 6 7 9 9     PROMIS 10-Global Health (all questions and answers displayed):       2/19/2024    12:53 PM 6/4/2024    11:40 AM 8/7/2024     4:00 PM 8/23/2024     8:00 AM   PROMIS 10   In general, would you say your health is: Fair Fair     In general, would you say your quality of life is: Good Poor     In general, how would you rate your physical health? Fair Fair     In general, how would you rate your mental health, including your mood and your ability to think? Good Fair     In general, how would you rate your satisfaction with your social activities and relationships? Good Fair     In general, please rate how well you carry out your usual social activities and roles Fair Poor     To what extent are you able to carry out your everyday physical activities such as walking, climbing stairs, carrying groceries, or moving a chair? Completely A little     In the past 7 days, how often have you been bothered by emotional problems such as feeling anxious, depressed, or irritable? Sometimes Often     In the past 7 days, how would you rate your fatigue on average? Mild Severe     In the past 7 days, how would you rate your pain on average, where 0 means no pain, and 10 means worst imaginable pain? 5 8     In general, would you say your health is: 2 2     In general, would you say your quality of life is: 3 1     In general, how would you rate your physical health? 2 2     In general, how would you rate your mental health, including your mood and your ability to think? 3 2     In general, how would you rate your satisfaction with your social activities and relationships? 3 2     In general, please rate how well you carry out your usual social activities and roles. (This includes activities at home, at work and in your community, and responsibilities  as a parent, child, spouse, employee, friend, etc.) 2 1     To what extent are you able to carry out your everyday physical activities such as walking, climbing stairs, carrying groceries, or moving a chair? 5 2     In the past 7 days, how often have you been bothered by emotional problems such as feeling anxious, depressed, or irritable? 3 4     In the past 7 days, how would you rate your fatigue on average? 2 4     In the past 7 days, how would you rate your pain on average, where 0 means no pain, and 10 means worst imaginable pain? 5 8     Global Mental Health Score 12    12 7     Global Physical Health Score 14    14 8     PROMIS TOTAL - SUBSCORES 26    26 15         Information is confidential and restricted. Go to Review Flowsheets to unlock data.    Multiple values from one day are sorted in reverse-chronological order     [place] Suicide Severity Rating Scale (Lifetime/Recent)      5/4/2024     6:08 PM 5/16/2024     9:52 AM 6/8/2024     3:11 AM 7/16/2024     4:09 PM 8/19/2024     4:03 PM 8/20/2024     4:00 AM 8/30/2024     8:26 AM   [place] Suicide Severity Rating (Lifetime/Recent)   Q1 Wish to be Dead (Lifetime)      No    Q2 Non-Specific Active Suicidal Thoughts (Lifetime)      No    Q1 Wished to be Dead (Past Month) 1-->yes 0-->no 0-->no 0-->no 0-->no 0-->no 0-->no   Q2 Suicidal Thoughts (Past Month) 1-->yes 0-->no 0-->no 0-->no 1-->yes 0-->no 0-->no   Q3 Suicidal Thought Method     0-->no 0-->no    Q4 Suicidal Intent without Specific Plan     0-->no 0-->no    Q5 Suicide Intent with Specific Plan     0-->no 0-->no    Q6 Suicide Behavior (Lifetime) 1-->yes 0-->no 0-->no 0-->no 0-->no 0-->no 0-->no   If yes to Q6, within past 3 months? 1-->yes         Level of Risk per Screen high risk no risks indicated no risks indicated no risks indicated low risk no risks indicated no risks indicated         ASSESSMENT: Current Emotional / Mental Status (status of significant symptoms):   Risk status (Self / Other  harm or suicidal ideation)   Patient denies current fears or concerns for personal safety.   Patient denies current or recent suicidal ideation or behaviors.    Patient denies current or recent homicidal ideation or behaviors.   Patient denies current or recent self injurious behavior or ideation.   Patient denies other safety concerns.   Patient reports there has been no change in risk factors since their last session.     Patient reports there has been no change in protective factors since their last session.     A safety and risk management plan has been developed including: Patient consented to co-developed safety plan.  Safety and risk management plan was completed - see below.  Patient agreed to use safety plan should any safety concerns arise.  A copy was given to the patient.      Appearance:   Appropriate    Eye Contact:   Fair    Psychomotor Behavior: Normal    Attitude:   Cooperative  Friendly   Orientation:   All   Speech    Rate / Production: Normal/ Responsive    Volume:  Normal    Mood:    Anxious  Depressed    Affect:    Appropriate    Thought Content:  Clear    Thought Form:  Coherent  Logical    Insight:    Fair      Medication Review:   No changes to current psychiatric medication(s)     Medication Compliance:   Yes     Changes in Health Issues:   None reported     Chemical Use Review:   Substance Use: decrease in alcohol .  Patient reports frequency of use : Patient reports successfully completing chemical dependency treatment for alcohol abuse at [facility] in September 2024. Patient reports being sober from alcohol since 8/16/24..  Provided support and affirmation for steps taken towards sobriety    .      Tobacco Use: No current tobacco use.      Diagnosis:  1. Major depressive disorder, recurrent episode, moderate (H)    Provisional Diagnosis: Alcohol Use Disorder    Collateral Reports Completed:   Not Applicable    PLAN: (Patient Tasks / Therapist Tasks / Other)  Patient will  "return in 2 weeks for next session. Patient will engage in self-care activities. Patient will work on mindfulness skills to increase awareness of triggers to alcohol cravings and depression. Patient will work on open communication skills and boundary setting. Patient will regularly utilize his coping skills, including listening to music. Patient will regularly evaluate his HALT cues that lead to alcohol cravings. Patient will utilize the \"delay, distract, decide\" skill if noticing any alcohol cravings. Patient will look into taking a Vitamin D supplement as approved by his PCP. Patient will continue to remain sober from alcohol and attend at least two AA meeting per week.       Lucy Rouse, [facility]                                                         ______________________________________________________________________    Individual Treatment Plan    Patient's Name: Cody Bolton  YOB: 1980    Date of Creation: 2/26/24  Date Treatment Plan Last Reviewed/Revised: 10/2/24    DSM5 Diagnoses:   Encounter Diagnosis   Name Primary?    Major depressive disorder, recurrent episode, moderate (H) Yes       Psychosocial / Contextual Factors: Relationship stressors, occupational dynamics.  PROMIS-10 from encounters over the past 365 days    Encounter date  Last reading 6/4/24  11:40 AM 2/26/24 2/19/2024 12:53 PM 2/19/24  12:53 PM   Global Mental Health Score (P) 7 (P) 12 (P) 12   Global Physical Health Score (P) 8 (P) 14 (P) 14   PROMIS TOTAL - SUBSCORES (P) 15 (P) 26 (P) 26       Referral / Collaboration:  Referral to another professional/service is not indicated at this time..    Anticipated number of session for this episode of care: 9-12 sessions  Anticipation frequency of session: Weekly  Anticipated Duration of each session: 38-52 minutes  Treatment plan will be reviewed in 90 days or when goals have been changed.       MeasurableTreatment Goal(s) related to diagnosis / functional impairment(s)  Goal " 1: Patient will reduce depression symptoms as evidenced by a reduction in PHQ9 score to a 7 or less in the next 2 months.    I will know I've met my goal when I'm not having the feeling as often that I just don't want to do life anymore.      Objective #A (Patient Action)    Patient will Decrease frequency and intensity of feeling down, depressed, hopeless.  Status: Continued - Date(s): 10/2/24    Intervention(s)  Therapist will teach  coping skills and self-care activities .    Objective #B  Patient will Feel less tired and more energy during the day .  Status: Continued - Date(s): 10/2/24    Intervention(s)  Therapist will  teach sleep hygiene and movement-based exercises .    Objective #C  Patient will Increase interest, engagement, and pleasure in doing things.  Status: Continued - Date(s):10/2/24     Intervention(s)  Therapist will  teach distress-tolerance skills. .        Patient has reviewed and agreed to the above plan.      KATRIN Salvador on 10/2/2024 at 4:00 PM

## 2024-10-10 ENCOUNTER — ANTICOAGULATION THERAPY VISIT (OUTPATIENT)
Dept: ANTICOAGULATION | Facility: CLINIC | Age: 44
End: 2024-10-10

## 2024-10-10 ENCOUNTER — LAB (OUTPATIENT)
Dept: LAB | Facility: CLINIC | Age: 44
End: 2024-10-10
Payer: COMMERCIAL

## 2024-10-10 DIAGNOSIS — Z86.718 HISTORY OF DVT (DEEP VEIN THROMBOSIS): Primary | ICD-10-CM

## 2024-10-10 DIAGNOSIS — Z86.711 HISTORY OF PULMONARY EMBOLISM: ICD-10-CM

## 2024-10-10 DIAGNOSIS — Z86.718 HISTORY OF DVT (DEEP VEIN THROMBOSIS): ICD-10-CM

## 2024-10-10 LAB — INR BLD: 3.6 (ref 0.9–1.1)

## 2024-10-10 PROCEDURE — 85610 PROTHROMBIN TIME: CPT

## 2024-10-10 PROCEDURE — 36416 COLLJ CAPILLARY BLOOD SPEC: CPT

## 2024-10-10 NOTE — PROGRESS NOTES
ANTICOAGULATION MANAGEMENT     Cody WALLACE Young 44 year old male is on warfarin with supratherapeutic INR result. (Goal INR 2.0-3.0)    Recent labs: (last 7 days)     10/10/24  1146   INR 3.6*       ASSESSMENT     Source(s): Chart Review and Patient/Caregiver Call     Warfarin doses taken: Warfarin taken as instructed  Diet: No new diet changes identified  Medication/supplement changes: None noted  New illness, injury, or hospitalization: No  Signs or symptoms of bleeding or clotting: No  Previous result: Supratherapeutic  Additional findings: None       PLAN     Recommended plan for ongoing change(s) affecting INR     Dosing Instructions: decrease your warfarin dose (5.3% change) with next INR in 2 weeks       Summary  As of 10/10/2024      Full warfarin instructions:  5 mg every Mon, Wed, Fri; 7.5 mg all other days   Next INR check:  10/24/2024               Telephone call with Cody who verbalizes understanding and agrees to plan    Lab visit scheduled    Education provided: Please call back if any changes to your diet, medications or how you've been taking warfarin    Plan made per [facility] anticoagulation protocol    Gerson Baker RN  10/10/2024  Anticoagulation Clinic  [employer] for routing messages: fransisco GARAY  ACC patient phone line: [phone]        _______________________________________________________________________     Anticoagulation Episode Summary       Current INR goal:  2.0-3.0   TTR:  50.2% (7.6 mo)   Target end date:  Indefinite   Send INR reminders to:  KEO GARAY    Indications    Acute pulmonary embolism without acute cor pulmonale  unspecified pulmonary embolism type (H) (Resolved) [I26.99]  History of DVT (deep vein thrombosis) [Z86.718]             Comments:               Anticoagulation Care Providers       Provider Role Specialty Phone number    Aaseby-Aguilera, Ramona Ann, PA-C Referring Family Medicine [phone]

## 2024-10-15 ENCOUNTER — VIRTUAL VISIT (OUTPATIENT)
Dept: BEHAVIORAL HEALTH | Facility: CLINIC | Age: 44
End: 2024-10-15
Payer: COMMERCIAL

## 2024-10-15 DIAGNOSIS — F33.1 MAJOR DEPRESSIVE DISORDER, RECURRENT EPISODE, MODERATE (H): Primary | ICD-10-CM

## 2024-10-15 PROCEDURE — 90834 PSYTX W PT 45 MINUTES: CPT | Mod: 95

## 2024-10-15 NOTE — PROGRESS NOTES
[facility]                                     Progress Note    Patient Name: Cody Bolton  Date: 10/15/24           Service Type: Individual      Session Start Time: 9.00    Session End Time: 9.39     Session Length: 38-52 minutes    Session #: 13    Attendees: Client attended alone    Service Modality:  Video Visit:      Provider verified identity through the following two step process.  Patient provided:  Patient  and Patient address    Telemedicine Visit: The patient's condition can be safely assessed and treated via synchronous audio and visual telemedicine encounter.      Reason for Telemedicine Visit: Patient has requested telehealth visit    Originating Site (Patient Location): Patient's home    Distant Site (Provider Location): [facility]    Consent:  The patient/guardian has verbally consented to: the potential risks and benefits of telemedicine (video visit) versus in person care; bill my insurance or make self-payment for services provided; and responsibility for payment of non-covered services.     Patient would like the video invitation sent by:  My Chart    Mode of Communication:  Video Conference via Amwell    Distant Location (Provider):  Off-site    As the provider I attest to compliance with applicable laws and regulations related to telemedicine.    DATA  Interactive Complexity: No.   Crisis: No        Progress Since Last Session (Related to Symptoms / Goals / Homework):   Symptoms: Worsening . Patient's PHQ9 score has increased to a 19.    Homework: Partially completed      Episode of Care Goals: Minimal progress - PREPARATION (Decided to change - considering how); Intervened by negotiating a change plan and determining options / strategies for behavior change, identifying triggers, exploring social supports, and working towards setting a date to begin behavior change     Current / Ongoing Stressors and  Concerns:  Patient discussed being 60 days sober from alcohol. Patient discussed noticing some alcohol cravings, but not acting on them. Patient discussed his engagement to his partner, Becca. Patient discussed his partner's active alcohol addiction and unwillingness to go to chemical dependency treatment. Patient discussed attending AA meetings. Patient discussed noticing some triggers when listening to other's stories at AA meetings. Patient discussed enabling behaviors and buying alcohol for his partner. Patient discussed struggles with trusting his partner. Patient discussed noticing an increase in his pain levels since being sober. Patient discussed looking at possibly purchasing a condo.     Treatment Objective(s) Addressed in This Session:   Decrease frequency and intensity of feeling down, depressed, hopeless       Intervention:   Motivational Interviewing    MI Intervention: Expressed Empathy/Understanding, Supported Autonomy, Collaboration, Evocation, Open-ended questions, and Reflections: simple and complex     Change Talk Expressed by the Patient: Desire to change    Provider Response to Change Talk: E - Evoked more info from patient about behavior change, A - Affirmed patient's thoughts, decisions, or attempts at behavior change, and R - Reflected patient's change talk    Assessments completed prior to visit:  The following assessments were completed by patient for this visit:  PHQ9:       7/2/2024    11:46 AM 8/7/2024     4:00 PM 8/9/2024     6:39 AM 8/13/2024    12:44 PM 8/20/2024     3:00 PM 8/23/2024     8:00 AM 9/26/2024     1:57 PM   PHQ-9 SCORE   PHQ-9 Total Score MyChart 14 (Moderate depression)  15 (Moderately severe depression) 16 (Moderately severe depression)   19 (Moderately severe depression)   PHQ-9 Total Score 14 18 15 16 13 13 19     GAD7:       2/19/2024    12:39 PM 6/4/2024    11:39 AM 8/7/2024     4:00 PM 8/9/2024     6:40 AM 8/20/2024     3:00 PM 8/23/2024     8:00 AM   MERI-7 SCORE    Total Score 2 (minimal anxiety) 7 (mild anxiety)  7 (mild anxiety)     Total Score 2 7 6 7 9 9     PROMIS 10-Global Health (all questions and answers displayed):       2/19/2024    12:53 PM 6/4/2024    11:40 AM 8/7/2024     4:00 PM 8/23/2024     8:00 AM   PROMIS 10   In general, would you say your health is: Fair Fair     In general, would you say your quality of life is: Good Poor     In general, how would you rate your physical health? Fair Fair     In general, how would you rate your mental health, including your mood and your ability to think? Good Fair     In general, how would you rate your satisfaction with your social activities and relationships? Good Fair     In general, please rate how well you carry out your usual social activities and roles Fair Poor     To what extent are you able to carry out your everyday physical activities such as walking, climbing stairs, carrying groceries, or moving a chair? Completely A little     In the past 7 days, how often have you been bothered by emotional problems such as feeling anxious, depressed, or irritable? Sometimes Often     In the past 7 days, how would you rate your fatigue on average? Mild Severe     In the past 7 days, how would you rate your pain on average, where 0 means no pain, and 10 means worst imaginable pain? 5 8     In general, would you say your health is: 2 2     In general, would you say your quality of life is: 3 1     In general, how would you rate your physical health? 2 2     In general, how would you rate your mental health, including your mood and your ability to think? 3 2     In general, how would you rate your satisfaction with your social activities and relationships? 3 2     In general, please rate how well you carry out your usual social activities and roles. (This includes activities at home, at work and in your community, and responsibilities as a parent, child, spouse, employee, friend, etc.) 2 1     To what extent are you able to  carry out your everyday physical activities such as walking, climbing stairs, carrying groceries, or moving a chair? 5 2     In the past 7 days, how often have you been bothered by emotional problems such as feeling anxious, depressed, or irritable? 3 4     In the past 7 days, how would you rate your fatigue on average? 2 4     In the past 7 days, how would you rate your pain on average, where 0 means no pain, and 10 means worst imaginable pain? 5 8     Global Mental Health Score 12    12 7     Global Physical Health Score 14    14 8     PROMIS TOTAL - SUBSCORES 26    26 15         Information is confidential and restricted. Go to Review Flowsheets to unlock data.    Multiple values from one day are sorted in reverse-chronological order     Jack Suicide Severity Rating Scale (Lifetime/Recent)      5/4/2024     6:08 PM 5/16/2024     9:52 AM 6/8/2024     3:11 AM 7/16/2024     4:09 PM 8/19/2024     4:03 PM 8/20/2024     4:00 AM 8/30/2024     8:26 AM   Jack Suicide Severity Rating (Lifetime/Recent)   Q1 Wish to be Dead (Lifetime)      No    Q2 Non-Specific Active Suicidal Thoughts (Lifetime)      No    Q1 Wished to be Dead (Past Month) 1-->yes 0-->no 0-->no 0-->no 0-->no 0-->no 0-->no   Q2 Suicidal Thoughts (Past Month) 1-->yes 0-->no 0-->no 0-->no 1-->yes 0-->no 0-->no   Q3 Suicidal Thought Method     0-->no 0-->no    Q4 Suicidal Intent without Specific Plan     0-->no 0-->no    Q5 Suicide Intent with Specific Plan     0-->no 0-->no    Q6 Suicide Behavior (Lifetime) 1-->yes 0-->no 0-->no 0-->no 0-->no 0-->no 0-->no   If yes to Q6, within past 3 months? 1-->yes         Level of Risk per Screen high risk no risks indicated no risks indicated no risks indicated low risk no risks indicated no risks indicated         ASSESSMENT: Current Emotional / Mental Status (status of significant symptoms):   Risk status (Self / Other harm or suicidal ideation)   Patient denies current fears or concerns for personal  safety.   Patient denies current or recent suicidal ideation or behaviors.    Patient denies current or recent homicidal ideation or behaviors.   Patient denies current or recent self injurious behavior or ideation.   Patient denies other safety concerns.   Patient reports there has been no change in risk factors since their last session.     Patient reports there has been no change in protective factors since their last session.     A safety and risk management plan has been developed including: Patient consented to co-developed safety plan.  Safety and risk management plan was completed - see below.  Patient agreed to use safety plan should any safety concerns arise.  A copy was given to the patient.      Appearance:   Appropriate    Eye Contact:   Fair    Psychomotor Behavior: Normal    Attitude:   Cooperative  Friendly   Orientation:   All   Speech    Rate / Production: Normal/ Responsive    Volume:  Normal    Mood:    Anxious  Depressed    Affect:    Appropriate    Thought Content:  Clear    Thought Form:  Coherent  Logical    Insight:    Fair      Medication Review:   No changes to current psychiatric medication(s)     Medication Compliance:   Yes     Changes in Health Issues:   None reported     Chemical Use Review:   Substance Use: decrease in alcohol .  Patient reports frequency of use : Patient reports successfully completing chemical dependency treatment for alcohol abuse at [facility] in September 2024. Patient reports being sober from alcohol since 8/16/24..  Provided support and affirmation for steps taken towards sobriety    .      Tobacco Use: No current tobacco use.      Diagnosis:  1. Major depressive disorder, recurrent episode, moderate (H)    Other Diagnosis: Alcohol Use Disorder     Collateral Reports Completed:   Not Applicable    PLAN: (Patient Tasks / Therapist Tasks / Other)  Patient will return in 3 weeks for next session. Patient will engage in self-care activities. Patient  "will work on mindfulness skills to increase awareness of triggers to alcohol cravings and depression. Patient will work on open communication skills and boundary setting. Patient will regularly utilize his coping skills, including listening to music. Patient will regularly evaluate his HALT cues that lead to alcohol cravings. Patient will utilize the \"delay, distract, decide\" skill when noticing alcohol cravings. Patient will look into taking a Vitamin D supplement as approved by his PCP. Patient will continue to remain sober from alcohol and attend at least two AA meetings per week. Patient will eliminate enabling behaviors, including buying alcohol for his partner.      Lucy Rouse, [facility]                                                         ______________________________________________________________________    Individual Treatment Plan    Patient's Name: Cody Bolton  YOB: 1980    Date of Creation: 2/26/24  Date Treatment Plan Last Reviewed/Revised: 10/2/24    DSM5 Diagnoses:   Encounter Diagnosis   Name Primary?    Major depressive disorder, recurrent episode, moderate (H) Yes       Psychosocial / Contextual Factors: Relationship stressors, occupational dynamics.  PROMIS-10 from encounters over the past 365 days    Encounter date  Last reading 6/4/24  11:40 AM 2/26/24 2/19/2024 12:53 PM 2/19/24  12:53 PM   Global Mental Health Score (P) 7 (P) 12 (P) 12   Global Physical Health Score (P) 8 (P) 14 (P) 14   PROMIS TOTAL - SUBSCORES (P) 15 (P) 26 (P) 26       Referral / Collaboration:  Referral to another professional/service is not indicated at this time..    Anticipated number of session for this episode of care: 9-12 sessions  Anticipation frequency of session: Weekly  Anticipated Duration of each session: 38-52 minutes  Treatment plan will be reviewed in 90 days or when goals have been changed.       MeasurableTreatment Goal(s) related to diagnosis / functional impairment(s)  Goal 1: " Patient will reduce depression symptoms as evidenced by a reduction in PHQ9 score to a 7 or less in the next 2 months.    I will know I've met my goal when I'm not having the feeling as often that I just don't want to do life anymore.      Objective #A (Patient Action)    Patient will Decrease frequency and intensity of feeling down, depressed, hopeless.  Status: Continued - Date(s): 10/2/24    Intervention(s)  Therapist will teach  coping skills and self-care activities .    Objective #B  Patient will Feel less tired and more energy during the day .  Status: Continued - Date(s): 10/2/24    Intervention(s)  Therapist will  teach sleep hygiene and movement-based exercises .    Objective #C  Patient will Increase interest, engagement, and pleasure in doing things.  Status: Continued - Date(s):10/2/24     Intervention(s)  Therapist will  teach distress-tolerance skills. .        Patient has reviewed and agreed to the above plan.      Lucy Rouse [facility] on 10/2/2024 at 4:00 PM

## 2024-10-21 ENCOUNTER — HOSPITAL ENCOUNTER (EMERGENCY)
Facility: CLINIC | Age: 44
Discharge: HOME | End: 2024-10-22
Attending: EMERGENCY MEDICINE | Admitting: EMERGENCY MEDICINE
Payer: COMMERCIAL

## 2024-10-21 ENCOUNTER — APPOINTMENT (OUTPATIENT)
Dept: GENERAL RADIOLOGY | Facility: CLINIC | Age: 44
End: 2024-10-21
Attending: EMERGENCY MEDICINE
Payer: COMMERCIAL

## 2024-10-21 DIAGNOSIS — F10.129 ALCOHOL ABUSE WITH INTOXICATION (H): ICD-10-CM

## 2024-10-21 DIAGNOSIS — F32.A DEPRESSION, UNSPECIFIED DEPRESSION TYPE: ICD-10-CM

## 2024-10-21 LAB
ALBUMIN SERPL BCG-MCNC: 4 G/DL (ref 3.5–5.2)
ALP SERPL-CCNC: 127 U/L (ref 40–150)
ALT SERPL W P-5'-P-CCNC: 62 U/L (ref 0–70)
ANION GAP SERPL CALCULATED.3IONS-SCNC: 14 MMOL/L (ref 7–15)
AST SERPL W P-5'-P-CCNC: 60 U/L (ref 0–45)
BASOPHILS # BLD AUTO: 0.1 10E3/UL (ref 0–0.2)
BASOPHILS NFR BLD AUTO: 1 %
BILIRUB DIRECT SERPL-MCNC: <0.2 MG/DL (ref 0–0.3)
BILIRUB SERPL-MCNC: 0.3 MG/DL
BUN SERPL-MCNC: 7.2 MG/DL (ref 6–20)
CALCIUM SERPL-MCNC: 8.1 MG/DL (ref 8.8–10.4)
CHLORIDE SERPL-SCNC: 105 MMOL/L (ref 98–107)
CREAT SERPL-MCNC: 0.83 MG/DL (ref 0.67–1.17)
EGFRCR SERPLBLD CKD-EPI 2021: >90 ML/MIN/1.73M2
EOSINOPHIL # BLD AUTO: 0.2 10E3/UL (ref 0–0.7)
EOSINOPHIL NFR BLD AUTO: 2 %
ERYTHROCYTE [DISTWIDTH] IN BLOOD BY AUTOMATED COUNT: 16.3 % (ref 10–15)
ETHANOL SERPL-MCNC: 0.17 G/DL
FLUAV RNA SPEC QL NAA+PROBE: NEGATIVE
FLUBV RNA RESP QL NAA+PROBE: NEGATIVE
GLUCOSE SERPL-MCNC: 240 MG/DL (ref 70–99)
HCO3 SERPL-SCNC: 23 MMOL/L (ref 22–29)
HCT VFR BLD AUTO: 41.5 % (ref 40–53)
HGB BLD-MCNC: 13.3 G/DL (ref 13.3–17.7)
HOLD SPECIMEN: NORMAL
IMM GRANULOCYTES # BLD: 0 10E3/UL
IMM GRANULOCYTES NFR BLD: 0 %
LYMPHOCYTES # BLD AUTO: 2.4 10E3/UL (ref 0.8–5.3)
LYMPHOCYTES NFR BLD AUTO: 28 %
MAGNESIUM SERPL-MCNC: 2 MG/DL (ref 1.7–2.3)
MCH RBC QN AUTO: 27 PG (ref 26.5–33)
MCHC RBC AUTO-ENTMCNC: 32 G/DL (ref 31.5–36.5)
MCV RBC AUTO: 84 FL (ref 78–100)
MONOCYTES # BLD AUTO: 0.8 10E3/UL (ref 0–1.3)
MONOCYTES NFR BLD AUTO: 9 %
NEUTROPHILS # BLD AUTO: 5.3 10E3/UL (ref 1.6–8.3)
NEUTROPHILS NFR BLD AUTO: 61 %
NRBC # BLD AUTO: 0 10E3/UL
NRBC BLD AUTO-RTO: 0 /100
PLATELET # BLD AUTO: 258 10E3/UL (ref 150–450)
POTASSIUM SERPL-SCNC: 3.7 MMOL/L (ref 3.4–5.3)
PROT SERPL-MCNC: 7 G/DL (ref 6.4–8.3)
RBC # BLD AUTO: 4.92 10E6/UL (ref 4.4–5.9)
RSV RNA SPEC NAA+PROBE: NEGATIVE
SARS-COV-2 RNA RESP QL NAA+PROBE: NEGATIVE
SODIUM SERPL-SCNC: 142 MMOL/L (ref 135–145)
WBC # BLD AUTO: 8.8 10E3/UL (ref 4–11)

## 2024-10-21 PROCEDURE — 82248 BILIRUBIN DIRECT: CPT | Performed by: EMERGENCY MEDICINE

## 2024-10-21 PROCEDURE — 71046 X-RAY EXAM CHEST 2 VIEWS: CPT

## 2024-10-21 PROCEDURE — 99285 EMERGENCY DEPT VISIT HI MDM: CPT | Mod: 25

## 2024-10-21 PROCEDURE — 36415 COLL VENOUS BLD VENIPUNCTURE: CPT | Performed by: EMERGENCY MEDICINE

## 2024-10-21 PROCEDURE — 83735 ASSAY OF MAGNESIUM: CPT | Performed by: EMERGENCY MEDICINE

## 2024-10-21 PROCEDURE — 82077 ASSAY SPEC XCP UR&BREATH IA: CPT | Performed by: EMERGENCY MEDICINE

## 2024-10-21 PROCEDURE — 87637 SARSCOV2&INF A&B&RSV AMP PRB: CPT | Performed by: EMERGENCY MEDICINE

## 2024-10-21 PROCEDURE — 85004 AUTOMATED DIFF WBC COUNT: CPT | Performed by: EMERGENCY MEDICINE

## 2024-10-21 PROCEDURE — 93005 ELECTROCARDIOGRAM TRACING: CPT

## 2024-10-21 PROCEDURE — 80048 BASIC METABOLIC PNL TOTAL CA: CPT | Performed by: EMERGENCY MEDICINE

## 2024-10-21 ASSESSMENT — ACTIVITIES OF DAILY LIVING (ADL)
ADLS_ACUITY_SCORE: 38
ADLS_ACUITY_SCORE: 38

## 2024-10-21 ASSESSMENT — COLUMBIA-SUICIDE SEVERITY RATING SCALE - C-SSRS
6. HAVE YOU EVER DONE ANYTHING, STARTED TO DO ANYTHING, OR PREPARED TO DO ANYTHING TO END YOUR LIFE?: YES
1. IN THE PAST MONTH, HAVE YOU WISHED YOU WERE DEAD OR WISHED YOU COULD GO TO SLEEP AND NOT WAKE UP?: NO
6. HAVE YOU EVER DONE ANYTHING, STARTED TO DO ANYTHING, OR PREPARED TO DO ANYTHING TO END YOUR LIFE?: NO
2. HAVE YOU ACTUALLY HAD ANY THOUGHTS OF KILLING YOURSELF IN THE PAST MONTH?: YES
BASED ON RESPONSES TO C-SSRS QS 1-6, WHAT IS THE PATIENT'S OVERALL RISK RATING FOR SUICIDE: LOW RISK
2. HAVE YOU ACTUALLY HAD ANY THOUGHTS OF KILLING YOURSELF IN THE PAST MONTH?: NO
1. IN THE PAST MONTH, HAVE YOU WISHED YOU WERE DEAD OR WISHED YOU COULD GO TO SLEEP AND NOT WAKE UP?: YES

## 2024-10-22 ENCOUNTER — TELEPHONE (OUTPATIENT)
Dept: ANTICOAGULATION | Facility: CLINIC | Age: 44
End: 2024-10-22
Payer: COMMERCIAL

## 2024-10-22 VITALS
TEMPERATURE: 98.6 F | SYSTOLIC BLOOD PRESSURE: 152 MMHG | HEART RATE: 75 BPM | RESPIRATION RATE: 18 BRPM | OXYGEN SATURATION: 95 % | DIASTOLIC BLOOD PRESSURE: 83 MMHG

## 2024-10-22 LAB
ALBUMIN UR-MCNC: 30 MG/DL
AMPHETAMINES UR QL SCN: NORMAL
APPEARANCE UR: ABNORMAL
ATRIAL RATE - MUSE: 81 BPM
BARBITURATES UR QL SCN: NORMAL
BENZODIAZ UR QL SCN: NORMAL
BILIRUB UR QL STRIP: NEGATIVE
BZE UR QL SCN: NORMAL
CANNABINOIDS UR QL SCN: NORMAL
CAOX CRY #/AREA URNS HPF: ABNORMAL /HPF
COLOR UR AUTO: YELLOW
DIASTOLIC BLOOD PRESSURE - MUSE: NORMAL MMHG
FENTANYL UR QL: NORMAL
GLUCOSE UR STRIP-MCNC: 300 MG/DL
HGB UR QL STRIP: ABNORMAL
HYALINE CASTS: 4 /LPF
INTERPRETATION ECG - MUSE: NORMAL
KETONES UR STRIP-MCNC: NEGATIVE MG/DL
LEUKOCYTE ESTERASE UR QL STRIP: NEGATIVE
MUCOUS THREADS #/AREA URNS LPF: PRESENT /LPF
NITRATE UR QL: NEGATIVE
OPIATES UR QL SCN: NORMAL
P AXIS - MUSE: 10 DEGREES
PCP QUAL URINE (ROCHE): NORMAL
PH UR STRIP: 5.5 [PH] (ref 5–7)
PR INTERVAL - MUSE: 146 MS
QRS DURATION - MUSE: 104 MS
QT - MUSE: 390 MS
QTC - MUSE: 453 MS
R AXIS - MUSE: 1 DEGREES
RBC URINE: 6 /HPF
SP GR UR STRIP: 1.03 (ref 1–1.03)
SQUAMOUS EPITHELIAL: 1 /HPF
SYSTOLIC BLOOD PRESSURE - MUSE: NORMAL MMHG
T AXIS - MUSE: 52 DEGREES
UROBILINOGEN UR STRIP-MCNC: NORMAL MG/DL
VENTRICULAR RATE- MUSE: 81 BPM
WBC URINE: 9 /HPF

## 2024-10-22 PROCEDURE — 81001 URINALYSIS AUTO W/SCOPE: CPT | Performed by: EMERGENCY MEDICINE

## 2024-10-22 PROCEDURE — 80307 DRUG TEST PRSMV CHEM ANLYZR: CPT | Performed by: EMERGENCY MEDICINE

## 2024-10-22 ASSESSMENT — ACTIVITIES OF DAILY LIVING (ADL)
ADLS_ACUITY_SCORE: 38

## 2024-10-22 NOTE — DISCHARGE INSTRUCTIONS
What do you do next:   Continue your home medications unless we have specifically changed them  Follow up as indicated below    When do you return: Review your discharge papers for specifics on reasons to return.    Thank you for allowing us to care for you today.

## 2024-10-22 NOTE — TELEPHONE ENCOUNTER
"ANTICOAGULATION  MANAGEMENT: Discharge Review    Cody Bolton chart reviewed for anticoagulation continuity of care    Emergency room visit on 10/21/24 for alcohol abuse.    Discharge disposition: Home    Results:    No results for input(s): \"INR\", \"[id number]\", \"F2\", \"ALMWH\", \"AAUFH\" in the last 168 hours.  Anticoagulation inpatient management:     not applicable     Anticoagulation discharge instructions:     Warfarin dosing: home regimen continued   Bridging: No   INR goal change: No      Medication changes affecting anticoagulation: No    Additional factors affecting anticoagulation: Yes: alcohol intake     PLAN     No adjustment to anticoagulation plan needed    Recommended follow up is scheduled    No adjustment to Anticoagulation Calendar was required    Gerson Baker RN  10/22/2024  Anticoagulation Clinic  [facility] for routing messages: fransisco GARAY  [facility] patient phone line: [phone]  "

## 2024-10-22 NOTE — ED TRIAGE NOTES
"Here for concern of alcohol problem. Stated that he was in rehab for about 30 days but relapse and started drinking since Saturday, last drink today of a 750mL of Vodka. History of A-fib and diabetes, is taking warfarin. Stated has suicidal ideation which why \"I'm drinking,\" but no current active thoughts. ABCs intact.      Triage Assessment (Adult)       Row Name 10/21/24 4665          Triage Assessment    Airway WDL WDL        Respiratory WDL    Respiratory WDL WDL        Cardiac WDL    Cardiac WDL WDL                     "

## 2024-10-22 NOTE — ED NOTES
No events this shift [id number]. Patient resting comfortably. Denies pain. Will reassess SI when awoke.

## 2024-10-22 NOTE — ED PROVIDER NOTES
Emergency Department Note      History of Present Illness     Chief Complaint   Alcohol Problem and Suicidal      HPI   Cody Bolton is a 44 year old male who presents to the ED with depression and concerns for alcohol abuse.  He says he was sober for 60 days and began drinking again for 5 days ago.  He is been drinking 750 mL of vodka per day.  His last drink was a few hours ago.  He also struggles with depression.  He follows through the outpatient mental health clinic.  He says he had vague suicidal thoughts but she does not want to elaborate on.  He says this is why he started drinking again now.  He says he has been having URI symptoms lately with frequent coughing.  His sister came and was concerned about his coughing and brought him in to the ED.  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  He denies any symptoms similar to when he had a PE.  No chest pain.  Other than with coughing has not been short of breath.  No leg swelling or hemoptysis.  He says he is very consistent with his warfarin.  He denies vomiting or diarrhea.  Says he has not made any attempts at self-harm recently.    Review of External Notes   Outpatient mental health notes reviewed from October 15, 2024 when the patient was seen for depression.  He also had an action plan to deal with alcohol cravings.    Past Medical History     Medical History and Problem List   Past Medical History:   Diagnosis Date    Depressive disorder     High cholesterol     History of gastric bypass     History of pulmonary embolism     Hypertension     CARLIE (obstructive sleep apnea)     Personal history of DVT (deep vein thrombosis)     Type 2 diabetes mellitus (H)     Uncomplicated asthma        Medications   acamprosate (CAMPRAL) 333 MG EC tablet  acetaminophen (TYLENOL) 500 MG tablet  buPROPion (WELLBUTRIN XL) 150 MG 24 hr tablet  carvedilol (COREG) 12.5 MG tablet  cloNIDine (CATAPRES) 0.1 MG tablet  lisinopril (ZESTRIL) 20 MG  tablet  metFORMIN (GLUCOPHAGE XR) 500 MG 24 hr tablet  sildenafil (VIAGRA) 100 MG tablet  simvastatin (ZOCOR) 20 MG tablet  tirzepatide (MOUNJARO) 7.5 MG/0.5ML pen  traZODone (DESYREL) 100 MG tablet  warfarin ANTICOAGULANT (COUMADIN) 5 MG tablet        Surgical History   Past Surgical History:   Procedure Laterality Date    APPENDECTOMY  08/15/2017    Dr. Ferrer    GASTRIC BYPASS      RI LAP,APPENDECTOMY N/A 8/14/2017    Procedure: APPENDECTOMY, LAPAROSCOPIC;  Surgeon: Nba Ferrer MD;  Location: [facility];  Service: General    REVISION AKANKSHA-EN-Y  2014    Dr. Ranjeet Espinal @[facility]       Physical Exam     Patient Vitals for the past 24 hrs:   BP Temp Temp src Pulse Resp SpO2   10/21/24 2120 (!) 155/93 98.9  F (37.2  C) Temporal 98 18 94 %     Physical Exam  Constitutional:       General: He is not in acute distress.     Appearance: Normal appearance. He is not toxic-appearing.   HENT:      Head: Atraumatic.      Right Ear: External ear normal.      Left Ear: External ear normal.      Mouth/Throat:      Pharynx: Oropharynx is clear.   Eyes:      General: No scleral icterus.     Conjunctiva/sclera: Conjunctivae normal.   Cardiovascular:      Rate and Rhythm: Normal rate and regular rhythm.      Heart sounds: Normal heart sounds.   Pulmonary:      Effort: Pulmonary effort is normal. No respiratory distress.      Breath sounds: Normal breath sounds.   Abdominal:      Palpations: Abdomen is soft.      Tenderness: There is no abdominal tenderness.   Musculoskeletal:         General: No deformity.      Cervical back: Neck supple.   Skin:     General: Skin is warm.      Capillary Refill: Capillary refill takes less than 2 seconds.   Neurological:      General: No focal deficit present.      Mental Status: He is alert and oriented to person, place, and time.   Psychiatric:      Comments: Open intoxicated.  Cooperative.  Pleasant.           Diagnostics     Lab Results   Labs Ordered and Resulted from Time  of ED Arrival to Time of ED Departure   BASIC METABOLIC PANEL - Abnormal       Result Value    Sodium 142      Potassium 3.7      Chloride 105      Carbon Dioxide (CO2) 23      Anion Gap 14      Urea Nitrogen 7.2      Creatinine 0.83      GFR Estimate >90      Calcium 8.1 (*)     Glucose 240 (*)    ETHYL ALCOHOL LEVEL - Abnormal    Alcohol ethyl 0.17 (*)    CBC WITH PLATELETS AND DIFFERENTIAL - Abnormal    WBC Count 8.8      RBC Count 4.92      Hemoglobin 13.3      Hematocrit 41.5      MCV 84      MCH 27.0      MCHC 32.0      RDW 16.3 (*)     Platelet Count 258      % Neutrophils 61      % Lymphocytes 28      % Monocytes 9      % Eosinophils 2      % Basophils 1      % Immature Granulocytes 0      NRBCs per 100 WBC 0      Absolute Neutrophils 5.3      Absolute Lymphocytes 2.4      Absolute Monocytes 0.8      Absolute Eosinophils 0.2      Absolute Basophils 0.1      Absolute Immature Granulocytes 0.0      Absolute NRBCs 0.0     HEPATIC FUNCTION PANEL - Abnormal    Protein Total 7.0      Albumin 4.0      Bilirubin Total 0.3      Alkaline Phosphatase 127      AST 60 (*)     ALT 62      Bilirubin Direct <0.20     MAGNESIUM - Normal    Magnesium 2.0     INFLUENZA A/B, RSV, & SARS-COV2 PCR - Normal    Influenza A PCR Negative      Influenza B PCR Negative      RSV PCR Negative      SARS CoV2 PCR Negative     ROUTINE UA WITH MICROSCOPIC REFLEX TO CULTURE       Imaging   XR Chest 2 Views   Final Result   IMPRESSION: Negative chest.        Chest x-ray dependently interpreted.  No pneumonia.    EKG   ECG results from 10/21/24   EKG 12-lead, tracing only     Value    Systolic Blood Pressure     Diastolic Blood Pressure     Ventricular Rate 81    Atrial Rate 81    GA Interval 146    QRS Duration 104        QTc 453    P Axis 10    R AXIS 1    T Axis 52    Interpretation ECG      Sinus rhythm  Incomplete right bundle branch block  Borderline ECG  When compared with ECG of 16-Jul-2024 18:04,  QT has lengthened        Medical  Decision Making / Diagnosis     [facility]   Cody Bolton is a 44 year old male who presents to the ED for evaluation of depression.  He states he is sad and lonely and therefore has been drinking alcohol.  He does not appear to be in withdrawal and in fact was sober for 2 months and withdrawal should be unlikely after 3 to 4 days of drinking.  He is overall hemodynamically stable and is medically clear at this point.  We are waiting DEC evaluation given his depression and vague suicidal thoughts    Diagnosis     ICD-10-CM    1. Depression, unspecified depression type  F32.A       2. Alcohol abuse with intoxication (H)  F10.129             Musa Hayward MD  10/22/24 4747

## 2024-10-22 NOTE — ED NOTES
0600: D/w Dr. Hayward. Patient notes 3-4 days of increased EtOH use. Some mental health related issues. DEC consult pending.     5838: D/W Kelsie Bueno from DEC post evaluation (and corroborating information from his sister). He notes stress from work, relationship. No SI. The patient has increased his therapy from bi-weekly to weekly. Doesn't want CD assessment or inpatient treatment. Patient comfortable discharging to home.     Franki Mckeon, DO  10/22/24 0786

## 2024-10-22 NOTE — CONSULTS
"Diagnostic Evaluation Consultation  Crisis Assessment    Patient Name: Cody Bolton  Age:  44 year old  Legal Sex: male  Gender Identity: male  Pronouns: he/him/his  Race: White  Ethnicity: Not  or   Language: English    Patient was assessed: Virtual: Maimai   Crisis Assessment Start Date: 10/22/24  Crisis Assessment Start Time: 0628  Crisis Assessment Stop Time: 0713  Patient location: [facility] EMERGENCY DEPT  [id number]    Referral Data and Chief Complaint  Cody Bolton presents to the ED with family/friends. Patient is presenting to the ED for the following concerns: Substance use, Intoxication.   Factors that make the mental health crisis life threatening or complex are:  Pt reports he came to the ED with his sister who brought him. Pt states \"I started to drink again\". Pt reports no real trigger to start drinking, was sober for 61 days. Pt reports money, work, relationship stressors. Pt drank 750 ML bottle of alcohol yesterday from 9AM to 4PM. Pt then fell alseep and his sister came over to check on him. Pt was having a coughing fit, his sister got worried and brought him to the ED. Pt feels he has a bit of a cold and picked it up over the weekend. Pt states his racing thoughts are preventing him from sleeping, something he will discuss with his PCP. Pt is not sure he is willing to stop drinking at this time and was given psychoeducation about the negative impact continued drinking could have on his mental health. Pt appears to understand. Pt has a therapist he will be increasing sessions from bi-weekly to weekly. Pt has a med provider and will continue to follow up with them about med management and his sleep concerns. Pt states he is safe and can be safe discharging home, denying any active or current thoughts, intent or plans to harm himself or others currently.    Informed Consent and Assessment Methods  Explained the crisis assessment process, including applicable information " disclosures and limits to confidentiality, assessed understanding of the process, and obtained consent to proceed with the assessment.  Assessment methods included conducting a formal interview with patient, review of medical records, collaboration with medical staff, and obtaining relevant collateral information from family and community providers when available.  : done     Patient response to interventions: acceptance expressed, verbalizes understanding  Coping skills were attempted to reduce the crisis:  Music, talking to friends and family, TV, taking a shower     History of the Crisis   Pt reports he has hx of anxiety and depression. Pt attended a PHP program in [place], starting in September 2023 through November 2023. Pt also reports recently completing MARCY programming at [place], successfully completing that program on September 16 2024. Pt currently has a therapist he meets with bi-weekly. Pt just increased his sessions to weekly.  Pt reports taking Wellbutrin as prescribed with no recent medication changes. He is prescribed his medications through his PCP at [facility].    Brief Psychosocial History  Family:  , Children yes (2 boys: 21 and 20)  Support System:  Significant Other, Sibling(s), Friend (Ex-wife, sister)  Employment Status:  employed full-time  Source of Income:  salary/wages  Financial Environmental Concerns:  other (see comments) (A lot of medical bills)  Current Hobbies:  television/movies/videos, music, travel (DJ business, watch football)  Barriers in Personal Life:  other (see comments), financial concerns (Substance use)    Significant Clinical History  Current Anxiety Symptoms:  racing thoughts  Current Depression/Trauma:  helplessness, hopelessness, sadness, thoughts of death/suicide, negativistic, withdrawl/isolation, difficulty concentrating, impaired decision making, low self esteem  Current Somatic Symptoms:   (None endorsed)  Current Psychosis/Thought  Disturbance:  forgetful  Current Eating Symptoms:  loss of appetite  Chemical Use History:  Alcohol: Blackouts, Daily, Binge  Last Use: 10/21/24  Benzodiazepines: None  Opiates: None  Cocaine: None  Marijuana: None  Other Use: None  Withdrawal Symptoms:  (Pt denied any concerns)  Addictions:  (None endorsed)   Past diagnosis:  Anxiety Disorder, Depression, Substance Use Disorder  Family history:  Depression, Substance Use Disorder (Mother, brother and sister = depression Brother has MARCY)  Past treatment:  Individual therapy, Partial Hospitalization, AA/NA, Primary Care, Psychiatric Medication Management  Details of most recent treatment:  Pt attends AA once a week, does not currently have a sponsor. Pt attended a PHP program in [place], starting in September 2023 through November 2023. Pt also reports recently completing MARCY programming at [facility], successfully completing that program on September 16 2024. Pt currently has a therapist he meets with bi-weekly. Pt just increased his sessions to weekly. Pt reports taking Wellbutrin as prescribed with no recent medication changes. He is prescribed his medications through his PCP at [facility].  Other relevant history:  Pt is in the middle of a settlement with a nursing home regarding the passing of his mother.     Collateral Information  Is there collateral information: Yes     Collateral information name, relationship, phone number:  Rosalind- sister [phone]    What happened today: Has a problem with alcoholism, went to treatment and then started drinking again. Pt was drunk at noon yesterday; she went over last night with his eyes in the back of his head and he was choking. Pt has Gastric bypass surgery, where he can get drunk faster. She found empty bottles of vodka around him.     What is different about patient's functioning: Junk food and pop in his home. She saw him a week ago at a gathering, thought he was sober at that time. Pt is funny and  "joking. When he drinks he falls alseep. No alcoholism in the family, they \"don't drink\". Pt lives in a triplex next to his ex-wife.     Has patient made comments about wanting to kill themselves/others: no    If d/c is recommended, can they take part in safety/aftercare planning: yes    Additional collateral information:  Sister asked him if he wanted to die and he said no. Pt's girlfriend is an alcoholic and does drugs. When things are good with the girlfriend, things are good. Maybe things aren't so good with her right now. Rosalind thinks his girlfriend needs to go.     Risk Assessment  [place] Suicide Severity Rating Scale Full Clinical Version:  Suicidal Ideation  Q1 Wish to be Dead (Lifetime): No  Q2 Non-Specific Active Suicidal Thoughts (Lifetime): No  Q6 Suicide Behavior (Lifetime): no     Suicidal Behavior (Lifetime)  Actual Attempt (Lifetime): No  Has subject engaged in non-suicidal self-injurious behavior? (Lifetime): No  Interrupted Attempts (Lifetime): No  Aborted or Self-Interrupted Attempt (Lifetime): No  Preparatory Acts or Behavior (Lifetime): No    [place] Suicide Severity Rating Scale Recent:   Suicidal Ideation (Recent)  Q1 Wished to be Dead (Past Month): no  Q2 Suicidal Thoughts (Past Month): no  Level of Risk per Screen: no risks indicated  Intensity of Ideation (Recent)  Most Severe Ideation Rating (Past 1 Month):  (Denied- none)  Frequency (Past 1 Month):  (Denied- none)  Duration (Past 1 Month):  (Denied- none)  Controllability (Past 1 Month):  (Denied- none)  Deterrents (Past 1 Month):  (Denied- none)  Reasons for Ideation (Past 1 Month):  (Denied- none)  Suicidal Behavior (Recent)  Actual Attempt (Past 3 Months): No  Total Number of Actual Attempts (Past 3 Months): 0  Has subject engaged in non-suicidal self-injurious behavior? (Past 3 Months): No  Interrupted Attempts (Past 3 Months): No  Total Number of Interrupted Attempts (Past 3 Months): 0  Aborted or Self-Interrupted Attempt (Past 3 " Months): No  Total Number of Aborted or Self-Interrupted Attempts (Past 3 Months): 0  Preparatory Acts or Behavior (Past 3 Months): No  Total Number of Preparatory Acts (Past 3 Months): 0    Environmental or Psychosocial Events: helplessness/hopelessness, challenging interpersonal relationships, excessive debt, poor finances, ongoing abuse of substances  Protective Factors: Protective Factors: lives in a responsibly safe and stable environment, strong bond to family unit, community support, or employment, responsibilities and duties to others, including pets and children, intact marriage or domestic partnership, supportive ongoing medical and mental health care relationships, able to access care without barriers, sense of importance of health and wellness, good impulse control, help seeking, constructive use of leisure time, enjoyable activities, resilience, optimistic outlook - identification of future goals, reality testing ability    Does the patient have thoughts of harming others? Feels Like Hurting Others: no  Previous Attempt to Hurt Others: no  Current presentation:  (None endorsed or observed)  Is the patient engaging in sexually inappropriate behavior?: no    Is the patient engaging in sexually inappropriate behavior?  no        Mental Status Exam   Affect: Flat  Appearance: Appropriate  Attention Span/Concentration: Attentive  Eye Contact: Engaged    Fund of Knowledge: Appropriate   Language /Speech Content: Fluent  Language /Speech Volume: Soft  Language /Speech Rate/Productions: Normal  Recent Memory: Intact  Remote Memory: Intact  Mood: Sad  Orientation to Person: Yes   Orientation to Place: Yes  Orientation to Time of Day: Yes  Orientation to Date: Yes     Situation (Do they understand why they are here?): Yes  Psychomotor Behavior: Normal  Thought Content: Clear  Thought Form: Intact      Medication  Psychotropic medications: Wellbutrin      Current Care Team  Patient Care Team:  Yovana Felipe  MD Sukumar as PCP - General (Family Medicine)  Marino Hampton MD as MD (Urology)  Gisela Dodge, RN as Registered Nurse (Urology)  Yovana Felipe MD as Assigned PCP  Cody Harvey MD as MD (Cardiovascular Disease)  Cody Harvey MD as Assigned Heart and Vascular Provider  Lucy Rouse [facility] as Assigned Behavioral Health Provider    Diagnosis  Patient Active Problem List   Diagnosis Code    Type 2 diabetes mellitus without complication, without long-term current use of insulin (H) E11.9    History of gastric bypass Z98.84    Hyperlipidemia E78.5    Obstructive sleep apnea syndrome G47.33    Elevated BP without diagnosis of hypertension R03.0    Major depressive disorder, recurrent episode, moderate (H) F33.1    History of DVT (deep vein thrombosis) Z86.718    History of pulmonary embolism Z86.711    Morbid obesity (H) E66.01    Paroxysmal atrial fibrillation (H) I48.0    Personal history of DVT (deep vein thrombosis) Z86.718    Erectile dysfunction due to arterial insufficiency N52.01    Alcohol use disorder, severe, in early remission (H) F10.21     Primary Problem This Admission  Active Hospital Problems  F33.1  Major depressive disorder, recurrent episode, moderate (H)    Clinical Summary and Substantiation of Recommendations   After therapeutic assessment, intervention, and aftercare planning by ED care team and LM and in consultation with attending provider, the patient's circumstances and mental state were appropriate for outpatient management. It is the recommendation of this clinician that pt discharge with OP MH support. Currently the pt is not presenting as an acute risk to self or others due to the following factors: Pt recently returned from drinking after being sober for 61 days. Pt is not sure he is willing to stop drinking at this time and was given psychoeducation about the negative impact continued drinking could have on his mental health. Pt appears to  understand. Pt has a therapist he will be increasing sessions from bi-weekly to weekly. Pt has a med provider and will continue to follow up with them about med management and his sleep concerns. Pt states he is safe and can be safe discharging home, denying any active or current thoughts, intent or plans to harm himself or others currently.    Patient coping skills attempted to reduce the crisis:  Music, talking to friends and family, TV, taking a shower    Disposition  Recommended disposition: Individual Therapy, Medication Management        Reviewed case and recommendations with attending provider. Attending Name: Dr Mckeon       Attending concurs with disposition: yes       Patient and/or validated legal guardian concurs with disposition: yes       Final disposition:  discharge    Legal status on admission: Voluntary/Patient has signed consent for treatment    Assessment Details   Total duration spent with the patient: 45 min     CPT code(s) utilized: 14515 - Psychotherapy for Crisis - 60 (30-74*) min    ESTRELLITA Doyle, Psychotherapist  [facility] - Triage & Transition Services  Callback: [phone]

## 2024-10-25 ENCOUNTER — TELEPHONE (OUTPATIENT)
Dept: BEHAVIORAL HEALTH | Facility: CLINIC | Age: 44
End: 2024-10-25
Payer: COMMERCIAL

## 2024-10-28 ENCOUNTER — LAB (OUTPATIENT)
Dept: LAB | Facility: CLINIC | Age: 44
End: 2024-10-28
Payer: COMMERCIAL

## 2024-10-28 ENCOUNTER — ANTICOAGULATION THERAPY VISIT (OUTPATIENT)
Dept: ANTICOAGULATION | Facility: CLINIC | Age: 44
End: 2024-10-28

## 2024-10-28 DIAGNOSIS — Z86.718 HISTORY OF DVT (DEEP VEIN THROMBOSIS): Primary | ICD-10-CM

## 2024-10-28 DIAGNOSIS — Z86.718 HISTORY OF DVT (DEEP VEIN THROMBOSIS): ICD-10-CM

## 2024-10-28 DIAGNOSIS — Z86.711 HISTORY OF PULMONARY EMBOLISM: ICD-10-CM

## 2024-10-28 LAB — INR BLD: 3.9 (ref 0.9–1.1)

## 2024-10-28 PROCEDURE — 36416 COLLJ CAPILLARY BLOOD SPEC: CPT

## 2024-10-28 PROCEDURE — 85610 PROTHROMBIN TIME: CPT

## 2024-10-28 NOTE — PROGRESS NOTES
ANTICOAGULATION MANAGEMENT     Cody MADISON Young 44 year old male is on warfarin with supratherapeutic INR result. (Goal INR 2.0-3.0)    Recent labs: (last 7 days)     10/28/24  1441   INR 3.9*       ASSESSMENT     Source(s): Chart Review and Patient/Caregiver Call     Warfarin doses taken: Warfarin taken as instructed  Diet: Change in alcohol intake may be affecting INR. Patient relapsed with alcohol on 10/16/24, since ED visit intake has reduced, but still consuming 2-3 drinks/day    Medication/supplement changes: None noted  New illness, injury, or hospitalization: Yes: mild URI over the last week, advised follow-up with PCP team for ongoing/worsening sx + ED 10/21/24 for alcohol abuse and suicidal thoughts - had behavorial health appt 10/25/24  Signs or symptoms of bleeding or clotting: No  Previous result: Supratherapeutic  Additional findings: None       PLAN     Recommended plan for temporary change(s) and ongoing change(s) affecting INR     Dosing Instructions: partial hold then decrease your warfarin dose (11.1% change) with next INR in 10 days       Summary  As of 10/28/2024      Full warfarin instructions:  10/28: 2.5 mg; Otherwise 7.5 mg every Sun, Thu; 5 mg all other days   Next INR check:  11/5/2024               Telephone call with Cody who verbalizes understanding and agrees to plan    Patient elected to schedule next visit 11/5/24    Education provided: Please call back if any changes to your diet, medications or how you've been taking warfarin  Healthy lifestyle considerations: potential interaction between warfarin and alcohol, limit alcohol intake to no more than 1 to 2 drinks in 24 hours if you choose to drink alcohol, and avoid excessive alcohol drinking (binge drinking) while on warfarin due to increased risk of bleeding from effect on INR and fall risk  Symptom monitoring: monitoring for bleeding signs and symptoms and monitoring for clotting signs and symptoms    Plan made per [facility]  anticoagulation protocol    Kera Deras, RN  10/28/2024  Anticoagulation Clinic  [facility] for routing messages: fransisco GARAY  [facility] patient phone line: [phone]        _______________________________________________________________________     Anticoagulation Episode Summary       Current INR goal:  2.0-3.0   TTR:  46.5% (8.2 mo)   Target end date:  Indefinite   Send INR reminders to:  KEO GARAY    Indications    Acute pulmonary embolism without acute cor pulmonale  unspecified pulmonary embolism type (H) (Resolved) [I26.99]  History of DVT (deep vein thrombosis) [Z86.718]             Comments:  --             Anticoagulation Care Providers       Provider Role Specialty Phone number    Aaseby-Aguilera, Ramona Ann, PA-C Referring Family Medicine [phone]

## 2024-11-04 ENCOUNTER — VIRTUAL VISIT (OUTPATIENT)
Dept: BEHAVIORAL HEALTH | Facility: CLINIC | Age: 44
End: 2024-11-04
Payer: COMMERCIAL

## 2024-11-04 DIAGNOSIS — F33.1 MAJOR DEPRESSIVE DISORDER, RECURRENT EPISODE, MODERATE (H): Primary | ICD-10-CM

## 2024-11-04 PROCEDURE — 90834 PSYTX W PT 45 MINUTES: CPT | Mod: 95

## 2024-11-04 ASSESSMENT — PATIENT HEALTH QUESTIONNAIRE - PHQ9
10. IF YOU CHECKED OFF ANY PROBLEMS, HOW DIFFICULT HAVE THESE PROBLEMS MADE IT FOR YOU TO DO YOUR WORK, TAKE CARE OF THINGS AT HOME, OR GET ALONG WITH OTHER PEOPLE: VERY DIFFICULT
SUM OF ALL RESPONSES TO PHQ QUESTIONS 1-9: 17
SUM OF ALL RESPONSES TO PHQ QUESTIONS 1-9: 17

## 2024-11-04 NOTE — PROGRESS NOTES
"    [facility]                                     Progress Note    Patient Name: Cody Bolton  Date: 24           Service Type: Individual      Session Start Time: 8.59    Session End Time:  9.41     Session Length: 38-52 minutes    Session #: 14    Attendees: Client attended alone    Service Modality:  Video Visit:      Provider verified identity through the following two step process.  Patient provided:  Patient  and Patient address    Telemedicine Visit: The patient's condition can be safely assessed and treated via synchronous audio and visual telemedicine encounter.      Reason for Telemedicine Visit: Patient has requested telehealth visit    Originating Site (Patient Location): Patient's home    Distant Site (Provider Location): [facility]    Consent:  The patient/guardian has verbally consented to: the potential risks and benefits of telemedicine (video visit) versus in person care; bill my insurance or make self-payment for services provided; and responsibility for payment of non-covered services.     Patient would like the video invitation sent by:  My Chart    Mode of Communication:  Video Conference via [employer]well    Distant Location (Provider):  Off-site    As the provider I attest to compliance with applicable laws and regulations related to telemedicine.    DATA  Interactive Complexity: No.   Crisis: No        Progress Since Last Session (Related to Symptoms / Goals / Homework):   Symptoms: Worsening . Patient's PHQ9 score has increased.    Homework: Partially completed      Episode of Care Goals: Minimal progress - CONTEMPLATION (Considering change and yet undecided); Intervened by assessing the negative and positive thinking (ambivalence) about behavior change     Current / Ongoing Stressors and Concerns:  Patient discussed returning to alcohol use on 10/21/24. Patient reports currently drinking \"375ml of vodka\" a day. " "Patient discussed listening to other's stories at AA meetings and feelings of loneliness being triggers to alcohol use. Patient reported a recent trip to the ER for a \"coughing fit\". Patient discussed relationship stressors with his fianceBecca. Patient discussed his partner's active alcohol addiction. Patient discussed a lack of boundary setting. Patient discussed enabling behaviors and buying alcohol for his partner.        Treatment Objective(s) Addressed in This Session:   Decrease frequency and intensity of feeling down, depressed, hopeless       Intervention:   Motivational Interviewing    MI Intervention: Expressed Empathy/Understanding, Supported Autonomy, Collaboration, Evocation, Open-ended questions, and Reflections: simple and complex     Change Talk Expressed by the Patient: Desire to change    Provider Response to Change Talk: E - Evoked more info from patient about behavior change, A - Affirmed patient's thoughts, decisions, or attempts at behavior change, and R - Reflected patient's change talk    Assessments completed prior to visit:  The following assessments were completed by patient for this visit:  PHQ9:       8/7/2024     4:00 PM 8/9/2024     6:39 AM 8/13/2024    12:44 PM 8/20/2024     3:00 PM 8/23/2024     8:00 AM 9/26/2024     1:57 PM 11/4/2024     8:47 AM   PHQ-9 SCORE   PHQ-9 Total Score MyChart  15 (Moderately severe depression) 16 (Moderately severe depression)   19 (Moderately severe depression) 17 (Moderately severe depression)   PHQ-9 Total Score 18 15 16 13 13 19 17        Patient-reported     GAD7:       2/19/2024    12:39 PM 6/4/2024    11:39 AM 8/7/2024     4:00 PM 8/9/2024     6:40 AM 8/20/2024     3:00 PM 8/23/2024     8:00 AM   MERI-7 SCORE   Total Score 2 (minimal anxiety) 7 (mild anxiety)  7 (mild anxiety)     Total Score 2 7 6 7 9 9     PROMIS 10-Global Health (all questions and answers displayed):       2/19/2024    12:53 PM 6/4/2024    11:40 AM 8/7/2024     4:00 PM " 8/23/2024     8:00 AM   PROMIS 10   In general, would you say your health is: Fair Fair     In general, would you say your quality of life is: Good Poor     In general, how would you rate your physical health? Fair Fair     In general, how would you rate your mental health, including your mood and your ability to think? Good Fair     In general, how would you rate your satisfaction with your social activities and relationships? Good Fair     In general, please rate how well you carry out your usual social activities and roles Fair Poor     To what extent are you able to carry out your everyday physical activities such as walking, climbing stairs, carrying groceries, or moving a chair? Completely A little     In the past 7 days, how often have you been bothered by emotional problems such as feeling anxious, depressed, or irritable? Sometimes Often     In the past 7 days, how would you rate your fatigue on average? Mild Severe     In the past 7 days, how would you rate your pain on average, where 0 means no pain, and 10 means worst imaginable pain? 5 8     In general, would you say your health is: 2  2      In general, would you say your quality of life is: 3  1      In general, how would you rate your physical health? 2  2      In general, how would you rate your mental health, including your mood and your ability to think? 3  2      In general, how would you rate your satisfaction with your social activities and relationships? 3  2      In general, please rate how well you carry out your usual social activities and roles. (This includes activities at home, at work and in your community, and responsibilities as a parent, child, spouse, employee, friend, etc.) 2  1      To what extent are you able to carry out your everyday physical activities such as walking, climbing stairs, carrying groceries, or moving a chair? 5  2      In the past 7 days, how often have you been bothered by emotional problems such as feeling  anxious, depressed, or irritable? 3  4      In the past 7 days, how would you rate your fatigue on average? 2  4      In the past 7 days, how would you rate your pain on average, where 0 means no pain, and 10 means worst imaginable pain? 5  8      Global Mental Health Score 12    12 7     Global Physical Health Score 14    14 8     PROMIS TOTAL - SUBSCORES 26    26 15         Information is confidential and restricted. Go to Review Flowsheets to unlock data.    Patient-reported    Multiple values from one day are sorted in reverse-chronological order     [place] Suicide Severity Rating Scale (Lifetime/Recent)      7/16/2024     4:09 PM 8/19/2024     4:03 PM 8/20/2024     4:00 AM 8/30/2024     8:26 AM 10/21/2024     9:20 PM 10/21/2024     9:22 PM 10/22/2024     6:28 AM   [place] Suicide Severity Rating (Lifetime/Recent)   Q1 Wish to be Dead (Lifetime)   No    No   Q2 Non-Specific Active Suicidal Thoughts (Lifetime)   No    No   Q1 Wished to be Dead (Past Month) 0-->no 0-->no 0-->no 0-->no 0-->no 1-->yes 0-->no   Q2 Suicidal Thoughts (Past Month) 0-->no 1-->yes 0-->no 0-->no 0-->no 1-->yes 0-->no   Q3 Suicidal Thought Method  0-->no 0-->no       Q4 Suicidal Intent without Specific Plan  0-->no 0-->no       Q5 Suicide Intent with Specific Plan  0-->no 0-->no       Q6 Suicide Behavior (Lifetime) 0-->no 0-->no 0-->no 0-->no 0-->no 1-->yes 0-->no   Level of Risk per Screen no risks indicated low risk no risks indicated no risks indicated no risks indicated low risk no risks indicated   Most Severe Ideation Rating (Past 1 Month)       --   Frequency (Past 1 Month)       --   Duration (Past 1 Month)       --   Controllability (Past 1 Month)       --   Deterrents (Past 1 Month)       --   Reasons for Ideation (Past 1 Month)       --   Actual Attempt (Lifetime)       N   Actual Attempt (Past 3 Months)       N   Total Number of Actual Attempts (Past 3 Months)       0   Has subject engaged in non-suicidal self-injurious  behavior? (Lifetime)       N   Has subject engaged in non-suicidal self-injurious behavior? (Past 3 Months)       N   Interrupted Attempts (Lifetime)       N   Interrupted Attempts (Past 3 Months)       N   Total Number of Interrupted Attempts (Past 3 Months)       0   Aborted or Self-Interrupted Attempt (Lifetime)       N   Aborted or Self-Interrupted Attempt (Past 3 Months)       N   Total Number of Aborted or Self-Interrupted Attempts (Past 3 Months)       0   Preparatory Acts or Behavior (Lifetime)       N   Preparatory Acts or Behavior (Past 3 Months)       N   Total Number of Preparatory Acts (Past 3 Months)       0   Calculated C-SSRS Risk Score (Lifetime/Recent)       No Risk Indicated         ASSESSMENT: Current Emotional / Mental Status (status of significant symptoms):   Risk status (Self / Other harm or suicidal ideation)   Patient denies current fears or concerns for personal safety.   Patient reports the following current or recent suicidal ideation or behaviors: patient reports passive SI, but denies any plan or intent. Patient's safety plan has been updated.    Patient denies current or recent homicidal ideation or behaviors.   Patient denies current or recent self injurious behavior or ideation.   Patient denies other safety concerns.   Patient reports there has been no change in risk factors since their last session.     Patient reports there has been no change in protective factors since their last session.     A safety and risk management plan has been developed including: Patient consented to co-developed safety plan.  Safety and risk management plan was completed - see below.  Patient agreed to use safety plan should any safety concerns arise.  A copy was given to the patient.      Appearance:   Appropriate    Eye Contact:   Fair    Psychomotor Behavior: Restless    Attitude:   Cooperative    Orientation:   All   Speech    Rate / Production: Pressured     Volume:  Normal    Mood:    Anxious   "Depressed    Affect:    Appropriate    Thought Content:  Clear    Thought Form:  Coherent    Insight:    Fair      Medication Review:   No changes to current psychiatric medication(s)     Medication Compliance:   Yes     Changes in Health Issues:   Yes: patient reported a recent trip to the ER for a \"coughing fit\".      Chemical Use Review:   Substance Use: increase in alcohol .  Patient reports frequency of use : Patient reports having 61 days of sobriety and then returning to alcohol use on 10/21/24. Patient reports currently drinking \"375ml of vodka\" a day.  Reviewed information and resources for treatment and ongoing sobriety  Provided encouragement towards sobriety  Provided information on local AA and SMART Recovery meetings   .      Tobacco Use: No current tobacco use.      Diagnosis:  1. Major depressive disorder, recurrent episode, moderate (H)    Other Diagnosis: Alcohol Use Disorder     Collateral Reports Completed:   Not Applicable    PLAN: (Patient Tasks / Therapist Tasks / Other)  Patient will return in 1 week for next session. Patient will engage in self-care activities. Patient will work on mindfulness skills to increase awareness of triggers to alcohol cravings and depression. Patient will work on boundary setting and eliminate enabling behaviors, including buying alcohol for his partner. Patient will regularly utilize his coping skills, including listening to music. Patient will regularly evaluate his HALT cues that lead to alcohol cravings. Patient will utilize the \"delay, distract, decide\" skill when noticing alcohol cravings. Patient will abstain from alcohol use and attend at least four AA or SMART Recovery meetings per week.       Lucy Rouse, [facility]                                                         ______________________________________________________________________    Individual Treatment Plan    Patient's Name: Cody Bolton  YOB: 1980    Date of Creation: " 2/26/24  Date Treatment Plan Last Reviewed/Revised: 10/2/24    DSM5 Diagnoses:   Encounter Diagnosis   Name Primary?    Major depressive disorder, recurrent episode, moderate (H) Yes       Psychosocial / Contextual Factors: Relationship stressors, occupational dynamics.  PROMIS-10 from encounters over the past 365 days    Encounter date  Last reading 11/4/24  9:00 AM 6/4/24  11:40 AM 2/26/24 2/19/2024 12:53 PM 2/19/24  12:53 PM   Global Mental Health Score 10 (P) 7 (P) 12 (P) 12   Global Physical Health Score 14 (P) 8 (P) 14 (P) 14   PROMIS TOTAL - SUBSCORES 24 (P) 15 (P) 26 (P) 26       Referral / Collaboration:  Referral to another professional/service is not indicated at this time..    Anticipated number of session for this episode of care: 9-12 sessions  Anticipation frequency of session: Weekly  Anticipated Duration of each session: 38-52 minutes  Treatment plan will be reviewed in 90 days or when goals have been changed.       MeasurableTreatment Goal(s) related to diagnosis / functional impairment(s)  Goal 1: Patient will reduce depression symptoms as evidenced by a reduction in PHQ9 score to a 7 or less in the next 2 months.    I will know I've met my goal when I'm not having the feeling as often that I just don't want to do life anymore.      Objective #A (Patient Action)    Patient will Decrease frequency and intensity of feeling down, depressed, hopeless.  Status: Continued - Date(s): 10/2/24    Intervention(s)  Therapist will teach  coping skills and self-care activities .    Objective #B  Patient will Feel less tired and more energy during the day .  Status: Continued - Date(s): 10/2/24    Intervention(s)  Therapist will  teach sleep hygiene and movement-based exercises .    Objective #C  Patient will Increase interest, engagement, and pleasure in doing things.  Status: Continued - Date(s):10/2/24     Intervention(s)  Therapist will  teach distress-tolerance skills. .        Patient has reviewed and  agreed to the above plan.      Lucy Rouse [facility] on 10/2/2024 at 4:00 PM

## 2024-11-05 ENCOUNTER — LAB (OUTPATIENT)
Dept: LAB | Facility: CLINIC | Age: 44
End: 2024-11-05
Payer: COMMERCIAL

## 2024-11-05 ENCOUNTER — ANTICOAGULATION THERAPY VISIT (OUTPATIENT)
Dept: ANTICOAGULATION | Facility: CLINIC | Age: 44
End: 2024-11-05

## 2024-11-05 DIAGNOSIS — Z86.711 HISTORY OF PULMONARY EMBOLISM: ICD-10-CM

## 2024-11-05 DIAGNOSIS — Z86.718 HISTORY OF DVT (DEEP VEIN THROMBOSIS): Primary | ICD-10-CM

## 2024-11-05 DIAGNOSIS — Z86.718 HISTORY OF DVT (DEEP VEIN THROMBOSIS): ICD-10-CM

## 2024-11-05 LAB — INR BLD: 1.7 (ref 0.9–1.1)

## 2024-11-05 PROCEDURE — 36416 COLLJ CAPILLARY BLOOD SPEC: CPT

## 2024-11-05 PROCEDURE — 85610 PROTHROMBIN TIME: CPT

## 2024-11-05 NOTE — PROGRESS NOTES
ANTICOAGULATION MANAGEMENT     Cody WALLACE Young 44 year old male is on warfarin with subtherapeutic INR result. (Goal INR 2.0-3.0)    Recent labs: (last 7 days)     11/05/24  1148   INR 1.7*       ASSESSMENT     Source(s): Chart Review and Patient/Caregiver Call     Warfarin doses taken: Missed dose(s) may be affecting INR  Diet: No new diet changes identified  Medication/supplement changes: None noted  New illness, injury, or hospitalization: No  Signs or symptoms of bleeding or clotting: No  Previous result: Supratherapeutic  Additional findings: None       PLAN     Recommended plan for temporary change(s) affecting INR     Dosing Instructions: booster dose then continue your current warfarin dose with next INR in 2 weeks       Summary  As of 11/5/2024      Full warfarin instructions:  11/5: 10 mg; Otherwise 7.5 mg every Sun, Thu; 5 mg all other days   Next INR check:  11/19/2024               Telephone call with Cody who verbalizes understanding and agrees to plan    Lab visit scheduled    Education provided: Please call back if any changes to your diet, medications or how you've been taking warfarin  Goal range and lab monitoring: goal range and significance of current result, Importance of therapeutic range, and Importance of following up at instructed interval    Plan made per [facility] anticoagulation protocol    Makayla Tamez RN  11/5/2024  Anticoagulation Clinic  [employer] for routing messages: fransisco GARAY  [facility] patient phone line: [phone]        _______________________________________________________________________     Anticoagulation Episode Summary       Current INR goal:  2.0-3.0   TTR:  46.5% (8.5 mo)   Target end date:  Indefinite   Send INR reminders to:  KEO GARAY    Indications    Acute pulmonary embolism without acute cor pulmonale  unspecified pulmonary embolism type (H) (Resolved) [I26.99]  History of DVT (deep vein thrombosis) [Z86.737]             Comments:  --              Anticoagulation Care Providers       Provider Role Specialty Phone number    Aaseby-Aguilera, Ramona Ann, PA-C Referring Family Medicine [phone]

## 2024-11-12 ENCOUNTER — VIRTUAL VISIT (OUTPATIENT)
Dept: BEHAVIORAL HEALTH | Facility: CLINIC | Age: 44
End: 2024-11-12
Payer: COMMERCIAL

## 2024-11-12 DIAGNOSIS — F33.1 MAJOR DEPRESSIVE DISORDER, RECURRENT EPISODE, MODERATE (H): Primary | ICD-10-CM

## 2024-11-12 PROCEDURE — 90834 PSYTX W PT 45 MINUTES: CPT | Mod: 95

## 2024-11-12 ASSESSMENT — PATIENT HEALTH QUESTIONNAIRE - PHQ9
SUM OF ALL RESPONSES TO PHQ QUESTIONS 1-9: 20
SUM OF ALL RESPONSES TO PHQ QUESTIONS 1-9: 20
10. IF YOU CHECKED OFF ANY PROBLEMS, HOW DIFFICULT HAVE THESE PROBLEMS MADE IT FOR YOU TO DO YOUR WORK, TAKE CARE OF THINGS AT HOME, OR GET ALONG WITH OTHER PEOPLE: VERY DIFFICULT

## 2024-11-12 NOTE — PROGRESS NOTES
"    [facility]                                     Progress Note    Patient Name: Cody Bolton  Date: 24           Service Type: Individual      Session Start Time: 9.00    Session End Time:  .40     Session Length: 38-52 minutes    Session #: 15    Attendees: Client attended alone    Service Modality:  Video Visit:      Provider verified identity through the following two step process.  Patient provided:  Patient  and Patient address    Telemedicine Visit: The patient's condition can be safely assessed and treated via synchronous audio and visual telemedicine encounter.      Reason for Telemedicine Visit: Patient has requested telehealth visit    Originating Site (Patient Location): Patient's home    Distant Site (Provider Location): [facility]    Consent:  The patient/guardian has verbally consented to: the potential risks and benefits of telemedicine (video visit) versus in person care; bill my insurance or make self-payment for services provided; and responsibility for payment of non-covered services.     Patient would like the video invitation sent by:  My Chart    Mode of Communication:  Video Conference via [employer]well    Distant Location (Provider):  Off-site    As the provider I attest to compliance with applicable laws and regulations related to telemedicine.    DATA  Interactive Complexity: No.   Crisis: No        Progress Since Last Session (Related to Symptoms / Goals / Homework):   Symptoms: Worsening . Patient's PHQ9 score has increased.    Homework: Partially completed      Episode of Care Goals: Minimal progress - CONTEMPLATION (Considering change and yet undecided); Intervened by assessing the negative and positive thinking (ambivalence) about behavior change     Current / Ongoing Stressors and Concerns:  Patient discussed \"mostly breaking up\" with his fiance. Patient discussed continuing to text his ex-fiance, Becca, on a " "daily basis. Patient discussed his ex-fiance's active alcohol addiction. Patient discussed his love languages. Patient discussed trust issues. Patient discussed being conflict avoidant and non-confrontational. Patient discussed working on boundary setting. Patient discussed planning on texting his ex-fiance to officially end the relationship. Patient discussed returning to alcohol use and currently drinking \"2 pints of vodka\" a day. Patient and provider explored local AA meetings.        Treatment Objective(s) Addressed in This Session:   Decrease frequency and intensity of feeling down, depressed, hopeless       Intervention:   Motivational Interviewing    MI Intervention: Expressed Empathy/Understanding, Supported Autonomy, Collaboration, Evocation, Open-ended questions, and Reflections: simple and complex     Change Talk Expressed by the Patient: Desire to change    Provider Response to Change Talk: E - Evoked more info from patient about behavior change, A - Affirmed patient's thoughts, decisions, or attempts at behavior change, and R - Reflected patient's change talk    Assessments completed prior to visit:  The following assessments were completed by patient for this visit:  PHQ9:       8/9/2024     6:39 AM 8/13/2024    12:44 PM 8/20/2024     3:00 PM 8/23/2024     8:00 AM 9/26/2024     1:57 PM 11/4/2024     8:47 AM 11/12/2024     8:45 AM   PHQ-9 SCORE   PHQ-9 Total Score MyChart 15 (Moderately severe depression) 16 (Moderately severe depression)   19 (Moderately severe depression) 17 (Moderately severe depression) 20 (Severe depression)   PHQ-9 Total Score 15 16 13 13 19 17  20        Patient-reported     GAD7:       2/19/2024    12:39 PM 6/4/2024    11:39 AM 8/7/2024     4:00 PM 8/9/2024     6:40 AM 8/20/2024     3:00 PM 8/23/2024     8:00 AM   MERI-7 SCORE   Total Score 2 (minimal anxiety) 7 (mild anxiety)  7 (mild anxiety)     Total Score 2 7 6 7 9 9     PROMIS 10-Global Health (all questions and answers " displayed):       2/19/2024    12:53 PM 6/4/2024    11:40 AM 8/7/2024     4:00 PM 8/23/2024     8:00 AM 11/4/2024     9:00 AM   PROMIS 10   In general, would you say your health is: Fair Fair      In general, would you say your quality of life is: Good Poor      In general, how would you rate your physical health? Fair Fair      In general, how would you rate your mental health, including your mood and your ability to think? Good Fair      In general, how would you rate your satisfaction with your social activities and relationships? Good Fair      In general, please rate how well you carry out your usual social activities and roles Fair Poor      To what extent are you able to carry out your everyday physical activities such as walking, climbing stairs, carrying groceries, or moving a chair? Completely A little      In the past 7 days, how often have you been bothered by emotional problems such as feeling anxious, depressed, or irritable? Sometimes Often      In the past 7 days, how would you rate your fatigue on average? Mild Severe      In the past 7 days, how would you rate your pain on average, where 0 means no pain, and 10 means worst imaginable pain? 5 8      In general, would you say your health is: 2  2    2   In general, would you say your quality of life is: 3  1    3   In general, how would you rate your physical health? 2  2    2   In general, how would you rate your mental health, including your mood and your ability to think? 3  2    2   In general, how would you rate your satisfaction with your social activities and relationships? 3  2    3   In general, please rate how well you carry out your usual social activities and roles. (This includes activities at home, at work and in your community, and responsibilities as a parent, child, spouse, employee, friend, etc.) 2  1    4   To what extent are you able to carry out your everyday physical activities such as walking, climbing stairs, carrying  groceries, or moving a chair? 5  2    5   In the past 7 days, how often have you been bothered by emotional problems such as feeling anxious, depressed, or irritable? 3  4    4   In the past 7 days, how would you rate your fatigue on average? 2  4    3   In the past 7 days, how would you rate your pain on average, where 0 means no pain, and 10 means worst imaginable pain? 5  8    3   Global Mental Health Score 12    12 7   10   Global Physical Health Score 14    14 8   14   PROMIS TOTAL - SUBSCORES 26    26 15   24       Information is confidential and restricted. Go to Review Flowsheets to unlock data.    Patient-reported    Multiple values from one day are sorted in reverse-chronological order     [place] Suicide Severity Rating Scale (Lifetime/Recent)      7/16/2024     4:09 PM 8/19/2024     4:03 PM 8/20/2024     4:00 AM 8/30/2024     8:26 AM 10/21/2024     9:20 PM 10/21/2024     9:22 PM 10/22/2024     6:28 AM   [place] Suicide Severity Rating (Lifetime/Recent)   Q1 Wish to be Dead (Lifetime)   No    No   Q2 Non-Specific Active Suicidal Thoughts (Lifetime)   No    No   Q1 Wished to be Dead (Past Month) 0-->no 0-->no 0-->no 0-->no 0-->no 1-->yes 0-->no   Q2 Suicidal Thoughts (Past Month) 0-->no 1-->yes 0-->no 0-->no 0-->no 1-->yes 0-->no   Q3 Suicidal Thought Method  0-->no 0-->no       Q4 Suicidal Intent without Specific Plan  0-->no 0-->no       Q5 Suicide Intent with Specific Plan  0-->no 0-->no       Q6 Suicide Behavior (Lifetime) 0-->no 0-->no 0-->no 0-->no 0-->no 1-->yes 0-->no   Level of Risk per Screen no risks indicated low risk no risks indicated no risks indicated no risks indicated low risk no risks indicated   Most Severe Ideation Rating (Past 1 Month)       --   Frequency (Past 1 Month)       --   Duration (Past 1 Month)       --   Controllability (Past 1 Month)       --   Deterrents (Past 1 Month)       --   Reasons for Ideation (Past 1 Month)       --   Actual Attempt (Lifetime)       N   Actual  Attempt (Past 3 Months)       N   Total Number of Actual Attempts (Past 3 Months)       0   Has subject engaged in non-suicidal self-injurious behavior? (Lifetime)       N   Has subject engaged in non-suicidal self-injurious behavior? (Past 3 Months)       N   Interrupted Attempts (Lifetime)       N   Interrupted Attempts (Past 3 Months)       N   Total Number of Interrupted Attempts (Past 3 Months)       0   Aborted or Self-Interrupted Attempt (Lifetime)       N   Aborted or Self-Interrupted Attempt (Past 3 Months)       N   Total Number of Aborted or Self-Interrupted Attempts (Past 3 Months)       0   Preparatory Acts or Behavior (Lifetime)       N   Preparatory Acts or Behavior (Past 3 Months)       N   Total Number of Preparatory Acts (Past 3 Months)       0   Calculated C-SSRS Risk Score (Lifetime/Recent)       No Risk Indicated         ASSESSMENT: Current Emotional / Mental Status (status of significant symptoms):   Risk status (Self / Other harm or suicidal ideation)   Patient denies current fears or concerns for personal safety.   Patient reports the following current or recent suicidal ideation or behaviors: patient reports passive SI, but denies any plan or intent. Patient's safety plan has been updated.    Patient denies current or recent homicidal ideation or behaviors.   Patient denies current or recent self injurious behavior or ideation.   Patient denies other safety concerns.   Patient reports there has been no change in risk factors since their last session.     Patient reports there has been no change in protective factors since their last session.     A safety and risk management plan has been developed including: Patient consented to co-developed safety plan.  Safety and risk management plan was completed - see below.  Patient agreed to use safety plan should any safety concerns arise.  A copy was given to the patient.      Appearance:   Appropriate    Eye Contact:   Fair    Psychomotor  "Behavior: Restless    Attitude:   Cooperative    Orientation:   All   Speech    Rate / Production: Pressured     Volume:  Normal    Mood:    Anxious  Depressed    Affect:    Appropriate    Thought Content:  Clear    Thought Form:  Coherent    Insight:    Fair      Medication Review:   No changes to current psychiatric medication(s)     Medication Compliance:   Yes     Changes in Health Issues:   None reported     Chemical Use Review:   Substance Use: increase in alcohol .  Patient reports frequency of use : Patient reports having 61 days of sobriety and then returning to alcohol use on 10/21/24. Patient reports currently drinking \"2 pints of vodka\" a day.  Reviewed information and resources for treatment and ongoing sobriety  Provided encouragement towards sobriety  Provided information on local AA and SMART Recovery meetings   .      Tobacco Use: No current tobacco use.      Diagnosis:  1. Major depressive disorder, recurrent episode, moderate (H)    Other Diagnosis: Alcohol Use Disorder     Collateral Reports Completed:   Not Applicable    PLAN: (Patient Tasks / Therapist Tasks / Other)  Patient will return in 1 week for next session. Patient will engage in self-care activities. Patient will work on mindfulness skills to increase awareness of triggers to alcohol cravings and depression. Patient will work on boundary setting and eliminate enabling behaviors, including buying alcohol for his partner. Patient will regularly utilize his coping skills, including listening to music. Patient will regularly evaluate his HALT cues that lead to alcohol cravings. Patient will utilize the \"delay, distract, decide\" skill when noticing alcohol cravings. Patient will abstain from alcohol use and attend at least four AA or SMART Recovery meetings/ week.       Lucy Rouse, [facility]                                                         ______________________________________________________________________    Individual Treatment " Plan    Patient's Name: Cody Bolton  YOB: 1980    Date of Creation: 2/26/24  Date Treatment Plan Last Reviewed/Revised: 10/2/24    DSM5 Diagnoses:   Encounter Diagnosis   Name Primary?    Major depressive disorder, recurrent episode, moderate (H) Yes       Psychosocial / Contextual Factors: Relationship stressors, occupational dynamics.  PROMIS-10 from encounters over the past 365 days    Encounter date  Last reading 11/4/24  9:00 AM 6/4/24  11:40 AM 2/26/24 2/19/2024 12:53 PM 2/19/24  12:53 PM   Global Mental Health Score 10 (P) 7 (P) 12 (P) 12   Global Physical Health Score 14 (P) 8 (P) 14 (P) 14   PROMIS TOTAL - SUBSCORES 24 (P) 15 (P) 26 (P) 26       Referral / Collaboration:  Referral to another professional/service is not indicated at this time..    Anticipated number of session for this episode of care: 9-12 sessions  Anticipation frequency of session: Weekly  Anticipated Duration of each session: 38-52 minutes  Treatment plan will be reviewed in 90 days or when goals have been changed.       MeasurableTreatment Goal(s) related to diagnosis / functional impairment(s)  Goal 1: Patient will reduce depression symptoms as evidenced by a reduction in PHQ9 score to a 7 or less in the next 2 months.    I will know I've met my goal when I'm not having the feeling as often that I just don't want to do life anymore.      Objective #A (Patient Action)    Patient will Decrease frequency and intensity of feeling down, depressed, hopeless.  Status: Continued - Date(s): 10/2/24    Intervention(s)  Therapist will teach  coping skills and self-care activities .    Objective #B  Patient will Feel less tired and more energy during the day .  Status: Continued - Date(s): 10/2/24    Intervention(s)  Therapist will  teach sleep hygiene and movement-based exercises .    Objective #C  Patient will Increase interest, engagement, and pleasure in doing things.  Status: Continued - Date(s):10/2/24      Intervention(s)  Therapist will  teach distress-tolerance skills. .        Patient has reviewed and agreed to the above plan.      KATRIN Salvador on 10/2/2024 at 4:00 PM

## 2024-11-19 ENCOUNTER — LAB (OUTPATIENT)
Dept: LAB | Facility: CLINIC | Age: 44
End: 2024-11-19
Payer: COMMERCIAL

## 2024-11-19 ENCOUNTER — VIRTUAL VISIT (OUTPATIENT)
Dept: BEHAVIORAL HEALTH | Facility: CLINIC | Age: 44
End: 2024-11-19
Payer: COMMERCIAL

## 2024-11-19 ENCOUNTER — ANTICOAGULATION THERAPY VISIT (OUTPATIENT)
Dept: ANTICOAGULATION | Facility: CLINIC | Age: 44
End: 2024-11-19

## 2024-11-19 DIAGNOSIS — Z86.718 HISTORY OF DVT (DEEP VEIN THROMBOSIS): ICD-10-CM

## 2024-11-19 DIAGNOSIS — F33.1 MAJOR DEPRESSIVE DISORDER, RECURRENT EPISODE, MODERATE (H): Primary | ICD-10-CM

## 2024-11-19 DIAGNOSIS — Z86.711 HISTORY OF PULMONARY EMBOLISM: ICD-10-CM

## 2024-11-19 DIAGNOSIS — Z86.718 HISTORY OF DVT (DEEP VEIN THROMBOSIS): Primary | ICD-10-CM

## 2024-11-19 LAB — INR BLD: 2.6 (ref 0.9–1.1)

## 2024-11-19 PROCEDURE — 85610 PROTHROMBIN TIME: CPT

## 2024-11-19 PROCEDURE — 90834 PSYTX W PT 45 MINUTES: CPT | Mod: 95

## 2024-11-19 PROCEDURE — 36416 COLLJ CAPILLARY BLOOD SPEC: CPT

## 2024-11-19 ASSESSMENT — PATIENT HEALTH QUESTIONNAIRE - PHQ9
SUM OF ALL RESPONSES TO PHQ QUESTIONS 1-9: 21
SUM OF ALL RESPONSES TO PHQ QUESTIONS 1-9: 21
10. IF YOU CHECKED OFF ANY PROBLEMS, HOW DIFFICULT HAVE THESE PROBLEMS MADE IT FOR YOU TO DO YOUR WORK, TAKE CARE OF THINGS AT HOME, OR GET ALONG WITH OTHER PEOPLE: VERY DIFFICULT

## 2024-11-19 NOTE — PROGRESS NOTES
[facility]                                     Progress Note    Patient Name: Cody Bolton  Date: 24           Service Type: Individual      Session Start Time: 8.57    Session End Time:  9.36     Session Length: 38-52 minutes    Session #: 16    Attendees: Client attended alone    Service Modality:  Video Visit:      Provider verified identity through the following two step process.  Patient provided:  Patient  and Patient address    Telemedicine Visit: The patient's condition can be safely assessed and treated via synchronous audio and visual telemedicine encounter.      Reason for Telemedicine Visit: Patient has requested telehealth visit    Originating Site (Patient Location): Patient's home    Distant Site (Provider Location): [facility]    Consent:  The patient/guardian has verbally consented to: the potential risks and benefits of telemedicine (video visit) versus in person care; bill my insurance or make self-payment for services provided; and responsibility for payment of non-covered services.     Patient would like the video invitation sent by:  My Chart    Mode of Communication:  Video Conference via Amwell    Distant Location (Provider):  Off-site    As the provider I attest to compliance with applicable laws and regulations related to telemedicine.    DATA  Interactive Complexity: No.   Crisis: No        Progress Since Last Session (Related to Symptoms / Goals / Homework):   Symptoms: Worsening . Patient's PHQ9 score has increased.    Homework: Partially completed      Episode of Care Goals: Minimal progress - CONTEMPLATION (Considering change and yet undecided); Intervened by assessing the negative and positive thinking (ambivalence) about behavior change     Current / Ongoing Stressors and Concerns:  Patient discussed stringing together three days of sobriety from alcohol this past weekend. Patient discussed recently  getting a massage. Patient discussed breaking up with his partner, Becca. Patient discussed his ex-fiance's active alcohol addiction. Patient discussed his love languages. Patient discussed working on boundary setting. Patient discussed using dating apps. Patient and provider explored local AA meetings. Patient discussed feelings of loneliness. Patient discussed getting hired for a [employer] gig this weekend. Patient discussed his Thanksgiving plans at his sister's house. Patient discussed needing to get new glasses. Patient committed to abstaining from alcohol from at least Thursday to Saturday.        Treatment Objective(s) Addressed in This Session:   Decrease frequency and intensity of feeling down, depressed, hopeless       Intervention:   Motivational Interviewing    MI Intervention: Expressed Empathy/Understanding, Supported Autonomy, Collaboration, Evocation, Open-ended questions, and Reflections: simple and complex     Change Talk Expressed by the Patient: Desire to change    Provider Response to Change Talk: E - Evoked more info from patient about behavior change, A - Affirmed patient's thoughts, decisions, or attempts at behavior change, and R - Reflected patient's change talk    Assessments completed prior to visit:  The following assessments were completed by patient for this visit:  PHQ9:       8/13/2024    12:44 PM 8/20/2024     3:00 PM 8/23/2024     8:00 AM 9/26/2024     1:57 PM 11/4/2024     8:47 AM 11/12/2024     8:45 AM 11/19/2024     8:50 AM   PHQ-9 SCORE   PHQ-9 Total Score MyChart 16 (Moderately severe depression)   19 (Moderately severe depression) 17 (Moderately severe depression) 20 (Severe depression) 21 (Severe depression)   PHQ-9 Total Score 16 13 13 19 17  20  21        Patient-reported     GAD7:       2/19/2024    12:39 PM 6/4/2024    11:39 AM 8/7/2024     4:00 PM 8/9/2024     6:40 AM 8/20/2024     3:00 PM 8/23/2024     8:00 AM   MERI-7 SCORE   Total Score 2 (minimal anxiety) 7 (mild anxiety)  7  (mild anxiety)     Total Score 2 7 6 7 9 9     PROMIS 10-Global Health (all questions and answers displayed):       2/19/2024    12:53 PM 6/4/2024    11:40 AM 8/7/2024     4:00 PM 8/23/2024     8:00 AM 11/4/2024     9:00 AM   PROMIS 10   In general, would you say your health is: Fair Fair      In general, would you say your quality of life is: Good Poor      In general, how would you rate your physical health? Fair Fair      In general, how would you rate your mental health, including your mood and your ability to think? Good Fair      In general, how would you rate your satisfaction with your social activities and relationships? Good Fair      In general, please rate how well you carry out your usual social activities and roles Fair Poor      To what extent are you able to carry out your everyday physical activities such as walking, climbing stairs, carrying groceries, or moving a chair? Completely A little      In the past 7 days, how often have you been bothered by emotional problems such as feeling anxious, depressed, or irritable? Sometimes Often      In the past 7 days, how would you rate your fatigue on average? Mild Severe      In the past 7 days, how would you rate your pain on average, where 0 means no pain, and 10 means worst imaginable pain? 5 8      In general, would you say your health is: 2  2    2   In general, would you say your quality of life is: 3  1    3   In general, how would you rate your physical health? 2  2    2   In general, how would you rate your mental health, including your mood and your ability to think? 3  2    2   In general, how would you rate your satisfaction with your social activities and relationships? 3  2    3   In general, please rate how well you carry out your usual social activities and roles. (This includes activities at home, at work and in your community, and responsibilities as a parent, child, spouse, employee, friend, etc.) 2  1    4   To what extent are you able  to carry out your everyday physical activities such as walking, climbing stairs, carrying groceries, or moving a chair? 5  2    5   In the past 7 days, how often have you been bothered by emotional problems such as feeling anxious, depressed, or irritable? 3  4    4   In the past 7 days, how would you rate your fatigue on average? 2  4    3   In the past 7 days, how would you rate your pain on average, where 0 means no pain, and 10 means worst imaginable pain? 5  8    3   Global Mental Health Score 12    12 7   10   Global Physical Health Score 14    14 8   14   PROMIS TOTAL - SUBSCORES 26    26 15   24       Information is confidential and restricted. Go to Review Flowsheets to unlock data.    Patient-reported    Multiple values from one day are sorted in reverse-chronological order     [place] Suicide Severity Rating Scale (Lifetime/Recent)      7/16/2024     4:09 PM 8/19/2024     4:03 PM 8/20/2024     4:00 AM 8/30/2024     8:26 AM 10/21/2024     9:20 PM 10/21/2024     9:22 PM 10/22/2024     6:28 AM   [place] Suicide Severity Rating (Lifetime/Recent)   Q1 Wish to be Dead (Lifetime)   No    No   Q2 Non-Specific Active Suicidal Thoughts (Lifetime)   No    No   Q1 Wished to be Dead (Past Month) 0-->no 0-->no 0-->no 0-->no 0-->no 1-->yes 0-->no   Q2 Suicidal Thoughts (Past Month) 0-->no 1-->yes 0-->no 0-->no 0-->no 1-->yes 0-->no   Q3 Suicidal Thought Method  0-->no 0-->no       Q4 Suicidal Intent without Specific Plan  0-->no 0-->no       Q5 Suicide Intent with Specific Plan  0-->no 0-->no       Q6 Suicide Behavior (Lifetime) 0-->no 0-->no 0-->no 0-->no 0-->no 1-->yes 0-->no   Level of Risk per Screen no risks indicated low risk no risks indicated no risks indicated no risks indicated low risk no risks indicated   Most Severe Ideation Rating (Past 1 Month)       --   Frequency (Past 1 Month)       --   Duration (Past 1 Month)       --   Controllability (Past 1 Month)       --   Deterrents (Past 1 Month)       --    Reasons for Ideation (Past 1 Month)       --   Actual Attempt (Lifetime)       N   Actual Attempt (Past 3 Months)       N   Total Number of Actual Attempts (Past 3 Months)       0   Has subject engaged in non-suicidal self-injurious behavior? (Lifetime)       N   Has subject engaged in non-suicidal self-injurious behavior? (Past 3 Months)       N   Interrupted Attempts (Lifetime)       N   Interrupted Attempts (Past 3 Months)       N   Total Number of Interrupted Attempts (Past 3 Months)       0   Aborted or Self-Interrupted Attempt (Lifetime)       N   Aborted or Self-Interrupted Attempt (Past 3 Months)       N   Total Number of Aborted or Self-Interrupted Attempts (Past 3 Months)       0   Preparatory Acts or Behavior (Lifetime)       N   Preparatory Acts or Behavior (Past 3 Months)       N   Total Number of Preparatory Acts (Past 3 Months)       0   Calculated C-SSRS Risk Score (Lifetime/Recent)       No Risk Indicated         ASSESSMENT: Current Emotional / Mental Status (status of significant symptoms):   Risk status (Self / Other harm or suicidal ideation)   Patient denies current fears or concerns for personal safety.   Patient reports the following current or recent suicidal ideation or behaviors: patient reports passive SI, but denies any plan or intent. Patient's safety plan has been updated.    Patient denies current or recent homicidal ideation or behaviors.   Patient denies current or recent self injurious behavior or ideation.   Patient denies other safety concerns.   Patient reports there has been no change in risk factors since their last session.     Patient reports there has been no change in protective factors since their last session.     A safety and risk management plan has been developed including: Patient consented to co-developed safety plan.  Safety and risk management plan was completed - see below.  Patient agreed to use safety plan should any safety concerns arise.  A copy was given to  "the patient.      Appearance:   Appropriate    Eye Contact:   Fair    Psychomotor Behavior: Restless    Attitude:   Cooperative    Orientation:   All   Speech    Rate / Production: Pressured     Volume:  Normal    Mood:    Anxious  Depressed    Affect:    Appropriate    Thought Content:  Clear    Thought Form:  Coherent    Insight:    Fair      Medication Review:   No changes to current psychiatric medication(s)     Medication Compliance:   Yes     Changes in Health Issues:   None reported     Chemical Use Review:   Substance Use: increase in alcohol .  Patient reports frequency of use : Patient reports having 61 days of sobriety and then returning to alcohol use on 10/21/24. Patient reports currently drinking \"2 pints of vodka\" a day, but recently being sober from alcohol from 11/14-11/17.  Reviewed information and resources for treatment and ongoing sobriety  Provided encouragement towards sobriety  Provided information on local AA and SMART Recovery meetings   .      Tobacco Use: No current tobacco use.      Diagnosis:  1. Major depressive disorder, recurrent episode, moderate (H)    Other Diagnosis: Alcohol Use Disorder     Collateral Reports Completed:   Not Applicable    PLAN: (Patient Tasks / Therapist Tasks / Other)  Patient will return in 1 week for next session. Patient will engage in self-care activities. Patient will work on mindfulness skills to increase awareness of triggers to alcohol cravings and depression. Patient will work on boundary setting and eliminate enabling behaviors, including buying alcohol for others. Patient will regularly utilize his coping skills, including listening to music. Patient will regularly evaluate his HALT cues that lead to alcohol cravings. Patient will utilize the \"delay, distract, decide\" skill when noticing alcohol cravings. Patient will abstain from alcohol use and attend at least four AA or SMART Recovery meetings/ week. Patient will explore social events to attend " on [email or web address].      Lucy Rouse, LPCC                                                         ______________________________________________________________________    Individual Treatment Plan    Patient's Name: Cody Bolton  YOB: 1980    Date of Creation: 2/26/24  Date Treatment Plan Last Reviewed/Revised: 10/2/24    DSM5 Diagnoses:   Encounter Diagnosis   Name Primary?    Major depressive disorder, recurrent episode, moderate (H) Yes       Psychosocial / Contextual Factors: Relationship stressors, occupational dynamics.  PROMIS-10 from encounters over the past 365 days    Encounter date  Last reading 11/4/24  9:00 AM 6/4/24  11:40 AM 2/26/24 2/19/2024 12:53 PM 2/19/24  12:53 PM   Global Mental Health Score 10 (P) 7 (P) 12 (P) 12   Global Physical Health Score 14 (P) 8 (P) 14 (P) 14   PROMIS TOTAL - SUBSCORES 24 (P) 15 (P) 26 (P) 26       Referral / Collaboration:  Referral to another professional/service is not indicated at this time..    Anticipated number of session for this episode of care: 9-12 sessions  Anticipation frequency of session: Weekly  Anticipated Duration of each session: 38-52 minutes  Treatment plan will be reviewed in 90 days or when goals have been changed.       MeasurableTreatment Goal(s) related to diagnosis / functional impairment(s)  Goal 1: Patient will reduce depression symptoms as evidenced by a reduction in PHQ9 score to a 7 or less in the next 2 months.    I will know I've met my goal when I'm not having the feeling as often that I just don't want to do life anymore.      Objective #A (Patient Action)    Patient will Decrease frequency and intensity of feeling down, depressed, hopeless.  Status: Continued - Date(s): 10/2/24    Intervention(s)  Therapist will teach  coping skills and self-care activities .    Objective #B  Patient will Feel less tired and more energy during the day .  Status: Continued - Date(s): 10/2/24    Intervention(s)  Therapist will  teach  sleep hygiene and movement-based exercises .    Objective #C  Patient will Increase interest, engagement, and pleasure in doing things.  Status: Continued - Date(s):10/2/24     Intervention(s)  Therapist will  teach distress-tolerance skills. .        Patient has reviewed and agreed to the above plan.      Lucy Rouse, [facility] on 10/2/2024 at 4:00 PM

## 2024-11-19 NOTE — PROGRESS NOTES
ANTICOAGULATION MANAGEMENT     Cody WALLACE Young 44 year old male is on warfarin with therapeutic INR result. (Goal INR 2.0-3.0)    Recent labs: (last 7 days)     11/19/24  1145   INR 2.6*       ASSESSMENT     Source(s): Chart Review  Previous INR was Subtherapeutic  Medication, diet, health changes since last INR chart reviewed; none identified         PLAN     Recommended plan for no diet, medication or health factor changes affecting INR     Dosing Instructions: Continue your current warfarin dose with next INR in 2 weeks       Summary  As of 11/19/2024      Full warfarin instructions:  7.5 mg every Sun, Thu; 5 mg all other days   Next INR check:  12/3/2024               Detailed voice message left for Cody with dosing instructions and follow up date.     Contact [phone] to schedule and with any changes, questions or concerns.     Education provided: None required    Plan made per [facility] anticoagulation protocol    Júnior GALICIA RN  11/19/2024  Anticoagulation Clinic  [employer] for routing messages: fransisco GARAY  [facility] patient phone line: [phone]        _______________________________________________________________________     Anticoagulation Episode Summary       Current INR goal:  2.0-3.0   TTR:  47.5% (9 mo)   Target end date:  Indefinite   Send INR reminders to:  KEO GARAY    Indications    Acute pulmonary embolism without acute cor pulmonale  unspecified pulmonary embolism type (H) (Resolved) [I26.99]  History of DVT (deep vein thrombosis) [Z86.718]             Comments:  --             Anticoagulation Care Providers       Provider Role Specialty Phone number    Aaseby-Aguilera, Ramona Ann, PA-C Referring Family Medicine [phone]

## 2024-12-02 ENCOUNTER — VIRTUAL VISIT (OUTPATIENT)
Dept: BEHAVIORAL HEALTH | Facility: CLINIC | Age: 44
End: 2024-12-02
Payer: COMMERCIAL

## 2024-12-02 DIAGNOSIS — F10.20 ALCOHOL USE DISORDER, SEVERE, DEPENDENCE (H): ICD-10-CM

## 2024-12-02 DIAGNOSIS — F33.1 MAJOR DEPRESSIVE DISORDER, RECURRENT EPISODE, MODERATE (H): Primary | ICD-10-CM

## 2024-12-02 PROCEDURE — 90834 PSYTX W PT 45 MINUTES: CPT | Mod: 95

## 2024-12-02 NOTE — PROGRESS NOTES
[facility]                                     Progress Note    Patient Name: Cody Bolton  Date: 24           Service Type: Individual      Session Start Time: 2.01    Session End Time:  2.42     Session Length: 38-52 minutes    Session #: 17    Attendees: Client attended alone    Service Modality:  Video Visit:      Provider verified identity through the following two step process.  Patient provided:  Patient  and Patient address    Telemedicine Visit: The patient's condition can be safely assessed and treated via synchronous audio and visual telemedicine encounter.      Reason for Telemedicine Visit: Patient has requested telehealth visit    Originating Site (Patient Location): Patient's home    Distant Site (Provider Location): [facility]    Consent:  The patient/guardian has verbally consented to: the potential risks and benefits of telemedicine (video visit) versus in person care; bill my insurance or make self-payment for services provided; and responsibility for payment of non-covered services.     Patient would like the video invitation sent by:  My Chart    Mode of Communication:  Video Conference via Amwell    Distant Location (Provider):  Off-site    As the provider I attest to compliance with applicable laws and regulations related to telemedicine.    DATA  Interactive Complexity: No.   Crisis: No        Progress Since Last Session (Related to Symptoms / Goals / Homework):   Symptoms: Worsening . Patient's PHQ9 score has increased.    Homework: Partially completed      Episode of Care Goals: Minimal progress - CONTEMPLATION (Considering change and yet undecided); Intervened by assessing the negative and positive thinking (ambivalence) about behavior change     Current / Ongoing Stressors and Concerns:  Patient discussed abstaining from alcohol since . Patient discussed continued communication with his ex-partner.  "Patient discussed themes of mistrust. Patient discussed relationship stressors. Patient discussed thoughts about being \"friends\" with his ex-partner. Patient discussed feeling used. Patient discussed his ex-fiance's active addiction issues. Patient discussed working on boundary setting. Patient committed to continuing to abstain from alcohol until his next session with this provider on 12/11. Patient discussed participating in speed dating.        Treatment Objective(s) Addressed in This Session:   Decrease frequency and intensity of feeling down, depressed, hopeless       Intervention:   Motivational Interviewing    MI Intervention: Expressed Empathy/Understanding, Supported Autonomy, Collaboration, Evocation, Open-ended questions, and Reflections: simple and complex     Change Talk Expressed by the Patient: Desire to change Reasons to change    Provider Response to Change Talk: E - Evoked more info from patient about behavior change, A - Affirmed patient's thoughts, decisions, or attempts at behavior change, and R - Reflected patient's change talk    Assessments completed prior to visit:  The following assessments were completed by patient for this visit:  PHQ9:       8/13/2024    12:44 PM 8/20/2024     3:00 PM 8/23/2024     8:00 AM 9/26/2024     1:57 PM 11/4/2024     8:47 AM 11/12/2024     8:45 AM 11/19/2024     8:50 AM   PHQ-9 SCORE   PHQ-9 Total Score MyChart 16 (Moderately severe depression)   19 (Moderately severe depression) 17 (Moderately severe depression) 20 (Severe depression) 21 (Severe depression)   PHQ-9 Total Score 16 13 13 19 17  20  21        Patient-reported     GAD7:       2/19/2024    12:39 PM 6/4/2024    11:39 AM 8/7/2024     4:00 PM 8/9/2024     6:40 AM 8/20/2024     3:00 PM 8/23/2024     8:00 AM   MERI-7 SCORE   Total Score 2 (minimal anxiety) 7 (mild anxiety)  7 (mild anxiety)     Total Score 2 7 6 7 9 9     PROMIS 10-Global Health (all questions and answers displayed):       2/19/2024    " 12:53 PM 6/4/2024    11:40 AM 8/7/2024     4:00 PM 8/23/2024     8:00 AM 11/4/2024     9:00 AM   PROMIS 10   In general, would you say your health is: Fair Fair      In general, would you say your quality of life is: Good Poor      In general, how would you rate your physical health? Fair Fair      In general, how would you rate your mental health, including your mood and your ability to think? Good Fair      In general, how would you rate your satisfaction with your social activities and relationships? Good Fair      In general, please rate how well you carry out your usual social activities and roles Fair Poor      To what extent are you able to carry out your everyday physical activities such as walking, climbing stairs, carrying groceries, or moving a chair? Completely A little      In the past 7 days, how often have you been bothered by emotional problems such as feeling anxious, depressed, or irritable? Sometimes Often      In the past 7 days, how would you rate your fatigue on average? Mild Severe      In the past 7 days, how would you rate your pain on average, where 0 means no pain, and 10 means worst imaginable pain? 5 8      In general, would you say your health is: 2  2    2   In general, would you say your quality of life is: 3  1    3   In general, how would you rate your physical health? 2  2    2   In general, how would you rate your mental health, including your mood and your ability to think? 3  2    2   In general, how would you rate your satisfaction with your social activities and relationships? 3  2    3   In general, please rate how well you carry out your usual social activities and roles. (This includes activities at home, at work and in your community, and responsibilities as a parent, child, spouse, employee, friend, etc.) 2  1    4   To what extent are you able to carry out your everyday physical activities such as walking, climbing stairs, carrying groceries, or moving a chair? 5  2    5    In the past 7 days, how often have you been bothered by emotional problems such as feeling anxious, depressed, or irritable? 3  4    4   In the past 7 days, how would you rate your fatigue on average? 2  4    3   In the past 7 days, how would you rate your pain on average, where 0 means no pain, and 10 means worst imaginable pain? 5  8    3   Global Mental Health Score 12    12 7   10   Global Physical Health Score 14    14 8   14   PROMIS TOTAL - SUBSCORES 26    26 15   24       Information is confidential and restricted. Go to Review Flowsheets to unlock data.    Patient-reported    Multiple values from one day are sorted in reverse-chronological order     Reyno Suicide Severity Rating Scale (Lifetime/Recent)      7/16/2024     4:09 PM 8/19/2024     4:03 PM 8/20/2024     4:00 AM 8/30/2024     8:26 AM 10/21/2024     9:20 PM 10/21/2024     9:22 PM 10/22/2024     6:28 AM   Reyno Suicide Severity Rating (Lifetime/Recent)   Q1 Wish to be Dead (Lifetime)   No    No   Q2 Non-Specific Active Suicidal Thoughts (Lifetime)   No    No   Q1 Wished to be Dead (Past Month) 0-->no 0-->no 0-->no 0-->no 0-->no 1-->yes 0-->no   Q2 Suicidal Thoughts (Past Month) 0-->no 1-->yes 0-->no 0-->no 0-->no 1-->yes 0-->no   Q3 Suicidal Thought Method  0-->no 0-->no       Q4 Suicidal Intent without Specific Plan  0-->no 0-->no       Q5 Suicide Intent with Specific Plan  0-->no 0-->no       Q6 Suicide Behavior (Lifetime) 0-->no 0-->no 0-->no 0-->no 0-->no 1-->yes 0-->no   Level of Risk per Screen no risks indicated low risk no risks indicated no risks indicated no risks indicated low risk no risks indicated   Most Severe Ideation Rating (Past 1 Month)       --   Frequency (Past 1 Month)       --   Duration (Past 1 Month)       --   Controllability (Past 1 Month)       --   Deterrents (Past 1 Month)       --   Reasons for Ideation (Past 1 Month)       --   Actual Attempt (Lifetime)       N   Actual Attempt (Past 3 Months)       N   Total  Number of Actual Attempts (Past 3 Months)       0   Has subject engaged in non-suicidal self-injurious behavior? (Lifetime)       N   Has subject engaged in non-suicidal self-injurious behavior? (Past 3 Months)       N   Interrupted Attempts (Lifetime)       N   Interrupted Attempts (Past 3 Months)       N   Total Number of Interrupted Attempts (Past 3 Months)       0   Aborted or Self-Interrupted Attempt (Lifetime)       N   Aborted or Self-Interrupted Attempt (Past 3 Months)       N   Total Number of Aborted or Self-Interrupted Attempts (Past 3 Months)       0   Preparatory Acts or Behavior (Lifetime)       N   Preparatory Acts or Behavior (Past 3 Months)       N   Total Number of Preparatory Acts (Past 3 Months)       0   Calculated C-SSRS Risk Score (Lifetime/Recent)       No Risk Indicated         ASSESSMENT: Current Emotional / Mental Status (status of significant symptoms):   Risk status (Self / Other harm or suicidal ideation)   Patient denies current fears or concerns for personal safety.   Patient denies current or recent suicidal ideation or behaviors.    Patient denies current or recent homicidal ideation or behaviors.   Patient denies current or recent self injurious behavior or ideation.   Patient denies other safety concerns.   Patient reports there has been no change in risk factors since their last session.     Patient reports there has been no change in protective factors since their last session.     A safety and risk management plan has been developed including: Patient consented to co-developed safety plan.  Safety and risk management plan was completed - see below.  Patient agreed to use safety plan should any safety concerns arise.  A copy was given to the patient.      Appearance:   Appropriate    Eye Contact:   Fair    Psychomotor Behavior: Restless    Attitude:   Cooperative  Pleasant   Orientation:   All   Speech    Rate / Production: Normal/ Responsive    Volume:  Normal  "   Mood:    Anxious  Depressed    Affect:    Appropriate    Thought Content:  Clear    Thought Form:  Coherent    Insight:    Fair      Medication Review:   No changes to current psychiatric medication(s)     Medication Compliance:   Yes     Changes in Health Issues:   None reported     Chemical Use Review:   Substance Use: decrease in alcohol .  Patient reports frequency of use : Patient reports currently being sober from alcohol since 11/27.  Reviewed information and resources for treatment and ongoing sobriety  Reviewed concerns related to health related substance abuse risk  Provided encouragement towards sobriety  Provided support and affirmation for steps taken towards sobriety   Reviewed information on local AA and SMART Recovery meetings   .      Tobacco Use: No current tobacco use.      Diagnosis:  1. Major depressive disorder, recurrent episode, moderate (H)    2. Alcohol use disorder, severe, dependence (H)        Collateral Reports Completed:   Not Applicable    PLAN: (Patient Tasks / Therapist Tasks / Other)  Patient will return in 1 week for next session. Patient will engage in self-care activities. Patient will work on mindfulness skills to increase awareness of triggers to alcohol cravings and depression. Patient will work on boundary setting and eliminate enabling behaviors, including buying alcohol for others. Patient will regularly utilize his coping skills, including listening to music. Patient will regularly evaluate his HALT (hungry, angry, lonely, tired) cues that lead to alcohol cravings. Patient will utilize the \"delay, distract, decide\" skill when noticing alcohol cravings. Patient will abstain from alcohol use and attend at least 4 AA or SMART recovery meetings/ week. Patient will explore social events to attend on [place]. Patient will work on creating a boundary list of tasks he is and is not willing to do for his ex-partner.       Lucy Rouse, [facility]                                   "                       ______________________________________________________________________    Individual Treatment Plan    Patient's Name: Cody Bolton  YOB: 1980    Date of Creation: 2/26/24  Date Treatment Plan Last Reviewed/Revised: 10/2/24    DSM5 Diagnoses:   Encounter Diagnoses   Name Primary?    Major depressive disorder, recurrent episode, moderate (H) Yes    Alcohol use disorder, severe, dependence (H)        Psychosocial / Contextual Factors: Relationship stressors, occupational dynamics.  PROMIS-10 from encounters over the past 365 days    Encounter date  Last reading 11/4/24  9:00 AM 6/4/24  11:40 AM 2/26/24 2/19/2024 12:53 PM 2/19/24  12:53 PM   Global Mental Health Score 10 (P) 7 (P) 12 (P) 12   Global Physical Health Score 14 (P) 8 (P) 14 (P) 14   PROMIS TOTAL - SUBSCORES 24 (P) 15 (P) 26 (P) 26       Referral / Collaboration:  Referral to another professional/service is not indicated at this time..    Anticipated number of session for this episode of care: 9-12 sessions  Anticipation frequency of session: Weekly  Anticipated Duration of each session: 38-52 minutes  Treatment plan will be reviewed in 90 days or when goals have been changed.       Measurable Treatment Goal(s) related to diagnosis / functional impairment(s)  Goal 1: Patient will reduce alcohol consumption to no more than 1 drink/ week for the next 1 month and then 0 drinks/ week for the next 3 months.    I will know I've met my goal when I'm not having the feeling as often that I just don't want to do life anymore.      Objective #A (Patient Action)    Patient will attend at least 5 AA meetings per week for the next 3 months.   Status: Continued - Date(s): 10/2/24    Intervention(s)  Therapist will teach  coping skills and provide information on local AA meetings and recovery resources .    Objective #B  Patient will identify at least 3 example(s) of how drinking has resulted in an experience that interferes with  person values or goals.  Status: Continued - Date(s): 10/2/24    Intervention(s)  Therapist will  teach mindfulness and self-awareness skills .      MeasurableTreatment Goal(s) related to diagnosis / functional impairment(s)  Goal 2: Patient will reduce depression symptoms as evidenced by a reduction in PHQ9 score to a 7 or less in the next 2 months.    I will know I've met my goal when I'm not having the feeling as often that I just don't want to do life anymore.      Objective #A (Patient Action)    Patient will Decrease frequency and intensity of feeling down, depressed, hopeless.  Status: Continued - Date(s): 10/2/24    Intervention(s)  Therapist will teach coping skills and self-care activities.    Objective #B  Patient will Feel less tired and more energy during the day .  Status: Continued - Date(s): 10/2/24    Intervention(s)  Therapist will teach sleep hygiene and movement-based exercises.    Objective #C  Patient will Increase interest, engagement, and pleasure in doing things.  Status: Continued - Date(s):10/2/24     Intervention(s)  Therapist will teach distress-tolerance skills.        Patient has reviewed and agreed to the above plan.      KATRIN Salvador on 10/2/2024 at 4:00 PM

## 2024-12-10 ENCOUNTER — ANTICOAGULATION THERAPY VISIT (OUTPATIENT)
Dept: ANTICOAGULATION | Facility: CLINIC | Age: 44
End: 2024-12-10

## 2024-12-10 ENCOUNTER — TELEPHONE (OUTPATIENT)
Dept: ANTICOAGULATION | Facility: CLINIC | Age: 44
End: 2024-12-10

## 2024-12-10 ENCOUNTER — LAB (OUTPATIENT)
Dept: LAB | Facility: CLINIC | Age: 44
End: 2024-12-10
Payer: COMMERCIAL

## 2024-12-10 DIAGNOSIS — Z86.718 HISTORY OF DVT (DEEP VEIN THROMBOSIS): Primary | ICD-10-CM

## 2024-12-10 DIAGNOSIS — Z86.711 HISTORY OF PULMONARY EMBOLISM: ICD-10-CM

## 2024-12-10 DIAGNOSIS — Z86.718 HISTORY OF DVT (DEEP VEIN THROMBOSIS): ICD-10-CM

## 2024-12-10 LAB — INR BLD: 3.2 (ref 0.9–1.1)

## 2024-12-10 PROCEDURE — 36416 COLLJ CAPILLARY BLOOD SPEC: CPT

## 2024-12-10 PROCEDURE — 85610 PROTHROMBIN TIME: CPT

## 2024-12-10 NOTE — TELEPHONE ENCOUNTER
ANTICOAGULATION     Cody Bolton is overdue for an INR check.     Spoke with Jeffery and scheduled lab appointment on 12/10/24    Gerson Baker RN  12/10/2024  Anticoagulation Clinic  [facility] for routing messages: fransisco GARYA  ACC patient phone line: [phone]

## 2024-12-10 NOTE — PROGRESS NOTES
ANTICOAGULATION MANAGEMENT     oCdy WALLACE Young 44 year old male is on warfarin with supratherapeutic INR result. (Goal INR 2.0-3.0)    Recent labs: (last 7 days)     12/10/24  1504   INR 3.2*       ASSESSMENT     Source(s): Chart Review     Warfarin doses taken: Reviewed in chart  Diet: No new diet changes identified. Need to assess ETOH use, his plan is for abstinence per last VV with Behavioral therapist on 12/2/24.  Medication/supplement changes: None noted  New illness, injury, or hospitalization: No  Signs or symptoms of bleeding or clotting: No  Previous result: Therapeutic last visit 3 weeks ago; previously outside of goal range and subtherapeutic after 2 missed doses. On 10/28 INR was 3.9 after relapse with alcohol and a mild URI. It was advised he do a partial hold and decrease maintenance dose 11%.  Additional findings: None       PLAN     Unable to reach Cody today.    Left message to continue current dose of warfarin 5 mg tonight. Request call back for assessment.    Follow up required to discuss out of range result     Catalina Begum RN  12/10/2024  Anticoagulation Clinic  [facility] for routing messages: fransisco GARAY  ACC patient phone line: [phone]

## 2024-12-11 ENCOUNTER — VIRTUAL VISIT (OUTPATIENT)
Dept: BEHAVIORAL HEALTH | Facility: CLINIC | Age: 44
End: 2024-12-11
Payer: COMMERCIAL

## 2024-12-11 DIAGNOSIS — F10.20 ALCOHOL USE DISORDER, SEVERE, DEPENDENCE (H): ICD-10-CM

## 2024-12-11 DIAGNOSIS — F33.1 MAJOR DEPRESSIVE DISORDER, RECURRENT EPISODE, MODERATE (H): Primary | ICD-10-CM

## 2024-12-11 PROCEDURE — 90834 PSYTX W PT 45 MINUTES: CPT | Mod: 95

## 2024-12-11 ASSESSMENT — ANXIETY QUESTIONNAIRES
2. NOT BEING ABLE TO STOP OR CONTROL WORRYING: MORE THAN HALF THE DAYS
2. NOT BEING ABLE TO STOP OR CONTROL WORRYING: MORE THAN HALF THE DAYS
6. BECOMING EASILY ANNOYED OR IRRITABLE: SEVERAL DAYS
1. FEELING NERVOUS, ANXIOUS, OR ON EDGE: MORE THAN HALF THE DAYS
7. FEELING AFRAID AS IF SOMETHING AWFUL MIGHT HAPPEN: NOT AT ALL
6. BECOMING EASILY ANNOYED OR IRRITABLE: SEVERAL DAYS
3. WORRYING TOO MUCH ABOUT DIFFERENT THINGS: SEVERAL DAYS
IF YOU CHECKED OFF ANY PROBLEMS ON THIS QUESTIONNAIRE, HOW DIFFICULT HAVE THESE PROBLEMS MADE IT FOR YOU TO DO YOUR WORK, TAKE CARE OF THINGS AT HOME, OR GET ALONG WITH OTHER PEOPLE: EXTREMELY DIFFICULT
1. FEELING NERVOUS, ANXIOUS, OR ON EDGE: MORE THAN HALF THE DAYS
3. WORRYING TOO MUCH ABOUT DIFFERENT THINGS: SEVERAL DAYS
GAD7 TOTAL SCORE: 10
4. TROUBLE RELAXING: MORE THAN HALF THE DAYS
IF YOU CHECKED OFF ANY PROBLEMS ON THIS QUESTIONNAIRE, HOW DIFFICULT HAVE THESE PROBLEMS MADE IT FOR YOU TO DO YOUR WORK, TAKE CARE OF THINGS AT HOME, OR GET ALONG WITH OTHER PEOPLE: EXTREMELY DIFFICULT
GAD7 TOTAL SCORE: 10
GAD7 TOTAL SCORE: 10
5. BEING SO RESTLESS THAT IT IS HARD TO SIT STILL: MORE THAN HALF THE DAYS
5. BEING SO RESTLESS THAT IT IS HARD TO SIT STILL: MORE THAN HALF THE DAYS
GAD7 TOTAL SCORE: 10
8. IF YOU CHECKED OFF ANY PROBLEMS, HOW DIFFICULT HAVE THESE MADE IT FOR YOU TO DO YOUR WORK, TAKE CARE OF THINGS AT HOME, OR GET ALONG WITH OTHER PEOPLE?: EXTREMELY DIFFICULT
7. FEELING AFRAID AS IF SOMETHING AWFUL MIGHT HAPPEN: NOT AT ALL
8. IF YOU CHECKED OFF ANY PROBLEMS, HOW DIFFICULT HAVE THESE MADE IT FOR YOU TO DO YOUR WORK, TAKE CARE OF THINGS AT HOME, OR GET ALONG WITH OTHER PEOPLE?: EXTREMELY DIFFICULT
4. TROUBLE RELAXING: MORE THAN HALF THE DAYS

## 2024-12-11 ASSESSMENT — COLUMBIA-SUICIDE SEVERITY RATING SCALE - C-SSRS
1. SINCE LAST CONTACT, HAVE YOU WISHED YOU WERE DEAD OR WISHED YOU COULD GO TO SLEEP AND NOT WAKE UP?: NO
2. HAVE YOU ACTUALLY HAD ANY THOUGHTS OF KILLING YOURSELF?: NO

## 2024-12-11 NOTE — PROGRESS NOTES
[facility]                                     Progress Note    Patient Name: Cody Bolton  Date: 24           Service Type: Individual      Session Start Time: .58    Session End Time:  1.42     Session Length: 38-52 minutes    Session #: 18    Attendees: Client attended alone    Service Modality:  Video Visit:      Provider verified identity through the following two step process.  Patient provided:  Patient  and Patient address    Telemedicine Visit: The patient's condition can be safely assessed and treated via synchronous audio and visual telemedicine encounter.      Reason for Telemedicine Visit: Patient has requested telehealth visit    Originating Site (Patient Location): Patient's place of employment    Distant Site (Provider Location): [facility]    Consent:  The patient/guardian has verbally consented to: the potential risks and benefits of telemedicine (video visit) versus in person care; bill my insurance or make self-payment for services provided; and responsibility for payment of non-covered services.     Patient would like the video invitation sent by:  My Chart    Mode of Communication:  Video Conference via Amwell    Distant Location (Provider):  Off-site    As the provider I attest to compliance with applicable laws and regulations related to telemedicine.    DATA  Interactive Complexity: No.   Crisis: No        Progress Since Last Session (Related to Symptoms / Goals / Homework):   Symptoms: Worsening . Patient's PHQ9 score has increased.    Homework: Partially completed      Episode of Care Goals: Minimal progress - CONTEMPLATION (Considering change and yet undecided); Intervened by assessing the negative and positive thinking (ambivalence) about behavior change     Current / Ongoing Stressors and Concerns:  Patient discussed abstaining from alcohol 4 out of 7 days this past week. Patient discussed health  stressors. Patient discussed participating in another speed dating event. Patient discussed planning on attending a singles event tonight. Patient discussed his plans to begin working with a dating/ . Patient discussed his anxious attachment style. Patient discussed feelings of abandonment. Patient discussed his father passing away when he was 15 years old. Patient reflected on his past relationships and self-sacrificing tendencies.         Treatment Objective(s) Addressed in This Session:   Decrease frequency and intensity of feeling down, depressed, hopeless       Intervention:   Motivational Interviewing    MI Intervention: Expressed Empathy/Understanding, Supported Autonomy, Collaboration, Evocation, Open-ended questions, and Reflections: simple and complex     Change Talk Expressed by the Patient: Desire to change Reasons to change    Provider Response to Change Talk: E - Evoked more info from patient about behavior change, A - Affirmed patient's thoughts, decisions, or attempts at behavior change, and R - Reflected patient's change talk    Assessments completed prior to visit:  The following assessments were completed by patient for this visit:  PHQ9:       8/13/2024    12:44 PM 8/20/2024     3:00 PM 8/23/2024     8:00 AM 9/26/2024     1:57 PM 11/4/2024     8:47 AM 11/12/2024     8:45 AM 11/19/2024     8:50 AM   PHQ-9 SCORE   PHQ-9 Total Score MyChart 16 (Moderately severe depression)   19 (Moderately severe depression) 17 (Moderately severe depression) 20 (Severe depression) 21 (Severe depression)   PHQ-9 Total Score 16 13 13 19 17  20  21        Patient-reported     GAD7:       2/19/2024    12:39 PM 6/4/2024    11:39 AM 8/7/2024     4:00 PM 8/9/2024     6:40 AM 8/20/2024     3:00 PM 8/23/2024     8:00 AM 12/11/2024    12:52 PM   MERI-7 SCORE   Total Score 2 (minimal anxiety) 7 (mild anxiety)  7 (mild anxiety)   10 (moderate anxiety)   Total Score 2 7 6 7 9 9 10        Patient-reported     PROMIS  10-Global Health (all questions and answers displayed):       2/19/2024    12:53 PM 6/4/2024    11:40 AM 8/7/2024     4:00 PM 8/23/2024     8:00 AM 11/4/2024     9:00 AM   PROMIS 10   In general, would you say your health is: Fair Fair      In general, would you say your quality of life is: Good Poor      In general, how would you rate your physical health? Fair Fair      In general, how would you rate your mental health, including your mood and your ability to think? Good Fair      In general, how would you rate your satisfaction with your social activities and relationships? Good Fair      In general, please rate how well you carry out your usual social activities and roles Fair Poor      To what extent are you able to carry out your everyday physical activities such as walking, climbing stairs, carrying groceries, or moving a chair? Completely A little      In the past 7 days, how often have you been bothered by emotional problems such as feeling anxious, depressed, or irritable? Sometimes Often      In the past 7 days, how would you rate your fatigue on average? Mild Severe      In the past 7 days, how would you rate your pain on average, where 0 means no pain, and 10 means worst imaginable pain? 5 8      In general, would you say your health is: 2  2    2   In general, would you say your quality of life is: 3  1    3   In general, how would you rate your physical health? 2  2    2   In general, how would you rate your mental health, including your mood and your ability to think? 3  2    2   In general, how would you rate your satisfaction with your social activities and relationships? 3  2    3   In general, please rate how well you carry out your usual social activities and roles. (This includes activities at home, at work and in your community, and responsibilities as a parent, child, spouse, employee, friend, etc.) 2  1    4   To what extent are you able to carry out your everyday physical activities such as  walking, climbing stairs, carrying groceries, or moving a chair? 5  2    5   In the past 7 days, how often have you been bothered by emotional problems such as feeling anxious, depressed, or irritable? 3  4    4   In the past 7 days, how would you rate your fatigue on average? 2  4    3   In the past 7 days, how would you rate your pain on average, where 0 means no pain, and 10 means worst imaginable pain? 5  8    3   Global Mental Health Score 12    12 7   10   Global Physical Health Score 14    14 8   14   PROMIS TOTAL - SUBSCORES 26    26 15   24       Information is confidential and restricted. Go to Review Flowsheets to unlock data.    Patient-reported    Multiple values from one day are sorted in reverse-chronological order     [place] Suicide Severity Rating Scale (Lifetime/Recent)      7/16/2024     4:09 PM 8/19/2024     4:03 PM 8/20/2024     4:00 AM 8/30/2024     8:26 AM 10/21/2024     9:20 PM 10/21/2024     9:22 PM 10/22/2024     6:28 AM   [place] Suicide Severity Rating (Lifetime/Recent)   Q1 Wish to be Dead (Lifetime)   No    No   Q2 Non-Specific Active Suicidal Thoughts (Lifetime)   No    No   Q1 Wished to be Dead (Past Month) 0-->no 0-->no 0-->no 0-->no 0-->no 1-->yes 0-->no   Q2 Suicidal Thoughts (Past Month) 0-->no 1-->yes 0-->no 0-->no 0-->no 1-->yes 0-->no   Q3 Suicidal Thought Method  0-->no 0-->no       Q4 Suicidal Intent without Specific Plan  0-->no 0-->no       Q5 Suicide Intent with Specific Plan  0-->no 0-->no       Q6 Suicide Behavior (Lifetime) 0-->no 0-->no 0-->no 0-->no 0-->no 1-->yes 0-->no   Level of Risk per Screen no risks indicated low risk no risks indicated no risks indicated no risks indicated low risk no risks indicated   Most Severe Ideation Rating (Past 1 Month)       --   Frequency (Past 1 Month)       --   Duration (Past 1 Month)       --   Controllability (Past 1 Month)       --   Deterrents (Past 1 Month)       --   Reasons for Ideation (Past 1 Month)       --   Actual  Attempt (Lifetime)       N   Actual Attempt (Past 3 Months)       N   Total Number of Actual Attempts (Past 3 Months)       0   Has subject engaged in non-suicidal self-injurious behavior? (Lifetime)       N   Has subject engaged in non-suicidal self-injurious behavior? (Past 3 Months)       N   Interrupted Attempts (Lifetime)       N   Interrupted Attempts (Past 3 Months)       N   Total Number of Interrupted Attempts (Past 3 Months)       0   Aborted or Self-Interrupted Attempt (Lifetime)       N   Aborted or Self-Interrupted Attempt (Past 3 Months)       N   Total Number of Aborted or Self-Interrupted Attempts (Past 3 Months)       0   Preparatory Acts or Behavior (Lifetime)       N   Preparatory Acts or Behavior (Past 3 Months)       N   Total Number of Preparatory Acts (Past 3 Months)       0   Calculated C-SSRS Risk Score (Lifetime/Recent)       No Risk Indicated         ASSESSMENT: Current Emotional / Mental Status (status of significant symptoms):   Risk status (Self / Other harm or suicidal ideation)   Patient denies current fears or concerns for personal safety.   Patient denies current or recent suicidal ideation or behaviors.    Patient denies current or recent homicidal ideation or behaviors.   Patient denies current or recent self injurious behavior or ideation.   Patient denies other safety concerns.   Patient reports there has been no change in risk factors since their last session.     Patient reports there has been no change in protective factors since their last session.     A safety and risk management plan has been developed including: Patient consented to co-developed safety plan.  Safety and risk management plan was completed - see below.  Patient agreed to use safety plan should any safety concerns arise.  A copy was given to the patient.      Appearance:   Appropriate    Eye Contact:   Fair    Psychomotor Behavior: Normal    Attitude:   Cooperative   "Pleasant   Orientation:   All   Speech    Rate / Production: Normal/ Responsive    Volume:  Normal    Mood:    Anxious  Depressed    Affect:    Appropriate    Thought Content:  Clear    Thought Form:  Coherent    Insight:    Fair      Medication Review:   No changes to current psychiatric medication(s)     Medication Compliance:   Yes     Changes in Health Issues:   None reported     Chemical Use Review:   Substance Use: increase in alcohol .  Patient reports frequency of use : Patient reports abstaining from alcohol 4/7 days this past week.  Reviewed information and resources for treatment and ongoing sobriety  Reviewed concerns related to health related substance abuse risk  Provided encouragement towards sobriety  Provided support and affirmation for steps taken towards sobriety   Reviewed information on local AA and SMART Recovery meetings   .      Tobacco Use: No current tobacco use.      Diagnosis:  1. Major depressive disorder, recurrent episode, moderate (H)    2. Alcohol use disorder, severe, dependence (H)        Collateral Reports Completed:   Not Applicable    PLAN: (Patient Tasks / Therapist Tasks / Other)  Patient will return in 1 week for next session. Patient will engage in self-care activities. Patient will work on mindfulness skills to increase awareness of triggers to alcohol cravings and depression. Patient will work on boundary setting and eliminating enabling behaviors, including buying alcohol for others. Patient will regularly utilize his coping skills, including listening to music. Patient will regularly evaluate his HALT (hungry, angry, lonely, tired) cues that lead to alcohol cravings. Patient will utilize the \"delay, distract, decide\" skill when noticing alcohol cravings. Patient will abstain from alcohol use and attend at least 4 AA or SMART recovery meetings/ week. 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 Patient will work on creating a boundary list of tasks he is " and is not willing to do for his ex-partner. Patient will work on giving his neighbor his car keys to keep from driving to the liquor store when tempted. Patient will write down 3 things a day that he likes about himself.       Lucy Rouse, [facility]                                                         ______________________________________________________________________    Individual Treatment Plan    Patient's Name: Cody Bolton  YOB: 1980    Date of Creation: 2/26/24  Date Treatment Plan Last Reviewed/Revised: 10/2/24    DSM5 Diagnoses:   Encounter Diagnoses   Name Primary?    Major depressive disorder, recurrent episode, moderate (H) Yes    Alcohol use disorder, severe, dependence (H)        Psychosocial / Contextual Factors: Relationship stressors, occupational dynamics.  PROMIS-10 from encounters over the past 365 days    Encounter date  Last reading 11/4/24  9:00 AM 6/4/24  11:40 AM 2/26/24 2/19/2024 12:53 PM 2/19/24  12:53 PM   Global Mental Health Score 10 (P) 7 (P) 12 (P) 12   Global Physical Health Score 14 (P) 8 (P) 14 (P) 14   PROMIS TOTAL - SUBSCORES 24 (P) 15 (P) 26 (P) 26       Referral / Collaboration:  Referral to another professional/service is not indicated at this time..    Anticipated number of session for this episode of care: 9-12 sessions  Anticipation frequency of session: Weekly  Anticipated Duration of each session: 38-52 minutes  Treatment plan will be reviewed in 90 days or when goals have been changed.       Measurable Treatment Goal(s) related to diagnosis / functional impairment(s)  Goal 1: Patient will reduce alcohol consumption to no more than 1 drink/ week for the next 1 month and then 0 drinks/ week for the next 3 months.    I will know I've met my goal when I'm not having the feeling as often that I just don't want to do life anymore.      Objective #A (Patient Action)    Patient will attend at least 5 AA meetings per week for the next 3 months.   Status:  Continued - Date(s): 10/2/24    Intervention(s)  Therapist will teach  coping skills and provide information on local AA meetings and recovery resources .    Objective #B  Patient will identify at least 3 example(s) of how drinking has resulted in an experience that interferes with person values or goals.  Status: Continued - Date(s): 10/2/24    Intervention(s)  Therapist will  teach mindfulness and self-awareness skills .      MeasurableTreatment Goal(s) related to diagnosis / functional impairment(s)  Goal 2: Patient will reduce depression symptoms as evidenced by a reduction in PHQ9 score to a 7 or less in the next 2 months.    I will know I've met my goal when I'm not having the feeling as often that I just don't want to do life anymore.      Objective #A (Patient Action)    Patient will Decrease frequency and intensity of feeling down, depressed, hopeless.  Status: Continued - Date(s): 10/2/24    Intervention(s)  Therapist will teach coping skills and self-care activities.    Objective #B  Patient will Feel less tired and more energy during the day .  Status: Continued - Date(s): 10/2/24    Intervention(s)  Therapist will teach sleep hygiene and movement-based exercises.    Objective #C  Patient will Increase interest, engagement, and pleasure in doing things.  Status: Continued - Date(s):10/2/24     Intervention(s)  Therapist will teach distress-tolerance skills.        Patient has reviewed and agreed to the above plan.      Lucy Rouse [facility] on 10/2/2024 at 4:00 PM

## 2024-12-11 NOTE — PROGRESS NOTES
Called [facility] again today. Again no answer. Left another vm to return call to [facility] RN.  Catalina WALLACE RN  Anticoagulation Team

## 2024-12-11 NOTE — PROGRESS NOTES
"ANTICOAGULATION MANAGEMENT     Cody MADISON Young 44 year old male is on warfarin with supratherapeutic INR result. (Goal INR 2.0-3.0)    Recent labs: (last 7 days)     12/10/24  1504   INR 3.2*       ASSESSMENT     Source(s): Chart Review and Patient/Caregiver Call     Warfarin doses taken: Less warfarin taken than planned which may be affecting INR Patient states for the last week and a half he has been taking  one tablet daily because he forgot how much he was supposed to be taking, and some nights he would forget to take it so he would take a tablet in the morning and then take his regular dose again that night.  Diet: Change in alcohol intake may be affecting INR. He admits he is drinking again. Not necessarily in excess, but not total abstinence. \"I'm doing the best I can\"   Medication/supplement changes:  Tylenol as needed use which may be increasing INR today since patient admits he is probably taking about 3000 mg/day  New illness, injury, or hospitalization: No he has chronic back pain which is also part of the reason he drinks also  Signs or symptoms of bleeding or clotting: No  Previous result: Therapeutic last visit; previously outside of goal range  Additional findings: None       PLAN     Recommended plan for ongoing change(s) affecting INR     Dosing Instructions: decrease your warfarin dose (6.3% change from last 7 days dosing) with next INR in 2 weeks       Summary  As of 12/10/2024      Full warfarin instructions:  2.5 mg every Wed; 5 mg all other days   Next INR check:  12/31/2024               Telephone call with Cody who verbalizes understanding and agrees to plan. Also sent enStage message.    Lab visit scheduled    Education provided: Please call back if any changes to your diet, medications or how you've been taking warfarin  Dietary considerations: importance of consistent vitamin K intake  Healthy lifestyle considerations: potential interaction between warfarin and alcohol, limit alcohol " intake to no more than 1 to 2 drinks in 24 hours if you choose to drink alcohol, and avoid excessive alcohol drinking (binge drinking) while on warfarin due to increased risk of bleeding from effect on INR and fall risk  Written instructions provided  Contact [phone] with any changes, questions or concerns.     Plan made per [facility] anticoagulation protocol    Catalina Begum RN  12/11/2024  Anticoagulation Clinic  [facility] for routing messages: fransisco GARAY  [facility] patient phone line: [phone]        _______________________________________________________________________     Anticoagulation Episode Summary       Current INR goal:  2.0-3.0   TTR:  48.9% (9.7 mo)   Target end date:  Indefinite   Send INR reminders to:  KEO GARAY    Indications    Acute pulmonary embolism without acute cor pulmonale  unspecified pulmonary embolism type (H) (Resolved) [I26.99]  History of DVT (deep vein thrombosis) [Z86.718]             Comments:  --             Anticoagulation Care Providers       Provider Role Specialty Phone number    Aaseby-Aguilera, Ramona Ann, PA-C Referring Family Medicine [phone]

## 2024-12-17 ENCOUNTER — VIRTUAL VISIT (OUTPATIENT)
Dept: BEHAVIORAL HEALTH | Facility: CLINIC | Age: 44
End: 2024-12-17
Payer: COMMERCIAL

## 2024-12-17 DIAGNOSIS — F10.20 ALCOHOL USE DISORDER, SEVERE, DEPENDENCE (H): ICD-10-CM

## 2024-12-17 DIAGNOSIS — F33.1 MAJOR DEPRESSIVE DISORDER, RECURRENT EPISODE, MODERATE (H): Primary | ICD-10-CM

## 2024-12-17 PROCEDURE — 90834 PSYTX W PT 45 MINUTES: CPT | Mod: 95

## 2024-12-17 PROCEDURE — 90785 PSYTX COMPLEX INTERACTIVE: CPT | Mod: 95

## 2024-12-17 ASSESSMENT — PATIENT HEALTH QUESTIONNAIRE - PHQ9
SUM OF ALL RESPONSES TO PHQ QUESTIONS 1-9: 20
10. IF YOU CHECKED OFF ANY PROBLEMS, HOW DIFFICULT HAVE THESE PROBLEMS MADE IT FOR YOU TO DO YOUR WORK, TAKE CARE OF THINGS AT HOME, OR GET ALONG WITH OTHER PEOPLE: EXTREMELY DIFFICULT
SUM OF ALL RESPONSES TO PHQ QUESTIONS 1-9: 20

## 2024-12-17 NOTE — PROGRESS NOTES
[facility]                                     Progress Note    Patient Name: Cody Bolton  Date: 24           Service Type: Individual      Session Start Time: 9.00    Session End Time:  .40     Session Length: 38-52 minutes    Session #: 19    Attendees: Client attended alone    Service Modality:  Video Visit:      Provider verified identity through the following two step process.  Patient provided:  Patient  and Patient address    Telemedicine Visit: The patient's condition can be safely assessed and treated via synchronous audio and visual telemedicine encounter.      Reason for Telemedicine Visit: Patient has requested telehealth visit    Originating Site (Patient Location): Patient's home    Distant Site (Provider Location): [facility]    Consent:  The patient/guardian has verbally consented to: the potential risks and benefits of telemedicine (video visit) versus in person care; bill my insurance or make self-payment for services provided; and responsibility for payment of non-covered services.     Patient would like the video invitation sent by:  My Chart    Mode of Communication:  Video Conference via [employer]    Distant Location (Provider):  Off-site    As the provider I attest to compliance with applicable laws and regulations related to telemedicine.    DATA  Interactive Complexity: Yes, visit entailed Interactive Complexity evidenced by:  -The need to manage maladaptive communication (related to, e.g., high anxiety, high reactivity, repeated questions, or disagreement) among participants that complicates delivery of care. Patient was frequently distracted and interrupted by work tasks, requiring refocusing and re-grounding to stay on topic.  Crisis: No        Progress Since Last Session (Related to Symptoms / Goals / Homework):   Symptoms: Worsening . Patient's PHQ9 score has increased.    Homework: Partially  completed      Episode of Care Goals: Minimal progress - CONTEMPLATION (Considering change and yet undecided); Intervened by assessing the negative and positive thinking (ambivalence) about behavior change     Current / Ongoing Stressors and Concerns:  Patient discussed the recent anniversary of his father's passing. Patient discussed participating in speed dating events and recently matching with someone. Patient discussed relationship dynamics with his ex-wife. Patient discussed getting out and socializing with others. Patient discussed needing to update his eyeglass prescription. Patient discussed self-sabotaging behaviors. 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 Patient discussed his pain being an motivator for his alcohol consumption. Patient and provider reviewed the patient's options for recovery and abstaining from alcohol.        Treatment Objective(s) Addressed in This Session:   Decrease frequency and intensity of feeling down, depressed, hopeless       Intervention:   Motivational Interviewing    MI Intervention: Expressed Empathy/Understanding, Supported Autonomy, Collaboration, Evocation, Open-ended questions, and Reflections: simple and complex     Change Talk Expressed by the Patient: Desire to change Reasons to change    Provider Response to Change Talk: E - Evoked more info from patient about behavior change, A - Affirmed patient's thoughts, decisions, or attempts at behavior change, and R - Reflected patient's change talk    Assessments completed prior to visit:  The following assessments were completed by patient for this visit:  PHQ9:       8/20/2024     3:00 PM 8/23/2024     8:00 AM 9/26/2024     1:57 PM 11/4/2024     8:47 AM 11/12/2024     8:45 AM 11/19/2024     8:50 AM 12/17/2024     8:52 AM   PHQ-9 SCORE   PHQ-9 Total Score [facility]   19 (Moderately severe depression) 17 (Moderately severe depression) 20 (Severe depression) 21 (Severe depression) 20 (Severe depression)   PHQ-9  Total Score 13 13 19 17  20  21  20        Patient-reported     GAD7:       2/19/2024    12:39 PM 6/4/2024    11:39 AM 8/7/2024     4:00 PM 8/9/2024     6:40 AM 8/20/2024     3:00 PM 8/23/2024     8:00 AM 12/11/2024    12:52 PM   MERI-7 SCORE   Total Score 2 (minimal anxiety) 7 (mild anxiety)  7 (mild anxiety)   10 (moderate anxiety)   Total Score 2 7 6 7 9 9 10        Patient-reported     PROMIS 10-Global Health (all questions and answers displayed):       2/19/2024    12:53 PM 6/4/2024    11:40 AM 8/7/2024     4:00 PM 8/23/2024     8:00 AM 11/4/2024     9:00 AM   PROMIS 10   In general, would you say your health is: Fair Fair      In general, would you say your quality of life is: Good Poor      In general, how would you rate your physical health? Fair Fair      In general, how would you rate your mental health, including your mood and your ability to think? Good Fair      In general, how would you rate your satisfaction with your social activities and relationships? Good Fair      In general, please rate how well you carry out your usual social activities and roles Fair Poor      To what extent are you able to carry out your everyday physical activities such as walking, climbing stairs, carrying groceries, or moving a chair? Completely A little      In the past 7 days, how often have you been bothered by emotional problems such as feeling anxious, depressed, or irritable? Sometimes Often      In the past 7 days, how would you rate your fatigue on average? Mild Severe      In the past 7 days, how would you rate your pain on average, where 0 means no pain, and 10 means worst imaginable pain? 5 8      In general, would you say your health is: 2  2    2   In general, would you say your quality of life is: 3  1    3   In general, how would you rate your physical health? 2  2    2   In general, how would you rate your mental health, including your mood and your ability to think? 3  2    2   In general, how would you  rate your satisfaction with your social activities and relationships? 3  2    3   In general, please rate how well you carry out your usual social activities and roles. (This includes activities at home, at work and in your community, and responsibilities as a parent, child, spouse, employee, friend, etc.) 2  1    4   To what extent are you able to carry out your everyday physical activities such as walking, climbing stairs, carrying groceries, or moving a chair? 5  2    5   In the past 7 days, how often have you been bothered by emotional problems such as feeling anxious, depressed, or irritable? 3  4    4   In the past 7 days, how would you rate your fatigue on average? 2  4    3   In the past 7 days, how would you rate your pain on average, where 0 means no pain, and 10 means worst imaginable pain? 5  8    3   Global Mental Health Score 12    12 7   10   Global Physical Health Score 14    14 8   14   PROMIS TOTAL - SUBSCORES 26    26 15   24       Information is confidential and restricted. Go to Review Flowsheets to unlock data.    Patient-reported    Multiple values from one day are sorted in reverse-chronological order     [place] Suicide Severity Rating Scale (Lifetime/Recent)      7/16/2024     4:09 PM 8/19/2024     4:03 PM 8/20/2024     4:00 AM 8/30/2024     8:26 AM 10/21/2024     9:20 PM 10/21/2024     9:22 PM 10/22/2024     6:28 AM   [place] Suicide Severity Rating (Lifetime/Recent)   Q1 Wish to be Dead (Lifetime)   No    No   Q2 Non-Specific Active Suicidal Thoughts (Lifetime)   No    No   Q1 Wished to be Dead (Past Month) 0-->no 0-->no 0-->no 0-->no 0-->no 1-->yes 0-->no   Q2 Suicidal Thoughts (Past Month) 0-->no 1-->yes 0-->no 0-->no 0-->no 1-->yes 0-->no   Q3 Suicidal Thought Method  0-->no 0-->no       Q4 Suicidal Intent without Specific Plan  0-->no 0-->no       Q5 Suicide Intent with Specific Plan  0-->no 0-->no       Q6 Suicide Behavior (Lifetime) 0-->no 0-->no 0-->no 0-->no 0-->no 1-->yes  0-->no   Level of Risk per Screen no risks indicated low risk no risks indicated no risks indicated no risks indicated low risk no risks indicated   Most Severe Ideation Rating (Past 1 Month)       --   Frequency (Past 1 Month)       --   Duration (Past 1 Month)       --   Controllability (Past 1 Month)       --   Deterrents (Past 1 Month)       --   Reasons for Ideation (Past 1 Month)       --   Actual Attempt (Lifetime)       N   Actual Attempt (Past 3 Months)       N   Total Number of Actual Attempts (Past 3 Months)       0   Has subject engaged in non-suicidal self-injurious behavior? (Lifetime)       N   Has subject engaged in non-suicidal self-injurious behavior? (Past 3 Months)       N   Interrupted Attempts (Lifetime)       N   Interrupted Attempts (Past 3 Months)       N   Total Number of Interrupted Attempts (Past 3 Months)       0   Aborted or Self-Interrupted Attempt (Lifetime)       N   Aborted or Self-Interrupted Attempt (Past 3 Months)       N   Total Number of Aborted or Self-Interrupted Attempts (Past 3 Months)       0   Preparatory Acts or Behavior (Lifetime)       N   Preparatory Acts or Behavior (Past 3 Months)       N   Total Number of Preparatory Acts (Past 3 Months)       0   Calculated C-SSRS Risk Score (Lifetime/Recent)       No Risk Indicated         ASSESSMENT: Current Emotional / Mental Status (status of significant symptoms):   Risk status (Self / Other harm or suicidal ideation)   Patient reports the following current fears or concerns for personal safety: Passive SI continues to be endorsed, patient denies any changes in safety factors or concerns, and confirmed there is no current intent to act on them. Patient was encouraged to review/utilize safety plan as necessary and reach out to crisis resources or call [phone] if anything changes before next session..    Patient denies current or recent suicidal ideation or behaviors.    Patient denies current or recent homicidal ideation or  behaviors.   Patient denies current or recent self injurious behavior or ideation.   Patient denies other safety concerns.   Patient reports there has been no change in risk factors since their last session.     Patient reports there has been no change in protective factors since their last session.     A safety and risk management plan has been developed including: Patient consented to co-developed safety plan.  Safety and risk management plan was completed - see below.  Patient agreed to use safety plan should any safety concerns arise.  A copy was given to the patient.      Appearance:   Appropriate    Eye Contact:   Fair    Psychomotor Behavior: Normal    Attitude:   Cooperative  Pleasant   Orientation:   All   Speech    Rate / Production: Normal/ Responsive    Volume:  Normal    Mood:    Anxious  Depressed    Affect:    Appropriate    Thought Content:  Clear    Thought Form:  Coherent    Insight:    Fair      Medication Review:   No changes to current psychiatric medication(s)     Medication Compliance:   Yes     Changes in Health Issues:   None reported     Chemical Use Review:   Substance Use: increase in alcohol .  Patient reports frequency of use : Patient reports drinking alcohol on a near daily basis this past week.  Reviewed information and resources for treatment and ongoing sobriety  Reviewed concerns related to health related substance abuse risk  Provided encouragement towards sobriety  Provided support and affirmation for steps taken towards sobriety   Reviewed information on local AA and [facility] Recovery meetings   .      Tobacco Use: No current tobacco use.      Diagnosis:  1. Major depressive disorder, recurrent episode, moderate (H)    2. Alcohol use disorder, severe, dependence (H)        Collateral Reports Completed:   Not Applicable    PLAN: (Patient Tasks / Therapist Tasks / Other)  Patient will return in 3 weeks for next session. Patient will engage in self-care activities. Patient will work  "on mindfulness skills to increase awareness of triggers to alcohol cravings and depression. Patient will work on boundary setting and eliminating enabling behaviors, including buying alcohol for others. Patient will regularly utilize his coping skills, including listening to music. Patient will regularly evaluate his HALT (hungry, angry, lonely, tired) cues that lead to alcohol cravings. Patient will utilize the \"delay, distract, decide\" skill when noticing alcohol cravings. Patient will abstain from alcohol use and attend at least 4 AA or SMART recovery meetings/ week. Patient will explore social events to attend on [employer]. Patient will work on giving his neighbor his car keys to keep from driving to the liquor store when tempted. Patient will write down 3 things a day that he likes about himself. Patient will work on doing his PT exercises. Patient will work on increasing their intake of fruits and vegetables to 7 servings/ day.      Lucy Rouse, [facility]                                                         ______________________________________________________________________    Individual Treatment Plan    Patient's Name: Cody Bolton  YOB: 1980    Date of Creation: 2/26/24  Date Treatment Plan Last Reviewed/Revised: 10/2/24    DSM5 Diagnoses:   Encounter Diagnoses   Name Primary?    Major depressive disorder, recurrent episode, moderate (H) Yes    Alcohol use disorder, severe, dependence (H)        Psychosocial / Contextual Factors: Relationship stressors, occupational dynamics.  PROMIS-10 from encounters over the past 365 days    Encounter date  Last reading 11/4/24  9:00 AM 6/4/24  11:40 AM 2/26/24 2/19/2024 12:53 PM 2/19/24  12:53 PM   Global Mental Health Score 10 (P) 7 (P) 12 (P) 12   Global Physical Health Score 14 (P) 8 (P) 14 (P) 14   PROMIS TOTAL - SUBSCORES 24 (P) 15 (P) 26 (P) 26       Referral / Collaboration:  Referral to another professional/service is not indicated at this " time..    Anticipated number of session for this episode of care: 9-12 sessions  Anticipation frequency of session: Weekly  Anticipated Duration of each session: 38-52 minutes  Treatment plan will be reviewed in 90 days or when goals have been changed.       Measurable Treatment Goal(s) related to diagnosis / functional impairment(s)  Goal 1: Patient will reduce alcohol consumption to no more than 1 drink/ week for the next 1 month and then 0 drinks/ week for the next 3 months.    I will know I've met my goal when I'm not having the feeling as often that I just don't want to do life anymore.      Objective #A (Patient Action)    Patient will attend at least 5 AA meetings per week for the next 3 months.   Status: Continued - Date(s): 10/2/24    Intervention(s)  Therapist will teach  coping skills and provide information on local AA meetings and recovery resources .    Objective #B  Patient will identify at least 3 example(s) of how drinking has resulted in an experience that interferes with person values or goals.  Status: Continued - Date(s): 10/2/24    Intervention(s)  Therapist will  teach mindfulness and self-awareness skills .      MeasurableTreatment Goal(s) related to diagnosis / functional impairment(s)  Goal 2: Patient will reduce depression symptoms as evidenced by a reduction in PHQ9 score to a 7 or less in the next 2 months.    I will know I've met my goal when I'm not having the feeling as often that I just don't want to do life anymore.      Objective #A (Patient Action)    Patient will Decrease frequency and intensity of feeling down, depressed, hopeless.  Status: Continued - Date(s): 10/2/24    Intervention(s)  Therapist will teach coping skills and self-care activities.    Objective #B  Patient will Feel less tired and more energy during the day .  Status: Continued - Date(s): 10/2/24    Intervention(s)  Therapist will teach sleep hygiene and movement-based exercises.    Objective #C  Patient will  Increase interest, engagement, and pleasure in doing things.  Status: Continued - Date(s):10/2/24     Intervention(s)  Therapist will teach distress-tolerance skills.        Patient has reviewed and agreed to the above plan.      KATRIN Salvador on 10/2/2024 at 4:00 PM

## 2024-12-23 ENCOUNTER — MYC MEDICAL ADVICE (OUTPATIENT)
Dept: FAMILY MEDICINE | Facility: CLINIC | Age: 44
End: 2024-12-23
Payer: COMMERCIAL

## 2024-12-24 ENCOUNTER — APPOINTMENT (OUTPATIENT)
Dept: GENERAL RADIOLOGY | Facility: CLINIC | Age: 44
End: 2024-12-24
Attending: EMERGENCY MEDICINE
Payer: COMMERCIAL

## 2024-12-24 ENCOUNTER — HOSPITAL ENCOUNTER (EMERGENCY)
Facility: CLINIC | Age: 44
Discharge: HOME | End: 2024-12-24
Attending: EMERGENCY MEDICINE
Payer: COMMERCIAL

## 2024-12-24 VITALS
SYSTOLIC BLOOD PRESSURE: 164 MMHG | HEART RATE: 68 BPM | RESPIRATION RATE: 16 BRPM | TEMPERATURE: 96.8 F | HEIGHT: 73 IN | BODY MASS INDEX: 41.75 KG/M2 | WEIGHT: 315 LBS | OXYGEN SATURATION: 98 % | DIASTOLIC BLOOD PRESSURE: 106 MMHG

## 2024-12-24 DIAGNOSIS — M25.571 PAIN IN JOINT, ANKLE AND FOOT, RIGHT: ICD-10-CM
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
## 2024-12-24 DIAGNOSIS — M79.18 BUTTOCK PAIN: ICD-10-CM

## 2024-12-24 LAB
ALBUMIN SERPL BCG-MCNC: 4.1 G/DL (ref 3.5–5.2)
ALP SERPL-CCNC: 105 U/L (ref 40–150)
ALT SERPL W P-5'-P-CCNC: 45 U/L (ref 0–70)
ANION GAP SERPL CALCULATED.3IONS-SCNC: 13 MMOL/L (ref 7–15)
AST SERPL W P-5'-P-CCNC: 41 U/L (ref 0–45)
BASOPHILS # BLD AUTO: 0.1 10E3/UL (ref 0–0.2)
BASOPHILS NFR BLD AUTO: 1 %
BILIRUB SERPL-MCNC: 0.4 MG/DL
BUN SERPL-MCNC: 8.3 MG/DL (ref 6–20)
CALCIUM SERPL-MCNC: 8.8 MG/DL (ref 8.8–10.4)
CHLORIDE SERPL-SCNC: 102 MMOL/L (ref 98–107)
CREAT SERPL-MCNC: 0.82 MG/DL (ref 0.67–1.17)
EGFRCR SERPLBLD CKD-EPI 2021: >90 ML/MIN/1.73M2
EOSINOPHIL # BLD AUTO: 0.3 10E3/UL (ref 0–0.7)
EOSINOPHIL NFR BLD AUTO: 5 %
ERYTHROCYTE [DISTWIDTH] IN BLOOD BY AUTOMATED COUNT: 14.9 % (ref 10–15)
ETHANOL SERPL-MCNC: <0.01 G/DL
GLUCOSE SERPL-MCNC: 106 MG/DL (ref 70–99)
HCO3 SERPL-SCNC: 23 MMOL/L (ref 22–29)
HCT VFR BLD AUTO: 37.9 % (ref 40–53)
HGB BLD-MCNC: 11.8 G/DL (ref 13.3–17.7)
IMM GRANULOCYTES # BLD: 0 10E3/UL
IMM GRANULOCYTES NFR BLD: 0 %
INR PPP: 3.4 (ref 0.85–1.15)
LYMPHOCYTES # BLD AUTO: 2.4 10E3/UL (ref 0.8–5.3)
LYMPHOCYTES NFR BLD AUTO: 35 %
MAGNESIUM SERPL-MCNC: 1.7 MG/DL (ref 1.7–2.3)
MCH RBC QN AUTO: 26.5 PG (ref 26.5–33)
MCHC RBC AUTO-ENTMCNC: 31.1 G/DL (ref 31.5–36.5)
MCV RBC AUTO: 85 FL (ref 78–100)
MONOCYTES # BLD AUTO: 0.6 10E3/UL (ref 0–1.3)
MONOCYTES NFR BLD AUTO: 8 %
NEUTROPHILS # BLD AUTO: 3.6 10E3/UL (ref 1.6–8.3)
NEUTROPHILS NFR BLD AUTO: 51 %
NRBC # BLD AUTO: 0 10E3/UL
NRBC BLD AUTO-RTO: 0 /100
NT-PROBNP SERPL-MCNC: 253 PG/ML (ref 0–450)
PLATELET # BLD AUTO: 234 10E3/UL (ref 150–450)
POTASSIUM SERPL-SCNC: 4.1 MMOL/L (ref 3.4–5.3)
PROT SERPL-MCNC: 7.1 G/DL (ref 6.4–8.3)
RBC # BLD AUTO: 4.46 10E6/UL (ref 4.4–5.9)
SODIUM SERPL-SCNC: 138 MMOL/L (ref 135–145)
TROPONIN T SERPL HS-MCNC: 8 NG/L
TROPONIN T SERPL HS-MCNC: 8 NG/L
WBC # BLD AUTO: 7 10E3/UL (ref 4–11)

## 2024-12-24 PROCEDURE — 99285 EMERGENCY DEPT VISIT HI MDM: CPT | Mod: 25

## 2024-12-24 PROCEDURE — 83880 ASSAY OF NATRIURETIC PEPTIDE: CPT | Performed by: EMERGENCY MEDICINE

## 2024-12-24 PROCEDURE — 82077 ASSAY SPEC XCP UR&BREATH IA: CPT | Performed by: EMERGENCY MEDICINE

## 2024-12-24 PROCEDURE — 71046 X-RAY EXAM CHEST 2 VIEWS: CPT

## 2024-12-24 PROCEDURE — 85004 AUTOMATED DIFF WBC COUNT: CPT | Performed by: EMERGENCY MEDICINE

## 2024-12-24 PROCEDURE — 73610 X-RAY EXAM OF ANKLE: CPT | Mod: RT

## 2024-12-24 PROCEDURE — 80053 COMPREHEN METABOLIC PANEL: CPT | Performed by: EMERGENCY MEDICINE

## 2024-12-24 PROCEDURE — 83735 ASSAY OF MAGNESIUM: CPT | Performed by: EMERGENCY MEDICINE

## 2024-12-24 PROCEDURE — 72220 X-RAY EXAM SACRUM TAILBONE: CPT

## 2024-12-24 PROCEDURE — 36415 COLL VENOUS BLD VENIPUNCTURE: CPT | Performed by: EMERGENCY MEDICINE

## 2024-12-24 PROCEDURE — 72170 X-RAY EXAM OF PELVIS: CPT

## 2024-12-24 PROCEDURE — 84484 ASSAY OF TROPONIN QUANT: CPT | Performed by: EMERGENCY MEDICINE

## 2024-12-24 PROCEDURE — 93005 ELECTROCARDIOGRAM TRACING: CPT

## 2024-12-24 PROCEDURE — 85610 PROTHROMBIN TIME: CPT | Performed by: EMERGENCY MEDICINE

## 2024-12-24 RX ORDER — ACETAMINOPHEN 325 MG/1
975 TABLET ORAL ONCE
Status: COMPLETED | OUTPATIENT
Start: 2024-12-24 | End: 2024-12-24

## 2024-12-24 RX ORDER — CYCLOBENZAPRINE HCL 10 MG
10 TABLET ORAL 3 TIMES DAILY PRN
Qty: 10 TABLET | Refills: 0 | Status: SHIPPED | OUTPATIENT
Start: 2024-12-24

## 2024-12-24 ASSESSMENT — LIFESTYLE VARIABLES
HEADACHE, FULLNESS IN HEAD: NOT PRESENT
ANXIETY: NO ANXIETY, AT EASE
TREMOR: 3
AGITATION: NORMAL ACTIVITY
ORIENTATION AND CLOUDING OF SENSORIUM: ORIENTED AND CAN DO SERIAL ADDITIONS
TOTAL SCORE: 4
PAROXYSMAL SWEATS: BARELY PERCEPTIBLE SWEATING, PALMS MOIST
VISUAL DISTURBANCES: NOT PRESENT
NAUSEA AND VOMITING: NO NAUSEA AND NO VOMITING
AUDITORY DISTURBANCES: NOT PRESENT

## 2024-12-24 ASSESSMENT — ACTIVITIES OF DAILY LIVING (ADL)
ADLS_ACUITY_SCORE: 55

## 2024-12-24 NOTE — TELEPHONE ENCOUNTER
Safe management for PCP.  [employer] message sent to patient recommended alternative medication management.

## 2024-12-24 NOTE — TELEPHONE ENCOUNTER
Dr. Felipe or covering provider please see patient question and advise. Thank you,  Genevieve MONTESINOS Edmondson Nurse [facility] (supporting Yovana Felipe MD)11 hours ago (7:02 PM)       I'm still struggling, is it possible to get a perscription for the medicine that will make you very sick if you drink.  I need to try something.

## 2024-12-24 NOTE — ED TRIAGE NOTES
Patient ambulatory reporting back, knee and ankle pain since a fall a few days ago. Did not hit head, is on thinners. Reports tylenol and lidocaine patch use at home with minimal relief.

## 2024-12-25 NOTE — ED PROVIDER NOTES
Emergency Department Note      History of Present Illness     Chief Complaint   Fall      HPI   Cody Bolton is a 44 year old male who presents to the emergency department with fall.  Patient reports 2 days prior to arrival that he fell off his wheelie chair at the office.  Did not hit his head.  This was a simple slip and fall.  Reports he is trying to use Tylenol and lidocaine patches without significant relief.  He reports that he does drink alcohol and felt that he should come to the ER for evaluation instead of using alcohol to numb his pain.  Has not had any alcohol today.  Reports he does get shakes occasionally after not drinking.  Denies any new weakness numbness or tingling.  Denies any loss of bowel or bladder function.  Reports walking weird has caused his right ankle to be sore.  Feels like his legs are more swollen.  Started to have some shortness of breath while in the emergency department.  Denies any chest pain.    Independent Historian   None    Review of External Notes   Reviewed primary care note from 9/26/2024 regarding alcohol use disorder, diabetes, morbid obesity, DVT on warfarin    Past Medical History     Medical History and Problem List   Past Medical History:   Diagnosis Date    Depressive disorder     High cholesterol     History of gastric bypass     History of pulmonary embolism     Hypertension     CARLIE (obstructive sleep apnea)     Personal history of DVT (deep vein thrombosis)     Type 2 diabetes mellitus (H)     Uncomplicated asthma        Medications   cyclobenzaprine (FLEXERIL) 10 MG tablet  acamprosate (CAMPRAL) 333 MG EC tablet  acetaminophen (TYLENOL) 500 MG tablet  buPROPion (WELLBUTRIN XL) 150 MG 24 hr tablet  carvedilol (COREG) 12.5 MG tablet  cloNIDine (CATAPRES) 0.1 MG tablet  fluticasone (FLONASE) 50 MCG/ACT nasal spray  lisinopril (ZESTRIL) 20 MG tablet  metFORMIN (GLUCOPHAGE XR) 500 MG 24 hr tablet  sildenafil (VIAGRA) 100 MG tablet  simvastatin (ZOCOR) 20 MG  "tablet  traZODone (DESYREL) 100 MG tablet  triamcinolone (KENALOG) 0.1 % external ointment  warfarin ANTICOAGULANT (COUMADIN) 5 MG tablet        Surgical History   Past Surgical History:   Procedure Laterality Date    APPENDECTOMY  08/15/2017    Dr. Ferrer    GASTRIC BYPASS      ME LAP,APPENDECTOMY N/A 8/14/2017    Procedure: APPENDECTOMY, LAPAROSCOPIC;  Surgeon: Nba Ferrer MD;  Location: [facility];  Service: General    REVISION AKANKSHA-EN-Y  2014    Dr. Ranjeet Espinal [facility]       Physical Exam     Patient Vitals for the past 24 hrs:   BP Temp Pulse Resp SpO2 Height Weight   12/24/24 1843 -- -- -- -- 99 % -- --   12/24/24 1839 (!) 183/109 -- 71 -- -- -- --   12/24/24 1608 (!) 202/100 96.8  F (36  C) 74 16 97 % 1.854 m (6' 1\") (!) 171.1 kg (377 lb 3.3 oz)     Physical Exam  General: Resting on the bed.  Head: No obvious trauma to head.  Ears, Nose, Throat:  External ears normal.  Nose normal.    Eyes:  Conjunctivae clear.  Pupils are equal, round, and reactive.   Neck: Normal range of motion.  Neck supple.   CV: Regular rate and rhythm.  No murmurs.      Respiratory: Effort normal and breath sounds normal.  No wheezing or crackles.   Gastrointestinal: Soft.  No distension. There is no tenderness.    Musculoskeletal: Normal range of motion.  Non tender extremities to palpations.  Nontender cervical, thoracic, or lumbar spine without step-off or deformity.  Mild tenderness to the sacrum and posterior pelvis.  Stable pelvis.  Mild tenderness to the medial and lateral malleolus of the right ankle.  2+ DP pulses bilaterally.  Dorsi and plantarflexion intact.  Bilateral lower extremity pitting edema noted.  Neuro: Alert. Moving all extremities appropriately.  Normal speech.  CN II-XII grossly intact  Gross muscle strength intact of the proximal and distal bilateral upper and lower extremities.  Sensation intact to light touch in all 4 extremities.  2+ patellar reflexes.  Normal gait.  No saddle " anesthesia.  Skin: Skin is warm and dry.  No rash noted.     Diagnostics     Lab Results   Labs Ordered and Resulted from Time of ED Arrival to Time of ED Departure   INR - Abnormal       Result Value    INR 3.40 (*)    COMPREHENSIVE METABOLIC PANEL - Abnormal    Sodium 138      Potassium 4.1      Carbon Dioxide (CO2) 23      Anion Gap 13      Urea Nitrogen 8.3      Creatinine 0.82      GFR Estimate >90      Calcium 8.8      Chloride 102      Glucose 106 (*)     Alkaline Phosphatase 105      AST 41      ALT 45      Protein Total 7.1      Albumin 4.1      Bilirubin Total 0.4     CBC WITH PLATELETS AND DIFFERENTIAL - Abnormal    WBC Count 7.0      RBC Count 4.46      Hemoglobin 11.8 (*)     Hematocrit 37.9 (*)     MCV 85      MCH 26.5      MCHC 31.1 (*)     RDW 14.9      Platelet Count 234      % Neutrophils 51      % Lymphocytes 35      % Monocytes 8      % Eosinophils 5      % Basophils 1      % Immature Granulocytes 0      NRBCs per 100 WBC 0      Absolute Neutrophils 3.6      Absolute Lymphocytes 2.4      Absolute Monocytes 0.6      Absolute Eosinophils 0.3      Absolute Basophils 0.1      Absolute Immature Granulocytes 0.0      Absolute NRBCs 0.0     TROPONIN T, HIGH SENSITIVITY - Normal    Troponin T, High Sensitivity 8     MAGNESIUM - Normal    Magnesium 1.7     ETHYL ALCOHOL LEVEL - Normal    Alcohol ethyl <0.01     NT PROBNP INPATIENT - Normal    N terminal Pro BNP Inpatient 253     TROPONIN T, HIGH SENSITIVITY - Normal    Troponin T, High Sensitivity 8         Imaging   XR Ankle Right 3 Views   Final Result   IMPRESSION: No acute displaced right ankle fracture or joint malalignment. Progressive mild to moderate ankle soft tissue swelling. Intact-appearing ankle mortise and distal syndesmosis. Mild tibiotalar osteoarthritis. Polyarticular degenerative    arthrosis in the hindfoot and midfoot with dorsal spurring. Mineralization remains in the proximal plantar fascia with a heel spur and enthesophyte change at  the posterosuperior right calcaneus.      XR Sacrum and Coccyx 2 Views   Final Result   IMPRESSION: PA view is partially obscured secondary to stool and bowel gas. Grossly normal alignment. No displaced fractures. If there is persistent clinical concern CT could be considered.      XR Pelvis 1/2 Views   Final Result   IMPRESSION: No acute displaced pelvic or proximal femur fracture is identified. Mild to moderate bilateral hip osteoarthritis. Diffuse bone demineralization. Both obturator rings appear intact. Degenerative change lower lumbar spine.      XR Chest 2 Views   Final Result   IMPRESSION: No infiltrates. No effusions. Cardiac size within normal limits, normal pulmonary vascularity. Hypertrophic changes in the thoracic spine.          EKG   ECG results from 12/24/24   EKG 12-lead, tracing only     Value    Systolic Blood Pressure     Diastolic Blood Pressure     Ventricular Rate 65    Atrial Rate 65    RI Interval 146    QRS Duration 102        QTc 434    P Axis -9    R AXIS -3    T Axis 16    Interpretation ECG      Sinus rhythm  Minimal voltage criteria for LVH, may be normal variant ( R in aVL )  Borderline ECG  When compared with ECG of 21-Oct-2024 22:20,  No significant change was found         Independent Interpretation   CXR: No pneumothorax, infiltrate, pleural effusion, or pulmonary edema.    ED Course      Medications Administered   Medications   acetaminophen (TYLENOL) tablet 975 mg (has no administration in time range)       Procedures   Procedures     Discussion of Management   None    ED Course   ED Course as of 12/24/24 2233   Tue Dec 24, 2024   2135 I reassessed the patient and went over results.   2232 I reassessed the patient.  He feels comfortable plan for discharge.  Advised close follow-up.       Additional Documentation  None    Medical Decision Making / Diagnosis     CMS Diagnoses: None    MIPS       None    [facility]   Cody MADISON Hoang is a 44 year old male who presents to the  Emergency Department with ongoing buttock pain.  Vital signs are hypertensive but improved over the course of stay.  Broad differential was considered include not limited to pneumonia, pneumothorax, effusion, rib fracture, anemia, electrolyte, metabolic, renal dysfunction, ACS, arrhythmia, CHF, etc.  Patient is anticoagulated on warfarin, INR 3.4.  CBC does show mild anemia 11.8 but reviewing his recent labs his baseline is from 12-13 therefore not clinically significant or different from his baseline.  No evidence of acute bleeding.  Examination does not show any ecchymosis or bruising.  CMP without acute electrolyte, metabolic or renal dysfunction.  LFTs are reassuring.  Magnesium level normal.  EKG shows sinus rhythm.  No acute ischemic change.  Troponin x 2 WNL.  History and troponin do not support a diagnosis of ACS or arrhythmia.  Patient otherwise is breathing comfortably.  BNP is normal not suggestive of CHF.  Chest x-ray without acute pneumonia, pneumothorax or effusion.  Alcohol level is negative.  X-rays of the pelvis and sacrum were reassuring, no evidence of acute fracture.  Patient is able to bear weight.  Walking without assistance to the bathroom in the emergency department.  I do not suspect occult fracture.  Do not think that CT or MRI is indicated at this time.  X-ray of ankle is negative except for some soft tissue swelling.  No obvious fracture or subluxation as well.  Patient feels this most consistent with musculoskeletal pain.  I did consider spinal cord emergency patient is no saddle anesthesia, no midline tenderness to the spine, no obvious evidence of fracture, subluxation, epidural abscess or hematoma.  I do not think emergent MRI is indicated at this time given that his pain localizes to the sacrum.  Overall patient seems reasonable for outpatient follow-up.  Close follow-up with primary doctor discussed.  Patient was discharged.    Disposition   The patient was discharged.     Diagnosis      ICD-10-CM    1. Fall, initial encounter  W19.XXXA       2. Buttock pain  M79.18       3. Pain in joint, ankle and foot, right  M25.571            Discharge Medications   New Prescriptions    CYCLOBENZAPRINE (FLEXERIL) 10 MG TABLET    Take 1 tablet (10 mg) by mouth 3 times daily as needed for muscle spasms.         MD Art Khanna Jennifer L, MD  12/24/24 0068

## 2024-12-25 NOTE — ED NOTES
Bed: ED23  Expected date: 12/24/24  Expected time:   Means of arrival:   Comments:  Hold for 38 Room to Main per provider

## 2024-12-25 NOTE — DISCHARGE INSTRUCTIONS
Ice, use heat as needed, Tylenol.  May use Flexeril sparingly.  Do not drink drive or operate heavy machinery with this medication.  This can be very sedating.  I recommend closely following up with your primary doctor.  If having worsening pain, large bruising, weakness or other concerns feel free to come back to the ER anytime for further evaluation.

## 2024-12-26 ENCOUNTER — TELEPHONE (OUTPATIENT)
Dept: ANTICOAGULATION | Facility: CLINIC | Age: 44
End: 2024-12-26
Payer: COMMERCIAL

## 2024-12-26 DIAGNOSIS — Z86.718 HISTORY OF DVT (DEEP VEIN THROMBOSIS): Primary | ICD-10-CM

## 2024-12-26 LAB
ATRIAL RATE - MUSE: 65 BPM
DIASTOLIC BLOOD PRESSURE - MUSE: NORMAL MMHG
INTERPRETATION ECG - MUSE: NORMAL
P AXIS - MUSE: -9 DEGREES
PR INTERVAL - MUSE: 146 MS
QRS DURATION - MUSE: 102 MS
QT - MUSE: 418 MS
QTC - MUSE: 434 MS
R AXIS - MUSE: -3 DEGREES
SYSTOLIC BLOOD PRESSURE - MUSE: NORMAL MMHG
T AXIS - MUSE: 16 DEGREES
VENTRICULAR RATE- MUSE: 65 BPM

## 2024-12-26 NOTE — TELEPHONE ENCOUNTER
ANTICOAGULATION  MANAGEMENT: Discharge Review    Cody Bolton chart reviewed for anticoagulation continuity of care    Emergency room visit on 12/24/24 for fall.    Discharge disposition: Home    Results:    Recent labs: (last 7 days)     12/24/24  1902   INR 3.40*     Anticoagulation inpatient management:     not applicable     Anticoagulation discharge instructions:     Warfarin dosing: home regimen continued   Bridging: No   INR goal change: No      Medication changes affecting anticoagulation: No    Additional factors affecting anticoagulation: Has been taking 5 mg daily, which would account for the continued elevated inr. Did self held yesterdays warfarin dose due to elevated inr     PLAN     Recommend to check INR on 12/30/24. Did discuss with Cody that planned maint dose was 2.5 mg on Wednesday, but will re evaluate on 12/30    Spoke with Cody    Anticoagulation Calendar updated    Makayla Tamez RN  12/26/2024  Anticoagulation Clinic  [facility] for routing messages: fransisco GARAY  [facility] patient phone line: [phone]

## 2024-12-31 ENCOUNTER — LAB (OUTPATIENT)
Dept: LAB | Facility: CLINIC | Age: 44
End: 2024-12-31
Payer: COMMERCIAL

## 2024-12-31 ENCOUNTER — ANTICOAGULATION THERAPY VISIT (OUTPATIENT)
Dept: ANTICOAGULATION | Facility: CLINIC | Age: 44
End: 2024-12-31

## 2024-12-31 DIAGNOSIS — Z86.718 HISTORY OF DVT (DEEP VEIN THROMBOSIS): Primary | ICD-10-CM

## 2024-12-31 DIAGNOSIS — Z86.718 HISTORY OF DVT (DEEP VEIN THROMBOSIS): ICD-10-CM

## 2024-12-31 DIAGNOSIS — E11.9 TYPE 2 DIABETES MELLITUS WITHOUT COMPLICATION, WITHOUT LONG-TERM CURRENT USE OF INSULIN (H): Primary | ICD-10-CM

## 2024-12-31 DIAGNOSIS — Z86.711 HISTORY OF PULMONARY EMBOLISM: ICD-10-CM

## 2024-12-31 LAB — INR BLD: 2.2 (ref 0.9–1.1)

## 2024-12-31 PROCEDURE — 85610 PROTHROMBIN TIME: CPT

## 2024-12-31 PROCEDURE — 36416 COLLJ CAPILLARY BLOOD SPEC: CPT

## 2024-12-31 NOTE — PROGRESS NOTES
ANTICOAGULATION MANAGEMENT     Cody Bolton 44 year old male is on warfarin with therapeutic INR result. (Goal INR 2.0-3.0)    Recent labs: (last 7 days)     12/31/24  1142   INR 2.2*       ASSESSMENT     Source(s): Chart Review and Patient/Caregiver Call     Warfarin doses taken: Less warfarin taken than planned which may be affecting INR - self held after INR was elevated in the ER.   Also states he is aware that Wednesdays should be a half tablet of warfarin.   Diet: Has been sober for 3 days. Motivated to maintain sobriety.  Medication/supplement changes: None noted - continues 3,000 mg of tylenol daily  Flexeril, flonase, and kenalog cream prescribed recently with no anticipated interaction.  New illness, injury, or hospitalization: No  Signs or symptoms of bleeding or clotting: No  Previous result: Supratherapeutic  Additional findings: None       PLAN     Recommended plan for no diet, medication or health factor changes affecting INR     Dosing Instructions: Continue your current warfarin dose with next INR in 2 weeks       Summary  As of 12/31/2024      Full warfarin instructions:  2.5 mg every Wed; 5 mg all other days   Next INR check:  1/14/2025               Telephone call with Cody who verbalizes understanding and agrees to plan    Lab visit scheduled    Education provided: Please call back if any changes to your diet, medications or how you've been taking warfarin    Plan made per [facility] anticoagulation protocol    Rosangela Martines RN  12/31/2024  Anticoagulation Clinic  [facility] for routing messages: fransisco GARAY  [facility] patient phone line: [phone]        _______________________________________________________________________     Anticoagulation Episode Summary       Current INR goal:  2.0-3.0   TTR:  47.1% (10.4 mo)   Target end date:  Indefinite   Send INR reminders to:  KEO GARAY    Indications    Acute pulmonary embolism without acute cor pulmonale  unspecified  pulmonary embolism type (H) (Resolved) [I26.99]  History of DVT (deep vein thrombosis) [Z86.718]             Comments:  --             Anticoagulation Care Providers       Provider Role Specialty Phone number    Aaseby-Aguilera, Ramona Ann, PA-C Referring [facility] [phone]

## 2025-01-02 ENCOUNTER — VIRTUAL VISIT (OUTPATIENT)
Dept: FAMILY MEDICINE | Facility: CLINIC | Age: 45
End: 2025-01-02
Payer: COMMERCIAL

## 2025-01-02 ENCOUNTER — MYC MEDICAL ADVICE (OUTPATIENT)
Dept: FAMILY MEDICINE | Facility: CLINIC | Age: 45
End: 2025-01-02

## 2025-01-02 ENCOUNTER — TRANSFERRED RECORDS (OUTPATIENT)
Dept: MULTI SPECIALTY CLINIC | Facility: CLINIC | Age: 45
End: 2025-01-02

## 2025-01-02 DIAGNOSIS — E11.9 TYPE 2 DIABETES MELLITUS WITHOUT COMPLICATION, WITHOUT LONG-TERM CURRENT USE OF INSULIN (H): ICD-10-CM

## 2025-01-02 DIAGNOSIS — R03.0 ELEVATED BP WITHOUT DIAGNOSIS OF HYPERTENSION: ICD-10-CM

## 2025-01-02 DIAGNOSIS — M25.562 ACUTE PAIN OF LEFT KNEE: ICD-10-CM

## 2025-01-02 DIAGNOSIS — F33.1 MAJOR DEPRESSIVE DISORDER, RECURRENT EPISODE, MODERATE (H): ICD-10-CM

## 2025-01-02 DIAGNOSIS — Z11.3 SCREENING EXAMINATION FOR VENEREAL DISEASE: Primary | ICD-10-CM

## 2025-01-02 DIAGNOSIS — F10.21 ALCOHOL USE DISORDER, SEVERE, IN EARLY REMISSION (H): Primary | ICD-10-CM

## 2025-01-02 LAB — RETINOPATHY: NORMAL

## 2025-01-02 RX ORDER — DISULFIRAM 250 MG/1
250 TABLET ORAL DAILY
Qty: 90 TABLET | Refills: 3 | Status: SHIPPED | OUTPATIENT
Start: 2025-01-02

## 2025-01-02 ASSESSMENT — PATIENT HEALTH QUESTIONNAIRE - PHQ9
10. IF YOU CHECKED OFF ANY PROBLEMS, HOW DIFFICULT HAVE THESE PROBLEMS MADE IT FOR YOU TO DO YOUR WORK, TAKE CARE OF THINGS AT HOME, OR GET ALONG WITH OTHER PEOPLE: VERY DIFFICULT
SUM OF ALL RESPONSES TO PHQ QUESTIONS 1-9: 18
SUM OF ALL RESPONSES TO PHQ QUESTIONS 1-9: 18

## 2025-01-02 NOTE — PATIENT INSTRUCTIONS
Safer Sex: Care Instructions  Overview  Safer sex is a way to reduce your risk of getting a sexually transmitted infection (STI). It can also help prevent pregnancy.  Several products can help you practice safer sex and reduce your chance of STIs. One of the best is a condom. There are internal and external condoms. You can use a special rubber sheet (dental dam) for protection during oral sex. Disposable gloves can keep your hands from touching blood, semen, or other body fluids that can carry infections.  Remember that birth control methods such as diaphragms, IUDs, foams, and birth control pills do not stop you from getting STIs.  Follow-up care is a key part of your treatment and safety. Be sure to make and go to all appointments, and call your doctor if you are having problems. It's also a good idea to know your test results and keep a list of the medicines you take.  How can you care for yourself at home?  Think about getting vaccinated to help prevent hepatitis A, hepatitis B, and human papillomavirus (HPV). They can be spread through sex.  Use a condom every time you have sex. Use an external condom, which goes on the penis. Or use an internal condom, which goes into the vagina or anus.  Make sure you use the right size external condom. A condom that's too small can break easily. A condom that's too big can slip off during sex.  Use a new condom each time you have sex. Be careful not to poke a hole in the condom when you open the wrapper.  Don't use an internal condom and an external condom at the same time.  Never use petroleum jelly (such as Vaseline), grease, hand lotion, baby oil, or anything with oil in it. These products can make holes in the condom.  After intercourse, hold the edge of the condom as you remove it. This will help keep semen from spilling out of the condom.  Do not have sex with anyone who has symptoms of an STI, such as sores on the genitals or mouth.  Do not drink a lot of alcohol or  "use drugs before sex.  Limit your sex partners. Sex with one partner who has sex only with you can reduce your risk of getting an STI.  Don't share sex toys. But if you do share them, use a condom and clean the sex toys between each use.  Talk to any partners before you have sex. Talk about what you feel comfortable with and whether you have any boundaries with sex. And find out if your partner or partners may be at risk for any STI. Keep in mind that a person may be able to spread an STI even if they do not have symptoms. You and any partners may want to get tested for STIs.  Where can you learn more?  Go to https://www.[employer].net/patiented  Enter B608 in the search box to learn more about \"Safer Sex: Care Instructions.\"  Current as of: April 30, 2024  Content Version: 14.3    2024 [employer].   Care instructions adapted under license by your healthcare professional. If you have questions about a medical condition or this instruction, always ask your healthcare professional. [employer] disclaims any warranty or liability for your use of this information.    "

## 2025-01-02 NOTE — PROGRESS NOTES
"Cody is a 44 year old who is being evaluated via a billable video visit.    How would you like to obtain your AVS? MyChart  If the video visit is dropped, the invitation should be resent by: Text to cell phone: [phone]  Will anyone else be joining your video visit? No      Assessment & Plan     Alcohol use disorder, severe, in early remission (H) - has been able to drink through naltrexone and acamprosate. Would like to try antabuse. Committed to using it. Cautioned he has to be 12 hours free from alcohol in order to start.   - disulfiram (ANTABUSE) 250 MG tablet; Take 1 tablet (250 mg) by mouth daily.    Major depressive disorder, recurrent episode, moderate (H) - contracts for safety today, will continue to monitor closely.     Type 2 diabetes mellitus without complication, without long-term current use of insulin (H) - due for A1c but due to continued drinking, will hold off on checking until March.     Elevated BP without diagnosis of hypertension - recent high BP in ER setting. He will obtain a cuff and check BP at home. I asked he send me his BP log.     Acute pain of left knee - heard a pop in the left knee when stepping out and up. Medial knee pain since. Suspect meniscus tear based on symptoms. Advised icing, NSAID, rest. Call if symptoms persist.           BMI  Estimated body mass index is 49.77 kg/m  as calculated from the following:    Height as of 12/24/24: 1.854 m (6' 1\").    Weight as of 12/24/24: 171.1 kg (377 lb 3.3 oz).           Subjective   Cody is a 44 year old, presenting for the following health issues:  Recheck Medication (Would like to discuss medications for his drinking habits. Drinking 5-7 days a week, 2 pints (vodka). Feels like his back pain and left knee is causing pain this past week contributing to this. )        1/2/2025     1:06 PM   Additional Questions   Roomed by Ekaterina Lindsey CMA   Accompanied by Self     HPI     Medication Request - Would like something to help slow " "down or stop his alcohol use  Taking Medication as prescribed: yes  Side Effects:  None  Medication Helping Symptoms:  yes        Review of Systems  Constitutional, HEENT, cardiovascular, pulmonary, gi and gu systems are negative, except as otherwise noted.      Objective    Vitals - Patient Reported  Weight (Patient Reported): 171 kg (377 lb)  Height (Patient Reported): 185.4 cm (6' 1\")  BMI (Based on Pt Reported Ht/Wt): 49.74        Physical Exam   GENERAL: alert and no distress  EYES: Eyes grossly normal to inspection.  No discharge or erythema, or obvious scleral/conjunctival abnormalities.  RESP: No audible wheeze, cough, or visible cyanosis.    SKIN: Visible skin clear. No significant rash, abnormal pigmentation or lesions.  NEURO: Cranial nerves grossly intact.  Mentation and speech appropriate for age.  PSYCH: Appropriate affect, tone, and pace of words        Video-Visit Details    Type of service:  Video Visit   Originating Location (pt. Location): Home    Distant Location (provider location):  On-site  Platform used for Video Visit: David  Signed Electronically by: Yovana Felipe MD    "

## 2025-01-07 ENCOUNTER — ANTICOAGULATION THERAPY VISIT (OUTPATIENT)
Dept: ANTICOAGULATION | Facility: CLINIC | Age: 45
End: 2025-01-07

## 2025-01-07 ENCOUNTER — FCC EXTENDED DOCUMENTATION (OUTPATIENT)
Dept: PSYCHOLOGY | Facility: CLINIC | Age: 45
End: 2025-01-07

## 2025-01-07 ENCOUNTER — LAB (OUTPATIENT)
Dept: LAB | Facility: CLINIC | Age: 45
End: 2025-01-07
Payer: COMMERCIAL

## 2025-01-07 DIAGNOSIS — E11.9 TYPE 2 DIABETES MELLITUS WITHOUT COMPLICATION, WITHOUT LONG-TERM CURRENT USE OF INSULIN (H): ICD-10-CM

## 2025-01-07 DIAGNOSIS — F10.21 ALCOHOL USE DISORDER, SEVERE, IN EARLY REMISSION (H): ICD-10-CM

## 2025-01-07 DIAGNOSIS — Z11.3 SCREENING EXAMINATION FOR VENEREAL DISEASE: ICD-10-CM

## 2025-01-07 DIAGNOSIS — Z86.718 HISTORY OF DVT (DEEP VEIN THROMBOSIS): Primary | ICD-10-CM

## 2025-01-07 DIAGNOSIS — Z86.718 HISTORY OF DVT (DEEP VEIN THROMBOSIS): ICD-10-CM

## 2025-01-07 DIAGNOSIS — F33.1 MAJOR DEPRESSIVE DISORDER, RECURRENT EPISODE, MODERATE (H): Primary | ICD-10-CM

## 2025-01-07 LAB
EST. AVERAGE GLUCOSE BLD GHB EST-MCNC: 146 MG/DL
HBA1C MFR BLD: 6.7 % (ref 0–5.6)
INR PPP: 2.02 (ref 0.85–1.15)

## 2025-01-07 PROCEDURE — 83036 HEMOGLOBIN GLYCOSYLATED A1C: CPT

## 2025-01-07 PROCEDURE — 85610 PROTHROMBIN TIME: CPT

## 2025-01-07 PROCEDURE — 87389 HIV-1 AG W/HIV-1&-2 AB AG IA: CPT

## 2025-01-07 PROCEDURE — 86780 TREPONEMA PALLIDUM: CPT

## 2025-01-07 PROCEDURE — 36415 COLL VENOUS BLD VENIPUNCTURE: CPT

## 2025-01-07 NOTE — PROGRESS NOTES
ANTICOAGULATION MANAGEMENT     Cody WALLACE Young 44 year old male is on warfarin with therapeutic INR result. (Goal INR 2.0-3.0)    Recent labs: (last 7 days)     01/07/25  1154   INR 2.02*       ASSESSMENT     Source(s): Chart Review  Previous INR was Therapeutic last visit; previously outside of goal range  Medication, diet, health changes since last INR - starting disulfiram (antabuse) medication. Per [employer], monitor for increased INR but supporting research in discussion is small case studies only.          PLAN     Recommended plan for no diet, medication or health factor changes affecting INR     Dosing Instructions: Continue your current warfarin dose with next INR in 1-2 weeks       Summary  As of 1/7/2025      Full warfarin instructions:  2.5 mg every Wed; 5 mg all other days   Next INR check:  1/14/2025               Detailed voice message left for Cody with dosing instructions and follow up date.   Sent [employer] message with dosing and follow up instructions    Contact [phone] to schedule and with any changes, questions or concerns.     Education provided: Please call back if any changes to your diet, medications or how you've been taking warfarin    Plan made per [facility] anticoagulation protocol    Rosangela Martines RN  1/7/2025  Anticoagulation Clinic  [employer] for routing messages: fransisco GARAY  [facility] patient phone line: [phone]        _______________________________________________________________________     Anticoagulation Episode Summary       Current INR goal:  2.0-3.0   TTR:  48.3% (10.6 mo)   Target end date:  Indefinite   Send INR reminders to:  KEO GARAY    Indications    Acute pulmonary embolism without acute cor pulmonale  unspecified pulmonary embolism type (H) (Resolved) [I26.99]  History of DVT (deep vein thrombosis) [Z86.718]             Comments:  --             Anticoagulation Care Providers       Provider Role Specialty Phone number    Aaseby-Aguilera,  Pepper Garcia PA-C [facility] [phone]

## 2025-01-07 NOTE — PROGRESS NOTES
Case Consultation Record       Client Name: Cody Bolton   Date:  1/7/25    Diagnosis:   Encounter Diagnoses   Name Primary?    Major depressive disorder, recurrent episode, moderate (H) Yes    Alcohol use disorder, severe, in early remission (H)          [facility]        Team Members Present:  Johana Ramos    Purpose:   General Review of Treatment    Recommendations:  Based on recommendations, this provider will set a boundary with patient that he must maintain his sobriety from alcohol in order to continue working with this provider for mental health. This provider will once again review chemical dependency resources, including support groups, with the patient.      KATRIN Salvador  January 7, 2025

## 2025-01-08 ENCOUNTER — TELEPHONE (OUTPATIENT)
Dept: FAMILY MEDICINE | Facility: CLINIC | Age: 45
End: 2025-01-08
Payer: COMMERCIAL

## 2025-01-08 DIAGNOSIS — J31.2 CHRONIC SORE THROAT: ICD-10-CM

## 2025-01-08 LAB
HIV 1+2 AB+HIV1 P24 AG SERPL QL IA: NONREACTIVE
T PALLIDUM AB SER QL: NONREACTIVE

## 2025-01-08 NOTE — TELEPHONE ENCOUNTER
Received faxed request for patient's fluticasone, order has already been pended in another encounter. Closing this encounter.

## 2025-01-08 NOTE — TELEPHONE ENCOUNTER
Fax from pharmacy stating disulfiram (ANTABUSE) 250 MG tablet is on backorder with no estimated return date.    Would you like pt to call around to other pharmacies or can an alternative be sent?    Jovana COOPERN, RN, PHN

## 2025-01-09 ENCOUNTER — TELEPHONE (OUTPATIENT)
Dept: FAMILY MEDICINE | Facility: CLINIC | Age: 45
End: 2025-01-09
Payer: COMMERCIAL

## 2025-01-09 RX ORDER — FLUTICASONE PROPIONATE 50 MCG
1 SPRAY, SUSPENSION (ML) NASAL DAILY
Qty: 16 G | Refills: 3 | Status: SHIPPED | OUTPATIENT
Start: 2025-01-09

## 2025-01-09 NOTE — TELEPHONE ENCOUNTER
There is no alternative to antabuse, and he's failed both other meds.     He should call around - try a  pharmacy

## 2025-01-09 NOTE — TELEPHONE ENCOUNTER
Patient Quality Outreach    Patient is due for the following:   Depression  -  PHQ-9 needed    Action(s) Taken:   No follow up needed at this time.    Type of outreach:    Patient updated phq last week.     Questions for provider review:    None           Monico Cramer CMA  Chart routed to none.

## 2025-01-11 LAB
C TRACH DNA SPEC QL PROBE+SIG AMP: NEGATIVE
N GONORRHOEA DNA SPEC QL NAA+PROBE: NEGATIVE
SPECIMEN TYPE: NORMAL

## 2025-01-13 ENCOUNTER — VIRTUAL VISIT (OUTPATIENT)
Dept: BEHAVIORAL HEALTH | Facility: CLINIC | Age: 45
End: 2025-01-13
Payer: COMMERCIAL

## 2025-01-13 DIAGNOSIS — F10.20 ALCOHOL USE DISORDER, SEVERE, DEPENDENCE (H): ICD-10-CM

## 2025-01-13 DIAGNOSIS — F33.1 MAJOR DEPRESSIVE DISORDER, RECURRENT EPISODE, MODERATE (H): Primary | ICD-10-CM

## 2025-01-13 PROCEDURE — 90834 PSYTX W PT 45 MINUTES: CPT | Mod: 95

## 2025-01-13 ASSESSMENT — PATIENT HEALTH QUESTIONNAIRE - PHQ9
SUM OF ALL RESPONSES TO PHQ QUESTIONS 1-9: 9
SUM OF ALL RESPONSES TO PHQ QUESTIONS 1-9: 9
10. IF YOU CHECKED OFF ANY PROBLEMS, HOW DIFFICULT HAVE THESE PROBLEMS MADE IT FOR YOU TO DO YOUR WORK, TAKE CARE OF THINGS AT HOME, OR GET ALONG WITH OTHER PEOPLE: SOMEWHAT DIFFICULT

## 2025-01-13 ASSESSMENT — COLUMBIA-SUICIDE SEVERITY RATING SCALE - C-SSRS
2. HAVE YOU ACTUALLY HAD ANY THOUGHTS OF KILLING YOURSELF?: NO
ATTEMPT SINCE LAST CONTACT: NO
1. SINCE LAST CONTACT, HAVE YOU WISHED YOU WERE DEAD OR WISHED YOU COULD GO TO SLEEP AND NOT WAKE UP?: NO
SUICIDE, SINCE LAST CONTACT: NO
TOTAL  NUMBER OF ABORTED OR SELF INTERRUPTED ATTEMPTS SINCE LAST CONTACT: NO
6. HAVE YOU EVER DONE ANYTHING, STARTED TO DO ANYTHING, OR PREPARED TO DO ANYTHING TO END YOUR LIFE?: NO
TOTAL  NUMBER OF INTERRUPTED ATTEMPTS SINCE LAST CONTACT: NO

## 2025-01-13 NOTE — PROGRESS NOTES
[facility]                                     Progress Note    Patient Name: Cody Bolton  Date: 25           Service Type: Individual      Session Start Time: 12.58    Session End Time:  1.41     Session Length: 38-52 minutes    Session #: 20    Attendees: Client attended alone    Service Modality:  Video Visit:      Provider verified identity through the following two step process.  Patient provided:  Patient  and Patient address    Telemedicine Visit: The patient's condition can be safely assessed and treated via synchronous audio and visual telemedicine encounter.      Reason for Telemedicine Visit: Patient has requested telehealth visit    Originating Site (Patient Location): Patient's home    Distant Site (Provider Location): [facility]    Consent:  The patient/guardian has verbally consented to: the potential risks and benefits of telemedicine (video visit) versus in person care; bill my insurance or make self-payment for services provided; and responsibility for payment of non-covered services.     Patient would like the video invitation sent by:  My Chart    Mode of Communication:  Video Conference via Amwell    Distant Location (Provider):  Off-site    As the provider I attest to compliance with applicable laws and regulations related to telemedicine.    DATA  Interactive Complexity: No.  Crisis: No        Progress Since Last Session (Related to Symptoms / Goals / Homework):   Symptoms: Improving . Patient's PHQ9 score has decreased.    Homework: Partially completed      Episode of Care Goals: Minimal progress - CONTEMPLATION (Considering change and yet undecided); Intervened by assessing the negative and positive thinking (ambivalence) about behavior change     Current / Ongoing Stressors and Concerns:  Patient discussed his back pain and recent trip to the emergency room. Patient discussed health stressors. Patient  discussed reestablishing a romantic relationship with his ex-wife, Tiny. Patient discussed starting couples counseling tomorrow. Patient discussed working on abstaining from alcohol. Patient discussed Fridays being an easy day to stay sober due to staying busy with work. Patient discussed increased alcohol cravings on Saturdays. Patient discussed attending 12-step support group meetings. Patient discussed needing to give his ex-partner back some belongings.       Treatment Objective(s) Addressed in This Session:   Decrease frequency and intensity of feeling down, depressed, hopeless       Intervention:   Motivational Interviewing    MI Intervention: Expressed Empathy/Understanding, Supported Autonomy, Collaboration, Evocation, Open-ended questions, and Reflections: simple and complex     Change Talk Expressed by the Patient: Desire to change Reasons to change    Provider Response to Change Talk: E - Evoked more info from patient about behavior change, A - Affirmed patient's thoughts, decisions, or attempts at behavior change, and R - Reflected patient's change talk    Assessments completed prior to visit:  The following assessments were completed by patient for this visit:  PHQ9:       9/26/2024     1:57 PM 11/4/2024     8:47 AM 11/12/2024     8:45 AM 11/19/2024     8:50 AM 12/17/2024     8:52 AM 1/2/2025     1:20 PM 1/13/2025    12:53 PM   PHQ-9 SCORE   PHQ-9 Total Score MyChart 19 (Moderately severe depression) 17 (Moderately severe depression) 20 (Severe depression) 21 (Severe depression) 20 (Severe depression) 18 (Moderately severe depression) 9 (Mild depression)   PHQ-9 Total Score 19 17  20  21  20  18  9        Patient-reported     GAD7:       2/19/2024    12:39 PM 6/4/2024    11:39 AM 8/7/2024     4:00 PM 8/9/2024     6:40 AM 8/20/2024     3:00 PM 8/23/2024     8:00 AM 12/11/2024    12:52 PM   MERI-7 SCORE   Total Score 2 (minimal anxiety) 7 (mild anxiety)  7 (mild anxiety)   10 (moderate anxiety)   Total  Score 2 7 6 7 9 9 10        Patient-reported     PROMIS 10-Global Health (all questions and answers displayed):       2/19/2024    12:53 PM 6/4/2024    11:40 AM 8/7/2024     4:00 PM 8/23/2024     8:00 AM 11/4/2024     9:00 AM   PROMIS 10   In general, would you say your health is: Fair Fair      In general, would you say your quality of life is: Good Poor      In general, how would you rate your physical health? Fair Fair      In general, how would you rate your mental health, including your mood and your ability to think? Good Fair      In general, how would you rate your satisfaction with your social activities and relationships? Good Fair      In general, please rate how well you carry out your usual social activities and roles Fair Poor      To what extent are you able to carry out your everyday physical activities such as walking, climbing stairs, carrying groceries, or moving a chair? Completely A little      In the past 7 days, how often have you been bothered by emotional problems such as feeling anxious, depressed, or irritable? Sometimes Often      In the past 7 days, how would you rate your fatigue on average? Mild Severe      In the past 7 days, how would you rate your pain on average, where 0 means no pain, and 10 means worst imaginable pain? 5 8      In general, would you say your health is: 2 2   2   In general, would you say your quality of life is: 3 1   3   In general, how would you rate your physical health? 2 2   2   In general, how would you rate your mental health, including your mood and your ability to think? 3 2   2   In general, how would you rate your satisfaction with your social activities and relationships? 3 2   3   In general, please rate how well you carry out your usual social activities and roles. (This includes activities at home, at work and in your community, and responsibilities as a parent, child, spouse, employee, friend, etc.) 2 1   4   To what extent are you able to carry  out your everyday physical activities such as walking, climbing stairs, carrying groceries, or moving a chair? 5 2   5   In the past 7 days, how often have you been bothered by emotional problems such as feeling anxious, depressed, or irritable? 3 4   4   In the past 7 days, how would you rate your fatigue on average? 2 4   3   In the past 7 days, how would you rate your pain on average, where 0 means no pain, and 10 means worst imaginable pain? 5 8   3   Global Mental Health Score 12    12 7   10   Global Physical Health Score 14    14 8   14   PROMIS TOTAL - SUBSCORES 26    26 15   24       Information is confidential and restricted. Go to Review Flowsheets to unlock data.     [place] Suicide Severity Rating Scale (Lifetime/Recent)      8/19/2024     4:03 PM 8/20/2024     4:00 AM 8/30/2024     8:26 AM 10/21/2024     9:20 PM 10/21/2024     9:22 PM 10/22/2024     6:28 AM 12/24/2024     4:08 PM   [place] Suicide Severity Rating (Lifetime/Recent)   Q1 Wish to be Dead (Lifetime)  No    No    Q2 Non-Specific Active Suicidal Thoughts (Lifetime)  No    No    Q1 Wished to be Dead (Past Month) 0-->no 0-->no 0-->no 0-->no 1-->yes 0-->no 0-->no   Q2 Suicidal Thoughts (Past Month) 1-->yes 0-->no 0-->no 0-->no 1-->yes 0-->no 0-->no   Q3 Suicidal Thought Method 0-->no 0-->no        Q4 Suicidal Intent without Specific Plan 0-->no 0-->no        Q5 Suicide Intent with Specific Plan 0-->no 0-->no        Q6 Suicide Behavior (Lifetime) 0-->no 0-->no 0-->no 0-->no 1-->yes 0-->no 0-->no   Level of Risk per Screen low risk no risks indicated no risks indicated no risks indicated low risk no risks indicated no risks indicated   Most Severe Ideation Rating (Past 1 Month)      --    Frequency (Past 1 Month)      --    Duration (Past 1 Month)      --    Controllability (Past 1 Month)      --    Deterrents (Past 1 Month)      --    Reasons for Ideation (Past 1 Month)      --    Actual Attempt (Lifetime)      N    Actual Attempt (Past 3  Months)      N    Total Number of Actual Attempts (Past 3 Months)      0    Has subject engaged in non-suicidal self-injurious behavior? (Lifetime)      N    Has subject engaged in non-suicidal self-injurious behavior? (Past 3 Months)      N    Interrupted Attempts (Lifetime)      N    Interrupted Attempts (Past 3 Months)      N    Total Number of Interrupted Attempts (Past 3 Months)      0    Aborted or Self-Interrupted Attempt (Lifetime)      N    Aborted or Self-Interrupted Attempt (Past 3 Months)      N    Total Number of Aborted or Self-Interrupted Attempts (Past 3 Months)      0    Preparatory Acts or Behavior (Lifetime)      N    Preparatory Acts or Behavior (Past 3 Months)      N    Total Number of Preparatory Acts (Past 3 Months)      0    Calculated C-SSRS Risk Score (Lifetime/Recent)      No Risk Indicated          ASSESSMENT: Current Emotional / Mental Status (status of significant symptoms):   Risk status (Self / Other harm or suicidal ideation)   Patient reports the following current fears or concerns for personal safety: Passive SI continues to be endorsed, patient denies any changes in safety factors or concerns, and confirmed there is no current intent to act on them. Patient was encouraged to review/utilize safety plan as necessary and reach out to crisis resources or call [phone] if anything changes before next session..    Patient denies current or recent suicidal ideation or behaviors.    Patient denies current or recent homicidal ideation or behaviors.   Patient denies current or recent self injurious behavior or ideation.   Patient denies other safety concerns.   Patient reports there has been no change in risk factors since their last session.     Patient reports there has been no change in protective factors since their last session.     A safety and risk management plan has been developed including: Patient consented to co-developed safety plan.  Safety and risk management plan was  "completed - see below.  Patient agreed to use safety plan should any safety concerns arise.  A copy was given to the patient.      Appearance:   Appropriate    Eye Contact:   Fair    Psychomotor Behavior: Normal    Attitude:   Cooperative  Pleasant   Orientation:   All   Speech    Rate / Production: Normal/ Responsive    Volume:  Normal    Mood:    Anxious  Depressed    Affect:    Appropriate    Thought Content:  Clear    Thought Form:  Coherent    Insight:    Fair      Medication Review:   No changes to current psychiatric medication(s)     Medication Compliance:   Yes     Changes in Health Issues:   Yes: increase in back pain     Chemical Use Review:   Substance Use: decrease in alcohol .  Patient reports frequency of use : Patient reports drinking alcohol roughly half the days this past week.  Reviewed concerns related to health related substance abuse risk  Provided encouragement towards sobriety  Provided support and affirmation for steps taken towards sobriety   Reviewed information on local AA and [facility] Recovery meetings   .      Tobacco Use: No current tobacco use.      Diagnosis:  1. Major depressive disorder, recurrent episode, moderate (H)    2. Alcohol use disorder, severe, dependence (H)        Collateral Reports Completed:   Not Applicable    PLAN: (Patient Tasks / Therapist Tasks / Other)  Patient will return in 3 weeks for next session. Patient will engage in self-care activities. Patient will work on mindfulness skills to increase awareness of triggers to alcohol cravings and depression. Patient will work on boundary setting and eliminating enabling behaviors. Patient will regularly utilize his coping skills, including listening to music. Patient will regularly evaluate his HALT (hungry, angry, lonely, tired) cues that lead to alcohol cravings. Patient will utilize the \"delay, distract, decide\" skill when noticing alcohol cravings. Patient will abstain from alcohol use and attend at least 4 AA or " SMART recovery meetings/ week. Patient will work on giving his ex-wife his car keys to keep from driving to the liquor store when tempted. Patient will write down 3 things a day that he likes about himself. Patient will work on doing his PT exercises regularly. Patient will work on increasing their intake of fruits and vegetables to 7 servings/ day.       Lucy Rouse, [facility]                                                         ______________________________________________________________________    Individual Treatment Plan    Patient's Name: Cody Bolton  YOB: 1980    Date of Creation: 2/26/24  Date Treatment Plan Last Reviewed/Revised: 1/13/25    DSM5 Diagnoses:   Encounter Diagnoses   Name Primary?    Major depressive disorder, recurrent episode, moderate (H) Yes    Alcohol use disorder, severe, dependence (H)        Psychosocial / Contextual Factors: Relationship stressors, occupational dynamics.  PROMIS-10 from encounters over the past 365 days    Encounter date  Last reading 11/4/24  9:00 AM 6/4/24  11:40 AM 2/26/24 2/19/2024 12:53 PM 2/19/24  12:53 PM   Global Mental Health Score 10 (P) 7 (P) 12 (P) 12   Global Physical Health Score 14 (P) 8 (P) 14 (P) 14   PROMIS TOTAL - SUBSCORES 24 (P) 15 (P) 26 (P) 26       Referral / Collaboration:  Referral to another professional/service is not indicated at this time..    Anticipated number of session for this episode of care: 9-12 sessions  Anticipation frequency of session: Weekly  Anticipated Duration of each session: 38-52 minutes  Treatment plan will be reviewed in 90 days or when goals have been changed.       Measurable Treatment Goal(s) related to diagnosis / functional impairment(s)  Goal 1: Patient will reduce alcohol consumption to no more than 1 drink/ week for the next 1 month and then 0 drinks/ week for the next 3 months.    I will know I've met my goal when I'm not having the feeling as often that I just don't want to do life  anymore.      Objective #A (Patient Action)    Patient will attend at least 5 AA meetings per week for the next 3 months.   Status: Continued - Date(s): 1/13/25    Intervention(s)  Therapist will teach  coping skills and provide information on local AA meetings and recovery resources .    Objective #B  Patient will identify at least 3 example(s) of how drinking has resulted in an experience that interferes with person values or goals.  Status: Continued - Date(s): 1/13/25    Intervention(s)  Therapist will  teach mindfulness and self-awareness skills .      MeasurableTreatment Goal(s) related to diagnosis / functional impairment(s)  Goal 2: Patient will reduce depression symptoms as evidenced by a reduction in PHQ9 score to a 7 or less in the next 2 months.    I will know I've met my goal when I'm not having the feeling as often that I just don't want to do life anymore.      Objective #A (Patient Action)    Patient will Decrease frequency and intensity of feeling down, depressed, hopeless.  Status: Continued - Date(s): 1/13/25    Intervention(s)  Therapist will teach coping skills and self-care activities.    Objective #B  Patient will Feel less tired and more energy during the day .  Status: Continued - Date(s): 1/13/25    Intervention(s)  Therapist will teach sleep hygiene and movement-based exercises.    Objective #C  Patient will Increase interest, engagement, and pleasure in doing things.  Status: Continued - Date(s):1/13/25    Intervention(s)  Therapist will teach distress-tolerance skills.        Patient has reviewed and agreed to the above plan.      KATRIN Salvador on 1/13/2025 at 1:10 PM

## 2025-01-14 ENCOUNTER — ANTICOAGULATION THERAPY VISIT (OUTPATIENT)
Dept: ANTICOAGULATION | Facility: CLINIC | Age: 45
End: 2025-01-14

## 2025-01-14 ENCOUNTER — LAB (OUTPATIENT)
Dept: LAB | Facility: CLINIC | Age: 45
End: 2025-01-14
Payer: COMMERCIAL

## 2025-01-14 DIAGNOSIS — Z86.711 HISTORY OF PULMONARY EMBOLISM: ICD-10-CM

## 2025-01-14 DIAGNOSIS — Z86.718 HISTORY OF DVT (DEEP VEIN THROMBOSIS): Primary | ICD-10-CM

## 2025-01-14 DIAGNOSIS — Z86.718 HISTORY OF DVT (DEEP VEIN THROMBOSIS): ICD-10-CM

## 2025-01-14 LAB — INR BLD: 2.7 (ref 0.9–1.1)

## 2025-01-14 PROCEDURE — 85610 PROTHROMBIN TIME: CPT

## 2025-01-14 PROCEDURE — 36416 COLLJ CAPILLARY BLOOD SPEC: CPT

## 2025-01-14 NOTE — PROGRESS NOTES
ANTICOAGULATION MANAGEMENT     Cody WALLACE Young 44 year old male is on warfarin with therapeutic INR result. (Goal INR 2.0-3.0)    Recent labs: (last 7 days)     01/14/25  1153   INR 2.7*       ASSESSMENT     Source(s): Chart Review and Patient/Caregiver Call     Warfarin doses taken: Warfarin taken as instructed  Diet: No new diet changes identified  Medication/supplement changes: None noted antibuse started 1/2/25, Per [facility], monitor for increased INR but supporting research in discussion is small case studies only.   New illness, injury, or hospitalization: No  Signs or symptoms of bleeding or clotting: No  Previous result: Therapeutic last 2(+) visits  Additional findings: None       PLAN     Recommended plan for no diet, medication or health factor changes affecting INR     Dosing Instructions: Continue your current warfarin dose with next INR in 2 weeks       Summary  As of 1/14/2025      Full warfarin instructions:  2.5 mg every Wed; 5 mg all other days   Next INR check:  1/28/2025               Telephone call with Cody who verbalizes understanding and agrees to plan and who agrees to plan and repeated back plan correctly    Lab visit scheduled    Education provided: None required    Plan made per [facility] anticoagulation protocol    Kitty Salcedo RN  1/14/2025  Anticoagulation Clinic  "[employer] for routing messages: fransisco GARAY  ACC patient phone line: [phone]        _______________________________________________________________________     Anticoagulation Episode Summary       Current INR goal:  2.0-3.0   TTR:  49.4% (10.8 mo)   Target end date:  Indefinite   Send INR reminders to:  KEO GARAY    Indications    Acute pulmonary embolism without acute cor pulmonale  unspecified pulmonary embolism type (H) (Resolved) [I26.99]  History of DVT (deep vein thrombosis) [Z86.718]             Comments:  --             Anticoagulation Care Providers       Provider Role Specialty Phone number     Aaseby-Aguilera, Ramona Ann, PA-C [facility] [phone]

## 2025-01-20 ENCOUNTER — VIRTUAL VISIT (OUTPATIENT)
Dept: BEHAVIORAL HEALTH | Facility: CLINIC | Age: 45
End: 2025-01-20
Payer: COMMERCIAL

## 2025-01-20 DIAGNOSIS — F10.20 ALCOHOL USE DISORDER, SEVERE, DEPENDENCE (H): ICD-10-CM

## 2025-01-20 DIAGNOSIS — F33.1 MAJOR DEPRESSIVE DISORDER, RECURRENT EPISODE, MODERATE (H): Primary | ICD-10-CM

## 2025-01-20 PROCEDURE — 90834 PSYTX W PT 45 MINUTES: CPT | Mod: 95

## 2025-01-20 NOTE — PROGRESS NOTES
[facility]                                     Progress Note    Patient Name: Cody Bolton  Date: 25           Service Type: Individual      Session Start Time: 8.59    Session End Time:  9.38     Session Length: 38-52 minutes    Session #: 21    Attendees: Client attended alone    Service Modality:  Video Visit:      Provider verified identity through the following two step process.  Patient provided:  Patient  and Patient address    Telemedicine Visit: The patient's condition can be safely assessed and treated via synchronous audio and visual telemedicine encounter.      Reason for Telemedicine Visit: Patient has requested telehealth visit    Originating Site (Patient Location): Patient's home    Distant Site (Provider Location): [facility]    Consent:  The patient/guardian has verbally consented to: the potential risks and benefits of telemedicine (video visit) versus in person care; bill my insurance or make self-payment for services provided; and responsibility for payment of non-covered services.     Patient would like the video invitation sent by:  My Chart    Mode of Communication:  Video Conference via Amwell    Distant Location (Provider):  Off-site    As the provider I attest to compliance with applicable laws and regulations related to telemedicine.    DATA  Interactive Complexity: No.  Crisis: No        Progress Since Last Session (Related to Symptoms / Goals / Homework):   Symptoms: Improving . Patient's PHQ9 score has decreased.    Homework: Partially completed      Episode of Care Goals: Minimal progress - PREPARATION (Decided to change - considering how); Intervened by negotiating a change plan and determining options / strategies for behavior change, identifying triggers, exploring social supports, and working towards setting a date to begin behavior change     Current / Ongoing Stressors and Concerns:  Patient  discussed an increase in knee pain and starting physical therapy next week. Patient discussed giving his ex-partner back her belongings. Patient discussed attending 12-step support group meetings. Patient discussed reestablishing a romantic relationship with his ex-wife, Betty. Patient discussed engaging in couples therapy. Patient discussed his therapy homework. Patient discussed scheduling a get-away date night on 2/1. Patient discussed getting prescribed Antabuse for his alcoholism.        Treatment Objective(s) Addressed in This Session:   Decrease frequency and intensity of feeling down, depressed, hopeless       Intervention:   Motivational Interviewing    MI Intervention: Expressed Empathy/Understanding, Supported Autonomy, Collaboration, Evocation, Open-ended questions, and Reflections: simple and complex     Change Talk Expressed by the Patient: Desire to change Reasons to change    Provider Response to Change Talk: E - Evoked more info from patient about behavior change, A - Affirmed patient's thoughts, decisions, or attempts at behavior change, and R - Reflected patient's change talk    Assessments completed prior to visit:  The following assessments were completed by patient for this visit:  PHQ9:       9/26/2024     1:57 PM 11/4/2024     8:47 AM 11/12/2024     8:45 AM 11/19/2024     8:50 AM 12/17/2024     8:52 AM 1/2/2025     1:20 PM 1/13/2025    12:53 PM   PHQ-9 SCORE   PHQ-9 Total Score MyChart 19 (Moderately severe depression) 17 (Moderately severe depression) 20 (Severe depression) 21 (Severe depression) 20 (Severe depression) 18 (Moderately severe depression) 9 (Mild depression)   PHQ-9 Total Score 19 17  20  21  20  18  9        Patient-reported     GAD7:       2/19/2024    12:39 PM 6/4/2024    11:39 AM 8/7/2024     4:00 PM 8/9/2024     6:40 AM 8/20/2024     3:00 PM 8/23/2024     8:00 AM 12/11/2024    12:52 PM   MERI-7 SCORE   Total Score 2 (minimal anxiety) 7 (mild anxiety)  7 (mild anxiety)   10  (moderate anxiety)   Total Score 2 7 6 7 9 9 10        Patient-reported     PROMIS 10-Global Health (all questions and answers displayed):       2/19/2024    12:53 PM 6/4/2024    11:40 AM 8/7/2024     4:00 PM 8/23/2024     8:00 AM 11/4/2024     9:00 AM   PROMIS 10   In general, would you say your health is: Fair Fair      In general, would you say your quality of life is: Good Poor      In general, how would you rate your physical health? Fair Fair      In general, how would you rate your mental health, including your mood and your ability to think? Good Fair      In general, how would you rate your satisfaction with your social activities and relationships? Good Fair      In general, please rate how well you carry out your usual social activities and roles Fair Poor      To what extent are you able to carry out your everyday physical activities such as walking, climbing stairs, carrying groceries, or moving a chair? Completely A little      In the past 7 days, how often have you been bothered by emotional problems such as feeling anxious, depressed, or irritable? Sometimes Often      In the past 7 days, how would you rate your fatigue on average? Mild Severe      In the past 7 days, how would you rate your pain on average, where 0 means no pain, and 10 means worst imaginable pain? 5 8      In general, would you say your health is: 2 2   2   In general, would you say your quality of life is: 3 1   3   In general, how would you rate your physical health? 2 2   2   In general, how would you rate your mental health, including your mood and your ability to think? 3 2   2   In general, how would you rate your satisfaction with your social activities and relationships? 3 2   3   In general, please rate how well you carry out your usual social activities and roles. (This includes activities at home, at work and in your community, and responsibilities as a parent, child, spouse, employee, friend, etc.) 2 1   4   To what  extent are you able to carry out your everyday physical activities such as walking, climbing stairs, carrying groceries, or moving a chair? 5 2   5   In the past 7 days, how often have you been bothered by emotional problems such as feeling anxious, depressed, or irritable? 3 4   4   In the past 7 days, how would you rate your fatigue on average? 2 4   3   In the past 7 days, how would you rate your pain on average, where 0 means no pain, and 10 means worst imaginable pain? 5 8   3   Global Mental Health Score 12    12 7   10   Global Physical Health Score 14    14 8   14   PROMIS TOTAL - SUBSCORES 26    26 15   24       Information is confidential and restricted. Go to Review Flowsheets to unlock data.     Alger Suicide Severity Rating Scale (Lifetime/Recent)      8/19/2024     4:03 PM 8/20/2024     4:00 AM 8/30/2024     8:26 AM 10/21/2024     9:20 PM 10/21/2024     9:22 PM 10/22/2024     6:28 AM 12/24/2024     4:08 PM   Alger Suicide Severity Rating (Lifetime/Recent)   Q1 Wish to be Dead (Lifetime)  No    No    Q2 Non-Specific Active Suicidal Thoughts (Lifetime)  No    No    Q1 Wished to be Dead (Past Month) 0-->no 0-->no 0-->no 0-->no 1-->yes 0-->no 0-->no   Q2 Suicidal Thoughts (Past Month) 1-->yes 0-->no 0-->no 0-->no 1-->yes 0-->no 0-->no   Q3 Suicidal Thought Method 0-->no 0-->no        Q4 Suicidal Intent without Specific Plan 0-->no 0-->no        Q5 Suicide Intent with Specific Plan 0-->no 0-->no        Q6 Suicide Behavior (Lifetime) 0-->no 0-->no 0-->no 0-->no 1-->yes 0-->no 0-->no   Level of Risk per Screen low risk no risks indicated no risks indicated no risks indicated low risk no risks indicated no risks indicated   Most Severe Ideation Rating (Past 1 Month)      --    Frequency (Past 1 Month)      --    Duration (Past 1 Month)      --    Controllability (Past 1 Month)      --    Deterrents (Past 1 Month)      --    Reasons for Ideation (Past 1 Month)      --    Actual Attempt (Lifetime)      N     Actual Attempt (Past 3 Months)      N    Total Number of Actual Attempts (Past 3 Months)      0    Has subject engaged in non-suicidal self-injurious behavior? (Lifetime)      N    Has subject engaged in non-suicidal self-injurious behavior? (Past 3 Months)      N    Interrupted Attempts (Lifetime)      N    Interrupted Attempts (Past 3 Months)      N    Total Number of Interrupted Attempts (Past 3 Months)      0    Aborted or Self-Interrupted Attempt (Lifetime)      N    Aborted or Self-Interrupted Attempt (Past 3 Months)      N    Total Number of Aborted or Self-Interrupted Attempts (Past 3 Months)      0    Preparatory Acts or Behavior (Lifetime)      N    Preparatory Acts or Behavior (Past 3 Months)      N    Total Number of Preparatory Acts (Past 3 Months)      0    Calculated C-SSRS Risk Score (Lifetime/Recent)      No Risk Indicated          ASSESSMENT: Current Emotional / Mental Status (status of significant symptoms):   Risk status (Self / Other harm or suicidal ideation)   Patient reports the following current fears or concerns for personal safety: Passive SI continues to be endorsed, patient denies any changes in safety factors or concerns, and confirmed there is no current intent to act on them. Patient was encouraged to review/utilize safety plan as necessary and reach out to crisis resources or call [phone] if anything changes before next session..    Patient denies current or recent suicidal ideation or behaviors.    Patient denies current or recent homicidal ideation or behaviors.   Patient denies current or recent self injurious behavior or ideation.   Patient denies other safety concerns.   Patient reports there has been no change in risk factors since their last session.     Patient reports there has been no change in protective factors since their last session.     A safety and risk management plan has been developed including: Patient consented to co-developed safety plan.  Safety and risk  "management plan was completed - see below.  Patient agreed to use safety plan should any safety concerns arise.  A copy was given to the patient.      Appearance:   Appropriate    Eye Contact:   Fair    Psychomotor Behavior: Restless    Attitude:   Cooperative  Pleasant   Orientation:   All   Speech    Rate / Production: Normal/ Responsive    Volume:  Normal    Mood:    Depressed    Affect:    Appropriate    Thought Content:  Clear    Thought Form:  Coherent    Insight:    Fair      Medication Review:   No changes to current psychiatric medication(s)     Medication Compliance:   Yes     Changes in Health Issues:   Yes: increase in knee pain     Chemical Use Review:   Substance Use: decrease in alcohol .  Patient reports frequency of use : Patient reports drinking alcohol 1 out of 7 days this past week.  Reviewed concerns related to health related substance abuse risk  Provided encouragement towards sobriety  Provided support and affirmation for steps taken towards sobriety    .      Tobacco Use: No current tobacco use.      Diagnosis:  1. Major depressive disorder, recurrent episode, moderate (H)    2. Alcohol use disorder, severe, dependence (H)        Collateral Reports Completed:   Not Applicable    PLAN: (Patient Tasks / Therapist Tasks / Other)  Patient will return in 4 weeks for next session. Patient will engage in self-care activities. Patient will work on mindfulness skills to increase awareness of triggers to alcohol cravings and depression. Patient will work on boundary setting and eliminating enabling behaviors. Patient will regularly utilize his coping skills, including listening to music. Patient will regularly evaluate his HALT (hungry, angry, lonely, tired) cues that lead to alcohol cravings. Patient will utilize the \"delay, distract, decide\" skill when noticing alcohol cravings. Patient will abstain from alcohol use and attend at least 4 AA or SMART recovery meetings/ week. Patient will work on " giving his ex-wife his car keys to keep from driving to the liquor store when tempted. Patient will write down 3 things a day that he likes about himself. Patient will work on doing his PT exercises regularly. Patient will work on increasing their intake of fruits and vegetables to 7 servings/ day.       Lucy Rouse, [facility]                                                         ______________________________________________________________________    Individual Treatment Plan    Patient's Name: Cody Botlon  YOB: 1980    Date of Creation: 2/26/24  Date Treatment Plan Last Reviewed/Revised: 1/13/25    DSM5 Diagnoses:   Encounter Diagnoses   Name Primary?    Major depressive disorder, recurrent episode, moderate (H) Yes    Alcohol use disorder, severe, dependence (H)        Psychosocial / Contextual Factors: Relationship stressors, occupational dynamics.  PROMIS-10 from encounters over the past 365 days    Encounter date  Last reading 11/4/24  9:00 AM 6/4/24  11:40 AM 2/26/24 2/19/2024 12:53 PM 2/19/24  12:53 PM   Global Mental Health Score 10 (P) 7 (P) 12 (P) 12   Global Physical Health Score 14 (P) 8 (P) 14 (P) 14   PROMIS TOTAL - SUBSCORES 24 (P) 15 (P) 26 (P) 26       Referral / Collaboration:  Referral to another professional/service is not indicated at this time..    Anticipated number of session for this episode of care: 9-12 sessions  Anticipation frequency of session: Weekly  Anticipated Duration of each session: 38-52 minutes  Treatment plan will be reviewed in 90 days or when goals have been changed.       Measurable Treatment Goal(s) related to diagnosis / functional impairment(s)  Goal 1: Patient will reduce alcohol consumption to no more than 1 drink/ week for the next 1 month and then 0 drinks/ week for the next 3 months.    I will know I've met my goal when I'm not having the feeling as often that I just don't want to do life anymore.      Objective #A (Patient Action)    Patient  will attend at least 5 AA meetings per week for the next 3 months.   Status: Continued - Date(s): 1/13/25    Intervention(s)  Therapist will teach  coping skills and provide information on local AA meetings and recovery resources .    Objective #B  Patient will identify at least 3 example(s) of how drinking has resulted in an experience that interferes with person values or goals.  Status: Continued - Date(s): 1/13/25    Intervention(s)  Therapist will  teach mindfulness and self-awareness skills .      MeasurableTreatment Goal(s) related to diagnosis / functional impairment(s)  Goal 2: Patient will reduce depression symptoms as evidenced by a reduction in PHQ9 score to a 7 or less in the next 2 months.    I will know I've met my goal when I'm not having the feeling as often that I just don't want to do life anymore.      Objective #A (Patient Action)    Patient will Decrease frequency and intensity of feeling down, depressed, hopeless.  Status: Continued - Date(s): 1/13/25    Intervention(s)  Therapist will teach coping skills and self-care activities.    Objective #B  Patient will Feel less tired and more energy during the day .  Status: Continued - Date(s): 1/13/25    Intervention(s)  Therapist will teach sleep hygiene and movement-based exercises.    Objective #C  Patient will Increase interest, engagement, and pleasure in doing things.  Status: Continued - Date(s):1/13/25    Intervention(s)  Therapist will teach distress-tolerance skills.        Patient has reviewed and agreed to the above plan.      Lucy Rouse [facility] Center[facility] on 1/13/2025 at 1:10 PM

## 2025-01-28 ENCOUNTER — LAB (OUTPATIENT)
Dept: LAB | Facility: CLINIC | Age: 45
End: 2025-01-28
Payer: COMMERCIAL

## 2025-01-28 ENCOUNTER — ANTICOAGULATION THERAPY VISIT (OUTPATIENT)
Dept: ANTICOAGULATION | Facility: CLINIC | Age: 45
End: 2025-01-28

## 2025-01-28 DIAGNOSIS — Z86.718 HISTORY OF DVT (DEEP VEIN THROMBOSIS): ICD-10-CM

## 2025-01-28 DIAGNOSIS — Z86.711 HISTORY OF PULMONARY EMBOLISM: ICD-10-CM

## 2025-01-28 DIAGNOSIS — Z86.718 HISTORY OF DVT (DEEP VEIN THROMBOSIS): Primary | ICD-10-CM

## 2025-01-28 LAB — INR BLD: 2.2 (ref 0.9–1.1)

## 2025-01-28 PROCEDURE — 85610 PROTHROMBIN TIME: CPT

## 2025-01-28 PROCEDURE — 36416 COLLJ CAPILLARY BLOOD SPEC: CPT

## 2025-01-28 NOTE — PROGRESS NOTES
ANTICOAGULATION MANAGEMENT     Cody WALLACE Young 44 year old male is on warfarin with therapeutic INR result. (Goal INR 2.0-3.0)    Recent labs: (last 7 days)     01/28/25  1150   INR 2.2*       ASSESSMENT     Source(s): Chart Review  Previous INR was Therapeutic last 2(+) visits  Medication, diet, health changes since last INR chart reviewed; none identified         PLAN     Recommended plan for no diet, medication or health factor changes affecting INR     Dosing Instructions: Continue your current warfarin dose with next INR in 3 weeks       Summary  As of 1/28/2025      Full warfarin instructions:  2.5 mg every Wed; 5 mg all other days   Next INR check:  2/18/2025               Detailed voice message left for Cody with dosing instructions and follow up date.   Sent [employer] message with dosing and follow up instructions    Contact [phone] to schedule and with any changes, questions or concerns.     Education provided: Please call back if any changes to your diet, medications or how you've been taking warfarin  Goal range and lab monitoring: goal range and significance of current result, Importance of therapeutic range, and Importance of following up at instructed interval    Plan made per [facility] anticoagulation protocol    Makayla Tamez, RN  1/28/2025  Anticoagulation Clinic  [employer] for routing messages: fransisco GARAY  ACC patient phone line: [phone]        _______________________________________________________________________     Anticoagulation Episode Summary       Current INR goal:  2.0-3.0   TTR:  51.4% (11.3 mo)   Target end date:  Indefinite   Send INR reminders to:  KEO GARAY    Indications    Acute pulmonary embolism without acute cor pulmonale  unspecified pulmonary embolism type (H) (Resolved) [I26.99]  History of DVT (deep vein thrombosis) [Z86.718]             Comments:  --             Anticoagulation Care Providers       Provider Role Specialty Phone number     Aaseby-Aguilera, Ramona Ann, PA-C [facility] [phone]

## 2025-02-19 ENCOUNTER — HOSPITAL ENCOUNTER (EMERGENCY)
Facility: CLINIC | Age: 45
Discharge: HOME | End: 2025-02-19
Attending: EMERGENCY MEDICINE | Admitting: EMERGENCY MEDICINE
Payer: COMMERCIAL

## 2025-02-19 VITALS
TEMPERATURE: 98 F | HEART RATE: 98 BPM | DIASTOLIC BLOOD PRESSURE: 90 MMHG | SYSTOLIC BLOOD PRESSURE: 152 MMHG | RESPIRATION RATE: 14 BRPM | BODY MASS INDEX: 45.32 KG/M2 | HEIGHT: 77 IN | OXYGEN SATURATION: 96 %

## 2025-02-19 DIAGNOSIS — R79.1 SUPRATHERAPEUTIC INR: ICD-10-CM

## 2025-02-19 DIAGNOSIS — F10.920 ALCOHOLIC INTOXICATION WITHOUT COMPLICATION: ICD-10-CM

## 2025-02-19 LAB
ANION GAP SERPL CALCULATED.3IONS-SCNC: 11 MMOL/L (ref 7–15)
BUN SERPL-MCNC: 10.7 MG/DL (ref 6–20)
CALCIUM SERPL-MCNC: 8.3 MG/DL (ref 8.8–10.4)
CHLORIDE SERPL-SCNC: 105 MMOL/L (ref 98–107)
CREAT SERPL-MCNC: 0.9 MG/DL (ref 0.67–1.17)
EGFRCR SERPLBLD CKD-EPI 2021: >90 ML/MIN/1.73M2
ETHANOL SERPL-MCNC: 0.28 G/DL
GLUCOSE SERPL-MCNC: 148 MG/DL (ref 70–99)
HCO3 SERPL-SCNC: 25 MMOL/L (ref 22–29)
HOLD SPECIMEN: NORMAL
HOLD SPECIMEN: NORMAL
INR PPP: 4.02 (ref 0.85–1.15)
POTASSIUM SERPL-SCNC: 3.6 MMOL/L (ref 3.4–5.3)
SODIUM SERPL-SCNC: 141 MMOL/L (ref 135–145)

## 2025-02-19 PROCEDURE — 99283 EMERGENCY DEPT VISIT LOW MDM: CPT

## 2025-02-19 PROCEDURE — 85610 PROTHROMBIN TIME: CPT | Performed by: EMERGENCY MEDICINE

## 2025-02-19 PROCEDURE — 82565 ASSAY OF CREATININE: CPT | Performed by: EMERGENCY MEDICINE

## 2025-02-19 PROCEDURE — 36415 COLL VENOUS BLD VENIPUNCTURE: CPT | Performed by: EMERGENCY MEDICINE

## 2025-02-19 PROCEDURE — 82077 ASSAY SPEC XCP UR&BREATH IA: CPT | Performed by: EMERGENCY MEDICINE

## 2025-02-19 ASSESSMENT — COLUMBIA-SUICIDE SEVERITY RATING SCALE - C-SSRS
2. HAVE YOU ACTUALLY HAD ANY THOUGHTS OF KILLING YOURSELF IN THE PAST MONTH?: NO
6. HAVE YOU EVER DONE ANYTHING, STARTED TO DO ANYTHING, OR PREPARED TO DO ANYTHING TO END YOUR LIFE?: NO
1. IN THE PAST MONTH, HAVE YOU WISHED YOU WERE DEAD OR WISHED YOU COULD GO TO SLEEP AND NOT WAKE UP?: NO

## 2025-02-19 ASSESSMENT — ACTIVITIES OF DAILY LIVING (ADL)
ADLS_ACUITY_SCORE: 55

## 2025-02-19 NOTE — DISCHARGE INSTRUCTIONS
You should hold your Coumadin the next 2 days due to elevated INR.  I recommend abstaining from alcohol and following up with your primary doctor.  Certainly if you have any thoughts wanting to harm herself or anyone else or if concerns are going through alcohol drawl, you can return to the emergency department at point

## 2025-02-19 NOTE — ED PROVIDER NOTES
"  Emergency Department Note      History of Present Illness     Chief Complaint   Alcohol Intoxication    HPI   Cody Bolton is a 44 year old male with a history of alcohol use disorder, DVT, PE, and paroxysmal atrial fibrillation on Warfarin, hypertension, hyperlipidemia, type 2 diabetes mellitus who presents via EMS with alcohol intoxication. He reports he is unsure who called EMS. He adds he lives alone. EMS notes the patient was having suicidal ideation but he denies suicidal ideation in the ED and adds he was \"just drinking.\" His last drink was this morning. He denies recent trips, falls, or stumbles and adds he has been taking his blood thinners.     Independent Historian   EMS as detailed above.    Review of External Notes   None    Past Medical History     Medical History and Problem List   Cervical radiculopathy   Depressive disorder  Fatty liver  Hyperlipidemia  History of gastric bypass  History of pulmonary embolism  Hypertension  CARLIE  Personal history of DVT  Type 2 diabetes mellitus   Uncomplicated asthma     Medications   Acamprosate   Bupropion   Carvedilol   Clonidine   Disulfiram   Lisinopril   Metformin    Sildenafil   Simvastatin   Trazodone   Warfarin     Surgical History   Appendectomy  Gastric bypass  Left hand surgery   Revision breanne-en-y  Vasectomy     Physical Exam     Patient Vitals for the past 24 hrs:   BP Temp Temp src Pulse Resp SpO2 Height   02/19/25 1330 (!) 171/89 -- -- 74 14 96 % --   02/19/25 1321 (!) 164/100 98  F (36.7  C) Oral -- -- -- 1.943 m (6' 4.5\")     Physical Exam  Constitutional: Alert, attentive, smells of alcohol, mildly slurred speech  HENT:   Head: Atraumatic  Eyes: EOM are normal, anicteric, conjugate gaze  CV: distal extremities warm, well perfused  Chest: Non-labored breathing on RA  GI:  non tender. No distension. No guarding or rebound.    Neurological: Alert, attentive, moving all extremities equally.   Skin: Skin is warm and dry.   Psych: No SI or " HI    Diagnostics     Lab Results   Labs Ordered and Resulted from Time of ED Arrival to Time of ED Departure   INR - Abnormal       Result Value    INR 4.02 (*)    BASIC METABOLIC PANEL - Abnormal    Sodium 141      Potassium 3.6      Chloride 105      Carbon Dioxide (CO2) 25      Anion Gap 11      Urea Nitrogen 10.7      Creatinine 0.90      GFR Estimate >90      Calcium 8.3 (*)     Glucose 148 (*)    ETHYL ALCOHOL LEVEL - Abnormal    Alcohol ethyl 0.28 (*)      Independent Interpretation   None    ED Course      Discussion of Management   None    ED Course   ED Course as of 02/19/25 1506   Wed Feb 19, 2025   1335 I obtained the patient's history and examined as noted above.      Additional Documentation  None    Medical Decision Making / Diagnosis     CMS Diagnoses: None    MIPS       None    MDM   Cody Bolton is a 44 year old male past medical history significant for DVT, on Coumadin, alcohol abuse presenting reportedly intoxicated from home, with initial reports EMS the patient was suicidal however patient denies this.  He denies any recent trauma.  He reports heavy drinking daily, denies severe withdrawal.  I do anticipate he will discharge after he is able to be monitored here for airway compromise once clinically sober able to ambulate with a steady gait.  Patient signed out pending this.  His INR is noted to be mildly supratherapeutic, will recommend he hold his dose today.    Disposition   Care of the patient was transferred to my colleague Dr. De Paz pending metabolization of alcohol.     Diagnosis     ICD-10-CM    1. Alcoholic intoxication without complication  F10.920       2. Supratherapeutic INR  R79.1          Zafar Patterson MD  [employer]  3:06 PM 02/19/25     Scribe Disclosure:  I, Noemi Bates, am serving as a scribe at 1:15 PM on 2/19/2025 to document services personally performed by Zafar Patterson MD based on my observations and the provider's  statements to me.           Zafar Patterson MD  02/19/25 [id number]

## 2025-02-19 NOTE — ED TRIAGE NOTES
Patient arrives via EMS from home where he lives alone. Patient has been drinking heavily (vodka) since at least yesterday per EMS, patient admits to drinking for the last few weeks. He has been eating food and able to tolerate it. Patient denies SI or a plan. Patient is in/out of sleep during assessment but arouses to tactile stimulation. ABCs intact.     Triage Assessment (Adult)       Row Name 02/19/25 1322          Triage Assessment    Airway WDL WDL        Respiratory WDL    Respiratory WDL WDL        Cognitive/Neuro/Behavioral WDL    Cognitive/Neuro/Behavioral WDL X;mood/behavior     Mood/Behavior labile  intoxicated

## 2025-02-20 ENCOUNTER — TELEPHONE (OUTPATIENT)
Dept: ANTICOAGULATION | Facility: CLINIC | Age: 45
End: 2025-02-20
Payer: COMMERCIAL

## 2025-02-20 DIAGNOSIS — Z86.718 HISTORY OF DVT (DEEP VEIN THROMBOSIS): Primary | ICD-10-CM

## 2025-02-20 NOTE — ED PROVIDER NOTES
I assumed care of patient at approximately 1400, briefly this is a 44-year-old male with alcohol use disorder who presents to the ED after alcohol use.  Plan is for metabolizing to freedom and reassessment if he needs DEC evaluation.    6:00 PM  I evaluated patient.  He is alert, he has ambulated.  I gave him some water to drink. I feel that he is overall clinically sober and he does not show any signs of withdrawal at this time.  He denies any SI HI.  I do not think that he needs a DEC evaluation or hold.  He is not interested in rehab at this time.  He lives by himself in a triplex, his sister is on her way to come get him. Once she arrives, I think that he is stable for discharge home with her.     MD Baldev Marti Connie, MD  02/20/25 [id number]

## 2025-02-20 NOTE — TELEPHONE ENCOUNTER
ANTICOAGULATION  MANAGEMENT: Discharge Review    Cody Bolton chart reviewed for anticoagulation continuity of care    Emergency room visit on 2/19/25 for alcohol intoxication.    Discharge disposition: Home    Results:    Recent labs: (last 7 days)     02/19/25  1324   INR 4.02*     Anticoagulation inpatient management:     held warfarin due to supratherapeutic level and alcohol use     Anticoagulation discharge instructions:     Warfarin dosing: home regimen continued   Bridging: No   INR goal change: No      Medication changes affecting anticoagulation: No    Additional factors affecting anticoagulation: Yes: patient declined rehab at this time     PLAN     Agree with dosing adjustment on discharge  Recommend to check INR on 2/24/25    Left a detailed message for Mu    Anticoagulation Calendar updated    Kera Deras, RN  2/20/2025  Anticoagulation Clinic  [facility] for routing messages: fransisco GARAY  ACC patient phone line: [phone]

## 2025-02-22 ENCOUNTER — TELEPHONE (OUTPATIENT)
Dept: BEHAVIORAL HEALTH | Facility: CLINIC | Age: 45
End: 2025-02-22

## 2025-02-22 ENCOUNTER — HOSPITAL ENCOUNTER (EMERGENCY)
Facility: CLINIC | Age: 45
Discharge: OTHER INSTITUTION | End: 2025-02-24
Attending: EMERGENCY MEDICINE | Admitting: EMERGENCY MEDICINE
Payer: COMMERCIAL

## 2025-02-22 DIAGNOSIS — R45.851 SUICIDAL IDEATION: ICD-10-CM

## 2025-02-22 DIAGNOSIS — F10.129 ALCOHOL ABUSE WITH INTOXICATION: ICD-10-CM

## 2025-02-22 PROBLEM — F10.220 ALCOHOL DEPENDENCE WITH UNCOMPLICATED INTOXICATION (H): Status: ACTIVE | Noted: 2025-02-22

## 2025-02-22 LAB
ALBUMIN SERPL BCG-MCNC: 4.2 G/DL (ref 3.5–5.2)
ALP SERPL-CCNC: 116 U/L (ref 40–150)
ALT SERPL W P-5'-P-CCNC: 46 U/L (ref 0–70)
ANION GAP SERPL CALCULATED.3IONS-SCNC: 20 MMOL/L (ref 7–15)
APAP SERPL-MCNC: <5 UG/ML (ref 10–30)
AST SERPL W P-5'-P-CCNC: 44 U/L (ref 0–45)
BASOPHILS # BLD AUTO: 0.1 10E3/UL (ref 0–0.2)
BASOPHILS NFR BLD AUTO: 1 %
BILIRUB SERPL-MCNC: 0.3 MG/DL
BUN SERPL-MCNC: 11.1 MG/DL (ref 6–20)
CALCIUM SERPL-MCNC: 8.3 MG/DL (ref 8.8–10.4)
CHLORIDE SERPL-SCNC: 95 MMOL/L (ref 98–107)
CREAT SERPL-MCNC: 0.76 MG/DL (ref 0.67–1.17)
EGFRCR SERPLBLD CKD-EPI 2021: >90 ML/MIN/1.73M2
EOSINOPHIL # BLD AUTO: 0.2 10E3/UL (ref 0–0.7)
EOSINOPHIL NFR BLD AUTO: 3 %
ERYTHROCYTE [DISTWIDTH] IN BLOOD BY AUTOMATED COUNT: 16.1 % (ref 10–15)
ETHANOL SERPL-MCNC: 0.28 G/DL
GLUCOSE SERPL-MCNC: 167 MG/DL (ref 70–99)
HCO3 SERPL-SCNC: 23 MMOL/L (ref 22–29)
HCT VFR BLD AUTO: 40.6 % (ref 40–53)
HGB BLD-MCNC: 13.1 G/DL (ref 13.3–17.7)
IMM GRANULOCYTES # BLD: 0 10E3/UL
IMM GRANULOCYTES NFR BLD: 0 %
INR PPP: 2.4 (ref 0.85–1.15)
LYMPHOCYTES # BLD AUTO: 2.5 10E3/UL (ref 0.8–5.3)
LYMPHOCYTES NFR BLD AUTO: 31 %
MCH RBC QN AUTO: 26.5 PG (ref 26.5–33)
MCHC RBC AUTO-ENTMCNC: 32.3 G/DL (ref 31.5–36.5)
MCV RBC AUTO: 82 FL (ref 78–100)
MONOCYTES # BLD AUTO: 0.7 10E3/UL (ref 0–1.3)
MONOCYTES NFR BLD AUTO: 9 %
NEUTROPHILS # BLD AUTO: 4.5 10E3/UL (ref 1.6–8.3)
NEUTROPHILS NFR BLD AUTO: 57 %
NRBC # BLD AUTO: 0 10E3/UL
NRBC BLD AUTO-RTO: 0 /100
PLATELET # BLD AUTO: 238 10E3/UL (ref 150–450)
POTASSIUM SERPL-SCNC: 4.3 MMOL/L (ref 3.4–5.3)
PROT SERPL-MCNC: 7.3 G/DL (ref 6.4–8.3)
RBC # BLD AUTO: 4.95 10E6/UL (ref 4.4–5.9)
SALICYLATES SERPL-MCNC: <0.3 MG/DL
SODIUM SERPL-SCNC: 138 MMOL/L (ref 135–145)
WBC # BLD AUTO: 8 10E3/UL (ref 4–11)

## 2025-02-22 PROCEDURE — 99291 CRITICAL CARE FIRST HOUR: CPT | Mod: 25

## 2025-02-22 PROCEDURE — 85025 COMPLETE CBC W/AUTO DIFF WBC: CPT | Performed by: EMERGENCY MEDICINE

## 2025-02-22 PROCEDURE — 36415 COLL VENOUS BLD VENIPUNCTURE: CPT | Performed by: SOCIAL WORKER

## 2025-02-22 PROCEDURE — 258N000003 HC RX IP 258 OP 636: Performed by: EMERGENCY MEDICINE

## 2025-02-22 PROCEDURE — 85610 PROTHROMBIN TIME: CPT | Performed by: SOCIAL WORKER

## 2025-02-22 PROCEDURE — 80143 DRUG ASSAY ACETAMINOPHEN: CPT | Performed by: EMERGENCY MEDICINE

## 2025-02-22 PROCEDURE — 82310 ASSAY OF CALCIUM: CPT | Performed by: EMERGENCY MEDICINE

## 2025-02-22 PROCEDURE — 84155 ASSAY OF PROTEIN SERUM: CPT | Performed by: EMERGENCY MEDICINE

## 2025-02-22 PROCEDURE — 250N000013 HC RX MED GY IP 250 OP 250 PS 637: Performed by: SOCIAL WORKER

## 2025-02-22 PROCEDURE — 96361 HYDRATE IV INFUSION ADD-ON: CPT

## 2025-02-22 PROCEDURE — 96360 HYDRATION IV INFUSION INIT: CPT

## 2025-02-22 PROCEDURE — 82077 ASSAY SPEC XCP UR&BREATH IA: CPT | Performed by: EMERGENCY MEDICINE

## 2025-02-22 PROCEDURE — 36415 COLL VENOUS BLD VENIPUNCTURE: CPT | Performed by: EMERGENCY MEDICINE

## 2025-02-22 PROCEDURE — 80179 DRUG ASSAY SALICYLATE: CPT | Performed by: EMERGENCY MEDICINE

## 2025-02-22 RX ORDER — OLANZAPINE 5 MG/1
5-10 TABLET, ORALLY DISINTEGRATING ORAL EVERY 6 HOURS PRN
Status: DISCONTINUED | OUTPATIENT
Start: 2025-02-22 | End: 2025-02-24 | Stop reason: HOSPADM

## 2025-02-22 RX ORDER — FLUMAZENIL 0.1 MG/ML
0.2 INJECTION, SOLUTION INTRAVENOUS
Status: DISCONTINUED | OUTPATIENT
Start: 2025-02-22 | End: 2025-02-24 | Stop reason: HOSPADM

## 2025-02-22 RX ORDER — HALOPERIDOL 5 MG/ML
2.5-5 INJECTION INTRAMUSCULAR EVERY 6 HOURS PRN
Status: DISCONTINUED | OUTPATIENT
Start: 2025-02-22 | End: 2025-02-24 | Stop reason: HOSPADM

## 2025-02-22 RX ORDER — DIAZEPAM 10 MG/2ML
5-10 INJECTION, SOLUTION INTRAMUSCULAR; INTRAVENOUS EVERY 30 MIN PRN
Status: DISCONTINUED | OUTPATIENT
Start: 2025-02-22 | End: 2025-02-24 | Stop reason: HOSPADM

## 2025-02-22 RX ORDER — FLUMAZENIL 0.1 MG/ML
0.2 INJECTION, SOLUTION INTRAVENOUS
Status: DISCONTINUED | OUTPATIENT
Start: 2025-02-22 | End: 2025-02-23

## 2025-02-22 RX ORDER — LISINOPRIL 10 MG/1
20 TABLET ORAL DAILY
Status: DISCONTINUED | OUTPATIENT
Start: 2025-02-23 | End: 2025-02-24 | Stop reason: HOSPADM

## 2025-02-22 RX ORDER — CARVEDILOL 12.5 MG/1
12.5 TABLET ORAL 2 TIMES DAILY WITH MEALS
Status: DISCONTINUED | OUTPATIENT
Start: 2025-02-22 | End: 2025-02-24 | Stop reason: HOSPADM

## 2025-02-22 RX ORDER — DIAZEPAM 5 MG/1
10 TABLET ORAL EVERY 30 MIN PRN
Status: DISCONTINUED | OUTPATIENT
Start: 2025-02-22 | End: 2025-02-24 | Stop reason: HOSPADM

## 2025-02-22 RX ORDER — BUPROPION HYDROCHLORIDE 150 MG/1
150 TABLET ORAL EVERY MORNING
Status: DISCONTINUED | OUTPATIENT
Start: 2025-02-23 | End: 2025-02-24 | Stop reason: HOSPADM

## 2025-02-22 RX ADMIN — SODIUM CHLORIDE 1000 ML: 9 INJECTION, SOLUTION INTRAVENOUS at 16:40

## 2025-02-22 RX ADMIN — CARVEDILOL 12.5 MG: 12.5 TABLET, FILM COATED ORAL at 23:54

## 2025-02-22 ASSESSMENT — LIFESTYLE VARIABLES
PAROXYSMAL SWEATS: 2
NAUSEA AND VOMITING: NO NAUSEA AND NO VOMITING
TACTILE DISTURBANCES: VERY MILD ITCHING, PINS AND NEEDLES, BURNING OR NUMBNESS
TREMOR: NOT VISIBLE, BUT CAN BE FELT FINGERTIP TO FINGERTIP
TOTAL SCORE: 7
ORIENTATION AND CLOUDING OF SENSORIUM: ORIENTED AND CAN DO SERIAL ADDITIONS
ANXIETY: MILDLY ANXIOUS
HEADACHE, FULLNESS IN HEAD: VERY MILD
VISUAL DISTURBANCES: NOT PRESENT
AUDITORY DISTURBANCES: NOT PRESENT
AGITATION: SOMEWHAT MORE THAN NORMAL ACTIVITY

## 2025-02-22 ASSESSMENT — ACTIVITIES OF DAILY LIVING (ADL)
ADLS_ACUITY_SCORE: 55

## 2025-02-22 ASSESSMENT — COLUMBIA-SUICIDE SEVERITY RATING SCALE - C-SSRS
2. HAVE YOU ACTUALLY HAD ANY THOUGHTS OF KILLING YOURSELF IN THE PAST MONTH?: YES
1. IN THE PAST MONTH, HAVE YOU WISHED YOU WERE DEAD OR WISHED YOU COULD GO TO SLEEP AND NOT WAKE UP?: YES
5. HAVE YOU STARTED TO WORK OUT OR WORKED OUT THE DETAILS OF HOW TO KILL YOURSELF? DO YOU INTEND TO CARRY OUT THIS PLAN?: YES
3. HAVE YOU BEEN THINKING ABOUT HOW YOU MIGHT KILL YOURSELF?: YES
6. HAVE YOU EVER DONE ANYTHING, STARTED TO DO ANYTHING, OR PREPARED TO DO ANYTHING TO END YOUR LIFE?: NO
4. HAVE YOU HAD THESE THOUGHTS AND HAD SOME INTENTION OF ACTING ON THEM?: NO

## 2025-02-22 NOTE — ED PROVIDER NOTES
"  Emergency Department Note      History of Present Illness     Chief Complaint   Suicidal      HPI   Cody Bolton is a 44 year old male with a history of anxiety, depression, fatty liver, type II diabetes, hypertension, atrial fibrillation, DVT, and PE, anticoagulated on warfarin who presents with his ex-wife to the Emergency Department for suicidal ideations. The patient's ex-wife reports Cody has been drinking whiskey in an attempt to commit suicide. At 2200 yesterday (2/21/25) he called her saying he wanted to die. She also notes that his problems with alcohol have been progressively worsening the last six months. Cody reports he \"likes to feel drunk\" and has access to carvedilol and knives at home. Denies vomiting, recent falls or hitting his head.    Independent Historian   Ex-wife as detailed above.    Review of External Notes   I reviewed the patient's 1/20/25 Virtual Visit note in which the patient discussed attending a 12-step support group meeting for his alcoholism and his relationship troubles with his ex-wife.    Past Medical History     Medical History and Problem List   Asthma   Anxiety   Atrial fibrillation  Bilateral myopia and astigmatism   Cervical radiculopathy   Depression   DVT  Fatty liver  Hypertension   Hyperlipidemia   Moderate acromioclavicular joint degenerative joint disease, right   CARLIE on CPAP  PE  Type II diabetes     Medications   Acamprosate   buPROPion   Carvedilol   cloNIDine  Disulfiram   Lisinopril   metFORMIN   Simvastatin  traZODone   Warfarin ANTICOAGULANT    Surgical History   Appendectomy  Gastric bypass  Vasectomy  Unspecified left hand surgery     Physical Exam     Patient Vitals for the past 24 hrs:   BP Temp Temp src Pulse Resp SpO2   02/22/25 1547 (!) 133/100 -- -- -- -- --   02/22/25 1546 -- 97.2  F (36.2  C) Temporal 95 16 96 %     Physical Exam   Nursing note and vitals reviewed.  General: Oriented to person, place, and time. Appears well-developed and " "well-nourished.   Head: No signs of trauma.   Mouth/Throat: Oropharynx is clear. Dry mucus membranes.   Eyes: Conjunctivae are normal. Pupils are equal, round, and reactive to light.   Neck: Normal range of motion. No nuchal rigidity.   Cardiovascular: Normal rate and regular rhythm.    Respiratory: Effort normal and breath sounds normal. No respiratory distress.   Abdominal: Soft. There is no tenderness. There is no guarding.   Musculoskeletal: Normal range of motion. no edema.   Neurological: The patient is alert and oriented to person, place, and time.  PERRLA, EOMI, visual fields intact, strength in upper/lower extremities normal and symmetrical.   Sensation normal. Speech normal  GCS eye subscore is 4. GCS verbal subscore is 5. GCS motor subscore is 6.   Skin: Skin is warm and dry. No rash noted.   Psychiatric: Appears intoxicated. Cooperative, not tearful, admits to suicidal ideations with a plan to \"drink himself to death with alcohol.\"    Diagnostics     Lab Results   Labs Ordered and Resulted from Time of ED Arrival to Time of ED Departure   COMPREHENSIVE METABOLIC PANEL - Abnormal       Result Value    Sodium 138      Potassium 4.3      Carbon Dioxide (CO2) 23      Anion Gap 20 (*)     Urea Nitrogen 11.1      Creatinine 0.76      GFR Estimate >90      Calcium 8.3 (*)     Chloride 95 (*)     Glucose 167 (*)     Alkaline Phosphatase 116      AST 44      ALT 46      Protein Total 7.3      Albumin 4.2      Bilirubin Total 0.3     ETHYL ALCOHOL LEVEL - Abnormal    Alcohol ethyl 0.28 (*)    ACETAMINOPHEN LEVEL - Abnormal    Acetaminophen <5.0 (*)    CBC WITH PLATELETS AND DIFFERENTIAL - Abnormal    WBC Count 8.0      RBC Count 4.95      Hemoglobin 13.1 (*)     Hematocrit 40.6      MCV 82      MCH 26.5      MCHC 32.3      RDW 16.1 (*)     Platelet Count 238      % Neutrophils 57      % Lymphocytes 31      % Monocytes 9      % Eosinophils 3      % Basophils 1      % Immature Granulocytes 0      NRBCs per 100 WBC 0 "      Absolute Neutrophils 4.5      Absolute Lymphocytes 2.5      Absolute Monocytes 0.7      Absolute Eosinophils 0.2      Absolute Basophils 0.1      Absolute Immature Granulocytes 0.0      Absolute NRBCs 0.0     SALICYLATE LEVEL - Normal    Salicylate <0.3         Imaging   No orders to display     Independent Interpretation   None    ED Course      Medications Administered   Medications   sodium chloride 0.9% BOLUS 1,000 mL (0 mLs Intravenous Stopped 2/22/25 1906)       Procedures   None     Discussion of Management   DEC    ED Course   ED Course as of 02/22/25 2055   Sat Feb 22, 2025   1555 I evaluated the patient, obtained history, and performed a physical exam as detailed above.    1945 I spoke with DEC.   2052 I spoke with central intake concerning [place] placement.       Additional Documentation  None    Medical Decision Making / Diagnosis       MARY   Cody Bolton is a 44 year old male alcohol abuse who presents to the ED with concerns over suicidal statements.  Differential diagnosis broadly includes:  Drug- including opioids or alcohol. Drug abuse screen is negative as above including any toxic ingestion such as Tylenol, salicylates or ethanol.  Metabolic- he is not hypoxic and shows no signs of hyper or hypoglycemia.   Intercranial abnormality- he had no history of trauma and no exam findings consistent with head trauma, therefore a CT head was not warranted. There were no focal neurologic findings consistent with this either.   Vascular- No history consistent with stroke, subarachnoid hemorrhage, or coronary ischemia. No headache or focal findings.  Psychiatric -Such as schizophrenia or major depression, this is most likely diagnosis given that there are no obvious findings on physical exam or on labs.  Infectious- Meningitis, encephalitis or bacteriemia, with his completely normal neuro exam, physical exam, it is very unlikely to be an infectious process at this time.     As a psychological  disturbance complicated by alcohol intoxication and abuse is the most likely etiology, DEC was consulted and they are recommending involuntary admission to unit 3A of [facility].     This will be a voluntary admission to the detox unit at [facility].  The patient will be signed out to Dr. De Paz overnight while awaiting a bed    Diagnosis:  Suicidal ideation    Disposition   The patient will be transferred to [facility] 3A detox via EMS.  The patient will be signed out to Dr. De Paz overnight awaiting bed confirmation.    Diagnosis     ICD-10-CM    1. Alcohol abuse with intoxication  F10.129       2. Suicidal ideation  R45.851            Discharge Medications   New Prescriptions    No medications on file     Scribe Disclosure:  I, Melissa Gaitan, am serving as a scribe at 4:31 PM on 2/22/2025 to document services personally performed by Sumeet Murry MD based on my observations and the provider's statements to me.        Sumeet Murry MD  02/22/25 2057

## 2025-02-22 NOTE — ED TRIAGE NOTES
Ex wife reports that patient had suicidal ideation that he expressed to her last night. He intends to drink alcohol to harm himself. In triage, patient is intoxicated.

## 2025-02-22 NOTE — ED NOTES
DEC called  p.m. was told pt not ready intoxicated . Pt was receiving blood draw and to please call back in 2 hours. DB

## 2025-02-23 ENCOUNTER — TELEPHONE (OUTPATIENT)
Dept: BEHAVIORAL HEALTH | Facility: CLINIC | Age: 45
End: 2025-02-23
Payer: COMMERCIAL

## 2025-02-23 PROBLEM — F10.20 ALCOHOL USE DISORDER, SEVERE, DEPENDENCE (H): Status: ACTIVE | Noted: 2024-08-08

## 2025-02-23 LAB
AMPHETAMINES UR QL SCN: NORMAL
BARBITURATES UR QL SCN: NORMAL
BENZODIAZ UR QL SCN: NORMAL
BZE UR QL SCN: NORMAL
CANNABINOIDS UR QL SCN: NORMAL
FENTANYL UR QL: NORMAL
INR PPP: 2.47 (ref 0.85–1.15)
OPIATES UR QL SCN: NORMAL
PCP QUAL URINE (ROCHE): NORMAL

## 2025-02-23 PROCEDURE — 85610 PROTHROMBIN TIME: CPT | Performed by: SOCIAL WORKER

## 2025-02-23 PROCEDURE — 36415 COLL VENOUS BLD VENIPUNCTURE: CPT | Performed by: SOCIAL WORKER

## 2025-02-23 PROCEDURE — 250N000013 HC RX MED GY IP 250 OP 250 PS 637: Performed by: EMERGENCY MEDICINE

## 2025-02-23 PROCEDURE — 250N000013 HC RX MED GY IP 250 OP 250 PS 637: Performed by: SOCIAL WORKER

## 2025-02-23 PROCEDURE — 80307 DRUG TEST PRSMV CHEM ANLYZR: CPT | Performed by: EMERGENCY MEDICINE

## 2025-02-23 RX ORDER — HYDRALAZINE HYDROCHLORIDE 10 MG/1
10 TABLET, FILM COATED ORAL 4 TIMES DAILY PRN
Status: DISCONTINUED | OUTPATIENT
Start: 2025-02-23 | End: 2025-02-24 | Stop reason: HOSPADM

## 2025-02-23 RX ORDER — METFORMIN HYDROCHLORIDE 500 MG/1
1000 TABLET, EXTENDED RELEASE ORAL
Status: DISCONTINUED | OUTPATIENT
Start: 2025-02-24 | End: 2025-02-24 | Stop reason: HOSPADM

## 2025-02-23 RX ORDER — WARFARIN SODIUM 5 MG/1
5 TABLET ORAL ONCE
Status: COMPLETED | OUTPATIENT
Start: 2025-02-23 | End: 2025-02-23

## 2025-02-23 RX ORDER — CLONIDINE HYDROCHLORIDE 0.1 MG/1
0.1 TABLET ORAL
Status: DISCONTINUED | OUTPATIENT
Start: 2025-02-23 | End: 2025-02-24 | Stop reason: HOSPADM

## 2025-02-23 RX ORDER — ACETAMINOPHEN 500 MG
1000 TABLET ORAL EVERY 8 HOURS PRN
Status: DISCONTINUED | OUTPATIENT
Start: 2025-02-23 | End: 2025-02-24 | Stop reason: HOSPADM

## 2025-02-23 RX ORDER — WARFARIN SODIUM 5 MG/1
5 TABLET ORAL
Status: COMPLETED | OUTPATIENT
Start: 2025-02-23 | End: 2025-02-23

## 2025-02-23 RX ADMIN — LISINOPRIL 20 MG: 10 TABLET ORAL at 07:16

## 2025-02-23 RX ADMIN — CARVEDILOL 12.5 MG: 12.5 TABLET, FILM COATED ORAL at 07:16

## 2025-02-23 RX ADMIN — BUPROPION HYDROCHLORIDE 150 MG: 150 TABLET, FILM COATED, EXTENDED RELEASE ORAL at 07:17

## 2025-02-23 RX ADMIN — HYDRALAZINE HYDROCHLORIDE 10 MG: 10 TABLET, FILM COATED ORAL at 21:43

## 2025-02-23 RX ADMIN — WARFARIN SODIUM 5 MG: 5 TABLET ORAL at 19:38

## 2025-02-23 RX ADMIN — ACETAMINOPHEN 1000 MG: 500 TABLET, FILM COATED ORAL at 20:18

## 2025-02-23 RX ADMIN — WARFARIN SODIUM 5 MG: 5 TABLET ORAL at 02:11

## 2025-02-23 RX ADMIN — CARVEDILOL 12.5 MG: 12.5 TABLET, FILM COATED ORAL at 18:06

## 2025-02-23 ASSESSMENT — LIFESTYLE VARIABLES
TOTAL SCORE: 3
ANXIETY: MILDLY ANXIOUS
AGITATION: NORMAL ACTIVITY
PAROXYSMAL SWEATS: NO SWEAT VISIBLE
NAUSEA AND VOMITING: NO NAUSEA AND NO VOMITING
VISUAL DISTURBANCES: NOT PRESENT
HEADACHE, FULLNESS IN HEAD: NOT PRESENT
HEADACHE, FULLNESS IN HEAD: NOT PRESENT
VISUAL DISTURBANCES: NOT PRESENT
PAROXYSMAL SWEATS: 2
AGITATION: NORMAL ACTIVITY
AUDITORY DISTURBANCES: NOT PRESENT
VISUAL DISTURBANCES: NOT PRESENT
PAROXYSMAL SWEATS: NO SWEAT VISIBLE
AUDITORY DISTURBANCES: NOT PRESENT
NAUSEA AND VOMITING: NO NAUSEA AND NO VOMITING
AUDITORY DISTURBANCES: NOT PRESENT
VISUAL DISTURBANCES: NOT PRESENT
TOTAL SCORE: 4
NAUSEA AND VOMITING: NO NAUSEA AND NO VOMITING
ORIENTATION AND CLOUDING OF SENSORIUM: ORIENTED AND CAN DO SERIAL ADDITIONS
VISUAL DISTURBANCES: NOT PRESENT
HEADACHE, FULLNESS IN HEAD: NOT PRESENT
TREMOR: NOT VISIBLE, BUT CAN BE FELT FINGERTIP TO FINGERTIP
ORIENTATION AND CLOUDING OF SENSORIUM: ORIENTED AND CAN DO SERIAL ADDITIONS
ANXIETY: NO ANXIETY, AT EASE
ANXIETY: NO ANXIETY, AT EASE
PAROXYSMAL SWEATS: BARELY PERCEPTIBLE SWEATING, PALMS MOIST
HEADACHE, FULLNESS IN HEAD: VERY MILD
AUDITORY DISTURBANCES: NOT PRESENT
PAROXYSMAL SWEATS: NO SWEAT VISIBLE
TREMOR: NO TREMOR
NAUSEA AND VOMITING: NO NAUSEA AND NO VOMITING
TREMOR: NOT VISIBLE, BUT CAN BE FELT FINGERTIP TO FINGERTIP
AGITATION: NORMAL ACTIVITY
HEADACHE, FULLNESS IN HEAD: NOT PRESENT
TREMOR: NOT VISIBLE, BUT CAN BE FELT FINGERTIP TO FINGERTIP
TACTILE DISTURBANCES: VERY MILD ITCHING, PINS AND NEEDLES, BURNING OR NUMBNESS
HEADACHE, FULLNESS IN HEAD: NOT PRESENT
TOTAL SCORE: 2
AGITATION: NORMAL ACTIVITY
NAUSEA AND VOMITING: NO NAUSEA AND NO VOMITING
ANXIETY: MILDLY ANXIOUS
TREMOR: NO TREMOR
ORIENTATION AND CLOUDING OF SENSORIUM: ORIENTED AND CAN DO SERIAL ADDITIONS
ANXIETY: NO ANXIETY, AT EASE
AUDITORY DISTURBANCES: NOT PRESENT
ANXIETY: NO ANXIETY, AT EASE
AGITATION: NORMAL ACTIVITY
ORIENTATION AND CLOUDING OF SENSORIUM: ORIENTED AND CAN DO SERIAL ADDITIONS
AUDITORY DISTURBANCES: NOT PRESENT
ORIENTATION AND CLOUDING OF SENSORIUM: ORIENTED AND CAN DO SERIAL ADDITIONS
TOTAL SCORE: 1
PAROXYSMAL SWEATS: NO SWEAT VISIBLE
NAUSEA AND VOMITING: NO NAUSEA AND NO VOMITING
VISUAL DISTURBANCES: NOT PRESENT
TOTAL SCORE: 0
AGITATION: NORMAL ACTIVITY
TREMOR: 2

## 2025-02-23 ASSESSMENT — ACTIVITIES OF DAILY LIVING (ADL)
ADLS_ACUITY_SCORE: 55

## 2025-02-23 ASSESSMENT — COLUMBIA-SUICIDE SEVERITY RATING SCALE - C-SSRS
TOTAL  NUMBER OF ABORTED OR SELF INTERRUPTED ATTEMPTS SINCE LAST CONTACT: NO
SUICIDE, SINCE LAST CONTACT: NO
LETHALITY/MEDICAL DAMAGE CODE MOST LETHAL ACTUAL ATTEMPT: MODERATE PHYSICAL DAMAGE, MEDICAL ATTENTION NEEDED
6. HAVE YOU EVER DONE ANYTHING, STARTED TO DO ANYTHING, OR PREPARED TO DO ANYTHING TO END YOUR LIFE?: NO
ATTEMPT SINCE LAST CONTACT: NO
MOST LETHAL DATE: 66964
TOTAL  NUMBER OF INTERRUPTED ATTEMPTS SINCE LAST CONTACT: NO

## 2025-02-23 NOTE — ED NOTES
Son here to visit, brought patient a belonging bag and cane.  Security searched belonging bag for safety.  Yolis PACE Son, RN

## 2025-02-23 NOTE — TELEPHONE ENCOUNTER
S: [facility] ED , DEC  Val  calling at 7:51PM about a 44 year old/Male presenting with SI w/ plan to drink himself to death.      B: Pt arrived via Family. Presenting problem, stressors: Pt was in the ED on Wednesday for similar presentation. Per chart, parents  recently.      Pt affect in ED:  Jovial  Pt Dx: Major Depressive Disorder, Generalized Anxiety Disorder, and Substance Use Disorder: alcohol  Previous IPMH hx? Yes: [facility]  Pt endorses SI with a plan to drink himself to death    Hx of suicide attempt? No but family reports pt has tried to drink himself to death in the past  Pt denies SIB  Pt denies HI   Pt denies hallucinations .   Pt RARS Score: 1    Hx of aggression/violence, sexual offenses, legal concerns, Epic care plan? describe: None  Current concerns for aggression this visit? No  Does pt have a history of Civil Commitment? No  Is Pt their own guardian? Yes    Pt is prescribed medication. Is patient medication compliant? No  Pt endorses OP services: Psychiatrist and Therapist  CD concerns: Actively using/consuming alcohol  Acute or chronic medical concerns: Type 2 diabetes, obesity  Does Pt present with specific needs, assistive devices, or exclusionary criteria? Bariatric needs (toilet, bed) must notify CRN       Pt is ambulatory  Pt is able to perform ADLs independently      A: Pt to be reviewed for IP admission. Pt is Voluntary  Preferred placement: [facility]    COVID Symptoms: No  If yes, COVID test required   Utox: Ordered, not yet collected   CMP: Abnormalities: A Gap 20 / Calcium 8.3 / Chloride 95 / Glucose 167  CBC: Abnormalities: Hemoglobin 13.1 / RDW 16.1  HCG: N/A  BAL: 0.28    R: Patient cleared and ready for behavioral bed placement: Yes  Pt placed on IP worklist? Yes    Does Patient need a Transfer Center request created? Yes, writer completed Transfer Center request at: 7:55PM    DEC recommending MICD admission to 3A. Informed DEC that all MICD admissions for 3A need to  be reviewed by unit medical director Monday-Friday from 8am-5pm -  Intake can place pt on IP WL in meantime and look for MH bed until pt can be reviewed for a detox bed Monday morning.     R: [place] [facility]patient Bed Call Log 2/22/2025 @3:07 PM:     Intake has called facilities that have not updated their bed status within the last 12 hours.    [facility] is posting 0 beds.                  [facility] is posting 3 beds. [phone]. Jasbir Bourgeois @ 3:31PM, they are full  [facility] is posting 0 beds. Negative covid required.  [facility] is posting 0 beds. Neg covid. No high school/[facility]-psych. [phone]  [facility] is posting 0 beds. [phone].   [facility] is posting 0 beds. [phone].   [facility] is posting 6 beds. (Ages 18-35) Negative covid, no aggression, physical or sexual assault, violence hx or drug abuse, or psychosis.  [phone]. Pt is not within age range requirement  [facility] is posting 0 beds.    [facility] is posting 0 beds [phone].    Pt remains on the work list pending appropriate bed availability.

## 2025-02-23 NOTE — ED NOTES
RN ED Mental Health Handoff Note    Voluntary    Does patient require 1:1? No    Hold and rights been given and documented for patient: N/A    Is the patient in BH scrubs? Yes    Has the patient been searched? Yes    Is the 15 minute observation tool up to date? Yes    Was patient issued a welcome folder? No -    Room check completed this shift: Yes    PSS3 and Hilton Assessment/Reassessment this shift:    C-SSRS ([place])      Date and Time Q1 Wished to be Dead (Past Month) Q2 Suicidal Thoughts (Past Month) Q3 Suicidal Thought Method Q4 Suicidal Intent without Specific Plan Q5 Suicide Intent with Specific Plan Q6 Suicide Behavior (Lifetime) If yes to Q6, within past 3 months? Level of Risk per Screen Level of Risk per Screen User   02/22/25 1641 1-->yes 1-->yes 1-->yes 0-->no 1-->yes 1-->yes 1-->yes -- high risk KRK   02/22/25 1547 1-->yes 1-->yes 1-->yes 0-->no 1-->yes 0-->no -- -- high risk LLM            Behavioral status of patient: Green    Code 21 called this shift? No    Use of restraints/seclusion this shift? No    Most recent vital signs:  Temp: 98.7  F (37.1  C) Temp src: Oral BP: (!) 160/98 Pulse: 85   Resp: 18 SpO2: 93 % O2 Device: None (Room air)      Medications:  Scheduled medication compliance? Yes    PRN Meds administered this shift? No    Medications   OLANZapine zydis (zyPREXA) ODT tab 5-10 mg (has no administration in time range)     Or   haloperidol lactate (HALDOL) injection 2.5-5 mg (has no administration in time range)   flumazenil (ROMAZICON) injection 0.2 mg (has no administration in time range)   melatonin tablet 5 mg (has no administration in time range)   diazepam (VALIUM) tablet 10 mg (0 mg Oral Return to Cabinet 2/23/25 0000)     Or   diazepam (VALIUM) injection 5-10 mg ( Intravenous See Alternative 2/23/25 0000)   flumazenil (ROMAZICON) injection 0.2 mg (has no administration in time range)   Warfarin Dose Required Daily - Pharmacist Managed (has no administration in time range)    lisinopril (ZESTRIL) tablet 20 mg (has no administration in time range)   buPROPion (WELLBUTRIN XL) 24 hr tablet 150 mg (has no administration in time range)   carvedilol (COREG) tablet 12.5 mg (12.5 mg Oral $Given 2/22/25 7310)   sodium chloride 0.9% BOLUS 1,000 mL (0 mLs Intravenous Stopped 2/22/25 1906)   warfarin ANTICOAGULANT (COUMADIN) tablet 5 mg (5 mg Oral $Given 2/23/25 0211)         ADLs    Meal Provided this shift? Yes    Hygiene items provided? Yes    ADLs completed? Yes    Date of last shower: PTA    Any significant events this shift? No    Any information that would be helpful in caring for this patient?      Family present/updated? No    Location of patient's belongings: phone, CPAP machine at bedside.     Critical Care Minutes:  Does the patient need critical care minutes documented? No

## 2025-02-23 NOTE — ED NOTES
Patient ambulates to bathroom with walker, pleasant in conversation. Washcloth and toothbrush provided and pt performing am oral and face wash cares. At this time also asked patient if headaches, chest pain or any other hypertensive symptoms, pt denies and states feeling good.   Yolis Jha, RN

## 2025-02-23 NOTE — ED NOTES
"Pt's sister states pt made comments like \" I want to go be with our parents\", parents  recently.   "

## 2025-02-23 NOTE — PHARMACY-ADMISSION MEDICATION HISTORY
Pharmacist Admission Medication History    Admission medication history is complete. The information provided in this note is only as accurate as the sources available at the time of the update.    Information Source(s): Patient, Clinic records, and CareEverywhere/SureScripts via in-person    Pertinent Information:     Changes made to PTA medication list:  Added: None  Deleted: Acamprosate, acetaminophen, cyclobenzaprine, disulfram  Changed: updated warfarin regimen    Allergies reviewed with patient and updates made in EHR: yes    Medication History Completed By: Anita Parks RPH 2/22/2025 10:32 PM    PTA Med List   Medication Sig Last Dose/Taking    buPROPion (WELLBUTRIN XL) 150 MG 24 hr tablet Take 1 tablet (150 mg) by mouth every morning Unknown    carvedilol (COREG) 12.5 MG tablet Take 1 tablet (12.5 mg) by mouth 2 times daily (with meals) Unknown    cloNIDine (CATAPRES) 0.1 MG tablet Take 0.1 mg by mouth as needed (Sleep) Unknown    lisinopril (ZESTRIL) 20 MG tablet Take 1 tablet (20 mg) by mouth daily Unknown    metFORMIN (GLUCOPHAGE XR) 500 MG 24 hr tablet Take 2 tablets (1,000 mg) by mouth daily (with dinner) Unknown    sildenafil (VIAGRA) 100 MG tablet Take 1 tablet (100 mg) by mouth daily as needed (erectile dysfunction). Unknown    simvastatin (ZOCOR) 20 MG tablet Take 1 tablet (20 mg) by mouth at bedtime Unknown    warfarin ANTICOAGULANT (COUMADIN) 5 MG tablet Take 5 mg by mouth every evening. Take 2.5 mg on Wednesdays and 5 mg all other days. 2/21/2025

## 2025-02-23 NOTE — PLAN OF CARE
"Cody WALLACE Hoang  February 22, 2025  Plan of Care Hand-off Note     Patient Recommended Care Path: inpatient mental health    Clinical Substantiation:  It is the recommendation of this clinician that pt admit to IP for safety and stabilization.   Pt guarded, denies all mental health symptoms but is brought in by family that are fearful for pt's risk to self due to excessive daily drinking and suicidal ideation.   Pt has threatened to drink himself to death.  Hx of pt unresponsive in past after taking oxys, needed CPR and Narcan.   Pt lives alone, not taking medication consistently, has not showed up to work for past 4 days, no recent appointments with therapist and has hx of depression and anxiety.  Family reports pt's mood is \"worse than ever\" and they are afraid of going to apartment and finding pt dead.  Pt admits to drinking until he blacks out and is not leaving his apartment.  Extended family is positive for death by suicide.  Pt has increased risk factors including living alone, , financial stressors and medical issues.  Pt currently agreeable to voluntary hospitalization.  Should pt request discharge, additional assessment may be warranted to determine ability to safety plan once sober.  Central Intake contacted.    Goals for crisis stabilization:  IP    Next steps for Care Team:       Treatment Objectives Addressed:       Therapeutic Interventions:       Has a specific means been identified for suicidal.homicide actions: Yes  If yes, describe: alcohol  Explain action steps toward mitigation: Pt reports his sister emptied his alcohol, however, pt UBERs delivery of alcohol  Document completion of mitigation action:    The follow up action still needed prior to discharge:      Patient coping skills attempted to reduce the crisis:  watching tv       Imminent risk of harm: Suicidal Behavior  Severe psychiatric, behavioral or other comorbid conditions are appropriate for management at inpatient [facility]" health as indicated by at least one of the following: Psychiatric Symptoms, Comorbid substance use disorder, Impaired impulse control, judgement, or insight  Severe dysfunction in daily living is present as indicated by at least one of the following: Other evidence of severe dysfunction  Situation and expectations are appropriate for inpatient care: Around-the-clock medical and nursing care to address symptoms and initiate intervention is required  Inpatient mental health services are necessary to meet patient needs and at least one of the following: Specific condition related to admission diagnosis is present and judged likely to further improve at proposed level of care      Collateral contact information:  Rosalind cook [phone]    Legal Status: Voluntary/Patient has signed consent for treatment  Reviewed court records: yes     Psychiatry Consult:     Val Goldstein

## 2025-02-23 NOTE — CONSULTS
"Diagnostic Evaluation Consultation  Crisis Assessment    Patient Name: Cody Bolton  Age:  44 year old  Legal Sex: male  Gender Identity: male  Pronouns: he/him/his  Race: White  Ethnicity: Not  or   Language: English      Patient was assessed:     Crisis Assessment Start Date: 02/22/25  Crisis Assessment Start Time: 1852  Crisis Assessment Stop Time: 1930  Patient location: [facility] Emergency Dept                             ED06    Referral Data and Chief Complaint  Cody Bolton presents to the ED with family/friends. Patient is presenting to the ED for the following concerns: Substance use, Suicidal ideation, Intoxication, Health stressors. Factors that make the mental health crisis life threatening or complex are: 44  year old  male brought to ER by family due to concerns of risk to self.  Pt making suicidal comments that he wants to drink himself to death.  Drinking daily and to excess.  Family reports he has not worked for 3-4 days, isolating self, not eating.  Hx of depression and anxiety.  Pt states, \"I like the feeling of being drunk, it's euphoric.  Admits to passive thoughts of suicide, crying.  Reports unable to sleep if he is not drinking.  Admits to drinking daily, 100 proof alcohol.  Family called welfare check on pt this week as well as pt in ED a few days ago.  Hx of MARCY treatment..      Informed Consent and Assessment Methods  Explained the crisis assessment process, including applicable information disclosures and limits to confidentiality, assessed understanding of the process, and obtained consent to proceed with the assessment.  Assessment methods included conducting a formal interview with patient, review of medical records, collaboration with medical staff, and obtaining relevant collateral information from family and community providers when available.  : done     History of the Crisis   Reports he was sober 61 days after last rehab " "9/2024.    Brief Psychosocial History  Family:   , Children yes  Support System:  Sibling(s)  Employment Status:     Source of Income:     Financial Environmental Concerns:  other (see comments) (debt)  Current Hobbies:  television/movies/videos  Barriers in Personal Life:  lack of motivation    Significant Clinical History  Current Anxiety Symptoms:  excessive worry  Current Depression/Trauma:  crying or feels like crying, thoughts of death/suicide  Current Somatic Symptoms:     Current Psychosis/Thought Disturbance:     Current Eating Symptoms:  loss of appetite  Chemical Use History:  Alcohol: Blackouts, Binge  Last Use:: 02/22/25  Benzodiazepines: None  Opiates: None  Cocaine: None  Marijuana: None   Past diagnosis:  Anxiety Disorder, Depression, Substance Use Disorder  Family history:  Substance Use Disorder, Depression  Past treatment:  Individual therapy, AA/NA, Primary Care, Psychiatric Medication Management  Details of most recent treatment:  Goes to AA every once in a while, \"It makes me want to drink\".  Reports he sees a therapist through [facility], sees a .  PCP prescribes medication.  Last MARCY was in 9/2024.  Other relevant history:       Have there been any medication changes in the past two weeks:  no       Is the patient compliant with medications:  no  \"forgets\" when drinking     Collateral Information  Is there collateral information: Yes     Collateral information name, relationship, phone number:  Rosalind - sister [phone]    What happened today: Exwife called with concerns about pt stating he wanted to die and was drinking.  Rosalind and sharla spoke to pt about coming to hospital.     What is different about patient's functioning: Hasn't been eating much, mostly drinks alcohol.  Has called into work for past 4 days.     What do you think the patient needs:      Has patient made comments about wanting to kill themselves/others: yes    If d/c is recommended, can they take part in " "safety/aftercare planning:  yes    Additional collateral information:  Lives in apartment by himself, exwife lives in next door apartment.  Has been calling into work the past week.  Drinks daily, blacks out daily.  Family hx positive for 1 cousin committing suicide, 1 attempted.  Brother hx of hospitalization for suicidal ideation.  Plan to drink self to death.  Recent falls.  Pt overdosed on oxys, was unresponsive, exwife gave CPR and EMS gave Narcan x2.  Pt denies this was a suicide attempt but used for pain after a fall.     Risk Assessment  [place] Suicide Severity Rating Scale Full Clinical Version:  Suicidal Ideation  Q6 Suicide Behavior (Lifetime): yes (2/19/25 PT MADE SUICIDAL STATEMENTS)          [place] Suicide Severity Rating Scale Recent:   Suicidal Ideation (Recent)  Q1 Wished to be Dead (Past Month): yes (PT CALLED HIS EXWIFE AND STATED HE WAS READY TO \"GO\")  Q2 Suicidal Thoughts (Past Month): yes (2/19/25 PT MADE SUICIDAL STATEMENTS)  Q3 Suicidal Thought Method: yes (DRINKING)  Q4 Suicidal Intent without Specific Plan: no  Q5 Suicide Intent with Specific Plan: yes (DRINKING)  If yes to Q6, within past 3 months?: yes (2/19/25 PT MADE SUICIDAL STATEMENTS)  Level of Risk per Screen: high risk          Environmental or Psychosocial Events: loss of a loved one  Protective Factors: Protective Factors: strong bond to family unit, community support, or employment    Does the patient have thoughts of harming others? Feels Like Hurting Others: no  Previous Attempt to Hurt Others: no  Is the patient engaging in sexually inappropriate behavior?: no  Does Patient have a known history of aggressive behavior: No    Is the patient engaging in sexually inappropriate behavior?  no        Mental Status Exam   Affect: Appropriate  Appearance: Disheveled  Attention Span/Concentration: Attentive  Eye Contact: Variable    Fund of Knowledge: Appropriate   Language /Speech Content: Fluent  Language /Speech Volume: " "Loud  Language /Speech Rate/Productions: Normal  Recent Memory: Variable  Remote Memory: Variable  Mood: Other (please comment) (reports \"I'm good\", sister reports depressed)  Orientation to Person: Yes   Orientation to Place: Yes  Orientation to Time of Day: No  Orientation to Date: Yes, No     Situation (Do they understand why they are here?): Yes  Psychomotor Behavior: Underactive  Thought Content: Other (please comment) (family reports suicidal ideation)  Thought Form:       Mini-Cog Assessment  Number of Words Recalled:    Clock-Drawing Test:     Three Item Recall:    Mini-Cog Total Score:       Medication  Psychotropic medications:   Medication Orders - Psychiatric (From admission, onward)      None             Current Care Team  Patient Care Team:  Yovana Felipe MD as PCP - General (Family Medicine)  Marino Hampton MD as MD (Urology)  Gisela Dodge RN as Registered Nurse (Urology)  Yovana Felipe MD as Assigned PCP  Cody Harvey MD as MD (Cardiovascular Disease)  Cody Harvey MD as Assigned Heart and Vascular Provider  Lucy Rouse [facility] as Assigned Behavioral Health Provider    Diagnosis  Patient Active Problem List   Diagnosis Code    Type 2 diabetes mellitus without complication, without long-term current use of insulin (H) E11.9    History of gastric bypass Z98.84    Hyperlipidemia E78.5    Obstructive sleep apnea syndrome G47.33    Elevated BP without diagnosis of hypertension R03.0    Major depressive disorder, recurrent episode, moderate (H) F33.1    History of DVT (deep vein thrombosis) Z86.718    History of pulmonary embolism Z86.711    Morbid obesity (H) E66.01    Paroxysmal atrial fibrillation (H) I48.0    Personal history of DVT (deep vein thrombosis) Z86.718    Erectile dysfunction due to arterial insufficiency N52.01    Alcohol use disorder, severe, in early remission (H) F10.21    Alcohol dependence with uncomplicated intoxication (H) F10.220 " "      Primary Problem This Admission  Active Hospital Problems    Alcohol dependence with uncomplicated intoxication (H)      *Major depressive disorder, recurrent episode, moderate (H)        Clinical Summary and Substantiation of Recommendations   Clinical Substantiation:  It is the recommendation of this clinician that pt admit to IP for safety and stabilization.   Pt guarded, denies all mental health symptoms but is brought in by family that are fearful for pt's risk to self due to excessive daily drinking and suicidal ideation.   Pt has threatened to drink himself to death.  Hx of pt unresponsive in past after taking oxys, needed CPR and Narcan.   Pt lives alone, not taking medication consistently, has not showed up to work for past 4 days, no recent appointments with therapist and has hx of depression and anxiety.  Family reports pt's mood is \"worse than ever\" and they are afraid of going to apartment and finding pt dead.  Pt admits to drinking until he blacks out and is not leaving his apartment.  Extended family is positive for death by suicide.  Pt has increased risk factors including living alone, , financial stressors and medical issues.  Pt currently agreeable to voluntary hospitalization.  Should pt request discharge, additional assessment may be warranted to determine ability to safety plan once sober.  Central Intake contacted.    Goals for crisis stabilization:  IP    Next steps for Care Team:       Treatment Objectives Addressed:       Therapeutic Interventions:       Has a specific means been identified for suicidal/homicide actions: Yes    If yes, describe:  alcohol    Explain action steps toward mitigation:  Pt reports his sister emptied his alcohol, however, pt [employer]BERs delivery of alcohol    Document completion of mitigation actions:       The follow up action still needed prior to discharge:       Patient coping skills attempted to reduce the crisis:  watching " tv    Disposition  Recommended referrals: Other. please comment (IP)        Reviewed case and recommendations with attending provider. Attending Name: Dr Murry       Attending concurs with disposition: yes       Patient and/or validated legal guardian concurs with disposition:   yes       Final disposition:  inpatient mental health         Imminent risk of harm: Suicidal Behavior  Severe psychiatric, behavioral or other comorbid conditions are appropriate for management at inpatient mental health as indicated by at least one of the following: Psychiatric Symptoms, Comorbid substance use disorder, Impaired impulse control, judgement, or insight  Severe dysfunction in daily living is present as indicated by at least one of the following: Other evidence of severe dysfunction  Situation and expectations are appropriate for inpatient care: Around-the-clock medical and nursing care to address symptoms and initiate intervention is required  Inpatient mental health services are necessary to meet patient needs and at least one of the following: Specific condition related to admission diagnosis is present and judged likely to further improve at proposed level of care      Legal status: Voluntary/Patient has signed consent for treatment                                                                                                          Reviewed court records: yes       Assessment Details   Total duration spent with the patient: 38 min     CPT code(s) utilized: 35858 - Psychotherapy for Crisis - 60 (30-74*) min    Val Goldstein Psychotherapist  [facility] - Triage & Transition Services  Callback: [phone]

## 2025-02-23 NOTE — ED NOTES
IP MH Referral Acuity Rating Score (RARS)    LMHP complete at referral to IP MH, with DEC; and, daily while awaiting IP MH placement. Call score to PPS.  CRITERIA SCORING   New 72 HH and Involuntary for IP MH (not adolescent) 0/3   Boarding over 24 hours 0/1   Vulnerable adult at least 55+ with multiple co morbidities; or, Patient age 11 or under 0/1   Suicide ideation without relief of precipitating factors 0/1   Current plan for suicide 0/1   Current plan for homicide 0/1   Imminent risk or actual attempt to seriously harm another without relief of factors precipitating the attempt 0/1   Severe dysfunction in daily living (ex: complete neglect for self care, extreme disruption in vegetative function, extreme deterioration in social interactions) 1/1   Recent (last 2 weeks) or current physical aggression in the ED 0/1   Restraints or seclusion episode in ED 0/1   Verbal aggression, agitation, yelling, etc., while in the ED 0/1   Active psychosis with psychomotor agitation or catatonia 0/1   Need for constant or near constant redirection (from leaving, from others, etc).  0/1   Intrusive or disruptive behaviors 0/1   TOTAL 1

## 2025-02-23 NOTE — PROGRESS NOTES
"Triage and Transition Services Extended Care Reassessment     Patient: Cody goes by \"Cody,\" uses he/him pronouns  Date of Service: February 23, 2025  Site of Service: [facility] Emergency Dept                             ED06  Patient was seen yes  Mode of Assessment: In person     Reason for Reassessment: significant behavior change, depression, suicidal ideation, worsening psychosocial stress, substance use, intoxication, health stressors    History of Patient's Original Emergency Room Encounter: Patient is presenting to the ED for the following concerns: Substance use, Suicidal ideation, Intoxication, Health stressors. Factors that make the mental health crisis life threatening or complex are: 44  year old  male brought to ER by family due to concerns of risk to self.  Pt making suicidal comments that he wants to drink himself to death.  Drinking daily and to excess.  Family reports he has not worked for 3-4 days, isolating self, not eating.  Hx of depression and anxiety.  Pt states, \"I like the feeling of being drunk, it's euphoric.  Admits to passive thoughts of suicide, crying.  Reports unable to sleep if he is not drinking.  Admits to drinking daily, 100 proof alcohol.  Family called welfare check on pt this week as well as pt in ED a few days ago.  Hx of MARCY treatment Reports he was sober 61 days after last rehab 9/2024.    Current Patient Presentation: Pt lying propped up on gurney. Pt appeared restless, anxious during interview, checking his watch periodically. Pt sat up toward end of interview.    Presentation Summary: Writer introduced self, explained role, and asked for permission to visit. Pt introduced his son, Chago, who was seated bedside, and pt asked son to step out of the room during interview. Pt reported that he is feeling much better now and that he is ready to go home. Through most of interview, pt appeared to be goal-directed in presenting reasons for discharge. " "Pt denied alcohol withdrawal symptoms and denied SI/HI, plan, and intent. Pt denied A/V hallucinations and did not appear to be responding to internal stimuli. Pt verbalized urgency to return to work tomorrow, however pt did not demonstrated insight or willingness to discuss his missing work last week due to acute increase in alcohol use and consequent decompensation of functioning. Pt described his daily activities to include visiting with friends online some evenings and drinking 0.75 liters- 1.75 liters or vodka daily. Pt stated: \"My girlfriend is going to rehab on Monday, and I agreed to watch her cats.\" Pt denied that anyone else can watch the cats. Even with prompting, pt demonstrated only partial insight into why his family and friends have been concerned enough about pt this week to call for welfare check and to bring him to ED twice.   Pt reported that he has not seen his therapist for a month, but stated that he has an appointment scheduled for this Tuesday. Pt denied having an outpatient psychiatric medication provider but stated that his PCP has advocated for pt with regard to MARCY concerns. Pt reported that PCP prescribes Wellbutrin for pt. Pt demonstrated lack of insight into SI, isolation, not leaving house, missing work, and increasing relapse being possible signs of worsening depression. Pt verbalized willingness to consider medication review with a psychiatry provider. Pt did endorse \"loneliness\" as a primary concern for him in one moment of appearing less guarded.   While pt denied immediate access to firearms, pt was unable to adequately engage in discussion of lethal means restriction. Pt demonstrated lack of insight into risk related to large amounts of Rx medications at home given pt's hx of overdose (pt denies hx intentional overdose, family and chart suggest pt attempted suicide via overdose 9 months ago). Pt verbally confirmed that he does not want to transfer to an IP MH unit for additional " support, and reiterated that he wants to discharge home today. Pt responded coherently to questions when prompted, but pt was not able to adequately engage in safety or aftercare planning.     Following discussion with ED MD, writer returned to speak again with pt and explained that ED MD placed a 72HH. Reviewed POC with pt and asked if pt had any questions. Pt expressed frustration, again insisted that he needs to get back to work tomorrow, and concluded interview. Pt remained respectful toward staff.       Changes Observed Since Initial Assessment:  patient request    Therapeutic Interventions Provided: Engaged in cognitive restructuring/ reframing, looked at common cognitive distortions and challenged negative thoughts., Engaged in guided discovery, explored patient's perspectives and helped expand them through socratic dialogue., Reviewed healthy living that supports positive mental health, including looking at sleep hygiene, regular movement, nutrition, and regular socialization., Provided positive reinforcement for progress towards goals, gains in knowledge, and application of skills previously taught., Explored motivation for treatment engagement, Explored motivation for behavioral change, Explored barriers to accessing care.    Current Symptoms: anxious avoidance, withdrawal/isolation, impaired decision making, sadness (loneliness)    (no evidence of acute psychosis)  (pt ready to eat dinner in ED when tray delivered)    Mental Status Exam   Affect: Constricted  Appearance: Disheveled  Attention Span/Concentration: Attentive  Eye Contact: Variable    Fund of Knowledge: Appropriate   Language /Speech Content: Fluent  Language /Speech Volume: Normal  Language /Speech Rate/Productions: Normal  Recent Memory: Variable  Remote Memory: Intact  Mood: Anxious, Depressed (mildly irritable)  Orientation to Person: Yes   Orientation to Place: Yes  Orientation to Time of Day: Yes  Orientation to Date: Yes     Situation  (Do they understand why they are here?): Yes (partial insight only)  Psychomotor Behavior:  (restless)  Thought Content:  (family reports recent suicidal statements)  Thought Form: Goal Directed    Treatment Objective(s) Addressed: rapport building, orienting the patient to therapy, processing feelings, building distress tolerance, assessing safety, identifying treatment goals, exploring obstacles to safety in the community, identifying additional supports    Patient Response to Interventions: unacceptance expressed, verbalizes understanding, needs reinforcement    Progress Towards Goals:  Next Step to Work Toward Discharge: symptom stabilization, patient ability to engage in safety planning, engaging in safety planning with collateral sources, follow up on referrals, means restriction  Means Restriction: Today, pt denied immediate access to firearms. Pt unable to adequately engage in discussion of lethal means restriction. Pt demonstrated lack of insight into risk related to large amounts of Rx medications at home given pt's hx of overdose (pt denies hx intentional overdose, family and chart suggest pt attempted suicide via overdose 9 months ago)    C-SSRS Since Last Contact:   1. Wish to be Dead (Since Last Contact):  (pt denied)  2. Non-Specific Active Suicidal Thoughts (Since Last Contact):  (pt denied)  Actual Attempt (Since Last Contact): No  Has subject engaged in non-suicidal self-injurious behavior? (Since Last Contact): No  Interrupted Attempts (Since Last Contact): No  Aborted or Self-Interrupted Attempt (Since Last Contact): No  Preparatory Acts or Behavior (Since Last Contact): No  Suicide (Since Last Contact): No  Most Lethal Attempt Date: 05/04/24  Actual Lethality/Medical Damage Code (Most Lethal Attempt): Moderate physical damage, medical attention needed  Calculated C-SSRS Risk Score (Since Last Contact): No Risk Indicated    Plan: Final Disposition / Recommended Care Path: inpatient mental  "health  Plan for Care reviewed with assigned Medical Provider: yes  Plan for Care Team Review: provider, RN  Comments: Kumar Romero MD; Yolis Jha, MARANDA; and Lucretia REYNA RN  Patient and/or validated legal guardian concurs: no (72HH)    Clinical Substantiation: Continue to recommend IP MH admission for pt safety and stabilization due to acute exacerbation of depressive symptoms, acute SI with recent statements about drinking self to death. Pt guarded, denies all mental health symptoms but is brought in by family that are fearful for pt's risk to self due to excessive daily drinking and suicidal ideation. Pt has threatened to drink himself to death. Hx of pt unresponsive in past after taking oxys, needed CPR and Narcan. Pt lives alone, not taking medication consistently, has not showed up to work for past 4 days, no recent appointments with therapist and has hx of depression and anxiety. Family reports pt's mood is \"worse than ever\" and they are afraid of going to apartment and finding pt dead. Pt admits to drinking until he blacks out and is not leaving his apartment. Extended family is positive for death by suicide. Pt has increased risk factors including living alone, , financial stressors and medical issues. Pt was agreeable to voluntary hospitalization at initial DEC assessment last night, but is requesting discharge today and not agreeable to recommended POC. MD ordered 72HH, which is effective through 2359 Wed 2/26. Psychiatry consult ordered. EC to assess pt in collaboration with consulting psychiatry provider regarding appropriateness for [facility] civil commitment petition.    Legal Status: Legal Status: 72 Hour Hold  72 Hour Hold - Date/Time Initiated: 1711 Sun 2/23  72 Hour Hold - Date/Time Ends: 2359 Wed 2/26    Session Status: Time session started: 1611  Time session ended: 1641 (plus [id number])  Session Duration (minutes): 34 minutes  Session Number: 1  Anticipated number of sessions or this episode of " care: 3  Date of most recent diagnostic assessment: 08/23/24    Session Start Time: 1611  Session Stop Time: 1641 (plus [phone])  CPT codes: 80499 - Psychotherapy (with patient) - 30 (16-37*) min  Time Spent: 34 minutes      CPT code(s) utilized: 91962 - Psychotherapy (with patient) - 30 (16-37*) min    Diagnosis:   Patient Active Problem List   Diagnosis Code    Type 2 diabetes mellitus without complication, without long-term current use of insulin (H) E11.9    History of gastric bypass Z98.84    Hyperlipidemia E78.5    Obstructive sleep apnea syndrome G47.33    Elevated BP without diagnosis of hypertension R03.0    Major depressive disorder, recurrent episode, moderate (H) F33.1    History of DVT (deep vein thrombosis) Z86.718    History of pulmonary embolism Z86.711    Morbid obesity (H) E66.01    Alcohol use disorder, severe, dependence (H) F10.20    Paroxysmal atrial fibrillation (H) I48.0    Personal history of DVT (deep vein thrombosis) Z86.718    Erectile dysfunction due to arterial insufficiency N52.01    Alcohol use disorder, severe, in early remission (H) F10.21    Alcohol dependence with uncomplicated intoxication (H) F10.220       Primary Problem This Admission: Active Hospital Problems    Alcohol use disorder, severe, dependence (H)      *Major depressive disorder, recurrent episode, moderate (H)        KATRIN WHITEHEAD, [facility]   Licensed Mental Health Professional (LMHP), [facility]  [phone]

## 2025-02-23 NOTE — PHARMACY-ANTICOAGULATION SERVICE
Clinical Pharmacy - Warfarin Dosing Consult     Pharmacy has been consulted to manage this patient s warfarin therapy.  Indication: DVT/PE Prophylaxis  Therapy Goal: INR 2-3  Warfarin Prior to Admission: Yes  Warfarin PTA Regimen: 2.5mg Wednesdays, 5mg ROW  Recent documented change in oral intake/nutrition: Unknown  Dose Comments: Missed dose on Saturday 2/22 PTA    INR   Date Value Ref Range Status   02/22/2025 2.40 (H) 0.85 - 1.15 Final   02/19/2025 4.02 (H) 0.85 - 1.15 Final       Recommend warfarin 5mg overnight.  Pharmacy will monitor Cody Bolton daily and order warfarin doses to achieve specified goal.      Please contact pharmacy as soon as possible if the warfarin needs to be held for a procedure or if the warfarin goals change.

## 2025-02-23 NOTE — ED NOTES
IP MH Referral Acuity Rating Score (RARS)    LMHP complete at referral to IP MH, with DEC; and, daily while awaiting IP MH placement. Call score to PPS.  CRITERIA SCORING   New 70 HH and Involuntary for IP MH (not adolescent) 3/3   Boarding over 24 hours 1/1   Vulnerable adult at least 55+ with multiple co morbidities; or, Patient age 11 or under 0/1   Suicide ideation without relief of precipitating factors 1/1   Current plan for suicide 1/1   Current plan for homicide 0/1   Imminent risk or actual attempt to seriously harm another without relief of factors precipitating the attempt 0/1   Severe dysfunction in daily living (ex: complete neglect for self care, extreme disruption in vegetative function, extreme deterioration in social interactions) 1/1   Recent (last 2 weeks) or current physical aggression in the ED 0/1   Restraints or seclusion episode in ED 0/1   Verbal aggression, agitation, yelling, etc., while in the ED 0/1   Active psychosis with psychomotor agitation or catatonia 0/1   Need for constant or near constant redirection (from leaving, from others, etc).  0/1   Intrusive or disruptive behaviors 0/1   TOTAL 7

## 2025-02-23 NOTE — ED PROVIDER NOTES
5:07 PM - Case discussed with Evelyn from the DEC. Patient has poor insight into his mental health and chemical dependency. He has demonstrated that he is unable to remain safe at home. Poor overall functioning. At this time patient would benefit from inpatient mental health and chemical dependency treatment and stabilization. Patient with significant active depressive symptoms and poor functioning. At this time after >25 hours in the ED the patient is not scoring positive on CIWA and does not appear to have ongoing significant alcohol withdrawal. Patient may be more appropriate for inpatient psychiatry rather than detox as his mental health symptoms need to be addressed and stabilized. Patient will be placed on 72 hour hold given he lacks insight and is unable to remain safe at home.      Kumar Romero MD  02/23/25 [id number]

## 2025-02-23 NOTE — TELEPHONE ENCOUNTER
R:  NO beds available within [facility].     Pt on worklist awaiting placement.   Pt prefers [facility] placement only: Bed search initiated @ 8:25am:      [facility]    [facility] is posting 3 beds. [phone]. However, per call -  they are full  [facility] is posting 0 beds. Negative covid required.  [facility] is posting 0 beds. Neg covid. No high school/[facility]-psych. [phone].  [facility] is posting 0 beds. [phone].  [facility] is posting 0 beds. [phone].  [facility] is posting 0 beds. (Ages 18-35) Negative covid, no aggression, physical or sexual assault, violence hx or drug abuse, or psychosis.  [phone]. Called and spoke to Freddy schmid @ 8:23am who reports no beds available.   [facility] is posting 0 beds.   [facility] is posting 0 beds [phone].      Pt to remain on the Adult worklist pending Appropriate placement availability for review.

## 2025-02-23 NOTE — TELEPHONE ENCOUNTER
R: Pt remains on waitlist pending appropriate placement availability.  R: [place]  [place] Inpatient Bed Call Log [id number] @ 2:00 AM  Intake has called facilities that have not updated their bed status within the last 12 hours.    Adults:     [facility] is posting 0 beds.                  [facility] is posting 3 beds. [phone]. Per Christelle @ 3:31PM, they are full  [facility] is posting 0 beds. Negative covid required.  [facility] is posting 0 beds. Neg covid. No high school/Natali-psych. [phone]. Per Henrik @ 3:35PM, they have SD/low acuity beds (no psychosis, maxim, aggression, HI)  [facility] is posting 0 beds. [phone].  [facility] is posting 0 beds. [phone].  [facility] is posting 6 beds. (Ages 18-35) Negative covid, no aggression, physical or sexual assault, violence hx or drug abuse, or psychosis.  [phone]. Called @ 3:36PM, no answer  [facility] is posting 0 beds.   [facility] is posting 0 beds [phone].    Pt remains on waitlist pending appropriate placement availability   Include Size Of Lesion In Location Indication Statement: Yes

## 2025-02-24 ENCOUNTER — HOSPITAL ENCOUNTER (EMERGENCY)
Facility: CLINIC | Age: 45
Discharge: OTHER INSTITUTION | End: 2025-02-24
Attending: EMERGENCY MEDICINE
Payer: COMMERCIAL

## 2025-02-24 ENCOUNTER — TELEPHONE (OUTPATIENT)
Dept: BEHAVIORAL HEALTH | Facility: CLINIC | Age: 45
End: 2025-02-24
Payer: COMMERCIAL

## 2025-02-24 ENCOUNTER — TELEPHONE (OUTPATIENT)
Dept: ANTICOAGULATION | Facility: CLINIC | Age: 45
End: 2025-02-24
Payer: COMMERCIAL

## 2025-02-24 ENCOUNTER — HOSPITAL ENCOUNTER (INPATIENT)
Facility: CLINIC | Age: 45
LOS: 2 days | Discharge: HOME | DRG: 885 | End: 2025-02-26
Attending: PSYCHIATRY & NEUROLOGY | Admitting: PSYCHIATRY & NEUROLOGY
Payer: COMMERCIAL

## 2025-02-24 VITALS
RESPIRATION RATE: 18 BRPM | SYSTOLIC BLOOD PRESSURE: 161 MMHG | HEART RATE: 94 BPM | WEIGHT: 315 LBS | DIASTOLIC BLOOD PRESSURE: 78 MMHG | BODY MASS INDEX: 42.66 KG/M2 | HEIGHT: 72 IN | OXYGEN SATURATION: 95 %

## 2025-02-24 VITALS
OXYGEN SATURATION: 95 % | TEMPERATURE: 97 F | DIASTOLIC BLOOD PRESSURE: 102 MMHG | RESPIRATION RATE: 20 BRPM | HEART RATE: 85 BPM | BODY MASS INDEX: 41.75 KG/M2 | HEIGHT: 73 IN | SYSTOLIC BLOOD PRESSURE: 164 MMHG | WEIGHT: 315 LBS

## 2025-02-24 DIAGNOSIS — Z86.718 HISTORY OF DVT (DEEP VEIN THROMBOSIS): Primary | ICD-10-CM

## 2025-02-24 DIAGNOSIS — F10.220 ALCOHOL DEPENDENCE WITH UNCOMPLICATED INTOXICATION (H): Primary | ICD-10-CM

## 2025-02-24 DIAGNOSIS — F10.10 ALCOHOL ABUSE: ICD-10-CM

## 2025-02-24 DIAGNOSIS — R45.851 SUICIDAL IDEATION: ICD-10-CM

## 2025-02-24 PROBLEM — G47.09 OTHER INSOMNIA: Chronic | Status: ACTIVE | Noted: 2025-02-24

## 2025-02-24 PROBLEM — F41.9 ANXIETY: Chronic | Status: ACTIVE | Noted: 2025-02-24

## 2025-02-24 LAB
GLUCOSE BLDC GLUCOMTR-MCNC: 183 MG/DL (ref 70–99)
INR PPP: 2.72 (ref 0.85–1.15)

## 2025-02-24 PROCEDURE — 250N000013 HC RX MED GY IP 250 OP 250 PS 637: Performed by: EMERGENCY MEDICINE

## 2025-02-24 PROCEDURE — 250N000013 HC RX MED GY IP 250 OP 250 PS 637: Performed by: SOCIAL WORKER

## 2025-02-24 PROCEDURE — 250N000013 HC RX MED GY IP 250 OP 250 PS 637: Performed by: PSYCHIATRY & NEUROLOGY

## 2025-02-24 PROCEDURE — 36415 COLL VENOUS BLD VENIPUNCTURE: CPT | Performed by: SOCIAL WORKER

## 2025-02-24 PROCEDURE — 99205 OFFICE O/P NEW HI 60 MIN: CPT | Performed by: PSYCHIATRY & NEUROLOGY

## 2025-02-24 PROCEDURE — HZ2ZZZZ DETOXIFICATION SERVICES FOR SUBSTANCE ABUSE TREATMENT: ICD-10-PCS | Performed by: PSYCHIATRY & NEUROLOGY

## 2025-02-24 PROCEDURE — 85610 PROTHROMBIN TIME: CPT | Performed by: SOCIAL WORKER

## 2025-02-24 PROCEDURE — 128N000001 HC R&B CD/MH ADULT

## 2025-02-24 RX ORDER — WARFARIN SODIUM 5 MG/1
5 TABLET ORAL EVERY EVENING
Status: DISCONTINUED | OUTPATIENT
Start: 2025-02-24 | End: 2025-02-24 | Stop reason: HOSPADM

## 2025-02-24 RX ORDER — LISINOPRIL 10 MG/1
20 TABLET ORAL DAILY
Status: DISCONTINUED | OUTPATIENT
Start: 2025-02-25 | End: 2025-02-24 | Stop reason: HOSPADM

## 2025-02-24 RX ORDER — AMOXICILLIN 250 MG
1 CAPSULE ORAL 2 TIMES DAILY PRN
Status: DISCONTINUED | OUTPATIENT
Start: 2025-02-24 | End: 2025-02-26 | Stop reason: HOSPADM

## 2025-02-24 RX ORDER — WARFARIN SODIUM 5 MG/1
5 TABLET ORAL EVERY EVENING
Status: DISCONTINUED | OUTPATIENT
Start: 2025-02-25 | End: 2025-02-25

## 2025-02-24 RX ORDER — HYDROXYZINE HYDROCHLORIDE 25 MG/1
25 TABLET, FILM COATED ORAL EVERY 4 HOURS PRN
Status: DISCONTINUED | OUTPATIENT
Start: 2025-02-24 | End: 2025-02-26 | Stop reason: HOSPADM

## 2025-02-24 RX ORDER — BUPROPION HYDROCHLORIDE 150 MG/1
150 TABLET ORAL EVERY MORNING
Status: DISCONTINUED | OUTPATIENT
Start: 2025-02-25 | End: 2025-02-24 | Stop reason: HOSPADM

## 2025-02-24 RX ORDER — MAGNESIUM HYDROXIDE/ALUMINUM HYDROXICE/SIMETHICONE 120; 1200; 1200 MG/30ML; MG/30ML; MG/30ML
30 SUSPENSION ORAL EVERY 4 HOURS PRN
Status: DISCONTINUED | OUTPATIENT
Start: 2025-02-24 | End: 2025-02-26 | Stop reason: HOSPADM

## 2025-02-24 RX ORDER — DEXTROSE MONOHYDRATE 25 G/50ML
25-50 INJECTION, SOLUTION INTRAVENOUS
Status: DISCONTINUED | OUTPATIENT
Start: 2025-02-24 | End: 2025-02-26 | Stop reason: HOSPADM

## 2025-02-24 RX ORDER — NICOTINE POLACRILEX 4 MG
15-30 LOZENGE BUCCAL
Status: DISCONTINUED | OUTPATIENT
Start: 2025-02-24 | End: 2025-02-26 | Stop reason: HOSPADM

## 2025-02-24 RX ORDER — TRAZODONE HYDROCHLORIDE 50 MG/1
50 TABLET ORAL
Status: DISCONTINUED | OUTPATIENT
Start: 2025-02-24 | End: 2025-02-26 | Stop reason: HOSPADM

## 2025-02-24 RX ORDER — FOLIC ACID 1 MG/1
1 TABLET ORAL DAILY
Status: DISCONTINUED | OUTPATIENT
Start: 2025-02-25 | End: 2025-02-26 | Stop reason: HOSPADM

## 2025-02-24 RX ORDER — ACETAMINOPHEN 325 MG/1
650 TABLET ORAL EVERY 4 HOURS PRN
Status: DISCONTINUED | OUTPATIENT
Start: 2025-02-24 | End: 2025-02-26 | Stop reason: HOSPADM

## 2025-02-24 RX ORDER — MULTIPLE VITAMINS W/ MINERALS TAB 9MG-400MCG
1 TAB ORAL DAILY
Status: DISCONTINUED | OUTPATIENT
Start: 2025-02-25 | End: 2025-02-26 | Stop reason: HOSPADM

## 2025-02-24 RX ORDER — CARVEDILOL 6.25 MG/1
12.5 TABLET ORAL 2 TIMES DAILY WITH MEALS
Status: DISCONTINUED | OUTPATIENT
Start: 2025-02-25 | End: 2025-02-26 | Stop reason: HOSPADM

## 2025-02-24 RX ORDER — CLONIDINE HYDROCHLORIDE 0.1 MG/1
0.1 TABLET ORAL 3 TIMES DAILY
Status: DISCONTINUED | OUTPATIENT
Start: 2025-02-24 | End: 2025-02-24 | Stop reason: HOSPADM

## 2025-02-24 RX ORDER — METFORMIN HYDROCHLORIDE 500 MG/1
1000 TABLET, EXTENDED RELEASE ORAL
Status: DISCONTINUED | OUTPATIENT
Start: 2025-02-24 | End: 2025-02-24 | Stop reason: HOSPADM

## 2025-02-24 RX ORDER — DIAZEPAM 5 MG/1
5-20 TABLET ORAL EVERY 30 MIN PRN
Status: DISCONTINUED | OUTPATIENT
Start: 2025-02-24 | End: 2025-02-26 | Stop reason: HOSPADM

## 2025-02-24 RX ORDER — CARVEDILOL 12.5 MG/1
12.5 TABLET ORAL 2 TIMES DAILY WITH MEALS
Status: DISCONTINUED | OUTPATIENT
Start: 2025-02-24 | End: 2025-02-24 | Stop reason: HOSPADM

## 2025-02-24 RX ORDER — LOPERAMIDE HYDROCHLORIDE 2 MG/1
2 CAPSULE ORAL 4 TIMES DAILY PRN
Status: DISCONTINUED | OUTPATIENT
Start: 2025-02-24 | End: 2025-02-26 | Stop reason: HOSPADM

## 2025-02-24 RX ORDER — SIMVASTATIN 20 MG
20 TABLET ORAL AT BEDTIME
Status: DISCONTINUED | OUTPATIENT
Start: 2025-02-24 | End: 2025-02-24 | Stop reason: HOSPADM

## 2025-02-24 RX ORDER — ONDANSETRON 4 MG/1
4 TABLET, ORALLY DISINTEGRATING ORAL EVERY 6 HOURS PRN
Status: DISCONTINUED | OUTPATIENT
Start: 2025-02-24 | End: 2025-02-26 | Stop reason: HOSPADM

## 2025-02-24 RX ORDER — LISINOPRIL 20 MG/1
20 TABLET ORAL DAILY
Status: DISCONTINUED | OUTPATIENT
Start: 2025-02-25 | End: 2025-02-26 | Stop reason: HOSPADM

## 2025-02-24 RX ADMIN — CARVEDILOL 12.5 MG: 12.5 TABLET, FILM COATED ORAL at 18:44

## 2025-02-24 RX ADMIN — CLONIDINE HYDROCHLORIDE 0.1 MG: 0.1 TABLET ORAL at 18:45

## 2025-02-24 RX ADMIN — LISINOPRIL 20 MG: 10 TABLET ORAL at 08:42

## 2025-02-24 RX ADMIN — HYDRALAZINE HYDROCHLORIDE 10 MG: 10 TABLET, FILM COATED ORAL at 02:27

## 2025-02-24 RX ADMIN — CARVEDILOL 12.5 MG: 12.5 TABLET, FILM COATED ORAL at 08:41

## 2025-02-24 RX ADMIN — TRAZODONE HYDROCHLORIDE 50 MG: 50 TABLET ORAL at 22:05

## 2025-02-24 RX ADMIN — DIAZEPAM 10 MG: 5 TABLET ORAL at 02:27

## 2025-02-24 RX ADMIN — WARFARIN SODIUM 5 MG: 5 TABLET ORAL at 18:44

## 2025-02-24 RX ADMIN — BUPROPION HYDROCHLORIDE 150 MG: 150 TABLET, FILM COATED, EXTENDED RELEASE ORAL at 08:41

## 2025-02-24 RX ADMIN — ACETAMINOPHEN 1000 MG: 500 TABLET, FILM COATED ORAL at 02:36

## 2025-02-24 RX ADMIN — METFORMIN ER 500 MG 1000 MG: 500 TABLET ORAL at 18:44

## 2025-02-24 ASSESSMENT — LIFESTYLE VARIABLES
TREMOR: NOT VISIBLE, BUT CAN BE FELT FINGERTIP TO FINGERTIP
HEADACHE, FULLNESS IN HEAD: NOT PRESENT
VISUAL DISTURBANCES: NOT PRESENT
AGITATION: NORMAL ACTIVITY
TOTAL SCORE: 1
VISUAL DISTURBANCES: NOT PRESENT
TOTAL SCORE: 4
AGITATION: NORMAL ACTIVITY
VISUAL DISTURBANCES: NOT PRESENT
HEADACHE, FULLNESS IN HEAD: MILD
ORIENTATION AND CLOUDING OF SENSORIUM: ORIENTED AND CAN DO SERIAL ADDITIONS
PAROXYSMAL SWEATS: NO SWEAT VISIBLE
ANXIETY: NO ANXIETY, AT EASE
NAUSEA AND VOMITING: NO NAUSEA AND NO VOMITING
TREMOR: NO TREMOR
AUDITORY DISTURBANCES: NOT PRESENT
PAROXYSMAL SWEATS: BARELY PERCEPTIBLE SWEATING, PALMS MOIST
HEADACHE, FULLNESS IN HEAD: MILD
ORIENTATION AND CLOUDING OF SENSORIUM: ORIENTED AND CAN DO SERIAL ADDITIONS
AUDITORY DISTURBANCES: NOT PRESENT
PAROXYSMAL SWEATS: BARELY PERCEPTIBLE SWEATING, PALMS MOIST
AGITATION: NORMAL ACTIVITY
SKIP TO QUESTIONS 9-10: 0
ANXIETY: MILDLY ANXIOUS
ANXIETY: 2
TREMOR: 3
TOTAL SCORE: 8
ORIENTATION AND CLOUDING OF SENSORIUM: ORIENTED AND CAN DO SERIAL ADDITIONS
AUDITORY DISTURBANCES: NOT PRESENT
NAUSEA AND VOMITING: NO NAUSEA AND NO VOMITING
NAUSEA AND VOMITING: NO NAUSEA AND NO VOMITING

## 2025-02-24 ASSESSMENT — ACTIVITIES OF DAILY LIVING (ADL)
ADLS_ACUITY_SCORE: 55
ADLS_ACUITY_SCORE: 70
ADLS_ACUITY_SCORE: 55
ADLS_ACUITY_SCORE: 70
ADLS_ACUITY_SCORE: 55
ADLS_ACUITY_SCORE: 55
ADLS_ACUITY_SCORE: 70

## 2025-02-24 NOTE — ED NOTES
"Sent message MD \"Pt BP is high 183/110, BP meds? also pt has intermittent pain, can we get a PRN for pain meds?\" Waiting for response  "

## 2025-02-24 NOTE — CONSULTS
"    [facility]     INITIAL PSYCHIATRY   CONSULT     DATE OF SERVICE   2/24/2025       IDENTIFICATION   Cody Bolton  Age: 44 year old  MRN# [id number]   YOB: 1980   LOS: 0       CHIEF COMPLAINT   \"Drinking for the high of it.\"       CONSULT REQUEST BY   Da Nickerson re:  72-hour hold       HISTORY OF PRESENT ILLNESS   This is a 44 year old  male with history of major depressive disorder, anxiety, alcohol use disorder.  Mental health history is associated with 1 prior psychiatric hospitalization for detox in August 2024 with associated treatment through [facility] and overdose history described as unintentional with pain medication and alcohol use.  Does not have a history of commitment.  Now, patient presents with second ED visit in 3 days time due to alcohol use and intoxication.  The patient's second ED visit associated with family concerns of suicidal statements.  Patient placed on a 72-hour hold prior to evaluation.    Consult placed to psychiatry regarding 72-hour hold.    In brief, patient presented to this facility's ED on 2/22 with suicide statement and alcohol intoxication.  Patient was seen on 2/19 for alcohol attacks occasion, then discharged to home.  On this occasion, presents with ex-wife due to suicide statements as ex-wife reports patient has been drinking whiskey as an attempt to commit suicide and making statements of wanting to die.  Concerns of worsening alcohol use increasing over the past 6 months despite efforts of [facility] treatment last fall.  Patient seen by DEC and placed on a 72-hour hold during assessment due to concerns of patient wanting to discharge and lacking insight.  Prior to psychiatry consult, patient placed on intake list for psychiatric admission.    DEC re-assessment reviewed and discussed is as follows:  History of Patient's Original Emergency Room Encounter: Patient is presenting to the ED for the following concerns: " "Substance use, Suicidal ideation, Intoxication, Health stressors. Factors that make the mental health crisis life threatening or complex are: 44  year old  male brought to ER by family due to concerns of risk to self.  Pt making suicidal comments that he wants to drink himself to death.  Drinking daily and to excess.  Family reports he has not worked for 3-4 days, isolating self, not eating.  Hx of depression and anxiety.  Pt states, \"I like the feeling of being drunk, it's euphoric.  Admits to passive thoughts of suicide, crying.  Reports unable to sleep if he is not drinking.  Admits to drinking daily, 100 proof alcohol.  Family called welfare check on pt this week as well as pt in ED a few days ago.  Hx of [facility] treatment Reports he was sober 61 days after last rehab 9/2024.   Presentation Summary: Writer introduced self, explained role, and asked for permission to visit. Pt introduced his son, Chago, who was seated bedside, and pt asked son to step out of the room during interview. Pt reported that he is feeling much better now and that he is ready to go home. Through most of interview, pt appeared to be goal-directed in presenting reasons for discharge. Pt denied alcohol withdrawal symptoms and denied SI/HI, plan, and intent. Pt denied A/V hallucinations and did not appear to be responding to internal stimuli. Pt verbalized urgency to return to work tomorrow, however pt did not demonstrated insight or willingness to discuss his missing work last week due to acute increase in alcohol use and consequent decompensation of functioning. Pt described his daily activities to include visiting with friends online some evenings and drinking 0.75 liters- 1.75 liters or vodka daily. Pt stated: \"My girlfriend is going to rehab on Monday, and I agreed to watch her cats.\" Pt denied that anyone else can watch the cats. Even with prompting, pt demonstrated only partial insight into why his family and friends have been " "concerned enough about pt this week to call for welfare check and to bring him to ED twice.   Pt reported that he has not seen his therapist for a month, but stated that he has an appointment scheduled for this Tuesday. Pt denied having an outpatient psychiatric medication provider but stated that his PCP has advocated for pt with regard to MARCY concerns. Pt reported that PCP prescribes Wellbutrin for pt. Pt demonstrated lack of insight into SI, isolation, not leaving house, missing work, and increasing relapse being possible signs of worsening depression. Pt verbalized willingness to consider medication review with a psychiatry provider. Pt did endorse \"loneliness\" as a primary concern for him in one moment of appearing less guarded.   While pt denied immediate access to firearms, pt was unable to adequately engage in discussion of lethal means restriction. Pt demonstrated lack of insight into risk related to large amounts of Rx medications at home given pt's hx of overdose (pt denies hx intentional overdose, family and chart suggest pt attempted suicide via overdose 9 months ago). Pt verbally confirmed that he does not want to transfer to an IP  unit for additional support, and reiterated that he wants to discharge home today. Pt responded coherently to questions when prompted, but pt was not able to adequately engage in safety or aftercare planning.   Following discussion with ED MD, writer returned to speak again with pt and explained that ED MD placed a 72HH. Reviewed POC with pt and asked if pt had any questions. Pt expressed frustration, again insisted that he needs to get back to work tomorrow, and concluded interview. Pt remained respectful toward staff.   Clinical Substantiation: Continue to recommend IP  admission for pt safety and stabilization due to acute exacerbation of depressive symptoms, acute SI with recent statements about drinking self to death. Pt guarded, denies all mental health symptoms " "but is brought in by family that are fearful for pt's risk to self due to excessive daily drinking and suicidal ideation. Pt has threatened to drink himself to death. Hx of pt unresponsive in past after taking oxys, needed CPR and Narcan. Pt lives alone, not taking medication consistently, has not showed up to work for past 4 days, no recent appointments with therapist and has hx of depression and anxiety. Family reports pt's mood is \"worse than ever\" and they are afraid of going to apartment and finding pt dead. Pt admits to drinking until he blacks out and is not leaving his apartment. Extended family is positive for death by suicide. Pt has increased risk factors including living alone, , financial stressors and medical issues. Pt was agreeable to voluntary hospitalization at initial DEC assessment last night, but is requesting discharge today and not agreeable to recommended POC. MD ordered 72HH, which is effective through 2359 Wed 2/26. Psychiatry consult ordered. EC to assess pt in collaboration with consulting psychiatry provider regarding appropriateness for [facility] civil commitment petition.     Care coordination performed in detail.  Includes, review of the family admission/discharge summary dated 8/20/2024.  Admit to ED to seek detox and [facility].  Course of hospitalization discussed and reviewed in detail with patient directly.    Upon interview, the patient is cooperative on approach.  Visit performed in patient's room in ED.  Consent is given to evaluate.  Patient is not voluntary.  Patient is made aware of plan to coordinate cares with treatment team.    Patient evaluation and review events into presentation and to clarify information as provided and chart review.  Information provided partially consistent with information in chart review as patient make statements directed toward sobriety and to continue efforts with established mental health provider in the community " due to events leading to admission    Patient admits to relapse of alcohol use after treatment at [facility] In August 2024.  Able to maintain sobriety for 61 days.  Then, relapsed with alcohol on an every 1 to 2-week basis.  Drinking, primarily on weekends.  In the past few weeks, amount of alcohol has increased and over the past week has evaluated him use and frequency.  Describes efforts of managing alcohol use to include intent to start Antabuse, but unable to name medication due to several issues.  Does not feel marked levels of alcohol and naltrexone were beneficial when used in monotherapy.  Overall, onset of problematic alcohol use started 3 years ago.    Medication compliant with Wellbutrin.  Started September 2023.  Denies side effect but acknowledges lack of efficacy when used with alcohol.  Sees therapist weekly, but has not seen in the past month due to issues with schedule.  However, resumed therapy with visits x 4 recently started on 2/25.  Additional therapeutic interventions included plans to establish with .    Denies specific stressors.  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  Acknowledges concerns by family of alcohol use.  However, denies statements of suicidality and behaviors suicidal intent.  Including, denies overdose with lethal intent.  Rather, describes ingestion as accidental by taking pain medication with alcohol.    Discussed concerns of increasing alcohol use leading to functional impairment.  Concerns about missed work and family concerns of suicidal behavior.  Further report of alcohol to target mood, poor motivation, change of appetite and weight.  Negative thoughts of self expressed.  Leading to placing 72-hour hold when requested discharge and was placed.    On further psychiatric review of symptoms, denies maxim or psychosis.    Treatment plan discussed.  Patient is on a 72-hour hold.  Does not meet criteria to  pursue [facility] petition for commitment, as consent is expressed to proceed with recommendations for treatment.  At time of documentation, patient accepted for placement.  Consents to medication management with initial commitment to start trial mirtazapine and delay restart Wellbutrin XL while monitoring withdrawal.  Agrees to retrial of Campral with naltrexone, as each with lacking benefit when used in monotherapy.  Tentative plans to start treatment at [facility], but agrees to inpatient CD evaluation.  Additional tentative plans to coordinate cares with sister, the patient admitted prior to peer coordination.  Treatment plan discussed with staff.        Review of external notes and/or information:  I personally reviewed notes from the patient's intake note dated 2/22, detox admission dated 8/2024. This provided me with information regarding patient's recent clinical course.     I personally reviewed the patient's chart, including available medication list and available past medical history, past surgical history, family history, and social history.        CHEMICAL DEPENDENCY HISTORY   History   Drug Use No     Social History    Substance and Sexual Activity      Alcohol use: Yes        Comment: Alcoholic Drinks/day: occasionally, trying to quit    History   Smoking Status    Never   Smokeless Tobacco    Never     Treatment: [facility]-Fall 2024  Detox: 8/19/2024.  Denies withdrawal seizure.   Legal: Denies DUI    Reviewed in detail and please see DEC Assessment for full details and history.       PAST PSYCHIATRIC HISTORY   Psychiatrist: PCP  Therapist: Ericka Rouse  Case Management: None  Hospitalizations: History of one prior hospitalization for detox  Most recent:  [facility] 8/19/2024  History of Commitment: None  Past Medications:   Wellbutrin  Campral  Naltrexone  TMS/ECT/Ketamine:  No  Suicide Attempts/Gun Access: Unintentional overdose.  Denies gun access.    Reviewed in detail and please see DEC Assessment for full  details and history.       PAST MEDICAL HISTORY   Past Medical History:   Diagnosis Date    Depressive disorder     High cholesterol     History of gastric bypass     History of pulmonary embolism     Hypertension     CARLIE (obstructive sleep apnea)     Personal history of DVT (deep vein thrombosis)     Type 2 diabetes mellitus (H)     Uncomplicated asthma      Past Surgical History:   Procedure Laterality Date    APPENDECTOMY  08/15/2017    Dr. Ferrer    GASTRIC BYPASS      VT LAP,APPENDECTOMY N/A 8/14/2017    Procedure: APPENDECTOMY, LAPAROSCOPIC;  Surgeon: Nba Ferrer MD;  Location: [facility];  Service: General    REVISION AKANKSHA-EN-Y  2014    Dr. Ranjeet Espinal [facility]     Primary Care Provider: Yovana Felipe  Medications:   Current Facility-Administered Medications   Medication Dose Route Frequency Provider Last Rate Last Admin    buPROPion (WELLBUTRIN XL) 24 hr tablet 150 mg  150 mg Oral QAM Alda Meredith MD   150 mg at 02/24/25 0841    carvedilol (COREG) tablet 12.5 mg  12.5 mg Oral BID w/meals Alda Meredith MD   12.5 mg at 02/24/25 0841    lisinopril (ZESTRIL) tablet 20 mg  20 mg Oral Daily Alda Meredith MD   20 mg at 02/24/25 0842    metFORMIN (GLUCOPHAGE XR) 24 hr tablet 1,000 mg  1,000 mg Oral Daily with supper Alda Meredith MD        Warfarin Dose Required Daily - Pharmacist Managed  1 each Oral See Admin Instructions Alda Meredith MD         Medications as needed:   Current Facility-Administered Medications   Medication Dose Route Frequency Provider Last Rate Last Admin    acetaminophen (TYLENOL) tablet 1,000 mg  1,000 mg Oral Q8H PRN Kumar Romero MD   1,000 mg at 02/24/25 0236    cloNIDine (CATAPRES) tablet 0.1 mg  0.1 mg Oral Once PRN Alda Meredith MD        diazepam (VALIUM) tablet 10 mg  10 mg Oral Q30 Min PRN Alda Meredith MD   10 mg at 02/24/25 0227    Or    diazepam (VALIUM) injection 5-10 mg  5-10 mg Intravenous Q30 Min PRN Baldev Garcia  MD Alda        flumazenil (ROMAZICON) injection 0.2 mg  0.2 mg Intravenous q1 min prn Alda Meredith MD        OLANZapine zydis (zyPREXA) ODT tab 5-10 mg  5-10 mg Oral Q6H PRN Alda Meredith MD        Or    haloperidol lactate (HALDOL) injection 2.5-5 mg  2.5-5 mg Intravenous Q6H PRN Alda Meredith MD        hydrALAZINE (APRESOLINE) tablet 10 mg  10 mg Oral 4x Daily PRN Kumar Romero MD   10 mg at 02/24/25 0227    melatonin tablet 5 mg  5 mg Oral QPM PRN Alda Meredith MD         ALLERGIES: Patient has no known allergies.    Reviewed in detail and see ED and Intake for full details and history.       MEDICATIONS   Prior to Admission medications    Medication Sig Last Dose Taking? Auth Provider Long Term End Date   buPROPion (WELLBUTRIN XL) 150 MG 24 hr tablet Take 1 tablet (150 mg) by mouth every morning Unknown Yes Gabrielle Chaudhary NP Yes    carvedilol (COREG) 12.5 MG tablet Take 1 tablet (12.5 mg) by mouth 2 times daily (with meals) Unknown Yes Cody Harvey MD Yes    cloNIDine (CATAPRES) 0.1 MG tablet Take 0.1 mg by mouth as needed (Sleep) Unknown Yes Unknown, Entered By History Yes    lisinopril (ZESTRIL) 20 MG tablet Take 1 tablet (20 mg) by mouth daily Unknown Yes Yovana Felipe MD Yes    metFORMIN (GLUCOPHAGE XR) 500 MG 24 hr tablet Take 2 tablets (1,000 mg) by mouth daily (with dinner) Unknown Yes Gabrielle Chaudhary NP Yes    sildenafil (VIAGRA) 100 MG tablet Take 1 tablet (100 mg) by mouth daily as needed (erectile dysfunction). Unknown Yes Yovana Felipe MD Yes    simvastatin (ZOCOR) 20 MG tablet Take 1 tablet (20 mg) by mouth at bedtime Unknown Yes Gabrielle Chaudhary NP Yes    warfarin ANTICOAGULANT (COUMADIN) 5 MG tablet Take 5 mg by mouth every evening. Take 2.5 mg on Wednesdays and 5 mg all other days. 2/21/2025 Yes Reported, Patient       Medication adherence issues: MS Med Adherence Y/N: No  Medication side effects: MEDICATION SIDE EFFECTS:  dizziness-Clonidine  Benefit: Yes / No: Yes       ROS   The 10 point Review of Systems is negative other than noted in the HPI or here.       FAMILY HISTORY   Family History   Problem Relation Age of Onset    Substance Abuse Brother     Anxiety Disorder Sister     Depression Sister       Psychiatric: Brother-Depression  Chemical: Brother-Alcohol  Suicide: Cousin-Completed       SOCIAL HISTORY   Social History     Socioeconomic History    Marital status:      Spouse name: Not on file    Number of children: Not on file    Years of education: Not on file    Highest education level: Not on file   Occupational History    Not on file   Tobacco Use    Smoking status: Never     Passive exposure: Never    Smokeless tobacco: Never   Vaping Use    Vaping status: Never Used   Substance and Sexual Activity    Alcohol use: Yes     Comment: Alcoholic Drinks/day: occasionally, trying to quit    Drug use: No    Sexual activity: Not on file     Comment: not asked   Other Topics Concern    Not on file   Social History Narrative    Not on file     Social Drivers of Health     Financial Resource Strain: Low Risk  (11/28/2023)    [facility]    Financial Resource Strain     Difficulty of Paying Living Expenses: 3     Difficulty of Paying Living Expenses: Not on file   Food Insecurity: No Food Insecurity (11/28/2023)    Received from depict [facility]    Food Insecurity     Do you worry your food will run out before you are able to buy more?: 1   Transportation Needs: No Transportation Needs (11/28/2023)    Received from depict [facility]    Transportation Needs     Does lack of transportation keep you from medical appointments?: 1     Does lack of transportation keep you from work, meetings or getting things that you need?: 1   Physical Activity: Not on file   Stress: Not on file  "(2/11/2021)   Social Connections: Socially Integrated (11/28/2023)    Received from [facility]    Social Connections     Do you often feel lonely or isolated from those around you?: 0   Interpersonal Safety: Low Risk  (8/9/2024)    Interpersonal Safety     Do you feel physically and emotionally safe where you currently live?: Yes     Within the past 12 months, have you been hit, slapped, kicked or otherwise physically hurt by someone?: No     Within the past 12 months, have you been humiliated or emotionally abused in other ways by your partner or ex-partner?: No   Housing Stability: Low Risk  (11/28/2023)    Received from [facility]    Housing Stability     What is your housing situation today?: 1     Born and Raised: [place]  Marital Status:   Children: 2  Living Situation: Living arrangements - the patient lives alone       MENTAL STATUS EXAM   Appearance:  Cooperative  Mood:  Mood: \"Better\"  Affect: appropriate  was congruent to speech with social smile  Suicidal Ideation: PRESENT / ABSENT: absent   Homicidal Ideation: PRESENT / ABSENT: absent   Thought process:  linear  and no RAFAELA.  Thought content: devoid of  suicidal ideation and violent ideation.   Fund of Knowledge: Average  Attention/Concentration: Fair  Language ability:  Intact  Memory:  sufficient  Insight:  fair.  Judgement: fair  Orientation: Person:  yes  Place:  yes  Time:  yes  Psychomotor Behavior: slowed    Muscle Strength and Tone: MuscleStrength: Normal  Gait and Station:  Seated       PHYSICAL EXAM   Vitals: BP (!) 173/106   Pulse 85   Temp 97  F (36.1  C) (Oral)   Resp 18   Ht 1.854 m (6' 1\")   Wt (!) 165.3 kg (364 lb 6.7 oz)   SpO2 97%   BMI 48.08 kg/m    Weight:   364 lbs 6.73 oz    Body mass index is 48.08 kg/m .    Physical exam as per Dr. Sumeet Murry MD. Dated 2/22/2025:    General: Oriented to person, place, and time. Appears well-developed and " "well-nourished.   Head: No signs of trauma.   Mouth/Throat: Oropharynx is clear. Dry mucus membranes.   Eyes: Conjunctivae are normal. Pupils are equal, round, and reactive to light.   Neck: Normal range of motion. No nuchal rigidity.   Cardiovascular: Normal rate and regular rhythm.    Respiratory: Effort normal and breath sounds normal. No respiratory distress.   Abdominal: Soft. There is no tenderness. There is no guarding.   Musculoskeletal: Normal range of motion. no edema.   Neurological: The patient is alert and oriented to person, place, and time.  PERRLA, EOMI, visual fields intact, strength in upper/lower extremities normal and symmetrical.   Sensation normal. Speech normal  GCS eye subscore is 4. GCS verbal subscore is 5. GCS motor subscore is 6.   Skin: Skin is warm and dry. No rash noted.   Psychiatric: Appears intoxicated. Cooperative, not tearful, admits to suicidal ideations with a plan to \"drink himself to death with alcohol.\"     I have reviewed the physical exam as documented by by the medical team and agree with findings and assessment and have no additional findings to add at this time.       LABS   personally reviewed.   Recent Results (from the past 48 hours)   Comprehensive metabolic panel    Collection Time: 02/22/25  4:25 PM   Result Value Ref Range    Sodium 138 135 - 145 mmol/L    Potassium 4.3 3.4 - 5.3 mmol/L    Carbon Dioxide (CO2) 23 22 - 29 mmol/L    Anion Gap 20 (H) 7 - 15 mmol/L    Urea Nitrogen 11.1 6.0 - 20.0 mg/dL    Creatinine 0.76 0.67 - 1.17 mg/dL    GFR Estimate >90 >60 mL/min/1.73m2    Calcium 8.3 (L) 8.8 - 10.4 mg/dL    Chloride 95 (L) 98 - 107 mmol/L    Glucose 167 (H) 70 - 99 mg/dL    Alkaline Phosphatase 116 40 - 150 U/L    AST 44 0 - 45 U/L    ALT 46 0 - 70 U/L    Protein Total 7.3 6.4 - 8.3 g/dL    Albumin 4.2 3.5 - 5.2 g/dL    Bilirubin Total 0.3 <=1.2 mg/dL   Alcohol ethyl    Collection Time: 02/22/25  4:25 PM   Result Value Ref Range    Alcohol ethyl 0.28 (H) " <=0.01 g/dL   Acetaminophen level    Collection Time: 02/22/25  4:25 PM   Result Value Ref Range    Acetaminophen <5.0 (L) 10.0 - 30.0 ug/mL   Salicylate level    Collection Time: 02/22/25  4:25 PM   Result Value Ref Range    Salicylate <0.3   mg/dL   CBC with platelets and differential    Collection Time: 02/22/25  4:25 PM   Result Value Ref Range    WBC Count 8.0 4.0 - 11.0 10e3/uL    RBC Count 4.95 4.40 - 5.90 10e6/uL    Hemoglobin 13.1 (L) 13.3 - 17.7 g/dL    Hematocrit 40.6 40.0 - 53.0 %    MCV 82 78 - 100 fL    MCH 26.5 26.5 - 33.0 pg    MCHC 32.3 31.5 - 36.5 g/dL    RDW 16.1 (H) 10.0 - 15.0 %    Platelet Count 238 150 - 450 10e3/uL    % Neutrophils 57 %    % Lymphocytes 31 %    % Monocytes 9 %    % Eosinophils 3 %    % Basophils 1 %    % Immature Granulocytes 0 %    NRBCs per 100 WBC 0 <1 /100    Absolute Neutrophils 4.5 1.6 - 8.3 10e3/uL    Absolute Lymphocytes 2.5 0.8 - 5.3 10e3/uL    Absolute Monocytes 0.7 0.0 - 1.3 10e3/uL    Absolute Eosinophils 0.2 0.0 - 0.7 10e3/uL    Absolute Basophils 0.1 0.0 - 0.2 10e3/uL    Absolute Immature Granulocytes 0.0 <=0.4 10e3/uL    Absolute NRBCs 0.0 10e3/uL   INR    Collection Time: 02/22/25 11:21 PM   Result Value Ref Range    INR 2.40 (H) 0.85 - 1.15   Urine Drug Screen Panel    Collection Time: 02/23/25  2:21 AM   Result Value Ref Range    Amphetamines Urine Screen Negative Screen Negative    Barbituates Urine Screen Negative Screen Negative    Benzodiazepine Urine Screen Negative Screen Negative    Cannabinoids Urine Screen Negative Screen Negative    Cocaine Urine Screen Negative Screen Negative    Fentanyl Qual Urine Screen Negative Screen Negative    Opiates Urine Screen Negative Screen Negative    PCP Urine Screen Negative Screen Negative   INR    Collection Time: 02/23/25  6:10 AM   Result Value Ref Range    INR 2.47 (H) 0.85 - 1.15   INR    Collection Time: 02/24/25  8:02 AM   Result Value Ref Range    INR 2.72 (H) 0.85 - 1.15     No results found for:  "\"PHENYTOIN\", \"PHENOBARB\", \"VALPROATE\", \"CBMZ\"       ASSESSMENT   Consult placed to psychiatry regarding worsening depression and alcohol use.  Occurring in the setting of family concerns of suicidal behaviors prior to admission with increasing alcohol use as a means of managing depressed mood associated with functional impairment.  Placed on a 72-hour hold due to lacking insight of behaviors associated with presentation and concerns expressed by family.  Indication for inpatient psychiatric hospitalization to coordinate cares, medication management, discharge plans.       DIAGNOSIS   Principal Problem:    Major depressive disorder, recurrent episode, moderate (H)    Active Problem List:  Patient Active Problem List   Diagnosis    Type 2 diabetes mellitus without complication, without long-term current use of insulin (H)    History of gastric bypass    Hyperlipidemia    Obstructive sleep apnea syndrome    Elevated BP without diagnosis of hypertension    Major depressive disorder, recurrent episode, moderate (H)    History of DVT (deep vein thrombosis)    History of pulmonary embolism    Morbid obesity (H)    Alcohol use disorder, severe, dependence (H)    Paroxysmal atrial fibrillation (H)    Personal history of DVT (deep vein thrombosis)    Erectile dysfunction due to arterial insufficiency    Alcohol use disorder, severe, in early remission (H)    Alcohol dependence with uncomplicated intoxication (H)    Anxiety    Other insomnia          RECOMMENDATIONS   Target psychiatric symptoms and interventions:  Education:  Risks, benefits, and alternatives discussed at length with patient.     Safety/Supervision/Disposition:  Legal Status: 72 hour hold (2/23/2025 at 1711).  Encouraged patient to proceed with Voluntary admission as he accepted plans for treatment.  Disposition:  Inpatient MH vs least restrictive placement (home with outpatient MH while awaiting inpatient MARCY treatment).  Precautions:  Withdrawal; Routine as " per ED.    Medication Recommendations:   [place] PDMP Reviewed:  Unremarkable  PTA Psychiatric Medications reviewed.  CIWA protocol  Wellbutrin XL:  Recommend holding PTA Wellbutrin  mg daily, depression/anxiety, while monitoring withdrawal and consider restart when clinically indicated.  Remeron:  Start trial Mirtazapine 7.5 mg at bedtime, depression/anxiety/MARCY, with plans to titrate.  Naltrexone + Campral:  Start re-trial combination Natrexone 50 mg daily + Campral 333 mg tid, AUD (reportedly, ineffective as monotherapy).    Acute Medical Problems and Treatments:  ED Intake (2/22):  Reviewed and discussed with patient.  Alcohol 0.28  UDS Negative  AST/ALT 44/46  INR 2.72    Care Coordination:  Treatment plan discussed directly with staff.  Please reconsult with Psychiatry as needed.         Risk Assessment: IP MHAC RISK ASSESSMENT: Patient on precautions    Total encounter time:  A total of  64  minutes spent related to chart review, history and exam, documentation   and further activities as noted above    This note was created with help of Dragon dictation system. Grammatical / typing errors are not intentional.        Fernando Gutierrez MD - 02/24/2025  - 9:24 AM  Consult/Liaison Psychiatry   [facility]    Please call the [facility] CL line ([phone]) with questions and to determine consult service coverage.

## 2025-02-24 NOTE — ED TRIAGE NOTES
"Pt returns to emergency department after transferr difficulty. Note below is copy and pasted from initial ED MD note.    \"Cody Bolton is a 44 year old male with a history of anxiety, depression, fatty liver, type II diabetes, hypertension, atrial fibrillation, DVT, and PE, anticoagulated on warfarin who presents with his ex-wife to the Emergency Department for suicidal ideations. The patient's ex-wife reports Cody has been drinking whiskey in an attempt to commit suicide. At 2200 yesterday (2/21/25) he called her saying he wanted to die. She also notes that his problems with alcohol have been progressively worsening the last six months. Cody reports he \"likes to feel drunk\" and has access to carvedilol and knives at home. Denies vomiting, recent falls or hitting his head. \"      Pt remains VSS A&Ox4 ABCs intact   "

## 2025-02-24 NOTE — TELEPHONE ENCOUNTER
"ANTICOAGULATION  MANAGEMENT: Discharge Review    Cody Bolton chart reviewed for anticoagulation continuity of care    Emergency room visit on 2/22/25 (72 hour hold) for suicidal ideation and alcohol intoxication.    Discharge disposition:  Transferred to [facility] unit    Results:    Recent labs: (last 7 days)     02/19/25  1324 02/22/25  2321 02/23/25  0610 02/24/25  0802   INR 4.02* 2.40* 2.47* 2.72*     Anticoagulation inpatient management:     home regimen continued    Anticoagulation discharge instructions:     Warfarin dosing:  TBD, pending dc   Bridging: No   INR goal change: No      Medication changes affecting anticoagulation: No    Additional factors affecting anticoagulation: Yes: alcohol cessation will affect INR     PLAN     No adjustment to anticoagulation plan needed + awaiting admission - \"DEC recommending MICD admission to 3A\"    Left a detailed message for Cody with [facility] dept # in case any questions come up during admission      Anticoagulation Calendar updated    Kera Deras, RN  2/24/2025  Anticoagulation Clinic  [facility] for routing messages: fransisco GARAY  [facility] patient phone line: [phone]    "

## 2025-02-24 NOTE — TELEPHONE ENCOUNTER
R: [place]patient [place] Call Log 2/23/2025 @ 4:33 PM:       Intake has called facilities that have not updated the bed status within the last 12 hours.                                  [facility] is posting 0 beds.   [facility] is posting 3 beds. [phone]; per call at 3:32 PM no high acuity pts. Potential low acuity beds, call back in 45 minutes.  No bed available  [facility] is posting 0 beds.[phone]; Negative covid required.   [facility] is posting 0 beds. Neg covid. No high school/Natali-psych. [phone]. Per call at 3:35 PM with Joyce, CRN will be back in an hour, call back in an hour.   [facility] is posting 0 beds. [phone];    [facility] is posting 0 beds. [phone];       [facility] is posting 6 beds. (Ages 18-35) Negative covid, no aggression, physical or sexual assault, violence hx or drug abuse, or psychosis. [phone]; 3:37 PM Per call with Jerry Adults: no adult beds   Pt is not age appropropriate  [facility] is posting 0 beds.   [facility] is posting 0 beds [phone];         Pt remains on the work list pending appropriate bed availability.

## 2025-02-24 NOTE — TELEPHONE ENCOUNTER
"R: per Kitty's notes on 2/22/25, \"DEC recommending MICD admission to . Informed DEC that all MICD admissions for 3A need to be reviewed by unit medical director Monday-Friday from 8am-5pm -  Intake can place pt on [facility] WL in meantime and look for MH bed until pt can be reviewed for a detox bed Monday morning.\"    Paged Justin at 8:20 am.    Per Justin at 8:34 am, he will discuss patient at treatment team this morning.     Per Justin at 10:01 am, pt is \"accepted to 3A / MICD / Justin.\"    Messaged Bee at 10:03 am and asked her if pt should wait or if admit can go forward, as [facility] ED pts are currently being prioritized for admits but pt had already been presented before we were informed of this.    Per Amparo at 10:05 am, \"since it was already set up and we have open detox beds, it is okay to leave it as is and start prioritizing [facility] detox patients from here on out.\"    3a west/Justin accepted for himself/MICD; Josiah on unit informed at 10:14 am;  Aby in er informed at 10:16 am and author asked her to ask ED MD to write transfer orders.      Per Evelyn in EC at 12:55 pm, \"pt d/c [facility] via EMS transport to [facility]- BUT [facility] ED now informed that [facility] refused EMS and pt is en route via EMS back to [facility] ED due to no bariatric bed yet on [facility] unit. Pt had been on regular gurney in [facility] ED since 2/22, and possible bariatric needs were noted on intake WL.\"    Author called Josiah on 3a at 12:59 pm, asking about the above. Author had previously notified Josiah of need for bariatric bed. He said he had informed ED staff not to send pt due to need for a bariatric bed. He said pt was still in the ED when he informed ED staff.  He said bariatric bed should be avail at 5 pm and said they are still planning on taking pt once bed is avail. He said ok for author to call ED and inform them that  is expecting pt on eves after 5 pm. He said they can't send pt until 3a staff calls ED to inform them that bed is avail/on " the unit.     Author called Isabella in ED at 1:05 pm and informed her that 3a is expecting pt once they receive the bariatric bed which they said they anticipate to be at about 5 pm. Author informed her that THEY NEED TO WAIT FOR 3A RN/STAFF TO CALL THEM TO INFORM THAT THE BARIATRIC BED HAS ARRIVED AND THAT THEY CAN NOT SEND PT TO 3a BEFORE THEN. She said she understood.

## 2025-02-24 NOTE — TELEPHONE ENCOUNTER
R: [place] Call Log 2/23/2025 @ 7:30 PM:       Intake has called facilities that have not updated the bed status within the last 12 hours.                                  [facility] is posting 0 beds.   [facility] is posting 3 beds. [phone]; per call at 3:32 PM no high acuity pts. Potential low acuity beds, call back in 45 minutes. 6:00 PM Per Robert [facility].  [facility] is posting 0 beds. Negative covid required. [phone]; Per Call 6:02 PM, per Stef At [facility]. Call back 2/24 at 8am.    [facility] is posting 0 beds. Neg covid. No high school/Natali-psych. [phone]. Per 6:05 PM with Henrik, they have low acuity beds.  [facility] is posting 0 beds. [phone];    [facility] is posting 0 beds. [phone];       [facility] is posting 6 beds. (Ages 18-35) Negative covid, no aggression, physical or sexual assault, violence hx or drug abuse, or psychosis. [phone]; 3:37 PM Per call with Jerry Adults: no adult beds   [facility] is posting 0 beds.   [facility] is posting 0 beds [phone];    Pt remains on the work list pending appropriate bed availability.

## 2025-02-24 NOTE — ED NOTES
Pt requested a shower but unable to get pt in for a shower. Shampoo cap given to pt. Pt was content with shower cap for now

## 2025-02-24 NOTE — PROGRESS NOTES
Radha called [phone] and obtained confirmation for bariatric bed. Confirmation is #[id number]    After the order placed:  Lupillo from [employer] called to alert that there is a slight delay in delivery of the bed stating they expect to have one to unit 3A by 1700 stating there is a slight back order but should be ready later today. Lupillo's number is [phone].

## 2025-02-24 NOTE — ED NOTES
Pt provided with shower cap, tooth brush, toothpaste, deodorant, wash cloths, towels and a comb per pt request. Pt escorted to bathroom to use these products. Event went well pt is calm and cooperative without complaints

## 2025-02-24 NOTE — TELEPHONE ENCOUNTER
R: [place]  [place]patient [place] Call Log 2/24/2025 @ 12:00 AM:       Intake has called facilities that have not updated the bed status within the last 12 hours.                                  [facility] is posting 0 beds.   [facility] is posting 0 beds. [phone]; per call with Fran at 12:52 AM. They are at capacity.    [facility] is posting 0 beds.[phone]; Negative covid required.   [facility] is posting 0 beds. Neg covid. No high school/Natali-psych. [phone]. Per call at 12:53 AM with Su, they are at capacity.   [facility] is posting 0 beds. [phone];    [facility] is posting 0 beds. [phone];       [facility] is posting 4 beds. (Ages 18-35) Negative covid, no aggression, physical or sexual assault, violence hx or drug abuse, or psychosis. [phone]; 12:36 AM Per call with Virgie Adults: adult beds available  [facility] is posting 0 beds.   [facility] is posting 0 beds [phone];         Pt remains on the work list pending appropriate bed availability.

## 2025-02-24 NOTE — ED NOTES
RN ED Mental Health Handoff Note    72 hour hold     Does patient require 1:1? Yes     Hold and rights been given and documented for patient: Yes     Is the patient in BH scrubs? Yes     Has the patient been searched? Yes     Is the 15 minute observation tool up to date? Yes     Was patient issued a welcome folder? Yes     Room check completed this shift: Yes     PSS3 and [place] Assessment/Reassessment this shift:     C-SSRS ([place])       Date and Time Q1 Wished to be Dead (Past Month) Q2 Suicidal Thoughts (Past Month) Q3 Suicidal Thought Method Q4 Suicidal Intent without Specific Plan Q5 Suicide Intent with Specific Plan Q6 Suicide Behavior (Lifetime) If yes to Q6, within past 3 months? Level of Risk per Screen Level of Risk per Screen User   02/22/25 1641 1-->yes 1-->yes 1-->yes 0-->no 1-->yes 1-->yes 1-->yes -- high risk KRK   02/22/25 1547 1-->yes 1-->yes 1-->yes 0-->no 1-->yes 0-->no -- -- high risk LLM                Behavioral status of patient: Green     Code 21 called this shift? No     Use of restraints/seclusion this shift? No     Most recent vital signs:  Temp: 97  F (36.1  C) Temp src: Oral BP: (!) 173/107 Pulse: 84   Resp: 20 SpO2: 94 % O2 Device: None (Room air)       Medications:  Scheduled medication compliance? Yes     PRN Meds administered this shift? Yes     Medications   OLANZapine zydis (zyPREXA) ODT tab 5-10 mg (has no administration in time range)     Or   haloperidol lactate (HALDOL) injection 2.5-5 mg (has no administration in time range)   flumazenil (ROMAZICON) injection 0.2 mg (has no administration in time range)   melatonin tablet 5 mg (has no administration in time range)   diazepam (VALIUM) tablet 10 mg (0 mg Oral Return to Cabinet 2/23/25 0000)     Or   diazepam (VALIUM) injection 5-10 mg ( Intravenous See Alternative 2/23/25 0000)   Warfarin Dose Required Daily - Pharmacist Managed (has no administration in time range)   lisinopril (ZESTRIL) tablet 20 mg (20 mg Oral $Given  2/23/25 0716)   buPROPion (WELLBUTRIN XL) 24 hr tablet 150 mg (150 mg Oral $Given 2/23/25 0717)   carvedilol (COREG) tablet 12.5 mg (12.5 mg Oral $Given 2/23/25 1806)   acetaminophen (TYLENOL) tablet 1,000 mg (1,000 mg Oral $Given 2/23/25 2018)   hydrALAZINE (APRESOLINE) tablet 10 mg (10 mg Oral $Given 2/23/25 2143)   cloNIDine (CATAPRES) tablet 0.1 mg (has no administration in time range)   metFORMIN (GLUCOPHAGE XR) 24 hr tablet 1,000 mg (has no administration in time range)   sodium chloride 0.9% BOLUS 1,000 mL (0 mLs Intravenous Stopped 2/22/25 1906)   warfarin ANTICOAGULANT (COUMADIN) tablet 5 mg (5 mg Oral $Given 2/23/25 0211)   warfarin ANTICOAGULANT (COUMADIN) tablet 5 mg (5 mg Oral $Given 2/23/25 1938)           ADLs     Meal Provided this shift? Yes     Hygiene items provided? Yes     ADLs completed? Yes     Date of last shower: unknown, nut shower cap given to pt     Any significant events this shift? No     Any information that would be helpful in caring for this patient?  N/a     Family present/updated? Yes     Location of patient's belongings: old Dec office     Critical Care Minutes:  Does the patient need critical care minutes documented? No

## 2025-02-24 NOTE — PROGRESS NOTES
Notified by intake that pt will need bariatric bed. Immediately called [facility] ER and spoke to RN caring for Cody to stop the transfer out of [facility]. Informed RN that we have called to get a bariatric bed and that once the bed arrives we will call back and have the patient sent to .

## 2025-02-24 NOTE — ED PROVIDER NOTES
History     Chief Complaint:  Suicidal ideation       HPI   Coyd Bolton is a 44 year old male who presents with suicidal ideation.  The patient was recently in our emergency department and had been transferred to the [place].  Upon arrival, however, he was sent back here as bariatric bed had not yet been arranged.  He has no acute concerns at this time.  Patient has not been in a bariatric bed nor needed a bariatric toilet in this department.        Independent Historian:   None    Review of External Notes:    Reviewed 2/22/2025 ED visit for alcohol abuse intoxication and SI    ROS:  Review of Systems    Allergies:  No Known Allergies     Medications:    buPROPion (WELLBUTRIN XL) 150 MG 24 hr tablet  carvedilol (COREG) 12.5 MG tablet  cloNIDine (CATAPRES) 0.1 MG tablet  lisinopril (ZESTRIL) 20 MG tablet  metFORMIN (GLUCOPHAGE XR) 500 MG 24 hr tablet  sildenafil (VIAGRA) 100 MG tablet  simvastatin (ZOCOR) 20 MG tablet  warfarin ANTICOAGULANT (COUMADIN) 5 MG tablet        Past History:    Past Medical History:   Diagnosis Date    Depressive disorder     High cholesterol     History of gastric bypass     History of pulmonary embolism     Hypertension     CARLIE (obstructive sleep apnea)     Personal history of DVT (deep vein thrombosis)     Type 2 diabetes mellitus (H)     Uncomplicated asthma          Physical Exam     Patient Vitals for the past 24 hrs:  Vitals:    02/24/25 1327   BP: (!) 178/102   Pulse: 85   Resp: 18   SpO2: 95%   Weight: (!) 165.3 kg (364 lb 6.7 oz)         Physical Exam  Constitutional: Alert, attentive  HENT:    Nose: Nose normal.    Mouth/Throat: Oropharynx is clear, mucous membranes are moist   Eyes: EOM are normal.   CV: regular rate and rhythm; no murmurs, rubs or gallups  Chest: Effort normal and breath sounds normal.   GI:  There is no tenderness. No distension. Normal bowel sounds  MSK: Normal range of motion.   Neurological: Alert, attentive  Skin: Skin is warm and  dry.      Emergency Department Course       Results for orders placed or performed during the hospital encounter of 02/22/25   Comprehensive metabolic panel     Status: Abnormal   Result Value Ref Range    Sodium 138 135 - 145 mmol/L    Potassium 4.3 3.4 - 5.3 mmol/L    Carbon Dioxide (CO2) 23 22 - 29 mmol/L    Anion Gap 20 (H) 7 - 15 mmol/L    Urea Nitrogen 11.1 6.0 - 20.0 mg/dL    Creatinine 0.76 0.67 - 1.17 mg/dL    GFR Estimate >90 >60 mL/min/1.73m2    Calcium 8.3 (L) 8.8 - 10.4 mg/dL    Chloride 95 (L) 98 - 107 mmol/L    Glucose 167 (H) 70 - 99 mg/dL    Alkaline Phosphatase 116 40 - 150 U/L    AST 44 0 - 45 U/L    ALT 46 0 - 70 U/L    Protein Total 7.3 6.4 - 8.3 g/dL    Albumin 4.2 3.5 - 5.2 g/dL    Bilirubin Total 0.3 <=1.2 mg/dL   Alcohol ethyl     Status: Abnormal   Result Value Ref Range    Alcohol ethyl 0.28 (H) <=0.01 g/dL   Acetaminophen level     Status: Abnormal   Result Value Ref Range    Acetaminophen <5.0 (L) 10.0 - 30.0 ug/mL   Salicylate level     Status: Normal   Result Value Ref Range    Salicylate <0.3   mg/dL   CBC with platelets and differential     Status: Abnormal   Result Value Ref Range    WBC Count 8.0 4.0 - 11.0 10e3/uL    RBC Count 4.95 4.40 - 5.90 10e6/uL    Hemoglobin 13.1 (L) 13.3 - 17.7 g/dL    Hematocrit 40.6 40.0 - 53.0 %    MCV 82 78 - 100 fL    MCH 26.5 26.5 - 33.0 pg    MCHC 32.3 31.5 - 36.5 g/dL    RDW 16.1 (H) 10.0 - 15.0 %    Platelet Count 238 150 - 450 10e3/uL    % Neutrophils 57 %    % Lymphocytes 31 %    % Monocytes 9 %    % Eosinophils 3 %    % Basophils 1 %    % Immature Granulocytes 0 %    NRBCs per 100 WBC 0 <1 /100    Absolute Neutrophils 4.5 1.6 - 8.3 10e3/uL    Absolute Lymphocytes 2.5 0.8 - 5.3 10e3/uL    Absolute Monocytes 0.7 0.0 - 1.3 10e3/uL    Absolute Eosinophils 0.2 0.0 - 0.7 10e3/uL    Absolute Basophils 0.1 0.0 - 0.2 10e3/uL    Absolute Immature Granulocytes 0.0 <=0.4 10e3/uL    Absolute NRBCs 0.0 10e3/uL   INR     Status: Abnormal   Result Value Ref  Range    INR 2.40 (H) 0.85 - 1.15   Urine Drug Screen Panel     Status: Normal   Result Value Ref Range    Amphetamines Urine Screen Negative Screen Negative    Barbituates Urine Screen Negative Screen Negative    Benzodiazepine Urine Screen Negative Screen Negative    Cannabinoids Urine Screen Negative Screen Negative    Cocaine Urine Screen Negative Screen Negative    Fentanyl Qual Urine Screen Negative Screen Negative    Opiates Urine Screen Negative Screen Negative    PCP Urine Screen Negative Screen Negative   INR     Status: Abnormal   Result Value Ref Range    INR 2.47 (H) 0.85 - 1.15   INR     Status: Abnormal   Result Value Ref Range    INR 2.72 (H) 0.85 - 1.15   CBC with platelets differential     Status: Abnormal    Narrative    The following orders were created for panel order CBC with platelets differential.  Procedure                               Abnormality         Status                     ---------                               -----------         ------                     CBC with platelets and d...[[id number]]  Abnormal            Final result                 Please view results for these tests on the individual orders.   Urine Drug Screen     Status: Normal    Narrative    The following orders were created for panel order Urine Drug Screen.  Procedure                               Abnormality         Status                     ---------                               -----------         ------                     Urine Drug Screen Panel[[id number]]      Normal              Final result                 Please view results for these tests on the individual orders.       Emergency Department Course & Assessments:             Interventions:  Medications   buPROPion (WELLBUTRIN XL) 24 hr tablet 150 mg (has no administration in time range)   carvedilol (COREG) tablet 12.5 mg (has no administration in time range)   cloNIDine (CATAPRES) tablet 0.1 mg (0.1 mg Oral Not Given 2/24/25 0633)   lisinopril  (ZESTRIL) tablet 20 mg (has no administration in time range)   metFORMIN (GLUCOPHAGE XR) 24 hr tablet 1,000 mg (has no administration in time range)   simvastatin (ZOCOR) tablet 20 mg (has no administration in time range)   warfarin ANTICOAGULANT (COUMADIN) tablet 5 mg (has no administration in time range)   Warfarin Dose Required Daily (has no administration in time range)       Consultations/Discussion of Management or Tests:  Consulted with  consultants.  A bed after 5 PM has been arranged for the patient       Disposition:  The patient will be transferred to [facility] via EMS.   Impression & Plan      Medical Decision Making:  Is a 44-year-old male with history of alcohol use disorder and SI who presents in the above context.  An appropriate bed is being made arranged and should be available after 5 PM today.  Transfer logistics completed.  Handed off to my colleague pending transfer.            Diagnosis:  Visit Diagnosis, Associated Orders, and Comments     ICD-10-CM    1. Suicidal ideation  R45.851       2. Alcohol abuse  F10.10                Zafar Anne MD  02/24/25 1547

## 2025-02-24 NOTE — ED PROVIDER NOTES
I assumed care of patient Iveth Reveles, briefly this is a 44-year-old male who presented to the emergency room intoxicated, he is currently pending transfer to [place] for treatment.  He has now been placed on a 72-hour hold.    7:10 AM  No acute events on my shift, he will be signed out to my colleague Dr. Nickerson pending inpatient admission.  Home meds have been ordered.    MD Baldev Marti Connie, MD  02/24/25 0710

## 2025-02-25 LAB
ANION GAP SERPL CALCULATED.3IONS-SCNC: 16 MMOL/L (ref 7–15)
BUN SERPL-MCNC: 12.4 MG/DL (ref 6–20)
CALCIUM SERPL-MCNC: 9.4 MG/DL (ref 8.8–10.4)
CHLORIDE SERPL-SCNC: 102 MMOL/L (ref 98–107)
CREAT SERPL-MCNC: 0.92 MG/DL (ref 0.67–1.17)
EGFRCR SERPLBLD CKD-EPI 2021: >90 ML/MIN/1.73M2
GGT SERPL-CCNC: 75 U/L (ref 8–61)
GLUCOSE BLDC GLUCOMTR-MCNC: 111 MG/DL (ref 70–99)
GLUCOSE BLDC GLUCOMTR-MCNC: 127 MG/DL (ref 70–99)
GLUCOSE BLDC GLUCOMTR-MCNC: 131 MG/DL (ref 70–99)
GLUCOSE SERPL-MCNC: 269 MG/DL (ref 70–99)
HCO3 SERPL-SCNC: 22 MMOL/L (ref 22–29)
HOLD SPECIMEN: NORMAL
INR PPP: 2.37 (ref 0.85–1.15)
POTASSIUM SERPL-SCNC: 3.8 MMOL/L (ref 3.4–5.3)
SODIUM SERPL-SCNC: 140 MMOL/L (ref 135–145)
TSH SERPL DL<=0.005 MIU/L-ACNC: 2.74 UIU/ML (ref 0.3–4.2)

## 2025-02-25 PROCEDURE — 82374 ASSAY BLOOD CARBON DIOXIDE: CPT

## 2025-02-25 PROCEDURE — 128N000001 HC R&B CD/MH ADULT

## 2025-02-25 PROCEDURE — 250N000013 HC RX MED GY IP 250 OP 250 PS 637

## 2025-02-25 PROCEDURE — 99222 1ST HOSP IP/OBS MODERATE 55: CPT | Mod: GC | Performed by: PSYCHIATRY & NEUROLOGY

## 2025-02-25 PROCEDURE — 250N000013 HC RX MED GY IP 250 OP 250 PS 637: Performed by: NURSE PRACTITIONER

## 2025-02-25 PROCEDURE — 85610 PROTHROMBIN TIME: CPT

## 2025-02-25 PROCEDURE — 80048 BASIC METABOLIC PNL TOTAL CA: CPT

## 2025-02-25 PROCEDURE — 36415 COLL VENOUS BLD VENIPUNCTURE: CPT | Performed by: PSYCHIATRY & NEUROLOGY

## 2025-02-25 PROCEDURE — H2032 ACTIVITY THERAPY, PER 15 MIN: HCPCS

## 2025-02-25 PROCEDURE — 82977 ASSAY OF GGT: CPT | Performed by: PSYCHIATRY & NEUROLOGY

## 2025-02-25 PROCEDURE — 36415 COLL VENOUS BLD VENIPUNCTURE: CPT

## 2025-02-25 PROCEDURE — 84443 ASSAY THYROID STIM HORMONE: CPT | Performed by: PSYCHIATRY & NEUROLOGY

## 2025-02-25 PROCEDURE — 250N000013 HC RX MED GY IP 250 OP 250 PS 637: Performed by: PSYCHIATRY & NEUROLOGY

## 2025-02-25 RX ORDER — WARFARIN SODIUM 5 MG/1
5 TABLET ORAL
Status: COMPLETED | OUTPATIENT
Start: 2025-02-25 | End: 2025-02-25

## 2025-02-25 RX ORDER — SIMVASTATIN 20 MG
20 TABLET ORAL AT BEDTIME
Status: DISCONTINUED | OUTPATIENT
Start: 2025-02-25 | End: 2025-02-26 | Stop reason: HOSPADM

## 2025-02-25 RX ORDER — METFORMIN HYDROCHLORIDE 500 MG/1
1000 TABLET, EXTENDED RELEASE ORAL
Status: DISCONTINUED | OUTPATIENT
Start: 2025-02-25 | End: 2025-02-26 | Stop reason: HOSPADM

## 2025-02-25 RX ADMIN — ACETAMINOPHEN 650 MG: 325 TABLET, FILM COATED ORAL at 08:02

## 2025-02-25 RX ADMIN — ACETAMINOPHEN 650 MG: 325 TABLET, FILM COATED ORAL at 22:00

## 2025-02-25 RX ADMIN — Medication 1 TABLET: at 08:01

## 2025-02-25 RX ADMIN — SIMVASTATIN 20 MG: 20 TABLET, FILM COATED ORAL at 22:00

## 2025-02-25 RX ADMIN — FOLIC ACID 1 MG: 1 TABLET ORAL at 08:02

## 2025-02-25 RX ADMIN — METFORMIN ER 500 MG 1000 MG: 500 TABLET ORAL at 17:33

## 2025-02-25 RX ADMIN — LISINOPRIL 20 MG: 20 TABLET ORAL at 08:02

## 2025-02-25 RX ADMIN — ACETAMINOPHEN 650 MG: 325 TABLET, FILM COATED ORAL at 18:06

## 2025-02-25 RX ADMIN — ACETAMINOPHEN 650 MG: 325 TABLET, FILM COATED ORAL at 12:14

## 2025-02-25 RX ADMIN — THIAMINE HCL TAB 100 MG 100 MG: 100 TAB at 08:01

## 2025-02-25 RX ADMIN — CARVEDILOL 12.5 MG: 6.25 TABLET, FILM COATED ORAL at 08:01

## 2025-02-25 RX ADMIN — WARFARIN SODIUM 5 MG: 5 TABLET ORAL at 17:33

## 2025-02-25 RX ADMIN — CARVEDILOL 12.5 MG: 6.25 TABLET, FILM COATED ORAL at 17:33

## 2025-02-25 ASSESSMENT — ACTIVITIES OF DAILY LIVING (ADL)
ADLS_ACUITY_SCORE: 47
ADLS_ACUITY_SCORE: 47
ADLS_ACUITY_SCORE: 41
ADLS_ACUITY_SCORE: 47
ADLS_ACUITY_SCORE: 47
ORAL_HYGIENE: INDEPENDENT
HYGIENE/GROOMING: INDEPENDENT
ADLS_ACUITY_SCORE: 41
ADLS_ACUITY_SCORE: 41
ADLS_ACUITY_SCORE: 47
DRESS: SCRUBS (BEHAVIORAL HEALTH)
HYGIENE/GROOMING: INDEPENDENT
ADLS_ACUITY_SCORE: 47
DRESS: SCRUBS (BEHAVIORAL HEALTH)
ADLS_ACUITY_SCORE: 41
ADLS_ACUITY_SCORE: 41
HYGIENE/GROOMING: INDEPENDENT
ORAL_HYGIENE: INDEPENDENT
ADLS_ACUITY_SCORE: 41
ADLS_ACUITY_SCORE: 47
DRESS: INDEPENDENT
ADLS_ACUITY_SCORE: 47
ADLS_ACUITY_SCORE: 41
ADLS_ACUITY_SCORE: 47
ADLS_ACUITY_SCORE: 41
ADLS_ACUITY_SCORE: 47
ADLS_ACUITY_SCORE: 41
ADLS_ACUITY_SCORE: 47
ORAL_HYGIENE: INDEPENDENT
ADLS_ACUITY_SCORE: 41

## 2025-02-25 NOTE — DISCHARGE INSTRUCTIONS
Behavioral Discharge Planning and Instructions  THANK YOU FOR CHOOSING [address]  [phone]    Summary: You were admitted to [facility] on 2/24/2025 for detoxification from alcohol.  A medical exam was performed that included lab work. You have met with a [facility] Counselor and opted to attend [facility].  Please take care and make your recovery a daily priority, Cody!  It was a pleasure working with you and the entire treatment team here wishes you the very best in your recovery!     Recommendation:  You are recommended to abstain from the use of alcohol or other mood-altering chemicals. You are recommended to complete a intensive outpatient substance use program such as [facility] and follow their continuing care recommendations.    [facility]  [address], [address]   [place] [address]   Phone: [phone]  Fax: [phone]    Main Diagnoses;  Alcohol use disorder, severe, dependence with withdrawal with complication  MDD  MERI  T2DM  Atrial fibrillation on warfarin  History of DVT/PE  CARLIE  HTN  HLD       Major Treatments, Procedures and Findings:  You were treated for Alcohol  detoxification using Valium . As an outpatient you will be prescribed Campral , please take this medication as prescribed until it is gone. You have met with a [facility] counselor to develop a treatment plan for discharge. You had labs drawn and those results were reviewed with you. Please take a copy of your lab work with you to your next primary care provider appointment.    Symptoms to Report:  If you experience more anxiety, confusion, sleeplessness, deep sadness or thoughts of suicide, notify your treatment team or notify your primary care provider. IF ANY OF THE SYMPTOMS YOU ARE EXPERIENCING ARE A MEDICAL EMERGENCY CALL 911 IMMEDIATELY.     Lifestyle Adjustment: Adjust your lifestyle to get enough sleep, relaxation, exercise and good nutrition. Continue to develop  healthy coping skills to decrease stress and promote a sober living environment. Do not use mood altering substances including alcohol, illegal drugs or addictive medications other than what is currently prescribed.     Disposition: Return to home and attend IOP treatment.    Facts about COVID19 at www.cdc.gov/COVID19 and www.[place].gov/covid19    Keeping hands clean is one of the most important steps we can take to avoid getting sick and spreading germs to others.  Please wash your hands frequently and lather with soap for at least 20 seconds!    Follow-up Appointment:   You are aware you should make a follow up appointment with your primary care doctor for medical and medication management     Recovery apps for your phone for educational purposes and to locate in person and zoom recovery meetings  Everything AA -  darrius is a great resource  12 Step Toolkit - educational purpose learning about the 12 steps to recovery  [place] [facility] - meeting darrius  AA  - meeting darrius  Meeting guide - meeting darrius  Quick NA meeting - meeting darrius  Joselito- has various apps    Patient Navigation Hub  [facility] Navigators work to be your point-of-contact for trustworthy and compassionate care from Inpatient services to [facility] Programmatic Care. We will provide resources and communication to help guide you into programmatic care. Ultimately, our goal is to be the one-stop-shop of communication, coordination, and support for your journey to programmatic care.  Phone: [phone]    Resources:  AA/NA meetings have returned to in-person or a hybrid combination of zoom/in-person therefore please check online to verify time.  Need Support Now? If you or someone you know is struggling or in crisis, help is available. Call or text 212 or chat Bambeco.org  AA meetings search for them at: https://aa-intergroup.org (worldwide meeting listings)  AA meetings for [place] area can be found online at: https://a[place].org  "(click local online meetings listings)  NA meetings for [place] area can be found online at: https://www.nam[place].org  (click find a meeting)  AA and NA Sponsors are excellent resources for support and you can find one at any support group meeting.   Alcoholics Anonymous (https://aa.org/): for information 24 hours/day  AA Intergroup service office in [place] (http://www.aastpaul.org/) 973.211.1918  AA Intergroup service office in [facility]: [phone]. (http://www.a[place].org/)  Narcotics Anonymous (www.nam[place].org) (717) 210-3771  https://aafairviewriverside.org/meetings  SMART Recovery - self management for addiction recovery:  www.GuestSpanrecovery.org  Pathways ~ A Health Crisis Resource & Support Center:  442.366.5683.  https://prescribetoprevent.org/patient-education/videos/  http://www.harmreduction.org  Crisis Text Line  Text 196937  You will be connected with a trained live crisis counselor to provide support. Por espanol, texto  AMANDA a 540684 o texto a 442-AYUDAME en WhatsApp  [place] Hope Line  1.196.SUICIDE [3558215]  [facility] 423-820-3395  Support Group:  AA/NA and Sponsor/support.  Fast Tracker  Linking people to mental health and substance use disorder resources  HealthcareMagicckHourlyNerdn.org   [place] Mental Health Warm Line  Peer to peer support  Monday thru Saturday, 12 pm to 10 pm  137.012.0617 or 8.377.969.7395  Text \"Support\" to 73300  National [place] on Mental Illness (LIZZETH)  498.235.0948 or 1.888.LIZZETH.HELPS  Alcoholics Anonymous (www.alcoholics-anonymous.org): Check your phone book for your local chapter.  Suicide Awareness Voices of Education (SAVE) (www.save.org): 717-127-FMYY (9436)  National Suicide Prevention Line (www.mentalhealthmn.org): 844-521-YFYM (1467)  Mental Health Consumer/Survivor Network of [place] (www.mhcsn.net): 166-536-6615 or 101-811-0761  Mental Health Association of [place] (www.mentalhealth.org): 861.356.7798 or 534-665-5205   Substance " Abuse and Mental Health Services (www.samhsa.gov)  [place] Opioid Prevention Coalition: www.opioidcoalition.org    [place] Recovery Connection ([facility])  [facility] connects people seeking recovery to resources that help foster and sustain long-term recovery.  Whether you are seeking resources for treatment, transportation, housing, job training, education, health care or other pathways to recovery, [facility] is a great place to start.  455.346.4073.  www.minnesotarecstudentSNy.Tintri    Great Pod casts for nutrition and wellness  Listen on Apple Podcasts  Dishing Up Nutrition   [employer]   Nutrition       Understand the connection between what you eat and how you feel. Hosted by licensed nutritionists and dietitians from [employer] we share practical, real-life solutions for healthier living through nutrition.     General Medication Instructions:   See your medication sheet(s) for instructions.   Take all medications as prescribed.  Make no changes unless your primary care provider suggests them.   Go to all your primary care provider visits.  Be sure to have all your required lab tests. This way, your medicines can be refilled on time.  Do not use any forms of alcohol.    Please Note: If you have any questions at anytime after you discharged please call [facility] detox unit 3A at [phone].    Here are a list of additional numbers you can call if you are wanting to resume services through [facility]:  [facility] Assessment Intake: [phone]  [facility] Adult [facility] Intensive Outpatient  call: [phone]  [facility] Admissions [phone]    Recovery Clinic call: [phone]  [facility]: [phone]  Medical Records call: [phone]  Billing Department call: [phone]    Please remember to take all of your behavioral discharge planning and lab paperwork to any follow up appointments, it contains your lab  results, diagnosis, medication list and discharge recommendations.      THANK YOU FOR CHOOSING [facility]

## 2025-02-25 NOTE — PLAN OF CARE
Behavioral Team Discussion: (2/25/2025)    Continued Stay Criteria/Rationale: Patient admitted for Chemical Use Issues.  Plan: The following services will be provided to the patient; psychiatric assessment, medication management, therapeutic milieu, individual and group support, and skills groups.   Participants: 3A Provider: Arnulfo Anderson MD; 3A RN: Niecy Armendariz RN; 3A LADC: ISA Kwong.  Summary/Recommendation: Providers will assess today for treatment recommendations, discharge planning, and aftercare plans. LADC will meet with pt for discharge planning.   Medical/Physical: No acute medical concerns reported.  Precautions:   Behavioral Orders   Procedures    Code 1 - Restrict to Unit    Routine Programming     As clinically indicated    Status 15     Every 15 minutes.    Withdrawal precautions     Rationale for change in precautions or plan: N/A  Progress: Initial.    ASAM Dimension Scale Ratings:  Dimension 1: 3 Client tolerates and susan with withdrawal discomfort poorly. Client has severe intoxication, such that the client endangers self or others, or intoxication has not abated with less intensive levels of services. Client displays severe signs and symptoms; or risk of severe, but manageable withdrawal; or withdrawal worsening despite detox at less intensive level.  Dimension 2: 1 Client tolerates and susan with physical discomfort and is able to get the services that the client needs.  Dimension 3: 2 Client has difficulty with impulse control and lacks coping skills. Client has thoughts of suicide or harm to others without means; however, the thoughts may interfere with participation in some treatment activities. Client has difficulty functioning in significant life areas. Client has moderate symptoms of emotional, behavioral, or cognitive problems. Client is able to participate in most treatment activities.  Dimension 4: 2 Client displays verbal compliance, but lacks consistent behaviors; has low  motivation for change; and is passively involved in treatment.  Dimension 5: 4 No awareness of the negative impact of mental health problems or substance abuse. No coping skills to arrest mental health or addiction illnesses, or prevent relapse.  Dimension 6: 3 Client is not engaged in structured, meaningful activity and the client's peers, family, significant other, and living environment are unsupportive, or there is significant criminal justice system involvement.

## 2025-02-25 NOTE — PLAN OF CARE
Problem: Suicide Risk  Goal: Absence of Self-Harm  Outcome: Progressing     Problem: Alcohol Withdrawal  Goal: Alcohol Withdrawal Symptom Control  2/25/2025 0510 by Ayanna Montano RN  Outcome: Progressing     Problem: Pain Acute  Goal: Optimal Pain Control and Function  Outcome: Progressing   Goal Outcome Evaluation:    Plan of Care Reviewed With: patient      The Patient's MSSA scores were 3 and 3. He received no meds during the night for withdrawal SxS and slept okay. The Team conducted safety checks every 15 minutes, and there were no issues. He used his CPAP and was independent with bed mobility in his Bariatric medical bed. He was a walker.

## 2025-02-25 NOTE — PHARMACY-ADMISSION MEDICATION HISTORY
Admission Medication History completed by pharmacist, Anita Parks on 2/22/25. Please see Pharmacy - Admission Medication History note under previous encounter at [facility]. for information regarding prior to admission medications.    Marley Pittman, PharmD   Clinical Pharmacist

## 2025-02-25 NOTE — CONSULTS
[facility]  Consult Note - Hospitalist Service  Date of Admission:  2/24/2025  Consult Requested by: Psychiatry; Dr. Anderson  Reason for Consult: Detox    Assessment & Plan   Cody Bolton is a 44 year old male with PMH of AUD, hepatic steatosis, HTN, T2DM, atrial fibrillation, h/o DVT/ PE, anticoagulated on warfarin admitted on 2/24/2025 for alcohol detox. Medicine was consulted for co-management.     #Acute alcohol withdrawal  #Alcohol use disorder  #Suicidal ideation, improved  IVAN on admission 0.28. Last drink 2/22 PTA. Per initial ED note, pt brought into ED by his ex-wife as he was drinking whiskey in an attempt to take his life. Drinking 0.75L of hard alcohol last 2 weeks w/ increase to 1.75L the few days PTA. Prior to binge over last 2 weeks, was drinking 1-2x per week.  Utox screen negative.  Labs on admission otherwise notable for elevated AG to 20, low Cl to 95, stable anemia w/ hgb 13.1, INR 2.47 (on warfarin w/ goal 2-3).  - Management per Psychiatry  - Agree w/ MSSA w/ diazepam   - Agree w/ folate, MVI, thiamine  - PRNs for withdrawal symptom management per Psychiatry     #Elevated AG, improved  #Hypochloremia, resolved  AG elevated to 20 and Cl down to 95 on admission, likely in setting of heavy alcohol use, dehydration, possible vomiting. Expect to resolved w/ rehydration, alcohol cessation, and progression through withdrawal.   Repeat BMP 2/25 w/ normalization of chloride, improved AG to 16.  - No need for further monitoring    #L knee pain   #L knee meniscal tear  Torn meniscus per patient, s/p cortisol shot with some improvement. Uses walker 2/2 instability when first standing but does better with more steps. Has some OP PT scheduled, but may have to cancel depending on next steps for CD treatment.   - OP PT recommended  - APAP PRN    #Chronic normocytic anemia  Likely in setting of alcohol use. Hgb 13.1 on admission, stable compared to baseline.   -  "No indication for continued monitoring here    # ? Hepatic steatosis  Pt w/ fatty liver disease documented in chart. Per review, this was initially documented as \"presumed fatty liver\". CT chest/abdomen/pelvis on 5/16/2024 w/o mention of fatty liver, though indication for scan was for fall/ R sided pain. Unclear hx of steatosis, but not clear objective evidence per my review. Also, LFTs on admission unremarkable.   - No current workup indicated  - Consider abdominal US in OP setting w/ PCP    #HTN  #HLD  - Continue PTA carvedilol 12.5mg BID, lisinopril 20mg daily  - Continue PTA simvastatin 20mg at bedtime    #T2DM  Last A1c of 6.7% on 1/7/25.  - Continue PTA metformin 1000mg daily with dinner  - Monjaro once weekly PTA    #Paroxysmal arial fibrillation  First noted in July 2024. At this time, already on warfarin. Thought to be in setting of substance use, particularly alcohol.  - Pharmacy to dose warfarin    #H/p PE 02/2024  #H/o DVT 05/2021  DVT noted in R LE in May 2021, deemed provoked. Was on Xarelto 15mg for 3-4 weeks. BL PE noted in Feb 2024, deemed unprovoked. Started on warfarin following PE dx in Feb 2024, will need lifelong AC.   - Pharmacy to dose warfarin    #CARLIE: CPAP  #ED: PRN sildenafil 100mg at home.     Medicine will sign off on this patient. Thank you for allowing us to participate in the care of this patient. Please do not hesitate to reach out with further questions and concerns.        The patient's care was discussed with the Patient.    Clinically Significant Risk Factors Present on Admission                # Drug Induced Coagulation Defect: home medication list includes an anticoagulant medication    # Hypertension: Home medication list includes antihypertensive(s)          # DMII: A1C = 6.7 % (Ref range: 0.0 - 5.6 %) within past 6 months    # Severe Obesity: Estimated body mass index is 48.82 kg/m  as calculated from the following:    Height as of this encounter: 1.829 m (6').    Weight as " "of this encounter: 163.3 kg (360 lb).       # Financial/Environmental Concerns:           Stephen Ramires PA-C  Hospitalist Service  Securely message with [employer] (more info)  Text page via [employer] Paging/Directory   ______________________________________________________________________    Chief Complaint   Alcohol detox    History is obtained from the patient and electronic health record    History of Present Illness   Cody Bolton is a 44 year old male with PMH of AUD, hepatic steatosis, HTN, T2DM, atrial fibrillation, h/o DVT/ PE, anticoagulated on warfarin admitted on 2/24/2025 for alcohol detox. Medicine was consulted for co-management.     Patient states he is feeling better today.  He is somewhat tremulous, but otherwise feeling okay.  States he feels \"more positive this time\" in regards to him being in detox.  States he is looking forward to what is coming next in life.  Also reports some left knee pain, reports he tore his left meniscus at some point.  Has some outpatient PT scheduled, but I have to reschedule depending on the decision made for treatment. No further questions or concerns at this time.       Past Medical History    Past Medical History:   Diagnosis Date    Depressive disorder     High cholesterol     History of gastric bypass     History of pulmonary embolism     Hypertension     CARLIE (obstructive sleep apnea)     Personal history of DVT (deep vein thrombosis)     Type 2 diabetes mellitus (H)     Uncomplicated asthma        Past Surgical History   Past Surgical History:   Procedure Laterality Date    APPENDECTOMY  08/15/2017    Dr. Ferrer    GASTRIC BYPASS      AL LAP,APPENDECTOMY N/A 8/14/2017    Procedure: APPENDECTOMY, LAPAROSCOPIC;  Surgeon: Nba Ferrer MD;  Location: [facility] OR;  Service: General    REVISION AKANKSHA-EN-Y  2014    Dr. Ranjeet Espinal [facility]       Medications   I have reviewed this patient's current medications  Current Facility-Administered Medications "   Medication Dose Route Frequency Provider Last Rate Last Admin    acetaminophen (TYLENOL) tablet 650 mg  650 mg Oral Q4H PRN Arnulfo Anderson MD   650 mg at 02/25/25 1214    alum & mag hydroxide-simethicone (MAALOX) suspension 30 mL  30 mL Oral Q4H PRN Arnulfo Anderson MD        carvedilol (COREG) tablet 12.5 mg  12.5 mg Oral BID w/meals Wilber Zhou, APRN CNP   12.5 mg at 02/25/25 0801    glucose gel 15-30 g  15-30 g Oral Q15 Min PRN Cass Juarez PA-C        Or    dextrose 50 % injection 25-50 mL  25-50 mL Intravenous Q15 Min PRN Cass Juarez PA-C        Or    glucagon injection 1 mg  1 mg Subcutaneous Q15 Min PRN Cass Juarez PA-C        diazepam (VALIUM) tablet 5-20 mg  5-20 mg Oral Q30 Min PRN Arnulfo Anderson MD        folic acid (FOLVITE) tablet 1 mg  1 mg Oral Daily Arnulfo Anderson MD   1 mg at 02/25/25 0802    hydrOXYzine HCl (ATARAX) tablet 25 mg  25 mg Oral Q4H PRN Arnulfo Anderson MD        lisinopril (ZESTRIL) tablet 20 mg  20 mg Oral Daily Wilber Zhou, LUIS ALBERTO CNP   20 mg at 02/25/25 0802    loperamide (IMODIUM) capsule 2 mg  2 mg Oral 4x Daily PRN Arnulfo Anderson MD        metFORMIN (GLUCOPHAGE XR) 24 hr tablet 1,000 mg  1,000 mg Oral Daily with Stephen Marion PA-C        multivitamin w/minerals (THERA-VIT-M) tablet 1 tablet  1 tablet Oral Daily Arnulfo Anderson MD   1 tablet at 02/25/25 0801    ondansetron (ZOFRAN ODT) ODT tab 4 mg  4 mg Oral Q6H PRN Arnulfo Anderson MD        senna-docusate (SENOKOT-S/PERICOLACE) 8.6-50 MG per tablet 1 tablet  1 tablet Oral BID PRN Arnulfo Anderson MD        simvastatin (ZOCOR) tablet 20 mg  20 mg Oral At Bedtime Stephen Ramires PA-C        thiamine (B-1) tablet 100 mg  100 mg Oral Daily Arnulfo Anderson MD   100 mg at 02/25/25 0801    traZODone (DESYREL) tablet 50 mg  50 mg Oral At Bedtime PRN Arnulfo Anderson MD   50 mg at 02/24/25 2403     warfarin ANTICOAGULANT (COUMADIN) tablet 5 mg  5 mg Oral ONCE at 18:00 Arnulfo Anderson MD        Warfarin Dose Required Daily - Pharmacist Managed  1 each Oral See Admin Instructions Stephen Ramires PA-C         Facility-Administered Medications Ordered in Other Encounters   Medication Dose Route Frequency Provider Last Rate Last Admin    Self Administer Medications: Behavioral Services   Does not apply See Admin Instructions Corrine Alvarenga MD        Self Administer Medications: Behavioral Services   Does not apply See Admin Instructions Corrine Alvarenga MD              Review of Systems    The 5 point Review of Systems is negative other than noted in the HPI or here.     Allergies   No Known Allergies  ------------------------------------------------------------------------     Physical Exam   Vital Signs: Temp: 98.7  F (37.1  C) Temp src: Oral BP: (!) 165/80 Pulse: 75   Resp: 16 SpO2: 96 % O2 Device: None (Room air)    Weight: 360 lbs 0 oz    General Appearance: Pt is well appearing, ambulating with a walker. NAD.   Respiratory: Lungs CTAB, breathing comfortably on room air.   Cardiovascular: RRR, no murmur appreciated.   GI: Non distended, non tender.   Skin: Warm and dry.   Other: Alert and oriented. Pleasant and interactive.      Medical Decision Making       45 MINUTES SPENT BY ME on the date of service doing chart review, history, exam, documentation & further activities per the note.      Data   ------------------------- PAST 24 HR DATA REVIEWED -----------------------------------------------    I have personally reviewed the following data over the past 24 hrs:    N/A  \   N/A   / N/A     140 102 12.4 /  111 (H)   3.8 22 0.92 \     TSH: 2.74 T4: N/A A1C: N/A     INR:  2.37 (H) PTT:  N/A   D-dimer:  N/A Fibrinogen:  N/A       Imaging results reviewed over the past 24 hrs:   No results found for this or any previous visit (from the past 24 hours).

## 2025-02-25 NOTE — CONSULTS
"SPIRITUAL HEALTH SERVICES  SPIRITUAL ASSESSMENT consult Note  [facility] ([facility]) 3a     REFERRAL SOURCE: routine consult    [facility], he shared that he attends \"[employer] [facility] [place]\" and asked to be placed on a prayer list there, which I assured him was possible.     He finds peace and melissa in:   DJ-ing for weddings and a roller-rink  Spending time with his adult children  Chatting online with friends    Cody values  visits and enjoys \"just chatting\".     I oriented pt to spiritual health services and they know they can request a visit at any time.     PLAN: Spiritual health services remains available for any follow-up or requests. For further visits please place spiritual health consults.    Lizbeth Das, [facility]  Associate   Pager: [phone]    "

## 2025-02-25 NOTE — PLAN OF CARE
Problem: Alcohol Withdrawal  Goal: Alcohol Withdrawal Symptom Control  Outcome: Progressing     Problem: Pain Acute  Goal: Optimal Pain Control and Function  Outcome: Progressing   Goal Outcome Evaluation:    The Patient's MSSA score was 5. He showered, declined sleep aid, and went to bed. He is using his CPAP. The Team conducted safety checks every 15 minutes without any issues.

## 2025-02-25 NOTE — PROGRESS NOTES
"[facility]     Case Management Encounter: Met with team, discussed patient progress, discharge plan and any impediments to discharge.    Writer met with the patient to discuss discharge and aftercare planning. Patient reports that he attended [facility] August 2024. Patient reports that he stopped drinking for a period of time after treatment then returned to occasional use. Patient reports he returned to \"problematic use\" 2 weeks ago when he started drinking 0.75 Liters of of liquor. Patient reports that his use increased to 1 liter of liquor per day for the past week. Patient reports what led to his recent return to use was bargaining with himself that he could \"just have one drink after work or a couple on the weekends\". Patient reports that he is interested in doing IOP treatment during the day instead of in the evening. Patient reports that he works full-time during the day shift but is willing to take a leave from work to do IOP treatment. Patient lives in [place], [place] in a triplex with his ex-wife and children. Patient reports living situation is safe and stable. Patient reports that he has adequate support such as his family, ex-wife, girlfriend, and friends. Patient reports that he would like to attend treatment around the [facility] area. Patient reports that he attends AA meetings from time to time and utilizes a meeting darrius to find meetings near him.     Insurance: [employer]    Legal Status:  72 hour hold   County: NA  File Number: NA  Start and expiration date of commitment: NA    SUDs Assessment Status: Will need for placement.     ROIs on file: None at this time.  Emergency contact: Rhiannon Bolton (Sister) [phone] (no ERICKA)    Living Situation: Lives in [place], [place].    Current Providers, Supports & Collateral:  PCP, Therapist, ex-wife, girlfriend, friends, and family.  PCP: Yovana Felipe MD - [facility]  Therapist: Lucy Rouse - [facility] (virtual " sessions)    Current Plan/Referral Status: IOP - near [place], [place].    Safety Plan Status: Completed.      ISA Agarwal  [facility] - Adult Inpatient Addiction Psychiatry Unit

## 2025-02-25 NOTE — PROGRESS NOTES
02/24/25 2055   Patient Belongings   Did you bring any home meds/supplements to the hospital?  No   Patient Belongings other (see comments)   Belongings Search Yes   Clothing Search Yes   Second Staff [place]     Storage bin: pants with belt, jacket, hat, body wipes, set of keys    Box in med room: phone,  cord    Med room: cpap in case    A             ADMISSION:    I am responsible for any personal items that are not sent to the safe or pharmacy. [facility] is not responsible for loss, theft or damage of any property in my possession.    Patient Signature _________________________________________ Date/Time _____________________    Staff Signature ___________________________________________ Date/Time _____________________    2nd Staff person, if patient is unable/unwilling to sign    ________________________________________________________ Date/Time _____________________    DISCHARGE:    All personal items have been returned to me.    Patient Signature _________________________________________ Date/Time _____________________    Staff Signature ___________________________________________ Date/Time _____________________

## 2025-02-25 NOTE — PROGRESS NOTES
Rehab Group     Start time: 1645  End time: 1730  Patient time total: 45 minutes     attended full group     #5 attended   Group Type: music   Group Topic Covered: substance use/recovery, grief and coping skills      Group Session Detail: Topics of Acceptance and Change were explored through live, original songs played by MT, followed by reflecting writing and sharing by pts.        Patient Response/Contribution:  cooperative with task, organized, expressed understanding of topic, and attentive      Patient Detail: Pt wrote on Letting go and Change in their lives, and shared with the group openly.       Pt was positive and upbeat in affect, and did share his writing with the group, though it was more surface than his peers.          Activity Therapy Per 15 min ()    Patient Active Problem List   Diagnosis    Type 2 diabetes mellitus without complication, without long-term current use of insulin (H)    History of gastric bypass    Hyperlipidemia    Obstructive sleep apnea syndrome    Elevated BP without diagnosis of hypertension    Major depressive disorder, recurrent episode, moderate (H)    History of DVT (deep vein thrombosis)    History of pulmonary embolism    Morbid obesity (H)    Alcohol use disorder, severe, dependence (H)    Paroxysmal atrial fibrillation (H)    Personal history of DVT (deep vein thrombosis)    Erectile dysfunction due to arterial insufficiency    Alcohol use disorder, severe, in early remission (H)    Alcohol dependence with uncomplicated intoxication (H)    Anxiety    Other insomnia    Suicidal ideation

## 2025-02-25 NOTE — H&P
Psychiatry History and Physical    Cody Bolton MRN# [id number]   Age: 44 year old YOB: 1980     Date of Admission:  2/24/2025          Assessment:   This patient is a 44 year old male with history of MDD, MERI, AUD, T2DM, atrial fibrillation and VTE on warfarin who presented to ED seeking detox from alcohol, as well as suicidal ideation. Medically cleared in ED, admitted MICD to [facility]. Drinking 0.75 - 1L of liquor per day, EtOH 0.28, UDS negative. MSSA with diazepam started for alcohol withdrawal. Withdrawal precautions in place, denies history of seizures. Admission labs ordered and reviewed those resulted. IM consult placed. Recent suicidal statements prior to admission, patient currently denying safety concerns including SI and HI. PTA medications continued as appropriate. Pt reports goals for hospitalization are to complete detox, prefers IOP but would consider residential treatment.      Inpatient psychiatric hospitalization is warranted at this time for safety, stabilization, and possible adjustment in medications.         Diagnoses:   Alcohol use disorder, severe, dependence with withdrawal with complication  MDD  MERI  T2DM  Atrial fibrillation on warfarin  History of DVT/PE  CARLIE  HTN  HLD    Clinically Significant Risk Factors Present on Admission                # Drug Induced Coagulation Defect: home medication list includes an anticoagulant medication    # Hypertension: Home medication list includes antihypertensive(s)          # DMII: A1C = 6.7 % (Ref range: 0.0 - 5.6 %) within past 6 months    # Severe Obesity: Estimated body mass index is 48.82 kg/m  as calculated from the following:    Height as of this encounter: 1.829 m (6').    Weight as of this encounter: 163.3 kg (360 lb).       # Financial/Environmental Concerns:                   Plan:   Psychiatric treatment/inteventions:  Medications:   -MSSA with diazepam, thiamine, folic acid, multivitamin for alcohol withdrawal   -hold  Wellbutrin due to seizure risk during detox, can restart at discharge  -PRN hydroxyzine 25mg every 4 hours for anxiety  -PRN trazodone 50mg at bedtime for sleep  -consider MAT for AUD prior to discharge - patient interested in naltrexone + acamprosate combination  -patient interested in IOP, open to residential     Laboratory/Imaging: reviewed labs from recent ED visit and admission 2/22, notable for serum creatinine 0.76; ethanol 0.28; RDW 16.1; UDS negative; ALT 46, AST 44; INR 2.72 (anticoagulated on warfarin).    Patient will be treated in therapeutic milieu with appropriate individual and group therapies as described.    Medical treatment/interventions:  Medical concerns:   1) Alcohol withdrawal  -MSSA as above  -PRN zofran and imodium for GI distress related to withdrawal   -withdrawal precautions    2) IM consult placed for management of other medical concerns, consult/recommendations pending. Appreciate assistance.    Legal Status: 72-h Hold signed on 02/24/25 08:11 PM    Safety Assessment:   Behavioral Orders   Procedures    Code 1 - Restrict to Unit    Routine Programming     As clinically indicated    Status 15     Every 15 minutes.    Withdrawal precautions      Pt has not required locked seclusion or restraints in the past 24 hours to maintain safety, please refer to RN documentation for further details.    The risks, benefits, alternatives and side effects have been discussed and are understood by the patient.    Disposition: Pending clinical stabilization. MICD IOP vs residential when stable.    Jorge Zavala MD  [facility] Psychiatry         Chief Complaint:     Alcohol withdrawal  Suicidal ideation         History of Present Illness:     Cody Bolton is a 44 year old male with history of MDD, MERI, AUD, T2DM, atrial fibrillation and VTE on warfarin who presented to ED seeking detox from alcohol, as well as suicidal ideation.    Chart review completed including ED notes 2/22/25  "leading to this admission for alcohol intoxication and suicidal ideation; ED note 2/19/25 for alcohol intoxication, initial concern for suicidal ideation by EMS report; ED visit 10/21/2024 for depression and concerns regarding his alcohol use disorder; [facility] admission 8/20/24 for alcohol withdrawal.     Per ED physician note:   Cody Bolton is a 44 year old male with a history of anxiety, depression, fatty liver, type II diabetes, hypertension, atrial fibrillation, DVT, and PE, anticoagulated on warfarin who presents with his ex-wife to the Emergency Department for suicidal ideations. The patient's ex-wife reports Cody has been drinking whiskey in an attempt to commit suicide. At 2200 yesterday (2/21/25) he called her saying he wanted to die. She also notes that his problems with alcohol have been progressively worsening the last six months. Cody reports he \"likes to feel drunk\" and has access to carvedilol and knives at home. Denies vomiting, recent falls or hitting his head.     Per psychiatry consult note A/P:  Consult placed to psychiatry regarding worsening depression and alcohol use.  Occurring in the setting of family concerns of suicidal behaviors prior to admission with increasing alcohol use as a means of managing depressed mood associated with functional impairment.  Placed on a 72-hour hold due to lacking insight of behaviors associated with presentation and concerns expressed by family.  Indication for inpatient psychiatric hospitalization to coordinate cares, medication management, discharge plans.     Upon interview:   Confirmed above with patient. Cody tells me that substance use first became an issue approximately three years ago. Prior to this he would drink 1-2x per year socially. Due to relationship issues and other stressors including family problems and work, he began drinking more heavily. His pattern of drinking has been in a binge pattern primarily, drinking heavily for 2-3 days " "followed by 1-2 weeks of sobriety. Around two weeks ago he reunited with his ex-girlfriend and has been drinking approximately 1.75L of liquor per day. He denies a history of alcohol withdrawal, seizures or delirium tremens. He has been in treatment once prior, [facility] 08/2024. He had 61 days of sobriety following this before returning to use. Triggers include stress, episodes of depression, relationship instability, and sometimes boredom. He has previously tried naltrexone and acamprosate and did not find them helpful. He denies any other previous or current substance use. Currently he denies any withdrawal symptoms or other concerns.     In terms of his mental health, he reports a diagnosis of MDD and MERI. He first noticed periods of depression, hopelessness, low energy in his 20s, so preceding his alcohol use. These can last days to weeks and occur several times per year. His ex-wife first pointed out his depressive episodes 3-4 years ago and he sought treatment. He did complete a PHP through [employer] in 2023, denies any psychiatric hospitalizations. He denies any suicide attempts. He notes one accidental overdose in 2023. He injured himself in a fall and \"took a sip\" of his mother's liquid morphine for pain and required naloxone. He denies this being intentional, and denies any other opioid use prior to or following this event. His only medication for mood has been bupropion, he has found it helpful for his mood. He does see a therapist weekly, Ericka Rouse. He also has a . He notes he will often make suicidal statements when intoxicated, but has never had suicidal ideation when sober and denies any attempts. 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  He denies a history of maxim, psychosis. Currently his mood is \"good - much better now that I am sober.\" He " denies suicidal ideation or any safety concerns.    His goals of this hospitalization are to complete detox and preferably going home with an IOP, though he would consider residential treatment if it was strongly recommended.              Psychiatric Review of Systems:   Depression:   Denies: depressed mood, suicidal ideation, decreased interest, changes in sleep, changes in appetite, guilt, hopelessness, helplessness, impaired concentration, decreased energy, irritability.  Pamela:   Denies: sleeplessness, impulsiveness, racing thoughts, increased goal-directed activities, pressured speech, increase in energy  Psychosis:   Denies: visual hallucinations, auditory hallucinations, paranoia, grandiosity, ideas of reference, thought insertions, thought broadcasting.  Anxiety:   Denies: excessive worries that are difficult to control, panic attacks  PTSD:   Denies: re-experiencing past trauma, nightmares, increased arousal, avoidance of traumatic stimuli, impaired function.  OCD:   Denies: obsessions, checking, symmetry, cleaning, skin picking.  ED:   Denies: restriction, binging, purging, excessive exercise, laxative abuse           Medical Review of Systems:     10 point review of systems is otherwise negative unless noted above.            Psychiatric History:   Psychiatric Hospitalizations: Reports PHP  History of Psychosis: denies  Prior ECT: denies  Court Commitment: denies  Suicide Attempts: denies - reports one unintentional overdose  Self-injurious Behavior: denies  Violence toward others: denies  Use of Psychotropics: bupropion for mood, has taken naltrexone + acamprosate in past for AUD         Substance Use History:   Alcohol: See HPI   Cannabis: none  Nicotine: none  Cocaine: none  Methamphetamine: none  Opiates/Heroin: none  Benzodiazepines: none  Hallucinogens: none  Inhalants: none    Prior Chemical Dependency treatment: x1          Social History:   Upbringing: Born and raised in [place]  Educational  History: College degree  Relationships:   Children: Two  Current Living Situation: Triplex - owned by ex-wife, lives in his own unit  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  Financial Support: Employment  Legal History: Denies  Abuse/Trauma History: None reported         Family History:     H/o completed suicides in family: Cousin  Reports brother with depression and a brother with substance use disorder    Family History   Problem Relation Age of Onset    Substance Abuse Brother     Anxiety Disorder Sister     Depression Sister              Past Medical History:     Past Medical History:   Diagnosis Date    Depressive disorder     High cholesterol     History of gastric bypass     History of pulmonary embolism     Hypertension     CARLIE (obstructive sleep apnea)     Personal history of DVT (deep vein thrombosis)     Type 2 diabetes mellitus (H)     Uncomplicated asthma        History of seizures: denies         Past Surgical History:     Past Surgical History:   Procedure Laterality Date    APPENDECTOMY  08/15/2017    Dr. Ferrer    GASTRIC BYPASS      [place] LAP,APPENDECTOMY N/A 8/14/2017    Procedure: APPENDECTOMY, LAPAROSCOPIC;  Surgeon: Nba Ferrer MD;  Location: [facility];  Service: General    REVISION AKANKSHA-EN-Y  2014    Dr. Ranjeet Espinal [facility]              Allergies:    No Known Allergies           Medications:   I have reviewed this patient's current medications  Medications Prior to Admission   Medication Sig Dispense Refill Last Dose/Taking    buPROPion (WELLBUTRIN XL) 150 MG 24 hr tablet Take 1 tablet (150 mg) by mouth every morning 90 tablet 3     carvedilol (COREG) 12.5 MG tablet Take 1 tablet (12.5 mg) by mouth 2 times daily (with meals) 60 tablet 11     cloNIDine (CATAPRES) 0.1 MG tablet Take 0.1 mg by mouth as needed (Sleep)       lisinopril (ZESTRIL) 20 MG tablet Take 1 tablet (20 mg) by mouth daily 90 tablet 2     metFORMIN  "(GLUCOPHAGE XR) 500 MG 24 hr tablet Take 2 tablets (1,000 mg) by mouth daily (with dinner) 180 tablet 3     sildenafil (VIAGRA) 100 MG tablet Take 1 tablet (100 mg) by mouth daily as needed (erectile dysfunction). 30 tablet 3     simvastatin (ZOCOR) 20 MG tablet Take 1 tablet (20 mg) by mouth at bedtime 90 tablet 3     warfarin ANTICOAGULANT (COUMADIN) 5 MG tablet Take 5 mg by mouth every evening. Take 2.5 mg on Wednesdays and 5 mg all other days.                Labs:     Recent Results (from the past 24 hours)   INR    Collection Time: 02/24/25  8:02 AM   Result Value Ref Range    INR 2.72 (H) 0.85 - 1.15   Glucose by meter    Collection Time: 02/24/25  9:02 PM   Result Value Ref Range    GLUCOSE BY METER POCT 183 (H) 70 - 99 mg/dL       BP (!) 143/81 (Patient Position: Sitting, Cuff Size: Adult Large)   Pulse 77   Temp 98.9  F (37.2  C) (Oral)   Resp 16   Ht 1.829 m (6')   Wt (!) 163.3 kg (360 lb)   SpO2 95%   BMI 48.82 kg/m    Weight is 360 lbs 0 oz  Body mass index is 48.82 kg/m .         Psychiatric Mental Status Examination:   Appearance: awake, alert  Attitude: cooperative and pleasant  Eye Contact: good  Mood:  \"good\"  Affect: mood congruent and full range  Speech:  clear, coherent and normal prosody  Language: fluent in English  Psychomotor Behavior:  no evidence of tardive dyskinesia, dystonia, or tics  Gait/Station: normal  Thought Process:  linear, logical, goal oriented  Associations:  no loose associations  Thought Content:  Denying SI/HI/AVH; no evidence of psychotic thinking  Insight:  good  Judgement: intact  Oriented to:  time, person, and place  Attention Span and Concentration:  intact  Recent and Remote Memory:  intact  Fund of Knowledge: appropriate           Physical Exam:   Please refer to physical exam completed by ED provider, Zafar Mixon, on 02/24/2025 as below. I agree with the findings and assessment and have no additional findings to add at this time with exception that " psychiatric findings now above in MSE.    Constitutional: Alert, attentive  HENT:    Nose: Nose normal.    Mouth/Throat: Oropharynx is clear, mucous membranes are moist   Eyes: EOM are normal.   CV: regular rate and rhythm; no murmurs, rubs or gallups  Chest: Effort normal and breath sounds normal.   GI:  There is no tenderness. No distension. Normal bowel sounds  MSK: Normal range of motion.   Neurological: Alert, attentive  Skin: Skin is warm and dry.

## 2025-02-25 NOTE — PLAN OF CARE
Goal Outcome Evaluation:    Plan of Care Reviewed With: patient Plan of Care Reviewed With: patient    Overall Patient Progress: improvingOverall Patient Progress: improving     Pt is out of Detox. Pt is calm and cooperative; mildly sweaty, reports an overall good appetite and denies hallucinations.      Pt using walker independently on unit. He is also using a CPAP and currently in a Bariatric medical bed      /74 (BP Location: Left arm)   Pulse 82   Temp 97.5  F (36.4  C) (Temporal)   Resp 16   Ht 1.829 m (6')   Wt (!) 163.3 kg (360 lb)   SpO2 95%   BMI 48.82 kg/m    Rates R Knee pain 5/10 Tylenol given x2 this evening.      Attended AA.    BG before dinner was 127  BG before bed 131  Pt on Metformin      Pt reports anxiety rated 1 of 10 in severity and depression 2 of 10 in severity. Endorses feeling hopeful. Pt denies any suicidal ideation plans or intent. Endorses willingness to alert staff if he develops any SI/SIB while hospitalized (contracts for safety).      Pt reports plan for after detox as interested in Intensive Outpatient.     Pt reports no further concerns at this time.

## 2025-02-25 NOTE — PHARMACY-ANTICOAGULATION SERVICE
Clinical Pharmacy - Warfarin Dosing Consult     Pharmacy has been consulted to manage this patient s warfarin therapy.  Indication: DVT/ PE Treatment  Therapy Goal: INR 2-3  Warfarin Prior to Admission: Yes  Warfarin PTA Regimen: 2.5 mg on Wednesdays, 5 mg on ROW  Recent documented change in oral intake/nutrition: Unknown    INR   Date Value Ref Range Status   02/25/2025 2.37 (H) 0.85 - 1.15 Final   02/24/2025 2.72 (H) 0.85 - 1.15 Final       Recommend warfarin 5 mg today.  Pharmacy will monitor Cody WALLACE Hoang daily and order warfarin doses to achieve specified goal.      Please contact pharmacy as soon as possible if the warfarin needs to be held for a procedure or if the warfarin goals change.      Yovana Espino, PharmD, BCPP  Behavioral Health Pharmacist  [facility])  Available on [address]

## 2025-02-25 NOTE — PLAN OF CARE
"  S: The Patient arrived at 8:08 PM on February 24, 2025, in room 322, as a mental Illness and chemical dependency patient(MICD) from the [facility] Emergency Department. He is under the care of Dr. Anderson due to issues related to alcohol abuse and suicidal ideation. The patient is no longer scoring on the Clinical Lowman Withdrawal Assessment (CIWA) in the Emergency Department, as he has been there for more than twenty-five hours (he was admitted on February 22). He is on 72 hours Hold as of 2/24 at 0715.    B: Based on the information from the ED notes, RN-to-RN report, and nursing assessment on the unit, Cody Bolton is a 44-year-old male with a history of anxiety, depression, obstructive sleep apnea (CARLIE with CPAP), asthma, fatty liver disease, type II diabetes (managed with Metformin), hypertension, atrial fibrillation, deep vein thrombosis (DVT), and pulmonary embolism (PE). He is currently on warfarin. He presented to the emergency department with his ex-wife due to suicidal thoughts. She reported that he has been drinking whiskey in an attempt to harm himself and that his alcohol issues have worsened over the past six months. At 10:00 PM (February 21, 2025), he called her indicating he wanted to die. Cody acknowledged that he \"likes to feel drunk\" and has access to carvedilol and knives at home. He denied desiring to kill himself but because he drinks heavily, peolple are worried.     Lab: 2/22  COV/ID Symptoms: No  Utox: Negative   CMP: Abnormalities: A Gap 20 / Calcium 8.3 / Chloride 95 / Glucose 167  CBC: Abnormalities: Hemoglobin 13.1 / RDW 16.1  HCG: N/A  BAL: 0.28      A:The Patient willingly participated in the admission process and safety search, appearing in good spirits. He denied suicidal or homicidal thoughts, anxiety, and hallucinations but reported depression and sleep issues. Mu has no concerns about bathroom use or eating and has completed detoxification(This is the second one), " with prior experience in residential(At [facility]) and outpatient treatment. He began drinking at 41, which became problematic in 2022. Mu lives in [place] and works from home for [employer].    R: Today, a team including a psychiatrist, an internal medicine provider, and a licensed alcohol and drug counselor (LADC) will care for the patient. The primary nurse will follow the [facility] protocol to assess the patient and administer Valium as needed. The team will support the patient in developing coping skills, setting daily goals, and conducting 15-minute safety checks.

## 2025-02-25 NOTE — PLAN OF CARE
Goal Outcome Evaluation:    Plan of Care Reviewed With: patient Plan of Care Reviewed With: patient    Overall Patient Progress: improvingOverall Patient Progress: improving     MSSA scores  today are 4 and 4. No Valium given this shift. Pt is out of Detox. Pt is slightly tremulous, mildly sweaty, reports an overall good appetite and denies hallucinations.     Pt using walker independently on unit. He is also using a CPAP and currently in a Bariatric medical bed     Vitals are /78 (BP Location: Left arm)   Pulse 72   Temp 98.4  F (36.9  C) (Oral)   Resp 16   Ht 1.829 m (6')   Wt (!) 163.3 kg (360 lb)   SpO2 95%   BMI 48.82 kg/m    Rates R Knee pain 5/10 Tylenol given x2 today.       Writer did not get BG level this morning as pt was eating before I arrived out on floor. (RN meeting went long as we were down 1 RN this morning). BG before lunch was 111      Pt reports anxiety rated 1 of 10 in severity and depression 2 of 10 in severity. Endorses feeling hopeful. Pt denies any suicidal ideation plans or intent. Endorses willingness to alert staff if he develops any SI/SIB while hospitalized (contracts for safety).     Pt reports plan for after detox as interested in Intensive Outpatient.     Pt reports no further concerns at this time.

## 2025-02-26 VITALS
DIASTOLIC BLOOD PRESSURE: 92 MMHG | OXYGEN SATURATION: 96 % | HEIGHT: 72 IN | RESPIRATION RATE: 16 BRPM | BODY MASS INDEX: 42.66 KG/M2 | SYSTOLIC BLOOD PRESSURE: 156 MMHG | WEIGHT: 315 LBS | TEMPERATURE: 97.7 F | HEART RATE: 83 BPM

## 2025-02-26 LAB
GLUCOSE BLDC GLUCOMTR-MCNC: 115 MG/DL (ref 70–99)
HOLD SPECIMEN: NORMAL
HOLD SPECIMEN: NORMAL
INR PPP: 2.29 (ref 0.85–1.15)

## 2025-02-26 PROCEDURE — 250N000013 HC RX MED GY IP 250 OP 250 PS 637

## 2025-02-26 PROCEDURE — 99239 HOSP IP/OBS DSCHRG MGMT >30: CPT | Performed by: PSYCHIATRY & NEUROLOGY

## 2025-02-26 PROCEDURE — 250N000013 HC RX MED GY IP 250 OP 250 PS 637: Performed by: PSYCHIATRY & NEUROLOGY

## 2025-02-26 PROCEDURE — 250N000013 HC RX MED GY IP 250 OP 250 PS 637: Performed by: NURSE PRACTITIONER

## 2025-02-26 PROCEDURE — 85610 PROTHROMBIN TIME: CPT

## 2025-02-26 PROCEDURE — 36415 COLL VENOUS BLD VENIPUNCTURE: CPT

## 2025-02-26 RX ORDER — NALTREXONE HYDROCHLORIDE 50 MG/1
50 TABLET, FILM COATED ORAL DAILY
Qty: 30 TABLET | Refills: 3 | Status: SHIPPED | OUTPATIENT
Start: 2025-02-26

## 2025-02-26 RX ORDER — MULTIPLE VITAMINS W/ MINERALS TAB 9MG-400MCG
1 TAB ORAL DAILY
Qty: 30 TABLET | Refills: 1 | Status: SHIPPED | OUTPATIENT
Start: 2025-02-27

## 2025-02-26 RX ORDER — LANOLIN ALCOHOL/MO/W.PET/CERES
100 CREAM (GRAM) TOPICAL DAILY
Qty: 30 TABLET | Refills: 0 | Status: SHIPPED | OUTPATIENT
Start: 2025-02-27

## 2025-02-26 RX ORDER — FOLIC ACID 1 MG/1
1 TABLET ORAL DAILY
Qty: 30 TABLET | Refills: 1 | Status: SHIPPED | OUTPATIENT
Start: 2025-02-27

## 2025-02-26 RX ORDER — WARFARIN SODIUM 5 MG/1
5 TABLET ORAL
Status: COMPLETED | OUTPATIENT
Start: 2025-02-26 | End: 2025-02-26

## 2025-02-26 RX ORDER — ACAMPROSATE CALCIUM 333 MG/1
666 TABLET, DELAYED RELEASE ORAL 3 TIMES DAILY
Qty: 180 TABLET | Refills: 3 | Status: SHIPPED | OUTPATIENT
Start: 2025-02-26

## 2025-02-26 RX ADMIN — Medication 1 TABLET: at 08:08

## 2025-02-26 RX ADMIN — FOLIC ACID 1 MG: 1 TABLET ORAL at 08:08

## 2025-02-26 RX ADMIN — ACETAMINOPHEN 650 MG: 325 TABLET, FILM COATED ORAL at 16:10

## 2025-02-26 RX ADMIN — LISINOPRIL 20 MG: 20 TABLET ORAL at 08:09

## 2025-02-26 RX ADMIN — Medication 25 MG: at 10:21

## 2025-02-26 RX ADMIN — ACETAMINOPHEN 650 MG: 325 TABLET, FILM COATED ORAL at 08:11

## 2025-02-26 RX ADMIN — METFORMIN ER 500 MG 1000 MG: 500 TABLET ORAL at 17:02

## 2025-02-26 RX ADMIN — THIAMINE HCL TAB 100 MG 100 MG: 100 TAB at 08:08

## 2025-02-26 RX ADMIN — CARVEDILOL 12.5 MG: 6.25 TABLET, FILM COATED ORAL at 17:55

## 2025-02-26 RX ADMIN — CARVEDILOL 12.5 MG: 6.25 TABLET, FILM COATED ORAL at 08:08

## 2025-02-26 RX ADMIN — WARFARIN SODIUM 5 MG: 5 TABLET ORAL at 17:59

## 2025-02-26 ASSESSMENT — ACTIVITIES OF DAILY LIVING (ADL)
ADLS_ACUITY_SCORE: 41
DRESS: SCRUBS (BEHAVIORAL HEALTH)
ADLS_ACUITY_SCORE: 41
ADLS_ACUITY_SCORE: 41
HYGIENE/GROOMING: INDEPENDENT
ADLS_ACUITY_SCORE: 41
ADLS_ACUITY_SCORE: 41
ORAL_HYGIENE: INDEPENDENT
ADLS_ACUITY_SCORE: 41
ADLS_ACUITY_SCORE: 41

## 2025-02-26 ASSESSMENT — ANXIETY QUESTIONNAIRES
1. FEELING NERVOUS, ANXIOUS, OR ON EDGE: NOT AT ALL
2. NOT BEING ABLE TO STOP OR CONTROL WORRYING: SEVERAL DAYS
6. BECOMING EASILY ANNOYED OR IRRITABLE: NOT AT ALL
IF YOU CHECKED OFF ANY PROBLEMS ON THIS QUESTIONNAIRE, HOW DIFFICULT HAVE THESE PROBLEMS MADE IT FOR YOU TO DO YOUR WORK, TAKE CARE OF THINGS AT HOME, OR GET ALONG WITH OTHER PEOPLE: SOMEWHAT DIFFICULT
GAD7 TOTAL SCORE: 5
7. FEELING AFRAID AS IF SOMETHING AWFUL MIGHT HAPPEN: SEVERAL DAYS
5. BEING SO RESTLESS THAT IT IS HARD TO SIT STILL: NOT AT ALL
4. TROUBLE RELAXING: MORE THAN HALF THE DAYS
GAD7 TOTAL SCORE: 5
3. WORRYING TOO MUCH ABOUT DIFFERENT THINGS: SEVERAL DAYS

## 2025-02-26 NOTE — PLAN OF CARE
Problem: Suicide Risk  Goal: Absence of Self-Harm  Outcome: Progressing     Problem: Alcohol Withdrawal  Goal: Alcohol Withdrawal Symptom Control  Outcome: Met     Problem: Sleep Disturbance  Goal: Adequate Sleep/Rest  Outcome: Progressing   Goal Outcome Evaluation:    Since completing the detox process, the Patient has been sleeping well at night, and the ongoing 15-minute safety checks show no issues.

## 2025-02-26 NOTE — PROGRESS NOTES
"    [facility] Detox  Provider Name/Credentials:  ISA Kwong    PATIENT'S NAME: Cody Bolton  PREFERRED NAME: Cody   PREFERRED PRONOUNS: he/him/his     MRN: [id number]  : 1980   Last 4 SSN: [id number]  ACCT. NUMBER: [id number]  DATE OF SERVICE: 25  START TIME: 2:00 pm  END TIME: 3:00 pm  PREFERRED PHONE: [phone]    PRERERRED EMAIL [email or web address]  May we leave a program related message: Yes  EMERGENCY CONTACT: was obtained Rhiannon Bolton (Sister) [phone] .  Service Modality:  In-person    UNIVERSAL ADULT Substance Use Disorder Assessment    Identifying Information:  Cody is a 44 year old  male.  Patient identified their sexual orientation as heterosexual. The pronoun use throughout this assessment reflects the patient's chosen pronoun.  Patient was referred for an assessment by self.  Patient attended the session alone.     Chief Complaint:   Cody shared their reason for seeking services at this time is: \"Alcoholism\".  The problem began at the age of 42. Patient reports that he didn't drink until 2 years ago. Patient reports that he started to drink socially 2 years ago and started to notice 1.5 years ago that his use was becoming problematic. Patient reports that he started to drink to feel better and to get a euphoric feeling. Patient admitted to the hospital due to alcohol intoxication and suicidal ideation. He has attempted to resolve these concerns in the past through Residential treatment at [facility] on 2024 .  He does not appear to be in severe withdrawal, an imminent safety risk to self or others, or requiring immediate medical attention and may proceed with the assessment interview..    Social/Family History:  Patient reported they grew up in [place].  They were raised by his parents. Patient reports that they grew up with siblings, a brother and sister. Patient reported that their childhood was was nice.  " "Patient describes current relationships with family of origin as Positive. Patient reports that his family is supportive of his recovery especially his sister.      The patient describes their cultural background as \"anything\".  Cultural influences and impact on patient's life structure, values, norms, and healthcare:  none identified .  Contextual influences on patient's health include: Individual Factors chemical use concerns .  Patient identified their preferred language to be English. Patient reported they do not need the assistance of an  or other support involved in therapy.  Patient reports they are not involved in community of olivier activities.  They reports spirituality impacts recovery positively.     Patient reported had no significant delays in developmental tasks.   Patient's highest education level was college graduate. Patient identified the following learning problems: speech. Patient reports he used to have a concern with stuttering. Patient reports they are  able to understand written materials.    Patient reported the following relationship history . Patient reports that he still has a good relationship with his ex-wife and that she is supportive of his recovery. Patient's current relationship status is . Patient reports that he is currently seeing a girlfriend who is also supportive. Patient reported having two children.    Patient's current living/housing situation involves staying in own home/apartment.  They live alone in his own apartment and they report that housing is stable. Patient reports that he owns a triplex with his ex-wife. Patient reports that he lives in his own apartment in the triplex, his ex-wife lives in another apartment with one of their kids, and their other child lives in the third apartment in the triplex. Patient identified partner, siblings, friends, co-worker, and ex-wife  as part of their support system.  Patient identified the quality of " these relationships as stable and meaningful. Patient reports a supportive friend group that he talks to daily on the computer. Patient reports family, girlfriend, and ex-wife check in on him often. 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      Patient reports engaging in the following recreational/leisure activities: DJ, play music, and go to the roller rink. Patient reports the following people are supportive of recovery: family especially his sister, ex-wife, girlfriend, friends, and coworkers.  Patient is currently employed full time and reports they are able to function appropriately at work. Patient reports having two jobs. 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  Patient reports their income is obtained through employment.  Patient does identify finances as a current stressor.  Cultural and socioeconomic factors do not affect the patient's access to services.      Patient denies substance related arrests or legal issues.  Patient denies being on probation / parole / under the jurisdiction of the court.    Patient's Strengths and Limitations:  Patient identified the following strengths or resources that will help them succeed in treatment:  social supports and ability to talk in groups. . Things that may interfere with the patient's success in treatment include: none identified.     Assessments:  The following assessments were completed by patient for this visit:  GAD7:       6/4/2024    11:39 AM 8/7/2024     4:00 PM 8/9/2024     6:40 AM 8/20/2024     3:00 PM 8/23/2024     8:00 AM 12/11/2024    12:52 PM 2/26/2025     5:00 PM   MERI-7 SCORE   Total Score 7 (mild anxiety)  7 (mild anxiety)   10 (moderate anxiety)    Total Score 7 6 7 9 9 10  5       Patient-reported     CAGE-AID:       2/19/2024    12:54 PM 8/23/2024     8:00 AM 2/26/2025     5:00 PM   CAGE-AID Total Score   Total Score 2 3 4   Total Score MyChart 2 (A total score of  2 or greater is considered clinically significant)       PROMIS 10-Global Health (all questions and answers displayed):       2/19/2024    12:53 PM 6/4/2024    11:40 AM 8/7/2024     4:00 PM 8/23/2024     8:00 AM 11/4/2024     9:00 AM 2/26/2025     5:00 PM   PROMIS 10   In general, would you say your health is: Fair Fair       In general, would you say your quality of life is: Good Poor       In general, how would you rate your physical health? Fair Fair       In general, how would you rate your mental health, including your mood and your ability to think? Good Fair       In general, how would you rate your satisfaction with your social activities and relationships? Good Fair       In general, please rate how well you carry out your usual social activities and roles Fair Poor       To what extent are you able to carry out your everyday physical activities such as walking, climbing stairs, carrying groceries, or moving a chair? Completely A little       In the past 7 days, how often have you been bothered by emotional problems such as feeling anxious, depressed, or irritable? Sometimes Often       In the past 7 days, how would you rate your fatigue on average? Mild Severe       In the past 7 days, how would you rate your pain on average, where 0 means no pain, and 10 means worst imaginable pain? 5 8       In general, would you say your health is: 2 2 3 2 2 3   In general, would you say your quality of life is: 3 1 3 4 3 4   In general, how would you rate your physical health? 2 2 3 2 2 2   In general, how would you rate your mental health, including your mood and your ability to think? 3 2 1 1 2 2   In general, how would you rate your satisfaction with your social activities and relationships? 3 2 3 4 3 3   In general, please rate how well you carry out your usual social activities and roles. (This includes activities at home, at work and in your community, and responsibilities as a parent, child, spouse, employee,  friend, etc.) 2 1 4 3 4 2   To what extent are you able to carry out your everyday physical activities such as walking, climbing stairs, carrying groceries, or moving a chair? 5 2 3 2 5 2   In the past 7 days, how often have you been bothered by emotional problems such as feeling anxious, depressed, or irritable? 3 4 4 4 4 3   In the past 7 days, how would you rate your fatigue on average? 2 4 3 3 3 2   In the past 7 days, how would you rate your pain on average, where 0 means no pain, and 10 means worst imaginable pain? 5 8 2 7 3 7   Global Mental Health Score 12    12 7 9 11 10 12   Global Physical Health Score 14    14 8 13 9 14 10   PROMIS TOTAL - SUBSCORES 26    26 15 22 20 24 22     Barton Suicide Severity Rating Scale (Lifetime/Recent)      12/24/2024     4:08 PM 2/19/2025     1:23 PM 2/22/2025     3:47 PM 2/22/2025     4:41 PM 2/23/2025     5:34 PM 2/24/2025     7:29 PM 2/24/2025    11:00 PM   Barton Suicide Severity Rating (Lifetime/Recent)   Q1 Wish to be Dead (Lifetime)       No   Q2 Non-Specific Active Suicidal Thoughts (Lifetime)       No   Q1 Wished to be Dead (Past Month) 0-->no 0-->no 1-->yes 1-->yes  1-->yes 0-->no   Q2 Suicidal Thoughts (Past Month) 0-->no 0-->no 1-->yes 1-->yes  1-->yes 0-->no   Q3 Suicidal Thought Method   1-->yes 1-->yes  1-->yes    Q4 Suicidal Intent without Specific Plan   0-->no 0-->no  0-->no    Q5 Suicide Intent with Specific Plan   1-->yes 1-->yes  1-->yes    Q6 Suicide Behavior (Lifetime) 0-->no 0-->no 0-->no 1-->yes  0-->no 0-->no   If yes to Q6, within past 3 months?    1-->yes      Level of Risk per Screen no risks indicated no risks indicated high risk high risk  high risk no risks indicated   Most Lethal Attempt Date     5/4/2024     Actual Lethality/Medical Damage Code (Most Lethal Attempt)     2       GAIN-sliding scale:      8/7/2024     4:00 PM 2/26/2025     5:00 PM   When was the last time that you had significant problems...   with feeling very trapped,  lonely, sad, blue, depressed or hopeless about the future? Past month Past month   with sleep trouble, such as bad dreams, sleeping restlessly, or falling asleep during the day? Past Month 2 to 12 months ago   with feeling very anxious, nervous, tense, scared, panicked or like something bad was going to happen? 1+ years ago 1+ years ago   with becoming very distressed & upset when something reminded you of the past? Past month 1+ years ago   with thinking about ending your life or committing suicide? Past month Past month          8/7/2024     4:00 PM 2/26/2025     5:00 PM   When was the last time that you did the following things 2 or more times?   Lied or conned to get things you wanted or to avoid having to do something? Past month 1+ years ago   Had a hard time paying attention at school, work or home? Past month 2 to 12 months ago   Had a hard time listening to instructions at school, work or home? Past month Never   Were a bully or threatened other people? 1+ years ago Never   Started physical fights with other people? 1+ years ago Never       Personal and Family Medical History:  Patient did report a family history of mental health concerns.  Patient reports family history includes Anxiety Disorder in his sister; Depression in his sister; Substance Abuse in his brother.    Patient reported the following previous mental health diagnoses: major depressive disorder and generalized anxiety disorder. Patient reports their primary mental health symptoms include:  depression and anxiety and these do not impact his ability to function. Patient has received mental health services in the past. Patient reported that he attended a mental health Partial Hospitalization Program at [facility] in 2023 and therapy.  Psychiatric Hospitalizations: [facility] in August 2024 .  Patient denies a history of civil commitment.  Current mental health services/providers include:  Weekly therapy with Lucy Rouse at   [facility]. Patient reports that he attends his therapy sessions virtually. Patient also mentions having a  that he meets with.    Patient has had a physical exam to rule out medical causes for current symptoms.  Date of last physical exam was within the past year. Client was encouraged to follow up with PCP if symptoms were to develop. The patient has a [facility] Provider, who is named Dr. Yovana Felipe.  Patient reports the following current medical concerns: torn meniscus and diabetes  .  Patient reports pain concerns including knee pain.  Patient does not want help addressing pain concerns.  There are not significant appetite / nutritional concerns / weight changes.  Patient does not report a history of an eating disorder.  Patient does not report a history of head injury / trauma / cognitive impairment.    Patient reports current meds as:   Current Facility-Administered Medications   Medication Dose Route Frequency Provider Last Rate Last Admin    acetaminophen (TYLENOL) tablet 650 mg  650 mg Oral Q4H PRN Arnulfo Anderson MD   650 mg at 02/26/25 1610    alum & mag hydroxide-simethicone (MAALOX) suspension 30 mL  30 mL Oral Q4H PRN Arnulfo Anderson MD        carvedilol (COREG) tablet 12.5 mg  12.5 mg Oral BID w/meals Miltcheva, Wilber Mintcheva, APRN CNP   12.5 mg at 02/26/25 0808    glucose gel 15-30 g  15-30 g Oral Q15 Min PRN Cass Juarez PA-C        Or    dextrose 50 % injection 25-50 mL  25-50 mL Intravenous Q15 Min PRN Cass Juarez PA-C        Or    glucagon injection 1 mg  1 mg Subcutaneous Q15 Min PRN Cass Juarez PA-C        diazepam (VALIUM) tablet 5-20 mg  5-20 mg Oral Q30 Min PRN Arnulfo Anderson MD        folic acid (FOLVITE) tablet 1 mg  1 mg Oral Daily Arnulfo Anderson MD   1 mg at 02/26/25 0808    hydrOXYzine HCl (ATARAX) tablet 25 mg  25 mg Oral Q4H PRN Arnulfo Anderson MD        lisinopril (ZESTRIL) tablet 20  mg  20 mg Oral Daily Wilber Zhoubradleycuco, APRN CNP   20 mg at 02/26/25 0809    loperamide (IMODIUM) capsule 2 mg  2 mg Oral 4x Daily PRN Arnulfo Anderson MD        metFORMIN (GLUCOPHAGE XR) 24 hr tablet 1,000 mg  1,000 mg Oral Daily with supper Stephen Ramires PA-C   1,000 mg at 02/25/25 1733    multivitamin w/minerals (THERA-VIT-M) tablet 1 tablet  1 tablet Oral Daily Arnuflo Anderson MD   1 tablet at 02/26/25 0808    naltrexone (DEPADE;REVIA) half-tab 25 mg  25 mg Oral Daily Arnulfo Anderson MD   25 mg at 02/26/25 1021    ondansetron (ZOFRAN ODT) ODT tab 4 mg  4 mg Oral Q6H PRN Arnulfo Anderson MD        senna-docusate (SENOKOT-S/PERICOLACE) 8.6-50 MG per tablet 1 tablet  1 tablet Oral BID PRN Arnulfo Anderson MD        simvastatin (ZOCOR) tablet 20 mg  20 mg Oral At Bedtime Stephen Ramires PA-C   20 mg at 02/25/25 2200    thiamine (B-1) tablet 100 mg  100 mg Oral Daily Arnulfo Anderson MD   100 mg at 02/26/25 0808    traZODone (DESYREL) tablet 50 mg  50 mg Oral At Bedtime PRN Arnulfo Anderson MD   50 mg at 02/24/25 2205    warfarin ANTICOAGULANT (COUMADIN) tablet 5 mg  5 mg Oral ONCE at 18:00 Arnulfo Anderson MD        Warfarin Dose Required Daily - Pharmacist Managed  1 each Oral See Admin Instructions Stephen Ramires PA-C         Facility-Administered Medications Ordered in Other Encounters   Medication Dose Route Frequency Provider Last Rate Last Admin    Self Administer Medications: Behavioral Services   Does not apply See Admin Instructions Corrine Alvarenga MD        Self Administer Medications: Behavioral Services   Does not apply See Admin Instructions Corrine Alvarenga MD           Medication Adherence:  Patient reports taking psychiatric medications as prescribed.  Patient is able to self-administer medications.      Patient Allergies:  No Known Allergies    Medical History:    Past Medical History:   Diagnosis Date    Depressive disorder     High  "cholesterol     History of gastric bypass     History of pulmonary embolism     Hypertension     CARLIE (obstructive sleep apnea)     Personal history of DVT (deep vein thrombosis)     Type 2 diabetes mellitus (H)     Uncomplicated asthma          Substance Use:   Patient reported the following biological family members or relatives with chemical health issues:  brother, cousin, and grandpa.  Patient has received substance use disorder and/or gambling treatment in the past.  Patient reports the following dates and locations of treatment services:  [facility] on August 2024 .  Patient has ever been to detox.  Patient is not currently receiving any chemical dependency treatment. Patient reports they have attended the following support groups: AA in the past. Patient reports that he has an darrius to find meetings around [place], [place] and attends meeting sporadically.        Substance Age of first use Pattern and duration of use (include amounts and frequency) Date of last use     Withdrawal potential Route of administration   has used Alcohol 42 Patient reports he started drinking at the age of 42 to \"feel better and euphoric\". Patient reports that he drinks to counteract his MDD and MERI MH symptoms. Patient reports that his drinking increased in the last 6 months. Patient reports he was just drinking on the weekends and his use has now increased to daily. Patient reports that he has been drinking 0.75 to 1 liter of liquor daily for the past 2 weeks.  2/22/2025 Yes oral   has not used Cannabis          has not used Amphetamines          has not used Cocaine/crack           has not used Hallucinogens        has not used Inhalants        has not used Heroin        has not used Other Opiates        has not used Benzodiazepine          has not used Barbiturates        has not used Over the counter meds.        has not use Caffeine        has not used Nicotine         has not used other substances not listed " above:  Identify:             Patient reported the following problems as a result of their substance use: family problems, occupational / vocational problems, and relationship problems.  Patient is concerned about substance use. Patient reports his family, ex-wife, girlfriend, and friends are concerned about their substance use.  Patient reports they obtain substances by buying it at the liquor store or using food delivery apps.  Patient reports their recovery goals are to maintain sobriety. Patient reports that he wishes he could be the type of people that can drink socially. Patient reports that if he drinks a little he will keep drinking more because he likes drinking and how it makes him feel.     Patient reports experiencing the following withdrawal symptoms within the past 12 months: sweating, shaky/jittery/tremors, unable to sleep, sad/depressed feeling, high blood pressure, and confused/disrupted speech and the following within the past 30 days: none.   Patients reports urges to use Alcohol.  Patient reports he has used more Alcohol than intended and over a longer period of time than intended. Patient reports he has had unsuccessful attempts to cut down or control use of Alcohol.  Patient reports longest period of abstinence was 61 days after completing residential treatment at [facility]. Patient reports return to use was due to relationship concerns. Patient reports he has needed to use more Alcohol to achieve the same effect.  Patient does  report diminished effect with use of same amount of Alcohol.     Patient does  report a great deal of time is spent in activities necessary to obtain, use, or recover from Alcohol effects.  Patient does  report important social, occupational, or recreational activities are given up or reduced because of Alcohol use.  Alcohol use is continued despite knowledge of having a persistent or recurrent physical or psychological problem that is likely to  have caused or exacerbated by use.     Patient reports substance use has not ever impacted their ability to function in a school setting. Patient reports substance use has ever impacted their ability to function in a work setting. 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 Patients demographics and history impact their recovery in the following ways:  none identified.  Patient reports engaging in the following recreation/leisure activities while using:  none reported.  Patient reports the following people are supportive of recovery: family, ex-wife, girlfriend, and friends.    Patient does not have a history of gambling concerns and/or treatment.  Patient does not have other addictive behaviors he is concerned about.      Dimension Scale Ratings:    Dimension 1: 1 Client can tolerate and cope with withdrawal discomfort. The client displays mild to moderate intoxication or signs and symptoms interfering with daily functioning but does not immediately endanger self or others. Client poses minimal risk of severe withdrawal.    Summary to support rating:  The client states that his last use was on 2/22/2025. At the time of the assessment, the client is not under the influence and is not at risk of withdrawal symptoms that would affect their ability to engage in treatment.    Dimension 2: 1 Client tolerates and susan with physical discomfort and is able to get the services that the client needs.  Summary to support rating: Client reports torn meniscus, diabetes, and chronic knee pain which is being addressed by his PCP. Client reports that his biomedical conditions would not interfere with his treatment or any recovery skills/training/workshop.    Dimension 3: 2 Client has difficulty with impulse control and lacks coping skills. Client has thoughts of suicide or harm to others without means; however, the thoughts may interfere with  participation in some treatment activities. Client has difficulty functioning in significant life areas. Client has moderate symptoms of emotional, behavioral, or cognitive problems. Client is able to participate in most treatment activities.  Summary to support rating: Client does endorse a formal mental health diagnosis of depression and anxiety. Client reports that he takes her psychotropic medications as prescribed. Client reports attending therapy sessions weekly with his mental health provider at [facility].    Dimension 4: 1 Client is motivated with active reinforcement, to explore treatment and strategies for change, but ambivalent about illness or need for change.  Summary to support rating:  Client acknowledges that his substance use is problematic and reports high motivation to attend treatment. Client reports the need to be sober due reducing work absence. The client has a history of attending treatment and has completed them successfully with sustained periods of sobriety.    Dimension 5: 2 (A) Client has minimal recognition and understanding of relapse and recidivism issues and displays moderate vulnerability for further substance use or mental health problems. (B) Client has some coping skills inconsistently applied.  Summary to support rating:  The client is considered to be at moderate risk for continued substance use. They have a history of daily use over the past 2-3 weeks and weekend use for the past six months. The client reports their last use was on 2/22/2025. While the client demonstrates some coping skills and has resources to support their ongoing sobriety, they would benefit significantly from further developing and consistently applying these skills.    Dimension 6: 2 Client is engaged in structured, meaningful activity, but peers, family, significant other, and living environment are unsupportive, or there is criminal justice involvement by the client or among the client's peers,  significant others, or in the client's living environment.  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 The client expresses interest in returning to work while attending treatment. Client has sober support from his family and friends and would benefit from utilizing his support system. They live alone in stable housing with family right next door.     Significant Losses / Trauma / Abuse / Neglect Issues:   Patient did not serve in the .  There are indications or report of significant loss, trauma, abuse or neglect issues related to: death of father, mother, brother, and cousin .  Patient has not been a victim of exploitation.  Concerns for possible neglect are not present.    Safety Assessment:   Patient denies current or past homicidal ideation and behaviors.  Patient denies current or past suicidal ideation and behaviors.  Patient denies current or past self-injurious behaviors.  Patient denied risk behaviors associated with substance use.  Patient denies any high risk behaviors associated with mental health symptoms.  Patient denied current or past personal safety concerns.    Patient denies past of current/recent assaultive behaviors.    Patient denied a history of sexual assault behaviors.     Patient reports there are not   firearms in the house.    Patient reports the following protective factors:  spirituality, positive social network, positive family connections, forward/future oriented, dedication to family/friends, safe & stable environment, regular sleep, sense of belonging, purpose, help-seeking behaviors, medication adherence, safety plan agreement, daily obligations, daily structure/routine, effective-problem solving skills, committed to well-being, sense of meaning, positive social skills, fear of risky behaviors, and sense of self-worth.     Risk Plan:  See  Recommendations for Safety and Risk Management Plan    Review of Symptoms per patient report:   Substance Use:  blackouts, daily use, work absence due to substance use, and cravings/urges to use     Diagnostic Criteria:  Substance Use Disorder Substance is often taken in larger amounts or over a longer period than was intended.  Met for:  Alcohol There is persistent desire or unsuccessful efforts to cut down or control use of the substance.  Met for:  Alcohol  A great deal of time is spent in activities necessary to obtain the substance, use the substance, or recover from its effects.  Met for:  Alcohol Craving, or a strong desire or urge to use the substance.  Met for:  Alcohol Recurrent use of the substance resulting in a failure to fulfill major role obligations at work, school, or home.  Met for:  Alcohol Continued use of the substance despite having persistent or recurrent social or interpersonal problems caused or exacerbated by the effects of its use.  Met for:  Alcohol Important social, occupational, or recreational activities are given up or reduced because of the substance.  Met for:  Alcohol Use of the substance is continued despite knowledge of having a persistent or recurrent physical or psychological problem that is likely to have been cause or exacerbated by the substance.  Met for:  Alcohol Tolerance:  either a need for markedly increased amounts of the substance to achieve the desired effect or a markedly diminished effect with continued use of the same amount of the substance.  Met for:  Alcohol Withdrawal:  either patient endorses characteristic withdrawal syndrome for the substance or the substance (or closely related substance) is taken to relieve or avoid withdrawal symptoms.  Met for:  Alcohol      Collateral Contact Summary:   Collateral contacts contributing to this assessment:  None.    If court related records were reviewed, summarize here: Not applicable.    Information in this assessment  was obtained from the medical record and provided by patient who is a good historian.    Patient will have open access to their mental health medical record.    Diagnostic Criteria: 1.) Alcohol/drug is often taken in larger amounts or over a longer period than was intended.  Met for Alcohol.  2.) There is a persistent desire or unsuccessful efforts to cut down or control alcohol/drug use.  Met for Alcohol.  3.) A great deal of time is spent in activities necessary to obtain alcohol, use alcohol, or recover from its effects.  Met for Alcohol.  4.) Craving, or a strong desire or urge to use alcohol/drug.  Met for Alcohol.  5.) Recurrent alcohol/drug use resulting in a failure to fulfill major role obligations at work, school or home.  Met for Alcohol.  6.) Continued alcohol use despite having persistent or recurrent social or interpersonal problems caused or exacerbated by the effects of alcohol/drug.  Met for Alcohol.  7.) Important social, occupational, or recreational activities are given up or reduced because of alcohol/drug use.  Met for Alcohol.  9.) Alcohol/drug use is continued despite knowledge of having a persistent or recurrent physical or psychological problem that is likely to have been caused or exacerbated by alcohol.  Met for Alcohol.  10.) Tolerance, as defined by either of the following: A need for markedly increased amounts of alcohol/drug to achieve intoxication or desired effect. and A markedly diminished effect with continued use of the same amount of alcohol/drug..  Met for Alcohol.  11.) Withdrawal, as manifested by either of the following: The characteristic withdrawal syndrome for alcohol/drug (refer to Criteria A and B of the criteria set for alcohol/drug withdrawal). and Alcohol/drug (or a closely related substance, such as a benzodiazepine) is taken to relieve or avoid withdrawal symptoms.. Met for Alcohol.       As evidenced by self report and criteria, client meets the following DSM5  Diagnoses:   (Sustained by DSM5 Criteria Listed Above)  Alcohol Use Disorder   303.90 (F10.20) Severe   .    Functional Status:  Patient reports the following functional impairments:  chronic disease management.     MARCY/DUAL Programmatic Care:      1. Does the patient have a history of vulnerability such as being teased, picked on, or other indications of potential safety issues with other residents?  No    2. Does this patient have a history of being the victim of abuse? No history of abuse reported or documented.    3. Does this patient have a history of victimizing others? No     4. Does the patient have a history of boundary violations?  No.    5. Does the patient have a history of other sexual acting out behaviors (e.g grooming)?   No    6. Does the patient have a history of threats to self or others? Fire setting, running away or other self-injurious behaviors?    Yes: Patient was admitted to the ED on 2/24/2025 due to alcohol intoxication and suicidal ideation on 2/22/2024.    7. Does the patient s history indicate the need for special precautions or particular staffing patterns in the facility?  No      NOTE: If this screening indicates that the patient is at risk to harm self or others, notify staff at referral location.    Recommendations:     1. Plan for Safety and Risk Management:  Recommended that patient call 911 or go to the local ED should there be a change in any of these risk factors.      Report to child / adult protection services was NA.     2. MARCY Recommendations: Patient meets criteria for the following levels of care based on ASAM Criteria:  Withdrawal Management - No Withdrawal Management Indicated, Treatment/Recovery Services - 2.1 Intensive Outpatient Services.  Patient's placement align's with the assessment and placement level of care recommendation based on current ASAM Dimension scale ratings. Patient reports they are willing to follow these recommendations.  Patient would like the  following family or other support people involved in their treatment:  none. Patient does not have a history of opiate use.    MARCY Referrals:    [facility]  [address], [address]  [place] [address]   Phone: [phone]  Fax: [phone]    3. Mental Health Referrals:  Patient will continue virtual therapy with Lucy Mtz through [facility].    4. Clinical Substantiation/medical necessity for the above recommendations:  Client is recommended to engage in intensive outpatient program at [facility] to further support his recovery. Client is at a moderate risk for relapse and continued use due to his increased use in the past 6 months and most especially the past two weeks. Client has a history of completing CD treatment however struggles to maintain long term sobriety. Client's longest date of sobriety is 61 days post CD residential treatment in 2024. Clients return to use was due to liking the feeling he gets when he is drunk. Client lives at home in his own apartment in a [facility]. Client reports his ex-wife and his children live in the other two apartments in the [facility]. Client has significant insight into his substance use and appears motivated to engage and participate in treatment to further support his goals of long term sobriety. Client has insight into his mental health; reports taking his psychotropic medications as prescribed and attending therapy.    5. Patient denied needing to address any cultural, ethnic, Caodaism, sexual/gender concerns at this time.     6. Recommendations for treatment focus:   Depressed Mood - To continue to engage in Therapy with Lucy mtz at [facility] and continue using psychotropic medications.  Anxiety - To continue to engage in Therapy with Lucy mtz at [facility] and continue using psychotropic medications.  Alcohol / Substance Use - To continue to engage and participate in IOP program and  "to following any of their continuing care recommendations .   .     7.  Interactive Complexity: No    8. Safety Plan:    Farzaneh Safety Plan      Creation Date: 3/26/24 Last Update Date: 2/25/25      Step 1: Warning signs:    Warning Signs    \"When I'm reviewing medical bills.\"    Financial Stressors - \"When I feel like it is too expensive to live.\"    More physical pain.    Feelings of loneliness.    Boredom.    Drinking alcohol daily.    \"Having needed conversation and having it with the bottle instead\"      Step 2: Internal coping strategies - Things I can do to take my mind off my problems without contacting another person:    Strategies    Play records and listen to music.    Watch TV shows.    Take a shower    Taking care of plants    Workout 3-4 days each week      Step 3: People and social settings that provide distraction:    Name Contact Information    Becca (girlfriend) # in phone    Aldair (friend) # in phone    Chepe- friend call       Places    Go to the [employer]      Step 4: People whom I can ask for help during a crisis:    Name Contact Information    Betty (ex-wife) [phone]    Aldair (friend) # in phone    Rhiannon (Sister) [phone]      Step 5: Professionals or agencies I can contact during a crisis:    Clinician/Agency Name Phone Emergency Contact    [facility] [phone] Rhiannon (Sister) - [phone]    Lucy Rouse (Therapist)        Local Emergency Department Emergency Department Address Emergency Department Phone    [facility]  [phone]    [facility] [address], [place] [address] [phone]      Suicide Prevention Lifeline Phone: Call or Text 799  Crisis Text Line: Text HOME to 179094     Step 6: Making the environment safer (plan for lethal means safety):   Substance Use Disorder Direct Access Resources    It is recommended that you abstain from all mood altering chemicals. Please " contact the sober support hotline (238-030-4598) as needed; phones are answered 24 hours a day, 7 days a week.    To access substance use treatment you must have a comprehensive assessment completed to begin any treatment program.     If uninsured, please contact your county of residence for eligibility screen to substance use disorder evaluation and treatment:    [place] - [phone]   [place] - [phone]   [place]f  [phone]   [place]ef[place] - [phone]   [place]  [phone]   Fred  [phone]   [facility] [phone]   [place] - [phone]     If you have private insurance, call the customer service number on the back of your insurance card to find an in-network substance abuse use disorder assessment. The ideal provider will be a treatment facility, licensed in the [facility].     Community MARCY Evaluations: Clients may call their county for a full list of providers - Availability and services listed belo are subject to change, please call the provider to confirm    [facility]  [phone]  [address] [place] [address], [address]  *Please call the above number to schedule a comprehensive assessment for determination of level of care needs. In person and virtual appointments available Mon-Fri.    [facility] [address], [address], [place] [address] (next to the outpatient lab)    Phone: [phone]   Provides bridging services to people with Opiate Use Disorders (OUD) seeking care. This is a front door to Medication Assisted Treatments (MAT), ages 16+  Walk In hours: Monday-Friday 9:00am-3:00pm    [facility]  [phone]  Walk in Assessments: Mon-Friday 7a-1:45p  [address], [place], [address] [place] [address] Recovery [facility]  Central Access [phone]  [address] [place] [address], [address]  *by appointment only    [facility]  [phone] (phone consultation available 24/7)  Locations in:  [place], [place], [facility], and [place], [place]  Albanian virtual IOP [phone] or visit [email or web address]   Also offers LGBTQ programming     [facility]  [phone]  [address]  [place], [place], [address]  *Currently only offered via telehealth - call to set up an appointment    [facility]  [address] [place], [place], [address]  [facility] Program  For more information to to make a referral call:  [phone]  Walk-in on   9-11 a.m.    [facility]  [phone]  [address] [place], [place], [address]  *available by appointments only    [facility]  [phone]  [address] [place], [place], [address]  *available by appointment only    [facility]  [phone]  [address] [place], [place], [address]  *walk in assessments available M-F starting at 7 am.    [facility]  [phone]  Locations: [facility], [facility], and [place]  *Walk in assessments availble M-F starting at 8 am -virtual only    [facility]  [phone]  [address] [place] [address]    [facility]  Virtual + Locations: [place], [place], [place], [place], [facility], [facility], [place], [place]   [phone]  *available by appointment only    [facility]  [phone]  [address]  [place], [place], [address]    [facility]  [phone]  [address], [place], [address]  *available by appointment    [facility]  [phone]  [address] [place], [place], [address]  *walk ins available M-TH from 9-3    [facility]  MAT program: [phone]  [address] [place], [place], [address]    [place]  [phone]  Same day substance use disorder assessments are available Monday - Friday, via walk-in or by appointment at the [place]  location.  [address] [place] [address], [address], [place] [address]     [facility] - adolescent and adult SUDs services  [phone]  Offer services Monday through Friday, as well as evening hours Monday through Thursday. Normally, a first appointment will be scheduled within one week  [email or web address]  Locations all over [place]    If you are intoxicated, you may be required to detox at a detox facility before starting treatment. The following are detox facilities that you can self present to. All detox facilities are able to help you complete an assessment prior to discharge if you choose:    [facility]: Arrive at a [facility] for immediate medical evaluation    [facility]: [address] [place], [place], [address].         [phone]    [facility]: [address] [place], [place], [address]  [phone]     [facility] ([facility]): [address] [place], [place], [address] [phone]     [facility]: [address] [place], [place], [address], [phone]    Ways to help cope with sobriety:  -- Take prescribed medicines as scheduled  -- Keep follow-up appointments  -- Talk to others about your concerns  -- Get regular exercise  -- Practice deep breathing skills  -- Eat a healthy diet  -- Use community resources, including hotline numbers, [facility] crisis and support meetings  -- Stay sober and avoid places/people/things associated with substance use  --Maintain a daily schedule/routine  --Get at least 7-8 hours of sleep per night  --Create a list 10--20 healthy activities that you can do that are enjoyable and do not involve substance use  --Create daily goals (approx. 1-4 goals) per day and work to achieve them throughout the day.     Free Resources:  [place] Recovery Connection (MRC)  [facility] connects people seeking recovery to resources that help foster and sustain long-term recovery.  "Whether you are seeking resources for treatment, transportation, housing, job training, education, health care or other pathways to recovery, [facility] is a great place to start.  Phone: 717.248.3072. www.[place]OnTheRoad.org (Great listing of all types of recovery and non-recovery related resources)    Alcoholics Anonymous  Phone: 4-658-ALCOHOL  Website: HTTP://WWW.AA.ORG/  AA [place] (260-029-8212 or http://aaminneaMoogi.org)  AA [place] (104-850-9470 or www.aasaul.org)     Narcotics Anonymous  Phone: 708.144.9986  Website: www.Neolane.AudioCatch.    [facility] [address], [place], [place],  Phone: [phone]  Drop-in Hours: Monday-Friday 9-11:30 am. By appointment at other times.  Provides: [facility] is a drop-in center on the east side [facility] that provides a safe space for individuals who are homeless and have a history of chemical use. Sobriety is not a requirement but drugs and alcohol are not allowed on the property.  Services: Non-clients can access drop-in services such as Recovery and Harm Reduction Groups, referrals to case management, community activities, shower facilities, and a pool table. Individuals who are homeless and have chemical health needs may be eligible for enrollment into [facility]'s case management program. Clients and  work together to access benefits, treatment, health care, shelter, and external housing resources.     Optional: What is most important to me and worth living for?:   \"My kids and my sister.\"       Farzaneh Safety Plan. Isela Niño and August Parks. Used with permission of the authors.               Provider Name/ Credentials:  ISA Kwong  February 26, 2025    "

## 2025-02-26 NOTE — PROGRESS NOTES
"[facility]     Case Management Encounter: Met with team, discussed patient progress, discharge plan and any impediments to discharge.    Writer met with patient to discuss [facility] program in [place], [place]. Patient reports that he will schedule an intake with them upon discharging. Patient will discharge today and reports that his sister will pick him up at 6:00 pm.    Writer met with the patient to discuss discharge and aftercare planning. Patient reports that he attended [facility] August 2024. Patient reports that he stopped drinking for a period of time after treatment then returned to occasional use. Patient reports he returned to \"problematic use\" 2 weeks ago when he started drinking 0.75 Liters of of liquor. Patient reports that his use increased to 1 liter of liquor per day for the past week. Patient reports what led to his recent return to use was bargaining with himself that he could \"just have one drink after work or a couple on the weekends\". Patient reports that he is interested in doing IOP treatment during the day instead of in the evening. Patient reports that he works full-time during the day shift but is willing to take a leave from work to do IOP treatment. Patient lives in [place], [place] in a triplex with his ex-wife and children. Patient reports living situation is safe and stable. Patient reports that he has adequate support such as his family, ex-wife, girlfriend, and friends. Patient reports that he would like to attend treatment around the [facility]. Patient reports that he attends AA meetings from time to time and utilizes a meeting darrius to find meetings near him.     Insurance: [employer]    Legal Status:  72 hour hold   County: NA  File Number: NA  Start and expiration date of commitment: NA    SUDs Assessment Status: Will need for placement.     ROIs on file: None at this time.  Emergency contact: Rhianonn Bolton (Sister) [phone] (no " ERICKA)    Living Situation: Lives in [place], [place].    Current Providers, Supports & Collateral:  PCP, Therapist, ex-wife, girlfriend, friends, and family.  PCP: Yovana Felipe MD - [facility]  Therapist: Lucy Mendoza (virtual sessions)    Current Plan/Referral Status: [facility] program in [place]. Patient will discharge today at 6:00 pm.    Safety Plan Status: Completed.      ISA Agarwal  [facility] - Adult Inpatient Addiction Psychiatry Unit

## 2025-02-26 NOTE — PLAN OF CARE
Goal Outcome Evaluation:    Plan of Care Reviewed With: patient          Pt discharging to home to attend [facility], he is alert, rates anxiety and depression low, endorsing a good appetite, discharge meds and instructions reviewed with pt, is silvio stable and is ambulating  on unit hallways via walker, has a CPAP and a bariatric bed.    Pt denies pain, SI/HI. Will be picked up at 5pm by sister.

## 2025-02-26 NOTE — DISCHARGE SUMMARY
"Psychiatric Discharge Summary    Cody Bolton MRN# [id number]   Age: 44 year old YOB: 1980     Date of Admission:  2/24/2025  Date of Discharge:  2/26/2025  6:15 PM  Admitting Physician:  Arnulfo Anderson MD  Discharge Physician:  Arnulfo Anderson MD          Event Leading to Hospitalization:   Cody Bolton is a 44 year old male with history of MDD, MERI, AUD, T2DM, atrial fibrillation and VTE on warfarin who presented to ED seeking detox from alcohol, as well as suicidal ideation.     Chart review completed including ED notes 2/22/25 leading to this admission for alcohol intoxication and suicidal ideation; ED note 2/19/25 for alcohol intoxication, initial concern for suicidal ideation by EMS report; ED visit 10/21/2024 for depression and concerns regarding his alcohol use disorder; 3A admission 8/20/24 for alcohol withdrawal.      Per ED physician note:   Cody Bolton is a 44 year old male with a history of anxiety, depression, fatty liver, type II diabetes, hypertension, atrial fibrillation, DVT, and PE, anticoagulated on warfarin who presents with his ex-wife to the Emergency Department for suicidal ideations. The patient's ex-wife reports Cody has been drinking whiskey in an attempt to commit suicide. At 2200 yesterday (2/21/25) he called her saying he wanted to die. She also notes that his problems with alcohol have been progressively worsening the last six months. Cody reports he \"likes to feel drunk\" and has access to carvedilol and knives at home. Denies vomiting, recent falls or hitting his head.      Per psychiatry consult note A/P:  Consult placed to psychiatry regarding worsening depression and alcohol use.  Occurring in the setting of family concerns of suicidal behaviors prior to admission with increasing alcohol use as a means of managing depressed mood associated with functional impairment.  Placed on a 72-hour hold due to lacking insight of behaviors associated " "with presentation and concerns expressed by family.  Indication for inpatient psychiatric hospitalization to coordinate cares, medication management, discharge plans.      Upon interview:   Confirmed above with patient. Cody tells me that substance use first became an issue approximately three years ago. Prior to this he would drink 1-2x per year socially. Due to relationship issues and other stressors including family problems and work, he began drinking more heavily. His pattern of drinking has been in a binge pattern primarily, drinking heavily for 2-3 days followed by 1-2 weeks of sobriety. Around two weeks ago he reunited with his ex-girlfriend and has been drinking approximately 1.75L of liquor per day. He denies a history of alcohol withdrawal, seizures or delirium tremens. He has been in treatment once prior, [facility] 08/2024. He had 61 days of sobriety following this before returning to use. Triggers include stress, episodes of depression, relationship instability, and sometimes boredom. He has previously tried naltrexone and acamprosate and did not find them helpful. He denies any other previous or current substance use. Currently he denies any withdrawal symptoms or other concerns.      In terms of his mental health, he reports a diagnosis of MDD and MERI. He first noticed periods of depression, hopelessness, low energy in his 20s, so preceding his alcohol use. These can last days to weeks and occur several times per year. His ex-wife first pointed out his depressive episodes 3-4 years ago and he sought treatment. He did complete a PHP through [facility] in 2023, denies any psychiatric hospitalizations. He denies any suicide attempts. He notes one accidental overdose in 2023. He injured himself in a fall and \"took a sip\" of his mother's liquid morphine for pain and required naloxone. He denies this being intentional, and denies any other opioid use prior to or following this event. His only medication " "for mood has been bupropion, he has found it helpful for his mood. He does see a therapist weekly, Ericka Rouse. He also has a . He notes he will often make suicidal statements when intoxicated, but has never had suicidal ideation when sober and denies any attempts. 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  He denies a history of maxim, psychosis. Currently his mood is \"good - much better now that I am sober.\" He denies suicidal ideation or any safety concerns.     His goals of this hospitalization are to complete detox and preferably going home with an IOP, though he would consider residential treatment if it was strongly recommended.       See Admission note by Arnulfo Anderson MD for additional details.          Diagnoses:     MDD, recurrent, moderate  Alcohol use disorder, severe, dependence with withdrawal with complication  MERI  T2DM  Atrial fibrillation on warfarin  History of DVT/PE  CARLIE  HTN  HLD    Clinically Significant Risk Factors                             # DMII: A1C = 6.7 % (Ref range: 0.0 - 5.6 %) within past 6 months, PRESENT ON ADMISSION  # Severe Obesity: Estimated body mass index is 48.82 kg/m  as calculated from the following:    Height as of this encounter: 1.829 m (6').    Weight as of this encounter: 163.3 kg (360 lb)., PRESENT ON ADMISSION     # Financial/Environmental Concerns:                  Labs:        Lab Results   Component Value Date     02/25/2025    Lab Results   Component Value Date    CHLORIDE 102 02/25/2025    CHLORIDE 104 05/04/2024    Lab Results   Component Value Date    BUN 12.4 02/25/2025    BUN 16 05/04/2024      Lab Results   Component Value Date    POTASSIUM 3.8 02/25/2025    POTASSIUM 4.6 05/04/2024    Lab Results   Component Value Date    CO2 22 02/25/2025    Lab Results   Component Value Date    CR 0.92 " 02/25/2025          Lab Results   Component Value Date    WBC 8.0 02/22/2025    HGB 13.1 (L) 02/22/2025    HCT 40.6 02/22/2025    MCV 82 02/22/2025     02/22/2025     Lab Results   Component Value Date    AST 44 02/22/2025    ALT 46 02/22/2025    GGT 75 (H) 02/25/2025    ALKPHOS 116 02/22/2025    BILITOTAL 0.3 02/22/2025     Lab Results   Component Value Date    TSH 2.74 02/25/2025            Consults:   Consultation during this admission received from internal medicine:  Cody Bolton is a 44 year old male with PMH of AUD, hepatic steatosis, HTN, T2DM, atrial fibrillation, h/o DVT/ PE, anticoagulated on warfarin admitted on 2/24/2025 for alcohol detox. Medicine was consulted for co-management.      #Acute alcohol withdrawal  #Alcohol use disorder  #Suicidal ideation, improved  IVAN on admission 0.28. Last drink 2/22 PTA. Per initial ED note, pt brought into ED by his ex-wife as he was drinking whiskey in an attempt to take his life. Drinking 0.75L of hard alcohol last 2 weeks w/ increase to 1.75L the few days PTA. Prior to binge over last 2 weeks, was drinking 1-2x per week.  Utox screen negative.  Labs on admission otherwise notable for elevated AG to 20, low Cl to 95, stable anemia w/ hgb 13.1, INR 2.47 (on warfarin w/ goal 2-3).  - Management per Psychiatry  - Agree w/ MSSA w/ diazepam   - Agree w/ folate, MVI, thiamine  - PRNs for withdrawal symptom management per Psychiatry      #Elevated AG, improved  #Hypochloremia, resolved  AG elevated to 20 and Cl down to 95 on admission, likely in setting of heavy alcohol use, dehydration, possible vomiting. Expect to resolved w/ rehydration, alcohol cessation, and progression through withdrawal.   Repeat BMP 2/25 w/ normalization of chloride, improved AG to 16.  - No need for further monitoring     #L knee pain   #L knee meniscal tear  Torn meniscus per patient, s/p cortisol shot with some improvement. Uses walker 2/2 instability when first standing but does  "better with more steps. Has some OP PT scheduled, but may have to cancel depending on next steps for CD treatment.   - OP PT recommended  - APAP PRN     #Chronic normocytic anemia  Likely in setting of alcohol use. Hgb 13.1 on admission, stable compared to baseline.   - No indication for continued monitoring here     # ? Hepatic steatosis  Pt w/ fatty liver disease documented in chart. Per review, this was initially documented as \"presumed fatty liver\". CT chest/abdomen/pelvis on 5/16/2024 w/o mention of fatty liver, though indication for scan was for fall/ R sided pain. Unclear hx of steatosis, but not clear objective evidence per my review. Also, LFTs on admission unremarkable.   - No current workup indicated  - Consider abdominal US in OP setting w/ PCP     #HTN  #HLD  - Continue PTA carvedilol 12.5mg BID, lisinopril 20mg daily  - Continue PTA simvastatin 20mg at bedtime     #T2DM  Last A1c of 6.7% on 1/7/25.  - Continue PTA metformin 1000mg daily with dinner  - Monjaro once weekly PTA     #Paroxysmal arial fibrillation  First noted in July 2024. At this time, already on warfarin. Thought to be in setting of substance use, particularly alcohol.  - Pharmacy to dose warfarin     #H/p PE 02/2024  #H/o DVT 05/2021  DVT noted in R LE in May 2021, deemed provoked. Was on Xarelto 15mg for 3-4 weeks. BL PE noted in Feb 2024, deemed unprovoked. Started on warfarin following PE dx in Feb 2024, will need lifelong AC.   - Pharmacy to dose warfarin     #CARLIE: CPAP  #ED: PRN sildenafil 100mg at home.         Hospital Course:   Cody Bolton was admitted to [facility] 3[facility] with attending Arnulfo Anderson MD as a voluntary patient. The patient was placed under status 15 (15 minute checks) to ensure patient safety.   MSSA protocol was initiated due to the patient's history of alcohol abuse and concern for withdrawal symptoms.  CBC, BMP and utox obtained.    All outpatient medications were continued with the exception of " Wellbutrin and Naltrexone which were restarted after detox was completed. Campral was added to Naltrexone.     Cody Bolton did participate in groups and was visible in the milieu.     The patient's symptoms of depression and withdrawal improved.     Cody Bolton was released to home. At the time of discharge Cody Bolton was determined to not be a danger to himself or others. At the current time of discharge, the patient does not meet criteria for involuntary hospitalization. On the day of discharge, the patient reports that they do not have suicidal or homicidal ideation and would never hurt themselves or others. Steps taken to minimize risk include: assessing patient s behavior and thought process daily during hospital stay, discharging patient with adequate plan for follow up for mental and physical health and discussing safety plan of returning to the hospital should the patient ever have thoughts of harming themselves or others. Therefore, based on all available evidence including the factors cited above, the patient does not appear to be at imminent risk for self-harm, and is appropriate for outpatient level of care.           Discharge Medications:     Current Discharge Medication List        START taking these medications    Details   acamprosate (CAMPRAL) 333 MG EC tablet Take 2 tablets (666 mg) by mouth 3 times daily.  Qty: 180 tablet, Refills: 3    Associated Diagnoses: Alcohol dependence with uncomplicated intoxication (H)      folic acid (FOLVITE) 1 MG tablet Take 1 tablet (1 mg) by mouth daily.  Qty: 30 tablet, Refills: 1    Associated Diagnoses: Alcohol dependence with uncomplicated intoxication (H)      multivitamin w/minerals (THERA-VIT-M) tablet Take 1 tablet by mouth daily.  Qty: 30 tablet, Refills: 1    Associated Diagnoses: Alcohol dependence with uncomplicated intoxication (H)      naltrexone (DEPADE/REVIA) 50 MG tablet Take 1 tablet (50 mg) by mouth daily.  Qty: 30 tablet, Refills: 3     Associated Diagnoses: Alcohol dependence with uncomplicated intoxication (H)      thiamine (B-1) 100 MG tablet Take 1 tablet (100 mg) by mouth daily.  Qty: 30 tablet, Refills: 0    Associated Diagnoses: Alcohol dependence with uncomplicated intoxication (H)           CONTINUE these medications which have NOT CHANGED    Details   buPROPion (WELLBUTRIN XL) 150 MG 24 hr tablet Take 1 tablet (150 mg) by mouth every morning  Qty: 90 tablet, Refills: 3    Associated Diagnoses: Major depressive disorder, recurrent episode, moderate (H)      carvedilol (COREG) 12.5 MG tablet Take 1 tablet (12.5 mg) by mouth 2 times daily (with meals)  Qty: 60 tablet, Refills: 11    Associated Diagnoses: Paroxysmal atrial fibrillation (H); Benign essential hypertension      cloNIDine (CATAPRES) 0.1 MG tablet Take 0.1 mg by mouth as needed (Sleep)      lisinopril (ZESTRIL) 20 MG tablet Take 1 tablet (20 mg) by mouth daily  Qty: 90 tablet, Refills: 2    Associated Diagnoses: Essential hypertension, benign      metFORMIN (GLUCOPHAGE XR) 500 MG 24 hr tablet Take 2 tablets (1,000 mg) by mouth daily (with dinner)  Qty: 180 tablet, Refills: 3    Associated Diagnoses: Type 2 diabetes mellitus without complication, without long-term current use of insulin (H)      sildenafil (VIAGRA) 100 MG tablet Take 1 tablet (100 mg) by mouth daily as needed (erectile dysfunction).  Qty: 30 tablet, Refills: 3    Associated Diagnoses: Erectile dysfunction due to arterial insufficiency      simvastatin (ZOCOR) 20 MG tablet Take 1 tablet (20 mg) by mouth at bedtime  Qty: 90 tablet, Refills: 3    Associated Diagnoses: Pure hypercholesterolemia      warfarin ANTICOAGULANT (COUMADIN) 5 MG tablet Take 5 mg by mouth every evening. Take 2.5 mg on Wednesdays and 5 mg all other days.                  Psychiatric Examination:   Appearance:  awake, alert and adequately groomed  Attitude:  cooperative  Eye Contact:  good  Mood:  good  Affect:  mood congruent  Speech:  clear,  coherent  Psychomotor Behavior:  no evidence of tardive dyskinesia, dystonia, or tics  Thought Process:  goal oriented  Associations:  no loose associations  Thought Content:  no evidence of suicidal ideation or homicidal ideation and no evidence of psychotic thought  Insight:  fair  Judgment:  fair  Oriented to:  time, person, and place  Attention Span and Concentration:  intact  Recent and Remote Memory:  fair  Language: Able to read and write  Fund of Knowledge: appropriate  Muscle Strength and Tone: normal  Gait and Station: Normal         Discharge Plan:   Continue medications as above.     Recommendation:  You are recommended to abstain from the use of alcohol or other mood-altering chemicals. You are recommended to complete a intensive outpatient substance use program such as [facility] and follow their continuing care recommendations.     [facility]  [address], [address]   [place] [address]   Phone: [phone]  Fax: [phone]     Major Treatments, Procedures and Findings:  You were treated for Alcohol  detoxification using Valium . As an outpatient you will be prescribed Campral , please take this medication as prescribed until it is gone. You have met with a [facility] counselor to develop a treatment plan for discharge. You had labs drawn and those results were reviewed with you. Please take a copy of your lab work with you to your next primary care provider appointment.     Symptoms to Report:  If you experience more anxiety, confusion, sleeplessness, deep sadness or thoughts of suicide, notify your treatment team or notify your primary care provider. IF ANY OF THE SYMPTOMS YOU ARE EXPERIENCING ARE A MEDICAL EMERGENCY CALL 911 IMMEDIATELY.      Lifestyle Adjustment: Adjust your lifestyle to get enough sleep, relaxation, exercise and good nutrition. Continue to develop healthy coping skills to decrease stress and promote a sober living  environment. Do not use mood altering substances including alcohol, illegal drugs or addictive medications other than what is currently prescribed.      Disposition: Return to home and attend IOP treatment.     Facts about COVID19 at www.cdc.gov/COVID19 and www.[place].gov/covid19     Keeping hands clean is one of the most important steps we can take to avoid getting sick and spreading germs to others.  Please wash your hands frequently and lather with soap for at least 20 seconds!     Follow-up Appointment:   You are aware you should make a follow up appointment with your primary care doctor for medical and medication management     Attestation:    The patient was seen and evaluated by me. I spent more than 30 minutes on discharge day activities. Arnulfo Anderson MD

## 2025-02-26 NOTE — PLAN OF CARE
Goal Outcome Evaluation:    Problem: Adult Behavioral Health Plan of Care  Goal: Plan of Care Review  Outcome: Met     Patient verbalized satisfaction with plan to discharge this shift. Discharge teaching and medication education provided. Patient verbalized understanding. Patient denied SI and SIB. Endorsed mild anxiety and depression. Knee pain 5/10; tylenol 650mg was administered with relief. Blood glucose at dinner time was 115. Safety plan in place.  Patient plans to follow-up with scheduled outpatient providers after discharge. Patient left the unit with discharge medications and belongings. Transported by home by his sister.  /76 (BP Location: Left arm)   Pulse 80   Temp 97.7  F (36.5  C) (Temporal)   Resp 16   Ht 1.829 m (6')   Wt (!) 163.3 kg (360 lb)   SpO2 96%   BMI 48.82 kg/m

## 2025-02-27 ENCOUNTER — TELEPHONE (OUTPATIENT)
Dept: ANTICOAGULATION | Facility: CLINIC | Age: 45
End: 2025-02-27
Payer: COMMERCIAL

## 2025-02-27 ENCOUNTER — PATIENT OUTREACH (OUTPATIENT)
Dept: CARE COORDINATION | Facility: CLINIC | Age: 45
End: 2025-02-27
Payer: COMMERCIAL

## 2025-02-27 DIAGNOSIS — Z86.718 HISTORY OF DVT (DEEP VEIN THROMBOSIS): Primary | ICD-10-CM

## 2025-02-27 NOTE — TELEPHONE ENCOUNTER
ANTICOAGULATION  MANAGEMENT: Discharge Review    Cody Bolton chart reviewed for anticoagulation continuity of care    Hospital Admission on 2/24-2/26/25 for alcohol dependence, suicidal ideation.    Discharge disposition: Home    Results:    Recent labs: (last 7 days)     02/22/25  2321 02/23/25  0610 02/24/25  0802 02/25/25  0929 02/26/25  0718   INR 2.40* 2.47* 2.72* 2.37* 2.29*     Anticoagulation inpatient management:     anticoagulation calendar updated with inpatient dosing    Anticoagulation discharge instructions:     Warfarin dosing: home regimen continued   Bridging: No   INR goal change: No      Medication changes affecting anticoagulation: No    Additional factors affecting anticoagulation: No     PLAN     Recommend to check INR on 2 weeks    Left a detailed message for Jeffery advising to recheck INR on or around 3/13/25. Misty also sent.     Anticoagulation Calendar updated    Gerson Baker RN  2/27/2025  Anticoagulation Clinic  [facility] for routing messages: fransisco GARAY  [facility] patient phone line: [phone]

## 2025-02-27 NOTE — PROGRESS NOTES
[facility]: Transitions of Care Outreach  Chief Complaint   Patient presents with    Clinic Care Coordination - Post Hospital       Most Recent Admission Date: 2/24/2025   Most Recent Admission Diagnosis:      Most Recent Discharge Date: 2/26/2025   Most Recent Discharge Diagnosis: Alcohol dependence with uncomplicated intoxication (H) - F10.220     Transitions of Care Assessment    Discharge Assessment  How are you doing now that you are home?: Writer spoke to patient who reports that things are good, no questions or concerns at this time.  How are your symptoms? (Red Flag symptoms escalate to triage hotline per guidelines): Improved  Do you know how to contact your clinic care team if you have future questions or changes to your health status? : Yes  Does the patient have their discharge instructions? : Yes  Does the patient have questions regarding their discharge instructions? : No  Were you started on any new medications or were there changes to any of your previous medications? : Yes  Does the patient have all of their medications?: Yes  Do you have questions regarding any of your medications? : No  Do you have all of your needed medical supplies or equipment (DME)?  (i.e. oxygen tank, CPAP, cane, etc.): Yes                  Follow up Plan   See below appointments   Discharge Follow-Up  Discharge follow up appointment scheduled in alignment with recommended follow up timeframe or Transitions of Risk Category? (Low = within 30 days; Moderate= within 14 days; High= within 7 days): Yes  Discharge Follow Up Appointment Date: 03/17/25    Future Appointments   Date Time Provider Department Center   3/4/2025  2:00 PM [facility]   3/11/2025  9:00 AM [facility]   3/17/2025  7:30 AM Yovana Felipe MD [facility]   3/18/2025  9:00 AM [facility]       Outpatient Plan as outlined on AVS reviewed with patient.    For  any urgent concerns, please contact our 24 hour nurse triage line: [phone])       CORINNE Oakes (she/her/hers)  Social Work Clinic Care Coordinator   [facility]  [email or web address]  [phone]

## 2025-03-04 ENCOUNTER — VIRTUAL VISIT (OUTPATIENT)
Dept: BEHAVIORAL HEALTH | Facility: CLINIC | Age: 45
End: 2025-03-04
Payer: COMMERCIAL

## 2025-03-04 DIAGNOSIS — F33.1 MAJOR DEPRESSIVE DISORDER, RECURRENT EPISODE, MODERATE (H): Primary | ICD-10-CM

## 2025-03-04 DIAGNOSIS — F10.20 ALCOHOL USE DISORDER, SEVERE, DEPENDENCE (H): ICD-10-CM

## 2025-03-04 PROCEDURE — 90791 PSYCH DIAGNOSTIC EVALUATION: CPT | Mod: 95

## 2025-03-04 ASSESSMENT — PATIENT HEALTH QUESTIONNAIRE - PHQ9
SUM OF ALL RESPONSES TO PHQ QUESTIONS 1-9: 6
10. IF YOU CHECKED OFF ANY PROBLEMS, HOW DIFFICULT HAVE THESE PROBLEMS MADE IT FOR YOU TO DO YOUR WORK, TAKE CARE OF THINGS AT HOME, OR GET ALONG WITH OTHER PEOPLE: SOMEWHAT DIFFICULT
SUM OF ALL RESPONSES TO PHQ QUESTIONS 1-9: 6

## 2025-03-04 NOTE — PROGRESS NOTES
"    [facility]      PATIENT'S NAME: Cody Bolton  PREFERRED NAME: Mu  PRONOUNS: he/him/his    MRN: [id number]  : 1980  ADDRESS: [address] [facility] [address]  ACCT. NUMBER:  [id number]  DATE OF SERVICE: 3/04/25  START TIME: 2.01  END TIME: 2.37  PREFERRED PHONE: [phone]  May we leave a program related message: Yes  EMERGENCY CONTACT: was not obtained  .  SERVICE MODALITY:  Video Visit:      Provider verified identity through the following two step process.  Patient provided:  Patient  and Patient address    Telemedicine Visit: The patient's condition can be safely assessed and treated via synchronous audio and visual telemedicine encounter.      Reason for Telemedicine Visit: Patient has requested telehealth visit    Originating Site (Patient Location): Patient's home    Distant Site (Provider Location): [facility]    Consent:  The patient/guardian has verbally consented to: the potential risks and benefits of telemedicine (video visit) versus in person care; bill my insurance or make self-payment for services provided; and responsibility for payment of non-covered services.     Patient would like the video invitation sent by:  My Chart    Mode of Communication:  [facility] Conference [facility]    Distant Location (Provider):  Off-site    As the provider I attest to compliance with applicable laws and regulations related to telemedicine.    UNIVERSAL ADULT Mental Health DIAGNOSTIC ASSESSMENT    Identifying Information:  Patient is a 44 year old,    individual.  Patient was referred for an assessment by self .  Patient attended the session alone.    Chief Complaint:   The reason for seeking services at this time is: \"depression \".  The problem(s) began  23.    Patient has attempted to resolve these concerns in the past through [facility] and partial " "hospitalization .    Social/Family History:  Patient reported they grew up in other [facility].  They were raised by biological parents  .  Parents .  Patient reported that their childhood was \"some yelling and it wasn't that dysfunctional\".  Patient described their current relationships with family of origin as \"pretty close with my sister\".      The patient describes their cultural background as  and reports that [place] friends without Baptist influences.  Cultural influences and impact on patient's life structure, values, norms, and healthcare: patient reports that he [place]..  Contextual influences on patient's health include: Contextual Factors: Community Factors  reports that he [place]. .    These factors will be addressed in the Preliminary Treatment plan. Patient identified their preferred language to be English. Patient reported they does not need the assistance of an  or other support involved in therapy.      Patient reported had no significant delays in developmental tasks.   Patient's highest education level was college graduate  .  Patient identified the following learning problems: none reported.  Modifications will not be used to assist communication in therapy.  Patient reports they are  able to understand written materials.      Patient reported the following relationship history including past dating and currently dating.  Patient's current relationship status is  for  1+ year.   Patient identified their sexual orientation as heterosexual.  Patient reported having 2 child(stephanie). Patient identified siblings; friends as part of their support system.  Patient identified the quality of these relationships as good,  .       Patient's current living/housing situation involves staying in own home/apartment.  The immediate members of family and " household include self  and they report that housing is stable.     Patient is currently employed fulltime.  Patient reports their finances are obtained through employment. Patient does identify finances as a current stressor.       Patient reported that they have not been involved with the legal system.    . Patient does not report being under probation/ parole/ jurisdiction. They are not under any current court jurisdiction. .    Patient's Strengths and Limitations:  Patient identified the following strengths or resources that will help them succeed in treatment: insight and intelligence. Things that may interfere with the patient's success in treatment include: physical health concerns.     Assessments:  The following assessments were completed by patient for this visit:  PHQ9:       11/4/2024     8:47 AM 11/12/2024     8:45 AM 11/19/2024     8:50 AM 12/17/2024     8:52 AM 1/2/2025     1:20 PM 1/13/2025    12:53 PM 3/4/2025     1:52 PM   PHQ-9 SCORE   PHQ-9 Total Score MyChart 17 (Moderately severe depression) 20 (Severe depression) 21 (Severe depression) 20 (Severe depression) 18 (Moderately severe depression) 9 (Mild depression) 6 (Mild depression)   PHQ-9 Total Score 17  20  21  20  18  9  6        Patient-reported     GAD7:       6/4/2024    11:39 AM 8/7/2024     4:00 PM 8/9/2024     6:40 AM 8/20/2024     3:00 PM 8/23/2024     8:00 AM 12/11/2024    12:52 PM 2/26/2025     5:00 PM   MERI-7 SCORE   Total Score 7 (mild anxiety)  7 (mild anxiety)   10 (moderate anxiety)    Total Score 7 6 7 9 9 10  5       Patient-reported     PROMIS 10-Global Health (all questions and answers displayed):       2/19/2024    12:53 PM 6/4/2024    11:40 AM 8/7/2024     4:00 PM 8/23/2024     8:00 AM 11/4/2024     9:00 AM 2/26/2025     5:00 PM 3/4/2025     1:54 PM   PROMIS 10   In general, would you say your health is: Fair Fair     Good   In general, would you say your quality of life is: Good Poor     Good   In general, how would you  rate your physical health? Fair Fair     Good   In general, how would you rate your mental health, including your mood and your ability to think? Good Fair     Good   In general, how would you rate your satisfaction with your social activities and relationships? Good Fair     Fair   In general, please rate how well you carry out your usual social activities and roles Fair Poor     Fair   To what extent are you able to carry out your everyday physical activities such as walking, climbing stairs, carrying groceries, or moving a chair? Completely A little     A little   In the past 7 days, how often have you been bothered by emotional problems such as feeling anxious, depressed, or irritable? Sometimes Often     Never   In the past 7 days, how would you rate your fatigue on average? Mild Severe     Moderate   In the past 7 days, how would you rate your pain on average, where 0 means no pain, and 10 means worst imaginable pain? 5 8     4   In general, would you say your health is: 2 2   2  3   In general, would you say your quality of life is: 3 1   3  3   In general, how would you rate your physical health? 2 2   2  3   In general, how would you rate your mental health, including your mood and your ability to think? 3 2   2  3   In general, how would you rate your satisfaction with your social activities and relationships? 3 2   3  2   In general, please rate how well you carry out your usual social activities and roles. (This includes activities at home, at work and in your community, and responsibilities as a parent, child, spouse, employee, friend, etc.) 2 1   4  2   To what extent are you able to carry out your everyday physical activities such as walking, climbing stairs, carrying groceries, or moving a chair? 5 2   5  2   In the past 7 days, how often have you been bothered by emotional problems such as feeling anxious, depressed, or irritable? 3 4   4  1   In the past 7 days, how would you rate your fatigue on  average? 2 4   3  3   In the past 7 days, how would you rate your pain on average, where 0 means no pain, and 10 means worst imaginable pain? 5 8   3  4   Global Mental Health Score 12    12 7   10  13    Global Physical Health Score 14    14 8   14  11    PROMIS TOTAL - SUBSCORES 26    26 15   24  24        Information is confidential and restricted. Go to Review Flowsheets to unlock data.    Patient-reported     Lyons Suicide Severity Rating Scale (Lifetime/Recent)      12/24/2024     4:08 PM 2/19/2025     1:23 PM 2/22/2025     3:47 PM 2/22/2025     4:41 PM 2/23/2025     5:34 PM 2/24/2025     7:29 PM 2/24/2025    11:00 PM   Lyons Suicide Severity Rating (Lifetime/Recent)   Q1 Wish to be Dead (Lifetime)       No   Q2 Non-Specific Active Suicidal Thoughts (Lifetime)       No   Q1 Wished to be Dead (Past Month) 0-->no 0-->no 1-->yes 1-->yes  1-->yes 0-->no   Q2 Suicidal Thoughts (Past Month) 0-->no 0-->no 1-->yes 1-->yes  1-->yes 0-->no   Q3 Suicidal Thought Method   1-->yes 1-->yes  1-->yes    Q4 Suicidal Intent without Specific Plan   0-->no 0-->no  0-->no    Q5 Suicide Intent with Specific Plan   1-->yes 1-->yes  1-->yes    Q6 Suicide Behavior (Lifetime) 0-->no 0-->no 0-->no 1-->yes  0-->no 0-->no   If yes to Q6, within past 3 months?    1-->yes      Level of Risk per Screen no risks indicated no risks indicated high risk high risk  high risk no risks indicated   Most Lethal Attempt Date     5/4/2024     Actual Lethality/Medical Damage Code (Most Lethal Attempt)     2         Personal and Family Medical History:  Patient   report a family history of mental health concerns.  Patient reports family history includes Anxiety Disorder in his sister; Depression in his sister; Substance Abuse in his brother..     Patient does report Mental Health Diagnosis and/or Treatment.  Patient reported the following previous diagnoses which include(s):  anxiety, depression .  Patient reported symptoms began in early  adulthood.  Patient has received mental health services in the past:    therapy; day treatment; partial hospitalization program   .  Psychiatric Hospitalizations:   when   ,  ,  ,  ,  ,  ,  ,  ,  ,  ,  .    Patient denies a history of civil commitment.      Currently, patient    is receiving other mental health services.  These include  psychiatry services .       Patient has had a physical exam to rule out medical causes for current symptoms.  Date of last physical exam was within the past year. Client was encouraged to follow up with PCP if symptoms were to develop. The patient does not have a Primary Care Provider and was encouraged to establish care with a PCP..  Patient reports the following current medical concerns: asthma and diabetes .  Patient reports pain concerns including back and neck.  Patient does want help addressing pain concerns..   There are not significant appetite / nutritional concerns / weight changes.   Patient does not report a history of head injury / trauma / cognitive impairment.      Current Outpatient Medications   Medication Sig Dispense Refill    acamprosate (CAMPRAL) 333 MG EC tablet Take 2 tablets (666 mg) by mouth 3 times daily. 180 tablet 3    buPROPion (WELLBUTRIN XL) 150 MG 24 hr tablet Take 1 tablet (150 mg) by mouth every morning 90 tablet 3    carvedilol (COREG) 12.5 MG tablet Take 1 tablet (12.5 mg) by mouth 2 times daily (with meals) 60 tablet 11    cloNIDine (CATAPRES) 0.1 MG tablet Take 0.1 mg by mouth as needed (Sleep)      folic acid (FOLVITE) 1 MG tablet Take 1 tablet (1 mg) by mouth daily. 30 tablet 1    lisinopril (ZESTRIL) 20 MG tablet Take 1 tablet (20 mg) by mouth daily 90 tablet 2    metFORMIN (GLUCOPHAGE XR) 500 MG 24 hr tablet Take 2 tablets (1,000 mg) by mouth daily (with dinner) 180 tablet 3    multivitamin w/minerals (THERA-VIT-M) tablet Take 1 tablet by mouth daily. 30 tablet 1    naltrexone (DEPADE/REVIA) 50 MG tablet Take 1 tablet (50 mg) by mouth daily.  30 tablet 3    sildenafil (VIAGRA) 100 MG tablet Take 1 tablet (100 mg) by mouth daily as needed (erectile dysfunction). 30 tablet 3    simvastatin (ZOCOR) 20 MG tablet Take 1 tablet (20 mg) by mouth at bedtime 90 tablet 3    thiamine (B-1) 100 MG tablet Take 1 tablet (100 mg) by mouth daily. 30 tablet 0    warfarin ANTICOAGULANT (COUMADIN) 5 MG tablet Take 5 mg by mouth every evening. Take 2.5 mg on Wednesdays and 5 mg all other days.       No current facility-administered medications for this visit.     Facility-Administered Medications Ordered in Other Visits   Medication Dose Route Frequency Provider Last Rate Last Admin    Self Administer Medications: Behavioral Services   Does not apply See Admin Instructions Corrine Alvarenga MD        Self Administer Medications: Behavioral Services   Does not apply See Admin Instructions Corrine Alvarenga MD             Medication Adherence:  Patient reports  .  taking psychiatric medications as prescribed.    Patient Allergies:  No Known Allergies    Medical History:    Past Medical History:   Diagnosis Date    Depressive disorder     High cholesterol     History of gastric bypass     History of pulmonary embolism     Hypertension     CARLIE (obstructive sleep apnea)     Personal history of DVT (deep vein thrombosis)     Type 2 diabetes mellitus (H)     Uncomplicated asthma          Current Mental Status Exam:   Appearance:  Appropriate    Eye Contact:  Fair   Psychomotor:  Normal       Gait / station:  Unable to assess due to seated video session  Attitude / Demeanor: Cooperative   Speech      Rate / Production: Normal/ Responsive      Volume:  Normal  volume      Language:  intact  Mood:   Depressed   Affect:   Appropriate    Thought Content: Clear   Thought Process: Logical       Associations: No loosening of associations  Insight:   Fair   Judgment:  Intact   Orientation:  All  Attention/concentration: Fair    Substance Use:   Patient did not report a family history of  "substance use concerns; see medical history section for details.  Patient has received chemical dependency treatment in the past at [facility] .  Patient has been to detox at [place].      Patient is not currently receiving any chemical dependency treatment.           Substance History of use Age of first use Date of last use     Pattern and duration of use (include amounts and frequency)   Alcohol    Used in the past  15  2/22/25 REPORTS SUBSTANCE USE: reports using substance 1 times per day and has 1 \"liter of 100 proof\" at a time.   Patient reports heaviest use was 2024. Patient is currently in process of getting sober through engagement in chemical dependency intensive outpatient treatment.   Cannabis    Never used     REPORTS SUBSTANCE USE: N/A     Amphetamines     Never used     REPORTS SUBSTANCE USE: N/A   Cocaine/crack      Never used       REPORTS SUBSTANCE USE: N/A   Hallucinogens     Never used       REPORTS SUBSTANCE USE: N/A   Inhalants     Never used       REPORTS SUBSTANCE USE: N/A   Heroin   Never used         REPORTS SUBSTANCE USE: N/A   Other Opiates   Never used     REPORTS SUBSTANCE USE: N/A   Benzodiazepine     Never used     REPORTS SUBSTANCE USE: N/A   Barbiturates   Never used     REPORTS SUBSTANCE USE: N/A   Over the counter meds   Never used     REPORTS SUBSTANCE USE: N/A   Caffeine  Currently use  8 3/4/25 REPORTS SUBSTANCE USE: reports using substance 1 times per day and has 3 cans of pop at a time.   Patient reports heaviest use is current use.   Nicotine    Never used     REPORTS SUBSTANCE USE: N/A   Other substances not listed above:  Identify:    Never used     REPORTS SUBSTANCE USE: N/A     Patient reported the following problems as a result of their substance use: family problems; relationship problems .    Substance Use: blackouts, daily use, social problems related to substance use, and cravings/urges to use    Based on the positive CAGE score and clinical interview " there  are indications of drug or alcohol abuse. Recommendation for substance abuse disorder evaluation with a substance use professional was given. Therapist did recommend client to reduce use or abstain from alcohol or substance use. Therapist did recommend structured treatment and or community support (AA, 12 step group, etc.).   .    Significant Losses / Trauma / Abuse / Neglect Issues:   Patient  did not  serve in the .  There are indications or report of significant loss, trauma, abuse or neglect issues related to: death of father when the patient was 15 and a friend who killed himself when the patient was in high school .  Concerns for possible neglect are not present.     Safety Assessment:   Patient denies current homicidal ideation and behaviors.  Patient denies current self-injurious ideation and behaviors.    Patient denied risk behaviors reported placing themselves in unsafe environment(s) associated with substance use.   Patient denies any high risk behaviors associated with mental health symptoms.  Patient reports the following current concerns for their personal safety: None.  Patient reports there  are no firearms in the house.       There are no firearms in the home..    History of Safety Concerns:  Patient denied a history of homicidal ideation.     Patient denied a history of personal safety concerns.    Patient denied a history of assaultive behaviors.    Patient denied a history of sexual assault behaviors.     Patient reported a history of placing themselves in unsafe environment(s) associated with substance use.  Patient denies any history of high risk behaviors associated with mental health symptoms.  Patient reports the following protective factors:   forward or future oriented thinking; dedication to family or friends; daily obligations; strong sense of self worth or esteem     Risk Plan:  See Recommendations for Safety and Risk Management Plan    Review of Symptoms per patient  report:   Depression: Lack of interest or pleasure in doing things, Feeling sad, down, or depressed, Feelings of hopelessness, Change in energy level, Change in sleep, Low self-worth, Difficulties concentrating, Suicidal ideation, and Excessive or inappropriate guilt  Pamela:  No Symptoms  Psychosis: No Symptoms  Anxiety: Nervousness  Panic:  No symptoms  Post Traumatic Stress Disorder:  No Symptoms   Eating Disorder: No Symptoms  ADD / ADHD:  No symptoms  Conduct Disorder: No symptoms  Autism Spectrum Disorder: No symptoms  Obsessive Compulsive Disorder: No Symptoms    Patient reports the following compulsive behaviors and treatment history:  Alcohol use disorder - past chemical dependency treatment history at [facility] .      Diagnostic Criteria:   Major Depressive Disorder  CRITERIA (A-C) REPRESENT A MAJOR DEPRESSIVE EPISODE - SELECT THESE CRITERIA  A) Recurrent episode(s) - symptoms have been present during the same 2-week period and represent a change from previous functioning 5 or more symptoms (required for diagnosis)   - Depressed mood. Note: In children and adolescents, can be irritable mood.     - Diminished interest or pleasure in all, or almost all, activities.    - Disrupted sleep.    - Fatigue or loss of energy.    - Feelings of worthlessness or excessive guilt.    - Diminished ability to think or concentrate, or indecisiveness.   B) The symptoms cause clinically significant distress or impairment in social, occupational, or other important areas of functioning  C) The episode is not attributable to the physiological effects of a substance or to another medical condition  D) The occurence of major depressive episode is not better explained by other thought / psychotic disorders  E) There has never been a manic episode or hypomanic episode Substance Use Disorder Substance is often taken in larger amounts or over a longer period than was intended.  Met for:  Alcohol There is persistent desire  or unsuccessful efforts to cut down or control use of the substance.  Met for:  Alcohol Craving, or a strong desire or urge to use the substance.  Met for:  Alcohol Use of the substance is continued despite knowledge of having a persistent or recurrent physical or psychological problem that is likely to have been cause or exacerbated by the substance.  Met for:  Alcohol Tolerance:  either a need for markedly increased amounts of the substance to achieve the desired effect or a markedly diminished effect with continued use of the same amount of the substance.  Met for:  Alcohol Withdrawal:  either patient endorses characteristic withdrawal syndrome for the substance or the substance (or closely related substance) is taken to relieve or avoid withdrawal symptoms.  Met for:  Alcohol    Functional Status:  Patient reports the following functional impairments:  relationship(s), self-care, and work / vocational responsibilities.     Nonprogrammatic care:  Patient is requesting basic services to address current mental health concerns.    Clinical Summary:  1. Psychosocial, Cultural and Contextual Factors: DA toyin for patient identifies as a 44 year old heterosexual, ,  male  . Patient has history of alcohol use disorder and reports that he is starting a 10 week Mental Health and Chemical Dependency Intensive outpatient treatment program at [facility] starting on 3/6/25. Patient reports planning on resuming individual therapy with this provider after this treatment program ends.   2. Principal DSM5 Diagnoses  (Sustained by DSM5 Criteria Listed Above):   296.32 (F33.1) Major Depressive Disorder, Recurrent Episode, Moderate _  Substance-Related & Addictive Disorders Alcohol Use Disorder   303.90 (F10.20) Severe   .  3. Other Diagnoses that is relevant to services:   none  4. Provisional Diagnosis:  None at this time.  5. Prognosis: Expect Improvement.  6. Likely consequences of symptoms if not treated:  Patient's condition would likely worsen, requiring a higher level of care.  7. Client strengths include:  insightful and intelligent .     Recommendations:     1. Plan for Safety and Risk Management:   Safety and Risk: A safety and risk management plan has been developed including: Patient consented to co-developed safety plan.  Safety and risk management plan was completed - see below.  Patient agreed to use safety plan should any safety concerns arise.  A copy was given to the patient..          Report to child / adult protection services was NA.     2. Patient's identified  mental health concerns indicating individual therapy .     3. Initial Treatment will focus on:    Depressed Mood - reduce depression symptoms  Alcohol / Substance Use - reduce substance use symptoms .     4. Resources/Service Plan:    services are not indicated.   Modifications to assist communication are not indicated.   Additional disability accommodations are not indicated.      5. Collaboration:   Collaboration / coordination of treatment will be initiated with the following  support professionals:  none at this time .      6.  Referrals:   The following referral(s) will be initiated:  Patient reports that he is starting a 10 week Mental Health and Chemical Dependency Intensive outpatient treatment program at [facility] starting on 3/6/25 .       A Release of Information has been obtained for the following:  none .     Clinical Substantiation/medical necessity for the above recommendations:  Patient's Alcohol Use Disorder diagnosis.    7. MARCY:    MARCY:  Discussed the general effects of drugs and alcohol on health and well-being. Provider gave patient information about the  effects of chemical use on their health and well being. Recommendations:  Continue abstaining from alcohol, seek a substance use comprehensive assessment, and attend 12 step recovery groups as needed. Patient reports that he is starting a 10 week Mental Health and  Chemical Dependency Intensive outpatient treatment program at [facility] starting on 3/6/25.     8. Records:   These were reviewed at time of assessment.   Information in this assessment was obtained from the medical record and  provided by patient who is a fair historian.    Patient will have open access to their mental health medical record.    9.   Interactive Complexity: No    10. Safety Plan:  Safety plan has been updated. See [place] safety plan in assessments tab for details.       Provider Name/ Credentials:  KATRIN Salvador on 3/4/25

## 2025-03-06 ENCOUNTER — VIRTUAL VISIT (OUTPATIENT)
Dept: FAMILY MEDICINE | Facility: CLINIC | Age: 45
End: 2025-03-06
Payer: COMMERCIAL

## 2025-03-06 DIAGNOSIS — F10.20 ALCOHOL USE DISORDER, SEVERE, DEPENDENCE (H): ICD-10-CM

## 2025-03-06 DIAGNOSIS — Z09 HOSPITAL DISCHARGE FOLLOW-UP: Primary | ICD-10-CM

## 2025-03-06 DIAGNOSIS — F33.1 MAJOR DEPRESSIVE DISORDER, RECURRENT EPISODE, MODERATE (H): ICD-10-CM

## 2025-03-06 DIAGNOSIS — E78.00 PURE HYPERCHOLESTEROLEMIA: ICD-10-CM

## 2025-03-06 DIAGNOSIS — E11.9 TYPE 2 DIABETES MELLITUS WITHOUT COMPLICATION, WITHOUT LONG-TERM CURRENT USE OF INSULIN (H): ICD-10-CM

## 2025-03-06 DIAGNOSIS — I10 ESSENTIAL HYPERTENSION, BENIGN: ICD-10-CM

## 2025-03-06 PROBLEM — F10.21 ALCOHOL USE DISORDER, SEVERE, IN EARLY REMISSION (H): Status: RESOLVED | Noted: 2024-09-26 | Resolved: 2025-03-06

## 2025-03-06 PROBLEM — F10.220 ALCOHOL DEPENDENCE WITH UNCOMPLICATED INTOXICATION (H): Status: RESOLVED | Noted: 2025-02-22 | Resolved: 2025-03-06

## 2025-03-06 RX ORDER — SIMVASTATIN 20 MG
20 TABLET ORAL AT BEDTIME
Qty: 90 TABLET | Refills: 3 | Status: SHIPPED | OUTPATIENT
Start: 2025-03-06

## 2025-03-06 RX ORDER — BUPROPION HYDROCHLORIDE 300 MG/1
300 TABLET ORAL EVERY MORNING
Qty: 90 TABLET | Refills: 3 | Status: SHIPPED | OUTPATIENT
Start: 2025-03-06

## 2025-03-06 RX ORDER — METFORMIN HYDROCHLORIDE 500 MG/1
1000 TABLET, EXTENDED RELEASE ORAL
Qty: 180 TABLET | Refills: 3 | Status: SHIPPED | OUTPATIENT
Start: 2025-03-06

## 2025-03-06 RX ORDER — LISINOPRIL 20 MG/1
20 TABLET ORAL DAILY
Qty: 90 TABLET | Refills: 3 | Status: SHIPPED | OUTPATIENT
Start: 2025-03-06

## 2025-03-06 ASSESSMENT — ANXIETY QUESTIONNAIRES
1. FEELING NERVOUS, ANXIOUS, OR ON EDGE: SEVERAL DAYS
7. FEELING AFRAID AS IF SOMETHING AWFUL MIGHT HAPPEN: NOT AT ALL
8. IF YOU CHECKED OFF ANY PROBLEMS, HOW DIFFICULT HAVE THESE MADE IT FOR YOU TO DO YOUR WORK, TAKE CARE OF THINGS AT HOME, OR GET ALONG WITH OTHER PEOPLE?: SOMEWHAT DIFFICULT
6. BECOMING EASILY ANNOYED OR IRRITABLE: NOT AT ALL
2. NOT BEING ABLE TO STOP OR CONTROL WORRYING: NOT AT ALL
IF YOU CHECKED OFF ANY PROBLEMS ON THIS QUESTIONNAIRE, HOW DIFFICULT HAVE THESE PROBLEMS MADE IT FOR YOU TO DO YOUR WORK, TAKE CARE OF THINGS AT HOME, OR GET ALONG WITH OTHER PEOPLE: SOMEWHAT DIFFICULT
GAD7 TOTAL SCORE: 1
3. WORRYING TOO MUCH ABOUT DIFFERENT THINGS: NOT AT ALL
7. FEELING AFRAID AS IF SOMETHING AWFUL MIGHT HAPPEN: NOT AT ALL
4. TROUBLE RELAXING: NOT AT ALL
5. BEING SO RESTLESS THAT IT IS HARD TO SIT STILL: NOT AT ALL
GAD7 TOTAL SCORE: 1
GAD7 TOTAL SCORE: 1

## 2025-03-06 ASSESSMENT — PATIENT HEALTH QUESTIONNAIRE - PHQ9
SUM OF ALL RESPONSES TO PHQ QUESTIONS 1-9: 4
SUM OF ALL RESPONSES TO PHQ QUESTIONS 1-9: 4
10. IF YOU CHECKED OFF ANY PROBLEMS, HOW DIFFICULT HAVE THESE PROBLEMS MADE IT FOR YOU TO DO YOUR WORK, TAKE CARE OF THINGS AT HOME, OR GET ALONG WITH OTHER PEOPLE: SOMEWHAT DIFFICULT

## 2025-03-06 NOTE — PROGRESS NOTES
Cody is a 44 year old who is being evaluated via a billable video visit.    How would you like to obtain your AVS? MyChart  If the video visit is dropped, the invitation should be resent by: Text to cell phone: [phone]  Will anyone else be joining your video visit? No      Assessment & Plan     Hospital discharge follow-up    Alcohol use disorder, severe, dependence (H) - set to start intensive outpatient treatment through [facility], starts Monday. He will send short term disability paperwork.     Major depressive disorder, recurrent episode, moderate (H) - will increase dose of wellbutrin to better help with ongoing symptoms. Contracts for safety today.   - buPROPion (WELLBUTRIN XL) 300 MG 24 hr tablet; Take 1 tablet (300 mg) by mouth every morning.    Essential hypertension, benign - continue current  - lisinopril (ZESTRIL) 20 MG tablet; Take 1 tablet (20 mg) by mouth daily.    Type 2 diabetes mellitus without complication, without long-term current use of insulin (H) - last A1c stable, continue current, DM follow up scheduled for May  - metFORMIN (GLUCOPHAGE XR) 500 MG 24 hr tablet; Take 2 tablets (1,000 mg) by mouth daily (with dinner).    Pure hypercholesterolemia - on statin. continue  - simvastatin (ZOCOR) 20 MG tablet; Take 1 tablet (20 mg) by mouth at bedtime.    The longitudinal plan of care for the diagnosis(es)/condition(s) as documented were addressed during this visit. Due to the added complexity in care, I will continue to support Cody in the subsequent management and with ongoing continuity of care.        BMI  Estimated body mass index is 48.82 kg/m  as calculated from the following:    Height as of 2/24/25: 1.829 m (6').    Weight as of 2/24/25: 163.3 kg (360 lb).     Depression Screening Follow Up        3/6/2025     9:54 AM   PHQ   PHQ-9 Total Score 4    Q9: Thoughts of better off dead/self-harm past 2 weeks Several days   F/U: Thoughts of suicide or self-harm Yes   F/U: Self  "harm-plan No   F/U: Self-harm action No   F/U: Safety concerns No       Patient-reported         Subjective   Cody is a 44 year old, presenting for the following health issues:  Forms (Needs some STD forms completed, starting an outpatient program for chemical dependency and depression. )        3/6/2025     9:36 AM   Additional Questions   Roomed by Ekaterina Lindsey CMA   Accompanied by Self     HPI          2/7/2024 5/7/2024 2/27/2025   Post Discharge Outreach   Admission Date 2/4/2024 5/4/2024    Reason for Admission worsening shortness of breath with pleuritic chest pain CATHLEEN (acute kidney injury)    Discharge Date 2/6/2024 5/6/2024    Discharge Diagnosis Unprovoked bilateral PE CATHLEEN (acute kidney injury)    How are you doing now that you are home? Okay.  Patient was quiet, gave mostly one word answers. \" Doing okay \" Writer spoke to patient who reports that things are good, no questions or concerns at this time.   How are your symptoms? (Red Flag symptoms escalate to triage hotline per guidelines) Improved Improved Improved   Do you feel your condition is stable enough to be safe at home until your provider visit? Yes Yes    Does the patient have their discharge instructions?  Yes Yes Yes   Does the patient have questions regarding their discharge instructions?  No No No   Were you started on any new medications or were there changes to any of your previous medications?  Yes Yes Yes   Does the patient have all of their medications? Yes Yes Yes   Do you have questions regarding any of your medications?  No No No   Do you have all of your needed medical supplies or equipment (DME)?  (i.e. oxygen tank, CPAP, cane, etc.) Yes Yes Yes   Discharge follow-up appointment scheduled within 14 calendar days?  Yes Yes    Discharge Follow Up Appointment Date 2/9/2024 5/7/2024 3/17/2025   Discharge Follow Up Appointment Scheduled with? Primary Care Provider Specialty Care Provider        Hospital Follow-up " Visit:    Hospital/Nursing Home/IP Rehab Facility: [facility]  Date of Admission: 02/24/2025  Date of Discharge: 02/27/2025  Reason(s) for Admission: Alcohol Dependence  Was the patient in the ICU or did the patient experience delirium during hospitalization?  No  Do you have any other stressors you would like to discuss with your provider? No    Problems taking medications regularly:  None  Medication changes since discharge: Yes - started on some new meds and Vitamin B1  Problems adhering to non-medication therapy:  None    Summary of hospitalization:  [facility] discharge summary reviewed  Diagnostic Tests/Treatments reviewed.  Follow up needed: none  Other Healthcare Providers Involved in Patient s Care:         None  Update since discharge: stable.       For short term disability paperwork:  Date he was out of work: 2/19/25  Starting treatment 3/10/25 - [facility]  Return to work on 5/19/25  Fax - on form    Plan of care communicated with patient             Review of Systems  Constitutional, HEENT, cardiovascular, pulmonary, gi and gu systems are negative, except as otherwise noted.      Objective    Vitals - Patient Reported  Systolic (Patient Reported): 1.57  Weight (Patient Reported): 165.6 kg (365 lb)  Height (Patient Reported): 182.9 cm (6')  BMI (Based on Pt Reported Ht/Wt): 49.5  Pain Score: Moderate Pain (4)  Pain Loc: Knee (Left Knee - torn meniscus)    Physical Exam   GENERAL: alert and no distress  EYES: Eyes grossly normal to inspection.  No discharge or erythema, or obvious scleral/conjunctival abnormalities.  RESP: No audible wheeze, cough, or visible cyanosis.    SKIN: Visible skin clear. No significant rash, abnormal pigmentation or lesions.  NEURO: Cranial nerves grossly intact.  Mentation and speech appropriate for age.  PSYCH: Appropriate affect, tone, and pace of words        Video-Visit Details    Type of service:  Video Visit    Originating Location (pt. Location): Home    Distant Location (provider location):  Off-site  Platform used for Video Visit: David  Signed Electronically by: Yovana Felipe MD

## 2025-03-26 ENCOUNTER — APPOINTMENT (OUTPATIENT)
Dept: MRI IMAGING | Facility: CLINIC | Age: 45
DRG: 552 | End: 2025-03-26
Attending: EMERGENCY MEDICINE
Payer: COMMERCIAL

## 2025-03-26 ENCOUNTER — APPOINTMENT (OUTPATIENT)
Dept: GENERAL RADIOLOGY | Facility: CLINIC | Age: 45
DRG: 552 | End: 2025-03-26
Attending: PHYSICIAN ASSISTANT
Payer: COMMERCIAL

## 2025-03-26 ENCOUNTER — APPOINTMENT (OUTPATIENT)
Dept: CT IMAGING | Facility: CLINIC | Age: 45
DRG: 552 | End: 2025-03-26
Attending: EMERGENCY MEDICINE
Payer: COMMERCIAL

## 2025-03-26 ENCOUNTER — HOSPITAL ENCOUNTER (INPATIENT)
Facility: CLINIC | Age: 45
DRG: 552 | End: 2025-03-26
Attending: EMERGENCY MEDICINE | Admitting: INTERNAL MEDICINE
Payer: COMMERCIAL

## 2025-03-26 ENCOUNTER — APPOINTMENT (OUTPATIENT)
Dept: GENERAL RADIOLOGY | Facility: CLINIC | Age: 45
DRG: 552 | End: 2025-03-26
Attending: EMERGENCY MEDICINE
Payer: COMMERCIAL

## 2025-03-26 DIAGNOSIS — S12.031A CLOSED NONDISPLACED FRACTURE OF POSTERIOR ARCH OF FIRST CERVICAL VERTEBRA, INITIAL ENCOUNTER (H): Primary | ICD-10-CM

## 2025-03-26 DIAGNOSIS — S00.81XA FACIAL ABRASION, INITIAL ENCOUNTER: ICD-10-CM

## 2025-03-26 DIAGNOSIS — S12.401A CLOSED NONDISPLACED FRACTURE OF FIFTH CERVICAL VERTEBRA, UNSPECIFIED FRACTURE MORPHOLOGY, INITIAL ENCOUNTER (H): ICD-10-CM

## 2025-03-26 DIAGNOSIS — F10.929 ALCOHOLIC INTOXICATION WITH COMPLICATION: ICD-10-CM

## 2025-03-26 DIAGNOSIS — S12.591A OTHER CLOSED NONDISPLACED FRACTURE OF SIXTH CERVICAL VERTEBRA, INITIAL ENCOUNTER (H): ICD-10-CM

## 2025-03-26 DIAGNOSIS — S62.656A CLOSED NONDISPLACED FRACTURE OF MIDDLE PHALANX OF RIGHT LITTLE FINGER, INITIAL ENCOUNTER: ICD-10-CM

## 2025-03-26 DIAGNOSIS — S12.001A CLOSED NONDISPLACED FRACTURE OF FIRST CERVICAL VERTEBRA, UNSPECIFIED FRACTURE MORPHOLOGY, INITIAL ENCOUNTER (H): ICD-10-CM

## 2025-03-26 LAB
ANION GAP SERPL CALCULATED.3IONS-SCNC: 14 MMOL/L (ref 7–15)
BASOPHILS # BLD AUTO: 0.1 10E3/UL (ref 0–0.2)
BASOPHILS NFR BLD AUTO: 1 %
BUN SERPL-MCNC: 13 MG/DL (ref 6–20)
CALCIUM SERPL-MCNC: 8.4 MG/DL (ref 8.8–10.4)
CHLORIDE SERPL-SCNC: 106 MMOL/L (ref 98–107)
CREAT SERPL-MCNC: 0.95 MG/DL (ref 0.67–1.17)
EGFRCR SERPLBLD CKD-EPI 2021: >90 ML/MIN/1.73M2
EOSINOPHIL # BLD AUTO: 0.3 10E3/UL (ref 0–0.7)
EOSINOPHIL NFR BLD AUTO: 4 %
ERYTHROCYTE [DISTWIDTH] IN BLOOD BY AUTOMATED COUNT: 16.1 % (ref 10–15)
ETHANOL SERPL-MCNC: 0.24 G/DL
GLUCOSE BLDC GLUCOMTR-MCNC: 205 MG/DL (ref 70–99)
GLUCOSE SERPL-MCNC: 135 MG/DL (ref 70–99)
HCO3 SERPL-SCNC: 23 MMOL/L (ref 22–29)
HCT VFR BLD AUTO: 40.5 % (ref 40–53)
HGB BLD-MCNC: 12.4 G/DL (ref 13.3–17.7)
HOLD SPECIMEN: NORMAL
HOLD SPECIMEN: NORMAL
IMM GRANULOCYTES # BLD: 0.1 10E3/UL
IMM GRANULOCYTES NFR BLD: 1 %
INR PPP: 1.58 (ref 0.85–1.15)
LYMPHOCYTES # BLD AUTO: 1.5 10E3/UL (ref 0.8–5.3)
LYMPHOCYTES NFR BLD AUTO: 23 %
MAGNESIUM SERPL-MCNC: 2 MG/DL (ref 1.7–2.3)
MCH RBC QN AUTO: 25.9 PG (ref 26.5–33)
MCHC RBC AUTO-ENTMCNC: 30.6 G/DL (ref 31.5–36.5)
MCV RBC AUTO: 85 FL (ref 78–100)
MONOCYTES # BLD AUTO: 0.5 10E3/UL (ref 0–1.3)
MONOCYTES NFR BLD AUTO: 8 %
NEUTROPHILS # BLD AUTO: 4.1 10E3/UL (ref 1.6–8.3)
NEUTROPHILS NFR BLD AUTO: 63 %
NRBC # BLD AUTO: 0 10E3/UL
NRBC BLD AUTO-RTO: 0 /100
PHOSPHATE SERPL-MCNC: 2.9 MG/DL (ref 2.5–4.5)
PLATELET # BLD AUTO: 267 10E3/UL (ref 150–450)
POTASSIUM SERPL-SCNC: 4.2 MMOL/L (ref 3.4–5.3)
RBC # BLD AUTO: 4.78 10E6/UL (ref 4.4–5.9)
SODIUM SERPL-SCNC: 143 MMOL/L (ref 135–145)
WBC # BLD AUTO: 6.6 10E3/UL (ref 4–11)

## 2025-03-26 PROCEDURE — G0378 HOSPITAL OBSERVATION PER HR: HCPCS

## 2025-03-26 PROCEDURE — HZ2ZZZZ DETOXIFICATION SERVICES FOR SUBSTANCE ABUSE TREATMENT: ICD-10-PCS | Performed by: INTERNAL MEDICINE

## 2025-03-26 PROCEDURE — 73140 X-RAY EXAM OF FINGER(S): CPT | Mod: RT

## 2025-03-26 PROCEDURE — 84100 ASSAY OF PHOSPHORUS: CPT | Performed by: INTERNAL MEDICINE

## 2025-03-26 PROCEDURE — 85610 PROTHROMBIN TIME: CPT | Performed by: EMERGENCY MEDICINE

## 2025-03-26 PROCEDURE — 72141 MRI NECK SPINE W/O DYE: CPT

## 2025-03-26 PROCEDURE — 72125 CT NECK SPINE W/O DYE: CPT

## 2025-03-26 PROCEDURE — 82962 GLUCOSE BLOOD TEST: CPT

## 2025-03-26 PROCEDURE — 250N000012 HC RX MED GY IP 250 OP 636 PS 637: Performed by: INTERNAL MEDICINE

## 2025-03-26 PROCEDURE — 85004 AUTOMATED DIFF WBC COUNT: CPT | Performed by: EMERGENCY MEDICINE

## 2025-03-26 PROCEDURE — 250N000009 HC RX 250: Performed by: EMERGENCY MEDICINE

## 2025-03-26 PROCEDURE — 120N000001 HC R&B MED SURG/OB

## 2025-03-26 PROCEDURE — 70450 CT HEAD/BRAIN W/O DYE: CPT

## 2025-03-26 PROCEDURE — 99222 1ST HOSP IP/OBS MODERATE 55: CPT | Performed by: INTERNAL MEDICINE

## 2025-03-26 PROCEDURE — 36415 COLL VENOUS BLD VENIPUNCTURE: CPT | Performed by: EMERGENCY MEDICINE

## 2025-03-26 PROCEDURE — 70486 CT MAXILLOFACIAL W/O DYE: CPT

## 2025-03-26 PROCEDURE — 82077 ASSAY SPEC XCP UR&BREATH IA: CPT | Performed by: EMERGENCY MEDICINE

## 2025-03-26 PROCEDURE — 99285 EMERGENCY DEPT VISIT HI MDM: CPT | Mod: 25

## 2025-03-26 PROCEDURE — 83735 ASSAY OF MAGNESIUM: CPT | Performed by: INTERNAL MEDICINE

## 2025-03-26 PROCEDURE — 72040 X-RAY EXAM NECK SPINE 2-3 VW: CPT

## 2025-03-26 PROCEDURE — 250N000011 HC RX IP 250 OP 636: Performed by: EMERGENCY MEDICINE

## 2025-03-26 PROCEDURE — 250N000013 HC RX MED GY IP 250 OP 250 PS 637: Performed by: INTERNAL MEDICINE

## 2025-03-26 PROCEDURE — 80048 BASIC METABOLIC PNL TOTAL CA: CPT | Performed by: EMERGENCY MEDICINE

## 2025-03-26 PROCEDURE — 85018 HEMOGLOBIN: CPT | Performed by: EMERGENCY MEDICINE

## 2025-03-26 PROCEDURE — 70496 CT ANGIOGRAPHY HEAD: CPT

## 2025-03-26 PROCEDURE — 2W32X3Z IMMOBILIZATION OF NECK USING BRACE: ICD-10-PCS | Performed by: STUDENT IN AN ORGANIZED HEALTH CARE EDUCATION/TRAINING PROGRAM

## 2025-03-26 RX ORDER — DEXTROSE MONOHYDRATE 25 G/50ML
25-50 INJECTION, SOLUTION INTRAVENOUS
Status: DISCONTINUED | OUTPATIENT
Start: 2025-03-26 | End: 2025-03-31 | Stop reason: HOSPADM

## 2025-03-26 RX ORDER — POLYETHYLENE GLYCOL 3350 17 G
2 POWDER IN PACKET (EA) ORAL
Status: DISCONTINUED | OUTPATIENT
Start: 2025-03-26 | End: 2025-03-31 | Stop reason: HOSPADM

## 2025-03-26 RX ORDER — LORAZEPAM 1 MG/1
1-2 TABLET ORAL EVERY 30 MIN PRN
Status: DISCONTINUED | OUTPATIENT
Start: 2025-03-26 | End: 2025-03-29

## 2025-03-26 RX ORDER — ONDANSETRON 2 MG/ML
4 INJECTION INTRAMUSCULAR; INTRAVENOUS EVERY 6 HOURS PRN
Status: DISCONTINUED | OUTPATIENT
Start: 2025-03-26 | End: 2025-03-31 | Stop reason: HOSPADM

## 2025-03-26 RX ORDER — IOPAMIDOL 755 MG/ML
500 INJECTION, SOLUTION INTRAVASCULAR ONCE
Status: COMPLETED | OUTPATIENT
Start: 2025-03-26 | End: 2025-03-26

## 2025-03-26 RX ORDER — ONDANSETRON 4 MG/1
4 TABLET, ORALLY DISINTEGRATING ORAL EVERY 6 HOURS PRN
Status: DISCONTINUED | OUTPATIENT
Start: 2025-03-26 | End: 2025-03-31 | Stop reason: HOSPADM

## 2025-03-26 RX ORDER — MULTIPLE VITAMINS W/ MINERALS TAB 9MG-400MCG
1 TAB ORAL DAILY
Status: DISCONTINUED | OUTPATIENT
Start: 2025-03-27 | End: 2025-03-31 | Stop reason: HOSPADM

## 2025-03-26 RX ORDER — FLUMAZENIL 0.1 MG/ML
0.2 INJECTION, SOLUTION INTRAVENOUS
Status: DISCONTINUED | OUTPATIENT
Start: 2025-03-26 | End: 2025-03-27

## 2025-03-26 RX ORDER — LORAZEPAM 2 MG/ML
1-2 INJECTION INTRAMUSCULAR EVERY 30 MIN PRN
Status: DISCONTINUED | OUTPATIENT
Start: 2025-03-26 | End: 2025-03-29

## 2025-03-26 RX ORDER — FOLIC ACID 1 MG/1
1 TABLET ORAL DAILY
Status: DISCONTINUED | OUTPATIENT
Start: 2025-03-27 | End: 2025-03-27

## 2025-03-26 RX ORDER — ACETAMINOPHEN 325 MG/1
650 TABLET ORAL EVERY 4 HOURS PRN
Status: DISCONTINUED | OUTPATIENT
Start: 2025-03-26 | End: 2025-03-27

## 2025-03-26 RX ORDER — PROCHLORPERAZINE MALEATE 5 MG/1
10 TABLET ORAL EVERY 6 HOURS PRN
Status: DISCONTINUED | OUTPATIENT
Start: 2025-03-26 | End: 2025-03-31 | Stop reason: HOSPADM

## 2025-03-26 RX ORDER — ACETAMINOPHEN 650 MG/1
650 SUPPOSITORY RECTAL EVERY 4 HOURS PRN
Status: DISCONTINUED | OUTPATIENT
Start: 2025-03-26 | End: 2025-03-27

## 2025-03-26 RX ORDER — WARFARIN SODIUM 5 MG/1
5 TABLET ORAL ONCE
Status: COMPLETED | OUTPATIENT
Start: 2025-03-26 | End: 2025-03-26

## 2025-03-26 RX ORDER — HYDROMORPHONE HYDROCHLORIDE 2 MG/1
2 TABLET ORAL EVERY 4 HOURS PRN
Status: DISCONTINUED | OUTPATIENT
Start: 2025-03-26 | End: 2025-03-27

## 2025-03-26 RX ORDER — NICOTINE POLACRILEX 4 MG
15-30 LOZENGE BUCCAL
Status: DISCONTINUED | OUTPATIENT
Start: 2025-03-26 | End: 2025-03-31 | Stop reason: HOSPADM

## 2025-03-26 RX ORDER — AMOXICILLIN 250 MG
1 CAPSULE ORAL 2 TIMES DAILY PRN
Status: DISCONTINUED | OUTPATIENT
Start: 2025-03-26 | End: 2025-03-31 | Stop reason: HOSPADM

## 2025-03-26 RX ORDER — HYDROMORPHONE HYDROCHLORIDE 1 MG/ML
0.5 INJECTION, SOLUTION INTRAMUSCULAR; INTRAVENOUS; SUBCUTANEOUS EVERY 4 HOURS PRN
Status: DISCONTINUED | OUTPATIENT
Start: 2025-03-26 | End: 2025-03-27

## 2025-03-26 RX ORDER — WARFARIN SODIUM 5 MG/1
5 TABLET ORAL
COMMUNITY

## 2025-03-26 RX ORDER — AMOXICILLIN 250 MG
2 CAPSULE ORAL 2 TIMES DAILY PRN
Status: DISCONTINUED | OUTPATIENT
Start: 2025-03-26 | End: 2025-03-31 | Stop reason: HOSPADM

## 2025-03-26 RX ORDER — FLUMAZENIL 0.1 MG/ML
0.2 INJECTION, SOLUTION INTRAVENOUS
Status: DISCONTINUED | OUTPATIENT
Start: 2025-03-26 | End: 2025-03-31 | Stop reason: HOSPADM

## 2025-03-26 RX ADMIN — ACETAMINOPHEN 650 MG: 325 TABLET, FILM COATED ORAL at 22:20

## 2025-03-26 RX ADMIN — INSULIN ASPART 1 UNITS: 100 INJECTION, SOLUTION INTRAVENOUS; SUBCUTANEOUS at 22:54

## 2025-03-26 RX ADMIN — SODIUM CHLORIDE 80 ML: 9 INJECTION, SOLUTION INTRAVENOUS at 19:42

## 2025-03-26 RX ADMIN — IOPAMIDOL 100 ML: 755 INJECTION, SOLUTION INTRAVENOUS at 19:42

## 2025-03-26 RX ADMIN — WARFARIN SODIUM 5 MG: 5 TABLET ORAL at 22:56

## 2025-03-26 ASSESSMENT — ACTIVITIES OF DAILY LIVING (ADL)
ADLS_ACUITY_SCORE: 54

## 2025-03-26 ASSESSMENT — LIFESTYLE VARIABLES
ANXIETY: MILDLY ANXIOUS
AUDITORY DISTURBANCES: NOT PRESENT
PAROXYSMAL SWEATS: NO SWEAT VISIBLE
TREMOR: NO TREMOR
AGITATION: NORMAL ACTIVITY
ORIENTATION AND CLOUDING OF SENSORIUM: ORIENTED AND CAN DO SERIAL ADDITIONS
NAUSEA AND VOMITING: NO NAUSEA AND NO VOMITING
HEADACHE, FULLNESS IN HEAD: NOT PRESENT
TOTAL SCORE: 1
VISUAL DISTURBANCES: NOT PRESENT

## 2025-03-26 NOTE — ED TRIAGE NOTES
"Pt presents to ED with his ex-wife who is his neighbor after fall this evening. Per pts ex wife, pt appeared to have been coming home from going grocery shopping and tripped with his grocery bags up the stairs and hit his head on the entry way. Denies LOC. Pt is on warfarin. Pt denies head pain, c/o neck pain and right hand pain. Pt unable to recall the event. Per his ex-wife he has been drinking alcohol and his living room had \"several empty and half empty liquor bottles in it.\"  When pt asked if he drinks daily, pt states \" I try not to.\" Asked how much he drinks a day and pt states \"375\".     Triage Assessment (Adult)       Row Name 03/26/25 1205          Triage Assessment    Airway WDL WDL        Respiratory WDL    Respiratory WDL WDL        Skin Circulation/Temperature WDL    Skin Circulation/Temperature WDL WDL                     "

## 2025-03-26 NOTE — ED PROVIDER NOTES
Emergency Department Note      History of Present Illness     Chief Complaint   Fall    HPI   Cody Bolton is a 44 year old right handed male on Warfarin with a history as noted below who presents to the emergency department for a fall. The patient states that he drank approximately 1 pint of ETOH today and when he attempted to ambulate up his stairs, he tripped on a step and fell forward, striking his face on the step. He sustained multiple abrasions and small lacerations to his face. He reports that since the fall, he has been experiencing neck pain and right fifth finger pain. No loss of consciousness. No numbness, tingling, weakness. No vision changes. No dental pain or jaw pain. No hip pain. No pain in his lower extremities. No suicidal ideations. Patient taking his Eliquis, as directed, for his hx of PE. Patient is right handed.     Independent Historian   None    Review of External Notes   See ED course.    Past Medical History     Medical History and Problem List   Asthma   Anxiety   Atrial fibrillation  Bilateral myopia and astigmatism   Cervical radiculopathy   Depression   DVT  Fatty liver  Hypertension   Hyperlipidemia   Moderate acromioclavicular joint degenerative joint disease, right   CARLIE on CPAP  PE  Type II diabetes    Medications   Acamprosate   buPROPion   Carvedilol   cloNIDine  Disulfiram   Lisinopril   metFORMIN   Simvastatin  traZODone   Warfarin ANTICOAGULANT    Surgical History   Appendectomy  Gastric bypass  Vasectomy  Unspecified left hand surgery     Physical Exam     Patient Vitals for the past 24 hrs:   BP Temp Pulse Resp SpO2   03/26/25 2010 -- -- -- -- 96 %   03/26/25 2008 (!) 194/103 -- 89 -- --   03/26/25 1852 (!) 176/100 -- 89 -- 95 %   03/26/25 1731 (!) 172/97 98.5  F (36.9  C) 82 24 99 %     Physical Exam    HENT:  mmm, no rhinorrhea, left forehead, left infraorbital area, left side of the nose there are superficial abrasions.  Eyes: Pupils equal, 4 mm, no resting  nystagmus, no hyphema  Neck:, Pain with attempted range of motion and palpation posteriorly.  Lungs:  CTAB,  no resp distress  CV: rrr, no m/r/g, ppi  Abd: soft, nontender, nondistended, no rebound/masses/guarding/hsm  Ext: With the exception of the right small finger there are no bony ttp on skeletal survey, no palpable deformities, no major joint effusions, full ROM major joints    Right small finger: Mild soft tissue swelling about the middle phalanx.  Closed injury, able to fully flex and extend the digit.  Sensation intact distally as is perfusion    Skin: warm, dry, well perfused, no rashes/bruising/lesions on exposed skin  Neuro: Intoxicated but awake and able to participate in conversation, able to answer questions, able to follow two-step commands.,  Face symmetric, speech clear, no lingual deviation, 5 out of 5 motor in all motor groups of the bilateral upper extremities.  Sensation intact to light touch bilateral per extremities.  Plantarflexion dorsiflexion EHL 5 out of 5 bilateral lower extremity.  Able to do a straight leg raise with both the left and right lower extremities.  Sensation intact throughout the lower extremities.  Psych: Normal mood, normal affect      Diagnostics     Lab Results   Labs Ordered and Resulted from Time of ED Arrival to Time of ED Departure   BASIC METABOLIC PANEL - Abnormal       Result Value    Sodium 143      Potassium 4.2      Chloride 106      Carbon Dioxide (CO2) 23      Anion Gap 14      Urea Nitrogen 13.0      Creatinine 0.95      GFR Estimate >90      Calcium 8.4 (*)     Glucose 135 (*)    ETHYL ALCOHOL LEVEL - Abnormal    Alcohol ethyl 0.24 (*)    CBC WITH PLATELETS AND DIFFERENTIAL - Abnormal    WBC Count 6.6      RBC Count 4.78      Hemoglobin 12.4 (*)     Hematocrit 40.5      MCV 85      MCH 25.9 (*)     MCHC 30.6 (*)     RDW 16.1 (*)     Platelet Count 267      % Neutrophils 63      % Lymphocytes 23      % Monocytes 8      % Eosinophils 4      % Basophils 1       % Immature Granulocytes 1      NRBCs per 100 WBC 0      Absolute Neutrophils 4.1      Absolute Lymphocytes 1.5      Absolute Monocytes 0.5      Absolute Eosinophils 0.3      Absolute Basophils 0.1      Absolute Immature Granulocytes 0.1      Absolute NRBCs 0.0     INR - Abnormal    INR 1.58 (*)      Imaging   CTA Head Neck with Contrast   Final Result   IMPRESSION:    HEAD CTA:    1.  No large vessel occlusion, high-grade stenosis, aneurysm, or high-flow vascular malformation.      NECK CTA:   1.  Mildly limited exam, as above.   2.  No evidence of acute cerebrovascular blunt injury.   3.  No hemodynamically significant stenosis or dissection in the neck vessels.      XR Finger Right G/E 2 Views   Final Result   IMPRESSION: Tiny ossific volar aspect of the fifth PIP joint suspicious for minimally displaced fracture. Mild scattered degenerative arthritis IP joints of the hand. Soft tissue swelling fifth finger.         CT Head w/o Contrast   Final Result   IMPRESSION:   HEAD CT:   1.  No acute intracranial process.      FACIAL BONE CT:   1.  No facial bone or mandibular fracture.      CERVICAL SPINE CT:   1.  Nondisplaced fractures of the anterior and posterior right C1 arches. Fracture comes in close proximity to the right vertebral artery foramen. A CT angiogram of the neck is recommended to exclude vascular injury.   2.  Nondisplaced fracture through an anterior osteophyte at C6-C7.   3.  Changes of DISH.      These findings were discussed with Dr. Salcedo at 7:02 PM CST on March 26, 2025.      CT Facial Bones without Contrast   Final Result   IMPRESSION:   HEAD CT:   1.  No acute intracranial process.      FACIAL BONE CT:   1.  No facial bone or mandibular fracture.      CERVICAL SPINE CT:   1.  Nondisplaced fractures of the anterior and posterior right C1 arches. Fracture comes in close proximity to the right vertebral artery foramen. A CT angiogram of the neck is recommended to exclude vascular injury.   2.   Nondisplaced fracture through an anterior osteophyte at C6-C7.   3.  Changes of DISH.      These findings were discussed with Dr. Salcedo at 7:02 PM CST on March 26, 2025.      CT Cervical Spine w/o Contrast   Final Result   IMPRESSION:   HEAD CT:   1.  No acute intracranial process.      FACIAL BONE CT:   1.  No facial bone or mandibular fracture.      CERVICAL SPINE CT:   1.  Nondisplaced fractures of the anterior and posterior right C1 arches. Fracture comes in close proximity to the right vertebral artery foramen. A CT angiogram of the neck is recommended to exclude vascular injury.   2.  Nondisplaced fracture through an anterior osteophyte at C6-C7.   3.  Changes of DISH.      These findings were discussed with Dr. Salcedo at 7:02 PM CST on March 26, 2025.      Cervical spine MRI w/o contrast    (Results Pending)   XR Cervical Spine 2/3 Views    (Results Pending)     Independent Interpretation   See ED course.    ED Course      Medications Administered   Medications   acetaminophen (TYLENOL) tablet 650 mg (has no administration in time range)     Or   acetaminophen (TYLENOL) Suppository 650 mg (has no administration in time range)   melatonin tablet 5 mg (has no administration in time range)   senna-docusate (SENOKOT-S/PERICOLACE) 8.6-50 MG per tablet 1 tablet (has no administration in time range)     Or   senna-docusate (SENOKOT-S/PERICOLACE) 8.6-50 MG per tablet 2 tablet (has no administration in time range)   ondansetron (ZOFRAN ODT) ODT tab 4 mg (has no administration in time range)     Or   ondansetron (ZOFRAN) injection 4 mg (has no administration in time range)   prochlorperazine (COMPAZINE) injection 10 mg (has no administration in time range)     Or   prochlorperazine (COMPAZINE) tablet 10 mg (has no administration in time range)   nicotine (COMMIT) lozenge 2 mg (has no administration in time range)   CT Scan Flush (80 mLs Intravenous $Given 3/26/25 1942)   iopamidol (ISOVUE-370) solution 500 mL (100 mLs  Intravenous $Given 3/26/25 1942)     Discussion of Management   Admitting Hospitalist, Dr. Lomeli  Radiologist, Dr. Sterling  Neurosurgery, Ann Lisa    ED Course   ED Course as of 03/26/25 2048   Wed Mar 26, 2025   1801 I obtained history and examined the patient as noted above.      1903 Spoke with radiologist, Dr. Sterling, regarding the patient's CT spine results. Evidence of C-spine fracture.     1904 I rechecked the patient. Aspen collar applied.     1916 Left small finger radiograph, to my review, no dislocation, possible tiny avulsion fracture volar surface PIP joint.   1934 Spoke with Ann Lisa, neurosurgery, regarding the patient.     2004 Spoke with Dr. Lomeli, admitting hospitalist, regarding the patient. He accepts patient for admission.       Additional Documentation  None    Medical Decision Making / Diagnosis     CMS Diagnoses: None    MIPS   None    [facility]   Cody Bolton is a 44 year old male     History of alcohol use disorder here after injury sustained during a fall while intoxicated.  No localizing neurologic deficits but unfortunately on our imaging test has cervical spine fractures as noted above.  He does not have any localizing neurologic deficits on examination.  His body habitus is challenging in terms of cervical immobilization.  I put him in an [place] collar if it is not ideal but we are able to secure the front and back components with the Velcro strap which would limit cervical motion.  He will be seen by orthotics tomorrow for a better fitting device.  Admit to the hospital service for further evaluation management, orthotics, neurosurgery.  Possible avulsion fracture of the right fifth digit, simple cony tape for this injury.    Disposition   The patient was admitted to the hospital.     Diagnosis     ICD-10-CM    1. Closed nondisplaced fracture of posterior arch of first cervical vertebra, initial encounter (H)  S12.031A Orthotics, Mastectomy and Custom Compression Orders Call  back #: [phone]     Orthotics, Mastectomy and Custom Compression Orders Call back #: [phone]     CANCELED: Orthotics, Mastectomy and Custom Compression Orders Call back #: [phone]     CANCELED: Orthotics, Mastectomy and Custom Compression Orders Call back #: [phone]      2. Other closed nondisplaced fracture of sixth cervical vertebra, initial encounter (H)  S12.591A Orthotics, Mastectomy and Custom Compression Orders Call back #: [phone]     Orthotics, Mastectomy and Custom Compression Orders Call back #: [phone]     CANCELED: Orthotics, Mastectomy and Custom Compression Orders Call back #: [phone]     CANCELED: Orthotics, Mastectomy and Custom Compression Orders Call back #: [phone]      3. Alcoholic intoxication with complication  F10.929       4. Facial abrasion, initial encounter  S00.81XA       5. Closed nondisplaced fracture of first cervical vertebra, unspecified fracture morphology, initial encounter (H)  S12.001A       6. Closed nondisplaced fracture of fifth cervical vertebra, unspecified fracture morphology, initial encounter (H)  S12.401A     anterior osteophyte complex c5/6      7. Closed nondisplaced fracture of middle phalanx of right little finger, initial encounter  S62.656A         Scribe Disclosure:  I, Talon Gifford, am serving as a scribe at 5:52 PM on 3/26/2025 to document services personally performed by Bravo Salcedo MD based on my observations and the provider's statements to me.        Bravo Salcedo MD  03/26/25 3235

## 2025-03-27 ENCOUNTER — APPOINTMENT (OUTPATIENT)
Dept: CT IMAGING | Facility: CLINIC | Age: 45
DRG: 552 | End: 2025-03-27
Attending: HOSPITALIST
Payer: COMMERCIAL

## 2025-03-27 VITALS
RESPIRATION RATE: 18 BRPM | HEART RATE: 65 BPM | OXYGEN SATURATION: 95 % | TEMPERATURE: 99 F | SYSTOLIC BLOOD PRESSURE: 157 MMHG | DIASTOLIC BLOOD PRESSURE: 80 MMHG

## 2025-03-27 LAB
ANION GAP SERPL CALCULATED.3IONS-SCNC: 13 MMOL/L (ref 7–15)
BUN SERPL-MCNC: 10.8 MG/DL (ref 6–20)
CALCIUM SERPL-MCNC: 8.9 MG/DL (ref 8.8–10.4)
CHLORIDE SERPL-SCNC: 103 MMOL/L (ref 98–107)
CREAT SERPL-MCNC: 0.65 MG/DL (ref 0.67–1.17)
EGFRCR SERPLBLD CKD-EPI 2021: >90 ML/MIN/1.73M2
ERYTHROCYTE [DISTWIDTH] IN BLOOD BY AUTOMATED COUNT: 16 % (ref 10–15)
GLUCOSE BLDC GLUCOMTR-MCNC: 125 MG/DL (ref 70–99)
GLUCOSE BLDC GLUCOMTR-MCNC: 130 MG/DL (ref 70–99)
GLUCOSE BLDC GLUCOMTR-MCNC: 132 MG/DL (ref 70–99)
GLUCOSE BLDC GLUCOMTR-MCNC: 133 MG/DL (ref 70–99)
GLUCOSE BLDC GLUCOMTR-MCNC: 134 MG/DL (ref 70–99)
GLUCOSE BLDC GLUCOMTR-MCNC: 145 MG/DL (ref 70–99)
GLUCOSE SERPL-MCNC: 132 MG/DL (ref 70–99)
HCO3 SERPL-SCNC: 23 MMOL/L (ref 22–29)
HCT VFR BLD AUTO: 38.5 % (ref 40–53)
HGB BLD-MCNC: 12.3 G/DL (ref 13.3–17.7)
INR PPP: 1.5 (ref 0.85–1.15)
MCH RBC QN AUTO: 26.2 PG (ref 26.5–33)
MCHC RBC AUTO-ENTMCNC: 31.9 G/DL (ref 31.5–36.5)
MCV RBC AUTO: 82 FL (ref 78–100)
PLATELET # BLD AUTO: 268 10E3/UL (ref 150–450)
POTASSIUM SERPL-SCNC: 4.2 MMOL/L (ref 3.4–5.3)
RBC # BLD AUTO: 4.7 10E6/UL (ref 4.4–5.9)
SODIUM SERPL-SCNC: 139 MMOL/L (ref 135–145)
WBC # BLD AUTO: 10.5 10E3/UL (ref 4–11)

## 2025-03-27 PROCEDURE — 99222 1ST HOSP IP/OBS MODERATE 55: CPT

## 2025-03-27 PROCEDURE — 250N000013 HC RX MED GY IP 250 OP 250 PS 637: Performed by: INTERNAL MEDICINE

## 2025-03-27 PROCEDURE — 99232 SBSQ HOSP IP/OBS MODERATE 35: CPT | Performed by: HOSPITALIST

## 2025-03-27 PROCEDURE — 85027 COMPLETE CBC AUTOMATED: CPT | Performed by: INTERNAL MEDICINE

## 2025-03-27 PROCEDURE — 82962 GLUCOSE BLOOD TEST: CPT

## 2025-03-27 PROCEDURE — 250N000011 HC RX IP 250 OP 636: Mod: JZ | Performed by: HOSPITALIST

## 2025-03-27 PROCEDURE — 36415 COLL VENOUS BLD VENIPUNCTURE: CPT | Performed by: INTERNAL MEDICINE

## 2025-03-27 PROCEDURE — 82435 ASSAY OF BLOOD CHLORIDE: CPT | Performed by: INTERNAL MEDICINE

## 2025-03-27 PROCEDURE — 99207 PR NO BILLABLE SERVICE THIS VISIT: CPT | Performed by: INTERNAL MEDICINE

## 2025-03-27 PROCEDURE — 72125 CT NECK SPINE W/O DYE: CPT

## 2025-03-27 PROCEDURE — 80048 BASIC METABOLIC PNL TOTAL CA: CPT | Performed by: INTERNAL MEDICINE

## 2025-03-27 PROCEDURE — 250N000013 HC RX MED GY IP 250 OP 250 PS 637: Performed by: HOSPITALIST

## 2025-03-27 PROCEDURE — L0172 CERV COL SR FOAM 2PC PRE OTS: HCPCS

## 2025-03-27 PROCEDURE — 85610 PROTHROMBIN TIME: CPT | Performed by: INTERNAL MEDICINE

## 2025-03-27 PROCEDURE — 250N000011 HC RX IP 250 OP 636: Mod: JZ | Performed by: INTERNAL MEDICINE

## 2025-03-27 PROCEDURE — 120N000001 HC R&B MED SURG/OB

## 2025-03-27 RX ORDER — NALOXONE HYDROCHLORIDE 0.4 MG/ML
0.4 INJECTION, SOLUTION INTRAMUSCULAR; INTRAVENOUS; SUBCUTANEOUS
Status: DISCONTINUED | OUTPATIENT
Start: 2025-03-27 | End: 2025-03-31 | Stop reason: HOSPADM

## 2025-03-27 RX ORDER — NALOXONE HYDROCHLORIDE 0.4 MG/ML
0.2 INJECTION, SOLUTION INTRAMUSCULAR; INTRAVENOUS; SUBCUTANEOUS
Status: DISCONTINUED | OUTPATIENT
Start: 2025-03-27 | End: 2025-03-31 | Stop reason: HOSPADM

## 2025-03-27 RX ORDER — FOLIC ACID 1 MG/1
1 TABLET ORAL DAILY
Status: DISCONTINUED | OUTPATIENT
Start: 2025-03-28 | End: 2025-03-31 | Stop reason: HOSPADM

## 2025-03-27 RX ORDER — BUPROPION HYDROCHLORIDE 150 MG/1
300 TABLET ORAL EVERY MORNING
Status: DISCONTINUED | OUTPATIENT
Start: 2025-03-27 | End: 2025-03-31 | Stop reason: HOSPADM

## 2025-03-27 RX ORDER — OXYCODONE HYDROCHLORIDE 5 MG/1
5-10 TABLET ORAL EVERY 4 HOURS PRN
Status: DISCONTINUED | OUTPATIENT
Start: 2025-03-27 | End: 2025-03-29

## 2025-03-27 RX ORDER — ACETAMINOPHEN 500 MG
1000 TABLET ORAL 3 TIMES DAILY
Status: DISCONTINUED | OUTPATIENT
Start: 2025-03-27 | End: 2025-03-31 | Stop reason: HOSPADM

## 2025-03-27 RX ORDER — HYDROXYZINE HYDROCHLORIDE 25 MG/1
25 TABLET, FILM COATED ORAL EVERY 6 HOURS PRN
Status: DISCONTINUED | OUTPATIENT
Start: 2025-03-27 | End: 2025-03-28

## 2025-03-27 RX ORDER — METHOCARBAMOL 500 MG/1
500 TABLET, FILM COATED ORAL 3 TIMES DAILY PRN
Status: DISCONTINUED | OUTPATIENT
Start: 2025-03-27 | End: 2025-03-28

## 2025-03-27 RX ORDER — LISINOPRIL 20 MG/1
20 TABLET ORAL DAILY
Status: DISCONTINUED | OUTPATIENT
Start: 2025-03-27 | End: 2025-03-31 | Stop reason: HOSPADM

## 2025-03-27 RX ORDER — WARFARIN SODIUM 7.5 MG/1
7.5 TABLET ORAL
Status: COMPLETED | OUTPATIENT
Start: 2025-03-27 | End: 2025-03-27

## 2025-03-27 RX ORDER — CARVEDILOL 12.5 MG/1
12.5 TABLET ORAL 2 TIMES DAILY WITH MEALS
Status: DISCONTINUED | OUTPATIENT
Start: 2025-03-27 | End: 2025-03-31 | Stop reason: HOSPADM

## 2025-03-27 RX ORDER — HYDROMORPHONE HYDROCHLORIDE 1 MG/ML
0.5 INJECTION, SOLUTION INTRAMUSCULAR; INTRAVENOUS; SUBCUTANEOUS
Status: DISCONTINUED | OUTPATIENT
Start: 2025-03-27 | End: 2025-03-28

## 2025-03-27 RX ADMIN — HYDROMORPHONE HYDROCHLORIDE 0.5 MG: 1 INJECTION, SOLUTION INTRAMUSCULAR; INTRAVENOUS; SUBCUTANEOUS at 16:02

## 2025-03-27 RX ADMIN — HYDROMORPHONE HYDROCHLORIDE 2 MG: 2 TABLET ORAL at 02:04

## 2025-03-27 RX ADMIN — HYDROMORPHONE HYDROCHLORIDE 0.5 MG: 1 INJECTION, SOLUTION INTRAMUSCULAR; INTRAVENOUS; SUBCUTANEOUS at 12:57

## 2025-03-27 RX ADMIN — Medication 1 TABLET: at 08:00

## 2025-03-27 RX ADMIN — OXYCODONE HYDROCHLORIDE 10 MG: 5 TABLET ORAL at 20:09

## 2025-03-27 RX ADMIN — METHOCARBAMOL 500 MG: 500 TABLET ORAL at 15:07

## 2025-03-27 RX ADMIN — HYDROMORPHONE HYDROCHLORIDE 0.5 MG: 1 INJECTION, SOLUTION INTRAMUSCULAR; INTRAVENOUS; SUBCUTANEOUS at 10:42

## 2025-03-27 RX ADMIN — CARVEDILOL 12.5 MG: 12.5 TABLET, FILM COATED ORAL at 12:57

## 2025-03-27 RX ADMIN — LISINOPRIL 20 MG: 20 TABLET ORAL at 12:57

## 2025-03-27 RX ADMIN — ACETAMINOPHEN 1000 MG: 500 TABLET, FILM COATED ORAL at 19:47

## 2025-03-27 RX ADMIN — CARVEDILOL 12.5 MG: 12.5 TABLET, FILM COATED ORAL at 17:32

## 2025-03-27 RX ADMIN — HYDROXYZINE HYDROCHLORIDE 25 MG: 25 TABLET ORAL at 18:29

## 2025-03-27 RX ADMIN — ACETAMINOPHEN 650 MG: 325 TABLET, FILM COATED ORAL at 03:55

## 2025-03-27 RX ADMIN — BUPROPION HYDROCHLORIDE 300 MG: 150 TABLET, EXTENDED RELEASE ORAL at 12:57

## 2025-03-27 RX ADMIN — HYDROMORPHONE HYDROCHLORIDE 2 MG: 2 TABLET ORAL at 06:23

## 2025-03-27 RX ADMIN — HYDROMORPHONE HYDROCHLORIDE 0.5 MG: 1 INJECTION, SOLUTION INTRAMUSCULAR; INTRAVENOUS; SUBCUTANEOUS at 18:29

## 2025-03-27 RX ADMIN — WARFARIN SODIUM 7.5 MG: 7.5 TABLET ORAL at 17:32

## 2025-03-27 RX ADMIN — HYDROMORPHONE HYDROCHLORIDE 2 MG: 2 TABLET ORAL at 16:27

## 2025-03-27 RX ADMIN — METHOCARBAMOL 500 MG: 500 TABLET ORAL at 22:37

## 2025-03-27 RX ADMIN — THIAMINE HCL TAB 100 MG 100 MG: 100 TAB at 08:00

## 2025-03-27 RX ADMIN — ACETAMINOPHEN 650 MG: 325 TABLET, FILM COATED ORAL at 08:08

## 2025-03-27 RX ADMIN — FOLIC ACID 1 MG: 1 TABLET ORAL at 08:00

## 2025-03-27 RX ADMIN — ACETAMINOPHEN 650 MG: 325 TABLET, FILM COATED ORAL at 13:06

## 2025-03-27 ASSESSMENT — ACTIVITIES OF DAILY LIVING (ADL)
ADLS_ACUITY_SCORE: 60
DIFFICULTY_EATING/SWALLOWING: YES
ADLS_ACUITY_SCORE: 54
TOILETING_ASSISTANCE: TOILETING DIFFICULTY, ASSISTANCE 1 PERSON
DOING_ERRANDS_INDEPENDENTLY_DIFFICULTY: YES
DRESSING/BATHING_DIFFICULTY: YES
ADLS_ACUITY_SCORE: 59
TOILETING_ISSUES: YES
ADLS_ACUITY_SCORE: 54
TOILETING: 1-->ASSISTANCE (EQUIPMENT/PERSON) NEEDED (NOT DEVELOPMENTALLY APPROPRIATE)
ADLS_ACUITY_SCORE: 54
ADLS_ACUITY_SCORE: 54
HEARING_DIFFICULTY_OR_DEAF: NO
ADLS_ACUITY_SCORE: 54
ADLS_ACUITY_SCORE: 60
EATING/SWALLOWING: OTHER (SEE COMMENTS);EATING
WEAR_GLASSES_OR_BLIND: YES
WALKING_OR_CLIMBING_STAIRS_DIFFICULTY: YES
FALL_HISTORY_WITHIN_LAST_SIX_MONTHS: YES
ADLS_ACUITY_SCORE: 56
TOILETING: 1-->ASSISTANCE (EQUIPMENT/PERSON) NEEDED
ADLS_ACUITY_SCORE: 54
NUMBER_OF_TIMES_PATIENT_HAS_FALLEN_WITHIN_LAST_SIX_MONTHS: 3
ADLS_ACUITY_SCORE: 60
ADLS_ACUITY_SCORE: 59
ADLS_ACUITY_SCORE: 54
ADLS_ACUITY_SCORE: 59
CONCENTRATING,_REMEMBERING_OR_MAKING_DECISIONS_DIFFICULTY: NO
ADLS_ACUITY_SCORE: 57
ADLS_ACUITY_SCORE: 59
ADLS_ACUITY_SCORE: 57
ADLS_ACUITY_SCORE: 57
VISION_MANAGEMENT: WEARS BIFOCALS
CHANGE_IN_FUNCTIONAL_STATUS_SINCE_ONSET_OF_CURRENT_ILLNESS/INJURY: YES
WALKING_OR_CLIMBING_STAIRS: OTHER (SEE COMMENTS)
ADLS_ACUITY_SCORE: 54
ADLS_ACUITY_SCORE: 59
EQUIPMENT_CURRENTLY_USED_AT_HOME: CANE, STRAIGHT;WALKER, ROLLING
ADLS_ACUITY_SCORE: 54
ADLS_ACUITY_SCORE: 54
ADLS_ACUITY_SCORE: 56
DIFFICULTY_COMMUNICATING: NO

## 2025-03-27 NOTE — CONSULTS
[facility]    Neurosurgery Consultation     Date of Admission:  3/26/2025  Date of Consult (When I saw the patient): 03/27/25    Assessment   Cody Bolton is a 44 year old male with history of morbid obesity, NIDDM type II, HTN, CARLIE, alcoholism, and atrial fibrillation and VTE on Warfarin who was admitted on 3/26/2025 after sustaining a ground-level fall and hit his face. Neurosurgery was consulted for cervical fracture in the setting of DISH.     Patient seen this afternoon in bed. States he has neck pain with intermittent shooting pain into his shoulders. Denies numbness/tingling in BUE/BLE. Denies bowel/bladder dysfunction. Neuro exam intact.     EXAM: CT CERVICAL SPINE W/O CONTRAST, CT HEAD W/O CONTRAST, CT FACIAL BONES WITHOUT CONTRAST  LOCATION: [facility]  DATE: 3/26/2025                                                                    IMPRESSION:  HEAD CT:  1.  No acute intracranial process.     FACIAL BONE CT:  1.  No facial bone or mandibular fracture.     CERVICAL SPINE CT:  1.  Nondisplaced fractures of the anterior and posterior right C1 arches. Fracture comes in close proximity to the right vertebral artery foramen. A CT angiogram of the neck is recommended to exclude vascular injury.  2.  Nondisplaced fracture through an anterior osteophyte at C6-C7.  3.  Changes of DISH.    EXAM: XR CERVICAL SPINE 2/3 VIEWS  LOCATION: [facility]  DATE: 3/26/2025                                                               IMPRESSION: Known fractures of C1 and C6-7 anterior osteophytes are not being visualized radiographically, and seen to better advantage on same-day CT. Normal vertebral heights and alignment. Anterior osteophytes consistent with dish extending from C2   through T1. Normal extraspinal structures.    EXAM: MR CERVICAL SPINE W/O CONTRAST  LOCATION: [facility]  DATE: 3/26/2025                                                               IMPRESSION:  1.  Limited examination.  2.  C1 ring fracture seen better by CT.  3.  Stable nondisplaced fracture through the bridging anterior endplate osteophyte at C6-C7.  4.  Mild prevertebral edema.  5.  No evidence of ligamentous injury.  6.  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    EXAM: CT CERVICAL SPINE W/O CONTRAST  LOCATION: [facility]  DATE: 3/27/2025                                                                   IMPRESSION: Radiographically similar right C1 anterior and posterior arch fractures and nondisplaced discovertebral unit C6-C7 interspace fractures. Preserved vertebral body heights and alignment.    Plan   - Unfortunately, support staff dropped patient's head sharply off gurney after losing . Patient has increased neck pain after incident. Repeat cervical spine CT stable.   - Orthotics consult for cervical collar and sujatha collar  - Cervical collar at all times, Sujatha collar for hygiene  - OK to continue warfarin from NSGY standpoint, no evidence of hematoma on MRI  - Follow up in NSGY clinic in 4-6 weeks cervical XR prior. Our schedulers will assist in coordination  - NSGY will sign off at this time     I have discussed the following assessment and plan with Dr. Barrett.     Valerie Fung, CNP  [facility]  [address] [place] [id number]  Tel [phone]  Fax [phone]     Code Status    Full Code    Reason for Consult   I was asked by Wild Rodriguez MD to evaluate this patient for:  Cervical fracture in the setting of DISH    Primary Care Physician   Yovana Felipe    Chief Complaint   S/p ground-level fall     History is obtained from the patient and electronic health record    History of Present Illness    Cody Bolton is a 44 year old male who presents after ground-level fall. Patient was intoxicated and trying to get into his apartment when he experienced a ground-level fall in front of his door. He states he tripped on one of the steps and fell onto his face. Started experiencing pain in his face and neck after the fall. Imaging with evidence of C1 fracture.     Past Medical History   I have reviewed this patient's medical history and updated it with pertinent information if needed.   Past Medical History:   Diagnosis Date    Depressive disorder     High cholesterol     History of gastric bypass     History of pulmonary embolism     Hypertension     CARLIE (obstructive sleep apnea)     Personal history of DVT (deep vein thrombosis)     Type 2 diabetes mellitus (H)     Uncomplicated asthma        Past Surgical History   I have reviewed this patient's surgical history and updated it with pertinent information if needed.  Past Surgical History:   Procedure Laterality Date    APPENDECTOMY  08/15/2017    Dr. Ferrer    GASTRIC BYPASS      [place] LAP,APPENDECTOMY N/A 8/14/2017    Procedure: APPENDECTOMY, LAPAROSCOPIC;  Surgeon: Nba Ferrer MD;  Location: [facility];  Service: General    REVISION AKANKSHA-EN-Y  2014    Dr. Ranjeet Espinal [facility]       Prior to Admission Medications   Prior to Admission Medications   Prescriptions Last Dose Informant Patient Reported? Taking?   acamprosate (CAMPRAL) 333 MG EC tablet 3/26/2025 Morning  No Yes   Sig: Take 2 tablets (666 mg) by mouth 3 times daily.   buPROPion (WELLBUTRIN XL) 300 MG 24 hr tablet 3/26/2025 Morning  No Yes   Sig: Take 1 tablet (300 mg) by mouth every morning.   carvedilol (COREG) 12.5 MG tablet 3/26/2025 Morning  No Yes   Sig: Take 1 tablet (12.5 mg) by mouth 2 times daily (with meals)   folic acid (FOLVITE) 1 MG tablet 3/26/2025 Morning  No Yes   Sig: Take 1 tablet (1 mg) by mouth daily.   lisinopril (ZESTRIL) 20 MG tablet 3/26/2025 Morning  No Yes    Sig: Take 1 tablet (20 mg) by mouth daily.   metFORMIN (GLUCOPHAGE XR) 500 MG 24 hr tablet 3/25/2025 Evening  No Yes   Sig: Take 2 tablets (1,000 mg) by mouth daily (with dinner).   multivitamin w/minerals (THERA-VIT-M) tablet 3/26/2025 Morning  No Yes   Sig: Take 1 tablet by mouth daily.   naltrexone (DEPADE/REVIA) 50 MG tablet 3/26/2025 Morning  No Yes   Sig: Take 1 tablet (50 mg) by mouth daily.   sildenafil (VIAGRA) 100 MG tablet   No Yes   Sig: Take 1 tablet (100 mg) by mouth daily as needed (erectile dysfunction).   simvastatin (ZOCOR) 20 MG tablet 3/25/2025 Bedtime  No Yes   Sig: Take 1 tablet (20 mg) by mouth at bedtime.   thiamine (B-1) 100 MG tablet 3/26/2025 Morning  No Yes   Sig: Take 1 tablet (100 mg) by mouth daily.   warfarin ANTICOAGULANT (COUMADIN) 5 MG tablet 3/19/2025 Bedtime  Yes Yes   Sig: Take 2.5 mg by mouth every 7 days. On Wednesday bedtime   warfarin ANTICOAGULANT (COUMADIN) 5 MG tablet 3/25/2025 Bedtime  Yes Yes   Sig: Take 5 mg by mouth six times a week. On Monday, Tuesday, Thursday, Friday, Saturday, and Sunday at bedtime      Facility-Administered Medications: None     Allergies   No Known Allergies    Social History   I have reviewed this patient's social history and updated it with pertinent information if needed. Cody Bolton  reports that he has never smoked. He has never been exposed to tobacco smoke. He has never used smokeless tobacco. He reports current alcohol use. He reports that he does not use drugs.    Family History   I have reviewed this patient's family history and updated it with pertinent information if needed.   Family History   Problem Relation Age of Onset    Substance Abuse Brother     Anxiety Disorder Sister     Depression Sister        Review of Systems   10 point ROS negative other than symptoms noted in HPI.    Physical Exam   Vital signs with ranges:   Temp:  [97.8  F (36.6  C)-98.5  F (36.9  C)] 97.9  F (36.6  C)  Pulse:  [82-89] 89  Resp:  [18-24]  20  BP: (152-194)/() 166/100  SpO2:  [95 %-99 %] 96 %  0 lbs 0 oz    HEENT:  Normocephalic, atraumatic  Heart:  No peripheral edema  Lungs:  No SOB  Skin:  Warm and dry, good capillary refill.    NEUROLOGICAL EXAMINATION:   Mental status:  Alert and Oriented x 3, speech is fluent.  Motor:    Shoulder Abduction  Right:  5/5   Left:  5/5  Biceps                      Right:  5/5   Left:  5/5  Triceps                     Right:  5/5   Left:  5/5  Wrist Extensors        Right:  5/5   Left:  5/5  Wrist Flexors            Right:  5/5   Left:  5/5  Intrinsics                   Right:  5/5   Left:  5/5    Iliopsoas  (hip flexion)               Right: 5/5  Left:  5/5  Quadriceps  (knee extension)       Right:  5/5  Left:  5/5  Hamstrings  (knee flexion)            Right:  5/5  Left:  5/5  Gastroc Soleus  (PF)                          Right:  5/5  Left:  5/5  Tibialis Ant  (DF)                          Right:  5/5  Left:  5/5  EHL                          Right:  5/5  Left:  5/5    Sensation:  Intact to light touch   Reflexes:   Negative Clonus.  Negative Dougherty's sign.     Data   CBC RESULTS:   Recent Labs   Lab Test 03/27/25  0827   WBC 10.5   RBC 4.70   HGB 12.3*   HCT 38.5*   MCV 82   MCH 26.2*   MCHC 31.9   RDW 16.0*        Basic Metabolic Panel:  Lab Results   Component Value Date     03/27/2025      Lab Results   Component Value Date    POTASSIUM 4.2 03/27/2025    POTASSIUM 4.6 05/04/2024     Lab Results   Component Value Date    CHLORIDE 103 03/27/2025    CHLORIDE 104 05/04/2024     Lab Results   Component Value Date    RAUL 8.9 03/27/2025     Lab Results   Component Value Date    CO2 23 03/27/2025     Lab Results   Component Value Date    BUN 10.8 03/27/2025    BUN 16 05/04/2024     Lab Results   Component Value Date    CR 0.65 03/27/2025     Lab Results   Component Value Date     03/27/2025     03/27/2025     INR:  Lab Results   Component Value Date    INR 1.50 03/27/2025    INR 1.58  03/26/2025    INR 2.29 02/26/2025    INR 2.37 02/25/2025    INR 2.72 02/24/2025    INR 2.47 02/23/2025    INR 2.40 02/22/2025    INR 4.02 02/19/2025    INR 2.2 01/28/2025    INR 2.7 01/14/2025    INR 2.02 01/07/2025    INR 2.2 12/31/2024    INR 3.40 12/24/2024    INR 3.2 12/10/2024    INR 2.6 11/19/2024    INR 1.7 11/05/2024    INR 3.9 10/28/2024    INR 3.6 10/10/2024    INR 3.58 09/26/2024    INR 2.34 09/16/2024

## 2025-03-27 NOTE — PLAN OF CARE
"Summary: C1 Fracture.  Nondisplaced fracture of C6-C7 osteophyte   Orientation: A&O x 4  Vitals/Tele: VSS on RA  IV Access/drains: PIV SL  Diet: Regular  Mobility: Lift or A3 w/ aspen collar on at all times  GI/: Continent of B&B  Wound/Skin: See flowsheets  Consults: Neuro  Discharge Plan: Pending    See Flow sheets for assessment       Goal Outcome Evaluation:      Plan of Care Reviewed With: patient    Overall Patient Progress: no change    Outcome Evaluation: A&O x 4; Increased pain; CIWA      Problem: Adult Inpatient Plan of Care  Goal: Plan of Care Review  Description: The Plan of Care Review/Shift note should be completed every shift.  The Outcome Evaluation is a brief statement about your assessment that the patient is improving, declining, or no change.  This information will be displayed automatically on your shiftnote.  Outcome: Not Progressing  Flowsheets (Taken 3/27/2025 1401)  Outcome Evaluation:   A&O x 4   Increased pain   CIWA  Plan of Care Reviewed With: patient  Overall Patient Progress: no change  Goal: Patient-Specific Goal (Individualized)  Description: You can add care plan individualizations to a care plan. Examples of Individualization might be:  \"Parent requests to be called daily at 9am for status\", \"I have a hard time hearing out of my right ear\", or \"Do not touch me to wake me up as it startlesme\".  Outcome: Not Progressing  Goal: Absence of Hospital-Acquired Illness or Injury  Outcome: Not Progressing  Intervention: Identify and Manage Fall Risk  Recent Flowsheet Documentation  Taken 3/27/2025 0800 by Margarita Lackey RN  Safety Promotion/Fall Prevention: safety round/check completed  Goal: Optimal Comfort and Wellbeing  Outcome: Not Progressing  Goal: Readiness for Transition of Care  Outcome: Not Progressing     Problem: Pain Acute  Goal: Optimal Pain Control and Function  Outcome: Not Progressing     Problem: Activity Intolerance  Goal: Enhanced Capacity and Energy  Outcome: Not " Progressing  Intervention: Optimize Activity Tolerance  Recent Flowsheet Documentation  Taken 3/27/2025 1200 by Margarita Lackey RN  Activity Management: activity adjusted per tolerance  Taken 3/27/2025 0800 by Margarita Lackey RN  Activity Management: activity adjusted per tolerance     Problem: Orthopaedic Fracture  Goal: Absence of Bleeding  Outcome: Not Progressing  Goal: Bowel Elimination  Outcome: Not Progressing  Goal: Absence of Embolism Signs and Symptoms  Outcome: Not Progressing  Goal: Fracture Stability  Outcome: Not Progressing  Goal: Optimal Functional Ability  Outcome: Not Progressing  Intervention: Optimize Functional Ability  Recent Flowsheet Documentation  Taken 3/27/2025 1200 by Margarita Lackey RN  Activity Management: activity adjusted per tolerance  Taken 3/27/2025 0800 by Margarita Lackey RN  Activity Management: activity adjusted per tolerance  Goal: Absence of Infection Signs and Symptoms  Outcome: Not Progressing  Goal: Effective Tissue Perfusion  Outcome: Not Progressing  Goal: Optimal Pain Control and Function  Outcome: Not Progressing  Goal: Effective Oxygenation and Ventilation  Outcome: Not Progressing  Intervention: Promote Airway Secretion Clearance  Recent Flowsheet Documentation  Taken 3/27/2025 1200 by Margarita Lackey RN  Activity Management: activity adjusted per tolerance  Taken 3/27/2025 0800 by Margarita Lackey RN  Activity Management: activity adjusted per tolerance     Problem: Alcohol Withdrawal  Goal: Alcohol Withdrawal Symptom Control  Outcome: Not Progressing  Goal: Optimal Neurologic Function  Outcome: Not Progressing  Goal: Readiness for Change Identified  Outcome: Not Progressing

## 2025-03-27 NOTE — H&P
[facility]       Hospitalist History & Physical     Assessment & Plan     ASSESSMENT    44M with hx of morbid obesity, NIDDM type II, hypertension, CARLIE, alcoholism, and atrial fibrillation and VTE on warfarin presents after a ground-level fall while intoxicated and hit face and found to have traumatic C1 fracture and fracture of RUE Fifth Digit.     PLAN    Traumatic C1 Fracture  -Had ground-level fall while intoxicated  -CT with C1 fracture, MRI pending  -Cervical collar placed but does not fit, orthotics consult for better fitting brace  -Pain regimen in place  -Neurosurgery consult    Fracture of RUE Fifth Digit  -Ortho consultation, will need splint vs cony taping  -Pain regimen in place    Ground-Level Fall  -Mechanical fall in setting of intoxication    Alcohol Intoxication w/ Concern for Withdrawal  -Alc level of .24 on adm  -No longer drinks daily, minimal withdrawal sx in the past  -CIWA, thiamine, MV, folate, hold off on scheduled gabapentin / valproate    Alcoholism  -Currently completing an intensive outpatient tx program    Other Issues  -Facial Contusions: From fall. No sutures needed  -NIDDM Type II: MDSS  -Chronic Afib: Warfarin per pharmacy  -Chronic VTE: Warfarin per pharmacy  -Drug-Induced Coagulation Defect: 2/2 warfarin use  -Morbid Obesity: Complicates all aspects of care  -Essential HTN: Home medications    DVT Prophy  -SCDs    Disposition  -Medically Ready for Discharge: Anticipated Tomorrow       Mu Lomeli MD    History of Present Illness     Cody Bolton is a 44 year old with hx of morbid obesity, NIDDM type II, hypertension, CARLIE, alcoholism, and atrial fibrillation and VTE on warfarin presents after a ground-level fall.  Patient was in his normal state of health until this evening when he was intoxicated and trying to get into his apartment when he experienced a ground-level fall in front of his door.  Says that he tripped on one of the steps.  Fell onto his face.   Denies lightheadedness or dizziness prior to the fall.  No LOC event.  Started experiencing pain in his face and neck after the fall.  No other injuries.  Long history of alcoholism but says that he no longer drinks every day.  Did drink a bottle of vodka today.  In the ED, hypertensive but other VSS.  Labs unrevealing. Imaging with evidence of C1 fracture and RUE Fifth Digit fracture.  Cervical collar placed but difficult to fit due to patient habitus.  Case discussed with neurosurgery, admitted to medicine.    Review of Systems     A Comprehensive greater than 10 system review of systems was carried out.  Pertinent positives and negatives are noted above.  Otherwise negative for contributory information.     Past Medical History     Past Medical History:   Diagnosis Date    Depressive disorder     High cholesterol     History of gastric bypass     History of pulmonary embolism     Hypertension     CARLIE (obstructive sleep apnea)     Personal history of DVT (deep vein thrombosis)     Type 2 diabetes mellitus (H)     Uncomplicated asthma      Medications     Current Outpatient Medications   Medication Sig Dispense Refill    acamprosate (CAMPRAL) 333 MG EC tablet Take 2 tablets (666 mg) by mouth 3 times daily. 180 tablet 3    buPROPion (WELLBUTRIN XL) 300 MG 24 hr tablet Take 1 tablet (300 mg) by mouth every morning. 90 tablet 3    carvedilol (COREG) 12.5 MG tablet Take 1 tablet (12.5 mg) by mouth 2 times daily (with meals) 60 tablet 11    cloNIDine (CATAPRES) 0.1 MG tablet Take 0.1 mg by mouth as needed (Sleep)      folic acid (FOLVITE) 1 MG tablet Take 1 tablet (1 mg) by mouth daily. 30 tablet 1    lisinopril (ZESTRIL) 20 MG tablet Take 1 tablet (20 mg) by mouth daily. 90 tablet 3    metFORMIN (GLUCOPHAGE XR) 500 MG 24 hr tablet Take 2 tablets (1,000 mg) by mouth daily (with dinner). 180 tablet 3    multivitamin w/minerals (THERA-VIT-M) tablet Take 1 tablet by mouth daily. 30 tablet 1    naltrexone (DEPADE/REVIA) 50  MG tablet Take 1 tablet (50 mg) by mouth daily. 30 tablet 3    sildenafil (VIAGRA) 100 MG tablet Take 1 tablet (100 mg) by mouth daily as needed (erectile dysfunction). 30 tablet 3    simvastatin (ZOCOR) 20 MG tablet Take 1 tablet (20 mg) by mouth at bedtime. 90 tablet 3    thiamine (B-1) 100 MG tablet Take 1 tablet (100 mg) by mouth daily. 30 tablet 0    warfarin ANTICOAGULANT (COUMADIN) 5 MG tablet Take 5 mg by mouth every evening. Take 2.5 mg on Wednesdays and 5 mg all other days.        Past Surgical History     Past Surgical History:   Procedure Laterality Date    APPENDECTOMY  08/15/2017    Dr. Ferrer    GASTRIC BYPASS      [place] LAP,APPENDECTOMY N/A 8/14/2017    Procedure: APPENDECTOMY, LAPAROSCOPIC;  Surgeon: Nba Ferrer MD;  Location: [facility] [place];  Service: General    REVISION AKANKSHA-EN-Y  2014    Dr. Ranjeet Espinal @[facility]     Family History     Family History   Problem Relation Age of Onset    Substance Abuse Brother     Anxiety Disorder Sister     Depression Sister      Allergies   No Known Allergies    Social History     Social History     Tobacco Use    Smoking status: Never     Passive exposure: Never    Smokeless tobacco: Never   Substance Use Topics    Alcohol use: Yes     Comment: Alcoholic Drinks/day: occasionally, trying to quit     Physical Exam   Blood pressure (!) 194/103, pulse 89, temperature 98.5  F (36.9  C), resp. rate 24, SpO2 96%.    General: Ill appearing, cooperative with exam, in NAD.  HEENT: Atraumatic. No erythema in posterior pharynx.  Lymph: No cervical or inguinal lymphadenopathy.  Cardiac: RRR. No murmurs.  Lungs: CTAB. Nl WOB.  Abd: Non-tender. No rebound or gaurding. Nl bowel sounds.  Ext: No edema. 2+ pulses.  Skin: No rashes, abrasions, or contusions.  Psych: A&Ox3. Nl affect.  Neuro: 5/5 strength. Sensation intact.    Labs & Imaging     Reviewed and Pertinent results discussed in assessment and plan.

## 2025-03-27 NOTE — PROGRESS NOTES
Paged from EDMD Dr. Salcedo regarding patient    44 year old with alcohol abuse presented after a fall with neck pain and imaging evidence as demonstrated below  Neuro intact per EDMD    Plan  OK to admit to [facility]  CTA- negative  Cervical MRI without contrast - no hematoma  Cervical upright XR, open mouth view in collar- reviewed, stable alignment  C collar at all times  Consulted orthotics due to body habitus for better fitting collar in addition to sujatha collar for showering  OK for warfarin from nsgy standpoint, no evidence of hematoma on MRI    History, imaging and plans reviewed myself as well as with Dr. Barrett and Dr. Salcedo. All in agreement  Our team will consult tomorrow on rounds    Ann Lisa PA-C  [facility]  [address] [facility]  [address] [place] [id number]  Tel [phone]  Fax [phone]  Text page via [facility] Paging/Directory        Narrative & Impression   EXAM: CT CERVICAL SPINE W/O CONTRAST, CT HEAD W/O CONTRAST, CT FACIAL BONES WITHOUT CONTRAST  LOCATION: [facility]  DATE: 3/26/2025     INDICATION: fall, pain  COMPARISON: None.  TECHNIQUE:   1) Routine CT Head without IV contrast. Multiplanar reformats. Dose reduction techniques were used.  2) Routine CT Facial Bones without IV contrast. Multiplanar reformats. Dose reduction techniques were used.  3) Routine CT Cervical Spine without IV contrast. Multiplanar reformats. Dose reduction techniques were used.     FINDINGS:  HEAD CT:   INTRACRANIAL CONTENTS: No intracranial hemorrhage, extraaxial collection, or mass effect.  No CT evidence of acute infarct. Normal parenchymal density for age. The ventricles and sulci are normal for age.      OSSEOUS STRUCTURES/SOFT TISSUES: Left frontal scalp soft tissue hematoma.      FACIAL BONE CT:  OSSEOUS STRUCTURES/SOFT TISSUES: Left supraorbital soft tissue hematoma.. No facial bone fracture or malalignment. Lucency along the lateral left maxillary sinus walls  favored to represent a mucous retention cyst. No hemorrhagic opacification of the   sinus or other evidence of trauma. Multifocal dental decay.     ORBITAL CONTENTS: No acute abnormality.     SINUSES: Left maxillary sinus mucous retention cyst..     CERVICAL SPINE CT:   VERTEBRA: Sagittal alignment is unremarkable. Nondisplaced fractures of the anterior posterior right C1 arches. Fracture comes in close proximity to the right vertebral artery foramen. Interval fracture through an anterior osteophyte at C6-C7. Changes of   DISH.     CANAL/FORAMINA: No severe spinal canal or neural foraminal stenosis.     PARASPINAL: No extraspinal abnormality. Visualized lung fields are clear.                                                                      IMPRESSION:  HEAD CT:  1.  No acute intracranial process.     FACIAL BONE CT:  1.  No facial bone or mandibular fracture.     CERVICAL SPINE CT:  1.  Nondisplaced fractures of the anterior and posterior right C1 arches. Fracture comes in close proximity to the right vertebral artery foramen. A CT angiogram of the neck is recommended to exclude vascular injury.  2.  Nondisplaced fracture through an anterior osteophyte at C6-C7.  3.  Changes of DISH.

## 2025-03-27 NOTE — PHARMACY-ANTICOAGULATION SERVICE
Clinical Pharmacy - Warfarin Dosing Consult     Pharmacy has been consulted to manage this patient s warfarin therapy.  Indication: Atrial Fibrillation  Therapy Goal: INR 2-3  Warfarin Prior to Admission: Yes  Warfarin PTA Regimen: 2.5 wed and 5 mg ROW  Recent documented change in oral intake/nutrition: Unknown  Dose Comments: INR 1.58. Dose 5 mg tonite.    INR   Date Value Ref Range Status   03/26/2025 1.58 (H) 0.85 - 1.15 Final   02/26/2025 2.29 (H) 0.85 - 1.15 Final       Recommend warfarin 5 mg today.  Pharmacy will monitor Cody Bolton daily and order warfarin doses to achieve specified goal.      Please contact pharmacy as soon as possible if the warfarin needs to be held for a procedure or if the warfarin goals change.

## 2025-03-27 NOTE — ED NOTES
"[facility]  ED Nurse Handoff Report    ED Chief complaint: Fall  . ED Diagnosis:   Final diagnoses:   Alcoholic intoxication with complication   Facial abrasion, initial encounter   Closed nondisplaced fracture of first cervical vertebra, unspecified fracture morphology, initial encounter (H)   Closed nondisplaced fracture of fifth cervical vertebra, unspecified fracture morphology, initial encounter (H) - anterior osteophyte complex c5/6   Closed nondisplaced fracture of middle phalanx of right little finger, initial encounter       Allergies: No Known Allergies    Code Status: Full Code    Activity level - Baseline/Home:  independent.  Activity Level - Current:   SBA with Aspen collar in place.  Lift room needed: No.   Bariatric: No but could be beneficial?   Needed: No   Isolation: No.   Infection: Not Applicable.     Respiratory status: Room air    Vital Signs (within 30 minutes):   Vitals:    03/26/25 1731 03/26/25 1852 03/26/25 2008 03/26/25 2010   BP: (!) 172/97 (!) 176/100 (!) 194/103    Pulse: 82 89 89    Resp: 24      Temp: 98.5  F (36.9  C)      SpO2: 99% 95%  96%       Cardiac Rhythm:  ,      Pain level:    Patient confused: No.   Patient Falls Risk: nonskid shoes/slippers when out of bed and patient and family education.   Elimination Status: Has not voided in ED     Patient Report - Initial Complaint: Pt presents to ED with his ex-wife who is his neighbor after fall this evening. Per pts ex wife, pt appeared to have been coming home from going grocery shopping and tripped with his grocery bags up the stairs and hit his head on the entry way. Denies LOC. Pt is on warfarin. Pt denies head pain, c/o neck pain and right hand pain. Pt unable to recall the event. Per his ex-wife he has been drinking alcohol and his living room had \"several empty and half empty liquor bottles in it.\"  When pt asked if he drinks daily, pt states \" I try not to.\" Asked how much he drinks a day " "and pt states \"375\"..   Focused Assessment: Cody Bolton is a 44 year old right handed male with a history as noted below who presents to the emergency department for a fall. The patient states that he drank approximately 1 pint of ETOH today and when he attempted to ambulate up his stairs, he tripped on a step and fell forward, striking his face on the step. He sustained multiple abrasions and small lacerations to his face. He reports that since the fall, he has been experiencing neck pain and right fifth finger pain. No loss of consciousness. No numbness, tingling, weakness. No vision changes. No dental pain or jaw pain. No hip pain. No pain in his lower extremities. No suicidal ideations. Patient taking his Eliquis, as directed, for his hx of PE. Patient is right handed.      Abnormal Results:   Labs Ordered and Resulted from Time of ED Arrival to Time of ED Departure   BASIC METABOLIC PANEL - Abnormal       Result Value    Sodium 143      Potassium 4.2      Chloride 106      Carbon Dioxide (CO2) 23      Anion Gap 14      Urea Nitrogen 13.0      Creatinine 0.95      GFR Estimate >90      Calcium 8.4 (*)     Glucose 135 (*)    ETHYL ALCOHOL LEVEL - Abnormal    Alcohol ethyl 0.24 (*)    CBC WITH PLATELETS AND DIFFERENTIAL - Abnormal    WBC Count 6.6      RBC Count 4.78      Hemoglobin 12.4 (*)     Hematocrit 40.5      MCV 85      MCH 25.9 (*)     MCHC 30.6 (*)     RDW 16.1 (*)     Platelet Count 267      % Neutrophils 63      % Lymphocytes 23      % Monocytes 8      % Eosinophils 4      % Basophils 1      % Immature Granulocytes 1      NRBCs per 100 WBC 0      Absolute Neutrophils 4.1      Absolute Lymphocytes 1.5      Absolute Monocytes 0.5      Absolute Eosinophils 0.3      Absolute Basophils 0.1      Absolute Immature Granulocytes 0.1      Absolute NRBCs 0.0     INR        XR Finger Right G/E 2 Views   Final Result   IMPRESSION: Tiny ossific volar aspect of the fifth PIP joint suspicious for minimally " displaced fracture. Mild scattered degenerative arthritis IP joints of the hand. Soft tissue swelling fifth finger.         CT Head w/o Contrast   Final Result   IMPRESSION:   HEAD CT:   1.  No acute intracranial process.      FACIAL BONE CT:   1.  No facial bone or mandibular fracture.      CERVICAL SPINE CT:   1.  Nondisplaced fractures of the anterior and posterior right C1 arches. Fracture comes in close proximity to the right vertebral artery foramen. A CT angiogram of the neck is recommended to exclude vascular injury.   2.  Nondisplaced fracture through an anterior osteophyte at C6-C7.   3.  Changes of DISH.      These findings were discussed with Dr. Salcedo at 7:02 PM CST on March 26, 2025.      CT Facial Bones without Contrast   Final Result   IMPRESSION:   HEAD CT:   1.  No acute intracranial process.      FACIAL BONE CT:   1.  No facial bone or mandibular fracture.      CERVICAL SPINE CT:   1.  Nondisplaced fractures of the anterior and posterior right C1 arches. Fracture comes in close proximity to the right vertebral artery foramen. A CT angiogram of the neck is recommended to exclude vascular injury.   2.  Nondisplaced fracture through an anterior osteophyte at C6-C7.   3.  Changes of DISH.      These findings were discussed with Dr. Salcedo at 7:02 PM CST on March 26, 2025.      CT Cervical Spine w/o Contrast   Final Result   IMPRESSION:   HEAD CT:   1.  No acute intracranial process.      FACIAL BONE CT:   1.  No facial bone or mandibular fracture.      CERVICAL SPINE CT:   1.  Nondisplaced fractures of the anterior and posterior right C1 arches. Fracture comes in close proximity to the right vertebral artery foramen. A CT angiogram of the neck is recommended to exclude vascular injury.   2.  Nondisplaced fracture through an anterior osteophyte at C6-C7.   3.  Changes of DISH.      These findings were discussed with Dr. Salcedo at 7:02 PM CST on March 26, 2025.      CTA Head Neck with Contrast     (Results Pending)   Cervical spine MRI w/o contrast    (Results Pending)   XR Cervical Spine 2/3 Views    (Results Pending)       Treatments provided: None.   Family Comments: NA  OBS brochure/video discussed/provided to patient:  No  ED Medications:   Medications   acetaminophen (TYLENOL) tablet 650 mg (has no administration in time range)     Or   acetaminophen (TYLENOL) Suppository 650 mg (has no administration in time range)   melatonin tablet 5 mg (has no administration in time range)   senna-docusate (SENOKOT-S/PERICOLACE) 8.6-50 MG per tablet 1 tablet (has no administration in time range)     Or   senna-docusate (SENOKOT-S/PERICOLACE) 8.6-50 MG per tablet 2 tablet (has no administration in time range)   ondansetron (ZOFRAN ODT) ODT tab 4 mg (has no administration in time range)     Or   ondansetron (ZOFRAN) injection 4 mg (has no administration in time range)   prochlorperazine (COMPAZINE) injection 10 mg (has no administration in time range)     Or   prochlorperazine (COMPAZINE) tablet 10 mg (has no administration in time range)   nicotine (COMMIT) lozenge 2 mg (has no administration in time range)   CT Scan Flush (80 mLs Intravenous $Given 3/26/25 1942)   iopamidol (ISOVUE-370) solution 500 mL (100 mLs Intravenous $Given 3/26/25 1942)       Drips infusing:  No  For the majority of the shift this patient was Green.   Interventions performed were NA.    Sepsis treatment initiated: No    Cares/treatment/interventions/medications to be completed following ED care: NA    ED Nurse Name: Madeline Kent RN  8:22 PM      RECEIVING UNIT ED HANDOFF REVIEW    RECEIVING UNIT ED HANDOFF REVIEW    Above ED Nurse Handoff Report was reviewed: Yes  Reviewed by: Jannie Faith RN on March 27, 2025 at 2:30 PM

## 2025-03-27 NOTE — PROGRESS NOTES
I returned with modified Cee muse..  Written, verbal and physical instruction given as well as contact information. I brought a finger splint but patient has been cony taped.  I did not provide splint for finger. I let charge nurse know of this.   [place] SPEARS.

## 2025-03-27 NOTE — PROGRESS NOTES
[facility]    Hospitalist Progress Note      Assessment & Plan   44M with hx of morbid obesity, NIDDM type II, hypertension, CARLIE, alcoholism, and atrial fibrillation and VTE on warfarin presents after a ground-level fall while intoxicated and hit face and found to have traumatic C1 fracture and fracture of RUE Fifth Digit.     #Fall complicated by C1 Fracture.  Nondisplaced fracture of C6-C7 osteophyte.  Changes of DISH: Was stepping into his home and tripped over the step.  Fell forward and had some bleeding in his face and neck pain.  No loss of consciousness.  No numbness or tingling.  -In the ER, patient afebrile and hemodynamically stable though hypertensive.  CBC unremarkable.  BMP unremarkable.  INR 1.58.  CT head cervical spine notable for nondisplaced fracture of the anterior and posterior right C1 arches along with nondisplaced fracture through an anterior osteophyte at C6-C7 along with changes of DISH.  He underwent CTA of the head and neck that showed no large vessel occlusion or stenosis and no evidence of blunt injury.  -MRI of the cervical spine showed C1 ring fracture that was seen better on CT along with stable nondisplaced fracture through the bridging anterior endplate osteophyte at C6-C7 with mild prevertebral edema and no evidence of ligamentous injury.  -Neurosurgery consulted.  Recommended c-collar at all times with no plan for surgery.  Orthotics consulted for collar.  Okay for continuation of warfarin.  -On the a.m. of 3/27, patient was in the ER when the head of his bed suddenly fell flat from a semiupright position.  He had increased pain to his cervical spine.  Neurosurgery consulted.  He did not have any new focal weakness or deficit.  Sensation intact in all extremities.  Pain seems to be localized to his cervical spine.  -Repeat CT cervical spine without contrast.  -Neurochecks every 4 hours.  -Pain regimen.  Scheduled Tylenol, as needed Dilaudid available.   Robaxin also available for muscle spasms.     #Fracture of RUE Fifth Digit: Ortho consultation, will need splint vs cony taping  -Pain regimen in place     #Alcohol use disorder with Intoxication w/ Concern for Withdrawal: Alc level of .24 on adm.  No longer drinks daily, minimal withdrawal sx in the past  -CIWA, thiamine, MV, folate, hold off on scheduled gabapentin / valproate     Other Issues  -Facial Contusions: From fall. No sutures needed  -NIDDM Type II: MDSS  -Chronic Afib: Warfarin per pharmacy  -Chronic VTE: Warfarin per pharmacy  -Drug-Induced Coagulation Defect: 2/2 warfarin use  -Morbid Obesity: Complicates all aspects of care  -Essential HTN: Home medications    DVT Prophylaxis: Warfarin  Code Status: Full Code  Medically Ready for Discharge: Anticipated in 2-4 Days.  Pending pain control and neurosurgery input.  Also pending repeat CT cervical spine.    Wild Rodriguez MD    Interval History   Patient was being adjusted in his bed when the head of his bed abruptly dropped from semiupright to flat.  He had increased pain to his cervical spine around the area afterwards.  No new neurodeficits.  No numbness tingling.  No worsening headache.  He denies any chest pain or palpitations.  No shortness of breath.    -Data reviewed today: I reviewed all new labs and imaging results over the last 24 hours. I personally reviewed     Physical Exam   Temp: 97.9  F (36.6  C) Temp src: Oral BP: (!) 166/100 Pulse: 89   Resp: 20 SpO2: 96 % O2 Device: None (Room air)    There were no vitals filed for this visit.  Vital Signs with Ranges  Temp:  [97.8  F (36.6  C)-98.5  F (36.9  C)] 97.9  F (36.6  C)  Pulse:  [82-89] 89  Resp:  [18-24] 20  BP: (152-194)/() 166/100  SpO2:  [95 %-99 %] 96 %  No intake/output data recorded.    Constitutional: Nontoxic, NAD.  Obese.  Answers appropriately.  Cervical collar in place.  HEENT: Superficial abrasions noted from fall.  Mucous membranes moist.  Cervical collar in  place.  Respiratory: Nl WOB, Clear bilaterally, No wheezes or crackles  Cardiovascular: Regular, no murmur  GI: Obese, BS+, NT, ND  Skin/Integumen: WWP, no rash. No edema  Neuro: CNII-XII intact. Moves all extremities. No tremor. A&Ox3.   strength is full bilaterally but unable to move his right fourth and fifth fingers due to pain.  Sensation to gross touch is intact in both upper and lower extremities.  Plantar and dorsiflexion are intact.    Medications   Current Facility-Administered Medications   Medication Dose Route Frequency Provider Last Rate Last Admin     Current Facility-Administered Medications   Medication Dose Route Frequency Provider Last Rate Last Admin    buPROPion (WELLBUTRIN XL) 24 hr tablet 300 mg  300 mg Oral QAM Wild Rodriguez MD   300 mg at 03/27/25 1257    carvedilol (COREG) tablet 12.5 mg  12.5 mg Oral BID w/meals Wild Rodriguez MD   12.5 mg at 03/27/25 1257    [START ON 3/28/2025] folic acid (FOLVITE) tablet 1 mg  1 mg Oral Daily Wild Rodriguez MD        insulin aspart (NovoLOG) injection (RAPID ACTING)  1-7 Units Subcutaneous TID AC Cody Birmingham MD        insulin aspart (NovoLOG) injection (RAPID ACTING)  1-5 Units Subcutaneous At Bedtime Cody Birmingham MD   1 Units at 03/26/25 2254    lisinopril (ZESTRIL) tablet 20 mg  20 mg Oral Daily Wild Rodriguez MD   20 mg at 03/27/25 1257    multivitamin w/minerals (THERA-VIT-M) tablet 1 tablet  1 tablet Oral Daily Cody Birmingham MD   1 tablet at 03/27/25 0800    [START ON 3/28/2025] thiamine (B-1) tablet 100 mg  100 mg Oral Daily Wlid Rodriguez MD        warfarin ANTICOAGULANT (COUMADIN) tablet 7.5 mg  7.5 mg Oral ONCE at 18:00 Cody Birmingham MD        Warfarin Dose Required Daily - Pharmacist Managed  1 each Oral See Admin Instructions Cody Birmingham MD           Data   Recent Labs   Lab 03/27/25  1140 03/27/25  0828 03/27/25  0827 03/26/25  2215 03/26/25  1816   WBC  --   --  10.5   --  6.6   HGB  --   --  12.3*  --  12.4*   MCV  --   --  82  --  85   PLT  --   --  268  --  267   INR  --   --  1.50*  --  1.58*   NA  --   --  139  --  143   POTASSIUM  --   --  4.2  --  4.2   CHLORIDE  --   --  103  --  106   CO2  --   --  23  --  23   BUN  --   --  10.8  --  13.0   CR  --   --  0.65*  --  0.95   ANIONGAP  --   --  13  --  14   RAUL  --   --  8.9  --  8.4*   * 132* 132*   < > 135*    < > = values in this interval not displayed.       Recent Results (from the past 24 hours)   CT Cervical Spine w/o Contrast    Narrative    EXAM: CT CERVICAL SPINE W/O CONTRAST, CT HEAD W/O CONTRAST, CT FACIAL BONES WITHOUT CONTRAST  LOCATION: [facility]  DATE: 3/26/2025    INDICATION: fall, pain  COMPARISON: None.  TECHNIQUE:   1) Routine CT Head without IV contrast. Multiplanar reformats. Dose reduction techniques were used.  2) Routine CT Facial Bones without IV contrast. Multiplanar reformats. Dose reduction techniques were used.  3) Routine CT Cervical Spine without IV contrast. Multiplanar reformats. Dose reduction techniques were used.    FINDINGS:  HEAD CT:   INTRACRANIAL CONTENTS: No intracranial hemorrhage, extraaxial collection, or mass effect.  No CT evidence of acute infarct. Normal parenchymal density for age. The ventricles and sulci are normal for age.     OSSEOUS STRUCTURES/SOFT TISSUES: Left frontal scalp soft tissue hematoma.     FACIAL BONE CT:  OSSEOUS STRUCTURES/SOFT TISSUES: Left supraorbital soft tissue hematoma.. No facial bone fracture or malalignment. Lucency along the lateral left maxillary sinus walls favored to represent a mucous retention cyst. No hemorrhagic opacification of the   sinus or other evidence of trauma. Multifocal dental decay.    ORBITAL CONTENTS: No acute abnormality.    SINUSES: Left maxillary sinus mucous retention cyst..    CERVICAL SPINE CT:   VERTEBRA: Sagittal alignment is unremarkable. Nondisplaced fractures of the anterior posterior  right C1 arches. Fracture comes in close proximity to the right vertebral artery foramen. Interval fracture through an anterior osteophyte at C6-C7. Changes of   DISH.    CANAL/FORAMINA: No severe spinal canal or neural foraminal stenosis.    PARASPINAL: No extraspinal abnormality. Visualized lung fields are clear.      Impression    IMPRESSION:  HEAD CT:  1.  No acute intracranial process.    FACIAL BONE CT:  1.  No facial bone or mandibular fracture.    CERVICAL SPINE CT:  1.  Nondisplaced fractures of the anterior and posterior right C1 arches. Fracture comes in close proximity to the right vertebral artery foramen. A CT angiogram of the neck is recommended to exclude vascular injury.  2.  Nondisplaced fracture through an anterior osteophyte at C6-C7.  3.  Changes of DISH.    These findings were discussed with Dr. Salcedo at 7:02 PM CST on March 26, 2025.   CT Facial Bones without Contrast    Narrative    EXAM: CT CERVICAL SPINE W/O CONTRAST, CT HEAD W/O CONTRAST, CT FACIAL BONES WITHOUT CONTRAST  LOCATION: [facility]  DATE: 3/26/2025    INDICATION: fall, pain  COMPARISON: None.  TECHNIQUE:   1) Routine CT Head without IV contrast. Multiplanar reformats. Dose reduction techniques were used.  2) Routine CT Facial Bones without IV contrast. Multiplanar reformats. Dose reduction techniques were used.  3) Routine CT Cervical Spine without IV contrast. Multiplanar reformats. Dose reduction techniques were used.    FINDINGS:  HEAD CT:   INTRACRANIAL CONTENTS: No intracranial hemorrhage, extraaxial collection, or mass effect.  No CT evidence of acute infarct. Normal parenchymal density for age. The ventricles and sulci are normal for age.     OSSEOUS STRUCTURES/SOFT TISSUES: Left frontal scalp soft tissue hematoma.     FACIAL BONE CT:  OSSEOUS STRUCTURES/SOFT TISSUES: Left supraorbital soft tissue hematoma.. No facial bone fracture or malalignment. Lucency along the lateral left maxillary sinus  spivey favored to represent a mucous retention cyst. No hemorrhagic opacification of the   sinus or other evidence of trauma. Multifocal dental decay.    ORBITAL CONTENTS: No acute abnormality.    SINUSES: Left maxillary sinus mucous retention cyst..    CERVICAL SPINE CT:   VERTEBRA: Sagittal alignment is unremarkable. Nondisplaced fractures of the anterior posterior right C1 arches. Fracture comes in close proximity to the right vertebral artery foramen. Interval fracture through an anterior osteophyte at C6-C7. Changes of   DISH.    CANAL/FORAMINA: No severe spinal canal or neural foraminal stenosis.    PARASPINAL: No extraspinal abnormality. Visualized lung fields are clear.      Impression    IMPRESSION:  HEAD CT:  1.  No acute intracranial process.    FACIAL BONE CT:  1.  No facial bone or mandibular fracture.    CERVICAL SPINE CT:  1.  Nondisplaced fractures of the anterior and posterior right C1 arches. Fracture comes in close proximity to the right vertebral artery foramen. A CT angiogram of the neck is recommended to exclude vascular injury.  2.  Nondisplaced fracture through an anterior osteophyte at C6-C7.  3.  Changes of DISH.    These findings were discussed with Dr. Salcedo at 7:02 PM CST on March 26, 2025.   CT Head w/o Contrast    Narrative    EXAM: CT CERVICAL SPINE W/O CONTRAST, CT HEAD W/O CONTRAST, CT FACIAL BONES WITHOUT CONTRAST  LOCATION: [facility]  DATE: 3/26/2025    INDICATION: fall, pain  COMPARISON: None.  TECHNIQUE:   1) Routine CT Head without IV contrast. Multiplanar reformats. Dose reduction techniques were used.  2) Routine CT Facial Bones without IV contrast. Multiplanar reformats. Dose reduction techniques were used.  3) Routine CT Cervical Spine without IV contrast. Multiplanar reformats. Dose reduction techniques were used.    FINDINGS:  HEAD CT:   INTRACRANIAL CONTENTS: No intracranial hemorrhage, extraaxial collection, or mass effect.  No CT evidence of  acute infarct. Normal parenchymal density for age. The ventricles and sulci are normal for age.     OSSEOUS STRUCTURES/SOFT TISSUES: Left frontal scalp soft tissue hematoma.     FACIAL BONE CT:  OSSEOUS STRUCTURES/SOFT TISSUES: Left supraorbital soft tissue hematoma.. No facial bone fracture or malalignment. Lucency along the lateral left maxillary sinus walls favored to represent a mucous retention cyst. No hemorrhagic opacification of the   sinus or other evidence of trauma. Multifocal dental decay.    ORBITAL CONTENTS: No acute abnormality.    SINUSES: Left maxillary sinus mucous retention cyst..    CERVICAL SPINE CT:   VERTEBRA: Sagittal alignment is unremarkable. Nondisplaced fractures of the anterior posterior right C1 arches. Fracture comes in close proximity to the right vertebral artery foramen. Interval fracture through an anterior osteophyte at C6-C7. Changes of   DISH.    CANAL/FORAMINA: No severe spinal canal or neural foraminal stenosis.    PARASPINAL: No extraspinal abnormality. Visualized lung fields are clear.      Impression    IMPRESSION:  HEAD CT:  1.  No acute intracranial process.    FACIAL BONE CT:  1.  No facial bone or mandibular fracture.    CERVICAL SPINE CT:  1.  Nondisplaced fractures of the anterior and posterior right C1 arches. Fracture comes in close proximity to the right vertebral artery foramen. A CT angiogram of the neck is recommended to exclude vascular injury.  2.  Nondisplaced fracture through an anterior osteophyte at C6-C7.  3.  Changes of DISH.    These findings were discussed with Dr. Salcedo at 7:02 PM CST on March 26, 2025.   XR Finger Right G/E 2 Views    Narrative    EXAM: XR FINGER RIGHT G/E 2 VIEWS  LOCATION: [facility]  DATE: 3/26/2025    INDICATION: fal pain PIP joint after fall  COMPARISON: None.      Impression    IMPRESSION: Tiny ossific volar aspect of the fifth PIP joint suspicious for minimally displaced fracture. Mild scattered  degenerative arthritis IP joints of the hand. Soft tissue swelling fifth finger.     CTA Head Neck with Contrast    Narrative    EXAM: CTA HEAD NECK W CONTRAST  LOCATION: [facility]  DATE: 3/26/2025    INDICATION: Fall with neck fracture. Evaluate for vascular injury.  COMPARISON: Cervical spine CT performed earlier today   CONTRAST: 100mL Isovue 370  TECHNIQUE: Head and neck CT angiogram with IV contrast. Axial helical CT images of the head and neck vessels obtained during the arterial phase of intravenous contrast administration. Axial 2D reconstructed images and multiplanar 3D MIP reconstructed   images of the head and neck vessels were performed by the technologist. Dose reduction techniques were used. All stenosis measurements made according to NASCET criteria unless otherwise specified.    FINDINGS:   HEAD CTA:  ANTERIOR CIRCULATION: Mild calcified atherosclerotic plaque involving the paraclinoid/supraclinoid left ICA. No high-grade stenosis, occlusion, aneurysm, or high-flow vascular malformation. A hypoplastic A1 segment of the right anterior cerebral artery.   A hypoplastic P1 segment of the right posterior cerebral artery with a patent right posterior communicating artery.    POSTERIOR CIRCULATION: No high-grade stenosis, occlusion, aneurysm, or high-flow vascular malformation. Essentially balanced vertebral arteries.     DURAL VENOUS SINUSES: Expected enhancement of the major dural venous sinuses. Please note that this exam is not specifically tailored for the assessment of the intracranial venous structures.    NECK CTA:  Limited assessment due to suboptimal contrast bolus in the setting of large patient body habitus and photon starvation artifact as well as streak artifact from dental restorations. Within this limitation:    RIGHT CAROTID: No definite suspicious luminal irregularity, hemodynamically significant stenosis, or evidence of dissection.    LEFT CAROTID: No definite  suspicious luminal irregularity, hemodynamically significant stenosis, or evidence of dissection.    VERTEBRAL ARTERIES: No definite suspicious luminal irregularity, focal high-grade stenosis, or evidence of dissection. Essentially balanced vertebral arteries.    AORTIC ARCH: Classic aortic arch anatomy with no significant stenosis at the origin of the great vessels.    NONVASCULAR STRUCTURES: Clear visualized lungs. Several small dental periapical lucencies involving maxillary teeth. Findings of the visualized intracranial compartment and cervical spine are better assessed on dedicated CT from earlier today, reported   separately. Notably, grossly unchanged alignment of the known C1 fractures as well as nondisplaced fracture involving anterior bridging C6-C7 osteophytes.      Impression    IMPRESSION:   HEAD CTA:   1.  No large vessel occlusion, high-grade stenosis, aneurysm, or high-flow vascular malformation.    NECK CTA:  1.  Mildly limited exam, as above.  2.  No evidence of acute cerebrovascular blunt injury.  3.  No hemodynamically significant stenosis or dissection in the neck vessels.   XR Cervical Spine 2/3 Views    Narrative    EXAM: XR CERVICAL SPINE 2/3 VIEWS  LOCATION: [facility]  DATE: 3/26/2025    INDICATION: c1 fracture, c6 7 fracture, open mouth view, upright AP LAT in collar  COMPARISON: CT 3/26/2025.      Impression    IMPRESSION: Known fractures of C1 and C6-7 anterior osteophytes are not being visualized radiographically, and seen to better advantage on same-day CT. Normal vertebral heights and alignment. Anterior osteophytes consistent with dish extending from C2   through T1. Normal extraspinal structures.       Cervical spine MRI w/o contrast    Narrative    EXAM: MR CERVICAL SPINE W/O CONTRAST  LOCATION: [facility]  DATE: 3/26/2025    INDICATION: Fall, fracture C1 and anterior osteophyte cmplex C6-C7.  COMPARISON: CT obtained earlier  today  TECHNIQUE: MRI Cervical Spine without IV contrast.    FINDINGS:   Limited examination due to body habitus.    C1 ring fracture is better seen by CT. Stable nondisplaced fracture through the bridging anterior endplate osteophyte at C6-C7. The posterior longitudinal ligament, ligamentum flavum, and interspinous ligaments appear intact. There is mild prevertebral   edema extending from C1 to T1-2.    Normal vertebral body heights, alignment and marrow signal. No definite abnormal cord signal. 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    Craniovertebral junction and C1-C2: C1 ring fracture seen better by CT. The alar, transverse, and apical ligaments appear intact as does the tectorial membrane and posterior atlantooccipital membrane.    C2-C3: Normal disc height. No herniation. Mild congenital narrowing of the central canal. Normal facets. No neural foraminal stenosis.     C3-C4: Normal disc height. No herniation. Moderate congenital narrowing of the central canal. Normal facets. No neural foraminal stenosis.     C4-C5: Normal disc height. No herniation. Moderate congenital narrowing of the central canal. Mild foraminal narrowing due to facet hypertrophic changes and small uncovertebral osteophytes.     C5-C6: Normal disc height. Tiny central disc protrusion indents the ventral thecal sac and along with the congenitally narrowed central canal contributes to mild central canal stenosis. Mild foraminal narrowing due to facet hypertrophic changes and small   uncovertebral osteophytes.     C6-C7: Normal disc height. No herniation. Normal facets. No spinal canal or neural foraminal stenosis.     C7-T1: Normal disc height. No herniation. Normal facets. No spinal canal or neural foraminal stenosis.      Impression    IMPRESSION:  1.  Limited examination.  2.  C1 ring fracture seen better by CT.  3.  Stable nondisplaced fracture through the bridging anterior endplate osteophyte at C6-C7.  4.  Mild  prevertebral edema.  5.  No evidence of ligamentous injury.  6.  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

## 2025-03-27 NOTE — CONSULTS
ORTHOPAEDIC SURGERY CONSULTATION NOTE  CONSULTING PROVIDER:  Rashid Lockett MD    HISTORY OF PRESENT ILLNESS  Cody Bolton is a 44 year old RHD male who presents for evaluation of right small finger injury. Patient fell down stairs yesterday after drinking alcohol. Had difficulty ambulating afterwards and was brought to the ER by EMS. CT showed C1 fracture. Admitted for further cares. Non-surgical treatment in C-collar planned by neurosurgery team. X-rays of the right hand showed voalr PIP avulsion injury without dislocation. Ortho service consulted for care of the right hand    MEDICAL HISTORY  Past Medical History:   Diagnosis Date    Depressive disorder     High cholesterol     History of gastric bypass     History of pulmonary embolism     Hypertension     CARLIE (obstructive sleep apnea)     Personal history of DVT (deep vein thrombosis)     Type 2 diabetes mellitus (H)     Uncomplicated asthma        SURGICAL HISTORY  Past Surgical History:   Procedure Laterality Date    APPENDECTOMY  08/15/2017    Dr. Ferrer    GASTRIC BYPASS      [place] LAP,APPENDECTOMY N/A 8/14/2017    Procedure: APPENDECTOMY, LAPAROSCOPIC;  Surgeon: Nba Ferrer MD;  Location: [facility] [place];  Service: General    REVISION [id number]  2014    Dr. Ranjeet Espinal @[facility]       CURRENT MEDICATIONS    Current Facility-Administered Medications:     acetaminophen (TYLENOL) tablet 1,000 mg, 1,000 mg, Oral, TID, Wild Rodriguez MD    buPROPion (WELLBUTRIN XL) 24 hr tablet 300 mg, 300 mg, Oral, QAM, Wild Rodriguez MD, 300 mg at 03/27/25 1257    carvedilol (COREG) tablet 12.5 mg, 12.5 mg, Oral, BID w/meals, Wild Rodriguez MD, 12.5 mg at 03/27/25 1257    glucose gel 15-30 g, 15-30 g, Oral, Q15 Min PRN **OR** dextrose 50 % injection 25-50 mL, 25-50 mL, Intravenous, Q15 Min PRN **OR** glucagon injection 1 mg, 1 mg, Subcutaneous, Q15 Min PRN, Cody Birmingham MD    flumazenil (ROMAZICON) injection 0.2 mg, 0.2 mg, Intravenous, q1 min prn,  Cody Birmingham MD    [START ON 3/28/2025] folic acid (FOLVITE) tablet 1 mg, 1 mg, Oral, Daily, Wild Rodriguez MD    HYDROmorphone (DILAUDID) tablet 2 mg, 2 mg, Oral, Q4H PRN, Cody Birmingham MD, 2 mg at 03/27/25 1627    HYDROmorphone (PF) (DILAUDID) injection 0.5 mg, 0.5 mg, Intravenous, Q2H PRN, Wild Rodriguez MD, 0.5 mg at 03/27/25 1602    insulin aspart (NovoLOG) injection (RAPID ACTING), 1-7 Units, Subcutaneous, TID AC, Cody Birmingham MD    insulin aspart (NovoLOG) injection (RAPID ACTING), 1-5 Units, Subcutaneous, At Bedtime, oCdy Birimngham MD, 1 Units at 03/26/25 2254    lisinopril (ZESTRIL) tablet 20 mg, 20 mg, Oral, Daily, Wild Rodriguez MD, 20 mg at 03/27/25 1257    LORazepam (ATIVAN) tablet 1-2 mg, 1-2 mg, Oral, Q30 Min PRN **OR** LORazepam (ATIVAN) injection 1-2 mg, 1-2 mg, Intravenous, Q30 Min PRN, Cody Birmingham MD    melatonin tablet 5 mg, 5 mg, Oral, QPM PRN, Cody Birmingham MD    methocarbamol (ROBAXIN) tablet 500 mg, 500 mg, Oral, TID PRN, Wild Rodriguez MD, 500 mg at 03/27/25 1507    multivitamin w/minerals (THERA-VIT-M) tablet 1 tablet, 1 tablet, Oral, Daily, Cody Birmingham MD, 1 tablet at 03/27/25 0800    naloxone (NARCAN) injection 0.2 mg, 0.2 mg, Intravenous, Q2 Min PRN **OR** naloxone (NARCAN) injection 0.4 mg, 0.4 mg, Intravenous, Q2 Min PRN **OR** naloxone (NARCAN) injection 0.2 mg, 0.2 mg, Intramuscular, Q2 Min PRN **OR** naloxone (NARCAN) injection 0.4 mg, 0.4 mg, Intramuscular, Q2 Min PRN, Cody Birmingham MD    nicotine (COMMIT) lozenge 2 mg, 2 mg, Buccal, Q1H PRN, Cody Birmingham MD    ondansetron (ZOFRAN ODT) ODT tab 4 mg, 4 mg, Oral, Q6H PRN **OR** ondansetron (ZOFRAN) injection 4 mg, 4 mg, Intravenous, Q6H PRN, Cody Birmingham MD    prochlorperazine (COMPAZINE) injection 10 mg, 10 mg, Intravenous, Q6H PRN **OR** prochlorperazine (COMPAZINE) tablet 10 mg, 10  mg, Oral, Q6H PRN, Cody Birmingham MD    senna-docusate (SENOKOT-S/PERICOLACE) 8.6-50 MG per tablet 1 tablet, 1 tablet, Oral, BID PRN **OR** senna-docusate (SENOKOT-S/PERICOLACE) 8.6-50 MG per tablet 2 tablet, 2 tablet, Oral, BID PRN, Cody Birmingham MD    [START ON 3/28/2025] thiamine (B-1) tablet 100 mg, 100 mg, Oral, Daily, Wild Rodriguez MD    warfarin ANTICOAGULANT (COUMADIN) tablet 7.5 mg, 7.5 mg, Oral, ONCE at 18:00, Cody Birmingham MD    Warfarin Dose Required Daily - Pharmacist Managed, 1 each, Oral, See Admin Instructions, Cody Birmingham MD    Facility-Administered Medications Ordered in Other Encounters:     Self Administer Medications: Behavioral Services, , Does not apply, See Admin Instructions, Corrine Alvarenga MD    Self Administer Medications: Behavioral Services, , Does not apply, See Admin Instructions, Corrine Alvarenga MD    SOCIAL HISTORY    Tobacco history is   History   Smoking Status    Never   Smokeless Tobacco    Never   .    REVIEW OF SYSTEMS  Constitutional: Negative for chills, fever, nausea, vomiting.  Objective   VITAL SIGNS  BP (!) 177/91 (03/27/25 1636) Temp 98.4  F (36.9  C) (03/27/25 1550)  Pulse   Resp 18 (03/27/25 1550)  SpO2 98 % (03/27/25 1550)  There is no height or weight on file to calculate BMI.    PHYSICAL EXAMINATION  Orthopedic Physical Examination_  General Appearance: Patient appears active, comfortable, no acute distress.  NEURO: The patient is alert and oriented to person, place, and time and able to follow commands.  HEENT: Head normocephalic, forehead lacerations. C-collar in place  CVS: No cyanosis or obvious jugular venous distension .  Resp: No acute respiratory distress or increased inspiratory effort. Musculoskeletal:  Musculoskeletal:  Right upper extremity:  Skin clean, dry and intact.  Swelling about the right small finger  Neurovascular exam:  Intact extensor pollicis longus, flexor pollicis longus,  Extensor digitorum comminus, Flexor digitorum profundus, Flexor digitorum superficialis, intrinsic muscles of the hand.  Sensation intact to light touch median, radial and ulnar nerve distributions. Palpable radial pulse.    DIAGNOSTICS  IMAGING:  R hand x-rays with volar avulsion injury PIP small finger    Assessment & Plan   44 year old male with R small finger PIP joint volar avulsion injury, C-1 fracture    PLAN:  I discussed with Cody treatment options for his small finger injury. Recommend non-surgical care with cony taping of the fingers. ROM as tolerated. OR to use hands for weight bearing. Care of the C-spine injury per neurosurgery team. All patient questions were answered to the best of my ability at this visit. Thank you for consultation of this very pleasant patient    ADMINISTRATIVE/BILLIN minutes were spent in care of this patient greater than 30 of which were spent in counseling, imaging review and coordination of care

## 2025-03-27 NOTE — PROGRESS NOTES
Here for collars.  I checked fit of Aspen and is appropriate for support.  I trialed fit of Cee shower collar but I will need to modify the straps as they cannot connect at this time.  I will return this morning once I modify and final fit the patient for shower collar.  Jason Morales.

## 2025-03-27 NOTE — PLAN OF CARE
"Goal Outcome Evaluation:      Plan of Care Reviewed With: patient    Overall Patient Progress: improvingOverall Patient Progress: improving    Outcome Evaluation: CIWA  Pt is A & O x 4, CT with C 1 fx, plan is: CIWA 1,2,2, C-collar on at al times, pain management, neurosurgery consult, orthotics consulted for c collar & Cee collar.    Problem: Adult Inpatient Plan of Care  Goal: Plan of Care Review  Description: The Plan of Care Review/Shift note should be completed every shift.  The Outcome Evaluation is a brief statement about your assessment that the patient is improving, declining, or no change.  This information will be displayed automatically on your shiftnote.  Outcome: Progressing  Flowsheets (Taken 3/26/2025 2110)  Outcome Evaluation: CIWA  Plan of Care Reviewed With: patient  Overall Patient Progress: improving  Goal: Patient-Specific Goal (Individualized)  Description: You can add care plan individualizations to a care plan. Examples of Individualization might be:  \"Parent requests to be called daily at 9am for status\", \"I have a hard time hearing out of my right ear\", or \"Do not touch me to wake me up as it startlesme\".  Outcome: Progressing  Goal: Absence of Hospital-Acquired Illness or Injury  Outcome: Progressing  Goal: Optimal Comfort and Wellbeing  Outcome: Progressing  Goal: Readiness for Transition of Care  Outcome: Progressing     Problem: Pain Acute  Goal: Optimal Pain Control and Function  Outcome: Progressing     Problem: Activity Intolerance  Goal: Enhanced Capacity and Energy  Outcome: Progressing     "

## 2025-03-27 NOTE — PHARMACY-ADMISSION MEDICATION HISTORY
Pharmacy Intern Admission Medication History    Admission medication history is complete. The information provided in this note is only as accurate as the sources available at the time of the update.    Information Source(s): Patient and CareEverywhere/SureScripts via in-person    Pertinent Information: None    Changes made to PTA medication list:  Added: None  Deleted: clonidine  Changed: None    Allergies reviewed with patient and updates made in EHR: yes    Medication History Completed By: Aidan Rodriguez 3/26/2025 9:07 PM    PTA Med List   Medication Sig Last Dose/Taking    acamprosate (CAMPRAL) 333 MG EC tablet Take 2 tablets (666 mg) by mouth 3 times daily. 3/26/2025 Morning    buPROPion (WELLBUTRIN XL) 300 MG 24 hr tablet Take 1 tablet (300 mg) by mouth every morning. 3/26/2025 Morning    carvedilol (COREG) 12.5 MG tablet Take 1 tablet (12.5 mg) by mouth 2 times daily (with meals) 3/26/2025 Morning    folic acid (FOLVITE) 1 MG tablet Take 1 tablet (1 mg) by mouth daily. 3/26/2025 Morning    lisinopril (ZESTRIL) 20 MG tablet Take 1 tablet (20 mg) by mouth daily. 3/26/2025 Morning    metFORMIN (GLUCOPHAGE XR) 500 MG 24 hr tablet Take 2 tablets (1,000 mg) by mouth daily (with dinner). 3/25/2025 Evening    multivitamin w/minerals (THERA-VIT-M) tablet Take 1 tablet by mouth daily. 3/26/2025 Morning    naltrexone (DEPADE/REVIA) 50 MG tablet Take 1 tablet (50 mg) by mouth daily. 3/26/2025 Morning    sildenafil (VIAGRA) 100 MG tablet Take 1 tablet (100 mg) by mouth daily as needed (erectile dysfunction). Taking As Needed    simvastatin (ZOCOR) 20 MG tablet Take 1 tablet (20 mg) by mouth at bedtime. 3/25/2025 Bedtime    thiamine (B-1) 100 MG tablet Take 1 tablet (100 mg) by mouth daily. 3/26/2025 Morning    warfarin ANTICOAGULANT (COUMADIN) 5 MG tablet Take 5 mg by mouth six times a week. On Monday, Tuesday, Thursday, Friday, Saturday, and Sunday at bedtime 3/25/2025 Bedtime    warfarin ANTICOAGULANT (COUMADIN) 5 MG  tablet Take 2.5 mg by mouth every 7 days. On Wednesday bedtime 3/19/2025 Bedtime

## 2025-03-28 ENCOUNTER — APPOINTMENT (OUTPATIENT)
Dept: PHYSICAL THERAPY | Facility: CLINIC | Age: 45
DRG: 552 | End: 2025-03-28
Attending: HOSPITALIST
Payer: COMMERCIAL

## 2025-03-28 LAB
GLUCOSE BLDC GLUCOMTR-MCNC: 118 MG/DL (ref 70–99)
GLUCOSE BLDC GLUCOMTR-MCNC: 137 MG/DL (ref 70–99)
GLUCOSE BLDC GLUCOMTR-MCNC: 152 MG/DL (ref 70–99)
GLUCOSE BLDC GLUCOMTR-MCNC: 153 MG/DL (ref 70–99)
GLUCOSE BLDC GLUCOMTR-MCNC: 153 MG/DL (ref 70–99)
INR PPP: 1.82 (ref 0.85–1.15)

## 2025-03-28 PROCEDURE — 250N000013 HC RX MED GY IP 250 OP 250 PS 637: Performed by: INTERNAL MEDICINE

## 2025-03-28 PROCEDURE — 250N000013 HC RX MED GY IP 250 OP 250 PS 637: Performed by: HOSPITALIST

## 2025-03-28 PROCEDURE — 85610 PROTHROMBIN TIME: CPT | Performed by: INTERNAL MEDICINE

## 2025-03-28 PROCEDURE — 97161 PT EVAL LOW COMPLEX 20 MIN: CPT | Mod: GP | Performed by: PHYSICAL THERAPIST

## 2025-03-28 PROCEDURE — 36415 COLL VENOUS BLD VENIPUNCTURE: CPT | Performed by: INTERNAL MEDICINE

## 2025-03-28 PROCEDURE — 120N000001 HC R&B MED SURG/OB

## 2025-03-28 PROCEDURE — 99232 SBSQ HOSP IP/OBS MODERATE 35: CPT | Performed by: HOSPITALIST

## 2025-03-28 PROCEDURE — 97116 GAIT TRAINING THERAPY: CPT | Mod: GP | Performed by: PHYSICAL THERAPIST

## 2025-03-28 PROCEDURE — 97530 THERAPEUTIC ACTIVITIES: CPT | Mod: GP | Performed by: PHYSICAL THERAPIST

## 2025-03-28 RX ORDER — WARFARIN SODIUM 5 MG/1
5 TABLET ORAL
Status: COMPLETED | OUTPATIENT
Start: 2025-03-28 | End: 2025-03-28

## 2025-03-28 RX ORDER — DIPHENHYDRAMINE HCL 25 MG
25 CAPSULE ORAL EVERY 6 HOURS PRN
Status: DISCONTINUED | OUTPATIENT
Start: 2025-03-28 | End: 2025-03-31 | Stop reason: HOSPADM

## 2025-03-28 RX ORDER — GABAPENTIN 300 MG/1
300 CAPSULE ORAL 3 TIMES DAILY
Status: DISCONTINUED | OUTPATIENT
Start: 2025-03-28 | End: 2025-03-29

## 2025-03-28 RX ORDER — HYDROXYZINE HYDROCHLORIDE 50 MG/1
50 TABLET, FILM COATED ORAL EVERY 6 HOURS PRN
Status: DISCONTINUED | OUTPATIENT
Start: 2025-03-28 | End: 2025-03-31 | Stop reason: HOSPADM

## 2025-03-28 RX ORDER — METHOCARBAMOL 500 MG/1
500 TABLET, FILM COATED ORAL 4 TIMES DAILY
Status: DISCONTINUED | OUTPATIENT
Start: 2025-03-28 | End: 2025-03-29

## 2025-03-28 RX ADMIN — BUPROPION HYDROCHLORIDE 300 MG: 150 TABLET, EXTENDED RELEASE ORAL at 07:38

## 2025-03-28 RX ADMIN — OXYCODONE HYDROCHLORIDE 10 MG: 5 TABLET ORAL at 05:18

## 2025-03-28 RX ADMIN — OXYCODONE HYDROCHLORIDE 5 MG: 5 TABLET ORAL at 23:27

## 2025-03-28 RX ADMIN — CARVEDILOL 12.5 MG: 12.5 TABLET, FILM COATED ORAL at 19:44

## 2025-03-28 RX ADMIN — Medication 1 TABLET: at 07:38

## 2025-03-28 RX ADMIN — ACETAMINOPHEN 1000 MG: 500 TABLET, FILM COATED ORAL at 19:43

## 2025-03-28 RX ADMIN — GABAPENTIN 300 MG: 300 CAPSULE ORAL at 13:24

## 2025-03-28 RX ADMIN — HYDROXYZINE HYDROCHLORIDE 25 MG: 25 TABLET ORAL at 01:00

## 2025-03-28 RX ADMIN — METHOCARBAMOL 500 MG: 500 TABLET ORAL at 16:12

## 2025-03-28 RX ADMIN — OXYCODONE HYDROCHLORIDE 5 MG: 5 TABLET ORAL at 16:15

## 2025-03-28 RX ADMIN — GABAPENTIN 300 MG: 300 CAPSULE ORAL at 19:43

## 2025-03-28 RX ADMIN — FOLIC ACID 1 MG: 1 TABLET ORAL at 07:38

## 2025-03-28 RX ADMIN — OXYCODONE HYDROCHLORIDE 10 MG: 5 TABLET ORAL at 01:00

## 2025-03-28 RX ADMIN — WARFARIN SODIUM 5 MG: 5 TABLET ORAL at 19:44

## 2025-03-28 RX ADMIN — METHOCARBAMOL 500 MG: 500 TABLET ORAL at 11:07

## 2025-03-28 RX ADMIN — THIAMINE HCL TAB 100 MG 100 MG: 100 TAB at 07:37

## 2025-03-28 RX ADMIN — HYDROXYZINE HYDROCHLORIDE 50 MG: 50 TABLET, FILM COATED ORAL at 19:43

## 2025-03-28 RX ADMIN — CARVEDILOL 12.5 MG: 12.5 TABLET, FILM COATED ORAL at 07:38

## 2025-03-28 RX ADMIN — ACETAMINOPHEN 1000 MG: 500 TABLET, FILM COATED ORAL at 07:38

## 2025-03-28 RX ADMIN — LISINOPRIL 20 MG: 20 TABLET ORAL at 07:38

## 2025-03-28 RX ADMIN — ACETAMINOPHEN 1000 MG: 500 TABLET, FILM COATED ORAL at 13:24

## 2025-03-28 RX ADMIN — HYDROXYZINE HYDROCHLORIDE 25 MG: 25 TABLET ORAL at 08:02

## 2025-03-28 RX ADMIN — METHOCARBAMOL 500 MG: 500 TABLET ORAL at 19:43

## 2025-03-28 ASSESSMENT — ACTIVITIES OF DAILY LIVING (ADL)
ADLS_ACUITY_SCORE: 57
ADLS_ACUITY_SCORE: 55
ADLS_ACUITY_SCORE: 57
ADLS_ACUITY_SCORE: 55
ADLS_ACUITY_SCORE: 55
DEPENDENT_IADLS:: INDEPENDENT
ADLS_ACUITY_SCORE: 57
ADLS_ACUITY_SCORE: 57
ADLS_ACUITY_SCORE: 55
ADLS_ACUITY_SCORE: 55
ADLS_ACUITY_SCORE: 57
ADLS_ACUITY_SCORE: 55
ADLS_ACUITY_SCORE: 57
ADLS_ACUITY_SCORE: 55
ADLS_ACUITY_SCORE: 57
ADLS_ACUITY_SCORE: 55
ADLS_ACUITY_SCORE: 57
ADLS_ACUITY_SCORE: 57

## 2025-03-28 NOTE — PLAN OF CARE
"Goal Outcome Evaluation:      Plan of Care Reviewed With: patient    Overall Patient Progress: no changeOverall Patient Progress: no change    Outcome Evaluation: pt is alert and oriented x4, RA, hypertensive, Aspen collar at all times, CMS + , Assist of 2 with GB and walker , CIWA monitored 3 . Very painful oxycodone , atarax, robaxin , tylenol, diabetic BS monitored, facial contusion .      Problem: Adult Inpatient Plan of Care  Goal: Plan of Care Review  Description: The Plan of Care Review/Shift note should be completed every shift.  The Outcome Evaluation is a brief statement about your assessment that the patient is improving, declining, or no change.  This information will be displayed automatically on your shiftnote.  Outcome: Progressing  Flowsheets (Taken 3/28/2025 0447)  Outcome Evaluation: pt is alert and oriented x4, RA, hypertensive, Aspen collar at all times, CMS + , Assist of 2 with GB and walker , CIWA monitored 3 . Very painful oxycodone , atarax, robaxin , tylenol, diabetic BS monitored, facial contusion .  Plan of Care Reviewed With: patient  Overall Patient Progress: no change  Goal: Patient-Specific Goal (Individualized)  Description: You can add care plan individualizations to a care plan. Examples of Individualization might be:  \"Parent requests to be called daily at 9am for status\", \"I have a hard time hearing out of my right ear\", or \"Do not touch me to wake me up as it startlesme\".  Outcome: Progressing  Goal: Absence of Hospital-Acquired Illness or Injury  Outcome: Progressing  Intervention: Identify and Manage Fall Risk  Recent Flowsheet Documentation  Taken 3/28/2025 0040 by Marlen Amaya RN  Safety Promotion/Fall Prevention:   activity supervised   assistive device/personal items within reach   clutter free environment maintained   lighting adjusted   mobility aid in reach   nonskid shoes/slippers when out of bed   patient and family education   room organization consistent   " safety round/check completed   supervised activity   treat reversible contributory factors   treat underlying cause  Intervention: Prevent Skin Injury  Recent Flowsheet Documentation  Taken 3/28/2025 0040 by Marlen Amaya RN  Body Position:   supine, head elevated   supine, legs elevated  Skin Protection:   adhesive use limited   incontinence pads utilized   tubing/devices free from skin contact  Intervention: Prevent and Manage VTE (Venous Thromboembolism) Risk  Recent Flowsheet Documentation  Taken 3/28/2025 0040 by Marlen Amaya RN  VTE Prevention/Management: SCDs off (sequential compression devices)  Intervention: Prevent Infection  Recent Flowsheet Documentation  Taken 3/28/2025 0040 by Marlen Amaya RN  Infection Prevention:   hand hygiene promoted   rest/sleep promoted   single patient room provided  Goal: Optimal Comfort and Wellbeing  Outcome: Progressing  Goal: Readiness for Transition of Care  Outcome: Progressing     Problem: Pain Acute  Goal: Optimal Pain Control and Function  Outcome: Progressing  Intervention: Optimize Psychosocial Wellbeing  Recent Flowsheet Documentation  Taken 3/28/2025 0040 by Marlen Amaya RN  Supportive Measures:   active listening utilized   verbalization of feelings encouraged   relaxation techniques promoted  Intervention: Prevent or Manage Pain  Recent Flowsheet Documentation  Taken 3/28/2025 0040 by Marlne Amaya RN  Sensory Stimulation Regulation:   care clustered   lighting decreased   quiet environment promoted  Sleep/Rest Enhancement:   awakenings minimized   noise level reduced   regular sleep/rest pattern promoted   relaxation techniques promoted  Bowel Elimination Promotion:   adequate fluid intake promoted   ambulation promoted  Medication Review/Management: medications reviewed     Problem: Activity Intolerance  Goal: Enhanced Capacity and Energy  Outcome: Progressing  Intervention: Optimize Activity  Tolerance  Recent Flowsheet Documentation  Taken 3/28/2025 0040 by Marlen Amaya RN  Self-Care Promotion:   BADL personal objects within reach   independence encouraged   BADL personal routines maintained   meal set-up provided   adaptive equipment use encouraged  Activity Management:   dorsiflexion/plantar flexion performed   up in chair   ambulated to bathroom  Environmental Support:   calm environment promoted   rest periods encouraged     Problem: Orthopaedic Fracture  Goal: Absence of Bleeding  Outcome: Progressing  Intervention: Monitor and Manage Fracture Bleeding  Recent Flowsheet Documentation  Taken 3/28/2025 0040 by Marlen Amaya RN  Fracture Immobilization:   supported during position changes   supported with pillows  Goal: Bowel Elimination  Outcome: Progressing  Intervention: Promote Effective Bowel Elimination  Recent Flowsheet Documentation  Taken 3/28/2025 0040 by Marlen Amaya RN  Bowel Elimination Management:   hygiene measures promoted   relaxation techniques promoted   sitting position facilitated   toileting offered  Bowel Elimination Promotion:   adequate fluid intake promoted   ambulation promoted  Goal: Absence of Embolism Signs and Symptoms  Outcome: Progressing  Intervention: Prevent or Manage Embolism Risk  Recent Flowsheet Documentation  Taken 3/28/2025 0040 by Marlen Amaya RN  VTE Prevention/Management: SCDs off (sequential compression devices)  Goal: Fracture Stability  Outcome: Progressing  Intervention: Promote Fracture Stability and Healing  Recent Flowsheet Documentation  Taken 3/28/2025 0040 by Marlen Amaya RN  Fracture Immobilization:   supported during position changes   supported with pillows  Goal: Optimal Functional Ability  Outcome: Progressing  Intervention: Optimize Functional Ability  Recent Flowsheet Documentation  Taken 3/28/2025 0040 by Marlen Amaya RN  Self-Care Promotion:   BADL personal objects within  reach   independence encouraged   BADL personal routines maintained   meal set-up provided   adaptive equipment use encouraged  Activity Management:   dorsiflexion/plantar flexion performed   up in chair   ambulated to bathroom  Positioning/Transfer Devices:   pillows   in use  Goal: Absence of Infection Signs and Symptoms  Outcome: Progressing  Goal: Effective Tissue Perfusion  Outcome: Progressing  Goal: Optimal Pain Control and Function  Outcome: Progressing  Intervention: Manage Acute Orthopaedic-Related Pain  Recent Flowsheet Documentation  Taken 3/28/2025 0040 by Marlen Amaya RN  Sleep/Rest Enhancement:   awakenings minimized   noise level reduced   regular sleep/rest pattern promoted   relaxation techniques promoted  Goal: Effective Oxygenation and Ventilation  Outcome: Progressing  Intervention: Promote Airway Secretion Clearance  Recent Flowsheet Documentation  Taken 3/28/2025 0040 by Marlen Amaya RN  Breathing Techniques/Airway Clearance: deep/controlled cough encouraged  Cough And Deep Breathing: done with encouragement  Activity Management:   dorsiflexion/plantar flexion performed   up in chair   ambulated to bathroom  Intervention: Optimize Oxygenation and Ventilation  Recent Flowsheet Documentation  Taken 3/28/2025 0040 by Marlen Amaya RN  Airway/Ventilation Management:   airway patency maintained   pulmonary hygiene promoted   calming measures promoted  Fluid/Electrolyte Management: fluids provided     Problem: Alcohol Withdrawal  Goal: Alcohol Withdrawal Symptom Control  Outcome: Progressing  Intervention: Minimize or Manage Alcohol Withdrawal Symptoms  Recent Flowsheet Documentation  Taken 3/28/2025 0040 by Marlen Amaya RN  Sensory Stimulation Regulation:   care clustered   lighting decreased   quiet environment promoted  Aspiration Precautions:   awake/alert before oral intake   upright posture maintained  Goal: Optimal Neurologic Function  Outcome:  Progressing  Intervention: Minimize or Manage Acute Neurologic Symptoms  Recent Flowsheet Documentation  Taken 3/28/2025 0040 by Marlen Amaya, RN  Sensory Stimulation Regulation:   care clustered   lighting decreased   quiet environment promoted  Airway/Ventilation Management:   airway patency maintained   pulmonary hygiene promoted   calming measures promoted  Goal: Readiness for Change Identified  Outcome: Progressing  Intervention: Partner to Facilitate Behavior Change  Recent Flowsheet Documentation  Taken 3/28/2025 0040 by Marlen Amaya, RN  Supportive Measures:   active listening utilized   verbalization of feelings encouraged   relaxation techniques promoted     Problem: Comorbidity Management  Goal: Blood Glucose Levels Within Targeted Range  Outcome: Progressing  Intervention: Monitor and Manage Glycemia  Recent Flowsheet Documentation  Taken 3/28/2025 0040 by Marlen Amaya RN  Medication Review/Management: medications reviewed  Goal: Blood Pressure in Desired Range  Outcome: Progressing  Intervention: Maintain Blood Pressure Management  Recent Flowsheet Documentation  Taken 3/28/2025 0040 by Marlen Amaya, RN  Medication Review/Management: medications reviewed     Problem: Dysrhythmia  Goal: Normalized Cardiac Rhythm  Outcome: Progressing  Intervention: Monitor and Manage Cardiac Rhythm Effect  Recent Flowsheet Documentation  Taken 3/28/2025 0040 by Marlen Amaya RN  VTE Prevention/Management: SCDs off (sequential compression devices)

## 2025-03-28 NOTE — PROGRESS NOTES
Cross cover    Patient does not think his dilaudid is working and would like to try something else oral    Plan  Oxycodone written for, oral dilaudid stopped, continue IV dilaudid for breakthrough      Franki Andrew MD

## 2025-03-28 NOTE — PROGRESS NOTES
[facility]    Hospitalist Progress Note      Assessment & Plan   44M with hx of morbid obesity, NIDDM type II, hypertension, CARLIE, alcoholism, and atrial fibrillation and VTE on warfarin presents after a ground-level fall while intoxicated and hit face and found to have traumatic C1 fracture and fracture of RUE Fifth Digit.      #Fall complicated by C1 Fracture.  Nondisplaced fracture of C6-C7 osteophyte.  Changes of DISH: Was stepping into his home and tripped over the step.  Fell forward and had some bleeding in his face and neck pain.  No loss of consciousness.  No numbness or tingling.  -In the ER, patient afebrile and hemodynamically stable though hypertensive.  CBC unremarkable.  BMP unremarkable.  INR 1.58.  CT head cervical spine notable for nondisplaced fracture of the anterior and posterior right C1 arches along with nondisplaced fracture through an anterior osteophyte at C6-C7 along with changes of DISH.  He underwent CTA of the head and neck that showed no large vessel occlusion or stenosis and no evidence of blunt injury.  -MRI of the cervical spine showed C1 ring fracture that was seen better on CT along with stable nondisplaced fracture through the bridging anterior endplate osteophyte at C6-C7 with mild prevertebral edema and no evidence of ligamentous injury.  -Neurosurgery consulted.  Recommended c-collar at all times with no plan for surgery.  Orthotics consulted for collar.  Okay for continuation of warfarin.  - Cervical collar at all times, Cee collar for hygiene   -On the a.m. of 3/27, patient was in the ER when the head of his bed suddenly fell flat from a semiupright position.  He had increased pain to his cervical spine.  Neurosurgery consulted.  He did not have any new focal weakness or deficit.  Sensation intact in all extremities.  Pain seems to be localized to his cervical spine.  Repeat CT cervical spine stable.  -Continue with neurochecks. These have been  stable on 3/28.  Pt still having 6-7/10 pain on 3/28.  IV dilaudid not really helping so this was stopped.    -Schedule tylenol and robaxin.  Prn oxycodone available.  Atarax as adjuvant.  Add gabapentin TID on 3/28.  PT/OT consulted.  Given ongoing pain, will monitor another night with modification of pain regimen (scheduled robaxin, increased atarax, added gabapentin, oxycodone replaced oral dilaudid overnight).   -Fall precautions.  Bed/chair alarm.       #Fracture of RUE Fifth Digit: Ortho consultation, will need splint vs cony taping     #Alcohol use disorder with Intoxication w/ Concern for Withdrawal: Alc level of .24 on adm.  No longer drinks daily, minimal withdrawal sx in the past  -CIWA, thiamine, MV, folate, hold off on scheduled gabapentin / valproate     Other Issues  -Facial Contusions: From fall. No sutures needed  -NIDDM Type II: MDSS  -Chronic Afib: Warfarin per pharmacy  -Chronic VTE: Warfarin per pharmacy  -Drug-Induced Coagulation Defect: 2/2 warfarin use  -Morbid Obesity: Complicates all aspects of care  -Essential HTN: Home medications     DVT Prophylaxis: Warfarin  Code Status: Full Code  Medically Ready for Discharge: Anticipated Tomorrow    Wild Rodriguez MD    Interval History   No events. Still with pain at cervical spine.  Rated 6-7/10.  No CP, SOB.  Had double vision earlier this AM that resolved.  No numbness/tingling.  No focal or new weakness.  No bowel or bladder incontinence.      -Data reviewed today: I reviewed all new labs and imaging results over the last 24 hours. I personally reviewed     Physical Exam   Temp: 98.3  F (36.8  C) Temp src: Temporal BP: (!) 167/85 Pulse: 68   Resp: 16 SpO2: 96 % O2 Device: None (Room air)    There were no vitals filed for this visit.  Vital Signs with Ranges  Temp:  [97.9  F (36.6  C)-99.1  F (37.3  C)] 98.3  F (36.8  C)  Pulse:  [65-69] 68  Resp:  [16-20] 16  BP: (131-188)/() 167/85  SpO2:  [95 %-98 %] 96 %  I/O last 3 completed  shifts:  In: 240 [P.O.:240]  Out: 400 [Urine:400]    Constitutional: Obese, NAD, Cervical collar in place.   HEENT: Superficial abrasions noted. Cervical collar in place.   Respiratory: Distant. Nl WOB, Clear bilaterally, No wheezes or crackles  Cardiovascular: Distant. Regular, no murmur  GI: Obese, BS+, NT, ND  Skin/Integumen: WWP, superficial abrasions noted scattered.   Neuro: CNII-XII intact. Moves all extremities. No tremor. A&Ox3.   strength full bilaterally.  Strength 5/5 in upper extremities and lower extremities.  Sensation in lower and upper extremities intact to gross touch. Plantar and dorsiflexion intact.      Medications   Current Facility-Administered Medications   Medication Dose Route Frequency Provider Last Rate Last Admin     Current Facility-Administered Medications   Medication Dose Route Frequency Provider Last Rate Last Admin    acetaminophen (TYLENOL) tablet 1,000 mg  1,000 mg Oral TID Wild Rodriguez MD   1,000 mg at 03/28/25 0738    buPROPion (WELLBUTRIN XL) 24 hr tablet 300 mg  300 mg Oral QAM Wild Rodriguez MD   300 mg at 03/28/25 0738    carvedilol (COREG) tablet 12.5 mg  12.5 mg Oral BID w/meals Wild Rodriguez MD   12.5 mg at 03/28/25 0738    folic acid (FOLVITE) tablet 1 mg  1 mg Oral Daily Wild Rodriguez MD   1 mg at 03/28/25 0738    gabapentin (NEURONTIN) capsule 300 mg  300 mg Oral TID Wild Rodriguez MD        insulin aspart (NovoLOG) injection (RAPID ACTING)  1-7 Units Subcutaneous TID AC Cody Birmingham MD        insulin aspart (NovoLOG) injection (RAPID ACTING)  1-5 Units Subcutaneous At Bedtime Cody Birmingham MD   1 Units at 03/26/25 5774    lisinopril (ZESTRIL) tablet 20 mg  20 mg Oral Daily Wild Rodriguez MD   20 mg at 03/28/25 0738    methocarbamol (ROBAXIN) tablet 500 mg  500 mg Oral 4x Daily Wild Rodriguez MD        multivitamin w/minerals (THERA-VIT-M) tablet 1 tablet  1 tablet Oral Daily Cody Birmingham MD   1 tablet at 03/28/25 0738     thiamine (B-1) tablet 100 mg  100 mg Oral Daily Wild Rodriguez MD   100 mg at 03/28/25 0737    Warfarin Dose Required Daily - Pharmacist Managed  1 each Oral See Admin Instructions Cody Birmingham MD           Data   Recent Labs   Lab 03/28/25  0744 03/28/25  0720 03/28/25  0205 03/27/25  2212 03/27/25  0828 03/27/25  0827 03/26/25  2215 03/26/25  1816   WBC  --   --   --   --   --  10.5  --  6.6   HGB  --   --   --   --   --  12.3*  --  12.4*   MCV  --   --   --   --   --  82  --  85   PLT  --   --   --   --   --  268  --  267   INR 1.82*  --   --   --   --  1.50*  --  1.58*   NA  --   --   --   --   --  139  --  143   POTASSIUM  --   --   --   --   --  4.2  --  4.2   CHLORIDE  --   --   --   --   --  103  --  106   CO2  --   --   --   --   --  23  --  23   BUN  --   --   --   --   --  10.8  --  13.0   CR  --   --   --   --   --  0.65*  --  0.95   ANIONGAP  --   --   --   --   --  13  --  14   RAUL  --   --   --   --   --  8.9  --  8.4*   GLC  --  137* 153* 133*   < > 132*   < > 135*    < > = values in this interval not displayed.       Recent Results (from the past 24 hours)   CT Cervical Spine w/o Contrast    Narrative    EXAM: CT CERVICAL SPINE W/O CONTRAST  LOCATION: [facility]  DATE: 3/27/2025    INDICATION: cervical fracture, head of bed brought down quickly.  Increased pain. Evaluate for interval change.  COMPARISON: 3/25/2025.  TECHNIQUE: Routine CT Cervical Spine without IV contrast. Multiplanar reformats. Dose reduction techniques were used.        Impression    IMPRESSION: Radiographically similar right C1 anterior and posterior arch fractures and nondisplaced discovertebral unit C6-C7 interspace fractures. Preserved vertebral body heights and alignment.

## 2025-03-28 NOTE — PROGRESS NOTES
"   03/28/25 2449   Appointment Info   Signing Clinician's Name / Credentials (PT) Marisela Patino, MARTIN   Living Environment   People in Home alone   Current Living Arrangements apartment   Home Accessibility stairs to enter home;stairs within home   Number of Stairs, Main Entrance 10   Stair Railings, Main Entrance railings on both sides of stairs   Number of Stairs, Within Home, Primary one   Living Environment Comments lives in an apt alone; ex-wife lives next door   Self-Care   Usual Activity Tolerance good   Current Activity Tolerance moderate   Equipment Currently Used at Home cane, straight  (has a walker and hand held shower at home)   Fall history within last six months yes   Number of times patient has fallen within last six months 3  (per chart)   Activity/Exercise/Self-Care Comment reports independence with mobility and cares at baseline; recent use of a cane 2 weeks ago for a L knee injury   General Information   Onset of Illness/Injury or Date of Surgery 03/26/25   Referring Physician Wild Rodriguez MD   Patient/Family Therapy Goals Statement (PT) return home   Pertinent History of Current Problem (include personal factors and/or comorbidities that impact the POC) per chart: \"44M with hx of morbid obesity, NIDDM type II, hypertension, CARILE, alcoholism, and atrial fibrillation and VTE on warfarin presents after a ground-level fall while intoxicated and hit face and found to have traumatic C1 fracture and fracture of RUE Fifth Digit. \"; also found to have \"Nondisplaced fracture of C6-C7 osteophyte.  Changes of DISH\"; see medical record for further information   Existing Precautions/Restrictions fall;spinal;lifting;other (see comments)  (Cervical brace at all times)   General Observations Patient up in recliner chair; agreeable to PT session   Cognition   Cognitive Status Comments appears intact during session   Pain Assessment   Patient Currently in Pain Yes, see Vital Sign flowsheet  (neck pain reported) "   Integumentary/Edema   Integumentary/Edema Comments see nursing notes for further information   Range of Motion (ROM)   ROM Comment neck limited by fracture, pain, and cervical brace; R 5th digit limited by fracture; others appear WFL   Strength (Manual Muscle Testing)   Strength Comments reports issues with L knee prior to recent fall; mild functional weakness noted during mobility   Bed Mobility   Comment, (Bed Mobility) CGA and cues for spinal precautions   Transfers   Comment, (Transfers) CGA for sit<>stand and transfer to EOB   Gait/Stairs (Locomotion)   Distance in Feet (Gait) 25   Comment, (Gait/Stairs) use of walker and CGA   Balance   Balance Comments current need for UE support/assistive device; no gross LOB   Sensory Examination   Sensory Perception Comments denies numbness or tingling   Clinical Impression   Criteria for Skilled Therapeutic Intervention Yes, treatment indicated   PT Diagnosis (PT) impaired functional mobility   Influenced by the following impairments decreased activity tolerance; pain; spinal precautions; lifting restrictions;mild  functional weakness; mild unsteadiness   Functional limitations due to impairments impaired independence with mobility and cares secondary to above deficits   Clinical Presentation (PT Evaluation Complexity) stable   Clinical Presentation Rationale clinical judgement; level of assist   Clinical Decision Making (Complexity) low complexity   Planned Therapy Interventions (PT) balance training;bed mobility training;gait training;patient/family education;stair training;transfer training;progressive activity/exercise   Risk & Benefits of therapy have been explained evaluation/treatment results reviewed;care plan/treatment goals reviewed;risks/benefits reviewed;current/potential barriers reviewed;participants voiced agreement with care plan;patient   Clinical Impression Comments below reported baseline   PT Total Evaluation Time   PT Eval, Low Complexity Minutes  (72926) 10   Physical Therapy Goals   PT Frequency Daily   PT Predicted Duration/Target Date for Goal Attainment 03/29/25   PT Goals Bed Mobility;Transfers;Gait;Stairs   PT: Bed Mobility Modified independent;Supine to/from sit;Rolling;Within precautions   PT: Transfers Modified independent;Sit to/from stand;Bed to/from chair;Assistive device;Within precautions   PT: Gait Modified independent;Rolling walker;Greater than 200 feet   PT: Stairs 10 stairs;Rail on right;Modified independent   PT Discharge Planning   PT Plan progress independence with bed mobility, tx, gait; trial stairs   PT Discharge Recommendation (DC Rec) home with assist   PT Rationale for DC Rec Anticipate patient will be safe to return home with assist of family for mobility and cares as needed; recommend continued use of walker and walking program   PT Brief overview of current status A x 1 with FWW   PT Total Distance Amb During Session (feet) 275   PT Equipment Needed at Discharge walker, rolling  (has own at home)   Physical Therapy Time and Intention   Total Session Time (sum of timed and untimed services) 10

## 2025-03-28 NOTE — CONSULTS
Care Management Initial Consult    General Information  Assessment completed with: Patient,    Type of CM/SW Visit: Initial Assessment    Primary Care Provider verified and updated as needed: Yes   Readmission within the last 30 days: no previous admission in last 30 days      Reason for Consult: discharge planning  Advance Care Planning:            Communication Assessment  Patient's communication style: spoken language (English or Bilingual)    Hearing Difficulty or Deaf: no   Wear Glasses or Blind: yes    Cognitive  Cognitive/Neuro/Behavioral: WDL                      Living Environment:   People in home: alone     Current living Arrangements: apartment      Able to return to prior arrangements: yes       Family/Social Support:  Care provided by:    Provides care for: no one, unable/limited ability to care for self  Marital Status:   Support system: Children          Description of Support System: Involved, Supportive    Support Assessment: Adequate family and caregiver support    Current Resources:   Patient receiving home care services:          Community Resources:    Equipment currently used at home: cane, straight, walker, rolling  Supplies currently used at home:      Employment/Financial:  Employment Status:          Financial Concerns:             Does the patient's insurance plan have a 3 day qualifying hospital stay waiver?  Yes     Which insurance plan 3 day waiver is available? Alternative insurance waiver    Will the waiver be used for post-acute placement? Undetermined at this time    Lifestyle & Psychosocial Needs:  Social Drivers of Health     Food Insecurity: Low Risk  (3/27/2025)    Food Insecurity     Within the past 12 months, did you worry that your food would run out before you got money to buy more?: No     Within the past 12 months, did the food you bought just not last and you didn t have money to get more?: No   Depression: Not at risk (3/6/2025)    PHQ-2     PHQ-2 Score: 1    Recent Concern: Depression - At risk (1/2/2025)    PHQ-2     PHQ-2 Score: 5   Housing Stability: Low Risk  (3/27/2025)    Housing Stability     Do you have housing? : Yes     Are you worried about losing your housing?: No   Tobacco Use: Low Risk  (3/6/2025)    Patient History     Smoking Tobacco Use: Never     Smokeless Tobacco Use: Never     Passive Exposure: Never   Financial Resource Strain: Low Risk  (3/27/2025)    Financial Resource Strain     Within the past 12 months, have you or your family members you live with been unable to get utilities (heat, electricity) when it was really needed?: No   Alcohol Use: Alcohol Misuse (2/24/2025)    AUDIT-C     Frequency of Alcohol Consumption: 4 or more times a week     Average Number of Drinks: 10 or more     Frequency of Binge Drinking: Daily or almost daily   Transportation Needs: Low Risk  (3/27/2025)    Transportation Needs     Within the past 12 months, has lack of transportation kept you from medical appointments, getting your medicines, non-medical meetings or appointments, work, or from getting things that you need?: No   Physical Activity: Not on file   Interpersonal Safety: Low Risk  (3/28/2025)    Interpersonal Safety     Do you feel physically and emotionally safe where you currently live?: Yes     Within the past 12 months, have you been hit, slapped, kicked or otherwise physically hurt by someone?: No     Within the past 12 months, have you been humiliated or emotionally abused in other ways by your partner or ex-partner?: No   Stress: Not on file (2/11/2021)   Social Connections: Socially Integrated (11/28/2023)    Received from [facility]    Social Connections     Do you often feel lonely or isolated from those around you?: 0   Health Literacy: Not on file       Functional Status:  Prior to admission patient needed assistance:   Dependent ADLs:: Independent  Dependent IADLs:: Independent  Assesssment of Functional  Status: Not at baseline with ADL Functioning    Mental Health Status:          Chemical Dependency Status:                Values/Beliefs:  Spiritual, Cultural Beliefs, Yazidism Practices, Values that affect care:                 Discussed  Partnership in Safe Discharge Planning  document with patient/family: No    Additional Information:    SW spoke with the pt regarding discharge planning. Pt shares that he lives in an apartment complex alone. His ex-spouse and children live in the same complex. Pt is independent with mobility and cares at baseline. Pt shares that he is taking time off of work to participate in an IOP with [facility] for MH/CD. He is not interested in an IP level of treatment at this time. He denies any needs from SW at this time.     Next Steps: follow for PT recs.     Rossy Bolton/JOCE Osei, SKY  Inpatient Care Coordination  Emergency Room /Float  [phone]    Rossy Bolton, SKY

## 2025-03-28 NOTE — PLAN OF CARE
"A&Ox4. VSS on room air. CMS intact. Denies nausea. CIWAs 4 and 2 this shift. Pain managed with scheduled tylenol, gabapentin, and robaxin as well as prn atarax. Aspen collar on at all times. Up ax1 gb/walker. Tolerating regular diet. Blood sugars 137 and 152 this shift. Voiding appropriately. Plan is to discharge home, possible tomorrow.     Goal Outcome Evaluation:      Plan of Care Reviewed With: patient    Overall Patient Progress: improvingOverall Patient Progress: improving       Problem: Adult Inpatient Plan of Care  Goal: Plan of Care Review  Description: The Plan of Care Review/Shift note should be completed every shift.  The Outcome Evaluation is a brief statement about your assessment that the patient is improving, declining, or no change.  This information will be displayed automatically on your shiftnote.  Outcome: Progressing  Flowsheets (Taken 3/28/2025 1114)  Plan of Care Reviewed With: patient  Overall Patient Progress: improving  Goal: Patient-Specific Goal (Individualized)  Description: You can add care plan individualizations to a care plan. Examples of Individualization might be:  \"Parent requests to be called daily at 9am for status\", \"I have a hard time hearing out of my right ear\", or \"Do not touch me to wake me up as it startlesme\".  Outcome: Progressing  Goal: Absence of Hospital-Acquired Illness or Injury  Outcome: Progressing  Intervention: Identify and Manage Fall Risk  Recent Flowsheet Documentation  Taken 3/28/2025 0740 by Kayley Hernandez RN  Safety Promotion/Fall Prevention: safety round/check completed  Intervention: Prevent Skin Injury  Recent Flowsheet Documentation  Taken 3/28/2025 0740 by Kayley Hernandez RN  Skin Protection: adhesive use limited  Intervention: Prevent and Manage VTE (Venous Thromboembolism) Risk  Recent Flowsheet Documentation  Taken 3/28/2025 0740 by Kayley Hernandez RN  VTE Prevention/Management: SCDs off (sequential compression devices)  Intervention: Prevent " Infection  Recent Flowsheet Documentation  Taken 3/28/2025 0740 by Kayley Hernandez RN  Infection Prevention:   single patient room provided   rest/sleep promoted  Goal: Optimal Comfort and Wellbeing  Outcome: Progressing  Intervention: Monitor Pain and Promote Comfort  Recent Flowsheet Documentation  Taken 3/28/2025 0718 by Kayley Hernandez RN  Pain Management Interventions:   medication (see MAR)   rest  Goal: Readiness for Transition of Care  Outcome: Progressing     Problem: Pain Acute  Goal: Optimal Pain Control and Function  Outcome: Progressing  Intervention: Optimize Psychosocial Wellbeing  Recent Flowsheet Documentation  Taken 3/28/2025 0740 by Kayley Hernandez RN  Supportive Measures: active listening utilized  Intervention: Develop Pain Management Plan  Recent Flowsheet Documentation  Taken 3/28/2025 0718 by Kayley Hernandez RN  Pain Management Interventions:   medication (see MAR)   rest  Intervention: Prevent or Manage Pain  Recent Flowsheet Documentation  Taken 3/28/2025 0740 by Kayley Hernandez RN  Sensory Stimulation Regulation:   care clustered   lighting decreased  Bowel Elimination Promotion:   adequate fluid intake promoted   ambulation promoted  Medication Review/Management: medications reviewed     Problem: Activity Intolerance  Goal: Enhanced Capacity and Energy  Outcome: Progressing  Intervention: Optimize Activity Tolerance  Recent Flowsheet Documentation  Taken 3/28/2025 0740 by Kayley Hernandez RN  Environmental Support: calm environment promoted     Problem: Orthopaedic Fracture  Goal: Absence of Bleeding  Outcome: Progressing  Intervention: Monitor and Manage Fracture Bleeding  Recent Flowsheet Documentation  Taken 3/28/2025 0740 by Kayley Hernandez RN  Fracture Immobilization: supported during position changes  Goal: Bowel Elimination  Outcome: Progressing  Intervention: Promote Effective Bowel Elimination  Recent Flowsheet Documentation  Taken 3/28/2025 0740 by Kayley Hernandez  MARANDA  Bowel Elimination Promotion:   adequate fluid intake promoted   ambulation promoted  Goal: Absence of Embolism Signs and Symptoms  Outcome: Progressing  Intervention: Prevent or Manage Embolism Risk  Recent Flowsheet Documentation  Taken 3/28/2025 0740 by Kayley Hernandez RN  VTE Prevention/Management: SCDs off (sequential compression devices)  Goal: Fracture Stability  Outcome: Progressing  Intervention: Promote Fracture Stability and Healing  Recent Flowsheet Documentation  Taken 3/28/2025 0740 by Kayley Hernandez RN  Fracture Immobilization: supported during position changes  Goal: Optimal Functional Ability  Outcome: Progressing  Intervention: Optimize Functional Ability  Recent Flowsheet Documentation  Taken 3/28/2025 0740 by Kayley Hernandez RN  Positioning/Transfer Devices:   pillows   in use  Goal: Absence of Infection Signs and Symptoms  Outcome: Progressing  Goal: Effective Tissue Perfusion  Outcome: Progressing  Goal: Optimal Pain Control and Function  Outcome: Progressing  Intervention: Manage Acute Orthopaedic-Related Pain  Recent Flowsheet Documentation  Taken 3/28/2025 0718 by Kayley Hernandez RN  Pain Management Interventions:   medication (see MAR)   rest  Goal: Effective Oxygenation and Ventilation  Outcome: Progressing  Intervention: Promote Airway Secretion Clearance  Recent Flowsheet Documentation  Taken 3/28/2025 0740 by Kayley Hernandez RN  Cough And Deep Breathing: done independently per patient     Problem: Alcohol Withdrawal  Goal: Alcohol Withdrawal Symptom Control  Outcome: Progressing  Intervention: Minimize or Manage Alcohol Withdrawal Symptoms  Recent Flowsheet Documentation  Taken 3/28/2025 0740 by Kayley Hernandez RN  Sensory Stimulation Regulation:   care clustered   lighting decreased  Goal: Optimal Neurologic Function  Outcome: Progressing  Intervention: Minimize or Manage Acute Neurologic Symptoms  Recent Flowsheet Documentation  Taken 3/28/2025 0740 by Kayley Hernandez  RN  Sensory Stimulation Regulation:   care clustered   lighting decreased  Goal: Readiness for Change Identified  Outcome: Progressing  Intervention: Partner to Facilitate Behavior Change  Recent Flowsheet Documentation  Taken 3/28/2025 0740 by Kayley Hernandez RN  Supportive Measures: active listening utilized     Problem: Comorbidity Management  Goal: Blood Glucose Levels Within Targeted Range  Outcome: Progressing  Intervention: Monitor and Manage Glycemia  Recent Flowsheet Documentation  Taken 3/28/2025 0740 by Kayley Hernandez RN  Medication Review/Management: medications reviewed  Goal: Blood Pressure in Desired Range  Outcome: Progressing  Intervention: Maintain Blood Pressure Management  Recent Flowsheet Documentation  Taken 3/28/2025 0740 by Kayley Hernandez RN  Medication Review/Management: medications reviewed     Problem: Dysrhythmia  Goal: Normalized Cardiac Rhythm  Outcome: Progressing  Intervention: Monitor and Manage Cardiac Rhythm Effect  Recent Flowsheet Documentation  Taken 3/28/2025 0740 by Kayley Hernandez RN  VTE Prevention/Management: SCDs off (sequential compression devices)

## 2025-03-28 NOTE — PLAN OF CARE
"Goal Outcome Evaluation:      Plan of Care Reviewed With: patient    Overall Patient Progress: no changeOverall Patient Progress: no change    Outcome Evaluation: VS monitored, c collar at all times, CMS+, voiding, aminta diet, A2 for bed mobility, A1 w/ww and gb once upright, CIWA 4, PO/IV Dilaudid/Atarax/Robaxin for pain, facial contusion, , pt consistently rating pain high, MD notified, will cont to work on pain control.    Problem: Adult Inpatient Plan of Care  Goal: Plan of Care Review  Description: The Plan of Care Review/Shift note should be completed every shift.  The Outcome Evaluation is a brief statement about your assessment that the patient is improving, declining, or no change.  This information will be displayed automatically on your shiftnote.  Outcome: Progressing  Flowsheets (Taken 3/27/2025 2018)  Outcome Evaluation: VS monitored, c collar at all times, CMS+, voiding, aminta diet, A2 for bed mobility, A1 w/ww and gb once upright, CIWA 4, PO/IV Dilaudid/Atarax/Robaxin for pain, facial contusion, , pt consistently rating pain high, MD notified, will cont to work on pain control.  Plan of Care Reviewed With: patient  Overall Patient Progress: no change  Goal: Patient-Specific Goal (Individualized)  Description: You can add care plan individualizations to a care plan. Examples of Individualization might be:  \"Parent requests to be called daily at 9am for status\", \"I have a hard time hearing out of my right ear\", or \"Do not touch me to wake me up as it startlesme\".  Outcome: Progressing  Goal: Absence of Hospital-Acquired Illness or Injury  Outcome: Progressing  Intervention: Identify and Manage Fall Risk  Recent Flowsheet Documentation  Taken 3/27/2025 2840 by Jannie Faith, RN  Safety Promotion/Fall Prevention:   activity supervised   assistive device/personal items within reach   clutter free environment maintained   lighting adjusted   mobility aid in reach   nonskid shoes/slippers when out " of bed   patient and family education   room organization consistent   safety round/check completed   supervised activity   treat reversible contributory factors   treat underlying cause  Intervention: Prevent Skin Injury  Recent Flowsheet Documentation  Taken 3/27/2025 1550 by Jannie Faith RN  Body Position:   supine, head elevated   supine, legs elevated  Skin Protection:   adhesive use limited   incontinence pads utilized   tubing/devices free from skin contact  Intervention: Prevent and Manage VTE (Venous Thromboembolism) Risk  Recent Flowsheet Documentation  Taken 3/27/2025 1550 by Jannie Faith RN  VTE Prevention/Management: SCDs off (sequential compression devices)  Intervention: Prevent Infection  Recent Flowsheet Documentation  Taken 3/27/2025 1550 by Jannie Faith RN  Infection Prevention:   hand hygiene promoted   rest/sleep promoted   single patient room provided  Goal: Optimal Comfort and Wellbeing  Outcome: Progressing  Intervention: Monitor Pain and Promote Comfort  Recent Flowsheet Documentation  Taken 3/27/2025 1829 by Jannie Faith RN  Pain Management Interventions: medication (see MAR)  Taken 3/27/2025 1709 by Jannie Faith RN  Pain Management Interventions: cold applied  Taken 3/27/2025 1627 by Jannie Faith RN  Pain Management Interventions: medication (see MAR)  Goal: Readiness for Transition of Care  Outcome: Progressing     Problem: Pain Acute  Goal: Optimal Pain Control and Function  Outcome: Progressing  Intervention: Optimize Psychosocial Wellbeing  Recent Flowsheet Documentation  Taken 3/27/2025 1550 by Jannie Faith RN  Supportive Measures:   active listening utilized   verbalization of feelings encouraged   relaxation techniques promoted  Intervention: Develop Pain Management Plan  Recent Flowsheet Documentation  Taken 3/27/2025 1829 by Jannie Faith RN  Pain Management Interventions: medication (see MAR)  Taken 3/27/2025 1709 by Jannie Faith RN  Pain Management  Interventions: cold applied  Taken 3/27/2025 1627 by Jannie Faith RN  Pain Management Interventions: medication (see MAR)  Intervention: Prevent or Manage Pain  Recent Flowsheet Documentation  Taken 3/27/2025 1550 by Jannie Faith RN  Sensory Stimulation Regulation:   care clustered   lighting decreased   quiet environment promoted  Sleep/Rest Enhancement:   awakenings minimized   noise level reduced   regular sleep/rest pattern promoted   relaxation techniques promoted  Bowel Elimination Promotion:   adequate fluid intake promoted   ambulation promoted  Medication Review/Management: medications reviewed     Problem: Activity Intolerance  Goal: Enhanced Capacity and Energy  Outcome: Progressing  Intervention: Optimize Activity Tolerance  Recent Flowsheet Documentation  Taken 3/27/2025 1550 by Jannie Faith RN  Self-Care Promotion:   BADL personal objects within reach   independence encouraged   BADL personal routines maintained   meal set-up provided   adaptive equipment use encouraged  Activity Management:   dorsiflexion/plantar flexion performed   up in chair   ambulated to bathroom  Environmental Support:   calm environment promoted   rest periods encouraged     Problem: Orthopaedic Fracture  Goal: Absence of Bleeding  Outcome: Progressing  Intervention: Monitor and Manage Fracture Bleeding  Recent Flowsheet Documentation  Taken 3/27/2025 1550 by Jannie Faith RN  Fracture Immobilization:   supported during position changes   supported with pillows  Goal: Bowel Elimination  Outcome: Progressing  Intervention: Promote Effective Bowel Elimination  Recent Flowsheet Documentation  Taken 3/27/2025 1550 by Jannie Faith RN  Bowel Elimination Management:   hygiene measures promoted   relaxation techniques promoted   sitting position facilitated   toileting offered  Bowel Elimination Promotion:   adequate fluid intake promoted   ambulation promoted  Goal: Absence of Embolism Signs and Symptoms  Outcome:  Progressing  Intervention: Prevent or Manage Embolism Risk  Recent Flowsheet Documentation  Taken 3/27/2025 1550 by Jannie Faith RN  VTE Prevention/Management: SCDs off (sequential compression devices)  Goal: Fracture Stability  Outcome: Progressing  Intervention: Promote Fracture Stability and Healing  Recent Flowsheet Documentation  Taken 3/27/2025 1550 by Jannie Faith RN  Fracture Immobilization:   supported during position changes   supported with pillows  Goal: Optimal Functional Ability  Outcome: Progressing  Intervention: Optimize Functional Ability  Recent Flowsheet Documentation  Taken 3/27/2025 1550 by Jannie Faith RN  Self-Care Promotion:   BADL personal objects within reach   independence encouraged   BADL personal routines maintained   meal set-up provided   adaptive equipment use encouraged  Activity Management:   dorsiflexion/plantar flexion performed   up in chair   ambulated to bathroom  Positioning/Transfer Devices:   pillows   in use  Goal: Absence of Infection Signs and Symptoms  Outcome: Progressing  Goal: Effective Tissue Perfusion  Outcome: Progressing  Goal: Optimal Pain Control and Function  Outcome: Progressing  Intervention: Manage Acute Orthopaedic-Related Pain  Recent Flowsheet Documentation  Taken 3/27/2025 1829 by Jannie Faith RN  Pain Management Interventions: medication (see MAR)  Taken 3/27/2025 1709 by Jannie Faith RN  Pain Management Interventions: cold applied  Taken 3/27/2025 1627 by Jannie Faith RN  Pain Management Interventions: medication (see MAR)  Taken 3/27/2025 1550 by Jannie Faith RN  Sleep/Rest Enhancement:   awakenings minimized   noise level reduced   regular sleep/rest pattern promoted   relaxation techniques promoted  Goal: Effective Oxygenation and Ventilation  Outcome: Progressing  Intervention: Promote Airway Secretion Clearance  Recent Flowsheet Documentation  Taken 3/27/2025 1550 by Jannie Faith RN  Breathing Techniques/Airway Clearance:  deep/controlled cough encouraged  Cough And Deep Breathing: done with encouragement  Activity Management:   dorsiflexion/plantar flexion performed   up in chair   ambulated to bathroom  Intervention: Optimize Oxygenation and Ventilation  Recent Flowsheet Documentation  Taken 3/27/2025 1550 by Jannie Faith RN  Airway/Ventilation Management:   airway patency maintained   pulmonary hygiene promoted   calming measures promoted  Fluid/Electrolyte Management: fluids provided     Problem: Alcohol Withdrawal  Goal: Alcohol Withdrawal Symptom Control  Outcome: Progressing  Intervention: Minimize or Manage Alcohol Withdrawal Symptoms  Recent Flowsheet Documentation  Taken 3/27/2025 1550 by Jannie Faith RN  Sensory Stimulation Regulation:   care clustered   lighting decreased   quiet environment promoted  Aspiration Precautions:   awake/alert before oral intake   upright posture maintained  Goal: Optimal Neurologic Function  Outcome: Progressing  Intervention: Minimize or Manage Acute Neurologic Symptoms  Recent Flowsheet Documentation  Taken 3/27/2025 1550 by Jannie Faith RN  Sensory Stimulation Regulation:   care clustered   lighting decreased   quiet environment promoted  Airway/Ventilation Management:   airway patency maintained   pulmonary hygiene promoted   calming measures promoted  Goal: Readiness for Change Identified  Outcome: Progressing  Intervention: Partner to Facilitate Behavior Change  Recent Flowsheet Documentation  Taken 3/27/2025 1550 by Jannie Faith RN  Supportive Measures:   active listening utilized   verbalization of feelings encouraged   relaxation techniques promoted     Problem: Comorbidity Management  Goal: Blood Glucose Levels Within Targeted Range  Outcome: Progressing  Intervention: Monitor and Manage Glycemia  Recent Flowsheet Documentation  Taken 3/27/2025 1550 by Jannie Faith RN  Medication Review/Management: medications reviewed  Goal: Blood Pressure in Desired Range  Outcome:  Progressing  Intervention: Maintain Blood Pressure Management  Recent Flowsheet Documentation  Taken 3/27/2025 1550 by Jannie Faith, RN  Medication Review/Management: medications reviewed     Problem: Dysrhythmia  Goal: Normalized Cardiac Rhythm  Outcome: Progressing  Intervention: Monitor and Manage Cardiac Rhythm Effect  Recent Flowsheet Documentation  Taken 3/27/2025 1550 by Jannie Fiath, RN  VTE Prevention/Management: SCDs off (sequential compression devices)

## 2025-03-29 ENCOUNTER — APPOINTMENT (OUTPATIENT)
Dept: PHYSICAL THERAPY | Facility: CLINIC | Age: 45
DRG: 552 | End: 2025-03-29
Payer: COMMERCIAL

## 2025-03-29 LAB
GLUCOSE BLDC GLUCOMTR-MCNC: 128 MG/DL (ref 70–99)
GLUCOSE BLDC GLUCOMTR-MCNC: 137 MG/DL (ref 70–99)
GLUCOSE BLDC GLUCOMTR-MCNC: 188 MG/DL (ref 70–99)
GLUCOSE BLDC GLUCOMTR-MCNC: 92 MG/DL (ref 70–99)
GLUCOSE BLDC GLUCOMTR-MCNC: 99 MG/DL (ref 70–99)
INR PPP: 2.24 (ref 0.85–1.15)

## 2025-03-29 PROCEDURE — 120N000001 HC R&B MED SURG/OB

## 2025-03-29 PROCEDURE — 85610 PROTHROMBIN TIME: CPT | Performed by: INTERNAL MEDICINE

## 2025-03-29 PROCEDURE — 250N000013 HC RX MED GY IP 250 OP 250 PS 637: Performed by: INTERNAL MEDICINE

## 2025-03-29 PROCEDURE — 250N000013 HC RX MED GY IP 250 OP 250 PS 637: Performed by: HOSPITALIST

## 2025-03-29 PROCEDURE — 99239 HOSP IP/OBS DSCHRG MGMT >30: CPT | Performed by: HOSPITALIST

## 2025-03-29 PROCEDURE — 36415 COLL VENOUS BLD VENIPUNCTURE: CPT | Performed by: INTERNAL MEDICINE

## 2025-03-29 PROCEDURE — 97116 GAIT TRAINING THERAPY: CPT | Mod: GP | Performed by: PHYSICAL THERAPIST

## 2025-03-29 PROCEDURE — 97530 THERAPEUTIC ACTIVITIES: CPT | Mod: GP | Performed by: PHYSICAL THERAPIST

## 2025-03-29 RX ORDER — METHOCARBAMOL 500 MG/1
500 TABLET, FILM COATED ORAL 4 TIMES DAILY
Qty: 28 TABLET | Refills: 0 | Status: SHIPPED | OUTPATIENT
Start: 2025-03-29 | End: 2025-03-31

## 2025-03-29 RX ORDER — ACETAMINOPHEN 500 MG
1000 TABLET ORAL 3 TIMES DAILY
Qty: 42 TABLET | Refills: 0 | Status: SHIPPED | OUTPATIENT
Start: 2025-03-29 | End: 2025-04-05

## 2025-03-29 RX ORDER — GABAPENTIN 300 MG/1
300 CAPSULE ORAL 3 TIMES DAILY
Qty: 42 CAPSULE | Refills: 0 | Status: SHIPPED | OUTPATIENT
Start: 2025-03-29 | End: 2025-03-31

## 2025-03-29 RX ORDER — AMOXICILLIN 250 MG
1 CAPSULE ORAL 2 TIMES DAILY PRN
Qty: 28 TABLET | Refills: 0 | Status: SHIPPED | OUTPATIENT
Start: 2025-03-29 | End: 2025-04-12

## 2025-03-29 RX ORDER — OXYCODONE HYDROCHLORIDE 5 MG/1
5 TABLET ORAL EVERY 4 HOURS PRN
Status: DISCONTINUED | OUTPATIENT
Start: 2025-03-29 | End: 2025-03-31 | Stop reason: HOSPADM

## 2025-03-29 RX ORDER — DIPHENHYDRAMINE HYDROCHLORIDE, ZINC ACETATE 2; .1 G/100G; G/100G
CREAM TOPICAL 3 TIMES DAILY PRN
Status: DISCONTINUED | OUTPATIENT
Start: 2025-03-29 | End: 2025-03-31 | Stop reason: HOSPADM

## 2025-03-29 RX ORDER — METHOCARBAMOL 500 MG/1
500 TABLET, FILM COATED ORAL 3 TIMES DAILY PRN
Status: DISCONTINUED | OUTPATIENT
Start: 2025-03-29 | End: 2025-03-31 | Stop reason: HOSPADM

## 2025-03-29 RX ORDER — OXYCODONE HYDROCHLORIDE 5 MG/1
5-10 TABLET ORAL EVERY 6 HOURS PRN
Qty: 20 TABLET | Refills: 0 | Status: SHIPPED | OUTPATIENT
Start: 2025-03-29 | End: 2025-03-31

## 2025-03-29 RX ORDER — WARFARIN SODIUM 5 MG/1
5 TABLET ORAL ONCE
Status: COMPLETED | OUTPATIENT
Start: 2025-03-29 | End: 2025-03-29

## 2025-03-29 RX ORDER — HYDROXYZINE HYDROCHLORIDE 50 MG/1
50 TABLET, FILM COATED ORAL EVERY 6 HOURS PRN
Qty: 30 TABLET | Refills: 0 | Status: SHIPPED | OUTPATIENT
Start: 2025-03-29 | End: 2025-03-31

## 2025-03-29 RX ADMIN — OXYCODONE HYDROCHLORIDE 5 MG: 5 TABLET ORAL at 22:10

## 2025-03-29 RX ADMIN — METHOCARBAMOL 500 MG: 500 TABLET ORAL at 07:40

## 2025-03-29 RX ADMIN — CARVEDILOL 12.5 MG: 12.5 TABLET, FILM COATED ORAL at 17:34

## 2025-03-29 RX ADMIN — HYDROXYZINE HYDROCHLORIDE 50 MG: 50 TABLET, FILM COATED ORAL at 11:36

## 2025-03-29 RX ADMIN — OXYCODONE HYDROCHLORIDE 5 MG: 5 TABLET ORAL at 12:39

## 2025-03-29 RX ADMIN — HYDROXYZINE HYDROCHLORIDE 50 MG: 50 TABLET, FILM COATED ORAL at 01:58

## 2025-03-29 RX ADMIN — WARFARIN SODIUM 5 MG: 5 TABLET ORAL at 22:10

## 2025-03-29 RX ADMIN — OXYCODONE HYDROCHLORIDE 5 MG: 5 TABLET ORAL at 17:33

## 2025-03-29 RX ADMIN — Medication 1 TABLET: at 07:40

## 2025-03-29 RX ADMIN — GABAPENTIN 300 MG: 300 CAPSULE ORAL at 07:40

## 2025-03-29 RX ADMIN — METHOCARBAMOL 500 MG: 500 TABLET ORAL at 22:10

## 2025-03-29 RX ADMIN — ACETAMINOPHEN 1000 MG: 500 TABLET, FILM COATED ORAL at 07:40

## 2025-03-29 RX ADMIN — THIAMINE HCL TAB 100 MG 100 MG: 100 TAB at 07:40

## 2025-03-29 RX ADMIN — LISINOPRIL 20 MG: 20 TABLET ORAL at 07:40

## 2025-03-29 RX ADMIN — ACETAMINOPHEN 1000 MG: 500 TABLET, FILM COATED ORAL at 19:44

## 2025-03-29 RX ADMIN — ACETAMINOPHEN 1000 MG: 500 TABLET, FILM COATED ORAL at 13:17

## 2025-03-29 RX ADMIN — FOLIC ACID 1 MG: 1 TABLET ORAL at 07:40

## 2025-03-29 RX ADMIN — DIPHENHYDRAMINE HYDROCHLORIDE, ZINC ACETATE: 2; .1 CREAM TOPICAL at 22:14

## 2025-03-29 RX ADMIN — OXYCODONE HYDROCHLORIDE 10 MG: 5 TABLET ORAL at 03:28

## 2025-03-29 RX ADMIN — BUPROPION HYDROCHLORIDE 300 MG: 150 TABLET, EXTENDED RELEASE ORAL at 07:39

## 2025-03-29 RX ADMIN — CARVEDILOL 12.5 MG: 12.5 TABLET, FILM COATED ORAL at 07:40

## 2025-03-29 RX ADMIN — DIPHENHYDRAMINE HYDROCHLORIDE 25 MG: 25 CAPSULE ORAL at 00:02

## 2025-03-29 RX ADMIN — HYDROXYZINE HYDROCHLORIDE 50 MG: 50 TABLET, FILM COATED ORAL at 19:45

## 2025-03-29 ASSESSMENT — ACTIVITIES OF DAILY LIVING (ADL)
ADLS_ACUITY_SCORE: 55
ADLS_ACUITY_SCORE: 60
ADLS_ACUITY_SCORE: 60
ADLS_ACUITY_SCORE: 55
ADLS_ACUITY_SCORE: 60

## 2025-03-29 NOTE — PHARMACY-ANTICOAGULATION SERVICE
Clinical Pharmacy- Warfarin Discharge Note  This patient is currently on warfarin for the treatment of Atrial fibrillation.  INR Goal= 2-3  Expected length of therapy lifetime.    Warfarin PTA Regimen: 2.5 wed and 5 mg ROW      Anticoagulation Dose History  More data exists         Latest Ref Rng & Units 2/24/2025 2/25/2025 2/26/2025 3/26/2025 3/27/2025 3/28/2025 3/29/2025   Recent Dosing and Labs   warfarin ANTICOAGULANT (COUMADIN) 5 MG tablet - 5 mg, $Given 5 mg, $Given 5 mg, $Given 5 mg, $Given - 5 mg, $Given -   warfarin ANTICOAGULANT (COUMADIN) 7.5 MG tablet - - - - - 7.5 mg, $Given - -   INR 0.85 - 1.15 2.72  2.37  2.29  1.58  1.50  1.82  2.24      Agree with plan to resume pta Warfarin regimen with INR check on 3/31.

## 2025-03-29 NOTE — PROGRESS NOTES
"Pt worked with PT today and reportedly was \"wobbly\" and lightheaded.  Almost fell going out of room. Cancel discharge. Stop scheduled gabapentin.  Adjust robaxin to prn.  Reduce dose of oxycodone.  May need TCU.      Wild Rodriguez MD  "

## 2025-03-29 NOTE — PLAN OF CARE
"Goal Outcome Evaluation:      Plan of Care Reviewed With: patient    Overall Patient Progress: improvingOverall Patient Progress: improving         [id number]  Collar on. Pain controlled with prn atarax at 0158, prn oxy 0328.  CIWA 1,1   Voiding well. Discharge home today      Problem: Adult Inpatient Plan of Care  Goal: Plan of Care Review  Description: The Plan of Care Review/Shift note should be completed every shift.  The Outcome Evaluation is a brief statement about your assessment that the patient is improving, declining, or no change.  This information will be displayed automatically on your shiftnote.  Outcome: Progressing  Flowsheets (Taken 3/29/2025 0504)  Plan of Care Reviewed With: patient  Overall Patient Progress: improving  Goal: Patient-Specific Goal (Individualized)  Description: You can add care plan individualizations to a care plan. Examples of Individualization might be:  \"Parent requests to be called daily at 9am for status\", \"I have a hard time hearing out of my right ear\", or \"Do not touch me to wake me up as it startlesme\".  Outcome: Progressing  Goal: Absence of Hospital-Acquired Illness or Injury  Outcome: Progressing  Intervention: Identify and Manage Fall Risk  Recent Flowsheet Documentation  Taken 3/29/2025 0007 by Brian Martines, RN  Safety Promotion/Fall Prevention:   activity supervised   assistive device/personal items within reach   clutter free environment maintained   mobility aid in reach   nonskid shoes/slippers when out of bed   room near nurse's station   room organization consistent  Intervention: Prevent Skin Injury  Recent Flowsheet Documentation  Taken 3/29/2025 0007 by Brian Martines, RN  Body Position: position changed independently  Intervention: Prevent and Manage VTE (Venous Thromboembolism) Risk  Recent Flowsheet Documentation  Taken 3/29/2025 0007 by Brian Martines, RN  VTE Prevention/Management: SCDs off (sequential compression devices)  Intervention: " Prevent Infection  Recent Flowsheet Documentation  Taken 3/29/2025 0007 by Brian Martines, RN  Infection Prevention:   single patient room provided   rest/sleep promoted  Goal: Optimal Comfort and Wellbeing  Outcome: Progressing  Intervention: Monitor Pain and Promote Comfort  Recent Flowsheet Documentation  Taken 3/29/2025 0007 by Brian Martines, RN  Pain Management Interventions:   repositioned   rest  Goal: Readiness for Transition of Care  Outcome: Progressing

## 2025-03-29 NOTE — DISCHARGE SUMMARY
[facility]    Discharge Summary  Hospitalist    Date of Admission:  3/26/2025  Date of Discharge:  3/29/2025  Discharging Provider: Wild Rodriguez MD  Date of Service (when I saw the patient): 03/29/25    Discharge Diagnoses   #Fall complicated by C1 Fracture.  Nondisplaced fracture of C6-C7 osteophyte.  Changes of DISH  #Fracture of RUE Fifth Digit  #Alcohol use disorder with Intoxication w/ Concern for Withdrawal  #Facial Contusions  #NIDDM Type I  #Chronic Afib  #Chronic VTE  #Drug-Induced Coagulation Defect: 2/2 warfarin use  #Morbid Obesity  #Essential HTN    Hospital Course   44M with hx of morbid obesity, NIDDM type II, hypertension, CARLIE, alcoholism, and atrial fibrillation and VTE on warfarin presents after a ground-level fall while intoxicated and hit face and found to have traumatic C1 fracture and fracture of RUE Fifth Digit.      #Fall complicated by C1 Fracture.  Nondisplaced fracture of C6-C7 osteophyte.  Changes of DISH: Was stepping into his home and tripped over the step.  Fell forward and had some bleeding in his face and neck pain.  No loss of consciousness.  No numbness or tingling.  -In the ER, patient afebrile and hemodynamically stable though hypertensive.  CBC unremarkable.  BMP unremarkable.  INR 1.58.  CT head cervical spine notable for nondisplaced fracture of the anterior and posterior right C1 arches along with nondisplaced fracture through an anterior osteophyte at C6-C7 along with changes of DISH.  He underwent CTA of the head and neck that showed no large vessel occlusion or stenosis and no evidence of blunt injury.  -MRI of the cervical spine showed C1 ring fracture that was seen better on CT along with stable nondisplaced fracture through the bridging anterior endplate osteophyte at C6-C7 with mild prevertebral edema and no evidence of ligamentous injury.  -Neurosurgery consulted.  Recommended c-collar at all times with no plan for surgery.  Orthotics consulted  for collar.  Okay for continuation of warfarin.  - Cervical collar at all times, Cee collar for hygiene   -On the a.m. of 3/27, patient was in the ER when the head of his bed suddenly fell flat from a semiupright position.  He had increased pain to his cervical spine.  Neurosurgery consulted.  He did not have any new focal weakness or deficit.  Sensation intact in all extremities.  Pain seems to be localized to his cervical spine.  Repeat CT cervical spine stable.  -Pt neurochecks remained stable. No focal weakness, numbness, tingling.  No loss of bowel or bladder function.  Seen by PT and OK for discharge home with assist from adult son.   -Schedule tylenol, robaxin and gabapentin.   -Prn atarax and oxycodone for breakthrough pain.  Given short supply of pain meds and need to follow up with PCP next week.   -Follow up with NSG in several weeks per their instructions with repeat scans.     #Fracture of RUE Fifth Digit: Ortho consultation, will need splint vs cony taping     #Alcohol use disorder with Intoxication w/ Concern for Withdrawal: Alc level of .24 on adm.  No longer drinks daily, minimal withdrawal sx in the past  -CIWA, thiamine, MV, folate.  Did not show significant withdrawal. Advised strict cessation.        Other Issues  -Facial Contusions: From fall. No sutures needed  -NIDDM Type II: MDSS  -Chronic Afib: Warfarin per pharmacy  -Chronic VTE: Warfarin per pharmacy  -Drug-Induced Coagulation Defect: 2/2 warfarin use  -Morbid Obesity: Complicates all aspects of care  -Essential HTN: Home medications    Wild Rodriguez MD    Code Status   Full Code       Primary Care Physician   Yovana Felipe    Physical Exam   Temp: 97.4  F (36.3  C) Temp src: Temporal BP: 106/61 Pulse: 65   Resp: 16 SpO2: 96 % O2 Device: None (Room air)    There were no vitals filed for this visit.  Vital Signs with Ranges  Temp:  [97  F (36.1  C)-98.8  F (37.1  C)] 97.4  F (36.3  C)  Pulse:  [64-70] 65  Resp:  [16-18] 16  BP:  (106-156)/(61-76) 106/61  SpO2:  [95 %-98 %] 96 %  I/O last 3 completed shifts:  In: 120 [P.O.:120]  Out: -     Constitutional: Obese, NAD, Cervical collar in place.   HEENT: Superficial abrasions noted. Cervical collar in place.   Respiratory: Distant. Nl WOB, Clear bilaterally, No wheezes or crackles  Cardiovascular: Distant. Regular, no murmur  GI: Obese, BS+, NT, ND  Skin/Integumen: WWP, superficial abrasions noted scattered.   Neuro: CNII-XII intact. Moves all extremities. No tremor. A&Ox3.   strength full bilaterally.  Strength 5/5 in upper extremities and lower extremities.  Sensation in lower and upper extremities intact to gross touch. Plantar and dorsiflexion intact.      Discharge Disposition   Discharged to home  Condition at discharge: Stable    Consultations This Hospital Stay   ORTHOPEDIC SURGERY IP CONSULT  PHARMACY TO DOSE WARFARIN  CARE MANAGEMENT / SOCIAL WORK IP CONSULT  NEUROSURGERY IP CONSULT  NURSING TO CONSULT FOR VIRTUAL CARE IP  PHYSICAL THERAPY ADULT IP CONSULT  OCCUPATIONAL THERAPY ADULT IP CONSULT    Time Spent on this Encounter   I, Wild Rodriguez MD, personally saw the patient today and spent greater than 30 minutes discharging this patient.    Discharge Orders      XR Cervical Spine 2/3 Views     Primary Care - Care Coordination Referral      Med Therapy Management Referral      Follow Up    Please follow up at [facility] in 4-6 weeks with upright XR prior. Our schedulers will call you to set up an appointment.   You may call the clinic with any scheduling questions or concerns.    [facility]  Tel [phone]     Activity    Wear collar at all times. Wear Cee collar for showering/hygiene.     Limit lifting to 10 pounds and limit bending/twisting until follow up visit. Ok to walk as tolerated, avoid bed rest and prolonged sitting. No contact sports or high impact activities until  follow up visit.     Reason for your hospital stay     You were hospitalized for fall related to alcohol use and cervical fracture.  You were seen by neurosurgery and orthopedic team.  You need to use your cervical brace at all times.  He will follow-up with neurosurgery in a few weeks for repeat imaging and follow-up.      You should use buddy tape on your right fourth and fifth fingers for your finger fracture and weight-bear as tolerated per orthopedics.      You should follow-up with your PCP next week for hospital follow-up.  You need to stop drinking alcohol moving forward.     Activity    Your activity upon discharge: activity as tolerated, use buddy tape your right fourth and fifth fingers and weight-bear as tolerated.  You need to wear your cervical collar at all times, Cee collar for hygiene.     Full Code     Diet    Follow this diet upon discharge: Current Diet:Orders Placed This Encounter      Regular Diet Adult     Hospital Follow-up with Existing Primary Care Provider (PCP)    Please see details below          Discharge Medications   Current Discharge Medication List        START taking these medications    Details   acetaminophen (TYLENOL) 500 MG tablet Take 2 tablets (1,000 mg) by mouth 3 times daily for 7 days.  Qty: 42 tablet, Refills: 0    Associated Diagnoses: Closed nondisplaced fracture of first cervical vertebra, unspecified fracture morphology, initial encounter (H)      gabapentin (NEURONTIN) 300 MG capsule Take 1 capsule (300 mg) by mouth 3 times daily for 14 days.  Qty: 42 capsule, Refills: 0    Associated Diagnoses: Closed nondisplaced fracture of first cervical vertebra, unspecified fracture morphology, initial encounter (H)      hydrOXYzine HCl (ATARAX) 50 MG tablet Take 1 tablet (50 mg) by mouth every 6 hours as needed for other (adjuvant pain).  Qty: 30 tablet, Refills: 0    Associated Diagnoses: Closed nondisplaced fracture of first cervical vertebra, unspecified fracture morphology, initial encounter (H)      methocarbamol (ROBAXIN)  500 MG tablet Take 1 tablet (500 mg) by mouth 4 times daily for 7 days.  Qty: 28 tablet, Refills: 0    Associated Diagnoses: Closed nondisplaced fracture of first cervical vertebra, unspecified fracture morphology, initial encounter (H)      oxyCODONE (ROXICODONE) 5 MG tablet Take 1-2 tablets (5-10 mg) by mouth every 6 hours as needed for severe pain.  Qty: 20 tablet, Refills: 0    Associated Diagnoses: Closed nondisplaced fracture of first cervical vertebra, unspecified fracture morphology, initial encounter (H)      senna-docusate (SENOKOT-S/PERICOLACE) 8.6-50 MG tablet Take 1 tablet by mouth 2 times daily as needed for constipation.  Qty: 28 tablet, Refills: 0    Associated Diagnoses: Closed nondisplaced fracture of first cervical vertebra, unspecified fracture morphology, initial encounter (H)           CONTINUE these medications which have NOT CHANGED    Details   acamprosate (CAMPRAL) 333 MG EC tablet Take 2 tablets (666 mg) by mouth 3 times daily.  Qty: 180 tablet, Refills: 3    Associated Diagnoses: Alcohol dependence with uncomplicated intoxication (H)      buPROPion (WELLBUTRIN XL) 300 MG 24 hr tablet Take 1 tablet (300 mg) by mouth every morning.  Qty: 90 tablet, Refills: 3    Associated Diagnoses: Major depressive disorder, recurrent episode, moderate (H)      carvedilol (COREG) 12.5 MG tablet Take 1 tablet (12.5 mg) by mouth 2 times daily (with meals)  Qty: 60 tablet, Refills: 11    Associated Diagnoses: Paroxysmal atrial fibrillation (H); Benign essential hypertension      folic acid (FOLVITE) 1 MG tablet Take 1 tablet (1 mg) by mouth daily.  Qty: 30 tablet, Refills: 1    Associated Diagnoses: Alcohol dependence with uncomplicated intoxication (H)      lisinopril (ZESTRIL) 20 MG tablet Take 1 tablet (20 mg) by mouth daily.  Qty: 90 tablet, Refills: 3    Associated Diagnoses: Essential hypertension, benign      metFORMIN (GLUCOPHAGE XR) 500 MG 24 hr tablet Take 2 tablets (1,000 mg) by mouth daily (with  dinner).  Qty: 180 tablet, Refills: 3    Associated Diagnoses: Type 2 diabetes mellitus without complication, without long-term current use of insulin (H)      multivitamin w/minerals (THERA-VIT-M) tablet Take 1 tablet by mouth daily.  Qty: 30 tablet, Refills: 1    Associated Diagnoses: Alcohol dependence with uncomplicated intoxication (H)      sildenafil (VIAGRA) 100 MG tablet Take 1 tablet (100 mg) by mouth daily as needed (erectile dysfunction).  Qty: 30 tablet, Refills: 3    Associated Diagnoses: Erectile dysfunction due to arterial insufficiency      simvastatin (ZOCOR) 20 MG tablet Take 1 tablet (20 mg) by mouth at bedtime.  Qty: 90 tablet, Refills: 3    Associated Diagnoses: Pure hypercholesterolemia      thiamine (B-1) 100 MG tablet Take 1 tablet (100 mg) by mouth daily.  Qty: 30 tablet, Refills: 0    Associated Diagnoses: Alcohol dependence with uncomplicated intoxication (H)      !! warfarin ANTICOAGULANT (COUMADIN) 5 MG tablet Take 5 mg by mouth six times a week. On Monday, Tuesday, Thursday, Friday, Saturday, and Sunday at bedtime      !! warfarin ANTICOAGULANT (COUMADIN) 5 MG tablet Take 2.5 mg by mouth every 7 days. On Wednesday bedtime       !! - Potential duplicate medications found. Please discuss with provider.        STOP taking these medications       naltrexone (DEPADE/REVIA) 50 MG tablet Comments:   Reason for Stopping:             Allergies   No Known Allergies  Data   Most Recent 3 CBC's:  Recent Labs   Lab Test 03/27/25  0827 03/26/25 1816 02/22/25  1625   WBC 10.5 6.6 8.0   HGB 12.3* 12.4* 13.1*   MCV 82 85 82    267 238      Most Recent 3 BMP's:  Recent Labs   Lab Test 03/29/25  0732 03/29/25  0232 03/28/25  2050 03/27/25  0828 03/27/25  0827 03/26/25  2215 03/26/25  1816 02/25/25  1128 02/25/25  0802   NA  --   --   --   --  139  --  143  --  140   POTASSIUM  --   --   --   --  4.2  --  4.2  --  3.8   CHLORIDE  --   --   --   --  103  --  106  --  102   CO2  --   --   --   --  23   --  23  --  22   BUN  --   --   --   --  10.8  --  13.0  --  12.4   CR  --   --   --   --  0.65*  --  0.95  --  0.92   ANIONGAP  --   --   --   --  13  --  14  --  16*   RAUL  --   --   --   --  8.9  --  8.4*  --  9.4   * 128* 118*   < > 132*   < > 135*   < > 269*    < > = values in this interval not displayed.     Most Recent 2 LFT's:  Recent Labs   Lab Test 02/22/25  1625 12/24/24  1902   AST 44 41   ALT 46 45   ALKPHOS 116 105   BILITOTAL 0.3 0.4     Most Recent INR's and Anticoagulation Dosing History:  Anticoagulation Dose History  More data exists         Latest Ref Rng & Units 2/24/2025 2/25/2025 2/26/2025 3/26/2025 3/27/2025 3/28/2025 3/29/2025   Recent Dosing and Labs   warfarin ANTICOAGULANT (COUMADIN) 5 MG tablet - 5 mg, $Given 5 mg, $Given 5 mg, $Given 5 mg, $Given - 5 mg, $Given -   warfarin ANTICOAGULANT (COUMADIN) 7.5 MG tablet - - - - - 7.5 mg, $Given - -   INR 0.85 - 1.15 2.72  2.37  2.29  1.58  1.50  1.82  2.24      Most Recent 3 Troponin's:No lab results found.  Most Recent Cholesterol Panel:  Recent Labs   Lab Test 06/11/24  1037   CHOL 183   *   HDL 49   TRIG 134     Most Recent 6 Bacteria Isolates From Any Culture (See EPIC Reports for Culture Details):No lab results found.  Most Recent TSH, T4 and A1c Labs:  Recent Labs   Lab Test 02/25/25  0802 01/07/25  1154   TSH 2.74  --    A1C  --  6.7*

## 2025-03-29 NOTE — PLAN OF CARE
"Goal Outcome Evaluation:      Plan of Care Reviewed With: patient    Overall Patient Progress: improvingOverall Patient Progress: improving    Outcome Evaluation: pt is A&OX4, on RA. aspen collar on althe time. SBA. voiding.plan is to discharge home .  VSS, tolerating diet. No N/V. No tremors. LS CTA. Denies SOB.voiding. pain controlled with scheduled gabapentin, Tylenol, Robaxin, PRN atarax and Oxycodone. Cont. BG checks. Last CIWA scored -2.  Pt called that his back was very itching. Upper , mid Back Skin is red. No redness  or rash to other parts of body. Cross cover was updated.benadryl  was proscribed.   Problem: Adult Inpatient Plan of Care  Goal: Plan of Care Review  Description: The Plan of Care Review/Shift note should be completed every shift.  The Outcome Evaluation is a brief statement about your assessment that the patient is improving, declining, or no change.  This information will be displayed automatically on your shiftnote.  Outcome: Progressing  Flowsheets (Taken 3/28/2025 2151)  Outcome Evaluation: pt is A&OX4, on RA. aspen collar on althe time. SBA. voiding.plan is to discharge home .  Plan of Care Reviewed With: patient  Overall Patient Progress: improving  Goal: Patient-Specific Goal (Individualized)  Description: You can add care plan individualizations to a care plan. Examples of Individualization might be:  \"Parent requests to be called daily at 9am for status\", \"I have a hard time hearing out of my right ear\", or \"Do not touch me to wake me up as it startlesme\".  Outcome: Progressing  Goal: Absence of Hospital-Acquired Illness or Injury  Outcome: Progressing  Intervention: Identify and Manage Fall Risk  Recent Flowsheet Documentation  Taken 3/28/2025 1600 by Mirta Singleton, RN  Safety Promotion/Fall Prevention:   activity supervised   assistive device/personal items within reach   clutter free environment maintained   mobility aid in reach   nonskid shoes/slippers when out of bed   room " near nurse's station   room organization consistent  Intervention: Prevent and Manage VTE (Venous Thromboembolism) Risk  Recent Flowsheet Documentation  Taken 3/28/2025 1600 by Mirta Singleton RN  VTE Prevention/Management: SCDs off (sequential compression devices)  Intervention: Prevent Infection  Recent Flowsheet Documentation  Taken 3/28/2025 1600 by Mirta Singleton RN  Infection Prevention:   single patient room provided   rest/sleep promoted  Goal: Optimal Comfort and Wellbeing  Outcome: Progressing  Goal: Readiness for Transition of Care  Outcome: Progressing

## 2025-03-30 ENCOUNTER — APPOINTMENT (OUTPATIENT)
Dept: OCCUPATIONAL THERAPY | Facility: CLINIC | Age: 45
DRG: 552 | End: 2025-03-30
Attending: HOSPITALIST
Payer: COMMERCIAL

## 2025-03-30 ENCOUNTER — APPOINTMENT (OUTPATIENT)
Dept: PHYSICAL THERAPY | Facility: CLINIC | Age: 45
DRG: 552 | End: 2025-03-30
Payer: COMMERCIAL

## 2025-03-30 LAB
ANION GAP SERPL CALCULATED.3IONS-SCNC: 9 MMOL/L (ref 7–15)
BUN SERPL-MCNC: 14.8 MG/DL (ref 6–20)
CALCIUM SERPL-MCNC: 8.8 MG/DL (ref 8.8–10.4)
CHLORIDE SERPL-SCNC: 102 MMOL/L (ref 98–107)
CREAT SERPL-MCNC: 0.8 MG/DL (ref 0.67–1.17)
EGFRCR SERPLBLD CKD-EPI 2021: >90 ML/MIN/1.73M2
ERYTHROCYTE [DISTWIDTH] IN BLOOD BY AUTOMATED COUNT: 16 % (ref 10–15)
GLUCOSE BLDC GLUCOMTR-MCNC: 104 MG/DL (ref 70–99)
GLUCOSE BLDC GLUCOMTR-MCNC: 111 MG/DL (ref 70–99)
GLUCOSE BLDC GLUCOMTR-MCNC: 126 MG/DL (ref 70–99)
GLUCOSE BLDC GLUCOMTR-MCNC: 135 MG/DL (ref 70–99)
GLUCOSE BLDC GLUCOMTR-MCNC: 137 MG/DL (ref 70–99)
GLUCOSE SERPL-MCNC: 114 MG/DL (ref 70–99)
HCO3 SERPL-SCNC: 26 MMOL/L (ref 22–29)
HCT VFR BLD AUTO: 38.6 % (ref 40–53)
HGB BLD-MCNC: 11.9 G/DL (ref 13.3–17.7)
INR PPP: 2.05 (ref 0.85–1.15)
MAGNESIUM SERPL-MCNC: 1.8 MG/DL (ref 1.7–2.3)
MCH RBC QN AUTO: 26.3 PG (ref 26.5–33)
MCHC RBC AUTO-ENTMCNC: 30.8 G/DL (ref 31.5–36.5)
MCV RBC AUTO: 85 FL (ref 78–100)
PLATELET # BLD AUTO: 237 10E3/UL (ref 150–450)
POTASSIUM SERPL-SCNC: 3.8 MMOL/L (ref 3.4–5.3)
RBC # BLD AUTO: 4.53 10E6/UL (ref 4.4–5.9)
SODIUM SERPL-SCNC: 137 MMOL/L (ref 135–145)
WBC # BLD AUTO: 8.3 10E3/UL (ref 4–11)

## 2025-03-30 PROCEDURE — 97530 THERAPEUTIC ACTIVITIES: CPT | Mod: GP | Performed by: PHYSICAL THERAPIST

## 2025-03-30 PROCEDURE — 97535 SELF CARE MNGMENT TRAINING: CPT | Mod: GO | Performed by: REHABILITATION PRACTITIONER

## 2025-03-30 PROCEDURE — 83735 ASSAY OF MAGNESIUM: CPT | Performed by: HOSPITALIST

## 2025-03-30 PROCEDURE — 36415 COLL VENOUS BLD VENIPUNCTURE: CPT | Performed by: HOSPITALIST

## 2025-03-30 PROCEDURE — 80048 BASIC METABOLIC PNL TOTAL CA: CPT | Performed by: HOSPITALIST

## 2025-03-30 PROCEDURE — 85610 PROTHROMBIN TIME: CPT | Performed by: INTERNAL MEDICINE

## 2025-03-30 PROCEDURE — 250N000013 HC RX MED GY IP 250 OP 250 PS 637: Performed by: INTERNAL MEDICINE

## 2025-03-30 PROCEDURE — 99232 SBSQ HOSP IP/OBS MODERATE 35: CPT | Performed by: HOSPITALIST

## 2025-03-30 PROCEDURE — 120N000001 HC R&B MED SURG/OB

## 2025-03-30 PROCEDURE — 250N000013 HC RX MED GY IP 250 OP 250 PS 637: Performed by: HOSPITALIST

## 2025-03-30 PROCEDURE — 97165 OT EVAL LOW COMPLEX 30 MIN: CPT | Mod: GO | Performed by: REHABILITATION PRACTITIONER

## 2025-03-30 PROCEDURE — 85014 HEMATOCRIT: CPT | Performed by: HOSPITALIST

## 2025-03-30 RX ORDER — GUAIFENESIN AND DEXTROMETHORPHAN HYDROBROMIDE 600; 30 MG/1; MG/1
1 TABLET, EXTENDED RELEASE ORAL 2 TIMES DAILY PRN
Status: DISCONTINUED | OUTPATIENT
Start: 2025-03-30 | End: 2025-03-31 | Stop reason: HOSPADM

## 2025-03-30 RX ORDER — WARFARIN SODIUM 5 MG/1
5 TABLET ORAL
Status: COMPLETED | OUTPATIENT
Start: 2025-03-30 | End: 2025-03-30

## 2025-03-30 RX ADMIN — WARFARIN SODIUM 5 MG: 5 TABLET ORAL at 17:42

## 2025-03-30 RX ADMIN — ACETAMINOPHEN 1000 MG: 500 TABLET, FILM COATED ORAL at 19:18

## 2025-03-30 RX ADMIN — METHOCARBAMOL 500 MG: 500 TABLET ORAL at 08:23

## 2025-03-30 RX ADMIN — HYDROXYZINE HYDROCHLORIDE 50 MG: 50 TABLET, FILM COATED ORAL at 11:31

## 2025-03-30 RX ADMIN — Medication 5 MG: at 02:24

## 2025-03-30 RX ADMIN — Medication 1 TABLET: at 07:59

## 2025-03-30 RX ADMIN — OXYCODONE HYDROCHLORIDE 5 MG: 5 TABLET ORAL at 17:42

## 2025-03-30 RX ADMIN — OXYCODONE HYDROCHLORIDE 5 MG: 5 TABLET ORAL at 01:09

## 2025-03-30 RX ADMIN — DIPHENHYDRAMINE HYDROCHLORIDE, ZINC ACETATE: 2; .1 CREAM TOPICAL at 17:43

## 2025-03-30 RX ADMIN — ACETAMINOPHEN 1000 MG: 500 TABLET, FILM COATED ORAL at 13:46

## 2025-03-30 RX ADMIN — THIAMINE HCL TAB 100 MG 100 MG: 100 TAB at 08:00

## 2025-03-30 RX ADMIN — METHOCARBAMOL 500 MG: 500 TABLET ORAL at 02:23

## 2025-03-30 RX ADMIN — DIPHENHYDRAMINE HYDROCHLORIDE 25 MG: 25 CAPSULE ORAL at 01:16

## 2025-03-30 RX ADMIN — HYDROXYZINE HYDROCHLORIDE 50 MG: 50 TABLET, FILM COATED ORAL at 02:24

## 2025-03-30 RX ADMIN — CARVEDILOL 12.5 MG: 12.5 TABLET, FILM COATED ORAL at 08:00

## 2025-03-30 RX ADMIN — DIPHENHYDRAMINE HYDROCHLORIDE, ZINC ACETATE: 2; .1 CREAM TOPICAL at 11:25

## 2025-03-30 RX ADMIN — CARVEDILOL 12.5 MG: 12.5 TABLET, FILM COATED ORAL at 17:43

## 2025-03-30 RX ADMIN — ACETAMINOPHEN 1000 MG: 500 TABLET, FILM COATED ORAL at 07:59

## 2025-03-30 RX ADMIN — BUPROPION HYDROCHLORIDE 300 MG: 150 TABLET, EXTENDED RELEASE ORAL at 07:59

## 2025-03-30 RX ADMIN — LISINOPRIL 20 MG: 20 TABLET ORAL at 08:00

## 2025-03-30 RX ADMIN — METHOCARBAMOL 500 MG: 500 TABLET ORAL at 19:19

## 2025-03-30 RX ADMIN — FOLIC ACID 1 MG: 1 TABLET ORAL at 07:59

## 2025-03-30 RX ADMIN — DIPHENHYDRAMINE HYDROCHLORIDE 25 MG: 25 CAPSULE ORAL at 08:00

## 2025-03-30 RX ADMIN — OXYCODONE HYDROCHLORIDE 5 MG: 5 TABLET ORAL at 05:26

## 2025-03-30 RX ADMIN — DIPHENHYDRAMINE HYDROCHLORIDE, ZINC ACETATE: 2; .1 CREAM TOPICAL at 05:31

## 2025-03-30 ASSESSMENT — ACTIVITIES OF DAILY LIVING (ADL)
ADLS_ACUITY_SCORE: 60

## 2025-03-30 NOTE — PLAN OF CARE
"Goal Outcome Evaluation:      Plan of Care Reviewed With: patient    Overall Patient Progress: improvingOverall Patient Progress: improving    Outcome Evaluation: pt is A&OX4, on RA. cont. BG checks. What Cheer collars on all the time.c/o pain 5-6/10. pain controlled with scheduled and PRN meds. tolerating diet. A-1 with walker and gate belt.voiding, hematuria noted. MD was updated.  Pt c/o feeling wobbly. No chills and fever. No dysuria  and urgency. No hematuria noted after first one. Abdomen large , round. BS active X4.ice applied to R/ side of face and shoulders. Takes PRN Oxycodone, Atarax, Robaxin. PT following. Pt had shower this evening, tolerated well, no light headiness. Back skin  is very read  and  c/o  itching. Cross cover  MD was updated and Benadryl cream was ordered, applied after shower. Plan is to discharge tomorrow.     Problem: Adult Inpatient Plan of Care  Goal: Plan of Care Review  Description: The Plan of Care Review/Shift note should be completed every shift.  The Outcome Evaluation is a brief statement about your assessment that the patient is improving, declining, or no change.  This information will be displayed automatically on your shiftnote.  Outcome: Progressing  Flowsheets (Taken 3/29/2025 1933)  Outcome Evaluation: pt is A&OX4, on RA. cont. BG checks. What Cheer collars on all the time.c/o pain 5-6/10. pain controlled with scheduled and PRN meds. tolerating diet. A-1 with walker and gate belt.voiding, hematuria noted. MD was updated.  Plan of Care Reviewed With: patient  Overall Patient Progress: improving  Goal: Patient-Specific Goal (Individualized)  Description: You can add care plan individualizations to a care plan. Examples of Individualization might be:  \"Parent requests to be called daily at 9am for status\", \"I have a hard time hearing out of my right ear\", or \"Do not touch me to wake me up as it startlesme\".  Outcome: Progressing  Goal: Absence of Hospital-Acquired Illness or " Injury  Outcome: Progressing  Intervention: Prevent and Manage VTE (Venous Thromboembolism) Risk  Recent Flowsheet Documentation  Taken 3/29/2025 1800 by Mirta Singleton RN  VTE Prevention/Management: SCDs off (sequential compression devices)  Intervention: Prevent Infection  Recent Flowsheet Documentation  Taken 3/29/2025 1800 by Mirta Singleton RN  Infection Prevention:   single patient room provided   rest/sleep promoted  Goal: Optimal Comfort and Wellbeing  Outcome: Progressing  Intervention: Monitor Pain and Promote Comfort  Recent Flowsheet Documentation  Taken 3/29/2025 1815 by Mirta Singleton RN  Pain Management Interventions: medication (see MAR)  Taken 3/29/2025 1700 by Mirta Singleton RN  Pain Management Interventions: medication (see MAR)  Goal: Readiness for Transition of Care  Outcome: Progressing

## 2025-03-30 NOTE — PROGRESS NOTES
[facility]    Hospitalist Progress Note      Assessment & Plan   44M with hx of morbid obesity, NIDDM type II, hypertension, CARLIE, alcoholism, and atrial fibrillation and VTE on warfarin presents after a ground-level fall while intoxicated and hit face and found to have traumatic C1 fracture and fracture of RUE Fifth Digit.      #Fall complicated by C1 Fracture.  Nondisplaced fracture of C6-C7 osteophyte.  Changes of DISH.  Post-concussive symptoms: Was stepping into his home and tripped over the step.  Fell forward and had some bleeding in his face and neck pain.  No loss of consciousness.  No numbness or tingling.  -In the ER, patient afebrile and hemodynamically stable though hypertensive.  CBC unremarkable.  BMP unremarkable.  INR 1.58.  CT head cervical spine notable for nondisplaced fracture of the anterior and posterior right C1 arches along with nondisplaced fracture through an anterior osteophyte at C6-C7 along with changes of DISH.  He underwent CTA of the head and neck that showed no large vessel occlusion or stenosis and no evidence of blunt injury.  -MRI of the cervical spine showed C1 ring fracture that was seen better on CT along with stable nondisplaced fracture through the bridging anterior endplate osteophyte at C6-C7 with mild prevertebral edema and no evidence of ligamentous injury.  -Neurosurgery consulted.  Recommended c-collar at all times with no plan for surgery.  Orthotics consulted for collar.  Okay for continuation of warfarin.  - Cervical collar at all times, Cee collar for hygiene   -On the a.m. of 3/27, patient was in the ER when the head of his bed suddenly fell flat from a semiupright position.  He had increased pain to his cervical spine.  Neurosurgery consulted.  He did not have any new focal weakness or deficit.  Sensation intact in all extremities.  Pain seems to be localized to his cervical spine.  Repeat CT cervical spine stable.  -Pt neurochecks  "remained stable. No focal weakness, numbness, tingling.  No loss of bowel or bladder function.    -Pt was initially going to discharge home but then was \"wobbly\" and weak with PT on AM of 3/29 prior to discharge.  Cancelled discharge.  I suspect patient was getting too much narcotics and sedating meds.   -Stopped scheduled gabapentin.  Changed robaxin to prn.  Reduced dose of oxycodone.   -Of note, patient has a history of opioid overdose in 05/2024.  I will consult pain team to get their thoughts on modification of regimen with goal of minimizing sedating meds and narcotics.  -He may very well benefit from TCU.     -Follow up with NSG in several weeks per their instructions with repeat scans.     #Fracture of RUE Fifth Digit: Ortho consultation, will need splint vs cony taping     #Alcohol use disorder with Intoxication w/ Concern for Withdrawal: Alc level of .24 on adm.  No longer drinks daily, minimal withdrawal sx in the past  -CIWA, thiamine, MV, folate.  Did not show significant withdrawal. Advised strict cessation.        Other Issues  -Facial Contusions: From fall. No sutures needed  -NIDDM Type II: MDSS  -Chronic Afib: Warfarin per pharmacy  -Chronic VTE: Warfarin per pharmacy  -Drug-Induced Coagulation Defect: 2/2 warfarin use  -Morbid Obesity: Complicates all aspects of care  -Essential HTN: Home medications    DVT Prophylaxis: Warfarin  Code Status: Full Code  Medically Ready for Discharge: Anticipated Tomorrow pending pain team recs, therapy recs.      Wild Rodriguez MD    Interval History   No events. Still with pain.  Rated 7-8/10 but vitals stable and he is sitting in chair and looks comfortable.  No numbness/tingling.  Has a bit of HA.  No CP, SOB.  Friend visited yesterday.  Discussed plan to minimize narcotics.     -Data reviewed today: I reviewed all new labs and imaging results over the last 24 hours. I personally reviewed     Physical Exam   Temp: 98  F (36.7  C) Temp src: Temporal BP: (!) 148/67 " Pulse: 65   Resp: 20 SpO2: 97 % O2 Device: None (Room air)    There were no vitals filed for this visit.  Vital Signs with Ranges  Temp:  [97.4  F (36.3  C)-98.8  F (37.1  C)] 98  F (36.7  C)  Pulse:  [63-71] 65  Resp:  [16-20] 20  BP: (106-148)/(61-80) 148/67  SpO2:  [96 %-97 %] 97 %  I/O last 3 completed shifts:  In: 240 [P.O.:240]  Out: 800 [Urine:800]    Constitutional: Obese, NAD, Cervical collar in place.   HEENT: Superficial abrasions noted. Cervical collar in place.   Respiratory: Distant. Nl WOB, Clear bilaterally, No wheezes or crackles  Cardiovascular: Distant. Regular, no murmur  GI: Obese, BS+, NT, ND  Skin/Integumen: WWP, superficial abrasions noted scattered.   Neuro: CNII-XII intact. Moves all extremities. No tremor. A&Ox3.   strength full bilaterally.  Strength 5/5 in upper extremities and lower extremities.  Sensation in lower and upper extremities intact to gross touch. Plantar and dorsiflexion intact.      Medications   Current Facility-Administered Medications   Medication Dose Route Frequency Provider Last Rate Last Admin     Current Facility-Administered Medications   Medication Dose Route Frequency Provider Last Rate Last Admin    acetaminophen (TYLENOL) tablet 1,000 mg  1,000 mg Oral TID Wild Rodriguez MD   1,000 mg at 03/30/25 0759    buPROPion (WELLBUTRIN XL) 24 hr tablet 300 mg  300 mg Oral QAM Wild Rodriguez MD   300 mg at 03/30/25 0759    carvedilol (COREG) tablet 12.5 mg  12.5 mg Oral BID w/meals Wild Rodriguez MD   12.5 mg at 03/30/25 0800    folic acid (FOLVITE) tablet 1 mg  1 mg Oral Daily Wild Rodriguez MD   1 mg at 03/30/25 0759    insulin aspart (NovoLOG) injection (RAPID ACTING)  1-7 Units Subcutaneous TID AC Cody Birmingham MD   1 Units at 03/29/25 1133    insulin aspart (NovoLOG) injection (RAPID ACTING)  1-5 Units Subcutaneous At Bedtime Cody Birmingham MD   1 Units at 03/26/25 2254    lisinopril (ZESTRIL) tablet 20 mg  20 mg Oral Daily Wild Rodriguez MD    20 mg at 03/30/25 0800    multivitamin w/minerals (THERA-VIT-M) tablet 1 tablet  1 tablet Oral Daily Cody Birmingham MD   1 tablet at 03/30/25 0759    thiamine (B-1) tablet 100 mg  100 mg Oral Daily Wild Rodriguez MD   100 mg at 03/30/25 0800    Warfarin Dose Required Daily - Pharmacist Managed  1 each Oral See Admin Instructions Cody Birmingham MD           Data   Recent Labs   Lab 03/30/25  0732 03/30/25  0643 03/30/25  0130 03/29/25  0732 03/29/25  0703 03/28/25  1053 03/28/25  0744 03/27/25  0828 03/27/25  0827 03/26/25  2215 03/26/25  1816   WBC  --  8.3  --   --   --   --   --   --  10.5  --  6.6   HGB  --  11.9*  --   --   --   --   --   --  12.3*  --  12.4*   MCV  --  85  --   --   --   --   --   --  82  --  85   PLT  --  237  --   --   --   --   --   --  268  --  267   INR  --  2.05*  --   --  2.24*  --  1.82*  --  1.50*  --  1.58*   NA  --  137  --   --   --   --   --   --  139  --  143   POTASSIUM  --  3.8  --   --   --   --   --   --  4.2  --  4.2   CHLORIDE  --  102  --   --   --   --   --   --  103  --  106   CO2  --  26  --   --   --   --   --   --  23  --  23   BUN  --  14.8  --   --   --   --   --   --  10.8  --  13.0   CR  --  0.80  --   --   --   --   --   --  0.65*  --  0.95   ANIONGAP  --  9  --   --   --   --   --   --  13  --  14   RAUL  --  8.8  --   --   --   --   --   --  8.9  --  8.4*   * 114* 126*   < >  --    < >  --    < > 132*   < > 135*    < > = values in this interval not displayed.       No results found for this or any previous visit (from the past 24 hours).

## 2025-03-30 NOTE — PROGRESS NOTES
"Care Management Follow Up    Length of Stay (days): 4    Expected Discharge Date: 03/30/2025     Concerns to be Addressed: all concerns addressed in this encounter     Patient plan of care discussed at interdisciplinary rounds: Yes    Anticipated Discharge Disposition:  home with family support and return to his hybrid CD treatment program.              Anticipated Discharge Services:  CD treatment   Anticipated Discharge DME:  N/A    Education Provided on the Discharge Plan:  yes  Patient/Family in Agreement with the Plan:  yes    Referrals Placed by CM/SW:  N/A  Private pay costs discussed: Not applicable    Discussed  Partnership in Safe Discharge Planning  document with patient/family: No     Handoff Completed: No, handoff not indicated or clinically appropriate    Additional Information:  Met with pt . Per chart review provider was wanted to consider option for TRANSITIONAL CARE UNIT placement following discharge. Spoke with pt about his health Plan [employer]   - this is a touch plan to obtain prior auth from for rehab placement    Mu stated he was surprised how well both his OT/PT session sent today and feels what he really needs is supervision- explained supervision I detention in nature and that is not covered under his health plan - per rehab assessment Mu has met needed goals for safe return home with family support for higher level IADLs- laundry, driving, errands. Pending progress with concussion recovery, may benefit OP OT or PT to aid in recovery.     PT eval today - mu was able to safely navigate the stairs and ambulate 140\" with walker .   Current needs to not meet SNF/TRANSITIONAL CARE UNIT level of care       Mu does plan to attend his OP CD virtual program. He identified drinking placed a factor in his fall.  He is more comfortable with the plan to return home and aware of the recommendations from both PT/OT   Next Steps: N/A- all needs met at this time     Corinne C. White, " LSW

## 2025-03-30 NOTE — PLAN OF CARE
"Goal Outcome Evaluation:      Plan of Care Reviewed With: patient    Overall Patient Progress: no changeOverall Patient Progress: no change    Outcome Evaluation: C/O pain frequently rated 8-9/10, given oxy, atarax, and robaxin, aspen collar on at all times    A/Ox4, VSS, on RA, CPAP overnight, PIV SL, Ax1 W/GB, aspen collar on at all times, pain managed with PRN PO atarax, robaxin, and oxy, pt reports a lot of breakthrough pain between interventions, paged CC and ok to give oxy early x1, CIWA 2, itching reported to back and RUE, skin appears red but blanchable, lotion applied and benadryl given, small abrasions to left side face, voiding well, tolerating a regular diet, plan for possible discharge to home in AM pending PT/OT    Problem: Orthopaedic Fracture  Goal: Optimal Pain Control and Function  Outcome: Not Progressing  Intervention: Manage Acute Orthopaedic-Related Pain  Recent Flowsheet Documentation  Taken 3/30/2025 0057 by Ekaterina Holloway, RN  Pain Management Interventions:   breathing exercises   care clustered   pillow support provided   quiet environment facilitated   repositioned   rest     Problem: Adult Inpatient Plan of Care  Goal: Plan of Care Review  Description: The Plan of Care Review/Shift note should be completed every shift.  The Outcome Evaluation is a brief statement about your assessment that the patient is improving, declining, or no change.  This information will be displayed automatically on your shiftnote.  Outcome: Progressing  Flowsheets (Taken 3/30/2025 0241)  Outcome Evaluation: C/O pain frequently rated 8-9/10, given oxy, atarax, and robaxin, aspen collar on at all times  Plan of Care Reviewed With: patient  Overall Patient Progress: no change  Goal: Patient-Specific Goal (Individualized)  Description: You can add care plan individualizations to a care plan. Examples of Individualization might be:  \"Parent requests to be called daily at 9am for status\", \"I have a hard time hearing " "out of my right ear\", or \"Do not touch me to wake me up as it startlesme\".  Outcome: Progressing  Goal: Absence of Hospital-Acquired Illness or Injury  Outcome: Progressing  Intervention: Identify and Manage Fall Risk  Recent Flowsheet Documentation  Taken 3/30/2025 0057 by Ekaterina Holloway RN  Safety Promotion/Fall Prevention:   activity supervised   assistive device/personal items within reach   clutter free environment maintained   nonskid shoes/slippers when out of bed   mobility aid in reach   patient and family education   room near nurse's station   room organization consistent   safety round/check completed  Intervention: Prevent and Manage VTE (Venous Thromboembolism) Risk  Recent Flowsheet Documentation  Taken 3/30/2025 0057 by Ekaterina Holloway RN  VTE Prevention/Management: (ambulation promotoed) SCDs off (sequential compression devices)  Intervention: Prevent Infection  Recent Flowsheet Documentation  Taken 3/30/2025 0057 by Ekaterina Holloway RN  Infection Prevention:   environmental surveillance performed   rest/sleep promoted   single patient room provided  Taken 3/30/2025 0005 by Ekaterina Holloway RN  Infection Prevention:   environmental surveillance performed   rest/sleep promoted   single patient room provided  Goal: Optimal Comfort and Wellbeing  Outcome: Progressing  Intervention: Monitor Pain and Promote Comfort  Recent Flowsheet Documentation  Taken 3/30/2025 0057 by Ekaterina Holloway RN  Pain Management Interventions:   breathing exercises   care clustered   pillow support provided   quiet environment facilitated   repositioned   rest  Goal: Readiness for Transition of Care  Outcome: Progressing     Problem: Pain Acute  Goal: Optimal Pain Control and Function  Outcome: Progressing  Intervention: Optimize Psychosocial Wellbeing  Recent Flowsheet Documentation  Taken 3/30/2025 0057 by Ekaterina Holloway RN  Supportive Measures:   active listening utilized   verbalization of feelings " encouraged  Intervention: Develop Pain Management Plan  Recent Flowsheet Documentation  Taken 3/30/2025 0057 by Ekaterina Holloway RN  Pain Management Interventions:   breathing exercises   care clustered   pillow support provided   quiet environment facilitated   repositioned   rest  Intervention: Prevent or Manage Pain  Recent Flowsheet Documentation  Taken 3/30/2025 0057 by Ekaterina Holloway RN  Bowel Elimination Promotion:   adequate fluid intake promoted   ambulation promoted  Medication Review/Management: medications reviewed     Problem: Activity Intolerance  Goal: Enhanced Capacity and Energy  Outcome: Progressing  Intervention: Optimize Activity Tolerance  Recent Flowsheet Documentation  Taken 3/30/2025 0057 by Ekaterina Holloway RN  Activity Management:   activity adjusted per tolerance   sitting, edge of bed   back to bed  Environmental Support:   calm environment promoted   rest periods encouraged     Problem: Orthopaedic Fracture  Goal: Absence of Bleeding  Outcome: Progressing  Goal: Bowel Elimination  Outcome: Progressing  Intervention: Promote Effective Bowel Elimination  Recent Flowsheet Documentation  Taken 3/30/2025 0057 by Ekaterina Holloway RN  Bowel Elimination Management: toileting offered  Bowel Elimination Promotion:   adequate fluid intake promoted   ambulation promoted  Goal: Absence of Embolism Signs and Symptoms  Outcome: Progressing  Intervention: Prevent or Manage Embolism Risk  Recent Flowsheet Documentation  Taken 3/30/2025 0057 by Ekaterina Holloway RN  VTE Prevention/Management: (ambulation promotoed) SCDs off (sequential compression devices)  Goal: Fracture Stability  Outcome: Progressing  Goal: Optimal Functional Ability  Outcome: Progressing  Intervention: Optimize Functional Ability  Recent Flowsheet Documentation  Taken 3/30/2025 0057 by Ekaterina Holloway RN  Activity Management:   activity adjusted per tolerance   sitting, edge of bed   back to bed  Goal: Absence of Infection Signs and  Symptoms  Outcome: Progressing  Goal: Effective Tissue Perfusion  Outcome: Progressing  Goal: Effective Oxygenation and Ventilation  Outcome: Progressing  Intervention: Promote Airway Secretion Clearance  Recent Flowsheet Documentation  Taken 3/30/2025 0057 by Ekaterina Holloway RN  Activity Management:   activity adjusted per tolerance   sitting, edge of bed   back to bed     Problem: Alcohol Withdrawal  Goal: Alcohol Withdrawal Symptom Control  Outcome: Progressing  Goal: Optimal Neurologic Function  Outcome: Progressing  Goal: Readiness for Change Identified  Outcome: Progressing  Intervention: Partner to Facilitate Behavior Change  Recent Flowsheet Documentation  Taken 3/30/2025 0057 by Ekaterina Holloway RN  Supportive Measures:   active listening utilized   verbalization of feelings encouraged     Problem: Comorbidity Management  Goal: Blood Glucose Levels Within Targeted Range  Outcome: Progressing  Intervention: Monitor and Manage Glycemia  Recent Flowsheet Documentation  Taken 3/30/2025 0057 by Ekaterina Holloway RN  Medication Review/Management: medications reviewed  Goal: Blood Pressure in Desired Range  Outcome: Progressing  Intervention: Maintain Blood Pressure Management  Recent Flowsheet Documentation  Taken 3/30/2025 0057 by Ekaterina Holloway RN  Medication Review/Management: medications reviewed     Problem: Dysrhythmia  Goal: Normalized Cardiac Rhythm  Outcome: Progressing  Intervention: Monitor and Manage Cardiac Rhythm Effect  Recent Flowsheet Documentation  Taken 3/30/2025 0057 by Ekaterina Holloway RN  VTE Prevention/Management: (ambulation promotoed) SCDs off (sequential compression devices)

## 2025-03-30 NOTE — PROGRESS NOTES
Cross cover note:    Paged at 0100 for neck pain.  The patient reports his neck pain is 9/10.  He is restless and unable to get comfortable due to stabbing pain.    His RN is asking for an additional medication.  He is able to get oxycodone at 0200.    Instead of prescribing another medication I just asked his RN to give his oxycodone an hour early.

## 2025-03-30 NOTE — PROGRESS NOTES
Occupational Therapy Discharge Summary    Reason for therapy discharge:    All goals and outcomes met, no further needs identified.    Progress towards therapy goal(s). See goals on Care Plan in [facility] electronic health record for goal details.  Goals met    Therapy recommendation(s):    No further therapy is recommended. patient has met needed goals for safe return home with family support for higher level IADLs- laundry, driving, errands. Pending progress with concussion recovery, may benefit OP OT or PT to aid in recovery

## 2025-03-30 NOTE — PLAN OF CARE
"A&Ox4. CIWAs 4 and 3 this shift. VSS on room air. CMS intact. Pain managed with scheduled tylenol as well as prn robaxin and atarax. Rash to back, pt reports itching. Prn po benadryl and benadryl cream given. Tolerating regular diet. Blood sugars 104 and 135 this shift. Up ax1 gb/walker. Voiding appropriately. Aspen collar on at all times. Pain team consulted. Discharge plan is TBD, home vs TCU.    Goal Outcome Evaluation:      Plan of Care Reviewed With: patient    Overall Patient Progress: no changeOverall Patient Progress: no change       Problem: Adult Inpatient Plan of Care  Goal: Plan of Care Review  Description: The Plan of Care Review/Shift note should be completed every shift.  The Outcome Evaluation is a brief statement about your assessment that the patient is improving, declining, or no change.  This information will be displayed automatically on your shiftnote.  Outcome: Progressing  Flowsheets (Taken 3/30/2025 1115)  Plan of Care Reviewed With: patient  Overall Patient Progress: no change  Goal: Patient-Specific Goal (Individualized)  Description: You can add care plan individualizations to a care plan. Examples of Individualization might be:  \"Parent requests to be called daily at 9am for status\", \"I have a hard time hearing out of my right ear\", or \"Do not touch me to wake me up as it startlesme\".  Outcome: Progressing  Goal: Absence of Hospital-Acquired Illness or Injury  Outcome: Progressing  Intervention: Identify and Manage Fall Risk  Recent Flowsheet Documentation  Taken 3/30/2025 0804 by Kayley Hernandez RN  Safety Promotion/Fall Prevention:   safety round/check completed   activity supervised  Intervention: Prevent Skin Injury  Recent Flowsheet Documentation  Taken 3/30/2025 0804 by Kayley Hernandez RN  Skin Protection: adhesive use limited  Intervention: Prevent and Manage VTE (Venous Thromboembolism) Risk  Recent Flowsheet Documentation  Taken 3/30/2025 0804 by Kayley Hernandez RN  VTE " Prevention/Management: SCDs off (sequential compression devices)  Intervention: Prevent Infection  Recent Flowsheet Documentation  Taken 3/30/2025 0804 by Kayley Hernandez RN  Infection Prevention:   rest/sleep promoted   single patient room provided  Goal: Optimal Comfort and Wellbeing  Outcome: Progressing  Intervention: Monitor Pain and Promote Comfort  Recent Flowsheet Documentation  Taken 3/30/2025 0955 by Kayley Hernandez RN  Pain Management Interventions: medication offered but refused  Taken 3/30/2025 0758 by Kayley Hernandez RN  Pain Management Interventions:   medication (see MAR)   repositioned  Goal: Readiness for Transition of Care  Outcome: Progressing     Problem: Pain Acute  Goal: Optimal Pain Control and Function  Outcome: Progressing  Intervention: Optimize Psychosocial Wellbeing  Recent Flowsheet Documentation  Taken 3/30/2025 0804 by Kayley Hernandez RN  Supportive Measures: active listening utilized  Intervention: Develop Pain Management Plan  Recent Flowsheet Documentation  Taken 3/30/2025 0955 by Kayley Hernandez RN  Pain Management Interventions: medication offered but refused  Taken 3/30/2025 0758 by Kayley Hernandez RN  Pain Management Interventions:   medication (see MAR)   repositioned  Intervention: Prevent or Manage Pain  Recent Flowsheet Documentation  Taken 3/30/2025 0804 by Kayley Hernandez RN  Sensory Stimulation Regulation: care clustered  Bowel Elimination Promotion:   ambulation promoted   adequate fluid intake promoted  Medication Review/Management: medications reviewed     Problem: Activity Intolerance  Goal: Enhanced Capacity and Energy  Outcome: Progressing  Intervention: Optimize Activity Tolerance  Recent Flowsheet Documentation  Taken 3/30/2025 0804 by Kayley Hernandez RN  Activity Management:   activity adjusted per tolerance   up in chair  Environmental Support: calm environment promoted     Problem: Orthopaedic Fracture  Goal: Absence of Bleeding  Outcome:  Progressing  Goal: Bowel Elimination  Outcome: Progressing  Intervention: Promote Effective Bowel Elimination  Recent Flowsheet Documentation  Taken 3/30/2025 0804 by Kayley Hernandez RN  Bowel Elimination Management: (refused at this time) toileting offered  Bowel Elimination Promotion:   ambulation promoted   adequate fluid intake promoted  Goal: Absence of Embolism Signs and Symptoms  Outcome: Progressing  Intervention: Prevent or Manage Embolism Risk  Recent Flowsheet Documentation  Taken 3/30/2025 0804 by Kayley Hernandez RN  VTE Prevention/Management: SCDs off (sequential compression devices)  Goal: Fracture Stability  Outcome: Progressing  Goal: Optimal Functional Ability  Outcome: Progressing  Intervention: Optimize Functional Ability  Recent Flowsheet Documentation  Taken 3/30/2025 0804 by Kayley Hernandez RN  Activity Management:   activity adjusted per tolerance   up in chair  Positioning/Transfer Devices:   pillows   in use  Goal: Absence of Infection Signs and Symptoms  Outcome: Progressing  Goal: Effective Tissue Perfusion  Outcome: Progressing  Intervention: Prevent or Manage Neurovascular Compromise  Recent Flowsheet Documentation  Taken 3/30/2025 0804 by Kayley Hernandez RN  Compartment Syndrome Management: active flexion/extension encouraged  Goal: Optimal Pain Control and Function  Outcome: Progressing  Intervention: Manage Acute Orthopaedic-Related Pain  Recent Flowsheet Documentation  Taken 3/30/2025 0955 by Kayley Hernandez RN  Pain Management Interventions: medication offered but refused  Taken 3/30/2025 0758 by Kayley Hernandez RN  Pain Management Interventions:   medication (see MAR)   repositioned  Goal: Effective Oxygenation and Ventilation  Outcome: Progressing  Intervention: Promote Airway Secretion Clearance  Recent Flowsheet Documentation  Taken 3/30/2025 0804 by Kayley Hernandez RN  Cough And Deep Breathing: done independently per patient  Activity Management:   activity adjusted per  tolerance   up in chair     Problem: Alcohol Withdrawal  Goal: Alcohol Withdrawal Symptom Control  Outcome: Progressing  Intervention: Minimize or Manage Alcohol Withdrawal Symptoms  Recent Flowsheet Documentation  Taken 3/30/2025 0804 by Kayley Hernandez RN  Sensory Stimulation Regulation: care clustered  Goal: Optimal Neurologic Function  Outcome: Progressing  Intervention: Minimize or Manage Acute Neurologic Symptoms  Recent Flowsheet Documentation  Taken 3/30/2025 0804 by Kayley Hernandez RN  Sensory Stimulation Regulation: care clustered  Goal: Readiness for Change Identified  Outcome: Progressing  Intervention: Partner to Facilitate Behavior Change  Recent Flowsheet Documentation  Taken 3/30/2025 0804 by Kayley Hernandez RN  Supportive Measures: active listening utilized     Problem: Comorbidity Management  Goal: Blood Glucose Levels Within Targeted Range  Outcome: Progressing  Intervention: Monitor and Manage Glycemia  Recent Flowsheet Documentation  Taken 3/30/2025 0804 by Kayley Hernandez RN  Medication Review/Management: medications reviewed  Goal: Blood Pressure in Desired Range  Outcome: Progressing  Intervention: Maintain Blood Pressure Management  Recent Flowsheet Documentation  Taken 3/30/2025 0804 by Kayley Hernandez RN  Medication Review/Management: medications reviewed     Problem: Dysrhythmia  Goal: Normalized Cardiac Rhythm  Outcome: Progressing  Intervention: Monitor and Manage Cardiac Rhythm Effect  Recent Flowsheet Documentation  Taken 3/30/2025 0804 by Kayley Hernandez RN  VTE Prevention/Management: SCDs off (sequential compression devices)

## 2025-03-30 NOTE — PROGRESS NOTES
03/30/25 1018   Appointment Info   Signing Clinician's Name / Credentials (OT) Reanna Anderson, OTR/L   Living Environment   People in Home alone   Current Living Arrangements apartment  (triplex)   Home Accessibility stairs to enter home;stairs within home   Number of Stairs, Main Entrance 10   Number of Stairs, Within Home, Primary two   Living Environment Comments lives in an apt alone; ex-wife lives next door, sons also live in building   Self-Care   Usual Activity Tolerance good   Current Activity Tolerance moderate   Equipment Currently Used at Home raised toilet seat;dressing device;cane, straight  (reacher, LHS with hook, sockaid, has ordered a shower chair)   Fall history within last six months yes   Number of times patient has fallen within last six months 3   Activity/Exercise/Self-Care Comment reports independence with mobility and cares at baseline; recent use of a cane 2 weeks ago for a L knee injury   Instrumental Activities of Daily Living (IADL)   IADL Comments pt unsure how [facility] support he will have at home, but think sons can A with laundry and home set up to increase indp for pt in apt.   General Information   Onset of Illness/Injury or Date of Surgery 03/26/25   Referring Physician Wild Rodriguez MD   Patient/Family Therapy Goal Statement (OT) not stated   Additional Occupational Profile Info/Pertinent History of Current Problem 44M with hx of morbid obesity, NIDDM type II, hypertension, CARLIE, alcoholism, and atrial fibrillation and VTE on warfarin presents after a ground-level fall while intoxicated and hit face and found to have traumatic C1 fracture and fracture of RUE Fifth Digit.   Existing Precautions/Restrictions fall;spinal   General Observations and Info pt was in chair and agreeable to OT session   Cognitive Status Examination   Orientation Status orientation to person, place and time   Visual Perception   Visual Impairment/Limitations corrective lenses full-time   Pain Assessment    Patient Currently in Pain Yes, see Vital Sign flowsheet  (rating 5/10)   Posture   Posture not impaired   Range of Motion Comprehensive   Comment, General Range of Motion appears intact, pt avoiding overreaching with B UE   Strength Comprehensive (MMT)   Comment, General Manual Muscle Testing (MMT) Assessment general sterngth appears adequate   Muscle Tone Assessment   Muscle Tone Quick Adds No deficits were identified   Coordination   Upper Extremity Coordination No deficits were identified   Transfers   Transfers toilet transfer;shower transfer;sit-stand transfer   Sit-Stand Transfer   Sit-Stand [place] (Transfers) contact guard   Assistive Device (Sit-Stand Transfers) walker, front-wheeled   Shower Transfer   Type (Shower Transfer) lateral   [place] Level (Shower Transfer) contact guard;verbal cues   Toilet Transfer   Type (Toilet Transfer) stand-sit   [place] Level (Toilet Transfer) contact guard   Assistive Device (Toilet Transfer) walker, front-wheeled   Balance   Balance Comments LOB was not noted, general unsteadiness to be expected   Activities of Daily Living   BADL Assessment/Intervention toileting;upper body dressing;lower body dressing;bathing   Bathing Assessment/Intervention   Comment, (Bathing) min A   Upper Body Dressing Assessment/Training   [place] Level (Upper Body Dressing) minimum assist (75% patient effort);verbal cues   Lower Body Dressing Assessment/Training   [place] Level (Lower Body Dressing) minimum assist (75% patient effort);verbal cues   Toileting   Comment, (Toileting) pt reports indp with toileting cares since hospitalization, declined offer to complete with OT   Clinical Impression   Criteria for Skilled Therapeutic Interventions Met (OT) Yes, treatment indicated   OT Diagnosis decreased IADLs/ADls   OT Problem List-Impairments impacting ADL activity tolerance impaired;strength;pain;post-surgical precautions   Identified Performance Deficits dsg,bathing,  functional/community mobility, IADLs, driving, errands   Planned Therapy Interventions (OT) ADL retraining;progressive activity/exercise;transfer training   Clinical Decision Making Complexity (OT) problem focused assessment/low complexity   Risk & Benefits of therapy have been explained evaluation/treatment results reviewed;care plan/treatment goals reviewed;risks/benefits reviewed;current/potential barriers reviewed;participants included;patient   OT Total Evaluation Time   OT Eval, Low Complexity Minutes (22663) 8   OT Goals   Therapy Frequency (OT) One time eval and treatment   OT Predicted Duration/Target Date for Goal Attainment 03/30/25   OT Goals Upper Body Dressing;Lower Body Dressing;Transfers;Toilet Transfer/Toileting;OT Goal 1;OT Goal 2   OT: Upper Body Dressing Modified independent;including orthotic;within precautions;Goal Met   OT: Lower Body Dressing Modified independent;within precautions;Goal Met   OT: Transfer Modified independent;with assistive device;within precautions;Goal Met  (walk in shower)   OT: Toilet Transfer/Toileting Modified independent;toilet transfer;using adaptive equipment;within precautions;Goal Met   OT: Goal 1 Patient to verbalize understanding of EC/WS techniques to use during ADLs tasks. goals met   OT: Goal 2 Patient verblaize understanding of spine precautions and how to manage ADls within precautions, goal met   OT Discharge Planning   OT Plan dc   OT Discharge Recommendation (DC Rec) home with assist   OT Rationale for DC Rec patient has met needed goals for safe return home with family support for higher level IADLs- laundry, driving, errands. Pending progress with concussion recovery, may benefit OP OT or PT to aid in recovery.   OT Brief overview of current status SBA to mod I, fww functional mobility, transfers and basic ADls.   OT Total Distance Amb During Session (feet) 50   OT Equipment Needed at Discharge   (LHS)   Total Session Time   Total Session Time (sum of  timed and untimed services) 8

## 2025-03-31 ENCOUNTER — TELEPHONE (OUTPATIENT)
Dept: ANTICOAGULATION | Facility: CLINIC | Age: 45
End: 2025-03-31
Payer: COMMERCIAL

## 2025-03-31 VITALS
DIASTOLIC BLOOD PRESSURE: 67 MMHG | SYSTOLIC BLOOD PRESSURE: 120 MMHG | OXYGEN SATURATION: 95 % | HEART RATE: 58 BPM | TEMPERATURE: 96.5 F | RESPIRATION RATE: 20 BRPM

## 2025-03-31 DIAGNOSIS — Z86.718 HISTORY OF DVT (DEEP VEIN THROMBOSIS): Primary | ICD-10-CM

## 2025-03-31 LAB
GLUCOSE BLDC GLUCOMTR-MCNC: 111 MG/DL (ref 70–99)
GLUCOSE BLDC GLUCOMTR-MCNC: 117 MG/DL (ref 70–99)
INR PPP: 1.92 (ref 0.85–1.15)

## 2025-03-31 PROCEDURE — 99239 HOSP IP/OBS DSCHRG MGMT >30: CPT | Performed by: HOSPITALIST

## 2025-03-31 PROCEDURE — 99222 1ST HOSP IP/OBS MODERATE 55: CPT | Performed by: PHYSICIAN ASSISTANT

## 2025-03-31 PROCEDURE — 250N000013 HC RX MED GY IP 250 OP 250 PS 637: Performed by: INTERNAL MEDICINE

## 2025-03-31 PROCEDURE — 97530 THERAPEUTIC ACTIVITIES: CPT | Mod: GP | Performed by: PHYSICAL THERAPIST

## 2025-03-31 PROCEDURE — 85610 PROTHROMBIN TIME: CPT | Performed by: INTERNAL MEDICINE

## 2025-03-31 PROCEDURE — 250N000013 HC RX MED GY IP 250 OP 250 PS 637: Performed by: HOSPITALIST

## 2025-03-31 PROCEDURE — 36415 COLL VENOUS BLD VENIPUNCTURE: CPT | Performed by: INTERNAL MEDICINE

## 2025-03-31 RX ORDER — OXYCODONE HYDROCHLORIDE 5 MG/1
5 TABLET ORAL EVERY 6 HOURS PRN
Qty: 12 TABLET | Refills: 0 | Status: SHIPPED | OUTPATIENT
Start: 2025-03-31

## 2025-03-31 RX ORDER — HYDROXYZINE HYDROCHLORIDE 50 MG/1
50 TABLET, FILM COATED ORAL EVERY 6 HOURS PRN
Qty: 15 TABLET | Refills: 0 | Status: SHIPPED | OUTPATIENT
Start: 2025-03-31 | End: 2025-04-05

## 2025-03-31 RX ORDER — METHOCARBAMOL 500 MG/1
500 TABLET, FILM COATED ORAL 3 TIMES DAILY PRN
Qty: 15 TABLET | Refills: 0 | Status: SHIPPED | OUTPATIENT
Start: 2025-03-31 | End: 2025-04-05

## 2025-03-31 RX ORDER — WARFARIN SODIUM 5 MG/1
5 TABLET ORAL
Status: DISCONTINUED | OUTPATIENT
Start: 2025-03-31 | End: 2025-03-31 | Stop reason: HOSPADM

## 2025-03-31 RX ADMIN — THIAMINE HCL TAB 100 MG 100 MG: 100 TAB at 08:34

## 2025-03-31 RX ADMIN — CARVEDILOL 12.5 MG: 12.5 TABLET, FILM COATED ORAL at 08:35

## 2025-03-31 RX ADMIN — ACETAMINOPHEN 1000 MG: 500 TABLET, FILM COATED ORAL at 08:33

## 2025-03-31 RX ADMIN — OXYCODONE HYDROCHLORIDE 5 MG: 5 TABLET ORAL at 00:37

## 2025-03-31 RX ADMIN — METHOCARBAMOL 500 MG: 500 TABLET ORAL at 11:47

## 2025-03-31 RX ADMIN — Medication 5 MG: at 00:37

## 2025-03-31 RX ADMIN — LISINOPRIL 20 MG: 20 TABLET ORAL at 08:34

## 2025-03-31 RX ADMIN — HYDROXYZINE HYDROCHLORIDE 50 MG: 50 TABLET, FILM COATED ORAL at 00:35

## 2025-03-31 RX ADMIN — ACETAMINOPHEN 1000 MG: 500 TABLET, FILM COATED ORAL at 13:29

## 2025-03-31 RX ADMIN — BUPROPION HYDROCHLORIDE 300 MG: 150 TABLET, EXTENDED RELEASE ORAL at 08:34

## 2025-03-31 RX ADMIN — FOLIC ACID 1 MG: 1 TABLET ORAL at 08:34

## 2025-03-31 RX ADMIN — OXYCODONE HYDROCHLORIDE 5 MG: 5 TABLET ORAL at 06:29

## 2025-03-31 RX ADMIN — DIPHENHYDRAMINE HYDROCHLORIDE, ZINC ACETATE: 2; .1 CREAM TOPICAL at 00:44

## 2025-03-31 RX ADMIN — Medication 1 TABLET: at 08:34

## 2025-03-31 ASSESSMENT — ACTIVITIES OF DAILY LIVING (ADL)
ADLS_ACUITY_SCORE: 60

## 2025-03-31 NOTE — DISCHARGE SUMMARY
[facility]    Discharge Summary  Hospitalist    Date of Admission:  3/26/2025  Date of Discharge:  3/31/2025  Discharging Provider: Wild Rodriguez MD  Date of Service (when I saw the patient): 03/31/25    Discharge Diagnoses   #Fall complicated by C1 Fracture.  Nondisplaced fracture of C6-C7 osteophyte.  Changes of DISH.  Post-concussive symptoms  #Fracture of RUE Fifth Digit  #Alcohol use disorder with Intoxication without clear withdrawal  #Facial Contusions  #NIDDM Type II  #Chronic Afib  #Chronic VTE  #Drug-Induced Coagulation Defect: 2/2 warfarin use  #Morbid Obesity  #Essential HTN    Hospital Course   44M with hx of morbid obesity, NIDDM type II, hypertension, CARLIE, alcoholism, and atrial fibrillation and VTE on warfarin presents after a ground-level fall while intoxicated and hit face and found to have traumatic C1 fracture and fracture of RUE Fifth Digit.      #Fall complicated by C1 Fracture.  Nondisplaced fracture of C6-C7 osteophyte.  Changes of DISH.  Post-concussive symptoms: Was stepping into his home and tripped over the step.  Fell forward and had some bleeding in his face and neck pain.  No loss of consciousness.  No numbness or tingling.  -In the ER, patient afebrile and hemodynamically stable though hypertensive.  CBC unremarkable.  BMP unremarkable.  INR 1.58.  CT head cervical spine notable for nondisplaced fracture of the anterior and posterior right C1 arches along with nondisplaced fracture through an anterior osteophyte at C6-C7 along with changes of DISH.  He underwent CTA of the head and neck that showed no large vessel occlusion or stenosis and no evidence of blunt injury.  -MRI of the cervical spine showed C1 ring fracture that was seen better on CT along with stable nondisplaced fracture through the bridging anterior endplate osteophyte at C6-C7 with mild prevertebral edema and no evidence of ligamentous injury.  -Neurosurgery consulted.  Recommended c-collar at all  "times with no plan for surgery.  Orthotics consulted for collar.  Okay for continuation of warfarin.  - Cervical collar at all times, Cee collar for hygiene   -On the a.m. of 3/27, patient was in the ER when the head of his bed suddenly fell flat from a semiupright position.  He had increased pain to his cervical spine.  Neurosurgery consulted.  He did not have any new focal weakness or deficit.  Sensation intact in all extremities.  Pain seems to be localized to his cervical spine.  Repeat CT cervical spine stable.  -Pt neurochecks remained stable. No focal weakness, numbness, tingling.  No loss of bowel or bladder function.    -Pt was initially going to discharge home but then was \"wobbly\" and weak with PT on AM of 3/29 prior to discharge.  Cancelled discharge.  I suspect patient was getting too much narcotics and sedating meds. Did better on 3/30 and 3/31 and OK for discharge home.    -Stopped scheduled gabapentin.  Changed robaxin to prn.  Reduced dose of oxycodone.  Did better actually on this regimen.    -Of note, patient has a history of opioid overdose in 05/2024.  He tells me circumstances of that was that he took his mother's liquid morphine along with prescribed pain meds.  He admits it was stupid and doesn't have access to that anymore.  -Pain team consulted.  He will continue with scheduled tylenol therapy, prn atarax, robaxin, and low dose oxycodone.  He was prescribed a short supply of 3 days worth and will need to follow up with PCP next week if he needs ongoing pain management.       -Follow up with [facility] in several weeks per their instructions with repeat scans.     #Fracture of RUE Fifth Digit: Ortho consultation, will need splint vs cony taping     #Alcohol use disorder with Intoxication w/ Concern for Withdrawal: Alc level of .24 on adm.  No longer drinks daily, minimal withdrawal sx in the past  -CIWA, thiamine, MV, folate.  Did not show significant withdrawal. Advised strict cessation.      "   Other Issues  -Facial Contusions: From fall. No sutures needed  -NIDDM Type II: MDSS  -Chronic Afib: Warfarin per pharmacy  -Chronic VTE: Warfarin per pharmacy  -Drug-Induced Coagulation Defect: 2/2 warfarin use  -Morbid Obesity: Complicates all aspects of care  -Essential HTN: Home medications    Wild Rodriguez MD    Code Status   Full Code       Primary Care Physician   Yovana Felipe    Physical Exam   Temp: (!) 96.5  F (35.8  C) Temp src: Temporal BP: 120/67 Pulse: 58   Resp: 20 SpO2: 95 % O2 Device: None (Room air)    There were no vitals filed for this visit.  Vital Signs with Ranges  Temp:  [96.5  F (35.8  C)-98.5  F (36.9  C)] 96.5  F (35.8  C)  Pulse:  [58-75] 58  Resp:  [18-20] 20  BP: (120-146)/(67-77) 120/67  SpO2:  [95 %-99 %] 95 %  No intake/output data recorded.    Constitutional: Obese, NAD, Cervical collar in place.   HEENT: Superficial abrasions noted. Cervical collar in place.   Respiratory: Distant. Nl WOB, Clear bilaterally, No wheezes or crackles  Cardiovascular: Distant. Regular, no murmur  GI: Obese, BS+, NT, ND  Skin/Integumen: WWP, superficial abrasions noted scattered.   Neuro: CNII-XII intact. Moves all extremities. No tremor. A&Ox3.   strength full bilaterally.  Strength 5/5 in upper extremities and lower extremities.  Sensation in lower and upper extremities intact to gross touch. Plantar and dorsiflexion intact.     Discharge Disposition   Discharged to home  Condition at discharge: Stable    Consultations This Hospital Stay   ORTHOPEDIC SURGERY IP CONSULT  PHARMACY TO DOSE WARFARIN  CARE MANAGEMENT / SOCIAL WORK IP CONSULT  NEUROSURGERY IP CONSULT  NURSING TO CONSULT FOR VIRTUAL CARE IP  PHYSICAL THERAPY ADULT IP CONSULT  OCCUPATIONAL THERAPY ADULT IP CONSULT  PAIN MANAGEMENT ADULT IP CONSULT  NURSING TO CONSULT FOR VIRTUAL CARE IP    Time Spent on this Encounter   I, Wild Rodriguez MD, personally saw the patient today and spent greater than 30 minutes discharging this  patient.    Discharge Orders      XR Cervical Spine 2/3 Views     Primary Care - Care Coordination Referral      Med Therapy Management Referral      Follow Up    Please follow up at [facility] in 4-6 weeks with upright XR prior. Our schedulers will call you to set up an appointment.   You may call the clinic with any scheduling questions or concerns.    [facility]  Tel [phone]     Activity    Wear collar at all times. Wear Cee collar for showering/hygiene.     Limit lifting to 10 pounds and limit bending/twisting until follow up visit. Ok to walk as tolerated, avoid bed rest and prolonged sitting. No contact sports or high impact activities until  follow up visit.     Reason for your hospital stay    You were hospitalized for fall related to alcohol use and cervical fracture.  You were seen by neurosurgery and orthopedic team.  You need to use your cervical brace at all times.  He will follow-up with neurosurgery in a few weeks for repeat imaging and follow-up.      You should use cony tape on your right fourth and fifth fingers for your finger fracture and weight-bear as tolerated per orthopedics.      You should follow-up with your PCP next week for hospital follow-up.  You need to stop drinking alcohol moving forward.     Activity    Your activity upon discharge: activity as tolerated, use cony tape your right fourth and fifth fingers and weight-bear as tolerated.  You need to wear your cervical collar at all times, Cee collar for hygiene.     Full Code     Diet    Follow this diet upon discharge: Current Diet:Orders Placed This Encounter      Regular Diet Adult     Hospital Follow-up with Existing Primary Care Provider (PCP)    Please see details below          Discharge Medications   Current Discharge Medication List        START taking these medications    Details   acetaminophen (TYLENOL) 500 MG tablet Take 2 tablets (1,000 mg) by mouth 3 times  daily for 7 days.  Qty: 42 tablet, Refills: 0    Associated Diagnoses: Closed nondisplaced fracture of first cervical vertebra, unspecified fracture morphology, initial encounter (H)      hydrOXYzine HCl (ATARAX) 50 MG tablet Take 1 tablet (50 mg) by mouth every 6 hours as needed for other (adjuvant pain).  Qty: 15 tablet, Refills: 0    Associated Diagnoses: Closed nondisplaced fracture of first cervical vertebra, unspecified fracture morphology, initial encounter (H)      methocarbamol (ROBAXIN) 500 MG tablet Take 1 tablet (500 mg) by mouth 3 times daily as needed for muscle spasms.  Qty: 15 tablet, Refills: 0    Associated Diagnoses: Closed nondisplaced fracture of first cervical vertebra, unspecified fracture morphology, initial encounter (H)      naloxone (NARCAN) 4 MG/0.1ML nasal spray Spray 1 spray (4 mg) into one nostril alternating nostrils as needed for opioid reversal. every 2-3 minutes until assistance arrives  Qty: 2 each, Refills: 0    Associated Diagnoses: Closed nondisplaced fracture of first cervical vertebra, unspecified fracture morphology, initial encounter (H)      oxyCODONE (ROXICODONE) 5 MG tablet Take 1 tablet (5 mg) by mouth every 6 hours as needed for severe pain.  Qty: 12 tablet, Refills: 0    Associated Diagnoses: Closed nondisplaced fracture of first cervical vertebra, unspecified fracture morphology, initial encounter (H)      senna-docusate (SENOKOT-S/PERICOLACE) 8.6-50 MG tablet Take 1 tablet by mouth 2 times daily as needed for constipation.  Qty: 28 tablet, Refills: 0    Associated Diagnoses: Closed nondisplaced fracture of first cervical vertebra, unspecified fracture morphology, initial encounter (H)           CONTINUE these medications which have NOT CHANGED    Details   acamprosate (CAMPRAL) 333 MG EC tablet Take 2 tablets (666 mg) by mouth 3 times daily.  Qty: 180 tablet, Refills: 3    Associated Diagnoses: Alcohol dependence with uncomplicated intoxication (H)      buPROPion  (WELLBUTRIN XL) 300 MG 24 hr tablet Take 1 tablet (300 mg) by mouth every morning.  Qty: 90 tablet, Refills: 3    Associated Diagnoses: Major depressive disorder, recurrent episode, moderate (H)      carvedilol (COREG) 12.5 MG tablet Take 1 tablet (12.5 mg) by mouth 2 times daily (with meals)  Qty: 60 tablet, Refills: 11    Associated Diagnoses: Paroxysmal atrial fibrillation (H); Benign essential hypertension      folic acid (FOLVITE) 1 MG tablet Take 1 tablet (1 mg) by mouth daily.  Qty: 30 tablet, Refills: 1    Associated Diagnoses: Alcohol dependence with uncomplicated intoxication (H)      lisinopril (ZESTRIL) 20 MG tablet Take 1 tablet (20 mg) by mouth daily.  Qty: 90 tablet, Refills: 3    Associated Diagnoses: Essential hypertension, benign      metFORMIN (GLUCOPHAGE XR) 500 MG 24 hr tablet Take 2 tablets (1,000 mg) by mouth daily (with dinner).  Qty: 180 tablet, Refills: 3    Associated Diagnoses: Type 2 diabetes mellitus without complication, without long-term current use of insulin (H)      multivitamin w/minerals (THERA-VIT-M) tablet Take 1 tablet by mouth daily.  Qty: 30 tablet, Refills: 1    Associated Diagnoses: Alcohol dependence with uncomplicated intoxication (H)      sildenafil (VIAGRA) 100 MG tablet Take 1 tablet (100 mg) by mouth daily as needed (erectile dysfunction).  Qty: 30 tablet, Refills: 3    Associated Diagnoses: Erectile dysfunction due to arterial insufficiency      simvastatin (ZOCOR) 20 MG tablet Take 1 tablet (20 mg) by mouth at bedtime.  Qty: 90 tablet, Refills: 3    Associated Diagnoses: Pure hypercholesterolemia      thiamine (B-1) 100 MG tablet Take 1 tablet (100 mg) by mouth daily.  Qty: 30 tablet, Refills: 0    Associated Diagnoses: Alcohol dependence with uncomplicated intoxication (H)      !! warfarin ANTICOAGULANT (COUMADIN) 5 MG tablet Take 5 mg by mouth six times a week. On Monday, Tuesday, Thursday, Friday, Saturday, and Sunday at bedtime      !! warfarin ANTICOAGULANT  (COUMADIN) 5 MG tablet Take 2.5 mg by mouth every 7 days. On Wednesday bedtime       !! - Potential duplicate medications found. Please discuss with provider.        STOP taking these medications       naltrexone (DEPADE/REVIA) 50 MG tablet Comments:   Reason for Stopping:             Allergies   No Known Allergies  Data   Most Recent 3 CBC's:  Recent Labs   Lab Test 03/30/25  0643 03/27/25  0827 03/26/25  1816   WBC 8.3 10.5 6.6   HGB 11.9* 12.3* 12.4*   MCV 85 82 85    268 267      Most Recent 3 BMP's:  Recent Labs   Lab Test 03/31/25  0832 03/31/25  0215 03/30/25  2156 03/30/25  0732 03/30/25  0643 03/27/25  0828 03/27/25  0827 03/26/25  2215 03/26/25  1816   NA  --   --   --   --  137  --  139  --  143   POTASSIUM  --   --   --   --  3.8  --  4.2  --  4.2   CHLORIDE  --   --   --   --  102  --  103  --  106   CO2  --   --   --   --  26  --  23  --  23   BUN  --   --   --   --  14.8  --  10.8  --  13.0   CR  --   --   --   --  0.80  --  0.65*  --  0.95   ANIONGAP  --   --   --   --  9  --  13  --  14   RAUL  --   --   --   --  8.8  --  8.9  --  8.4*   * 111* 111*   < > 114*   < > 132*   < > 135*    < > = values in this interval not displayed.     Most Recent 2 LFT's:  Recent Labs   Lab Test 02/22/25  1625 12/24/24  1902   AST 44 41   ALT 46 45   ALKPHOS 116 105   BILITOTAL 0.3 0.4     Most Recent INR's and Anticoagulation Dosing History:  Anticoagulation Dose History  More data exists         Latest Ref Rng & Units 2/26/2025 3/26/2025 3/27/2025 3/28/2025 3/29/2025 3/30/2025 3/31/2025   Recent Dosing and Labs   warfarin ANTICOAGULANT (COUMADIN) 5 MG tablet - 5 mg, $Given 5 mg, $Given - 5 mg, $Given 5 mg, $Given 5 mg, $Given -   warfarin ANTICOAGULANT (COUMADIN) 7.5 MG tablet - - - 7.5 mg, $Given - - - -   INR 0.85 - 1.15 2.29  1.58  1.50  1.82  2.24  2.05  1.92      Most Recent 3 Troponin's:No lab results found.  Most Recent Cholesterol Panel:  Recent Labs   Lab Test 06/11/24  1037   CHOL 183   LDL  107*   HDL 49   TRIG 134     Most Recent 6 Bacteria Isolates From Any Culture (See EPIC Reports for Culture Details):No lab results found.  Most Recent TSH, T4 and A1c Labs:  Recent Labs   Lab Test 02/25/25  0802 01/07/25  1154   TSH 2.74  --    A1C  --  6.7*

## 2025-03-31 NOTE — TELEPHONE ENCOUNTER
ANTICOAGULATION  MANAGEMENT: Discharge Review    [facility] chart reviewed for anticoagulation continuity of care    Hospital Admission on 3/26/25 - 3/31/25 for fall with alcohol use disorder &intoxication, complicated by C1 fracture, nondisplaced fracture of C6-C7 osteophyte, changes of DISH, post-concussive symptoms, fracture of RUE fifth digit, facial contusions.    Discharge disposition: Home    Results:    Recent labs: (last 7 days)     03/26/25  1816 03/27/25  0827 03/28/25  0744 03/29/25  0703 03/30/25  0643 03/31/25  0651   INR 1.58* 1.50* 1.82* 2.24* 2.05* 1.92*     Anticoagulation inpatient management:     anticoagulation calendar updated with inpatient dosing, more warfarin given than usual on 3/26/26 & 3/27/25 for subtherapeutic INR    Anticoagulation discharge instructions:     Warfarin dosing: home regimen continued   Bridging: No   INR goal change: No      Medication changes affecting anticoagulation: No    Additional factors affecting anticoagulation: Yes: potential for patient to return to alcohol use once home, though advised to continue with strict cessation.     PLAN     Agree with dosing adjustment on discharge  Recommend to check INR within 1 week.    Left a detailed message for Mu . Advised to continued with maintenance dose and check next INR within 1 week, by 4/7/25. Call [facility] RN with any questions or concerns.    Anticoagulation Calendar updated    Catalina Begum, RN  3/31/2025  Anticoagulation Clinic  [employer] for routing messages: fransisco GARAY  [facility] patient phone line: [phone]

## 2025-03-31 NOTE — PLAN OF CARE
"Goal Outcome Evaluation:      Plan of Care Reviewed With: patient    Overall Patient Progress: improvingOverall Patient Progress: improving    Outcome Evaluation: pain managed with PRN PO atarax and oxy, aspen collar on at all times    A/Ox4, VSS, on RA, PIV SL, Ax1 W/GB, aspen collar on at all times, pain managed with PRN PO atarax and oxy, closed abrasions to L side of face from fall, denies pain to site, no SOI noted, rash observed to upper back and LUE, frequent lotion and benadryl ointment applied with relief per pt, glucose monitored, tolerating a regular diet, voiding well, plan for PT this AM, possible discharge to home today    Problem: Adult Inpatient Plan of Care  Goal: Plan of Care Review  Description: The Plan of Care Review/Shift note should be completed every shift.  The Outcome Evaluation is a brief statement about your assessment that the patient is improving, declining, or no change.  This information will be displayed automatically on your shiftnote.  Outcome: Progressing  Flowsheets (Taken 3/31/2025 0206)  Outcome Evaluation: pain managed with PRN PO atarax and oxy, aspen collar on at all times  Plan of Care Reviewed With: patient  Overall Patient Progress: improving  Goal: Patient-Specific Goal (Individualized)  Description: You can add care plan individualizations to a care plan. Examples of Individualization might be:  \"Parent requests to be called daily at 9am for status\", \"I have a hard time hearing out of my right ear\", or \"Do not touch me to wake me up as it startlesme\".  Outcome: Progressing  Goal: Absence of Hospital-Acquired Illness or Injury  Outcome: Progressing  Intervention: Identify and Manage Fall Risk  Recent Flowsheet Documentation  Taken 3/31/2025 0037 by Ekaterina Holloway, RN  Safety Promotion/Fall Prevention:   activity supervised   clutter free environment maintained   mobility aid in reach   nonskid shoes/slippers when out of bed   room near nurse's station   room organization " consistent   safety round/check completed  Intervention: Prevent and Manage VTE (Venous Thromboembolism) Risk  Recent Flowsheet Documentation  Taken 3/31/2025 0037 by Ekaterina Holloway RN  VTE Prevention/Management: SCDs off (sequential compression devices)  Intervention: Prevent Infection  Recent Flowsheet Documentation  Taken 3/31/2025 0037 by Ekaterina Holloway RN  Infection Prevention:   rest/sleep promoted   single patient room provided  Goal: Optimal Comfort and Wellbeing  Outcome: Progressing  Intervention: Monitor Pain and Promote Comfort  Recent Flowsheet Documentation  Taken 3/31/2025 0037 by Ekaterina Holloway RN  Pain Management Interventions: medication (see MAR)  Goal: Readiness for Transition of Care  Outcome: Progressing     Problem: Pain Acute  Goal: Optimal Pain Control and Function  Outcome: Progressing  Intervention: Optimize Psychosocial Wellbeing  Recent Flowsheet Documentation  Taken 3/31/2025 0037 by Ekaterina Hololway RN  Supportive Measures: active listening utilized  Intervention: Develop Pain Management Plan  Recent Flowsheet Documentation  Taken 3/31/2025 0037 by Ekaterina Holloway RN  Pain Management Interventions: medication (see MAR)  Intervention: Prevent or Manage Pain  Recent Flowsheet Documentation  Taken 3/31/2025 0037 by Ekaterina Holloway RN  Medication Review/Management: medications reviewed     Problem: Activity Intolerance  Goal: Enhanced Capacity and Energy  Outcome: Progressing  Intervention: Optimize Activity Tolerance  Recent Flowsheet Documentation  Taken 3/31/2025 0037 by Ekaterina Holloway RN  Activity Management:   activity adjusted per tolerance   sitting, edge of bed   back to bed  Environmental Support: calm environment promoted     Problem: Orthopaedic Fracture  Goal: Absence of Bleeding  Outcome: Progressing  Goal: Bowel Elimination  Outcome: Progressing  Goal: Absence of Embolism Signs and Symptoms  Outcome: Progressing  Intervention: Prevent or Manage Embolism Risk  Recent  Flowsheet Documentation  Taken 3/31/2025 0037 by Ekaterina Holloway RN  VTE Prevention/Management: SCDs off (sequential compression devices)  Goal: Fracture Stability  Outcome: Progressing  Goal: Optimal Functional Ability  Outcome: Progressing  Intervention: Optimize Functional Ability  Recent Flowsheet Documentation  Taken 3/31/2025 0037 by Ekaterina Holloway RN  Activity Management:   activity adjusted per tolerance   sitting, edge of bed   back to bed  Goal: Absence of Infection Signs and Symptoms  Outcome: Progressing  Goal: Effective Tissue Perfusion  Outcome: Progressing  Goal: Optimal Pain Control and Function  Outcome: Progressing  Intervention: Manage Acute Orthopaedic-Related Pain  Recent Flowsheet Documentation  Taken 3/31/2025 0037 by Ekaterina Holloway RN  Pain Management Interventions: medication (see MAR)  Goal: Effective Oxygenation and Ventilation  Outcome: Progressing  Intervention: Promote Airway Secretion Clearance  Recent Flowsheet Documentation  Taken 3/31/2025 0037 by Ekaterina Holloway RN  Cough And Deep Breathing: done independently per patient  Activity Management:   activity adjusted per tolerance   sitting, edge of bed   back to bed     Problem: Alcohol Withdrawal  Goal: Alcohol Withdrawal Symptom Control  Outcome: Progressing  Goal: Optimal Neurologic Function  Outcome: Progressing  Goal: Readiness for Change Identified  Outcome: Progressing  Intervention: Partner to Facilitate Behavior Change  Recent Flowsheet Documentation  Taken 3/31/2025 0037 by Ekaterina Holloway RN  Supportive Measures: active listening utilized     Problem: Comorbidity Management  Goal: Blood Glucose Levels Within Targeted Range  Outcome: Progressing  Intervention: Monitor and Manage Glycemia  Recent Flowsheet Documentation  Taken 3/31/2025 0037 by Ekaterina Holloway RN  Medication Review/Management: medications reviewed  Goal: Blood Pressure in Desired Range  Outcome: Progressing  Intervention: Maintain Blood Pressure  Management  Recent Flowsheet Documentation  Taken 3/31/2025 0037 by Ekaterina Holloway, RN  Medication Review/Management: medications reviewed     Problem: Dysrhythmia  Goal: Normalized Cardiac Rhythm  Outcome: Progressing  Intervention: Monitor and Manage Cardiac Rhythm Effect  Recent Flowsheet Documentation  Taken 3/31/2025 0037 by Ekaterina Holloway, RN  VTE Prevention/Management: SCDs off (sequential compression devices)

## 2025-03-31 NOTE — CONSULTS
[facility] ACUTE INPATIENT PAIN SERVICE    [facility], [facility], [facility], [facility], [place]   PAIN CONSULT          ASSESSMENT/ PLAN:   Pain is consistent with C1 fracture, non-displaced fracture of C6-7 osteophyte from a mechanical fall with alcohol intoxication (IVAN 0.24). Previous opioid overdose in 05/2024. Admission has been complicated by sedation, likely medication induced.    Opioids received in the past 24h:  *(3) Oxycodone 5mg tablets  TOTAL MME = 22.50     Multimodal Medication Therapy:   Adjuvants:   - APAP 1000mg tid  - Wellbutrin 300mg morning  - Hydroxyzine 50mg q6h prn  - Methocarbamol 500mg tid  Opioids:   - Oxycodone 5mg q4h prn  Interventional treatments- If pain is not controlled post-discharge. Would encourage pursuing non-medication modalities (cervical TPIs or occipital nerve block) which can be completed by outpatient pain team.  Non-medication interventions- Ice, PT  Constipation Prophylaxis- Senna-S prn  Follow up /Discharge Recommendations - We recommend prescribing the following at the time of discharge:    Oxycodone 5mg q6h prn (#12 tablets). Recommend Rx of intranasal naloxone. Will schedule follow up with his PCP for ongoing management. Due to history of intentional medication induced overdose, would encourage no more than 3 day Rx's dispensed at time for opioid refills.  5 day Rx of Methocarbamol 500mg tid and Hydroxyzine 50mg q6h prn      Subjective:  Cody is seen today in his chair alert and oriented in no acute distress. Reports his pain is currently a 3/10 located in his neck and right hand.     Primary pain is located in the right side of his neck with radiation into the base of his skull. Discussed he can pursue occipital nerve blocks with added TPIs for his c spine for pain control if his pain is not managed outpatient. States he feel like his pain is controlled and has improved since admission.     Denies nausea, vomiting, constipation.      Reviewed continuing  with current plan, all questions answered.     HPI:  Cody Bolton is a 44 year old male who was admitted on 3/26/2025.  I was asked by Dr. Wild Rodriguez to see the patient for his neck and right hand 5th digit pain secondary to a mechanical fall. Medical history is significant for opioid overdose (05/2024). Alcohol level 0.24 on admission and was placed on CIWA. Admitted for uncontrolled pain. Imaging indicated C1 fracture, nondisplaced C6-7 osteophyte fracture along with changes consistent with DISH. XR showing fracture of RUE (5th digit).  History of T2DM, hypertension, CARLIE, alcoholism, and atrial fibrillation and VTE on warfarin.         PDMP RESULTS: He is not managed on any active controlled medications.    History   Drug Use No         Tobacco Use      Smoking status: Never        Passive exposure: Never      Smokeless tobacco: Never        Objective:  Vital signs in last 24 hours:  B/P: 144/77, T: 97.9, P: 71, R: 20   Blood pressure (!) 144/77, pulse 71, temperature 97.9  F (36.6  C), temperature source Temporal, resp. rate 20, SpO2 97%.        Review of Systems:   As per subjective, all others negative.    Physical Exam    General: in no apparent distress and non-toxic   HEENT: Head normocephalic atraumatic, oral mucosa moist. Sclerae anicteric  CV: Regular rhythm, normal rate, no murmurs  Resp: No wheezes, no rales or rhonchi, no focal consolidations  GI: Non-tender; +Bs  Skin: No rashes or lesions  Extremities: No peripheral edema  Psych: Normal affect, mood euthymic  Neuro: Grossly normal; LE strength intact           Imaging:  Personally Reviewed.    Results for orders placed or performed during the hospital encounter of 03/26/25   CT Head w/o Contrast    Impression    IMPRESSION:  HEAD CT:  1.  No acute intracranial process.    FACIAL BONE CT:  1.  No facial bone or mandibular fracture.    CERVICAL SPINE CT:  1.  Nondisplaced fractures of the anterior and posterior right C1 arches. Fracture comes in  close proximity to the right vertebral artery foramen. A CT angiogram of the neck is recommended to exclude vascular injury.  2.  Nondisplaced fracture through an anterior osteophyte at C6-C7.  3.  Changes of DISH.    These findings were discussed with Dr. Salcedo at 7:02 PM CST on March 26, 2025.   CT Cervical Spine w/o Contrast    Impression    IMPRESSION:  HEAD CT:  1.  No acute intracranial process.    FACIAL BONE CT:  1.  No facial bone or mandibular fracture.    CERVICAL SPINE CT:  1.  Nondisplaced fractures of the anterior and posterior right C1 arches. Fracture comes in close proximity to the right vertebral artery foramen. A CT angiogram of the neck is recommended to exclude vascular injury.  2.  Nondisplaced fracture through an anterior osteophyte at C6-C7.  3.  Changes of DISH.    These findings were discussed with Dr. Salcedo at 7:02 PM CST on March 26, 2025.   CT Facial Bones without Contrast    Impression    IMPRESSION:  HEAD CT:  1.  No acute intracranial process.    FACIAL BONE CT:  1.  No facial bone or mandibular fracture.    CERVICAL SPINE CT:  1.  Nondisplaced fractures of the anterior and posterior right C1 arches. Fracture comes in close proximity to the right vertebral artery foramen. A CT angiogram of the neck is recommended to exclude vascular injury.  2.  Nondisplaced fracture through an anterior osteophyte at C6-C7.  3.  Changes of DISH.    These findings were discussed with Dr. Salcedo at 7:02 PM CST on March 26, 2025.   XR Finger Right G/E 2 Views    Impression    IMPRESSION: Tiny ossific volar aspect of the fifth PIP joint suspicious for minimally displaced fracture. Mild scattered degenerative arthritis IP joints of the hand. Soft tissue swelling fifth finger.     CTA Head Neck with Contrast    Impression    IMPRESSION:   HEAD CTA:   1.  No large vessel occlusion, high-grade stenosis, aneurysm, or high-flow vascular malformation.    NECK CTA:  1.  Mildly limited exam, as above.  2.  No  evidence of acute cerebrovascular blunt injury.  3.  No hemodynamically significant stenosis or dissection in the neck vessels.   Cervical spine MRI w/o contrast    Impression    IMPRESSION:  1.  Limited examination.  2.  C1 ring fracture seen better by CT.  3.  Stable nondisplaced fracture through the bridging anterior endplate osteophyte at C6-C7.  4.  Mild prevertebral edema.  5.  No evidence of ligamentous injury.  6.  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     XR Cervical Spine 2/3 Views    Impression    IMPRESSION: Known fractures of C1 and C6-7 anterior osteophytes are not being visualized radiographically, and seen to better advantage on same-day CT. Normal vertebral heights and alignment. Anterior osteophytes consistent with dish extending from C2   through T1. Normal extraspinal structures.       CT Cervical Spine w/o Contrast    Impression    IMPRESSION: Radiographically similar right C1 anterior and posterior arch fractures and nondisplaced discovertebral unit C6-C7 interspace fractures. Preserved vertebral body heights and alignment.        Lab Results:  Personally Reviewed.   Last Comprehensive Metabolic Panel:  Sodium   Date Value Ref Range Status   03/30/2025 137 135 - 145 mmol/L Final     Potassium   Date Value Ref Range Status   03/30/2025 3.8 3.4 - 5.3 mmol/L Final     Potassium POCT   Date Value Ref Range Status   05/04/2024 4.6 3.4 - 5.3 mmol/L Final     Chloride   Date Value Ref Range Status   03/30/2025 102 98 - 107 mmol/L Final     Chloride POCT   Date Value Ref Range Status   05/04/2024 104 94 - 109 mmol/L Final     Carbon Dioxide (CO2)   Date Value Ref Range Status   03/30/2025 26 22 - 29 mmol/L Final     Anion Gap   Date Value Ref Range Status   03/30/2025 9 7 - 15 mmol/L Final     GLUCOSE BY METER POCT   Date  Value Ref Range Status   03/31/2025 111 (H) 70 - 99 mg/dL Final     Urea Nitrogen   Date Value Ref Range Status   03/30/2025 14.8 6.0 - 20.0 mg/dL Final     UREA NITROGEN POCT   Date Value Ref Range Status   05/04/2024 16 7 - 30 mg/dL Final     Creatinine   Date Value Ref Range Status   03/30/2025 0.80 0.67 - 1.17 mg/dL Final     GFR Estimate   Date Value Ref Range Status   03/30/2025 >90 >60 mL/min/1.73m2 Final     Comment:     eGFR calculated using 2021 CKD-EPI equation.     Calcium   Date Value Ref Range Status   03/30/2025 8.8 8.8 - 10.4 mg/dL Final        UA:   Amphetamines Urine   Date Value Ref Range Status   02/23/2025 Screen Negative Screen Negative Final     Comment:     Cutoff for a negative amphetamine is less than 500 ng/mL.     Barbituates Urine   Date Value Ref Range Status   02/23/2025 Screen Negative Screen Negative Final     Comment:     Cutoff for a negative barbiturate is less than 200 ng/mL.     Cannabinoids Urine   Date Value Ref Range Status   02/23/2025 Screen Negative Screen Negative Final     Comment:     Cutoff for a negative cannabinoid is less than 50 ng/mL.     Cocaine Urine   Date Value Ref Range Status   02/23/2025 Screen Negative Screen Negative Final     Comment:     Cutoff for a negative cocaine is less than 300 ng/mL.     Opiates Urine   Date Value Ref Range Status   02/23/2025 Screen Negative Screen Negative Final     Comment:     Cutoff for a negative opiate is less than 300 ng/mL.     PCP Urine   Date Value Ref Range Status   02/23/2025 Screen Negative Screen Negative Final     Comment:     Cutoff for a negative PCP is less than 25 ng/mL.              Please see A&P for additional details of medical decision making.         Jacky Mays PA-C  Acute Care Pain Management  Team  Hours of pain coverage Mon-Fri 8-1600, afterhours please call the primary team    [employer] (JAIME, LOBOs, SD, RH)   Page via Vocera text web console -Click for [employer]

## 2025-03-31 NOTE — PLAN OF CARE
"Goal Outcome Evaluation:      Plan of Care Reviewed With: patient    Overall Patient Progress: improvingOverall Patient Progress: improving     Alert and oriented. Vitally stable on room air. C collar on at all times. Pain controlled with oxy, robaxin and atarax. CMS intact. Voiding well. Blood sugars monitored. Rash to back, benadryl cream applied. CIWA, 2,2. Possibly home tomorrow.         Problem: Adult Inpatient Plan of Care  Goal: Plan of Care Review  Description: The Plan of Care Review/Shift note should be completed every shift.  The Outcome Evaluation is a brief statement about your assessment that the patient is improving, declining, or no change.  This information will be displayed automatically on your shiftnote.  Outcome: Progressing  Flowsheets (Taken 3/30/2025 2148)  Plan of Care Reviewed With: patient  Overall Patient Progress: improving  Goal: Patient-Specific Goal (Individualized)  Description: You can add care plan individualizations to a care plan. Examples of Individualization might be:  \"Parent requests to be called daily at 9am for status\", \"I have a hard time hearing out of my right ear\", or \"Do not touch me to wake me up as it startlesme\".  Outcome: Progressing  Goal: Absence of Hospital-Acquired Illness or Injury  Outcome: Progressing  Intervention: Identify and Manage Fall Risk  Recent Flowsheet Documentation  Taken 3/30/2025 1700 by Carin Almendarez RN  Safety Promotion/Fall Prevention:   activity supervised   clutter free environment maintained   mobility aid in reach   nonskid shoes/slippers when out of bed   room near nurse's station   room organization consistent   safety round/check completed  Intervention: Prevent Skin Injury  Recent Flowsheet Documentation  Taken 3/30/2025 1700 by Carin Almendarez RN  Body Position: supine, head elevated  Intervention: Prevent and Manage VTE (Venous Thromboembolism) Risk  Recent Flowsheet Documentation  Taken 3/30/2025 1700 by Carin Almendarez, " RN  VTE Prevention/Management: SCDs off (sequential compression devices)  Intervention: Prevent Infection  Recent Flowsheet Documentation  Taken 3/30/2025 1700 by Carin Almendarez RN  Infection Prevention:   rest/sleep promoted   single patient room provided  Goal: Optimal Comfort and Wellbeing  Outcome: Progressing  Intervention: Monitor Pain and Promote Comfort  Recent Flowsheet Documentation  Taken 3/30/2025 1918 by Carin Almendarez RN  Pain Management Interventions: medication (see MAR)  Taken 3/30/2025 1742 by Carin Almendarez RN  Pain Management Interventions: medication (see MAR)  Goal: Readiness for Transition of Care  Outcome: Progressing     Problem: Pain Acute  Goal: Optimal Pain Control and Function  Outcome: Progressing  Intervention: Develop Pain Management Plan  Recent Flowsheet Documentation  Taken 3/30/2025 1918 by Carin Almendarez RN  Pain Management Interventions: medication (see MAR)  Taken 3/30/2025 1742 by Carin Almendarez RN  Pain Management Interventions: medication (see MAR)  Intervention: Prevent or Manage Pain  Recent Flowsheet Documentation  Taken 3/30/2025 1700 by Carin Almendarez RN  Medication Review/Management: medications reviewed     Problem: Activity Intolerance  Goal: Enhanced Capacity and Energy  Outcome: Progressing  Intervention: Optimize Activity Tolerance  Recent Flowsheet Documentation  Taken 3/30/2025 1700 by Carin Almendarez RN  Activity Management:   ambulated to bathroom   back to bed     Problem: Orthopaedic Fracture  Goal: Absence of Bleeding  Outcome: Progressing  Goal: Bowel Elimination  Outcome: Progressing  Goal: Absence of Embolism Signs and Symptoms  Outcome: Progressing  Intervention: Prevent or Manage Embolism Risk  Recent Flowsheet Documentation  Taken 3/30/2025 1700 by Carin Almendarez RN  VTE Prevention/Management: SCDs off (sequential compression devices)  Goal: Fracture Stability  Outcome: Progressing  Goal: Optimal Functional Ability  Outcome:  Progressing  Intervention: Optimize Functional Ability  Recent Flowsheet Documentation  Taken 3/30/2025 1700 by Carin Almendarez RN  Activity Management:   ambulated to bathroom   back to bed  Positioning/Transfer Devices:   pillows   in use  Goal: Absence of Infection Signs and Symptoms  Outcome: Progressing  Goal: Effective Tissue Perfusion  Outcome: Progressing  Goal: Optimal Pain Control and Function  Outcome: Progressing  Intervention: Manage Acute Orthopaedic-Related Pain  Recent Flowsheet Documentation  Taken 3/30/2025 1918 by Carin Almendarez RN  Pain Management Interventions: medication (see MAR)  Taken 3/30/2025 1742 by Carin Almendarez RN  Pain Management Interventions: medication (see MAR)  Goal: Effective Oxygenation and Ventilation  Outcome: Progressing  Intervention: Promote Airway Secretion Clearance  Recent Flowsheet Documentation  Taken 3/30/2025 1700 by Carin Almendarez RN  Cough And Deep Breathing: done independently per patient  Activity Management:   ambulated to bathroom   back to bed  Intervention: Optimize Oxygenation and Ventilation  Recent Flowsheet Documentation  Taken 3/30/2025 1700 by Carin Almendarez RN  Head of Bed (HOB) Positioning: HOB at 30 degrees     Problem: Alcohol Withdrawal  Goal: Alcohol Withdrawal Symptom Control  Outcome: Progressing  Goal: Optimal Neurologic Function  Outcome: Progressing  Goal: Readiness for Change Identified  Outcome: Progressing     Problem: Comorbidity Management  Goal: Blood Glucose Levels Within Targeted Range  Outcome: Progressing  Intervention: Monitor and Manage Glycemia  Recent Flowsheet Documentation  Taken 3/30/2025 1700 by Carin Almendarez RN  Medication Review/Management: medications reviewed  Goal: Blood Pressure in Desired Range  Outcome: Progressing  Intervention: Maintain Blood Pressure Management  Recent Flowsheet Documentation  Taken 3/30/2025 1700 by Carin Almendarez RN  Medication Review/Management: medications reviewed      Problem: Dysrhythmia  Goal: Normalized Cardiac Rhythm  Outcome: Progressing  Intervention: Monitor and Manage Cardiac Rhythm Effect  Recent Flowsheet Documentation  Taken 3/30/2025 1700 by Carin Almendarez RN  VTE Prevention/Management: SCDs off (sequential compression devices)

## 2025-03-31 NOTE — PROGRESS NOTES
Physical Therapy Discharge Summary    Reason for therapy discharge:    All goals and outcomes met, no further needs identified.    Progress towards therapy goal(s). See goals on Care Plan in Epic electronic health record for goal details.  Goals met    Therapy recommendation(s):    No further therapy is recommended.  Pt may benefit from OP OT for concussion rehab if symptoms persist.

## 2025-03-31 NOTE — PLAN OF CARE
Goal Outcome Evaluation:      Plan of Care Reviewed With: patient    Overall Patient Progress: improvingOverall Patient Progress: improving    Outcome Evaluation: pain a 2-3. on oxycodone, robaxin, atarax. light sensativety decreased. ha less. up in chair. pain team saw pt. passed PT and OT. ready to dischrarge home today    VS-stable. Ciwa a 2 for HA. Both times today.   Lung Sounds-diminished. Bases, infreq dry cough. On room air. Taking deep breathes.   O2-on room air.   GI-+Bs. +Flatus. Had bm today. Tolerating diet , denies nausea vomiting.   -voiding. Adequately.   IVF-sl iv.   Dressings-aspen collar in place. None.   CMS-denies numbness and tingline, +pp, +radial pulses. Trace edema in both ankles. Collar on.   Drain-none.   Activity-up with one assist. Walker, gb.   Pain-rates pain a 2-3. On atarax, robaxin. Oxycodone. On tylenol,   D/C Plan-home today. Ex wife will pick him up after 5 pm today.     Discharge Note    Patient discharged to home via private vehicle  accompanied by significant other .  IV: Discontinued  Prescriptions filled and given to patient/family.   Belongings reviewed and sent with patient.   Home medications returned to patient: NA  Equipment sent with: patient, N/A.   patient verbalizes understanding of discharge instructions. AVS given to patient.  Additional education completed?  Aspen collar       Problem: Adult Inpatient Plan of Care  Goal: Plan of Care Review  Description: The Plan of Care Review/Shift note should be completed every shift.  The Outcome Evaluation is a brief statement about your assessment that the patient is improving, declining, or no change.  This information will be displayed automatically on your shiftnote.  Outcome: Adequate for Care Transition  Flowsheets (Taken 3/31/2025 1120)  Outcome Evaluation: pain a 2-3. on oxycodone, robaxin, atarax. light sensativety decreased. ha less. up in chair. pain team saw pt. passed PT and OT. ready to dischrarge home  today  Plan of Care Reviewed With: patient  Overall Patient Progress: improving  Goal: Absence of Hospital-Acquired Illness or Injury  Intervention: Identify and Manage Fall Risk  Recent Flowsheet Documentation  Taken 3/31/2025 0835 by Ksenia Guerra RN  Safety Promotion/Fall Prevention:   activity supervised   clutter free environment maintained   mobility aid in reach   nonskid shoes/slippers when out of bed   room near nurse's station   room organization consistent   safety round/check completed  Intervention: Prevent Skin Injury  Recent Flowsheet Documentation  Taken 3/31/2025 0835 by Ksenia Guerra RN  Skin Protection: protective footwear used  Intervention: Prevent and Manage VTE (Venous Thromboembolism) Risk  Recent Flowsheet Documentation  Taken 3/31/2025 0835 by Ksenia Guerra RN  VTE Prevention/Management: SCDs off (sequential compression devices)  Intervention: Prevent Infection  Recent Flowsheet Documentation  Taken 3/31/2025 0835 by Ksenia Guerra RN  Infection Prevention:   rest/sleep promoted   single patient room provided     Problem: Pain Acute  Goal: Optimal Pain Control and Function  Intervention: Optimize Psychosocial Wellbeing  Recent Flowsheet Documentation  Taken 3/31/2025 0835 by Ksenia Guerra RN  Supportive Measures: active listening utilized  Intervention: Prevent or Manage Pain  Recent Flowsheet Documentation  Taken 3/31/2025 0835 by Ksenia Guerra RN  Sensory Stimulation Regulation:   auditory stimulation minimized   care clustered   lighting decreased  Bowel Elimination Promotion:   adequate fluid intake promoted   ambulation promoted   privacy promoted  Medication Review/Management: medications reviewed     Problem: Activity Intolerance  Goal: Enhanced Capacity and Energy  Intervention: Optimize Activity Tolerance  Recent Flowsheet Documentation  Taken 3/31/2025 0835 by Ksenia Guerra RN  Activity Management:   activity adjusted per tolerance   sitting, edge of bed   back to  bed  Environmental Support: calm environment promoted     Problem: Orthopaedic Fracture  Goal: Absence of Bleeding  Intervention: Monitor and Manage Fracture Bleeding  Recent Flowsheet Documentation  Taken 3/31/2025 0835 by Ksenia Guerra RN  Fracture Immobilization: immobilization device maintained  Goal: Bowel Elimination  Intervention: Promote Effective Bowel Elimination  Recent Flowsheet Documentation  Taken 3/31/2025 0835 by Ksenia Guerra RN  Bowel Elimination Promotion:   adequate fluid intake promoted   ambulation promoted   privacy promoted  Goal: Absence of Embolism Signs and Symptoms  Intervention: Prevent or Manage Embolism Risk  Recent Flowsheet Documentation  Taken 3/31/2025 0835 by Ksenia Guerra RN  VTE Prevention/Management: SCDs off (sequential compression devices)  Goal: Fracture Stability  Intervention: Promote Fracture Stability and Healing  Recent Flowsheet Documentation  Taken 3/31/2025 0835 by Ksenia Guerra RN  Fracture Immobilization: immobilization device maintained  Goal: Optimal Functional Ability  Intervention: Optimize Functional Ability  Recent Flowsheet Documentation  Taken 3/31/2025 0835 by Ksenia Guerra RN  Activity Management:   activity adjusted per tolerance   sitting, edge of bed   back to bed  Goal: Absence of Infection Signs and Symptoms  Intervention: Prevent or Manage Infection  Recent Flowsheet Documentation  Taken 3/31/2025 0835 by Ksenia Guerra RN  Fever Reduction/Comfort Measures:   ice pack(s) applied   lightweight bedding  Goal: Effective Tissue Perfusion  Intervention: Prevent or Manage Neurovascular Compromise  Recent Flowsheet Documentation  Taken 3/31/2025 0835 by Ksenia Guerra RN  Compartment Syndrome Management: active flexion/extension encouraged  Goal: Effective Oxygenation and Ventilation  Intervention: Promote Airway Secretion Clearance  Recent Flowsheet Documentation  Taken 3/31/2025 0835 by Ksenia Guerra RN  Breathing Techniques/Airway Clearance:  deep/controlled cough encouraged  Cough And Deep Breathing: done independently per patient  Activity Management:   activity adjusted per tolerance   sitting, edge of bed   back to bed  Intervention: Optimize Oxygenation and Ventilation  Recent Flowsheet Documentation  Taken 3/31/2025 0835 by Ksenia Guerra RN  Airway/Ventilation Management:   airway patency maintained   pulmonary hygiene promoted     Problem: Alcohol Withdrawal  Goal: Alcohol Withdrawal Symptom Control  Intervention: Minimize or Manage Alcohol Withdrawal Symptoms  Recent Flowsheet Documentation  Taken 3/31/2025 0835 by Ksenia Guerra RN  Sensory Stimulation Regulation:   auditory stimulation minimized   care clustered   lighting decreased  Goal: Optimal Neurologic Function  Intervention: Minimize or Manage Acute Neurologic Symptoms  Recent Flowsheet Documentation  Taken 3/31/2025 0835 by Ksenia Guerra RN  Sensory Stimulation Regulation:   auditory stimulation minimized   care clustered   lighting decreased  Airway/Ventilation Management:   airway patency maintained   pulmonary hygiene promoted  Goal: Readiness for Change Identified  Intervention: Partner to Facilitate Behavior Change  Recent Flowsheet Documentation  Taken 3/31/2025 0835 by Ksenia Guerra RN  Supportive Measures: active listening utilized     Problem: Comorbidity Management  Goal: Blood Glucose Levels Within Targeted Range  Intervention: Monitor and Manage Glycemia  Recent Flowsheet Documentation  Taken 3/31/2025 0835 by Ksenia Guerra RN  Medication Review/Management: medications reviewed  Goal: Blood Pressure in Desired Range  Intervention: Maintain Blood Pressure Management  Recent Flowsheet Documentation  Taken 3/31/2025 0835 by Ksenia Guerra RN  Medication Review/Management: medications reviewed     Problem: Dysrhythmia  Goal: Normalized Cardiac Rhythm  Intervention: Monitor and Manage Cardiac Rhythm Effect  Recent Flowsheet Documentation  Taken 3/31/2025 0835 by Mari  Ksenia RN  VTE Prevention/Management: SCDs off (sequential compression devices)

## 2025-04-01 ENCOUNTER — PATIENT OUTREACH (OUTPATIENT)
Dept: CARE COORDINATION | Facility: CLINIC | Age: 45
End: 2025-04-01
Payer: COMMERCIAL

## 2025-04-01 NOTE — LETTER
[facility] CARE COORDINATION  [id number]  [place] [address]    April 1, 2025    Cody Bolton  [address]  [place] [address]      Dear Cody,    I am a clinic care coordinator who works with Yovana Felipe MD with the [facility]. I wanted to introduce myself and provide you with my contact information for you to be able to call me with any questions or concerns. I wanted to thank you for spending the time to talk with me.  Below is a description of clinic care coordination and how I can further assist you.       The clinic care coordination team is made up of a registered nurse, , financial resource worker and community health worker who understand the health care system. The goal of clinic care coordination is to help you manage your health and improve access to the health care system. Our team works alongside your provider to assist you in determining your health and social needs. We can help you obtain health care and community resources, providing you with necessary information and education. We can work with you through any barriers and develop a care plan that helps coordinate and strengthen the communication between you and your care team.  Our services are voluntary and are offered without charge to you personally.    Please feel free to contact me with any questions or concerns regarding care coordination and what we can offer.      We are focused on providing you with the highest-quality healthcare experience possible.    Sincerely,     Rosa Cowan RN, BSN, CPHN, [facility] Management  [facility], and [facility]  [email or web address]  Office: [phone]  Employed by [employer]     Enclosed: Additional information on Care Coordination.     WHAT IS CARE COORDINATION?      [facility] Care Coordination supports patients and families dealing with chronic or complex  health conditions, developmental issues, and social service needs. This service is available to patients of all ages, from babies to seniors. When you re facing a difficult decision about caring for yourself or someone you love, we can help you understand your options. We identify and refer you to community resources that help with financial, legal, mental health, transportation, and other issues. We also help with your medical and related education needs.     IS CARE COORDINATION RIGHT FOR ME? Discuss a referral to Care Coordination with your primary care provider or care team member.     HOW CAN I CONNECT WITH CARE COORDINATION?  Contact your clinic.  Speak with your doctor or clinic staff.  Discuss care coordination with hospital staff before discharge.     MEET YOUR CARE COORDINATION TEAM     Registered Nurse Care Coordinator  Provides education on medications, disease management, and new diagnoses.  Addresses concerns about medical conditions and connects you to resources.  Develops patient-centered goals and communicates with patient s care team.     Social Work Care Coordinator  Provides education on emotional wellbeing.  Connects you to a variety of community-based resources.  Communicates with care team and community partners.     Community Health Worker  Identifies health and social barriers and connects you with community resources.  Develops nonclinical patient and family centered goals.  Enhances communication between patients and care teams.     Financial Resource Worker  Assists patients with applying for health insurance ([place], [place], [employer]).  Helps patients with applying for county benefits.  Connects patients with [employer].

## 2025-04-01 NOTE — PROGRESS NOTES
Clinic Care Coordination Contact  Transitions of Care Outreach  Chief Complaint   Patient presents with    Clinic Care Coordination - Initial    Clinic Care Coordination - Post Hospital     Most Recent Admission Date: 3/26/2025   Most Recent Admission Diagnosis: Closed nondisplaced fracture of middle phalanx of right little finger, initial encounter - S62.656A  Facial abrasion, initial encounter - S00.81XA  Alcoholic intoxication with complication - F10.929  Other closed nondisplaced fracture of sixth cervical vertebra, initial encounter (H) - S12.591A  Closed nondisplaced fracture of fifth cervical vertebra, unspecified fracture morphology, initial encounter (H) - S12.401A  Closed nondisplaced fracture of first cervical vertebra, unspecified fracture morphology, initial encounter (H) - S12.001A  Closed nondisplaced fracture of posterior arch of first cervical vertebra, initial encounter (H) - S12.031A     Most Recent Discharge Date: 3/31/2025   Most Recent Discharge Diagnosis: Closed nondisplaced fracture of posterior arch of first cervical vertebra, initial encounter (H) - S12.031A  Other closed nondisplaced fracture of sixth cervical vertebra, initial encounter (H) - S12.591A  Alcoholic intoxication with complication - F10.929  Facial abrasion, initial encounter - S00.81XA  Closed nondisplaced fracture of first cervical vertebra, unspecified fracture morphology, initial encounter (H) - S12.001A  Closed nondisplaced fracture of fifth cervical vertebra, unspecified fracture morphology, initial encounter (H) - S12.401A  Closed nondisplaced fracture of middle phalanx of right little finger, initial encounter - S62.656Z     Transitions of Care Assessment    Discharge Assessment  How are you doing now that you are home?: Doing pretty good. He was able to adjust his bed to keep his brace.  How are your symptoms? (Red Flag symptoms escalate to triage hotline per guidelines): Improved  Do you know how to contact your  clinic care team if you have future questions or changes to your health status? : Yes  Does the patient have their discharge instructions? : Yes  Does the patient have questions regarding their discharge instructions? : Yes (see comment) (Discussed follow up recommendations with patient.)  Were you started on any new medications or were there changes to any of your previous medications? : Yes  Does the patient have all of their medications?: Yes  Do you have questions regarding any of your medications? : No  Do you have all of your needed medical supplies or equipment (DME)?  (i.e. oxygen tank, CPAP, cane, etc.): Yes    Post-op ([facility] CTA Only)  If the patient had a surgery or procedure, do they have any questions for a nurse?: No    Post-op (Clinicians Only)  Did the patient have surgery or a procedure: No  Fever: No  Chills: No  Eating & Drinking: eating and drinking without complaints/concerns  PO Intake: regular diet  Bowel Function: normal  Date of last BM: 03/31/25  Urinary Status: voiding without complaint/concerns    Follow up Plan     Discharge Follow-Up  Discharge follow up appointment scheduled in alignment with recommended follow up timeframe or Transitions of Risk Category? (Low = within 30 days; Moderate= within 14 days; High= within 7 days): Yes  Discharge Follow Up Appointment Date: 04/10/25  Discharge Follow Up Appointment Scheduled with?: Primary Care Provider    RN CC contacted patient for post-hospital follow up and to introduce Care Coordination. Full assessment not indicated as patient declined to have Care Coordination support at this time. He was agreeable to receiving an introduction letter with additional information on Care Coordination via [employer]. Verified patient has access to [employer].     RN CC will send patient introduction letter via [employer].     Future Appointments   Date Time Provider Department [place]   4/10/2025  3:00 PM Yovana Felipe MD [facility]    4/25/2025 10:40 AM  [id number] - [facility]   4/25/2025 11:00 AM Elian Hendrix PA-C [facility]   5/9/2025  9:30 AM Yovana Felipe MD [facility]     Outpatient Plan as outlined on AVS reviewed with patient.    For any urgent concerns, please contact our 24 hour nurse triage line: [phone] ([phone])       Rosa Cowan RN, BSN, CPHN, [facility] Ambulatory Care Management  [facility], and [facility]  [email or web address]  Office: [phone]  Employed by [facility]

## 2025-04-03 ENCOUNTER — TELEPHONE (OUTPATIENT)
Dept: FAMILY MEDICINE | Facility: CLINIC | Age: 45
End: 2025-04-03
Payer: COMMERCIAL

## 2025-04-03 NOTE — TELEPHONE ENCOUNTER
MTM referral from: Transitions of Care (recent hospital discharge, TCU discharge, or ED visit)    MT referral outreach attempt #2 on April 3, 2025 at 1:57 PM      Outcome: Patient not reachable after several attempts, sent [employer] message    Use [employer]ury petty  for the carrier/Plan on the flowsheet      [employer] Message Sent    ANA MARIA Hartman   [phone]

## 2025-04-07 DIAGNOSIS — I48.0 PAROXYSMAL ATRIAL FIBRILLATION (H): Primary | ICD-10-CM

## 2025-04-07 RX ORDER — WARFARIN SODIUM 5 MG/1
TABLET ORAL
Qty: 90 TABLET | Refills: 1 | Status: SHIPPED | OUTPATIENT
Start: 2025-04-07

## 2025-04-07 NOTE — TELEPHONE ENCOUNTER
ANTICOAGULATION MANAGEMENT:  Medication Refill    Anticoagulation Summary  As of 3/31/2025      Warfarin maintenance plan:  2.5 mg (5 mg x 0.5) every Wed; 5 mg (5 mg x 1) all other days   Next INR check:  4/7/2025   Target end date:  Indefinite    Indications    Acute pulmonary embolism without acute cor pulmonale  unspecified pulmonary embolism type (H) (Resolved) [I26.99]  History of DVT (deep vein thrombosis) [Z86.718]                 Anticoagulation Care Providers       Provider Role Specialty Phone number    Aaseby-Aguilera, Ramona Ann, PA-C Referring Family Medicine [phone]            Refill Criteria    Visit with referring provider/group: Meets criteria: visit within referring provider group in the last 15 months on 3/6/25    [facility] referral last signed: 03/27/2024; within last year:  Yes    Lab monitoring is up to date (not exceeding 2 weeks overdue): Yes    Cody meets all criteria for refill. Rx instructions and quantity supplied updated to match patient's current dosing plan. Warfarin 90 day supply with 1 refill granted per [facility] protocol     Gerson Baker RN  Anticoagulation Clinic

## 2025-04-08 ENCOUNTER — TELEPHONE (OUTPATIENT)
Dept: ANTICOAGULATION | Facility: CLINIC | Age: 45
End: 2025-04-08
Payer: COMMERCIAL

## 2025-04-08 NOTE — TELEPHONE ENCOUNTER
ANTICOAGULATION     Cody Bolton is overdue for an INR check.     Left message for patient to call and schedule lab appointment as soon as possible. If returning call, please schedule.     Makayla Tamez, RN  4/8/2025  Anticoagulation Clinic  [facility] for routing messages: fransisco GARAY  [facility] patient phone line: [phone]

## 2025-04-10 ENCOUNTER — VIRTUAL VISIT (OUTPATIENT)
Dept: FAMILY MEDICINE | Facility: CLINIC | Age: 45
End: 2025-04-10
Attending: HOSPITALIST
Payer: COMMERCIAL

## 2025-04-10 DIAGNOSIS — S12.001A CLOSED NONDISPLACED FRACTURE OF FIRST CERVICAL VERTEBRA, UNSPECIFIED FRACTURE MORPHOLOGY, INITIAL ENCOUNTER (H): ICD-10-CM

## 2025-04-10 DIAGNOSIS — S12.401A CLOSED NONDISPLACED FRACTURE OF FIFTH CERVICAL VERTEBRA, UNSPECIFIED FRACTURE MORPHOLOGY, INITIAL ENCOUNTER (H): ICD-10-CM

## 2025-04-10 DIAGNOSIS — S12.031A CLOSED NONDISPLACED FRACTURE OF POSTERIOR ARCH OF FIRST CERVICAL VERTEBRA, INITIAL ENCOUNTER (H): ICD-10-CM

## 2025-04-10 DIAGNOSIS — S62.656A CLOSED NONDISPLACED FRACTURE OF MIDDLE PHALANX OF RIGHT LITTLE FINGER, INITIAL ENCOUNTER: ICD-10-CM

## 2025-04-10 DIAGNOSIS — Z09 HOSPITAL DISCHARGE FOLLOW-UP: Primary | ICD-10-CM

## 2025-04-10 DIAGNOSIS — Z12.11 SCREEN FOR COLON CANCER: ICD-10-CM

## 2025-04-10 DIAGNOSIS — F33.1 MAJOR DEPRESSIVE DISORDER, RECURRENT EPISODE, MODERATE (H): ICD-10-CM

## 2025-04-10 DIAGNOSIS — S00.81XA FACIAL ABRASION, INITIAL ENCOUNTER: ICD-10-CM

## 2025-04-10 DIAGNOSIS — S12.591A OTHER CLOSED NONDISPLACED FRACTURE OF SIXTH CERVICAL VERTEBRA, INITIAL ENCOUNTER (H): ICD-10-CM

## 2025-04-10 DIAGNOSIS — F10.20 ALCOHOL USE DISORDER, SEVERE, DEPENDENCE (H): ICD-10-CM

## 2025-04-10 PROBLEM — F10.929 ALCOHOLIC INTOXICATION WITH COMPLICATION: Status: RESOLVED | Noted: 2025-03-26 | Resolved: 2025-04-10

## 2025-04-10 RX ORDER — LANCETS
EACH MISCELLANEOUS
COMMUNITY
Start: 2024-08-21

## 2025-04-10 RX ORDER — BLOOD-GLUCOSE METER
EACH MISCELLANEOUS
COMMUNITY
Start: 2024-08-21

## 2025-04-10 RX ORDER — TIRZEPATIDE 7.5 MG/.5ML
INJECTION, SOLUTION SUBCUTANEOUS
COMMUNITY
Start: 2024-10-28

## 2025-04-10 NOTE — PROGRESS NOTES
Cody is a 44 year old who is being evaluated via a billable video visit.    How would you like to obtain your AVS? MyChart  If the video visit is dropped, the invitation should be resent by: Text to cell phone: [phone]  Will anyone else be joining your video visit? No      Assessment & Plan     Hospital discharge follow-up    Alcohol use disorder, severe, dependence (H) - Cody was intoxicated during our visit today. He was not able to offer too much helpful information. Still indicated that he may pursue outpatient treatment through [facility]. Contracts for safety today. He reports he is taking his medications. I will have staff follow up with him daily, encourage treatment.   - Adult Mental Health  Referral; Future    Closed nondisplaced fracture of fifth cervical vertebra, unspecified fracture morphology, initial encounter (H) - he denies pain, was able to state his follow up date with Neurosurgery.     Closed nondisplaced fracture of first cervical vertebra, unspecified fracture morphology, initial encounter (H)    Closed nondisplaced fracture of middle phalanx of right little finger, initial encounter    Closed nondisplaced fracture of posterior arch of first cervical vertebra, initial encounter (H)    Facial abrasion, initial encounter    Other closed nondisplaced fracture of sixth cervical vertebra, initial encounter (H)    Major depressive disorder, recurrent episode, moderate (H) - contracts for safety today.     Screen for colon cancer  - Colonoscopy Screening  Referral; Future    The longitudinal plan of care for the diagnosis(es)/condition(s) as documented were addressed during this visit. Due to the added complexity in care, I will continue to support Cody in the subsequent management and with ongoing continuity of care.      MED REC REQUIRED  Post Medication Reconciliation Status:  Discharge medications reconciled, continue medications without  change  BMI  Estimated body mass index is 48.82 kg/m  as calculated from the following:    Height as of 2/24/25: 1.829 m (6').    Weight as of 2/24/25: 163.3 kg (360 lb).       Suhail Ross is a 44 year old, presenting for the following health issues:  Hospital F/U (Follow-up hospital, was admitted to [facility] on 03/26/2025 and discharged on 03/31/2025 for #Fall complicated by C1 Fracture.  Nondisplaced fracture of C6-C7 osteophyte.  Changes of DISH.  Post-concussive symptoms)        3/6/2025     9:36 AM   Additional Questions   Roomed by Ekaterina Lindsey CMA   Accompanied by Self     HPI          4/1/2025   Post Discharge Outreach   How are you doing now that you are home? Doing pretty good. He was able to adjust his bed to keep his brace.   How are your symptoms? (Red Flag symptoms escalate to triage hotline per guidelines) Improved   Does the patient have their discharge instructions?  Yes   Does the patient have questions regarding their discharge instructions?  Yes (see comment)   Were you started on any new medications or were there changes to any of your previous medications?  Yes   Does the patient have all of their medications? Yes   Do you have questions regarding any of your medications?  No   Do you have all of your needed medical supplies or equipment (DME)?  (i.e. oxygen tank, CPAP, cane, etc.) Yes   Discharge Follow Up Appointment Date 4/10/2025   Discharge Follow Up Appointment Scheduled with? Primary Care Provider       Hospital Follow-up Visit:    Hospital/Nursing Home/IP Rehab Facility: [facility]  Most Recent Admission Date: 3/26/2025   Most Recent Admission Diagnosis: Closed nondisplaced fracture of middle phalanx of right little finger, initial encounter - S62.656A  Facial abrasion, initial encounter - S00.81XA  Alcoholic intoxication with complication - F10.929  Other closed nondisplaced fracture of sixth cervical vertebra, initial encounter (H) -  S12.591A  Closed nondisplaced fracture of fifth cervical vertebra, unspecified fracture morphology, initial encounter (H) - S12.401A  Closed nondisplaced fracture of first cervical vertebra, unspecified fracture morphology, initial encounter (H) - S12.001A  Closed nondisplaced fracture of posterior arch of first cervical vertebra, initial encounter (H) - S12.031A     Most Recent Discharge Date: 3/31/2025   Most Recent Discharge Diagnosis: Closed nondisplaced fracture of posterior arch of first cervical vertebra, initial encounter (H) - S12.031A  Other closed nondisplaced fracture of sixth cervical vertebra, initial encounter (H) - S12.591A  Alcoholic intoxication with complication - F10.929  Facial abrasion, initial encounter - S00.81XA  Closed nondisplaced fracture of first cervical vertebra, unspecified fracture morphology, initial encounter (H) - S12.001A  Closed nondisplaced fracture of fifth cervical vertebra, unspecified fracture morphology, initial encounter (H) - S12.401A  Closed nondisplaced fracture of middle phalanx of right little finger, initial encounter - S62.656A   Was the patient in the ICU or did the patient experience delirium during hospitalization?  No  Do you have any other stressors you would like to discuss with your provider? No    Problems taking medications regularly:  None  Medication changes since discharge: None  Problems adhering to non-medication therapy:  None    Summary of hospitalization:  [facility] discharge summary reviewed  Diagnostic Tests/Treatments reviewed.  Follow up needed: none  Other Healthcare Providers Involved in Patient s Care:         Specialist appointment - Neurosurgery 4/25/25  Update since discharge: improved.         Plan of care communicated with patient               Review of Systems  Constitutional, HEENT, cardiovascular, pulmonary, gi and gu systems are negative, except as otherwise noted.      Objective    Vitals - Patient  Reported  Systolic (Patient Reported): 125  Diastolic (Patient Reported): (!) 100  Weight (Patient Reported): 163.3 kg (360 lb)  Height (Patient Reported): 182.9 cm (6')  BMI (Based on Pt Reported Ht/Wt): 48.82        Physical Exam   GENERAL: alert and no distress  EYES: Eyes grossly normal to inspection.  No discharge or erythema, or obvious scleral/conjunctival abnormalities.  RESP: No audible wheeze, cough, or visible cyanosis.    SKIN: Visible skin clear. No significant rash, abnormal pigmentation or lesions.  NEURO: Cranial nerves grossly intact.  Mentation and speech appropriate for age.  PSYCH: Appropriate affect, tone, and pace of words        Video-Visit Details    Type of service:  Video Visit   Originating Location (pt. Location): Home    Distant Location (provider location):  Off-site  Platform used for Video Visit: David  Signed Electronically by: Yovana Felipe MD

## 2025-04-10 NOTE — PROGRESS NOTES
Per routing comment:    Yovana Felipe MD P [facility] - Primary Care  Please call giovanni daily to see how he is doing - he was intoxicated during our visit today - back to drinking daily. Hesitant to participate in treatment, declined referral today. Is there an CTC on file for anyone? We can involve them if need be.    ----------------------------------------------------------------------------    NO CTC on file.    Called patient and left a message to call the clinic back and ask to speak to a nurse.

## 2025-04-10 NOTE — Clinical Note
Please call giovanni daily to see how he is doing - he was intoxicated during our visit today - back to drinking daily. Hesitant to participate in treatment, declined referral today. Is there an CTC on file for anyone? We can involve them if need be.

## 2025-04-11 ENCOUNTER — APPOINTMENT (OUTPATIENT)
Dept: PHYSICAL THERAPY | Facility: CLINIC | Age: 45
DRG: 897 | End: 2025-04-11
Attending: INTERNAL MEDICINE
Payer: COMMERCIAL

## 2025-04-11 ENCOUNTER — APPOINTMENT (OUTPATIENT)
Dept: CT IMAGING | Facility: CLINIC | Age: 45
DRG: 897 | End: 2025-04-11
Attending: EMERGENCY MEDICINE
Payer: COMMERCIAL

## 2025-04-11 ENCOUNTER — HOSPITAL ENCOUNTER (INPATIENT)
Facility: CLINIC | Age: 45
LOS: 1 days | Discharge: HOME HEALTH | DRG: 897 | End: 2025-04-14
Attending: EMERGENCY MEDICINE | Admitting: INTERNAL MEDICINE
Payer: COMMERCIAL

## 2025-04-11 ENCOUNTER — TELEPHONE (OUTPATIENT)
Dept: FAMILY MEDICINE | Facility: CLINIC | Age: 45
End: 2025-04-11

## 2025-04-11 DIAGNOSIS — S12.401A CLOSED NONDISPLACED FRACTURE OF FIFTH CERVICAL VERTEBRA, UNSPECIFIED FRACTURE MORPHOLOGY, INITIAL ENCOUNTER (H): ICD-10-CM

## 2025-04-11 DIAGNOSIS — R73.9 HYPERGLYCEMIA: ICD-10-CM

## 2025-04-11 DIAGNOSIS — S12.001A CLOSED NONDISPLACED FRACTURE OF FIRST CERVICAL VERTEBRA, UNSPECIFIED FRACTURE MORPHOLOGY, INITIAL ENCOUNTER (H): ICD-10-CM

## 2025-04-11 DIAGNOSIS — F10.920 ALCOHOLIC INTOXICATION WITHOUT COMPLICATION: ICD-10-CM

## 2025-04-11 DIAGNOSIS — R79.1 SUBTHERAPEUTIC INTERNATIONAL NORMALIZED RATIO (INR): ICD-10-CM

## 2025-04-11 DIAGNOSIS — E88.89 ALCOHOLIC KETOSIS (H): ICD-10-CM

## 2025-04-11 DIAGNOSIS — R74.8 ELEVATED CPK: ICD-10-CM

## 2025-04-11 DIAGNOSIS — S12.031A CLOSED NONDISPLACED FRACTURE OF POSTERIOR ARCH OF FIRST CERVICAL VERTEBRA, INITIAL ENCOUNTER (H): Primary | ICD-10-CM
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
## 2025-04-11 LAB
ALBUMIN SERPL BCG-MCNC: 4.3 G/DL (ref 3.5–5.2)
ALBUMIN UR-MCNC: 100 MG/DL
ALP SERPL-CCNC: 151 U/L (ref 40–150)
ALT SERPL W P-5'-P-CCNC: 66 U/L (ref 0–70)
AMPHETAMINES UR QL SCN: NORMAL
ANION GAP SERPL CALCULATED.3IONS-SCNC: 18 MMOL/L (ref 7–15)
ANION GAP SERPL CALCULATED.3IONS-SCNC: 29 MMOL/L (ref 7–15)
APPEARANCE UR: CLEAR
AST SERPL W P-5'-P-CCNC: 88 U/L (ref 0–45)
B-OH-BUTYR SERPL-SCNC: 2.7 MMOL/L
BARBITURATES UR QL SCN: NORMAL
BASE EXCESS BLDV CALC-SCNC: -6.8 MMOL/L (ref -3–3)
BASOPHILS # BLD AUTO: 0 10E3/UL (ref 0–0.2)
BASOPHILS NFR BLD AUTO: 0 %
BENZODIAZ UR QL SCN: NORMAL
BILIRUB SERPL-MCNC: 0.5 MG/DL
BILIRUB UR QL STRIP: NEGATIVE
BUN SERPL-MCNC: 12.4 MG/DL (ref 6–20)
BUN SERPL-MCNC: 12.8 MG/DL (ref 6–20)
BZE UR QL SCN: NORMAL
CALCIUM SERPL-MCNC: 9 MG/DL (ref 8.8–10.4)
CALCIUM SERPL-MCNC: 9.1 MG/DL (ref 8.8–10.4)
CANNABINOIDS UR QL SCN: NORMAL
CELLULAR CAST: 1 /LPF
CHLORIDE SERPL-SCNC: 88 MMOL/L (ref 98–107)
CHLORIDE SERPL-SCNC: 93 MMOL/L (ref 98–107)
CK SERPL-CCNC: 1135 U/L (ref 39–308)
COLOR UR AUTO: ABNORMAL
CREAT SERPL-MCNC: 0.78 MG/DL (ref 0.67–1.17)
CREAT SERPL-MCNC: 0.86 MG/DL (ref 0.67–1.17)
EGFRCR SERPLBLD CKD-EPI 2021: >90 ML/MIN/1.73M2
EGFRCR SERPLBLD CKD-EPI 2021: >90 ML/MIN/1.73M2
EOSINOPHIL # BLD AUTO: 0.3 10E3/UL (ref 0–0.7)
EOSINOPHIL NFR BLD AUTO: 2 %
ERYTHROCYTE [DISTWIDTH] IN BLOOD BY AUTOMATED COUNT: 16.5 % (ref 10–15)
ETHANOL SERPL-MCNC: 0.26 G/DL
FENTANYL UR QL: NORMAL
GLUCOSE BLDC GLUCOMTR-MCNC: 147 MG/DL (ref 70–99)
GLUCOSE BLDC GLUCOMTR-MCNC: 196 MG/DL (ref 70–99)
GLUCOSE BLDC GLUCOMTR-MCNC: 211 MG/DL (ref 70–99)
GLUCOSE BLDC GLUCOMTR-MCNC: 288 MG/DL (ref 70–99)
GLUCOSE SERPL-MCNC: 177 MG/DL (ref 70–99)
GLUCOSE SERPL-MCNC: 260 MG/DL (ref 70–99)
GLUCOSE UR STRIP-MCNC: NEGATIVE MG/DL
GRANULAR CAST: 17 /LPF
HCO3 BLDV-SCNC: 17 MMOL/L (ref 21–28)
HCO3 SERPL-SCNC: 15 MMOL/L (ref 22–29)
HCO3 SERPL-SCNC: 19 MMOL/L (ref 22–29)
HCT VFR BLD AUTO: 39.3 % (ref 40–53)
HGB BLD-MCNC: 12.7 G/DL (ref 13.3–17.7)
HGB UR QL STRIP: ABNORMAL
HYALINE CASTS: 14 /LPF
IMM GRANULOCYTES # BLD: 0.1 10E3/UL
IMM GRANULOCYTES NFR BLD: 1 %
INR PPP: 1.38 (ref 0.85–1.15)
KETONES UR STRIP-MCNC: 80 MG/DL
LEUKOCYTE ESTERASE UR QL STRIP: NEGATIVE
LYMPHOCYTES # BLD AUTO: 1.3 10E3/UL (ref 0.8–5.3)
LYMPHOCYTES NFR BLD AUTO: 11 %
MAGNESIUM SERPL-MCNC: 1.9 MG/DL (ref 1.7–2.3)
MCH RBC QN AUTO: 26.4 PG (ref 26.5–33)
MCHC RBC AUTO-ENTMCNC: 32.3 G/DL (ref 31.5–36.5)
MCV RBC AUTO: 82 FL (ref 78–100)
MONOCYTES # BLD AUTO: 0.6 10E3/UL (ref 0–1.3)
MONOCYTES NFR BLD AUTO: 5 %
MUCOUS THREADS #/AREA URNS LPF: PRESENT /LPF
NEUTROPHILS # BLD AUTO: 9.2 10E3/UL (ref 1.6–8.3)
NEUTROPHILS NFR BLD AUTO: 81 %
NITRATE UR QL: NEGATIVE
NRBC # BLD AUTO: 0 10E3/UL
NRBC BLD AUTO-RTO: 0 /100
O2/TOTAL GAS SETTING VFR VENT: 21 %
OPIATES UR QL SCN: NORMAL
OXYHGB MFR BLDV: 62 % (ref 70–75)
PCO2 BLDV: 31 MM HG (ref 40–50)
PCP QUAL URINE (ROCHE): NORMAL
PH BLDV: 7.36 [PH] (ref 7.32–7.43)
PH UR STRIP: 5.5 [PH] (ref 5–7)
PHOSPHATE SERPL-MCNC: 3.4 MG/DL (ref 2.5–4.5)
PLAT MORPH BLD: NORMAL
PLATELET # BLD AUTO: 288 10E3/UL (ref 150–450)
PO2 BLDV: 37 MM HG (ref 25–47)
POTASSIUM SERPL-SCNC: 4.3 MMOL/L (ref 3.4–5.3)
POTASSIUM SERPL-SCNC: 4.8 MMOL/L (ref 3.4–5.3)
PROT SERPL-MCNC: 7.9 G/DL (ref 6.4–8.3)
RBC # BLD AUTO: 4.81 10E6/UL (ref 4.4–5.9)
RBC MORPH BLD: NORMAL
RBC URINE: 1 /HPF
SAO2 % BLDV: 63 % (ref 70–75)
SODIUM SERPL-SCNC: 130 MMOL/L (ref 135–145)
SODIUM SERPL-SCNC: 132 MMOL/L (ref 135–145)
SP GR UR STRIP: 1.02 (ref 1–1.03)
SQUAMOUS EPITHELIAL: <1 /HPF
UROBILINOGEN UR STRIP-MCNC: NORMAL MG/DL
WBC # BLD AUTO: 11.4 10E3/UL (ref 4–11)
WBC URINE: 3 /HPF

## 2025-04-11 PROCEDURE — 83735 ASSAY OF MAGNESIUM: CPT | Performed by: EMERGENCY MEDICINE

## 2025-04-11 PROCEDURE — 97530 THERAPEUTIC ACTIVITIES: CPT | Mod: GP | Performed by: PHYSICAL THERAPIST

## 2025-04-11 PROCEDURE — 72125 CT NECK SPINE W/O DYE: CPT

## 2025-04-11 PROCEDURE — 99222 1ST HOSP IP/OBS MODERATE 55: CPT | Performed by: INTERNAL MEDICINE

## 2025-04-11 PROCEDURE — 82077 ASSAY SPEC XCP UR&BREATH IA: CPT | Performed by: EMERGENCY MEDICINE

## 2025-04-11 PROCEDURE — 82805 BLOOD GASES W/O2 SATURATION: CPT | Performed by: EMERGENCY MEDICINE

## 2025-04-11 PROCEDURE — G0378 HOSPITAL OBSERVATION PER HR: HCPCS

## 2025-04-11 PROCEDURE — 250N000012 HC RX MED GY IP 250 OP 636 PS 637: Performed by: INTERNAL MEDICINE

## 2025-04-11 PROCEDURE — 250N000011 HC RX IP 250 OP 636: Performed by: INTERNAL MEDICINE

## 2025-04-11 PROCEDURE — 84155 ASSAY OF PROTEIN SERUM: CPT | Performed by: EMERGENCY MEDICINE

## 2025-04-11 PROCEDURE — 258N000003 HC RX IP 258 OP 636: Performed by: INTERNAL MEDICINE

## 2025-04-11 PROCEDURE — 96374 THER/PROPH/DIAG INJ IV PUSH: CPT

## 2025-04-11 PROCEDURE — 36415 COLL VENOUS BLD VENIPUNCTURE: CPT | Performed by: INTERNAL MEDICINE

## 2025-04-11 PROCEDURE — 80307 DRUG TEST PRSMV CHEM ANLYZR: CPT | Performed by: EMERGENCY MEDICINE

## 2025-04-11 PROCEDURE — 94660 CPAP INITIATION&MGMT: CPT

## 2025-04-11 PROCEDURE — 97161 PT EVAL LOW COMPLEX 20 MIN: CPT | Mod: GP | Performed by: PHYSICAL THERAPIST

## 2025-04-11 PROCEDURE — 250N000013 HC RX MED GY IP 250 OP 250 PS 637: Performed by: INTERNAL MEDICINE

## 2025-04-11 PROCEDURE — 81001 URINALYSIS AUTO W/SCOPE: CPT | Performed by: INTERNAL MEDICINE

## 2025-04-11 PROCEDURE — 85004 AUTOMATED DIFF WBC COUNT: CPT | Performed by: EMERGENCY MEDICINE

## 2025-04-11 PROCEDURE — 82550 ASSAY OF CK (CPK): CPT | Performed by: EMERGENCY MEDICINE

## 2025-04-11 PROCEDURE — 51798 US URINE CAPACITY MEASURE: CPT

## 2025-04-11 PROCEDURE — 96361 HYDRATE IV INFUSION ADD-ON: CPT

## 2025-04-11 PROCEDURE — 70450 CT HEAD/BRAIN W/O DYE: CPT

## 2025-04-11 PROCEDURE — 96372 THER/PROPH/DIAG INJ SC/IM: CPT | Performed by: INTERNAL MEDICINE

## 2025-04-11 PROCEDURE — 82962 GLUCOSE BLOOD TEST: CPT

## 2025-04-11 PROCEDURE — 5A09357 ASSISTANCE WITH RESPIRATORY VENTILATION, LESS THAN 24 CONSECUTIVE HOURS, CONTINUOUS POSITIVE AIRWAY PRESSURE: ICD-10-PCS | Performed by: INTERNAL MEDICINE

## 2025-04-11 PROCEDURE — 99285 EMERGENCY DEPT VISIT HI MDM: CPT | Mod: 25

## 2025-04-11 PROCEDURE — 82010 KETONE BODYS QUAN: CPT | Performed by: EMERGENCY MEDICINE

## 2025-04-11 PROCEDURE — 84100 ASSAY OF PHOSPHORUS: CPT | Performed by: INTERNAL MEDICINE

## 2025-04-11 PROCEDURE — 36415 COLL VENOUS BLD VENIPUNCTURE: CPT | Performed by: EMERGENCY MEDICINE

## 2025-04-11 PROCEDURE — 85610 PROTHROMBIN TIME: CPT | Performed by: EMERGENCY MEDICINE

## 2025-04-11 PROCEDURE — 258N000003 HC RX IP 258 OP 636: Performed by: EMERGENCY MEDICINE

## 2025-04-11 PROCEDURE — 999N000157 HC STATISTIC RCP TIME EA 10 MIN

## 2025-04-11 RX ORDER — BUPROPION HYDROCHLORIDE 150 MG/1
300 TABLET ORAL EVERY MORNING
Status: DISCONTINUED | OUTPATIENT
Start: 2025-04-11 | End: 2025-04-14 | Stop reason: HOSPADM

## 2025-04-11 RX ORDER — FLUMAZENIL 0.1 MG/ML
0.2 INJECTION, SOLUTION INTRAVENOUS
Status: DISCONTINUED | OUTPATIENT
Start: 2025-04-11 | End: 2025-04-14 | Stop reason: HOSPADM

## 2025-04-11 RX ORDER — LISINOPRIL 10 MG/1
20 TABLET ORAL DAILY
Status: DISCONTINUED | OUTPATIENT
Start: 2025-04-11 | End: 2025-04-14 | Stop reason: HOSPADM

## 2025-04-11 RX ORDER — ENOXAPARIN SODIUM 150 MG/ML
120 INJECTION SUBCUTANEOUS EVERY 12 HOURS
Status: DISCONTINUED | OUTPATIENT
Start: 2025-04-11 | End: 2025-04-14 | Stop reason: HOSPADM

## 2025-04-11 RX ORDER — ACETAMINOPHEN 500 MG
1000 TABLET ORAL 3 TIMES DAILY PRN
COMMUNITY

## 2025-04-11 RX ORDER — DEXTROSE MONOHYDRATE 25 G/50ML
25-50 INJECTION, SOLUTION INTRAVENOUS
Status: DISCONTINUED | OUTPATIENT
Start: 2025-04-11 | End: 2025-04-14 | Stop reason: HOSPADM

## 2025-04-11 RX ORDER — LORAZEPAM 1 MG/1
1-2 TABLET ORAL EVERY 30 MIN PRN
Status: DISCONTINUED | OUTPATIENT
Start: 2025-04-11 | End: 2025-04-14 | Stop reason: HOSPADM

## 2025-04-11 RX ORDER — WARFARIN SODIUM 5 MG/1
5 TABLET ORAL
Status: COMPLETED | OUTPATIENT
Start: 2025-04-11 | End: 2025-04-11

## 2025-04-11 RX ORDER — MULTIPLE VITAMINS W/ MINERALS TAB 9MG-400MCG
1 TAB ORAL DAILY
Status: DISCONTINUED | OUTPATIENT
Start: 2025-04-11 | End: 2025-04-14 | Stop reason: HOSPADM

## 2025-04-11 RX ORDER — AMOXICILLIN 250 MG
2 CAPSULE ORAL 2 TIMES DAILY PRN
Status: DISCONTINUED | OUTPATIENT
Start: 2025-04-11 | End: 2025-04-14 | Stop reason: HOSPADM

## 2025-04-11 RX ORDER — METHOCARBAMOL 500 MG/1
500 TABLET, FILM COATED ORAL 3 TIMES DAILY PRN
COMMUNITY
End: 2025-04-17

## 2025-04-11 RX ORDER — PROCHLORPERAZINE MALEATE 5 MG/1
10 TABLET ORAL EVERY 6 HOURS PRN
Status: DISCONTINUED | OUTPATIENT
Start: 2025-04-11 | End: 2025-04-14 | Stop reason: HOSPADM

## 2025-04-11 RX ORDER — HYDROXYZINE HYDROCHLORIDE 50 MG/1
50 TABLET, FILM COATED ORAL EVERY 6 HOURS PRN
COMMUNITY

## 2025-04-11 RX ORDER — METFORMIN HYDROCHLORIDE 500 MG/1
1000 TABLET, EXTENDED RELEASE ORAL
Status: DISCONTINUED | OUTPATIENT
Start: 2025-04-11 | End: 2025-04-14 | Stop reason: HOSPADM

## 2025-04-11 RX ORDER — DEXTROSE MONOHYDRATE AND SODIUM CHLORIDE 5; .9 G/100ML; G/100ML
INJECTION, SOLUTION INTRAVENOUS CONTINUOUS
Status: DISCONTINUED | OUTPATIENT
Start: 2025-04-11 | End: 2025-04-12

## 2025-04-11 RX ORDER — FOLIC ACID 1 MG/1
1 TABLET ORAL DAILY
Status: DISCONTINUED | OUTPATIENT
Start: 2025-04-11 | End: 2025-04-14 | Stop reason: HOSPADM

## 2025-04-11 RX ORDER — ACETAMINOPHEN 325 MG/1
650 TABLET ORAL EVERY 4 HOURS PRN
Status: DISCONTINUED | OUTPATIENT
Start: 2025-04-11 | End: 2025-04-14 | Stop reason: HOSPADM

## 2025-04-11 RX ORDER — CARVEDILOL 12.5 MG/1
12.5 TABLET ORAL 2 TIMES DAILY WITH MEALS
Status: DISCONTINUED | OUTPATIENT
Start: 2025-04-11 | End: 2025-04-14 | Stop reason: HOSPADM

## 2025-04-11 RX ORDER — NICOTINE POLACRILEX 4 MG
15-30 LOZENGE BUCCAL
Status: DISCONTINUED | OUTPATIENT
Start: 2025-04-11 | End: 2025-04-14 | Stop reason: HOSPADM

## 2025-04-11 RX ORDER — ACETAMINOPHEN 650 MG/1
650 SUPPOSITORY RECTAL EVERY 4 HOURS PRN
Status: DISCONTINUED | OUTPATIENT
Start: 2025-04-11 | End: 2025-04-14 | Stop reason: HOSPADM

## 2025-04-11 RX ORDER — AMOXICILLIN 250 MG
1 CAPSULE ORAL 2 TIMES DAILY PRN
Status: DISCONTINUED | OUTPATIENT
Start: 2025-04-11 | End: 2025-04-14 | Stop reason: HOSPADM

## 2025-04-11 RX ORDER — FLUTICASONE PROPIONATE 50 MCG
1 SPRAY, SUSPENSION (ML) NASAL DAILY PRN
COMMUNITY

## 2025-04-11 RX ORDER — LORAZEPAM 2 MG/ML
1-2 INJECTION INTRAMUSCULAR EVERY 30 MIN PRN
Status: DISCONTINUED | OUTPATIENT
Start: 2025-04-11 | End: 2025-04-14 | Stop reason: HOSPADM

## 2025-04-11 RX ORDER — ONDANSETRON 2 MG/ML
4 INJECTION INTRAMUSCULAR; INTRAVENOUS EVERY 6 HOURS PRN
Status: DISCONTINUED | OUTPATIENT
Start: 2025-04-11 | End: 2025-04-14 | Stop reason: HOSPADM

## 2025-04-11 RX ORDER — ONDANSETRON 4 MG/1
4 TABLET, ORALLY DISINTEGRATING ORAL EVERY 6 HOURS PRN
Status: DISCONTINUED | OUTPATIENT
Start: 2025-04-11 | End: 2025-04-14 | Stop reason: HOSPADM

## 2025-04-11 RX ORDER — POLYETHYLENE GLYCOL 3350 17 G/17G
17 POWDER, FOR SOLUTION ORAL 2 TIMES DAILY PRN
Status: DISCONTINUED | OUTPATIENT
Start: 2025-04-11 | End: 2025-04-14 | Stop reason: HOSPADM

## 2025-04-11 RX ORDER — NALTREXONE HYDROCHLORIDE 50 MG/1
50 TABLET, FILM COATED ORAL DAILY
COMMUNITY

## 2025-04-11 RX ADMIN — SODIUM CHLORIDE 1000 ML: 9 INJECTION, SOLUTION INTRAVENOUS at 05:00

## 2025-04-11 RX ADMIN — INSULIN ASPART 2 UNITS: 100 INJECTION, SOLUTION INTRAVENOUS; SUBCUTANEOUS at 17:49

## 2025-04-11 RX ADMIN — ACETAMINOPHEN 650 MG: 325 TABLET, FILM COATED ORAL at 10:18

## 2025-04-11 RX ADMIN — ACETAMINOPHEN 650 MG: 325 TABLET, FILM COATED ORAL at 20:29

## 2025-04-11 RX ADMIN — ONDANSETRON 4 MG: 4 TABLET, ORALLY DISINTEGRATING ORAL at 12:53

## 2025-04-11 RX ADMIN — Medication 1 TABLET: at 09:20

## 2025-04-11 RX ADMIN — FOLIC ACID 1 MG: 1 TABLET ORAL at 09:20

## 2025-04-11 RX ADMIN — INSULIN ASPART 3 UNITS: 100 INJECTION, SOLUTION INTRAVENOUS; SUBCUTANEOUS at 12:46

## 2025-04-11 RX ADMIN — ENOXAPARIN SODIUM 120 MG: 120 INJECTION SUBCUTANEOUS at 12:42

## 2025-04-11 RX ADMIN — SODIUM CHLORIDE 1000 ML: 9 INJECTION, SOLUTION INTRAVENOUS at 05:48

## 2025-04-11 RX ADMIN — DEXTROSE AND SODIUM CHLORIDE: 5; 900 INJECTION, SOLUTION INTRAVENOUS at 06:50

## 2025-04-11 RX ADMIN — LORAZEPAM 1 MG: 1 TABLET ORAL at 14:33

## 2025-04-11 RX ADMIN — CARVEDILOL 12.5 MG: 12.5 TABLET, FILM COATED ORAL at 09:51

## 2025-04-11 RX ADMIN — LORAZEPAM 2 MG: 2 INJECTION INTRAMUSCULAR; INTRAVENOUS at 12:53

## 2025-04-11 RX ADMIN — WARFARIN SODIUM 5 MG: 5 TABLET ORAL at 17:49

## 2025-04-11 RX ADMIN — CARVEDILOL 12.5 MG: 12.5 TABLET, FILM COATED ORAL at 17:49

## 2025-04-11 RX ADMIN — DEXTROSE AND SODIUM CHLORIDE: 5; 900 INJECTION, SOLUTION INTRAVENOUS at 17:51

## 2025-04-11 RX ADMIN — BUPROPION HYDROCHLORIDE 300 MG: 150 TABLET, EXTENDED RELEASE ORAL at 09:51

## 2025-04-11 RX ADMIN — LISINOPRIL 20 MG: 10 TABLET ORAL at 09:51

## 2025-04-11 RX ADMIN — INSULIN ASPART 1 UNITS: 100 INJECTION, SOLUTION INTRAVENOUS; SUBCUTANEOUS at 09:51

## 2025-04-11 RX ADMIN — THIAMINE HCL TAB 100 MG 100 MG: 100 TAB at 09:20

## 2025-04-11 ASSESSMENT — ACTIVITIES OF DAILY LIVING (ADL)
ADLS_ACUITY_SCORE: 49
ADLS_ACUITY_SCORE: 53
ADLS_ACUITY_SCORE: 49
ADLS_ACUITY_SCORE: 60
ADLS_ACUITY_SCORE: 52
ADLS_ACUITY_SCORE: 49
ADLS_ACUITY_SCORE: 53
ADLS_ACUITY_SCORE: 52
ADLS_ACUITY_SCORE: 52
ADLS_ACUITY_SCORE: 60
ADLS_ACUITY_SCORE: 53
DEPENDENT_IADLS:: INDEPENDENT
ADLS_ACUITY_SCORE: 53
ADLS_ACUITY_SCORE: 60
ADLS_ACUITY_SCORE: 52
ADLS_ACUITY_SCORE: 49
ADLS_ACUITY_SCORE: 52
ADLS_ACUITY_SCORE: 52
ADLS_ACUITY_SCORE: 53
ADLS_ACUITY_SCORE: 60
ADLS_ACUITY_SCORE: 52

## 2025-04-11 NOTE — ED NOTES
"RECEIVING UNIT ED HANDOFF REVIEW    Above ED Nurse Handoff Report was reviewed: Yes  Reviewed by: Tito Barlow RN on April 11, 2025 at 6:42 AM   I Corine called the ED to inform them the note was read: Yes       [facility]  ED Nurse Handoff Report    ED Chief complaint: Alcohol Intoxication and Fall  . ED Diagnosis:   Final diagnoses:   Alcoholic intoxication without complication   Fall, initial encounter   Subtherapeutic international normalized ratio (INR)   Hyperglycemia   Elevated CPK       Allergies: No Known Allergies    Code Status: Full Code    Activity level - Baseline/Home:  independent.  Activity Level - Current:   standby.   Lift room needed: No.   Bariatric: No   Needed: No   Isolation: No.   Infection: Not Applicable.     Respiratory status: Room air    Vital Signs (within 30 minutes):   Vitals:    04/11/25 0402 04/11/25 0540   BP: (!) 168/98 (!) 164/90   Pulse: 99 96   Resp: 22 21   Temp: 99.2  F (37.3  C) 98.4  F (36.9  C)   TempSrc: Temporal Temporal   SpO2: 96% 95%       Cardiac Rhythm:  ,      Pain level:    Patient confused: No.   Patient Falls Risk: nonskid shoes/slippers when out of bed, arm band in place, patient and family education, assistive device/personal items within reach, and activity supervised.   Elimination Status:  straight cath at 0600H. Drained 600mL      Patient Report - Initial Complaint: alcohol intoxication, fall.   Focused Assessment: 44 year old male with history of AFIB on Warfarin, DVT, and type 2 diabetes who presents to the ED via EMS for evaluation of alcohol intoxication. EMS reports patient was in his apartment drinking vodka since yesterday morning. His neighbor received a text from him saying \"help\". Patient fell and couldn't get up. He denies head trauma/LOC. He denies use of mobility devices. He reports laying on the ground roughly 20 minutes. Neighbor had to climb through the window to help him. According to ex-wife who " "lives next door, EMS reported she had concerns the patient has been unable to care of himself while living alone. His blood glucose was 106. Patient has known C1, C5/C6 fractures from a fall that occurred two weeks ago when he was intoxicated.    He states normally drinking 750 ml of alcohol for the past 5 days. His last drink was 2300. This wasn't an attempt to harm self. No history of self harm. He states drinking more because \"I like the feeling of it\". No history withdrawal. He has no interest of detox. Denies shortness of breath, chest pain, abdominal pain, fever, cough, back pain, headache or vomiting. No drug use.      Abnormal Results:   Labs Ordered and Resulted from Time of ED Arrival to Time of ED Departure   COMPREHENSIVE METABOLIC PANEL - Abnormal       Result Value    Sodium 132 (*)     Potassium 4.8      Carbon Dioxide (CO2) 15 (*)     Anion Gap 29 (*)     Urea Nitrogen 12.4      Creatinine 0.86      GFR Estimate >90      Calcium 9.1      Chloride 88 (*)     Glucose 177 (*)     Alkaline Phosphatase 151 (*)     AST 88 (*)     ALT 66      Protein Total 7.9      Albumin 4.3      Bilirubin Total 0.5     INR - Abnormal    INR 1.38 (*)    ETHYL ALCOHOL LEVEL - Abnormal    Alcohol ethyl 0.26 (*)    CBC WITH PLATELETS AND DIFFERENTIAL - Abnormal    WBC Count 11.4 (*)     RBC Count 4.81      Hemoglobin 12.7 (*)     Hematocrit 39.3 (*)     MCV 82      MCH 26.4 (*)     MCHC 32.3      RDW 16.5 (*)     Platelet Count 288     CK TOTAL - Abnormal    CK 1,135 (*)    BLOOD GAS VENOUS - Abnormal    pH Venous 7.36      pCO2 Venous 31 (*)     pO2 Venous 37      Bicarbonate Venous 17 (*)     Base Excess/Deficit Venous -6.8 (*)     FIO2 21      Oxyhemoglobin Venous 62 (*)     O2 Sat, Venous 63.0 (*)    KETONE BETA-HYDROXYBUTYRATE QUANTITATIVE, RAPID - Abnormal    Ketone (Beta-Hydroxybutyrate) Quantitative 2.70 (*)    MAGNESIUM - Normal    Magnesium 1.9     RBC AND PLATELET MORPHOLOGY   ROUTINE UA WITH MICROSCOPIC   URINE " DRUG SCREEN PANEL        CT Cervical Spine w/o Contrast   Final Result   IMPRESSION:   HEAD CT:   No acute intracranial hemorrhage or calvarial fracture.      CERVICAL SPINE CT:   1.  No CT evidence for acute fracture or posttraumatic subluxation.   2.  Redemonstration of nonunited fractures involving the anterior and posterior arches of the C1 ring without significant interval healing.   3.  Redemonstration of a nondisplaced fracture through an anterior C6-C7 osteophyte without significant interval healing.                      Head CT w/o contrast   Final Result   IMPRESSION:   HEAD CT:   No acute intracranial hemorrhage or calvarial fracture.      CERVICAL SPINE CT:   1.  No CT evidence for acute fracture or posttraumatic subluxation.   2.  Redemonstration of nonunited fractures involving the anterior and posterior arches of the C1 ring without significant interval healing.   3.  Redemonstration of a nondisplaced fracture through an anterior C6-C7 osteophyte without significant interval healing.                          Treatments provided: see MAR  Family Comments: N/A  OBS brochure/video discussed/provided to patient:  N/A  ED Medications:   Medications   sodium chloride 0.9% BOLUS 1,000 mL (1,000 mLs Intravenous $New Bag 4/11/25 0548)   sodium chloride 0.9% BOLUS 1,000 mL (0 mLs Intravenous Stopped 4/11/25 0613)       Drips infusing:  No  For the majority of the shift this patient was Green.   Interventions performed were N/A.    Sepsis treatment initiated: No    Cares/treatment/interventions/medications to be completed following ED care: see notes    ED Nurse Name: Eda Hicks RN  6:17 AM

## 2025-04-11 NOTE — ED NOTES
RN ED Mental Health Handoff Note    MICKIE    Does patient require 1:1? No    Hold and rights been given and documented for patient: Yes    Is the patient in  scrubs? No -hospital gown    Has the patient been searched? Yes    Is the 15 minute observation tool up to date? Yes    Was patient issued a welcome folder? No -    Room check completed this shift: Yes    PSS3 and [place] Assessment/Reassessment this shift:    C-SSRS ([place])      Date and Time Q1 Wished to be Dead (Past Month) Q2 Suicidal Thoughts (Past Month) Q3 Suicidal Thought Method Q4 Suicidal Intent without Specific Plan Q5 Suicide Intent with Specific Plan Q6 Suicide Behavior (Lifetime) If yes to Q6, within past 3 months? Level of Risk per Screen Level of Risk per Screen User   04/11/25 0438 0-->no 0-->no -- -- -- 0-->no -- -- no risks indicated MAT            Behavioral status of patient: Green    Code 21 called this shift? No    Use of restraints/seclusion this shift? No    Most recent vital signs:  Temp: 98.4  F (36.9  C) Temp src: Temporal BP: (!) 164/90 Pulse: 96   Resp: 21 SpO2: 95 % O2 Device: None (Room air)      Medications:  Scheduled medication compliance? N/A    PRN Meds administered this shift? N/A    Medications   sodium chloride 0.9% BOLUS 1,000 mL (1,000 mLs Intravenous $New Bag 4/11/25 3420)   buPROPion (WELLBUTRIN XL) 24 hr tablet 300 mg (has no administration in time range)   carvedilol (COREG) tablet 12.5 mg (has no administration in time range)   lisinopril (ZESTRIL) tablet 20 mg (has no administration in time range)   metFORMIN (GLUCOPHAGE XR) 24 hr tablet 1,000 mg ( Oral Automatically Held 4/14/25 1700)   senna-docusate (SENOKOT-S/PERICOLACE) 8.6-50 MG per tablet 1 tablet (has no administration in time range)     Or   senna-docusate (SENOKOT-S/PERICOLACE) 8.6-50 MG per tablet 2 tablet (has no administration in time range)   ondansetron (ZOFRAN ODT) ODT tab 4 mg (has no administration in time range)     Or   ondansetron (ZOFRAN)  injection 4 mg (has no administration in time range)   prochlorperazine (COMPAZINE) injection 10 mg (has no administration in time range)     Or   prochlorperazine (COMPAZINE) tablet 10 mg (has no administration in time range)   melatonin tablet 5 mg (has no administration in time range)   flumazenil (ROMAZICON) injection 0.2 mg (has no administration in time range)   glucose gel 15-30 g (has no administration in time range)     Or   dextrose 50 % injection 25-50 mL (has no administration in time range)     Or   glucagon injection 1 mg (has no administration in time range)   acetaminophen (TYLENOL) tablet 650 mg (has no administration in time range)     Or   acetaminophen (TYLENOL) Suppository 650 mg (has no administration in time range)   dextrose 5% and 0.9% NaCl infusion (has no administration in time range)   polyethylene glycol (MIRALAX) Packet 17 g (has no administration in time range)   LORazepam (ATIVAN) tablet 1-2 mg (has no administration in time range)     Or   LORazepam (ATIVAN) injection 1-2 mg (has no administration in time range)   multivitamin w/minerals (THERA-VIT-M) tablet 1 tablet (has no administration in time range)   thiamine (B-1) tablet 100 mg (has no administration in time range)   folic acid (FOLVITE) tablet 1 mg (has no administration in time range)   insulin aspart (NovoLOG) injection (RAPID ACTING) (has no administration in time range)   insulin aspart (NovoLOG) injection (RAPID ACTING) (has no administration in time range)   Patient is already receiving anticoagulation with heparin, enoxaparin (LOVENOX), warfarin (COUMADIN)  or other anticoagulant medication (has no administration in time range)   sodium chloride 0.9% BOLUS 1,000 mL (0 mLs Intravenous Stopped 4/11/25 0621)         ADLs    Meal Provided this shift? No    Hygiene items provided? No    ADLs completed? Yes    Date of last shower: PTA    Any significant events this shift? No    Any information that would be helpful in caring  for this patient?      Family present/updated? No    Location of patient's belongings: DEC, phone with pt    Critical Care Minutes:  Does the patient need critical care minutes documented? Yes

## 2025-04-11 NOTE — PROGRESS NOTES
04/11/25 1712   Appointment Info   Signing Clinician's Name / Credentials (PT) Casi Welsh DPT   Rehab Comments (PT) [place] collar at all times d/t recent C1 fx   Living Environment   People in Home alone   Current Living Arrangements apartment   Home Accessibility stairs to enter home;stairs within home   Stair Railings, Main Entrance railings on both sides of stairs   Number of Stairs, Within Home, Primary three   Transportation Anticipated car, drives self;family or friend will provide   Living Environment Comments lives in an apt alone; ex-wife lives next door   Self-Care   Usual Activity Tolerance good   Current Activity Tolerance moderate   Equipment Currently Used at Home cane, straight  (has a walker and hand held shower at home)   Fall history within last six months yes   Number of times patient has fallen within last six months 5  (per chart)   Activity/Exercise/Self-Care Comment Was supposed to use 2WW upon discharge per PT notes but pt states he wasn't   General Information   Onset of Illness/Injury or Date of Surgery 04/11/25   Referring Physician Yogi Prather MD   Patient/Family Therapy Goals Statement (PT) none stated   Pertinent History of Current Problem (include personal factors and/or comorbidities that impact the POC) 44 year old male with alcohol use disorder, 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 from his home via EMS after a fall while getting up from his chair and starting to walk when he felt weak and lost his balance.   Existing Precautions/Restrictions fall;spinal;lifting;other (see comments)  ([place]en collar on at all times)   General Observations Patient  up in recliner chair; agreeable to PT session   Cognition   Affect/Mental Status (Cognition) WFL   Orientation Status (Cognition) oriented x 3   Follows Commands (Cognition) WFL   Pain Assessment   Patient Currently in Pain No   Integumentary/Edema   Integumentary/Edema Comments Aspen collar on but needing to be adjuted   Posture    Posture Forward head position;Protracted shoulders   Range of Motion (ROM)   Range of Motion ROM deficits secondary to weakness   Strength (Manual Muscle Testing)   Strength (Manual Muscle Testing) Deficits observed during functional mobility   Bed Mobility   Comment, (Bed Mobility) not assessed, in chair at beginning and end of session   Transfers   Comment, (Transfers) CGA sit <> stand with 2WW, tremulous   Gait/Stairs (Locomotion)   [place] Level (Gait) contact guard;verbal cues   Assistive Device (Gait) walker, front-wheeled   Distance in Feet (Gait) 5' for eval; add'l for treat   Pattern (Gait) step-through   Balance   Balance Comments Tremulous with occasional knee buckling   Clinical Impression   Criteria for Skilled Therapeutic Intervention Yes, treatment indicated   PT Diagnosis (PT) decreased functional mobility   Influenced by the following impairments weakness, impaired balance, tremulous   Functional limitations due to impairments bed mobility, transfers, ambulation, stairs   Clinical Presentation (PT Evaluation Complexity) stable   Clinical Presentation Rationale clinical judgement   Clinical Decision Making (Complexity) low complexity   Planned Therapy Interventions (PT) balance training;bed mobility training;gait training;patient/family education;stair training;transfer training;progressive activity/exercise   Risk & Benefits of therapy have been explained evaluation/treatment results reviewed;care plan/treatment goals reviewed;risks/benefits reviewed;current/potential barriers reviewed;participants voiced agreement with care plan;patient   PT Total Evaluation Time    PT Eval, Low Complexity Minutes (21758) 8   Physical Therapy Goals   PT Frequency Daily   PT Predicted Duration/Target Date for Goal Attainment 04/18/25   PT Goals Bed Mobility;Transfers;Gait;Stairs   PT: Bed Mobility Modified independent;Supine to/from sit;Within precautions   PT: Transfers Modified independent;Sit to/from stand;Bed to/from chair;Assistive device;Within precautions   PT: Gait Modified independent;Assistive device;100 feet;Within precautions   PT: Stairs Modified independent;8 stairs;Rail on both sides;Assistive device   PT Discharge Planning   PT Plan assess bed mobility, repeated STS, inc safety with ambulation, trial stairs when able   PT Discharge Recommendation (DC Rec) Transitional Care Facility;home with assist;home with home care physical therapy   PT Rationale for DC Rec Pt is below baseline and presenting as huge falls risk with already having C1 fx from last fall.  Rec TCU for strength, balance, and gait.  If unable/unwilling to discharge to TCU, rec home with assist and HHPT   PT Brief overview of current status Ax1 with 2WW in room   PT Total Distance Amb During Session (feet) 140   PT Equipment Needed at Discharge walker, rolling   Physical Therapy Time and Intention   Timed Code Treatment Minutes 20   Total Session Time (sum of timed and untimed services) 28

## 2025-04-11 NOTE — PROGRESS NOTES
"PRIMARY DIAGNOSIS: Starvation/alcoholic ketoacidosis    OUTPATIENT/OBSERVATION GOALS TO BE MET BEFORE DISCHARGE  1. Orthostatic performed: N/A    2. Tolerating PO medications: Yes    3. Return to near baseline physical activity:  Awaiting PT consult    4. Cleared for discharge by consultants (if involved): No    Discharge Planner Nurse   Safe discharge environment identified: No  Barriers to discharge: Yes       Entered by: Bridgette Lambert RN 04/11/2025 2:48 PM     Please review provider order for any additional goals.   Nurse to notify provider when observation goals have been met and patient is ready for discharge.      Patient alert and oriented. VSS on room air. Reporting minimal pain at rest, a couple instances of severe pain when moving \"in the wrong way\". PRN tylenol given. Patient up assist of 1 walker gait belt. Voided and loose BM today. Neck collar in place from previous fracture. Patient with body odor and unkempt - discussed with patient on bed bath this evening or tomorrow. CIWA scoring high - see chart and MAR. Biggest complaint is hallucinations - seeing movies on the wall and his pants moving. One episode of severe dry heaving - PRN ativan (for withdrawal) and adriana given and effective. Plans pending.  "

## 2025-04-11 NOTE — PROGRESS NOTES
"PRIMARY DIAGNOSIS: \"Starvation/alcoholic ketoacidosis  OUTPATIENT/OBSERVATION GOALS TO BE MET BEFORE DISCHARGE:  ADLs back to baseline: No    Activity and level of assistance: assist of 1 walker gait belt. PT to evaluate    Pain status: Improved-controlled with oral pain medications.    Return to near baseline physical activity: No     Discharge Planner Nurse   Safe discharge environment identified: No  Barriers to discharge: Yes       Entered by: Bridgette Lambert RN 04/11/2025 2:52 PM     Please review provider order for any additional goals.   Nurse to notify provider when observation goals have been met and patient is ready for discharge.   "

## 2025-04-11 NOTE — TELEPHONE ENCOUNTER
Pt is back in hospital today       Blood ETOH 0.26 taken to ER via EMS     Needs to make sure on PAL list     Sariah Thibodeaux, RN

## 2025-04-11 NOTE — DISCHARGE INSTRUCTIONS
Substance Use Disorder Direct Access Resources    It is recommended that you abstain from all mood-altering chemicals. Please contact the scroll kiter support hotline ([phone]) as needed; phones are answered 24 hours a day, 7 days a week.     To access substance use treatment you must have a comprehensive assessment completed to begin any treatment program.     If uninsured, please contact your county of residence for eligibility screen for substance use disorder evaluation and treatment.   [place] - [phone]  [facility] [phone]  [facility] [phone]  [facility] [phone]  [facility] [phone]  [facility] [phone]  [facility] [phone]  [place] - [phone]    If you have private insurance, call the customer service number on the back of your insurance card to find an in-network substance use disorder assessment. The ideal provider will be a treatment facility, licensed in the [facility].     The following facilities also offer Direct Access Substance Use Disorder assessments:    [facility]  [phone]  Mon-Friday: [address] [facility], [address]  Saturday:  [address], [place], [address]  M-F assessments (7a-1:45p); Saturday assessments (7:45-10:45a)    [facility]  [phone]  [address] [place], [place], [address]  *by appointment only M-W; walk ins available Fridays from 10-2.    [facility]  [phone] (phone consultation available 24/7)  In-person Assessments: [address] [facility], [place] [address]  [address] [address] [place] [address] [address] [place], [place] [address] [place] [address]    [facility]  [phone]  [address] [facility], [address], [place], [address]  *walk in and appointments available by calling    [facility]  [phone] [address] [place], [place], [address]  *by appointment only M-Th     [facility]   [phone]  [address] [place], [place], [address]  *walk ins available M-F    [facility]  [phone]  [address] [place], [place], [address]  *available by appointments only    [facility]  [phone]  [address] [place], [place], [address]  *available by appointment only    [facility] ([facility])  [phone]  [address] [address], [place], [place], [address]  *Walk in assessments also available M-F starting at 8 am.    [facility]  [phone]  [address] [place], [place], [address]  *available by appointments    [facility]  [phone]  [address] [place], [place], [address]  *walk in assessments available M-F starting at 7 am.    [facility]  [phone]  [facility]  [address] , [place], [place], [address]  *Walk in assessments available M-F starting at 8 am OR [phone]    [facility]  [address], [place] [address]  *Walk in assessments available M-F starting at 8 am    [facility]  [phone]  [address] [place] [address]    [facility]  [phone]  [address] [place], [place], [address]  *available by appointment only    [facility]  [phone]  [address] [facility], [place], [address]    [facility]  [phone]  [address], [place], [place], [address]  *available by appointment    [facility]  [phone]  [address], [place], [place], [address]  *walk ins available M-TH from 9-3    [facility]  [phone]  [address] [place], [place], [address]    [facility]  [phone] [address] [place] [address] [address] [place] [address]  [email or web address]  [phone]  [address], [address], [place] [address]    [facility]   [email or web address]  [phone], [address], [place], [place] [address] [phone], [address], [place] [address]  [phone], [address] [facility], [address], [place] [address]  [phone], [address], ([facility]), [place] [address]  [phone], [address] [place] [address]    If you are intoxicated, you may be required to detox at a detox facility before starting treatment. The following are detox facilities where you may seek services:    [facility]: [address] [place], [place], [address] [phone]  [facility]: [address] [place], [place], [id number] [phone]  [facility]: [address] [place], [place], [address] [phone]  [facility]: [address] [place], [place], [address] [phone]  [facility]: [address] [place], [place], [address] [phone]     Ways to help cope with sobriety:  Take prescribed medicines as scheduled  Keep follow-up appointments  Talk to others about your concerns  Get regular exercise  Practice deep breathing skills  Eat a healthy diet  Use community resources, including hotline numbers, [facility] crisis and support meetings  Stay sober and avoid places/people/things associated with substance use  Maintain a daily schedule/routine  Get at least 7-8 hours of sleep per night  Create a list 10--20 healthy activities that you can do that are enjoyable and do not involve substance use  Create daily goals (approx. 1-4 goals) per day and work to achieve them throughout the day    [place] [facility] ([facility]): www.[facility].org  [phone]  [facility] connects people seeking recovery to resources that help foster and sustain long-term recovery. Whether you are seeking resources for treatment, transportation, housing, job training, education,  health care or other pathways to recovery, [facility] is a great place to start. (Great listing of all types of recovery and non-recovery related resources)    [facility]: https://www.smartrecDot Hill Systemsy.org/    Alcoholics Anonymous: 1-800-ALCOHOL  HTTP://WWW.AA.ORG/  [facility] (279-797-0333 or http://a[place].org)  [facility] (241-462-2260 or www.aastpaul.org)    Narcotics Anonymous: 751.808.3800  www.Well.ca.[employer].    [facility]: [address] [place] [address], [address], [place]  [phone]  Drop-in Hours: Monday-Friday 9-11:30 am. By appointment at other times.  [facility] is a drop-in center on the east side [facility] that provides a safe space for individuals who are homeless and have a history of chemical use. Sobriety is not a requirement but drugs and alcohol are not allowed on the property.  Non-clients can access drop-in services such as Recovery and Harm Reduction Groups, referrals to case management, community activities, shower facilities, and a pool table. Individuals who are homeless and have chemical health needs may be eligible for enrollment into [facility]'s case management program. Clients and  work together to access benefits, treatment, health care, shelter, and external housing resources.     [facility] Chemical Assessment & Referral Unit: [address] [place] [address]  [phone]  Monday-Friday 8 am-5 pm  Substance use disorder comprehensive assessment and referral for individuals seeking treatment or counseling for chemical dependency. Must be a resident of [facility]. There is no fee for assessment. There is some funding available for treatment programs.    [facility]: [facility]  [phone] or [phone]  Monday - Friday 7 am - 5 pm  Daily drop-in substance use disorder comprehensive assessment and weekly mental health assessments and services. Outpatient chemical dependency treatment (with sober  recovery housing), relapse prevention, case management, and employment services. Outpatient and residential treatment for women with children. Specializes in assisting individuals with a history of relapse who face multiple barriers to achieving stable recovery. No charge for most services or services can be billed through insurance. Gender-specific services and treatment for co-occurring substance abuse and mental health concerns offered.    [facility]: [facility], [address] [place] [address], First Floor, [address], [place] [address] (next to the outpatient lab)  [phone]  Serves people ages 16 and older  Open 5 days/week Walk-in hours: Monday, Wednesday, Friday 9:00 am - 11:00 am and 1:00 pm - 3:00 pm and Tuesday, Thursday 1:30 pm - 4:00 pm  Provides bridging services to people with Opiate Use Disorders (OUD) seeking care. This is a front door to Medication Assisted Treatments (MAT), Peer Recovery and Nursing services and referrals to Substance Use Disorder (MARCY) Assessments.

## 2025-04-11 NOTE — ED TRIAGE NOTES
"Arrived via EMS for alcohol intoxication. Per EMS, pt texted neighbor \"help\". Pt fell on the floor and couldn't get up. Neighbor had to climb thru the window to get to him.  Been drinking since yesterday morning. Dozen liters of empty/partial vodka bottles seen inside his apartment.   Pt living alone and unable to care for himself.   mg/dL  Pt with neck collar. Pt had a fall and C1 fracture 2 weeks ago.  Pt denies dizziness, headache, N/V.  Denies SI/HI    "

## 2025-04-11 NOTE — PLAN OF CARE
PRIMARY DIAGNOSIS: Fall, Etoh, Cervival fractures  OUTPATIENT/OBSERVATION GOALS TO BE MET BEFORE DISCHARGE:  ADLs back to baseline: No    Activity and level of assistance: A1 walker gait belt    Pain status: Improved-controlled with oral pain medications.    Return to near baseline physical activity: No     Discharge Planner Nurse   Safe discharge environment identified: No  Barriers to discharge: Yes       Entered by: Odalis Gaytan RN 04/11/2025 4:59 PM     Please review provider order for any additional goals.   Nurse to notify provider when observation goals have been met and patient is ready for discharge.    BP (!) 151/73 (BP Location: Right arm)   Pulse 82   Temp 97.6  F (36.4  C) (Temporal)   Resp 20   Ht 1.829 m (6')   Wt (!) 158 kg (348 lb 5.2 oz)   SpO2 94%   BMI 47.24 kg/m       Neck and back pain rated 2/10. Cervical collar in place. CIWA 7. Ambulating A1 walker gait belt. PT evaluation completed.

## 2025-04-11 NOTE — CONSULTS
Care Management Initial Consult    General Information  Assessment completed with: Cody Oro  Type of CM/SW Visit: Initial Assessment    Primary Care Provider verified and updated as needed: Yes   Readmission within the last 30 days:        Reason for Consult: discharge planning  Advance Care Planning:            Communication Assessment  Patient's communication style: spoken language (English or Bilingual)    Hearing Difficulty or Deaf: no   Wear Glasses or Blind: yes    Cognitive  Cognitive/Neuro/Behavioral:                        Living Environment:   People in home: alone     Current living Arrangements: apartment      Able to return to prior arrangements: yes       Family/Social Support:  Care provided by: self  Provides care for: no one  Marital Status:   Support system: Other (specify) (ex-spouse)          Description of Support System: Involved    Support Assessment: Adequate family and caregiver support    Current Resources:   Patient receiving home care services: No        Community Resources: Other (see comment) (outpatient PT)  Equipment currently used at home: raised toilet seat, dressing device, cane, straight  Supplies currently used at home: None    Employment/Financial:  Employment Status:          Financial Concerns:             Does the patient's insurance plan have a 3 day qualifying hospital stay waiver?  Yes     Which insurance plan 3 day waiver is available? Alternative insurance waiver    Will the waiver be used for post-acute placement? Undetermined at this time    Lifestyle & Psychosocial Needs:  Social Drivers of Health     Food Insecurity: Low Risk  (4/11/2025)    Food Insecurity     Within the past 12 months, did you worry that your food would run out before you got money to buy more?: No     Within the past 12 months, did the food you bought just not last and you didn t have money to get more?: No   Depression: Not at risk (3/6/2025)    PHQ-2     PHQ-2 Score: 1   Recent  Concern: Depression - At risk (1/2/2025)    PHQ-2     PHQ-2 Score: 5   Housing Stability: High Risk (4/11/2025)    Housing Stability     Do you have housing? : No     Are you worried about losing your housing?: No   Tobacco Use: Low Risk  (4/10/2025)    Patient History     Smoking Tobacco Use: Never     Smokeless Tobacco Use: Never     Passive Exposure: Never   Financial Resource Strain: Low Risk  (4/11/2025)    Financial Resource Strain     Within the past 12 months, have you or your family members you live with been unable to get utilities (heat, electricity) when it was really needed?: No   Alcohol Use: Alcohol Misuse (2/24/2025)    AUDIT-C     Frequency of Alcohol Consumption: 4 or more times a week     Average Number of Drinks: 10 or more     Frequency of Binge Drinking: Daily or almost daily   Transportation Needs: Low Risk  (4/11/2025)    Transportation Needs     Within the past 12 months, has lack of transportation kept you from medical appointments, getting your medicines, non-medical meetings or appointments, work, or from getting things that you need?: No   Physical Activity: Not on file   Interpersonal Safety: Low Risk  (3/28/2025)    Interpersonal Safety     Do you feel physically and emotionally safe where you currently live?: Yes     Within the past 12 months, have you been hit, slapped, kicked or otherwise physically hurt by someone?: No     Within the past 12 months, have you been humiliated or emotionally abused in other ways by your partner or ex-partner?: No   Stress: Not on file (2/11/2021)   Social Connections: Socially Integrated (11/28/2023)    [facility]    Social Connections     Do you often feel lonely or isolated from those around you?: 0   Health Literacy: Not on file       Functional Status:  Prior to admission patient needed assistance:   Dependent ADLs:: Independent  Dependent IADLs:: Independent  Assesssment of Functional Status: Not at  baseline with ADL Functioning         Chemical Dependency Status:    Pt reports that he continues to participate in the [facility] CD treatment with [facility].              Values/Beliefs:  Spiritual, Cultural Beliefs, Yarsanism Practices, Values that affect care:                 Discussed  Partnership in Safe Discharge Planning  document with patient/family: No    Additional Information:  Pt reports that he lives alone in his apartment.  His ex-spouse provides help as needed.  He reports that he is still attending IOP CD and declined CD services when SW offered information. He denies any services and/or supports from SW at this time.      Next Steps: Follow for PT GLADYS Castellanos  Inpatient Care Coordination  [facility]

## 2025-04-11 NOTE — H&P
[facility]  Hospitalist Admission Note  Name: Cody Bolton    MRN: [id number]  YOB: 1980    Age: 44 year old  Date of admission: 4/11/2025  Primary care provider: Yovana Felipe    Chief Complaint:  fall, weakness    Assessment and Plan:   Fall  Generalized weakness  Recent C1 and C6-7 osteophyte fractures: Hospitalized here 3/26 - 3/21 after a fall resulting in a C1 ring fracture and C6-7 osteophyte fracture along with a right fifth finger fracture.  This was in the setting of alcohol use.  Has had ongoing daily excessive alcohol use resulting in another fall after he got up from a chair and then fell forward.  No LOC or presyncope symptoms.  He was too weak to get up so texted a neighbor who he said came to the window and got him up within 20 minutes.  I suspect he was down for longer as his CK level was elevated 1135.  Ethanol level 0.26.  Chronically anticoagulated on warfarin.  Noncontrast head CT and cervical CT spine show redemonstration of nonunited fractures involving the anterior and posterior arches of the C1 ring and the nondisplaced fracture through an anterior C6-C7 osteophyte.  -Consult PT/SW, may need to consider TCU until C1 fracture is better healed  -Acetaminophen as needed for mild pain  -Continue cervical collar at all times  -Fall precautions    Starvation ketoacidosis  Dehydration  Hyponatremia  Rhabdomyolysis: Sodium is low 132.  His bicarb is 15 with anion gap 29 and ketone elevation at 2.70.  Lactic acid was not checked, but suspect it would be high in setting of alcohol intoxication and hypovolemia.  As of minimal food intake for the last few days.  He is diabetic, but I doubt this represents diabetic ketoacidosis and is more likely secondary to starvation ketoacidosis.  His CK level is elevated at 1135.  No evidence for compartment syndrome.  -Received 2 L NS in the ER.  Start D5 NS at 125 mL/h with subcu insulin  -Check CK level tomorrow  -Hold PTA  simvastatin    Alcohol intoxication  Alcohol use disorder: Chronic issues with binge drinking.  Has been in treatment once before.  Recent fall resulting in C1 fracture in setting of alcohol intoxication, but despite this he has been drinking 750 mL of vodka daily for the last 4 to 5 days.  No previous significant withdrawal symptoms.  Ethanol level 0.26 in the ER.  Several empty bottles of vodka found in his apartment per EMS.  -CIWA with lorazepam as needed  -Oral thiamine, folic acid, multivitamin  -Consult social work    History unprovoked DVT, PE  Paroxysmal atrial fibrillation  HTN: He had an unprovoked bilateral PE and right lower extremity DVT in 2/2024.  He also has paroxysmal A-fib.  Chronically on warfarin despite his multiple falls.  INR subtherapeutic at 1.38 in the ER.  He is also on carvedilol 12.5 mg twice daily and lisinopril 20 mg daily.  -Resume carvedilol and lisinopril difficult  -Difficult situation as patient continues to drink alcohol and has been having falls.  He has 2 separate indications for chronic anticoagulation.  If he is unwilling to stop drinking alcohol could consider an IVC filter, but would need further evaluation for possible Watchman device which would take some time.  -For now resumed warfarin, pharmacy to dose, daily INR    Type II DM: A1c 6.7 three months ago.  Glucose 177 in the ER.  He is on Mounjaro weekly and metformin XR 1000 mg daily with dinner.  Ketones elevated as well, but I doubt DKA starvation ketoacidosis makes more sense for his current clinical condition.  -Medium dose sliding scale aspart  -Suspect lactic acid is elevated, hold metformin and Mounjaro    Leukocytosis: WBC count elevated at 11.9.  Vital stable in the ER and afebrile.  No infectious symptoms.  Likely stress demargination.  -CBC tomorrow    Morbid obesity: BMI 48.8.  Previous gastric bypass surgery.    MDD  Anxiety: Resume PTA Wellbutrin XL 3 mg daily.    HLD: Hold PTA simvastatin secondary to  rhabdomyolysis.    CARLIE: Resume CPAP if using.      DVT Prophylaxis: Warfarin  Code Status: Full Code  FEN: Regular diet, D5 NS at 125 mL/h  Discharge Dispo: Lives alone.  Ex-wife lives nearby.  Multiple falls resulting in trauma.  Consult PT/SW, consider TCU  Estimated Disch Date / # of Days until Disch: Admit observation for IV fluids for dehydration and rhabdomyolysis along with PT evaluation.  Anticipate medically ready for discharge by tomorrow 4/12      History of Present Illness:  Cody Bolton is a 44 year old male with PMH including alcohol use disorder, unprovoked DVT/PE on chronic warfarin, paroxysmal A-fib, HTN, HLD, DM2, morbid obesity s/p gastric bypass surgery, CARLIE, MDD, anxiety, and recent hospitalization here 3/26 - 3/31 after a fall resulting in C1 fracture and C6/C7 osteophyte fracture who presents from home via EMS after another fall.  He got up from his chair and then was starting to walk when he felt weak and lost his balance.  He fell forward and tried to brace himself with a folding table.  He fell forward onto the ground and he was too weak to get up.  He did have his phone nearly was able to text a neighbor who said crawled through his window.  EMS was called.  He says he was only down 20 minutes.  He denies any new injury after the fall.  He says he was wearing his cervical collar.  Denies any focal weakness or paresthesias, just generalized weakness.  He has continued to drink alcohol roughly 750 mL/day for the last 4 to 5 days.  Several empty bottles of vodka found in his apartment per EMS.  Has not been eating much food at this time.  Denies any fevers, chills, cough, abdominal pain, vomiting, diarrhea.    History obtained from patient, medical record, and from Dr. Choudhury in the emergency department.  Blood pressure 164/90, heart rate 96, temperature 98.4  F, oxygen 95% on room air.  He has a leukocytosis at 11.9, hemoglobin 12.7, platelet count 288.  Sodium is 132, potassium 4.8,  chloride 88, bicarb 15, BUN 12, creatinine 0.6, glucose 177, anion gap 29.  Alk phos 151, AST 88.  CK level elevated at 1135.  Ethanol level elevated at 0.26.  Ketones elevated 2.70.  INR is 1.38.  CT cervical spine do not show any change in his C1 ring fractures and C6/7 osteophyte fracture.  No intracranial hemorrhage seen on noncontrast head CT.  He received 2 L NS.  Plan for observation admission for further IV fluids for rhabdomyolysis and starvation ketoacidosis along with PT/SW consultation.       Clinically Significant Risk Factors Present on Admission         # Hyponatremia: Lowest Na = 132 mmol/L in last 2 days, will monitor as appropriate  # Hypochloremia: Lowest Cl = 88 mmol/L in last 2 days, will monitor as appropriate     # Anion Gap Metabolic Acidosis: Highest Anion Gap = 29 mmol/L in last 2 days, will monitor and treat as appropriate     # Drug Induced Coagulation Defect: home medication list includes an anticoagulant medication    # Hypertension: Home medication list includes antihypertensive(s)          # DMII: A1C = N/A within past 6 months          # Financial/Environmental Concerns:                     Past Medical History reviewed:  Past Medical History:   Diagnosis Date    Alcohol use disorder     Depressive disorder     High cholesterol     History of gastric bypass     History of pulmonary embolism     Hypertension     Morbid obesity (H)     CARLIE (obstructive sleep apnea)     Paroxysmal atrial fibrillation (H)     Personal history of DVT (deep vein thrombosis)     Pulmonary embolism (H) 02/2024    unprovoked bilateral PE and RLE DVT 2/2024    Type 2 diabetes mellitus (H)     Uncomplicated asthma      Past Surgical History reviewed:  Past Surgical History:   Procedure Laterality Date    APPENDECTOMY  08/15/2017    Dr. Ferrer    GASTRIC BYPASS      [place] LAP,APPENDECTOMY N/A 8/14/2017    Procedure: APPENDECTOMY, LAPAROSCOPIC;  Surgeon: Nba Ferrer MD;  Location: [facility];   Service: General    REVISION AKANKSHA-EN-Y 2014    Dr. Ranjeet Espinal @[facility]     Social History reviewed:  Social History     Tobacco Use    Smoking status: Never     Passive exposure: Never    Smokeless tobacco: Never   Substance Use Topics    Alcohol use: Yes     Comment: Binge drinking behavior.  Usually drinks a few days in a row 750 ml/d     Social History     Social History Narrative    Not on file     Family History reviewed:  Family History   Problem Relation Age of Onset    Substance Abuse Brother     Anxiety Disorder Sister     Depression Sister      Allergies:  No Known Allergies  Medications:  Prior to Admission medications    Medication Sig Last Dose Taking? Auth Provider Long Term End Date   acamprosate (CAMPRAL) 333 MG EC tablet Take 2 tablets (666 mg) by mouth 3 times daily.   Arnulfo Anderson MD     blood glucose (NO BRAND SPECIFIED) test strip    Reported, Patient     blood glucose monitoring (SOFTCLIX) lancets    Reported, Patient     Blood Glucose Monitoring Suppl (ACCU-CHEK GUIDE) w/Device KIT    Reported, Patient     buPROPion (WELLBUTRIN XL) 300 MG 24 hr tablet Take 1 tablet (300 mg) by mouth every morning.   Yovana Felipe MD Yes    carvedilol (COREG) 12.5 MG tablet Take 1 tablet (12.5 mg) by mouth 2 times daily (with meals)   Cody Harvey MD Yes    folic acid (FOLVITE) 1 MG tablet Take 1 tablet (1 mg) by mouth daily.   Arnulfo Anderson MD     lisinopril (ZESTRIL) 20 MG tablet Take 1 tablet (20 mg) by mouth daily.   Yovana Felipe MD Yes    metFORMIN (GLUCOPHAGE XR) 500 MG 24 hr tablet Take 2 tablets (1,000 mg) by mouth daily (with dinner).   Yovana Felipe MD Yes    MOUNJARO 7.5 MG/0.5ML SOAJ auto-injector pen    Reported, Patient     multivitamin w/minerals (THERA-VIT-M) tablet Take 1 tablet by mouth daily.   Arnulfo Anderson MD     naloxone (NARCAN) 4 MG/0.1ML nasal spray Spray 1 spray (4 mg) into one nostril alternating nostrils as needed  for opioid reversal. every 2-3 minutes until assistance arrives   Wild Rodriguez MD Yes 4/3/25   oxyCODONE (ROXICODONE) 5 MG tablet Take 1 tablet (5 mg) by mouth every 6 hours as needed for severe pain.   Wild Rodriguez MD     senna-docusate (SENOKOT-S/PERICOLACE) 8.6-50 MG tablet Take 1 tablet by mouth 2 times daily as needed for constipation.   Wild Rodriguez MD  4/12/25   sildenafil (VIAGRA) 100 MG tablet Take 1 tablet (100 mg) by mouth daily as needed (erectile dysfunction).   Yovana Felipe MD Yes    simvastatin (ZOCOR) 20 MG tablet Take 1 tablet (20 mg) by mouth at bedtime.   Yovana Felipe MD Yes    thiamine (B-1) 100 MG tablet Take 1 tablet (100 mg) by mouth daily.   Arnulfo Anderson MD     warfarin ANTICOAGULANT (COUMADIN) 5 MG tablet 2.5 mg Wed; 5 mg all other days or as directed by INR clinic   Yovana Felipe MD     warfarin ANTICOAGULANT (COUMADIN) 5 MG tablet Take 2.5 mg by mouth every 7 days. On Wednesday bedtime   Reported, Patient       Review of Systems:  A Comprehensive greater than 10 system review of systems was carried out.  Pertinent positives and negatives are noted above.  Otherwise negative.     Physical Exam:  Blood pressure (!) 164/90, pulse 96, temperature 98.4  F (36.9  C), temperature source Temporal, resp. rate 21, SpO2 95%.  Wt Readings from Last 1 Encounters:   02/24/25 (!) 163.3 kg (360 lb)     Exam:  Constitutional: Awake, NAD   Eyes: sclera white   HEENT: Cervical collar in place, dry mucous membrane  Respiratory: no respiratory distress, anterior lungs cta bilaterally, no crackles or wheeze  Cardiovascular: RRR.  No murmur   GI: Obese, soft, non-tender, not distended, bowel sounds present  Skin: no rash or large ecchymosis  Musculoskeletal/extremities: No obvious trauma to the extremities.  Trace bilateral lower extremity edema.  Neurologic: Alert, voice is raspy, strength equal bilaterally, light sensation intact peripherally, no tremor  Psychiatric: calm      Lab and imaging data personally reviewed:  Labs:  Recent Labs   Lab 04/11/25  0501   PHV 7.36   PO2V 37   PCO2V 31*   HCO3V 17*     Recent Labs   Lab 04/11/25  0412   WBC 11.4*   HGB 12.7*   HCT 39.3*   MCV 82        Recent Labs   Lab 04/11/25  0412   *   POTASSIUM 4.8   CHLORIDE 88*   CO2 15*   ANIONGAP 29*   *   BUN 12.4   CR 0.86   GFRESTIMATED >90   RAUL 9.1   MAG 1.9   PHOS 3.4   PROTTOTAL 7.9   ALBUMIN 4.3   BILITOTAL 0.5   ALKPHOS 151*   AST 88*   ALT 66     Recent Labs   Lab 04/11/25  0609   COLOR Light Yellow   APPEARANCE Clear   URINEGLC Negative   URINEBILI Negative   URINEKETONE 80*   SG 1.022   UBLD Moderate*   URINEPH 5.5   PROTEIN 100*   NITRITE Negative   LEUKEST Negative   RBCU 1   WBCU 3     INR 1.38  Ketone 2.70  CK 1135  UDS negative    Imaging:  Recent Results (from the past 24 hours)   Head CT w/o contrast    Narrative    EXAM: CT HEAD W/O CONTRAST, CT CERVICAL SPINE W/O CONTRAST  LOCATION: [facility]  DATE: 4/11/2025    INDICATION: Fall, intoxicated, anticoagulated known recent C1, C5 and C6 fracture  COMPARISON: 3/26/2025 and 3/27/2025  TECHNIQUE:   1) Routine CT Head without IV contrast. Multiplanar reformats. Dose reduction techniques were used.  2) Routine CT Cervical Spine without IV contrast. Multiplanar reformats. Dose reduction techniques were used.    FINDINGS:   HEAD CT:   INTRACRANIAL CONTENTS: No intracranial hemorrhage, extraaxial collection, or mass effect.  No CT evidence of acute infarct. Normal parenchymal attenuation. Mild generalized volume loss. No hydrocephalus.     VISUALIZED ORBITS/SINUSES/MASTOIDS: No intraorbital abnormality. 2 cm polyp or retention cyst in the left maxillary sinus. No middle ear or mastoid effusion.    BONES/SOFT TISSUES: No acute abnormality.    CERVICAL SPINE CT:   VERTEBRA: Redemonstration of nonunited fracture deformities involving the anterior and posterior arches of the C1 ring without significant  interval healing. No increased widening of the fracture plane is appreciated. Additional nondisplaced fracture   through an anterior C6-C7 osteophyte. No convincing new fracture deformity. Underlying changes of diffuse idiopathic skeletal hyperostosis.     CANAL/FORAMINA: No canal or neural foraminal stenosis.    PARASPINAL: No extraspinal abnormality. Visualized lung fields are clear.      Impression    IMPRESSION:  HEAD CT:  No acute intracranial hemorrhage or calvarial fracture.    CERVICAL SPINE CT:  1.  No CT evidence for acute fracture or posttraumatic subluxation.  2.  Redemonstration of nonunited fractures involving the anterior and posterior arches of the C1 ring without significant interval healing.  3.  Redemonstration of a nondisplaced fracture through an anterior C6-C7 osteophyte without significant interval healing.              CT Cervical Spine w/o Contrast    Narrative    EXAM: CT HEAD W/O CONTRAST, CT CERVICAL SPINE W/O CONTRAST  LOCATION: [facility]  DATE: 4/11/2025    INDICATION: Fall, intoxicated, anticoagulated known recent C1, C5 and C6 fracture  COMPARISON: 3/26/2025 and 3/27/2025  TECHNIQUE:   1) Routine CT Head without IV contrast. Multiplanar reformats. Dose reduction techniques were used.  2) Routine CT Cervical Spine without IV contrast. Multiplanar reformats. Dose reduction techniques were used.    FINDINGS:   HEAD CT:   INTRACRANIAL CONTENTS: No intracranial hemorrhage, extraaxial collection, or mass effect.  No CT evidence of acute infarct. Normal parenchymal attenuation. Mild generalized volume loss. No hydrocephalus.     VISUALIZED ORBITS/SINUSES/MASTOIDS: No intraorbital abnormality. 2 cm polyp or retention cyst in the left maxillary sinus. No middle ear or mastoid effusion.    BONES/SOFT TISSUES: No acute abnormality.    CERVICAL SPINE CT:   VERTEBRA: Redemonstration of nonunited fracture deformities involving the anterior and posterior arches of the C1 ring  without significant interval healing. No increased widening of the fracture plane is appreciated. Additional nondisplaced fracture   through an anterior C6-C7 osteophyte. No convincing new fracture deformity. Underlying changes of diffuse idiopathic skeletal hyperostosis.     CANAL/FORAMINA: No canal or neural foraminal stenosis.    PARASPINAL: No extraspinal abnormality. Visualized lung fields are clear.      Impression    IMPRESSION:  HEAD CT:  No acute intracranial hemorrhage or calvarial fracture.    CERVICAL SPINE CT:  1.  No CT evidence for acute fracture or posttraumatic subluxation.  2.  Redemonstration of nonunited fractures involving the anterior and posterior arches of the C1 ring without significant interval healing.  3.  Redemonstration of a nondisplaced fracture through an anterior C6-C7 osteophyte without significant interval healing.                  Yogi Prather MD  Hospitalist  [facility]

## 2025-04-11 NOTE — PHARMACY-ADMISSION MEDICATION HISTORY
Pharmacy Intern Admission Medication History    Admission medication history is complete. The information provided in this note is only as accurate as the sources available at the time of the update.    Information Source(s): Patient, Prescription bottles, and CareEverywhere/SureScripts via in-person    Pertinent Information: Patient reported that he hasn't taken any of his medications since Thursday of last week (4/3) including warfarin.     Changes made to PTA medication list:  Added: Flonase, Hydroxyzine, Robaxin, Tylenol, Naltrexone   Deleted: Mounjaro, Warfarin (duplicate)  Changed: None    Allergies reviewed with patient and updates made in EHR: yes    Medication History Completed By: Cammy Mckay RPH 4/11/2025 8:59 AM    PTA Med List   Medication Sig Last Dose/Taking    acamprosate (CAMPRAL) 333 MG EC tablet Take 2 tablets (666 mg) by mouth 3 times daily. More than a month    acetaminophen (TYLENOL) 500 MG tablet Take 1,000 mg by mouth 3 times daily as needed for mild pain. Taking As Needed    buPROPion (WELLBUTRIN XL) 300 MG 24 hr tablet Take 1 tablet (300 mg) by mouth every morning. Past Month Morning    carvedilol (COREG) 12.5 MG tablet Take 1 tablet (12.5 mg) by mouth 2 times daily (with meals) Past Month Evening    fluticasone (FLONASE) 50 MCG/ACT nasal spray Spray 1 spray into both nostrils daily as needed for rhinitis or allergies. Taking As Needed    folic acid (FOLVITE) 1 MG tablet Take 1 tablet (1 mg) by mouth daily. Past Month    hydrOXYzine HCl (ATARAX) 50 MG tablet Take 50 mg by mouth every 6 hours as needed for other (Pain). Taking As Needed    lisinopril (ZESTRIL) 20 MG tablet Take 1 tablet (20 mg) by mouth daily. Past Month Morning    metFORMIN (GLUCOPHAGE XR) 500 MG 24 hr tablet Take 2 tablets (1,000 mg) by mouth daily (with dinner). Past Month Evening    methocarbamol (ROBAXIN) 500 MG tablet Take 500 mg by mouth 3 times daily as needed for muscle spasms. Taking As Needed    multivitamin  w/minerals (THERA-VIT-M) tablet Take 1 tablet by mouth daily. Past Month    naloxone (NARCAN) 4 MG/0.1ML nasal spray Spray 1 spray (4 mg) into one nostril alternating nostrils as needed for opioid reversal. every 2-3 minutes until assistance arrives Taking As Needed    naltrexone (DEPADE/REVIA) 50 MG tablet Take 50 mg by mouth daily. Past Month    oxyCODONE (ROXICODONE) 5 MG tablet Take 1 tablet (5 mg) by mouth every 6 hours as needed for severe pain. Taking As Needed    senna-docusate (SENOKOT-S/PERICOLACE) 8.6-50 MG tablet Take 1 tablet by mouth 2 times daily as needed for constipation. Taking As Needed    sildenafil (VIAGRA) 100 MG tablet Take 1 tablet (100 mg) by mouth daily as needed (erectile dysfunction). Taking As Needed    simvastatin (ZOCOR) 20 MG tablet Take 1 tablet (20 mg) by mouth at bedtime. Past Month Bedtime    thiamine (B-1) 100 MG tablet Take 1 tablet (100 mg) by mouth daily. Past Month Morning    warfarin ANTICOAGULANT (COUMADIN) 5 MG tablet Take 2.5 mg by mouth every 7 days. On Wednesday bedtime 4/3/2025 Evening

## 2025-04-11 NOTE — ED PROVIDER NOTES
"  Emergency Department Note      History of Present Illness     Chief Complaint  Alcohol Intoxication    HPI  Cody Bolton is a 44 year old male with history of AFIB on Warfarin, DVT, and type 2 diabetes who presents to the ED via EMS for evaluation of alcohol intoxication. EMS reports patient was in his apartment drinking vodka since yesterday morning. His neighbor received a text from him saying \"help\". Patient fell and couldn't get up. He denies head trauma/LOC. He denies use of mobility devices. He reports laying on the ground roughly 20 minutes. Neighbor had to climb through the window to help him. According to ex-wife who lives next door, EMS reported she had concerns the patient has been unable to care of himself while living alone. His blood glucose was 106. Patient has known C1 fracture and C6/C7 osteophyte fractures from a fall that occurred two weeks ago when he was intoxicated.     He states normally drinking 750 ml of alcohol for the past 5 days. His last drink was 2300. This wasn't an attempt to harm self. No history of self harm. He states drinking more because \"I like the feeling of it\". No history withdrawal. He has no interest of detox. Denies shortness of breath, chest pain, abdominal pain, fever, cough, back pain, headache or vomiting. No drug use.     Independent Historian  EMS as detailed above.    Review of External Notes  Virtual visit 4/10/25 patient was intoxicated at visit    3/31/25 discharge summary: C1 Fracture. Nondisplaced fracture of C6-C7 osteophyte. Changes of DISH. Post-concussive symptoms   Past Medical History   Medical History and Problem List   Asthma   Anxiety   Atrial fibrillation  Bilateral myopia and astigmatism   Cervical radiculopathy   Depression   DVT  Fatty liver  Hypertension   Hyperlipidemia   Moderate acromioclavicular joint degenerative joint disease, right   CARLIE on CPAP  PE  Type II diabetes  Suicidal ideation     Medications   Warfarin   Campral "   Wellbutrin  Metformin  Lisinopril  Coreg  Mounjaro  Oxycodone  Zocor    Surgical History   Appendectomy  Gastric bypass  Vasectomy  Unspecified left hand surgery     Physical Exam   Patient Vitals for the past 24 hrs:   BP Temp Temp src Pulse Resp SpO2   04/11/25 0402 (!) 168/98 99.2  F (37.3  C) Temporal 99 22 96 %     Physical Exam  General: Alert. Appears intoxicated. [place] collar in place  Head:  The L. Frontal bone is with contusion  Eyes:  Sclera white; Pupils are equal and round  ENT:    External ears normal.  No hemotympanum.      External nares normal.  No septal hematoma.    Neck:  No midline tenderness  CV:  Rate as above with regular rhythm   Resp:  Breath sounds clear and equal bilaterally    Non-labored, no retractions or accessory muscle use  GI:  Morbidly obese. Abdomen soft, non-tender, non-distended    No rebound tenderness or guarding  MSK:  No midline tenderness or bony step-off    Moves all extremities equally and symmetrically  Skin:  No rash. Excoriations to upper back  Neuro:   No apparent deficit. No tremors    Strength 5/5 x4.  Sensation intact x4.     GCS: 14  Psych:  Flat affect, denies suicidal ideations.       Diagnostics   Lab Results   Labs Ordered and Resulted from Time of ED Arrival to Time of ED Departure   COMPREHENSIVE METABOLIC PANEL - Abnormal       Result Value    Sodium 132 (*)     Potassium 4.8      Carbon Dioxide (CO2) 15 (*)     Anion Gap 29 (*)     Urea Nitrogen 12.4      Creatinine 0.86      GFR Estimate >90      Calcium 9.1      Chloride 88 (*)     Glucose 177 (*)     Alkaline Phosphatase 151 (*)     AST 88 (*)     ALT 66      Protein Total 7.9      Albumin 4.3      Bilirubin Total 0.5     INR - Abnormal    INR 1.38 (*)    ETHYL ALCOHOL LEVEL - Abnormal    Alcohol ethyl 0.26 (*)    CBC WITH PLATELETS AND DIFFERENTIAL - Abnormal    WBC Count 11.4 (*)     RBC Count 4.81      Hemoglobin 12.7 (*)     Hematocrit 39.3 (*)     MCV 82      MCH 26.4 (*)     MCHC 32.3      RDW  16.5 (*)     Platelet Count 288     CK TOTAL - Abnormal    CK 1,135 (*)    BLOOD GAS VENOUS - Abnormal    pH Venous 7.36      pCO2 Venous 31 (*)     pO2 Venous 37      Bicarbonate Venous 17 (*)     Base Excess/Deficit Venous -6.8 (*)     FIO2 21      Oxyhemoglobin Venous 62 (*)     O2 Sat, Venous 63.0 (*)    KETONE BETA-HYDROXYBUTYRATE QUANTITATIVE, RAPID - Abnormal    Ketone (Beta-Hydroxybutyrate) Quantitative 2.70 (*)    ROUTINE UA WITH MICROSCOPIC - Abnormal    Color Urine Light Yellow      Appearance Urine Clear      Glucose Urine Negative      Bilirubin Urine Negative      Ketones Urine 80 (*)     Specific Gravity Urine 1.022      Blood Urine Moderate (*)     pH Urine 5.5      Protein Albumin Urine 100 (*)     Urobilinogen Urine Normal      Nitrite Urine Negative      Leukocyte Esterase Urine Negative      Mucus Urine Present (*)     RBC Urine 1      WBC Urine 3      Squamous Epithelials Urine <1      Cellular Casts Urine 1 (*)     Hyaline Casts Urine 14 (*)     Granular Casts Urine 17 (*)    MAGNESIUM - Normal    Magnesium 1.9     PHOSPHORUS - Normal    Phosphorus 3.4     RBC AND PLATELET MORPHOLOGY   URINE DRUG SCREEN PANEL   GLUCOSE MONITOR NURSING POCT   GLUCOSE MONITOR NURSING POCT   GLUCOSE MONITOR NURSING POCT   GLUCOSE MONITOR NURSING POCT   GLUCOSE MONITOR NURSING POCT       Imaging  CT Cervical Spine w/o Contrast   Final Result   IMPRESSION:   HEAD CT:   No acute intracranial hemorrhage or calvarial fracture.      CERVICAL SPINE CT:   1.  No CT evidence for acute fracture or posttraumatic subluxation.   2.  Redemonstration of nonunited fractures involving the anterior and posterior arches of the C1 ring without significant interval healing.   3.  Redemonstration of a nondisplaced fracture through an anterior C6-C7 osteophyte without significant interval healing.                      Head CT w/o contrast   Final Result   IMPRESSION:   HEAD CT:   No acute intracranial hemorrhage or calvarial fracture.       CERVICAL SPINE CT:   1.  No CT evidence for acute fracture or posttraumatic subluxation.   2.  Redemonstration of nonunited fractures involving the anterior and posterior arches of the C1 ring without significant interval healing.   3.  Redemonstration of a nondisplaced fracture through an anterior C6-C7 osteophyte without significant interval healing.                        Report per radiology    Independent Interpretation  CT Head: No intracranial hemorrhage or midline shift.  ED Course    Medications Administered  Medications   sodium chloride 0.9% BOLUS 1,000 mL (1,000 mLs Intravenous $New Bag 4/11/25 0548)   sodium chloride 0.9% BOLUS 1,000 mL (0 mLs Intravenous Stopped 4/11/25 0613)       Procedures  Procedures     Discussion of Management  None    Additional Documentation  None    ED Course  ED Course as of 04/11/25 0616   Fri Apr 11, 2025   0346 I obtained history and examined the patient as noted above.    0615 I spoke to Dr. Prather, hospitalist     Medical Decision Making / Diagnosis   CMS Diagnoses: None    MIPS     None    [facility]  Cody Bolton is a 44 year old male with a known history of recent C-spine injury, who presents after a reported mechanical fall in the setting of acute alcohol intoxication.  Patient cooperative on arrival, nontoxic, though clinically intoxicated.  He was placed on an MICKIE.  Patient with noted head contusion on exam, he denies head trauma though particularly given intoxication and known history anticoagulation, decision was made to pursue formal head CT which fortunately is unremarkable. CT cervical spine with acute cervical injury. Remainder of head to toe exam is without evidence to suggest serious traumatic injury.  CPK elevated.  Patient with also noted elevated anion gap though I suspect this is more 2/2 to alcohol ketosis.  Mild hyperglycemia though lower suspicion for DKA at this time.  He was given aggressive IVF.  No signs of alcohol withdrawal.  He declines  wanting detox today.  INR subtherapeutic today. No reported chest pain/dyspnea; lungs clear overall.     Patient to benefit from continued IV hydration as well as likely social work consultation this admission particularly given concerns from family for patient's ability to care for himself.      Disposition  Care of the patient was transferred to my colleague Dr. Mckeon pending clinical sobriety and reeval.     ICD-10 Codes:    ICD-10-CM    1. Alcoholic intoxication without complication  F10.920       2. Fall, initial encounter  W19.XXXA       3. Subtherapeutic international normalized ratio (INR)  R79.1       4. Hyperglycemia  R73.9       5. Elevated CPK  R74.8       6. Alcoholic ketosis (H)  E88.89            Discharge Medications  New Prescriptions    No medications on file     Scribe Disclosure:  I, Carol Brady, am serving as a scribe at 4:00 AM on 4/11/2025 to document services personally performed by Yoana Choudhury DO based on my observations and the provider's statements to me.          Yoana Choudhury DO  04/11/25 0611

## 2025-04-11 NOTE — PROGRESS NOTES
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 from his home via EMS after a fall while getting up from his chair and starting to walk when he felt weak and lost his balance.  He was wearing his cervical collar he denies any major injuries several empty bottles of water were found in his apartment by EMS WBC 11.9 hemoglobin 12.7 platelet count 288,000 sodium 122 potassium 4.8 chloride 88 bicarb 15 BUN 12 creatinine 0.6 glucose 177 anion gap 29 alkaline phosphatase 151 AST 88 CPK level 1135 ethanol level 0.26 ketones 2.7 INR 1.38 CT scan of the cervical spine did not show any changes in the C1 ring fractures and C6/C7 osteophyte fracture no intracranial hemorrhage was seen.  He received 2 L of normal saline.  He was diagnosed with starvation ketosis/alcoholic ketoacidosis.  He was admitted for observation       Date of Admission:  4/11/2025  Assessment & Plan     Fall  Generalized weakness  Recent C1 and C6-7 osteophyte fractures  Patient came in after a fall while being intoxicated with an alcohol level of 0.06  PT and OT will be consulted for possible TCU until C1 fracture is better healed  Cervical collar will be continued    Starvation ketoacidosis  Dehydration  Hyponatremia  Mild rhabdomyolysis  Will monitor CPK levels after the patient came in with fall while being intoxicated  Anion ap will be followed with a BMP checked in the evening as well as in the morning  CPK level will be checked in the morning    Alcohol intoxication  Alcohol use disorder: Chronic  issues with binge drinking.  Has been in treatment once before.  Recent fall resulting in C1 fracture in setting of alcohol intoxication, but despite this he has been drinking 750 mL of vodka daily for the last 4 to 5 days.  No previous significant withdrawal symptoms.  Ethanol level 0.26 in the ER.  Several empty bottles of vodka found in his apartment per EMS.  -CIWA with lorazepam as needed  -Oral thiamine, folic acid, multivitamin  -Consult social work     History unprovoked DVT, PE  Paroxysmal atrial fibrillation  HTN: He had an unprovoked bilateral PE and right lower extremity DVT in 2/2024.  He also has paroxysmal A-fib.  Chronically on warfarin despite his multiple falls.  INR subtherapeutic at 1.38 in the ER.  He is also on carvedilol 12.5 mg twice daily and lisinopril 20 mg daily.  -Resume carvedilol and lisinopril difficult  -Difficult situation as patient continues to drink alcohol and has been having falls.  He has 2 separate indications for chronic anticoagulation.  If he is unwilling to stop drinking alcohol could consider an IVC filter, but would need further evaluation for possible Watchman device which would take some time.  -For now resumed warfarin, pharmacy to dose, daily INR     Type II DM: A1c 6.7 three months ago.  Glucose 177 in the ER.  He is on Mounjaro weekly and metformin XR 1000 mg daily with dinner.  Ketones elevated as well, but I doubt DKA starvation ketoacidosis makes more sense for his current clinical condition.  -Medium dose sliding scale aspart  -Suspect lactic acid is elevated, hold metformin and Mounjaro     Leukocytosis: WBC count elevated at 11.9.  Vital stable in the ER and afebrile.  No infectious symptoms.  Likely stress demargination.  -CBC tomorrow     Morbid obesity: BMI 48.8.  Previous gastric bypass surgery.     MDD  Anxiety: Resume PTA Wellbutrin XL 3 mg daily.     HLD: Hold PTA simvastatin secondary to rhabdomyolysis.     CARLIE: Resume CPAP if using.        Plan:    Check BMP in at 4 PM  Check CMP in a.m.  Check CPK in a.m.      Diet: Regular Diet Adult    DVT Prophylaxis: Warfarin  Santos Catheter: Not present  Code Status: Full Code         Medically Ready for Discharge: Anticipated Tomorrow    Clinically Significant Risk Factors Present on Admission         # Hyponatremia: Lowest Na = 132 mmol/L in last 2 days, will monitor as appropriate  # Hypochloremia: Lowest Cl = 88 mmol/L in last 2 days, will monitor as appropriate     # Anion Gap Metabolic Acidosis: Highest Anion Gap = 29 mmol/L in last 2 days, will monitor and treat as appropriate   # Drug Induced Coagulation Defect: home medication list includes an anticoagulant medication    # Hypertension: Home medication list includes antihypertensive(s)          # DMII: A1C = N/A within past 6 months        # Financial/Environmental Concerns:                 The patient's care was discussed with the Patient and RN.    Renzo Mercer MD  Hospitalist Service  [facility]    ______________________________________________________________________    Interval History     Symptoms   No new symptoms    Review of Systems:   Discussed the need to quit alcohol    Data reviewed today: I reviewed all medications, new labs and imaging results over the last 24 hours.     Physical Exam   Vital Signs: Temp: 99  F (37.2  C) Temp src: Temporal BP: (!) 157/75 Pulse: 95   Resp: 24 SpO2: 96 % O2 Device: None (Room air)    Weight: 0 lbs 0 oz      GENERAL: Patient is not in acute distress  HEENT: EOM+,Conjunctiva is clear   NECK: In hard c-collar  HEART: S1 S2 regular Rate and Rhythm, there is  no murmur,   LUNGS: Respirations are  not laboured, Lungs are  clear to auscultation without Crepitations or Wheezing   ABDOMEN: Soft , there is no tenderness , Bowel Sounds are  Positive   LOWER LIMBS: no  Pedal Edema  Bilaterally   CNS:  Alert,  Oriented x 3, Moving all the Four Limbs     Data   Recent Labs   Lab 04/11/25  0412   WBC  11.4*   HGB 12.7*   MCV 82      INR 1.38*   *   POTASSIUM 4.8   CHLORIDE 88*   CO2 15*   BUN 12.4   CR 0.86   ANIONGAP 29*   RAUL 9.1   *   ALBUMIN 4.3   PROTTOTAL 7.9   BILITOTAL 0.5   ALKPHOS 151*   ALT 66   AST 88*         Recent Results (from the past 24 hours)   Head CT w/o contrast    Narrative    EXAM: CT HEAD W/O CONTRAST, CT CERVICAL SPINE W/O CONTRAST  LOCATION: [facility]  DATE: 4/11/2025    INDICATION: Fall, intoxicated, anticoagulated known recent C1, C5 and C6 fracture  COMPARISON: 3/26/2025 and 3/27/2025  TECHNIQUE:   1) Routine CT Head without IV contrast. Multiplanar reformats. Dose reduction techniques were used.  2) Routine CT Cervical Spine without IV contrast. Multiplanar reformats. Dose reduction techniques were used.    FINDINGS:   HEAD CT:   INTRACRANIAL CONTENTS: No intracranial hemorrhage, extraaxial collection, or mass effect.  No CT evidence of acute infarct. Normal parenchymal attenuation. Mild generalized volume loss. No hydrocephalus.     VISUALIZED ORBITS/SINUSES/MASTOIDS: No intraorbital abnormality. 2 cm polyp or retention cyst in the left maxillary sinus. No middle ear or mastoid effusion.    BONES/SOFT TISSUES: No acute abnormality.    CERVICAL SPINE CT:   VERTEBRA: Redemonstration of nonunited fracture deformities involving the anterior and posterior arches of the C1 ring without significant interval healing. No increased widening of the fracture plane is appreciated. Additional nondisplaced fracture   through an anterior C6-C7 osteophyte. No convincing new fracture deformity. Underlying changes of diffuse idiopathic skeletal hyperostosis.     CANAL/FORAMINA: No canal or neural foraminal stenosis.    PARASPINAL: No extraspinal abnormality. Visualized lung fields are clear.      Impression    IMPRESSION:  HEAD CT:  No acute intracranial hemorrhage or calvarial fracture.    CERVICAL SPINE CT:  1.  No CT evidence for acute fracture or  posttraumatic subluxation.  2.  Redemonstration of nonunited fractures involving the anterior and posterior arches of the C1 ring without significant interval healing.  3.  Redemonstration of a nondisplaced fracture through an anterior C6-C7 osteophyte without significant interval healing.              CT Cervical Spine w/o Contrast    Narrative    EXAM: CT HEAD W/O CONTRAST, CT CERVICAL SPINE W/O CONTRAST  LOCATION: [facility]  DATE: 4/11/2025    INDICATION: Fall, intoxicated, anticoagulated known recent C1, C5 and C6 fracture  COMPARISON: 3/26/2025 and 3/27/2025  TECHNIQUE:   1) Routine CT Head without IV contrast. Multiplanar reformats. Dose reduction techniques were used.  2) Routine CT Cervical Spine without IV contrast. Multiplanar reformats. Dose reduction techniques were used.    FINDINGS:   HEAD CT:   INTRACRANIAL CONTENTS: No intracranial hemorrhage, extraaxial collection, or mass effect.  No CT evidence of acute infarct. Normal parenchymal attenuation. Mild generalized volume loss. No hydrocephalus.     VISUALIZED ORBITS/SINUSES/MASTOIDS: No intraorbital abnormality. 2 cm polyp or retention cyst in the left maxillary sinus. No middle ear or mastoid effusion.    BONES/SOFT TISSUES: No acute abnormality.    CERVICAL SPINE CT:   VERTEBRA: Redemonstration of nonunited fracture deformities involving the anterior and posterior arches of the C1 ring without significant interval healing. No increased widening of the fracture plane is appreciated. Additional nondisplaced fracture   through an anterior C6-C7 osteophyte. No convincing new fracture deformity. Underlying changes of diffuse idiopathic skeletal hyperostosis.     CANAL/FORAMINA: No canal or neural foraminal stenosis.    PARASPINAL: No extraspinal abnormality. Visualized lung fields are clear.      Impression    IMPRESSION:  HEAD CT:  No acute intracranial hemorrhage or calvarial fracture.    CERVICAL SPINE CT:  1.  No CT evidence  for acute fracture or posttraumatic subluxation.  2.  Redemonstration of nonunited fractures involving the anterior and posterior arches of the C1 ring without significant interval healing.  3.  Redemonstration of a nondisplaced fracture through an anterior C6-C7 osteophyte without significant interval healing.

## 2025-04-11 NOTE — PHARMACY-ANTICOAGULATION SERVICE
Clinical Pharmacy - Warfarin Dosing Consult     Pharmacy has been consulted to manage this patient s warfarin therapy.  Indication: DVT/PE Prophylaxis;Atrial Fibrillation  Therapy Goal: INR 2-3  Warfarin Prior to Admission: Yes  Warfarin PTA Regimen: 2.5 W, 5mg ROW  Dose Comments: enox bridge started for INR 1.38 on admission.    INR   Date Value Ref Range Status   04/11/2025 1.38 (H) 0.85 - 1.15 Final   03/31/2025 1.92 (H) 0.85 - 1.15 Final       Recommend warfarin 5 mg today.  Pharmacy will monitor Cody MADISON Bolton daily and order warfarin doses to achieve specified goal.      Please contact pharmacy as soon as possible if the warfarin needs to be held for a procedure or if the warfarin goals change.

## 2025-04-11 NOTE — CONSULTS
See SW note 4/11.    Rossy Bolton/JOCE Osei, [facility]  Inpatient Care Coordination  Emergency Room /Float  [phone]

## 2025-04-11 NOTE — ED NOTES
Searched by security. 1 bag of belongings including home meds, labeled and secured in DEC office. Phone with pt.

## 2025-04-12 ENCOUNTER — APPOINTMENT (OUTPATIENT)
Dept: PHYSICAL THERAPY | Facility: CLINIC | Age: 45
DRG: 897 | End: 2025-04-12
Payer: COMMERCIAL

## 2025-04-12 ENCOUNTER — APPOINTMENT (OUTPATIENT)
Dept: GENERAL RADIOLOGY | Facility: CLINIC | Age: 45
DRG: 897 | End: 2025-04-12
Attending: INTERNAL MEDICINE
Payer: COMMERCIAL

## 2025-04-12 LAB
ALBUMIN SERPL BCG-MCNC: 3.7 G/DL (ref 3.5–5.2)
ALP SERPL-CCNC: 120 U/L (ref 40–150)
ALT SERPL W P-5'-P-CCNC: 60 U/L (ref 0–70)
ANION GAP SERPL CALCULATED.3IONS-SCNC: 13 MMOL/L (ref 7–15)
AST SERPL W P-5'-P-CCNC: 82 U/L (ref 0–45)
BILIRUB SERPL-MCNC: 0.8 MG/DL
BUN SERPL-MCNC: 9.4 MG/DL (ref 6–20)
CALCIUM SERPL-MCNC: 9 MG/DL (ref 8.8–10.4)
CHLORIDE SERPL-SCNC: 100 MMOL/L (ref 98–107)
CK SERPL-CCNC: 802 U/L (ref 39–308)
CREAT SERPL-MCNC: 0.73 MG/DL (ref 0.67–1.17)
EGFRCR SERPLBLD CKD-EPI 2021: >90 ML/MIN/1.73M2
ERYTHROCYTE [DISTWIDTH] IN BLOOD BY AUTOMATED COUNT: 16.6 % (ref 10–15)
GLUCOSE BLDC GLUCOMTR-MCNC: 137 MG/DL (ref 70–99)
GLUCOSE BLDC GLUCOMTR-MCNC: 150 MG/DL (ref 70–99)
GLUCOSE BLDC GLUCOMTR-MCNC: 157 MG/DL (ref 70–99)
GLUCOSE BLDC GLUCOMTR-MCNC: 161 MG/DL (ref 70–99)
GLUCOSE BLDC GLUCOMTR-MCNC: 223 MG/DL (ref 70–99)
GLUCOSE SERPL-MCNC: 163 MG/DL (ref 70–99)
HCO3 SERPL-SCNC: 23 MMOL/L (ref 22–29)
HCT VFR BLD AUTO: 35.2 % (ref 40–53)
HGB BLD-MCNC: 11.5 G/DL (ref 13.3–17.7)
INR PPP: 2.02 (ref 0.85–1.15)
MAGNESIUM SERPL-MCNC: 1.9 MG/DL (ref 1.7–2.3)
MCH RBC QN AUTO: 26.6 PG (ref 26.5–33)
MCHC RBC AUTO-ENTMCNC: 32.7 G/DL (ref 31.5–36.5)
MCV RBC AUTO: 81 FL (ref 78–100)
PHOSPHATE SERPL-MCNC: 2 MG/DL (ref 2.5–4.5)
PLATELET # BLD AUTO: 218 10E3/UL (ref 150–450)
POTASSIUM SERPL-SCNC: 3.4 MMOL/L (ref 3.4–5.3)
POTASSIUM SERPL-SCNC: 4 MMOL/L (ref 3.4–5.3)
PROT SERPL-MCNC: 6.5 G/DL (ref 6.4–8.3)
RBC # BLD AUTO: 4.33 10E6/UL (ref 4.4–5.9)
SODIUM SERPL-SCNC: 136 MMOL/L (ref 135–145)
WBC # BLD AUTO: 6.2 10E3/UL (ref 4–11)

## 2025-04-12 PROCEDURE — G0378 HOSPITAL OBSERVATION PER HR: HCPCS

## 2025-04-12 PROCEDURE — 84450 TRANSFERASE (AST) (SGOT): CPT | Performed by: INTERNAL MEDICINE

## 2025-04-12 PROCEDURE — 99233 SBSQ HOSP IP/OBS HIGH 50: CPT | Performed by: INTERNAL MEDICINE

## 2025-04-12 PROCEDURE — 85610 PROTHROMBIN TIME: CPT | Performed by: INTERNAL MEDICINE

## 2025-04-12 PROCEDURE — 250N000011 HC RX IP 250 OP 636: Performed by: INTERNAL MEDICINE

## 2025-04-12 PROCEDURE — 82550 ASSAY OF CK (CPK): CPT | Performed by: INTERNAL MEDICINE

## 2025-04-12 PROCEDURE — 97530 THERAPEUTIC ACTIVITIES: CPT | Mod: GP

## 2025-04-12 PROCEDURE — 82962 GLUCOSE BLOOD TEST: CPT

## 2025-04-12 PROCEDURE — 96372 THER/PROPH/DIAG INJ SC/IM: CPT | Performed by: INTERNAL MEDICINE

## 2025-04-12 PROCEDURE — 96361 HYDRATE IV INFUSION ADD-ON: CPT

## 2025-04-12 PROCEDURE — 36415 COLL VENOUS BLD VENIPUNCTURE: CPT | Performed by: INTERNAL MEDICINE

## 2025-04-12 PROCEDURE — 85041 AUTOMATED RBC COUNT: CPT | Performed by: INTERNAL MEDICINE

## 2025-04-12 PROCEDURE — 84132 ASSAY OF SERUM POTASSIUM: CPT | Performed by: INTERNAL MEDICINE

## 2025-04-12 PROCEDURE — 72040 X-RAY EXAM NECK SPINE 2-3 VW: CPT

## 2025-04-12 PROCEDURE — 83735 ASSAY OF MAGNESIUM: CPT | Performed by: INTERNAL MEDICINE

## 2025-04-12 PROCEDURE — 97116 GAIT TRAINING THERAPY: CPT | Mod: GP

## 2025-04-12 PROCEDURE — 250N000013 HC RX MED GY IP 250 OP 250 PS 637: Performed by: INTERNAL MEDICINE

## 2025-04-12 PROCEDURE — 84100 ASSAY OF PHOSPHORUS: CPT | Performed by: INTERNAL MEDICINE

## 2025-04-12 PROCEDURE — 85018 HEMOGLOBIN: CPT | Performed by: INTERNAL MEDICINE

## 2025-04-12 PROCEDURE — 36416 COLLJ CAPILLARY BLOOD SPEC: CPT | Performed by: INTERNAL MEDICINE

## 2025-04-12 PROCEDURE — 250N000013 HC RX MED GY IP 250 OP 250 PS 637: Performed by: EMERGENCY MEDICINE

## 2025-04-12 PROCEDURE — 82310 ASSAY OF CALCIUM: CPT | Performed by: INTERNAL MEDICINE

## 2025-04-12 PROCEDURE — 80069 RENAL FUNCTION PANEL: CPT | Performed by: INTERNAL MEDICINE

## 2025-04-12 PROCEDURE — 258N000003 HC RX IP 258 OP 636: Performed by: INTERNAL MEDICINE

## 2025-04-12 RX ORDER — NALOXONE HYDROCHLORIDE 0.4 MG/ML
0.4 INJECTION, SOLUTION INTRAMUSCULAR; INTRAVENOUS; SUBCUTANEOUS
Status: DISCONTINUED | OUTPATIENT
Start: 2025-04-12 | End: 2025-04-14 | Stop reason: HOSPADM

## 2025-04-12 RX ORDER — OXYCODONE HYDROCHLORIDE 5 MG/1
5 TABLET ORAL EVERY 6 HOURS PRN
Status: DISCONTINUED | OUTPATIENT
Start: 2025-04-12 | End: 2025-04-14 | Stop reason: HOSPADM

## 2025-04-12 RX ORDER — POTASSIUM CHLORIDE 1500 MG/1
40 TABLET, EXTENDED RELEASE ORAL ONCE
Status: COMPLETED | OUTPATIENT
Start: 2025-04-12 | End: 2025-04-12

## 2025-04-12 RX ORDER — NALOXONE HYDROCHLORIDE 0.4 MG/ML
0.2 INJECTION, SOLUTION INTRAMUSCULAR; INTRAVENOUS; SUBCUTANEOUS
Status: DISCONTINUED | OUTPATIENT
Start: 2025-04-12 | End: 2025-04-14 | Stop reason: HOSPADM

## 2025-04-12 RX ORDER — WARFARIN SODIUM 3 MG/1
3 TABLET ORAL
Status: COMPLETED | OUTPATIENT
Start: 2025-04-12 | End: 2025-04-12

## 2025-04-12 RX ADMIN — ENOXAPARIN SODIUM 120 MG: 120 INJECTION SUBCUTANEOUS at 11:38

## 2025-04-12 RX ADMIN — THIAMINE HCL TAB 100 MG 100 MG: 100 TAB at 08:19

## 2025-04-12 RX ADMIN — POTASSIUM & SODIUM PHOSPHATES POWDER PACK 280-160-250 MG 1 PACKET: 280-160-250 PACK at 14:03

## 2025-04-12 RX ADMIN — Medication 1 LOZENGE: at 02:46

## 2025-04-12 RX ADMIN — CARVEDILOL 12.5 MG: 12.5 TABLET, FILM COATED ORAL at 08:19

## 2025-04-12 RX ADMIN — ACETAMINOPHEN 650 MG: 325 TABLET, FILM COATED ORAL at 08:19

## 2025-04-12 RX ADMIN — Medication 1 TABLET: at 08:19

## 2025-04-12 RX ADMIN — OXYCODONE HYDROCHLORIDE 5 MG: 5 TABLET ORAL at 11:41

## 2025-04-12 RX ADMIN — ACETAMINOPHEN 650 MG: 325 TABLET, FILM COATED ORAL at 22:20

## 2025-04-12 RX ADMIN — INSULIN ASPART 1 UNITS: 100 INJECTION, SOLUTION INTRAVENOUS; SUBCUTANEOUS at 17:20

## 2025-04-12 RX ADMIN — POTASSIUM & SODIUM PHOSPHATES POWDER PACK 280-160-250 MG 1 PACKET: 280-160-250 PACK at 17:22

## 2025-04-12 RX ADMIN — INSULIN ASPART 1 UNITS: 100 INJECTION, SOLUTION INTRAVENOUS; SUBCUTANEOUS at 09:23

## 2025-04-12 RX ADMIN — ENOXAPARIN SODIUM 120 MG: 120 INJECTION SUBCUTANEOUS at 00:17

## 2025-04-12 RX ADMIN — FOLIC ACID 1 MG: 1 TABLET ORAL at 08:19

## 2025-04-12 RX ADMIN — Medication 1 LOZENGE: at 08:19

## 2025-04-12 RX ADMIN — CARVEDILOL 12.5 MG: 12.5 TABLET, FILM COATED ORAL at 17:22

## 2025-04-12 RX ADMIN — DEXTROSE AND SODIUM CHLORIDE: 5; 900 INJECTION, SOLUTION INTRAVENOUS at 04:09

## 2025-04-12 RX ADMIN — ACETAMINOPHEN 650 MG: 325 TABLET, FILM COATED ORAL at 14:56

## 2025-04-12 RX ADMIN — BUPROPION HYDROCHLORIDE 300 MG: 150 TABLET, EXTENDED RELEASE ORAL at 08:19

## 2025-04-12 RX ADMIN — ACETAMINOPHEN 650 MG: 325 TABLET, FILM COATED ORAL at 02:04

## 2025-04-12 RX ADMIN — POTASSIUM & SODIUM PHOSPHATES POWDER PACK 280-160-250 MG 1 PACKET: 280-160-250 PACK at 09:22

## 2025-04-12 RX ADMIN — Medication 1 LOZENGE: at 13:03

## 2025-04-12 RX ADMIN — LISINOPRIL 20 MG: 10 TABLET ORAL at 08:19

## 2025-04-12 RX ADMIN — OXYCODONE HYDROCHLORIDE 5 MG: 5 TABLET ORAL at 19:37

## 2025-04-12 RX ADMIN — INSULIN ASPART 1 UNITS: 100 INJECTION, SOLUTION INTRAVENOUS; SUBCUTANEOUS at 14:01

## 2025-04-12 RX ADMIN — POTASSIUM CHLORIDE 40 MEQ: 1500 TABLET, EXTENDED RELEASE ORAL at 09:22

## 2025-04-12 RX ADMIN — WARFARIN SODIUM 3 MG: 3 TABLET ORAL at 18:16

## 2025-04-12 RX ADMIN — ENOXAPARIN SODIUM 120 MG: 120 INJECTION SUBCUTANEOUS at 23:31

## 2025-04-12 ASSESSMENT — ACTIVITIES OF DAILY LIVING (ADL)
ADLS_ACUITY_SCORE: 54
ADLS_ACUITY_SCORE: 54
ADLS_ACUITY_SCORE: 52
ADLS_ACUITY_SCORE: 54
ADLS_ACUITY_SCORE: 61
ADLS_ACUITY_SCORE: 53
ADLS_ACUITY_SCORE: 61
ADLS_ACUITY_SCORE: 53
ADLS_ACUITY_SCORE: 54
ADLS_ACUITY_SCORE: 53
ADLS_ACUITY_SCORE: 54
ADLS_ACUITY_SCORE: 53
ADLS_ACUITY_SCORE: 54
ADLS_ACUITY_SCORE: 54
ADLS_ACUITY_SCORE: 53
ADLS_ACUITY_SCORE: 54

## 2025-04-12 NOTE — PROVIDER NOTIFICATION
04/11/25 2215   Tech Time   $Tech Time (10 minute increments) 3   Mode: CPAP/ BiPAP/ AVAPS/ AVAPS AE   CPAP/BiPAP/ AVAPS/ AVAPS AE Mode CPAP   Equipment   Device Resmed   CPAP/BiPAP/Settings   $CPAP/BiPAP Initial completed   Oxygen (%) 21   CPAP/BiPAP Patient Parameters   CPAP (cm H2O) 15 cmh2o   RR Total (breaths/min) 20 breaths/min   CPAP/BiPAP/AVAPS/AVAPS AE Alarms   Humidifier Checked N/A   RT Device Skin Assessment   Oxygen Delivery Device CPAP/BiPAP Mask   Interface Face Mask - Medium   Ventilator Arm In Place No   Site Appearance neck circumference Clean and dry   Site Appearance bridge of nose Clean and dry   Site Appearance occiput Clean and dry   Strap Tightness Finger Allowance between head and device strap   Device Skin Interventions Taken No adjustments needed   Respiratory WDL   Respiratory WDL X     Zafar Boyd RT on 4/11/2025 at 10:59 PM

## 2025-04-12 NOTE — PROGRESS NOTES
PRIMARY DIAGNOSIS: Starvation ketoacidosis/fall  OUTPATIENT/OBSERVATION GOALS TO BE MET BEFORE DISCHARGE:  ADLs back to baseline: No    Activity and level of assistance: Up with standby assistance.    Pain status: Improved-controlled with oral pain medications.    Return to near baseline physical activity: No     Discharge Planner Nurse   Safe discharge environment identified: No  Barriers to discharge: No       Entered by: Bridgette Lambert RN 04/12/2025 6:07 PM     Please review provider order for any additional goals.   Nurse to notify provider when observation goals have been met and patient is ready for discharge.       Patient alert and oriented. VSS on room air. Neck collar in place - up assist of 1/SBA with walker gait belt. Voiding. Loose stools. No PRNs for CIWA. PRN oxycodone and tylenol given for pain. CMS in tact. Bath and full clean today. K and phos replaced today per order, recheck tomorrow. Plan for TCU.     Right at shift change new pain with swallowing and moving. MD aware. VSS. Passed swallow screen now. MRI ordered.

## 2025-04-12 NOTE — PLAN OF CARE
"Pt alert and oriented. On RA. C/o neck pain, Tylenol given. Collar in place. CIWA scored at 5. Blood sugar monitored. Up with 1 assist, gait belt an walker.     Problem: Adult Inpatient Plan of Care  Goal: Plan of Care Review  Description: The Plan of Care Review/Shift note should be completed every shift.  The Outcome Evaluation is a brief statement about your assessment that the patient is improving, declining, or no change.  This information will be displayed automatically on your shiftnote.  Outcome: Progressing  Flowsheets (Taken 4/11/2025 0045)  Outcome Evaluation: Pt alert and oriented. On RA. C/o neck pain, Tylenol given. Collar in place. CIWA scored at 5. Blood sugar monitored. Up with 1 assist, gait belt an walker.  Plan of Care Reviewed With: patient  Overall Patient Progress: improving  Goal: Patient-Specific Goal (Individualized)  Description: You can add care plan individualizations to a care plan. Examples of Individualization might be:  \"Parent requests to be called daily at 9am for status\", \"I have a hard time hearing out of my right ear\", or \"Do not touch me to wake me up as it startlesme\".  Outcome: Progressing  Goal: Absence of Hospital-Acquired Illness or Injury  Outcome: Progressing  Goal: Optimal Comfort and Wellbeing  Outcome: Progressing  Goal: Readiness for Transition of Care  Outcome: Progressing     Problem: Pain Acute  Goal: Optimal Pain Control and Function  Outcome: Progressing   Goal Outcome Evaluation:      Plan of Care Reviewed With: patient    Overall Patient Progress: improvingOverall Patient Progress: improving    Outcome Evaluation: Pt alert and oriented. On RA. C/o neck pain, Tylenol given. Collar in place. CIWA scored at 5. Blood sugar monitored. Up with 1 assist, gait belt an walker.      "

## 2025-04-12 NOTE — PLAN OF CARE
Goal Outcome Evaluation:      Plan of Care Reviewed With: patient    Overall Patient Progress: improvingOverall Patient Progress: improving         PRIMARY DIAGNOSIS: ETOH/ Falls/ Cervical Fx  OUTPATIENT/OBSERVATION GOALS TO BE MET BEFORE DISCHARGE:  ADLs back to baseline: No    Activity and level of assistance: Ax1, GB/walker    Pain status: Improved-controlled with oral pain medications. Controlled with PRN tylenol.    Return to near baseline physical activity: No       Please review provider order for any additional goals.   Nurse to notify provider when observation goals have been met and patient is ready for discharge.      CIWA 6 and 3. VSS on RA, pt did not wear CPAP overnight. Pain managed with prn tylenol. CC paged for throat lozenge for pt due to infrequent cough causing sore throat. Voiding well, BM 4/12. IV infusing. A&Ox4. Pt on warfarin and lovenox. Discharge TBD.

## 2025-04-12 NOTE — PROGRESS NOTES
PRIMARY DIAGNOSIS: Starvation ketoacidosis/fall  OUTPATIENT/OBSERVATION GOALS TO BE MET BEFORE DISCHARGE:  ADLs back to baseline: No    Activity and level of assistance: Up with standby assistance.    Pain status: Improved-controlled with oral pain medications.    Return to near baseline physical activity: No     Discharge Planner Nurse   Safe discharge environment identified: No  Barriers to discharge: No       Entered by: Bridgette Lambert RN 04/12/2025 6:05 PM     Please review provider order for any additional goals.   Nurse to notify provider when observation goals have been met and patient is ready for discharge.

## 2025-04-12 NOTE — PROGRESS NOTES
PRIMARY DIAGNOSIS: Starvation ketoacidosis/fall  OUTPATIENT/OBSERVATION GOALS TO BE MET BEFORE DISCHARGE:  ADLs back to baseline: No    Activity and level of assistance: Up with standby assistance.    Pain status: Improved with oral analgesics    Return to near baseline physical activity: No     Discharge Planner Nurse   Safe discharge environment identified: No  Barriers to discharge: No       Entered by: Bridgette Lambert RN 04/12/2025 6:06 PM     Please review provider order for any additional goals.   Nurse to notify provider when observation goals have been met and patient is ready for discharge.

## 2025-04-12 NOTE — PROGRESS NOTES
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  He was wearing his cervical collar he denies any major injuries several empty bottles of water were found in his apartment by EMS WBC 11.9 hemoglobin 12.7 platelet count 288,000 sodium 122 potassium 4.8 chloride 88 bicarb 15 BUN 12 creatinine 0.6 glucose 177 anion gap 29 alkaline phosphatase 151 AST 88 CPK level 1135 ethanol level 0.26 ketones 2.7 INR 1.38 CT scan of the cervical spine did not show any changes in the C1 ring fractures and C6/C7 osteophyte fracture no intracranial hemorrhage was seen.  He received 2 L of normal saline.  He was diagnosed with starvation ketosis/alcoholic ketoacidosis.  He was admitted for observation       Date of Admission:  4/11/2025  Assessment & Plan     Fall  Generalized weakness  Recent C1 and C6-7 osteophyte fractures  Patient came in after a fall while being intoxicated with an alcohol level of 0.06  PT and OT will be consulted for possible TCU until C1 fracture is better healed  Cervical collar will be continued  Has pain in the Right side of the neck for which I am ordering A Xray     Starvation ketoacidosis  Dehydration  Hyponatremia  Mild rhabdomyolysis  Will monitor CPK levels after the patient came in with fall while being intoxicated  Anion gap is decreased will continue to follow  BMP in the morning  CPK level decreased     Alcohol  intoxication  Alcohol use disorder: Chronic issues with binge drinking.  Has been in treatment once before.  Recent fall resulting in C1 fracture in setting of alcohol intoxication, but despite this he has been drinking 750 mL of vodka daily for the last 4 to 5 days.  No previous significant withdrawal symptoms.  Ethanol level 0.26 in the ER.  Several empty bottles of vodka found in his apartment per EMS.  -CIWA with lorazepam as needed  -Oral thiamine, folic acid, multivitamin  -Consult social work    Acute Hypophosphatemia  Will replace Phosphorus as per replacement protocol and check in am      History unprovoked DVT, PE  Paroxysmal atrial fibrillation  HTN: He had an unprovoked bilateral PE and right lower extremity DVT in 2/2024.  He also has paroxysmal A-fib.  Chronically on warfarin despite his multiple falls.  INR subtherapeutic at 1.38 in the ER.  He is also on carvedilol 12.5 mg twice daily and lisinopril 20 mg daily.  -Resume carvedilol and lisinopril difficult  -Difficult situation as patient continues to drink alcohol and has been having falls.  He has 2 separate indications for chronic anticoagulation.  If he is unwilling to stop drinking alcohol could consider an IVC filter, but would need further evaluation for possible Watchman device which would take some time.  -For now resumed warfarin, pharmacy to dose, daily INR     Type II DM: A1c 6.7 three months ago.  Glucose 177 in the ER.  He is on Mounjaro weekly and metformin XR 1000 mg daily with dinner.  Ketones elevated as well, but I doubt DKA starvation ketoacidosis makes more sense for his current clinical condition.  -Medium dose sliding scale aspart  -Suspect lactic acid is elevated, hold metformin and Mounjaro     Leukocytosis: WBC count elevated at 11.9.  Vital stable in the ER and afebrile.  No infectious symptoms.  Likely stress demargination.  -CBC tomorrow     Morbid obesity: BMI 48.8.  Previous gastric bypass surgery.     MDD  Anxiety:  Resume PTA Wellbutrin XL 3 mg daily.     HLD: Hold PTA simvastatin secondary to rhabdomyolysis.     CARLIE: Resume CPAP if using.        Plan:   Check CMP in a.m.  Give as needed oxycodone for neck pain  With normalization of the anion gap will discontinue IV Dextrose   Plan to discharge to TCU - consult    Monitor blood pressure on treatment with Carvidelol as well as Lisinopril       Diet: Regular Diet Adult    DVT Prophylaxis: Warfarin  Santos Catheter: Not present  Code Status: Full Code         Medically Ready for Discharge: Anticipated Tomorrow    Clinically Significant Risk Factors Present on Admission         # Hyponatremia: Lowest Na = 130 mmol/L in last 2 days, will monitor as appropriate  # Hypochloremia: Lowest Cl = 88 mmol/L in last 2 days, will monitor as appropriate     # Anion Gap Metabolic Acidosis: Highest Anion Gap = 29 mmol/L in last 2 days, will monitor and treat as appropriate     # Drug Induced Coagulation Defect: home medication list includes an anticoagulant medication    # Hypertension: Home medication list includes antihypertensive(s)          # DMII: A1C = N/A within past 6 months    # Severe Obesity: Estimated body mass index is 47.24 kg/m  as calculated from the following:    Height as of this encounter: 1.829 m (6').    Weight as of this encounter: 158 kg (348 lb 5.2 oz).         # Financial/Environmental Concerns:                 The patient's care was discussed with the Patient and RN.    Renzo Mercer MD  Hospitalist Service  [facility]    ______________________________________________________________________    Interval History     Symptoms   Denies any new symptoms    Review of Systems:   Discussed the need to quit alcohol    Data reviewed today: I reviewed all medications, new labs and imaging results over the last 24 hours.     Physical Exam   Vital Signs: Temp: 97.7  F (36.5  C) Temp src: Temporal BP: (!) 157/76 Pulse: 79   Resp: 20 SpO2: 94 %  O2 Device: None (Room air)    Weight: 348 lbs 5.23 oz      GENERAL: Patient is not in acute distress  HEENT: EOM+,Conjunctiva is clear   NECK: In hard c-collar  HEART: S1 S2 regular Rate and Rhythm, there is  no murmur,   LUNGS: Respirations are  not laboured, Lungs are  clear to auscultation without Crepitations or Wheezing   ABDOMEN: Soft , there is no tenderness , Bowel Sounds are  Positive   LOWER LIMBS: no  Pedal Edema  Bilaterally   CNS:  Alert,  Oriented x 3, Moving all the Four Limbs     Data   Recent Labs   Lab 04/12/25  0653 04/12/25  0301 04/11/25  2218 04/11/25  1748 04/11/25  1549 04/11/25  0928 04/11/25  0412   WBC 6.2  --   --   --   --   --  11.4*   HGB 11.5*  --   --   --   --   --  12.7*   MCV 81  --   --   --   --   --  82     --   --   --   --   --  288   INR 2.02*  --   --   --   --   --  1.38*     --   --   --  130*  --  132*   POTASSIUM 3.4  --   --   --  4.3  --  4.8   CHLORIDE 100  --   --   --  93*  --  88*   CO2 23  --   --   --  19*  --  15*   BUN 9.4  --   --   --  12.8  --  12.4   CR 0.73  --   --   --  0.78  --  0.86   ANIONGAP 13  --   --   --  18*  --  29*   RAUL 9.0  --   --   --  9.0  --  9.1   * 223* 196*   < > 260*   < > 177*   ALBUMIN 3.7  --   --   --   --   --  4.3   PROTTOTAL 6.5  --   --   --   --   --  7.9   BILITOTAL 0.8  --   --   --   --   --  0.5   ALKPHOS 120  --   --   --   --   --  151*   ALT 60  --   --   --   --   --  66   AST 82*  --   --   --   --   --  88*    < > = values in this interval not displayed.         No results found for this or any previous visit (from the past 24 hours).

## 2025-04-13 ENCOUNTER — APPOINTMENT (OUTPATIENT)
Dept: MRI IMAGING | Facility: CLINIC | Age: 45
DRG: 897 | End: 2025-04-13
Attending: STUDENT IN AN ORGANIZED HEALTH CARE EDUCATION/TRAINING PROGRAM
Payer: COMMERCIAL

## 2025-04-13 ENCOUNTER — APPOINTMENT (OUTPATIENT)
Dept: PHYSICAL THERAPY | Facility: CLINIC | Age: 45
DRG: 897 | End: 2025-04-13
Payer: COMMERCIAL

## 2025-04-13 PROBLEM — E88.89 ALCOHOLIC KETOSIS (H): Status: ACTIVE | Noted: 2025-04-13

## 2025-04-13 LAB
ALBUMIN SERPL BCG-MCNC: 3.8 G/DL (ref 3.5–5.2)
ALP SERPL-CCNC: 121 U/L (ref 40–150)
ALT SERPL W P-5'-P-CCNC: 83 U/L (ref 0–70)
ANION GAP SERPL CALCULATED.3IONS-SCNC: 12 MMOL/L (ref 7–15)
AST SERPL W P-5'-P-CCNC: 96 U/L (ref 0–45)
BILIRUB SERPL-MCNC: 0.6 MG/DL
BUN SERPL-MCNC: 7 MG/DL (ref 6–20)
CALCIUM SERPL-MCNC: 9.1 MG/DL (ref 8.8–10.4)
CHLORIDE SERPL-SCNC: 102 MMOL/L (ref 98–107)
CREAT SERPL-MCNC: 0.77 MG/DL (ref 0.67–1.17)
EGFRCR SERPLBLD CKD-EPI 2021: >90 ML/MIN/1.73M2
GLUCOSE BLDC GLUCOMTR-MCNC: 127 MG/DL (ref 70–99)
GLUCOSE BLDC GLUCOMTR-MCNC: 130 MG/DL (ref 70–99)
GLUCOSE BLDC GLUCOMTR-MCNC: 138 MG/DL (ref 70–99)
GLUCOSE BLDC GLUCOMTR-MCNC: 166 MG/DL (ref 70–99)
GLUCOSE BLDC GLUCOMTR-MCNC: 177 MG/DL (ref 70–99)
GLUCOSE SERPL-MCNC: 123 MG/DL (ref 70–99)
HCO3 SERPL-SCNC: 23 MMOL/L (ref 22–29)
INR PPP: 1.88 (ref 0.85–1.15)
MAGNESIUM SERPL-MCNC: 1.9 MG/DL (ref 1.7–2.3)
PHOSPHATE SERPL-MCNC: 3.7 MG/DL (ref 2.5–4.5)
POTASSIUM SERPL-SCNC: 3.7 MMOL/L (ref 3.4–5.3)
PROT SERPL-MCNC: 6.8 G/DL (ref 6.4–8.3)
SODIUM SERPL-SCNC: 137 MMOL/L (ref 135–145)

## 2025-04-13 PROCEDURE — 80053 COMPREHEN METABOLIC PANEL: CPT | Performed by: INTERNAL MEDICINE

## 2025-04-13 PROCEDURE — 85610 PROTHROMBIN TIME: CPT | Performed by: INTERNAL MEDICINE

## 2025-04-13 PROCEDURE — 255N000002 HC RX 255 OP 636: Performed by: STUDENT IN AN ORGANIZED HEALTH CARE EDUCATION/TRAINING PROGRAM

## 2025-04-13 PROCEDURE — 250N000011 HC RX IP 250 OP 636: Performed by: INTERNAL MEDICINE

## 2025-04-13 PROCEDURE — 96372 THER/PROPH/DIAG INJ SC/IM: CPT | Performed by: INTERNAL MEDICINE

## 2025-04-13 PROCEDURE — 72156 MRI NECK SPINE W/O & W/DYE: CPT

## 2025-04-13 PROCEDURE — 83735 ASSAY OF MAGNESIUM: CPT | Performed by: INTERNAL MEDICINE

## 2025-04-13 PROCEDURE — 97116 GAIT TRAINING THERAPY: CPT | Mod: GP

## 2025-04-13 PROCEDURE — G0378 HOSPITAL OBSERVATION PER HR: HCPCS

## 2025-04-13 PROCEDURE — 82962 GLUCOSE BLOOD TEST: CPT

## 2025-04-13 PROCEDURE — 99222 1ST HOSP IP/OBS MODERATE 55: CPT

## 2025-04-13 PROCEDURE — A9585 GADOBUTROL INJECTION: HCPCS | Performed by: STUDENT IN AN ORGANIZED HEALTH CARE EDUCATION/TRAINING PROGRAM

## 2025-04-13 PROCEDURE — 99232 SBSQ HOSP IP/OBS MODERATE 35: CPT | Performed by: INTERNAL MEDICINE

## 2025-04-13 PROCEDURE — 120N000001 HC R&B MED SURG/OB

## 2025-04-13 PROCEDURE — 250N000013 HC RX MED GY IP 250 OP 250 PS 637: Performed by: EMERGENCY MEDICINE

## 2025-04-13 PROCEDURE — 84100 ASSAY OF PHOSPHORUS: CPT | Performed by: INTERNAL MEDICINE

## 2025-04-13 PROCEDURE — 36415 COLL VENOUS BLD VENIPUNCTURE: CPT | Performed by: INTERNAL MEDICINE

## 2025-04-13 PROCEDURE — 250N000013 HC RX MED GY IP 250 OP 250 PS 637: Performed by: INTERNAL MEDICINE

## 2025-04-13 PROCEDURE — 97530 THERAPEUTIC ACTIVITIES: CPT | Mod: GP

## 2025-04-13 RX ORDER — HYDROXYZINE HYDROCHLORIDE 50 MG/1
50 TABLET, FILM COATED ORAL EVERY 6 HOURS PRN
Status: DISCONTINUED | OUTPATIENT
Start: 2025-04-13 | End: 2025-04-14 | Stop reason: HOSPADM

## 2025-04-13 RX ORDER — WARFARIN SODIUM 4 MG/1
4 TABLET ORAL
Status: COMPLETED | OUTPATIENT
Start: 2025-04-13 | End: 2025-04-13

## 2025-04-13 RX ORDER — GADOBUTROL 604.72 MG/ML
0.1 INJECTION INTRAVENOUS ONCE
Status: COMPLETED | OUTPATIENT
Start: 2025-04-13 | End: 2025-04-13

## 2025-04-13 RX ORDER — HYDROXYZINE HYDROCHLORIDE 25 MG/1
25 TABLET, FILM COATED ORAL EVERY 6 HOURS PRN
Status: DISCONTINUED | OUTPATIENT
Start: 2025-04-13 | End: 2025-04-14 | Stop reason: HOSPADM

## 2025-04-13 RX ADMIN — BUPROPION HYDROCHLORIDE 300 MG: 150 TABLET, EXTENDED RELEASE ORAL at 08:09

## 2025-04-13 RX ADMIN — OXYCODONE HYDROCHLORIDE 5 MG: 5 TABLET ORAL at 11:32

## 2025-04-13 RX ADMIN — ACETAMINOPHEN 650 MG: 325 TABLET, FILM COATED ORAL at 14:17

## 2025-04-13 RX ADMIN — OXYCODONE HYDROCHLORIDE 5 MG: 5 TABLET ORAL at 00:16

## 2025-04-13 RX ADMIN — THIAMINE HCL TAB 100 MG 100 MG: 100 TAB at 08:09

## 2025-04-13 RX ADMIN — CARVEDILOL 12.5 MG: 12.5 TABLET, FILM COATED ORAL at 08:09

## 2025-04-13 RX ADMIN — GADOBUTROL 15.8 ML: 604.72 INJECTION INTRAVENOUS at 00:27

## 2025-04-13 RX ADMIN — Medication 1 TABLET: at 08:09

## 2025-04-13 RX ADMIN — CARVEDILOL 12.5 MG: 12.5 TABLET, FILM COATED ORAL at 17:16

## 2025-04-13 RX ADMIN — ACETAMINOPHEN 650 MG: 325 TABLET, FILM COATED ORAL at 22:49

## 2025-04-13 RX ADMIN — Medication 1 LOZENGE: at 00:17

## 2025-04-13 RX ADMIN — ACETAMINOPHEN 650 MG: 325 TABLET, FILM COATED ORAL at 02:36

## 2025-04-13 RX ADMIN — FOLIC ACID 1 MG: 1 TABLET ORAL at 08:09

## 2025-04-13 RX ADMIN — OXYCODONE HYDROCHLORIDE 5 MG: 5 TABLET ORAL at 06:33

## 2025-04-13 RX ADMIN — LISINOPRIL 20 MG: 10 TABLET ORAL at 08:09

## 2025-04-13 RX ADMIN — ENOXAPARIN SODIUM 120 MG: 120 INJECTION SUBCUTANEOUS at 11:28

## 2025-04-13 RX ADMIN — INSULIN ASPART 1 UNITS: 100 INJECTION, SOLUTION INTRAVENOUS; SUBCUTANEOUS at 12:38

## 2025-04-13 RX ADMIN — OXYCODONE HYDROCHLORIDE 5 MG: 5 TABLET ORAL at 17:16

## 2025-04-13 RX ADMIN — WARFARIN SODIUM 4 MG: 4 TABLET ORAL at 18:53

## 2025-04-13 RX ADMIN — HYDROXYZINE HYDROCHLORIDE 25 MG: 25 TABLET, FILM COATED ORAL at 18:53

## 2025-04-13 RX ADMIN — ACETAMINOPHEN 650 MG: 325 TABLET, FILM COATED ORAL at 10:18

## 2025-04-13 ASSESSMENT — ACTIVITIES OF DAILY LIVING (ADL)
TOILETING_ISSUES: NO
ADLS_ACUITY_SCORE: 43
CONCENTRATING,_REMEMBERING_OR_MAKING_DECISIONS_DIFFICULTY: NO
ADLS_ACUITY_SCORE: 52
HEARING_DIFFICULTY_OR_DEAF: NO
DIFFICULTY_COMMUNICATING: NO
ADLS_ACUITY_SCORE: 43
ADLS_ACUITY_SCORE: 52
CHANGE_IN_FUNCTIONAL_STATUS_SINCE_ONSET_OF_CURRENT_ILLNESS/INJURY: YES
ADLS_ACUITY_SCORE: 52
DIFFICULTY_EATING/SWALLOWING: NO
DOING_ERRANDS_INDEPENDENTLY_DIFFICULTY: YES
ADLS_ACUITY_SCORE: 52
WEAR_GLASSES_OR_BLIND: YES
ADLS_ACUITY_SCORE: 43
DRESSING/BATHING_DIFFICULTY: NO
ADLS_ACUITY_SCORE: 43
ADLS_ACUITY_SCORE: 43
ADLS_ACUITY_SCORE: 52
ADLS_ACUITY_SCORE: 43
NUMBER_OF_TIMES_PATIENT_HAS_FALLEN_WITHIN_LAST_SIX_MONTHS: 3
ADLS_ACUITY_SCORE: 52
ADLS_ACUITY_SCORE: 43
ADLS_ACUITY_SCORE: 43
ADLS_ACUITY_SCORE: 52
FALL_HISTORY_WITHIN_LAST_SIX_MONTHS: YES
WALKING_OR_CLIMBING_STAIRS_DIFFICULTY: NO
ADLS_ACUITY_SCORE: 52
ADLS_ACUITY_SCORE: 43

## 2025-04-13 NOTE — PROGRESS NOTES
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  He was wearing his cervical collar he denies any major injuries several empty bottles of water were found in his apartment by EMS WBC 11.9 hemoglobin 12.7 platelet count 288,000 sodium 122 potassium 4.8 chloride 88 bicarb 15 BUN 12 creatinine 0.6 glucose 177 anion gap 29 alkaline phosphatase 151 AST 88 CPK level 1135 ethanol level 0.26 ketones 2.7 INR 1.38 CT scan of the cervical spine did not show any changes in the C1 ring fractures and C6/C7 osteophyte fracture no intracranial hemorrhage was seen.  He received 2 L of normal saline.  He was diagnosed with starvation ketosis/alcoholic ketoacidosis.  He was admitted for observation       Date of Admission:  4/11/2025  Assessment & Plan     Fall  Generalized weakness  Recent C1 and C6-7 osteophyte fractures  Patient came in after a fall while being intoxicated with an alcohol level of 0.06  PT and OT will be consulted for possible TCU until C1 fracture is better healed  Cervical collar will be continued  Has pain in the Right side of the neck for which I am ordering A Xray     Starvation ketoacidosis  Dehydration  Hyponatremia  Mild rhabdomyolysis  Will monitor CPK levels after the patient came in with fall while being intoxicated  Anion gap is decreased will continue to follow  BMP in the morning  CPK level decreased     Alcohol  intoxication  Alcohol use disorder: Chronic issues with binge drinking.  Has been in treatment once before.  Recent fall resulting in C1 fracture in setting of alcohol intoxication, but despite this he has been drinking 750 mL of vodka daily for the last 4 to 5 days.  No previous significant withdrawal symptoms.  Ethanol level 0.26 in the ER.  Several empty bottles of vodka found in his apartment per EMS.  -CIWA with lorazepam as needed  -Oral thiamine, folic acid, multivitamin  -Consult social work    Acute Hypophosphatemia  Will replace Phosphorus as per replacement protocol and check in am      History unprovoked DVT, PE  Paroxysmal atrial fibrillation  HTN: He had an unprovoked bilateral PE and right lower extremity DVT in 2/2024.  He also has paroxysmal A-fib.  Chronically on warfarin despite his multiple falls.  INR subtherapeutic at 1.38 in the ER.  He is also on carvedilol 12.5 mg twice daily and lisinopril 20 mg daily.  -Resume carvedilol and lisinopril difficult  -Difficult situation as patient continues to drink alcohol and has been having falls.  He has 2 separate indications for chronic anticoagulation.  If he is unwilling to stop drinking alcohol could consider an IVC filter, but would need further evaluation for possible Watchman device which would take some time.  -For now resumed warfarin, pharmacy to dose, daily INR     Type II DM: A1c 6.7 three months ago.  Glucose 177 in the ER.  He is on Mounjaro weekly and metformin XR 1000 mg daily with dinner.  Ketones elevated as well, but I doubt DKA starvation ketoacidosis makes more sense for his current clinical condition.  -Medium dose sliding scale aspart  -Suspect lactic acid is elevated, hold metformin and Mounjaro     Leukocytosis: WBC count elevated at 11.9.  Vital stable in the ER and afebrile.  No infectious symptoms.  Likely stress demargination.  -CBC tomorrow     Morbid obesity: BMI 48.8.  Previous gastric bypass surgery.     MDD  Anxiety:  Resume PTA Wellbutrin XL 3 mg daily.     HLD: Hold PTA simvastatin secondary to rhabdomyolysis.     CARLIE: Resume CPAP if using.        Plan:   Give as needed oxycodone for neck pain  With normalization of the anion gap will discontinue IV Dextrose   Plan to discharge to TCU - consult    Monitor blood pressure on treatment with Carvidelol as well as Lisinopril   Neurosurgery consult -no intervention needed advised to follow-up on 4/25/2025 as outpatient patient is ready to go from neurosurgical perspective    Diet: Regular Diet Adult    DVT Prophylaxis: Warfarin  Snatos Catheter: Not present  Code Status: Full Code         Medically Ready for Discharge: Anticipated Tomorrow    Clinically Significant Risk Factors Present on Admission         # Hyponatremia: Lowest Na = 130 mmol/L in last 2 days, will monitor as appropriate  # Hypochloremia: Lowest Cl = 93 mmol/L in last 2 days, will monitor as appropriate         # Drug Induced Coagulation Defect: home medication list includes an anticoagulant medication    # Hypertension: Home medication list includes antihypertensive(s)          # DMII: A1C = N/A within past 6 months    # Severe Obesity: Estimated body mass index is 47.24 kg/m  as calculated from the following:    Height as of this encounter: 1.829 m (6').    Weight as of this encounter: 158 kg (348 lb 5.2 oz).         # Financial/Environmental Concerns:                 The patient's care was discussed with the Patient and RN.    Renzo Mercer MD  Hospitalist Service  [facility]    ______________________________________________________________________    Interval History     Symptoms   Patient complains of tingling and numbness on the left side of the foot  He has more pain in the neck earlier which got somewhat better with adjusting of the collar by OT    Review of Systems:   Shortness of breath    Data reviewed today: I reviewed all medications, new labs and imaging results over  the last 24 hours.     Physical Exam   Vital Signs: Temp: 97.1  F (36.2  C) Temp src: Temporal BP: 134/75 Pulse: 66   Resp: 20 SpO2: 94 % O2 Device: None (Room air) Oxygen Delivery: 1 LPM  Weight: 348 lbs 5.23 oz      GENERAL: Patient is not in acute distress  HEENT: EOM+,Conjunctiva is clear   NECK: In hard c-collar  HEART: S1 S2 regular Rate and Rhythm, there is  no murmur,   LUNGS: Respirations are  not laboured, Lungs are  clear to auscultation without Crepitations or Wheezing   ABDOMEN: Soft , there is no tenderness , Bowel Sounds are  Positive   LOWER LIMBS: no  Pedal Edema  Bilaterally   CNS:  Alert,  Oriented x 3, Moving all the Four Limbs     Data   Recent Labs   Lab 04/13/25  1151 04/13/25  0744 04/13/25  0640 04/12/25  1719 04/12/25  1522 04/12/25  0831 04/12/25  0653 04/11/25  1748 04/11/25  1549 04/11/25  0928 04/11/25  0412   WBC  --   --   --   --   --   --  6.2  --   --   --  11.4*   HGB  --   --   --   --   --   --  11.5*  --   --   --  12.7*   MCV  --   --   --   --   --   --  81  --   --   --  82   PLT  --   --   --   --   --   --  218  --   --   --  288   INR  --   --  1.88*  --   --   --  2.02*  --   --   --  1.38*   NA  --   --  137  --   --   --  136  --  130*  --  132*   POTASSIUM  --   --  3.7  --  4.0  --  3.4  --  4.3  --  4.8   CHLORIDE  --   --  102  --   --   --  100  --  93*  --  88*   CO2  --   --  23  --   --   --  23  --  19*  --  15*   BUN  --   --  7.0  --   --   --  9.4  --  12.8  --  12.4   CR  --   --  0.77  --   --   --  0.73  --  0.78  --  0.86   ANIONGAP  --   --  12  --   --   --  13  --  18*  --  29*   RAUL  --   --  9.1  --   --   --  9.0  --  9.0  --  9.1   * 138* 123*   < >  --    < > 163*   < > 260*   < > 177*   ALBUMIN  --   --  3.8  --   --   --  3.7  --   --   --  4.3   PROTTOTAL  --   --  6.8  --   --   --  6.5  --   --   --  7.9   BILITOTAL  --   --  0.6  --   --   --  0.8  --   --   --  0.5   ALKPHOS  --   --  121  --   --   --  120  --   --   --  151*    ALT  --   --  83*  --   --   --  60  --   --   --  66   AST  --   --  96*  --   --   --  82*  --   --   --  88*    < > = values in this interval not displayed.         Recent Results (from the past 24 hours)   XR Cervical Spine 2/3 Views    Narrative    EXAM: XR CERVICAL SPINE 2/3 VIEWS  LOCATION: [facility]  DATE: 4/12/2025    INDICATION: Neck pain Right side of the Neck on cervical collar after Recent C1 and C6 7 osteophyte fractures  COMPARISON: CT cervical spine 4/11/2025      Impression    IMPRESSION: The known C1 fracture and C6-7 fractures are better appreciated on recent CT. Vertebral body heights are maintained. Normal alignment. Moderate multilevel disc height loss. Flowing anterior osteophytes throughout. Mild prevertebral soft   tissue swelling.   MR Cervical Spine w/o & w Contrast    Narrative    EXAM: MR CERVICAL SPINE W/O AND W CONTRAST  LOCATION: [facility]  DATE: 4/13/2025    INDICATION: New neck pain, recent C1 and C6-C7 fractures.  COMPARISON: MRI cervical spine 03/26/2025.  CONTRAST: 15.8 ml Gadavist  TECHNIQUE: MRI Cervical Spine without and with IV contrast.    FINDINGS: Image quality is mildly degraded by motion.    Normal vertebral body heights and alignment. Marrow edema associated with C1 burst fracture. Redemonstrated fracture through the ventral osteophyte at the C6-C7 level with marrow edema and mild prevertebral edema. Interval development of mild edema at   the superior C3 endplate and anterior osteophyte (sagittal STIR image 7). In retrospect, there is a very subtle fracture through the osteophyte at this level on prior CT 4/11/2025 and 3/27/2025. No new fractures. Diffuse developmental spinal canal   stenosis. No spinal cord signal abnormality. No extraspinal abnormality.    Craniovertebral junction and C1-C2: C1 fracture. No spinal canal stenosis.    C2-C3: Mild disc space narrowing with mild uncinate spurring on the right.  Unremarkable facets. Mild to moderate spinal canal stenosis. No foraminal stenosis.     C3-C4: Mild disc space narrowing. No herniation. Unremarkable facets. Mild spinal canal stenosis. No foraminal stenosis.     C4-C5: Mild disc space narrowing. No herniation. Mild uncinate spurring. Unremarkable facets. Mild spinal canal stenosis. Mild bilateral foraminal stenoses.     C5-C6: Mild disc space narrowing with mild marginal osteophyte. Unremarkable facets. No spinal canal stenosis. No foraminal stenosis.     C6-C7: Mild disc space narrowing with mild marginal osteophyte. Fracture through the bulky ventral osteophyte. Unremarkable facets. No spinal canal stenosis. Mild foraminal stenosis.    C7-T1: Normal disc height without herniation. Unremarkable facets. No spinal canal or foraminal stenosis.      Impression    IMPRESSION:  1.  Redemonstrated C1 burst fracture with persistent marrow edema.  2.  Redemonstrated fracture through the ventral osteophyte at C6-C7 with mild prevertebral edema.  3.  Interval development of mild edema at the superior C3 endplate and anterior osteophyte, associated with very subtle fracture through the osteophyte at this level present on previous CT 03/26/2025.  4.  Congenital spinal canal stenosis with superimposed spondylosis resulting in mild to moderate spinal canal stenosis at C2-C3 and mild spinal canal stenosis at C3-C4 and C4-C5.  5.  Mild foraminal stenoses as detailed.

## 2025-04-13 NOTE — PROGRESS NOTES
PRIMARY DIAGNOSIS: Fall, Generalized weakness, recent cervical fx, starvation ketoacidosis   OUTPATIENT/OBSERVATION GOALS TO BE MET BEFORE DISCHARGE:  ADLs back to baseline: No    Activity and level of assistance: Up with standby assistance.    Pain status: Improved-controlled with oral pain medications.    Return to near baseline physical activity: No     Discharge Planner Nurse   Safe discharge environment identified: No  Barriers to discharge: Yes       Entered by: Gabrielle Crews RN 04/13/2025 1:38 AM  Waiting for MRI. PRN Oxy for pain. CIWA 7   Please review provider order for any additional goals.   Nurse to notify provider when observation goals have been met and patient is ready for discharge.

## 2025-04-13 NOTE — PROGRESS NOTES
Care Management Follow Up    Length of Stay (days): 0    Expected Discharge Date: 04/15/2025     Concerns to be Addressed: all concerns addressed in this encounter     Patient plan of care discussed at interdisciplinary rounds: Yes    Additional Information:    SW reviewed the patient's case along with a sticky note that indicated the CM should follow up on the (PT) evaluation.  PT recommended that the patient either go to a (TCU) or consider home care services with PT. The SW discussed these recommendations with the patient and provided a pamphlet about the TCU. The patient will review the options for TCU and inform the SW of his decision by tomorrow. While the patient is leaning towards the TCU, he is also considering home care services with PT. The SW discussed both options thoroughly with the patient, who is expected to make a final decision tomorrow. The SW will continue to follow up.    Next Steps:     TCU or home care services decision and then submit referrals.    JOCE Richter, LGSW   Care Management

## 2025-04-13 NOTE — CONSULTS
[facility]    Neurosurgery Consultation     Date of Admission:  4/11/2025  Date of Consult (When I saw the patient): 04/13/25    Assessment & Plan   44 year old male with history of morbid obesity, NIDDM type II, HTN, CARLIE, alcoholism, and atrial fibrillation and VTE on Warfarin previously admitted 3/26/25-3/31/25 for a ground level fall with a head injury, NSGY was consulted for C1 anterior and posterior arch fracture and nondisplaced C6-C7 anterior osteophyte fracture in setting of DISH, recommending hard cervical collar at all times/sujatha collar for hygiene and follow up in outpatient NSGY clinic in 4-6 weeks with xrays prior (scheduled 4/25/25). Repeat cervical CT and MRI stable. NSGY consulted for neck pain and headache with C1 fracture, tingling in the left foot.     Patient states he recently developed pain at the base of his neck radiating into bilateral shoulders but not radiating into bilateral upper extremities. Also reports bilateral temporal pain, denies history of migraines. Denies upper extremity radicular paresthesias, most recent fall 4\10\25. Endorses new paresthesias in left foot. Has been wearing hard cervical collar.     Cervical MRI redemonstrates C1 fracture with persistent edema, stable fracture through ventral osteophyte at C-C7 with mild prevertebral edema, interval development of mild edema at superior C3 endplate and anterior osteophyte with subtle fracture through osteophyte, congenital canal stenosis with superimposed spondylosis resulting in mild to moderate canal stenosis at C2-3.     Imaging:  Imaging Interpretation provided by radiologist.   EXAM: MR CERVICAL SPINE W/O AND W CONTRAST  LOCATION: [facility]  DATE: 4/13/2025     INDICATION: New neck pain, recent C1 and C6-C7 fractures.  COMPARISON: MRI cervical spine 03/26/2025.  CONTRAST: 15.8 ml Gadavist  TECHNIQUE: MRI Cervical Spine without and with IV contrast.     FINDINGS: Image  quality is mildly degraded by motion.     Normal vertebral body heights and alignment. Marrow edema associated with C1 burst fracture. Redemonstrated fracture through the ventral osteophyte at the C6-C7 level with marrow edema and mild prevertebral edema. Interval development of mild edema at   the superior C3 endplate and anterior osteophyte (sagittal STIR image 7). In retrospect, there is a very subtle fracture through the osteophyte at this level on prior CT 4/11/2025 and 3/27/2025. No new fractures. Diffuse developmental spinal canal   stenosis. No spinal cord signal abnormality. No extraspinal abnormality.     Craniovertebral junction and C1-C2: C1 fracture. No spinal canal stenosis.     C2-C3: Mild disc space narrowing with mild uncinate spurring on the right. Unremarkable facets. Mild to moderate spinal canal stenosis. No foraminal stenosis.      C3-C4: Mild disc space narrowing. No herniation. Unremarkable facets. Mild spinal canal stenosis. No foraminal stenosis.      C4-C5: Mild disc space narrowing. No herniation. Mild uncinate spurring. Unremarkable facets. Mild spinal canal stenosis. Mild bilateral foraminal stenoses.      C5-C6: Mild disc space narrowing with mild marginal osteophyte. Unremarkable facets. No spinal canal stenosis. No foraminal stenosis.      C6-C7: Mild disc space narrowing with mild marginal osteophyte. Fracture through the bulky ventral osteophyte. Unremarkable facets. No spinal canal stenosis. Mild foraminal stenosis.     C7-T1: Normal disc height without herniation. Unremarkable facets. No spinal canal or foraminal stenosis.                                                               IMPRESSION:  1.  Redemonstrated C1 burst fracture with persistent marrow edema.  2.  Redemonstrated fracture through the ventral osteophyte at C6-C7 with mild prevertebral edema.  3.  Interval development of mild edema at the superior C3 endplate and anterior osteophyte, associated with very subtle  fracture through the osteophyte at this level present on previous CT 03/26/2025.  4.  Congenital spinal canal stenosis with superimposed spondylosis resulting in mild to moderate spinal canal stenosis at C2-C3 and mild spinal canal stenosis at C3-C4 and C4-C5.  5.  Mild foraminal stenoses as detailed.      EXAM: CT HEAD W/O CONTRAST, CT CERVICAL SPINE W/O CONTRAST  LOCATION: [facility]  DATE: 4/11/2025     INDICATION: Fall, intoxicated, anticoagulated known recent C1, C5 and C6 fracture  COMPARISON: 3/26/2025 and 3/27/2025  TECHNIQUE:   1) Routine CT Head without IV contrast. Multiplanar reformats. Dose reduction techniques were used.  2) Routine CT Cervical Spine without IV contrast. Multiplanar reformats. Dose reduction techniques were used.     FINDINGS:   HEAD CT:   INTRACRANIAL CONTENTS: No intracranial hemorrhage, extraaxial collection, or mass effect.  No CT evidence of acute infarct. Normal parenchymal attenuation. Mild generalized volume loss. No hydrocephalus.      VISUALIZED ORBITS/SINUSES/MASTOIDS: No intraorbital abnormality. 2 cm polyp or retention cyst in the left maxillary sinus. No middle ear or mastoid effusion.     BONES/SOFT TISSUES: No acute abnormality.     CERVICAL SPINE CT:   VERTEBRA: Redemonstration of nonunited fracture deformities involving the anterior and posterior arches of the C1 ring without significant interval healing. No increased widening of the fracture plane is appreciated. Additional nondisplaced fracture   through an anterior C6-C7 osteophyte. No convincing new fracture deformity. Underlying changes of diffuse idiopathic skeletal hyperostosis.      CANAL/FORAMINA: No canal or neural foraminal stenosis.     PARASPINAL: No extraspinal abnormality. Visualized lung fields are clear.                                                            IMPRESSION:  HEAD CT:  No acute intracranial hemorrhage or calvarial fracture.     CERVICAL SPINE CT:  1.  No CT  evidence for acute fracture or posttraumatic subluxation.  2.  Redemonstration of nonunited fractures involving the anterior and posterior arches of the C1 ring without significant interval healing.  3.  Redemonstration of a nondisplaced fracture through an anterior C6-C7 osteophyte without significant interval healing.    NSGY Recommendations:   - No urgent/emergent NSGY intervention  - Follow up with NSGY as scheduled 4/25/25 with xray prior  - Continue hard cervical collar at all times, sujatha collar for hygiene    NSGY signing off. Patient okay for discharge from NSGY point of care once medically stable.     Please contact on call NSGY JOSE GUADALUPE via [employer] system for questions or concerns.     I have discussed the following assessment and plan with Dr. Barrett who is in agreement with initial plan and will follow up with further consultation recommendations.    Edel Ames PA-C  [facility] Neurosurgery  [place] [phone]    Text page via [employer] Paging/Directory    Code Status    Full Code    Reason for Consult   Reason for consult: C1 fracture    Primary Care Physician   Yovana Felipe    Chief Complaint   Neck pain    History is obtained from the patient and chart review    History of Present Illness   Cody Bolton is a  44 year old male with history of morbid obesity, NIDDM type II, HTN, CARLIE, alcoholism, and atrial fibrillation and VTE on Warfarin previously admitted 3/26/25-3/31/25 for a ground level fall with a head injury, NSGY was consulted for C1 anterior and posterior arch fracture and nondisplaced C6-C7 anterior osteophyte fracture in setting of DISH, recommending hard cervical collar at all times\sujatha collar for hygiene and follow up in outpatient NSGY clinic in 4-6 weeks with xrays prior (scheduled 4/25/25). Repeat cervical CT and MRI stable.     Patient states he recently developed pain at the base of his neck radiating  into bilateral shoulders but not radiating into bilateral upper extremities. Also reports bilateral temporal pain, denies history of migraines. Denies upper extremity radicular paresthesias, most recent fall 4\10\25. Endorses new paresthesias in left foot. Has been wearing hard cervical collar.     Past Medical History   I have reviewed this patient's medical history and updated it with pertinent information if needed.   Past Medical History:   Diagnosis Date    Alcohol use disorder     Depressive disorder     High cholesterol     History of gastric bypass     History of pulmonary embolism     Hypertension     Morbid obesity (H)     CARLIE (obstructive sleep apnea)     Paroxysmal atrial fibrillation (H)     Personal history of DVT (deep vein thrombosis)     Pulmonary embolism (H) 02/2024    unprovoked bilateral PE and RLE DVT 2/2024    Type 2 diabetes mellitus (H)     Uncomplicated asthma        Past Surgical History   I have reviewed this patient's surgical history and updated it with pertinent information if needed.  Past Surgical History:   Procedure Laterality Date    APPENDECTOMY  08/15/2017    Dr. Ferrer    GASTRIC BYPASS      UT LAP,APPENDECTOMY N/A 8/14/2017    Procedure: APPENDECTOMY, LAPAROSCOPIC;  Surgeon: Nba Ferrer MD;  Location: [facility];  Service: General    REVISION AKANKSHA-EN-Y  2014    Dr. Ranjeet Espinal @[facility]       Prior to Admission Medications   Prior to Admission Medications   Prescriptions Last Dose Informant Patient Reported? Taking?   acamprosate (CAMPRAL) 333 MG EC tablet More than a month  No Yes   Sig: Take 2 tablets (666 mg) by mouth 3 times daily.   acetaminophen (TYLENOL) 500 MG tablet   Yes Yes   Sig: Take 1,000 mg by mouth 3 times daily as needed for mild pain.   buPROPion (WELLBUTRIN XL) 300 MG 24 hr tablet Past Month Morning  No Yes   Sig: Take 1 tablet (300 mg) by mouth every morning.   carvedilol (COREG) 12.5 MG tablet Past Month Evening  No Yes   Sig: Take 1  tablet (12.5 mg) by mouth 2 times daily (with meals)   fluticasone (FLONASE) 50 MCG/ACT nasal spray Unknown  Yes Yes   Sig: Spray 1 spray into both nostrils daily as needed for rhinitis or allergies.   folic acid (FOLVITE) 1 MG tablet Past Month  No Yes   Sig: Take 1 tablet (1 mg) by mouth daily.   hydrOXYzine HCl (ATARAX) 50 MG tablet Unknown  Yes Yes   Sig: Take 50 mg by mouth every 6 hours as needed for other (Pain).   lisinopril (ZESTRIL) 20 MG tablet Past Month Morning  No Yes   Sig: Take 1 tablet (20 mg) by mouth daily.   metFORMIN (GLUCOPHAGE XR) 500 MG 24 hr tablet Past Month Evening  No Yes   Sig: Take 2 tablets (1,000 mg) by mouth daily (with dinner).   methocarbamol (ROBAXIN) 500 MG tablet   Yes Yes   Sig: Take 500 mg by mouth 3 times daily as needed for muscle spasms.   multivitamin w/minerals (THERA-VIT-M) tablet Past Month  No Yes   Sig: Take 1 tablet by mouth daily.   naloxone (NARCAN) 4 MG/0.1ML nasal spray   No Yes   Sig: Spray 1 spray (4 mg) into one nostril alternating nostrils as needed for opioid reversal. every 2-3 minutes until assistance arrives   naltrexone (DEPADE/REVIA) 50 MG tablet Past Month  Yes Yes   Sig: Take 50 mg by mouth daily.   oxyCODONE (ROXICODONE) 5 MG tablet   No Yes   Sig: Take 1 tablet (5 mg) by mouth every 6 hours as needed for severe pain.   senna-docusate (SENOKOT-S/PERICOLACE) 8.6-50 MG tablet   No Yes   Sig: Take 1 tablet by mouth 2 times daily as needed for constipation.   sildenafil (VIAGRA) 100 MG tablet Unknown  No Yes   Sig: Take 1 tablet (100 mg) by mouth daily as needed (erectile dysfunction).   simvastatin (ZOCOR) 20 MG tablet Past Month Bedtime  No Yes   Sig: Take 1 tablet (20 mg) by mouth at bedtime.   thiamine (B-1) 100 MG tablet Past Month Morning  No Yes   Sig: Take 1 tablet (100 mg) by mouth daily.   warfarin ANTICOAGULANT (COUMADIN) 5 MG tablet 4/3/2025 Evening  Yes Yes   Sig: Take 2.5mg on Wednesdays and 5mg all other days      Facility-Administered  Medications: None     Allergies   No Known Allergies    Social History   I have reviewed this patient's social history and updated it with pertinent information if needed. Cody Bolton  reports that he has never smoked. He has never been exposed to tobacco smoke. He has never used smokeless tobacco. He reports current alcohol use. He reports that he does not use drugs.    Family History   I have reviewed this patient's family history and updated it with pertinent information if needed.   Family History   Problem Relation Age of Onset    Substance Abuse Brother     Anxiety Disorder Sister     Depression Sister        ROS: 10 point ROS negative other than the symptoms noted above in the HPI.    Physical Exam   Temp: 97.1  F (36.2  C) Temp src: Temporal BP: 134/75 Pulse: 66   Resp: 20 SpO2: 94 % O2 Device: None (Room air) Oxygen Delivery: 1 LPM  Vital Signs with Ranges  Temp:  [97.1  F (36.2  C)-98  F (36.7  C)] 97.1  F (36.2  C)  Pulse:  [64-74] 66  Resp:  [16-20] 20  BP: (128-152)/(67-86) 134/75  SpO2:  [94 %-99 %] 94 %  348 lbs 5.23 oz     , Blood pressure 134/75, pulse 66, temperature 97.1  F (36.2  C), temperature source Temporal, resp. rate 20, height 1.829 m (6'), weight (!) 158 kg (348 lb 5.2 oz), SpO2 94%.  348 lbs 5.23 oz    Lying in hospital bed with hard cervical collar in place  HEENT:  Normocephalic, atraumatic.  PERRL.  EOM s intact.   NEUROLOGICAL EXAMINATION:   Mental status:  Alert and Oriented x 3, speech is fluent.  Cranial nerves:  II-XII intact.   Motor:   Shoulder Abduction:       Right:  5/5   Left:  5/5  Elbow Flexion:                Right:  5/5   Left:  5/5  Elbow Extension:            Right:  4+/5   Left:  5/5  interosseus :                  Right:  4+/5   Left:  4+/5  Hip Flexion:                    Right: 5/5  Left:  4+/5  Knee Flexion:                 Right:  5/5  Left:  5/5  Knee Extension:             Right:  5/5  Left:  5/5  Dorsiflexion:                   Right:  5/5  Left:   5/5  Plantar Flexion:             Right:  5/5  Left:  5/5  EHL:                              Right:  5/5  Left:  5/5  Sensation:  Intact to light touch throughout BUE/BLE    Reflexes: Negative Dougherty's Bilaterally.    Mild tenderness over lower cervical spine.       Data     CBC RESULTS:   Recent Labs   Lab Test 04/12/25  0653   WBC 6.2   RBC 4.33*   HGB 11.5*   HCT 35.2*   MCV 81   MCH 26.6   MCHC 32.7   RDW 16.6*        Basic Metabolic Panel:  Lab Results   Component Value Date     04/13/2025      Lab Results   Component Value Date    POTASSIUM 3.7 04/13/2025    POTASSIUM 4.6 05/04/2024     Lab Results   Component Value Date    CHLORIDE 102 04/13/2025    CHLORIDE 104 05/04/2024     Lab Results   Component Value Date    RAUL 9.1 04/13/2025     Lab Results   Component Value Date    CO2 23 04/13/2025     Lab Results   Component Value Date    BUN 7.0 04/13/2025    BUN 16 05/04/2024     Lab Results   Component Value Date    CR 0.77 04/13/2025     Lab Results   Component Value Date     04/13/2025     INR:  Lab Results   Component Value Date    INR 1.88 04/13/2025    INR 2.02 04/12/2025    INR 1.38 04/11/2025    INR 1.92 03/31/2025    INR 2.05 03/30/2025    INR 2.24 03/29/2025    INR 1.82 03/28/2025    INR 1.50 03/27/2025    INR 1.58 03/26/2025    INR 2.29 02/26/2025    INR 2.37 02/25/2025    INR 2.72 02/24/2025

## 2025-04-13 NOTE — PROGRESS NOTES
PRIMARY DIAGNOSIS: GENERALIZED WEAKNESS    OUTPATIENT/OBSERVATION GOALS TO BE MET BEFORE DISCHARGE  1. Orthostatic performed: No    2. Tolerating PO medications: Yes    3. Return to near baseline physical activity: No    4. Cleared for discharge by consultants (if involved): N/A    Discharge Planner Nurse   Safe discharge environment identified: No  Barriers to discharge: Yes       Entered by: Gabrielle Crews RN 04/13/2025 2:31 AM     Please review provider order for any additional goals.   Nurse to notify provider when observation goals have been met and patient is ready for discharge.     Cervical MRI completed. Pain managed with Tylenol and Oxycodone. Up with SBA. Tolerating po intake- no nausea or vomiting. Voiding without difficulty. Aspen collar in place at all times.

## 2025-04-13 NOTE — PLAN OF CARE
"Goal Outcome Evaluation:      Plan of Care Reviewed With: patient    Overall Patient Progress: no changeOverall Patient Progress: no change    Outcome Evaluation: Patient alert and oriented. VSS on room air. Intermittent severe pain in neck and temporals - PRN tylenol and oxycodone given. MD aware. Pain with swallowing but no swallowing difficulty. CIWA on higher side due to head pain but no intervention was needed per order. Up SBA with walker and gait belt. Aspen collar on at all times. Voiding. Plans pending.        Problem: Adult Inpatient Plan of Care  Goal: Plan of Care Review  Description: The Plan of Care Review/Shift note should be completed every shift.  The Outcome Evaluation is a brief statement about your assessment that the patient is improving, declining, or no change.  This information will be displayed automatically on your shiftnote.  Outcome: Progressing  Flowsheets (Taken 4/13/2025 1440)  Outcome Evaluation: Patient alert and oriented. VSS on room air.  Plan of Care Reviewed With: patient  Overall Patient Progress: no change  Goal: Patient-Specific Goal (Individualized)  Description: You can add care plan individualizations to a care plan. Examples of Individualization might be:  \"Parent requests to be called daily at 9am for status\", \"I have a hard time hearing out of my right ear\", or \"Do not touch me to wake me up as it startlesme\".  Outcome: Progressing  Goal: Absence of Hospital-Acquired Illness or Injury  Outcome: Progressing  Intervention: Prevent Skin Injury  Recent Flowsheet Documentation  Taken 4/13/2025 1130 by Bridgette Lambert, RN  Body Position: position changed independently  Goal: Optimal Comfort and Wellbeing  Outcome: Progressing  Goal: Readiness for Transition of Care  Outcome: Progressing  Intervention: Mutually Develop Transition Plan  Recent Flowsheet Documentation  Taken 4/13/2025 1400 by Bridgette Lambert, RN  Equipment Currently Used at Home: dressing device     Problem: Pain " Acute  Goal: Optimal Pain Control and Function  Outcome: Progressing     Problem: Orthopaedic Fracture  Goal: Absence of Bleeding  Outcome: Progressing  Goal: Bowel Elimination  Outcome: Progressing  Goal: Absence of Embolism Signs and Symptoms  Outcome: Progressing  Goal: Fracture Stability  Outcome: Progressing  Goal: Optimal Functional Ability  Outcome: Progressing  Intervention: Optimize Functional Ability  Recent Flowsheet Documentation  Taken 4/13/2025 1130 by Bridgette Lambert, RN  Activity Management:   ambulated to bathroom   back to bed  Positioning/Transfer Devices:   pillows   applied   in use  Goal: Absence of Infection Signs and Symptoms  Outcome: Progressing  Goal: Effective Tissue Perfusion  Outcome: Progressing  Goal: Optimal Pain Control and Function  Outcome: Progressing  Goal: Effective Oxygenation and Ventilation  Outcome: Progressing  Intervention: Promote Airway Secretion Clearance  Recent Flowsheet Documentation  Taken 4/13/2025 1130 by Bridgette Lambert RN  Activity Management:   ambulated to bathroom   back to bed  Intervention: Optimize Oxygenation and Ventilation  Recent Flowsheet Documentation  Taken 4/13/2025 1130 by Bridgette Lambert, RN  Head of Bed (HOB) Positioning: HOB at 20 degrees     Problem: Alcohol Withdrawal  Goal: Alcohol Withdrawal Symptom Control  Outcome: Progressing  Goal: Optimal Neurologic Function  Outcome: Progressing  Goal: Readiness for Change Identified  Outcome: Progressing

## 2025-04-13 NOTE — SIGNIFICANT EVENT
Patient with new sharp R sided neck pain with swallowing or moving R arm. Given the C spine fractures, will obtain MRI to further evaluate.    Brenna Melo, DO

## 2025-04-13 NOTE — PROGRESS NOTES
PRIMARY DIAGNOSIS: GENERALIZED WEAKNESS    OUTPATIENT/OBSERVATION GOALS TO BE MET BEFORE DISCHARGE  1. Orthostatic performed: No    2. Tolerating PO medications: Yes    3. Return to near baseline physical activity: No    4. Cleared for discharge by consultants (if involved): N/A    Discharge Planner Nurse   Safe discharge environment identified: No  Barriers to discharge: Yes       Entered by: Gabrielle Crews RN 04/13/2025 4:35 AM     Please review provider order for any additional goals.   Nurse to notify provider when observation goals have been met and patient is ready for discharge.     A&O x 4. Up with SBA. Neuros intact. Denies numbness and tingling. Voiding without difficulties. Neck and shoulder pain managed with Tylenol and Oxy. Swallowing without difficulty but does complain of radiating pain to temple with swallowing and sore throat. No SOB. Aspen collar in place at all times. Could not get food fit of CPAP mask with collar so maintained on 1L O2 via NC while sleeping. PT consult today. May need TCU at discharge. SW following.

## 2025-04-14 ENCOUNTER — MYC MEDICAL ADVICE (OUTPATIENT)
Dept: FAMILY MEDICINE | Facility: CLINIC | Age: 45
End: 2025-04-14
Payer: COMMERCIAL

## 2025-04-14 ENCOUNTER — TELEPHONE (OUTPATIENT)
Dept: ANTICOAGULATION | Facility: CLINIC | Age: 45
End: 2025-04-14
Payer: COMMERCIAL

## 2025-04-14 VITALS
RESPIRATION RATE: 20 BRPM | DIASTOLIC BLOOD PRESSURE: 84 MMHG | HEART RATE: 79 BPM | OXYGEN SATURATION: 94 % | WEIGHT: 315 LBS | HEIGHT: 72 IN | TEMPERATURE: 97.4 F | BODY MASS INDEX: 42.66 KG/M2 | SYSTOLIC BLOOD PRESSURE: 165 MMHG

## 2025-04-14 DIAGNOSIS — Z86.718 HISTORY OF DVT (DEEP VEIN THROMBOSIS): Primary | ICD-10-CM

## 2025-04-14 LAB
GLUCOSE BLDC GLUCOMTR-MCNC: 124 MG/DL (ref 70–99)
GLUCOSE BLDC GLUCOMTR-MCNC: 126 MG/DL (ref 70–99)
GLUCOSE BLDC GLUCOMTR-MCNC: 134 MG/DL (ref 70–99)
INR PPP: 2.16 (ref 0.85–1.15)
MAGNESIUM SERPL-MCNC: 1.8 MG/DL (ref 1.7–2.3)
PHOSPHATE SERPL-MCNC: 3.4 MG/DL (ref 2.5–4.5)
POTASSIUM SERPL-SCNC: 3.5 MMOL/L (ref 3.4–5.3)

## 2025-04-14 PROCEDURE — 97530 THERAPEUTIC ACTIVITIES: CPT | Mod: GP | Performed by: PHYSICAL THERAPIST

## 2025-04-14 PROCEDURE — 99239 HOSP IP/OBS DSCHRG MGMT >30: CPT | Performed by: INTERNAL MEDICINE

## 2025-04-14 PROCEDURE — 250N000011 HC RX IP 250 OP 636: Performed by: INTERNAL MEDICINE

## 2025-04-14 PROCEDURE — 84100 ASSAY OF PHOSPHORUS: CPT | Performed by: INTERNAL MEDICINE

## 2025-04-14 PROCEDURE — 84132 ASSAY OF SERUM POTASSIUM: CPT | Performed by: INTERNAL MEDICINE

## 2025-04-14 PROCEDURE — 83735 ASSAY OF MAGNESIUM: CPT | Performed by: INTERNAL MEDICINE

## 2025-04-14 PROCEDURE — 250N000013 HC RX MED GY IP 250 OP 250 PS 637: Performed by: INTERNAL MEDICINE

## 2025-04-14 PROCEDURE — 85610 PROTHROMBIN TIME: CPT | Performed by: INTERNAL MEDICINE

## 2025-04-14 PROCEDURE — 36415 COLL VENOUS BLD VENIPUNCTURE: CPT | Performed by: INTERNAL MEDICINE

## 2025-04-14 RX ORDER — AMOXICILLIN 250 MG
1 CAPSULE ORAL 2 TIMES DAILY PRN
Qty: 28 TABLET | Refills: 0 | Status: SHIPPED | OUTPATIENT
Start: 2025-04-14 | End: 2025-04-17

## 2025-04-14 RX ORDER — WARFARIN SODIUM 4 MG/1
4 TABLET ORAL
Status: DISCONTINUED | OUTPATIENT
Start: 2025-04-14 | End: 2025-04-14 | Stop reason: HOSPADM

## 2025-04-14 RX ORDER — OXYCODONE HYDROCHLORIDE 5 MG/1
5 TABLET ORAL EVERY 6 HOURS PRN
Qty: 12 TABLET | Refills: 0 | Status: SHIPPED | OUTPATIENT
Start: 2025-04-14 | End: 2025-04-17

## 2025-04-14 RX ADMIN — HYDROXYZINE HYDROCHLORIDE 25 MG: 25 TABLET, FILM COATED ORAL at 06:03

## 2025-04-14 RX ADMIN — CARVEDILOL 12.5 MG: 12.5 TABLET, FILM COATED ORAL at 10:16

## 2025-04-14 RX ADMIN — OXYCODONE HYDROCHLORIDE 5 MG: 5 TABLET ORAL at 00:31

## 2025-04-14 RX ADMIN — OXYCODONE HYDROCHLORIDE 5 MG: 5 TABLET ORAL at 13:03

## 2025-04-14 RX ADMIN — FOLIC ACID 1 MG: 1 TABLET ORAL at 10:17

## 2025-04-14 RX ADMIN — LISINOPRIL 20 MG: 10 TABLET ORAL at 10:16

## 2025-04-14 RX ADMIN — BUPROPION HYDROCHLORIDE 300 MG: 150 TABLET, EXTENDED RELEASE ORAL at 10:16

## 2025-04-14 RX ADMIN — THIAMINE HCL TAB 100 MG 100 MG: 100 TAB at 10:17

## 2025-04-14 RX ADMIN — ENOXAPARIN SODIUM 120 MG: 120 INJECTION SUBCUTANEOUS at 00:32

## 2025-04-14 RX ADMIN — Medication 1 TABLET: at 10:17

## 2025-04-14 RX ADMIN — ACETAMINOPHEN 650 MG: 325 TABLET, FILM COATED ORAL at 10:16

## 2025-04-14 RX ADMIN — ENOXAPARIN SODIUM 120 MG: 120 INJECTION SUBCUTANEOUS at 12:56

## 2025-04-14 RX ADMIN — ACETAMINOPHEN 650 MG: 325 TABLET, FILM COATED ORAL at 06:04

## 2025-04-14 ASSESSMENT — ACTIVITIES OF DAILY LIVING (ADL)
ADLS_ACUITY_SCORE: 43

## 2025-04-14 NOTE — PROGRESS NOTES
Physical Therapy Discharge Summary    Reason for therapy discharge:    All goals and outcomes met, no further needs identified.    Progress towards therapy goal(s). See goals on Care Plan in [facility] electronic health record for goal details.  Goals met    Therapy recommendation(s):    Continued therapy is recommended.  Rationale/Recommendations:  HHPT for continued strengthening.

## 2025-04-14 NOTE — PHARMACY-ANTICOAGULATION SERVICE
Clinical Pharmacy- Warfarin Discharge Note  This patient is currently on warfarin for the treatment of  DVT/PE, A fib .  INR Goal= 2-3  Expected length of therapy lifetime.    Warfarin PTA Regimen: 2.5 W, 5mg ROW      Anticoagulation Dose History  More data exists         Latest Ref Rng & Units 3/29/2025 3/30/2025 3/31/2025 4/11/2025 4/12/2025 4/13/2025 4/14/2025   Recent Dosing and Labs   warfarin ANTICOAGULANT (COUMADIN) 3 MG tablet - - - - - 3 mg, $Given - -   warfarin ANTICOAGULANT (COUMADIN) 4 MG tablet - - - - - - 4 mg, $Given -   warfarin ANTICOAGULANT (COUMADIN) 5 MG tablet - 5 mg, $Given 5 mg, $Given - 5 mg, $Given - - -   INR 0.85 - 1.15 2.24  2.05  1.92  1.38  2.02  1.88  2.16        Vitamin K doses administered during the last 7 days: none  FFP administered during the last 7 days: none  Agree with discharging the patient on their PTA warfarin regimen.    The patient should have an INR checked in 2-3 days when he schedules PCP f/up visit.    Melissa Gomez, PharmD  April 14, 2025

## 2025-04-14 NOTE — DISCHARGE SUMMARY
[facility]  Hospitalist Discharge Summary      Date of Admission:  4/11/2025  Date of Discharge:  4/14/2025  Discharging Provider: Renzo Mercer MD  Discharge Service: Hospitalist Service    Discharge Diagnoses   Fall  Generalized weakness  Recent C1 and C6-7 osteophyte fractures  Starvation ketoacidosis  Dehydration  Hyponatremia  Mild rhabdomyolysis  Alcohol intoxication  Alcohol use disorder  Acute Hypophosphatemia   History unprovoked DVT, PE  Paroxysmal atrial fibrillation  HTN  Type II DM   Morbid obesity   MDD  Anxiety  Hypercholesteremia obstructive sleep apnea  CARLIE on CPAP    Clinically Significant Risk Factors     # DMII: A1C = N/A within past 6 months    # Severe Obesity: Estimated body mass index is 47.24 kg/m  as calculated from the following:    Height as of this encounter: 1.829 m (6').    Weight as of this encounter: 158 kg (348 lb 5.2 oz).       Follow-ups Needed After Discharge   Follow-up Appointments       Hospital Follow-up with Existing Primary Care Provider (PCP)          Schedule Primary Care visit within: 7 Days                Discharge Disposition   Discharged to home with home care  Condition at discharge: Good    Hospital Course   Cody Bolton is a 44 year old male with alcohol use disorder, unprovoked DVT/PE on chronic warfarin therapy, paroxysmal atrial fibrillation, hypertension, hypercholesteremia, T2DM, morbid obesity s/p gastric bypass surgery, obstructive sleep apnea, MDD, anxiety, recent hospitalization 3/26/2020 25 through 3 31 2025 after a fall resulting in C1 fracture and a C6/C7 osteophyte fracture presented to [facility] from his home via EMS after a fall while getting up from his chair and starting to walk when he felt weak and lost his balance.  He was wearing his cervical collar he denies any major injuries several empty bottles of water were found in his apartment by EMS WBC 11.9 hemoglobin 12.7 platelet count 288,000 sodium 122  potassium 4.8 chloride 88 bicarb 15 BUN 12 creatinine 0.6 glucose 177 anion gap 29 alkaline phosphatase 151 AST 88 CPK level 1135 ethanol level 0.26 ketones 2.7 INR 1.38 CT scan of the cervical spine did not show any changes in the C1 ring fractures and C6/C7 osteophyte fracture no intracranial hemorrhage was seen.  He received 2 L of normal saline.  He was diagnosed with starvation ketosis/alcoholic ketoacidosis.    Patient had physical therapy and Occupational Therapy in the hospital and they thought that the patient could go home with home care PT OT patient was able to walk without much.  Difficulty.  He earlier had some neck pain and but after adjusting the neck collar this pain was gone .    Consultations This Hospital Stay   CARE MANAGEMENT / SOCIAL WORK IP CONSULT  PHYSICAL THERAPY ADULT IP CONSULT  PHARMACY TO DOSE WARFARIN  CARE MANAGEMENT / SOCIAL WORK IP CONSULT  NEUROSURGERY IP CONSULT    Code Status   Full Code    Time Spent on this Encounter   I, Renzo Mercer MD, personally saw the patient today and spent greater than 30 minutes discharging this patient.       Renzo Mercer MD  [facility] ORTHO SPINE  [address] [place] [address]  Phone: [phone]  Fax: [phone]  ______________________________________________________________________    Physical Exam   Vital Signs: Temp: 97.4  F (36.3  C) Temp src: Temporal BP: (!) 165/84 Pulse: 79   Resp: 20 SpO2: 94 % O2 Device: None (Room air)    Weight: 348 lbs 5.23 oz  GENERAL: Patient is not in acute distress  HEENT: EOM+,Conjunctiva is clear   NECK: In hard c-collar  HEART: S1 S2 regular Rate and Rhythm, there is  no murmur,   LUNGS: Respirations are  not laboured, Lungs are  clear to auscultation without Crepitations or Wheezing   ABDOMEN: Soft , there is no tenderness , Bowel Sounds are  Positive   LOWER LIMBS: no  Pedal Edema  Bilaterally   CNS:  Alert,  Oriented x 3, Moving all the Four Limbs        Primary Care  Physician   Yovana Felipe    Discharge Orders      Med Therapy Management Referral      Primary Care - Care Coordination Referral      Home Care Referral      Reason for your hospital stay    presented to [facility] from his home via EMS after a fall while getting up from his chair and starting to walk when he felt weak and lost his balance.     Activity    Your activity upon discharge: activity as tolerated     Diet    Follow this diet upon discharge: Current Diet:Orders Placed This Encounter  Moderate carbohydrate diabetic diet     Hospital Follow-up with Existing Primary Care Provider (PCP)            Significant Results and Procedures   Most Recent 3 CBC's:  Recent Labs   Lab Test 04/12/25  0653 04/11/25  0412 03/30/25  0643   WBC 6.2 11.4* 8.3   HGB 11.5* 12.7* 11.9*   MCV 81 82 85    288 237     Most Recent 3 BMP's:  Recent Labs   Lab Test 04/14/25  0716 04/14/25  0713 04/14/25  0202 04/13/25  2136 04/13/25  0744 04/13/25  0640 04/12/25  1719 04/12/25  1522 04/12/25  0831 04/12/25  0653 04/11/25  1748 04/11/25  1549   NA  --   --   --   --   --  137  --   --   --  136  --  130*   POTASSIUM  --  3.5  --   --   --  3.7  --  4.0  --  3.4  --  4.3   CHLORIDE  --   --   --   --   --  102  --   --   --  100  --  93*   CO2  --   --   --   --   --  23  --   --   --  23  --  19*   BUN  --   --   --   --   --  7.0  --   --   --  9.4  --  12.8   CR  --   --   --   --   --  0.77  --   --   --  0.73  --  0.78   ANIONGAP  --   --   --   --   --  12  --   --   --  13  --  18*   RAUL  --   --   --   --   --  9.1  --   --   --  9.0  --  9.0   *  --  124* 177*   < > 123*   < >  --    < > 163*   < > 260*    < > = values in this interval not displayed.     Most Recent 2 LFT's:  Recent Labs   Lab Test 04/13/25  0640 04/12/25  0653   AST 96* 82*   ALT 83* 60   ALKPHOS 121 120   BILITOTAL 0.6 0.8     Most Recent 3 INR's:  Recent Labs   Lab Test 04/14/25  0713 04/13/25  0640 04/12/25  0653   INR 2.16*  1.88* 2.02*     Most Recent Urinalysis:  Recent Labs   Lab Test 04/11/25  0609   COLOR Light Yellow   APPEARANCE Clear   URINEGLC Negative   URINEBILI Negative   URINEKETONE 80*   SG 1.022   UBLD Moderate*   URINEPH 5.5   PROTEIN 100*   NITRITE Negative   LEUKEST Negative   RBCU 1   WBCU 3   ,   Results for orders placed or performed during the hospital encounter of 04/11/25   CT Cervical Spine w/o Contrast    Narrative    EXAM: CT HEAD W/O CONTRAST, CT CERVICAL SPINE W/O CONTRAST  LOCATION: [facility]  DATE: 4/11/2025    INDICATION: Fall, intoxicated, anticoagulated known recent C1, C5 and C6 fracture  COMPARISON: 3/26/2025 and 3/27/2025  TECHNIQUE:   1) Routine CT Head without IV contrast. Multiplanar reformats. Dose reduction techniques were used.  2) Routine CT Cervical Spine without IV contrast. Multiplanar reformats. Dose reduction techniques were used.    FINDINGS:   HEAD CT:   INTRACRANIAL CONTENTS: No intracranial hemorrhage, extraaxial collection, or mass effect.  No CT evidence of acute infarct. Normal parenchymal attenuation. Mild generalized volume loss. No hydrocephalus.     VISUALIZED ORBITS/SINUSES/MASTOIDS: No intraorbital abnormality. 2 cm polyp or retention cyst in the left maxillary sinus. No middle ear or mastoid effusion.    BONES/SOFT TISSUES: No acute abnormality.    CERVICAL SPINE CT:   VERTEBRA: Redemonstration of nonunited fracture deformities involving the anterior and posterior arches of the C1 ring without significant interval healing. No increased widening of the fracture plane is appreciated. Additional nondisplaced fracture   through an anterior C6-C7 osteophyte. No convincing new fracture deformity. Underlying changes of diffuse idiopathic skeletal hyperostosis.     CANAL/FORAMINA: No canal or neural foraminal stenosis.    PARASPINAL: No extraspinal abnormality. Visualized lung fields are clear.      Impression    IMPRESSION:  HEAD CT:  No acute intracranial  hemorrhage or calvarial fracture.    CERVICAL SPINE CT:  1.  No CT evidence for acute fracture or posttraumatic subluxation.  2.  Redemonstration of nonunited fractures involving the anterior and posterior arches of the C1 ring without significant interval healing.  3.  Redemonstration of a nondisplaced fracture through an anterior C6-C7 osteophyte without significant interval healing.              Head CT w/o contrast    Narrative    EXAM: CT HEAD W/O CONTRAST, CT CERVICAL SPINE W/O CONTRAST  LOCATION: [facility]  DATE: 4/11/2025    INDICATION: Fall, intoxicated, anticoagulated known recent C1, C5 and C6 fracture  COMPARISON: 3/26/2025 and 3/27/2025  TECHNIQUE:   1) Routine CT Head without IV contrast. Multiplanar reformats. Dose reduction techniques were used.  2) Routine CT Cervical Spine without IV contrast. Multiplanar reformats. Dose reduction techniques were used.    FINDINGS:   HEAD CT:   INTRACRANIAL CONTENTS: No intracranial hemorrhage, extraaxial collection, or mass effect.  No CT evidence of acute infarct. Normal parenchymal attenuation. Mild generalized volume loss. No hydrocephalus.     VISUALIZED ORBITS/SINUSES/MASTOIDS: No intraorbital abnormality. 2 cm polyp or retention cyst in the left maxillary sinus. No middle ear or mastoid effusion.    BONES/SOFT TISSUES: No acute abnormality.    CERVICAL SPINE CT:   VERTEBRA: Redemonstration of nonunited fracture deformities involving the anterior and posterior arches of the C1 ring without significant interval healing. No increased widening of the fracture plane is appreciated. Additional nondisplaced fracture   through an anterior C6-C7 osteophyte. No convincing new fracture deformity. Underlying changes of diffuse idiopathic skeletal hyperostosis.     CANAL/FORAMINA: No canal or neural foraminal stenosis.    PARASPINAL: No extraspinal abnormality. Visualized lung fields are clear.      Impression    IMPRESSION:  HEAD CT:  No acute  intracranial hemorrhage or calvarial fracture.    CERVICAL SPINE CT:  1.  No CT evidence for acute fracture or posttraumatic subluxation.  2.  Redemonstration of nonunited fractures involving the anterior and posterior arches of the C1 ring without significant interval healing.  3.  Redemonstration of a nondisplaced fracture through an anterior C6-C7 osteophyte without significant interval healing.              XR Cervical Spine 2/3 Views    Narrative    EXAM: XR CERVICAL SPINE 2/3 VIEWS  LOCATION: [facility]  DATE: 4/12/2025    INDICATION: Neck pain Right side of the Neck on cervical collar after Recent C1 and C6 7 osteophyte fractures  COMPARISON: CT cervical spine 4/11/2025      Impression    IMPRESSION: The known C1 fracture and C6-7 fractures are better appreciated on recent CT. Vertebral body heights are maintained. Normal alignment. Moderate multilevel disc height loss. Flowing anterior osteophytes throughout. Mild prevertebral soft   tissue swelling.   MR Cervical Spine w/o & w Contrast    Narrative    EXAM: MR CERVICAL SPINE W/O AND W CONTRAST  LOCATION: [facility]  DATE: 4/13/2025    INDICATION: New neck pain, recent C1 and C6-C7 fractures.  COMPARISON: MRI cervical spine 03/26/2025.  CONTRAST: 15.8 ml Gadavist  TECHNIQUE: MRI Cervical Spine without and with IV contrast.    FINDINGS: Image quality is mildly degraded by motion.    Normal vertebral body heights and alignment. Marrow edema associated with C1 burst fracture. Redemonstrated fracture through the ventral osteophyte at the C6-C7 level with marrow edema and mild prevertebral edema. Interval development of mild edema at   the superior C3 endplate and anterior osteophyte (sagittal STIR image 7). In retrospect, there is a very subtle fracture through the osteophyte at this level on prior CT 4/11/2025 and 3/27/2025. No new fractures. Diffuse developmental spinal canal   stenosis. No spinal cord signal  abnormality. No extraspinal abnormality.    Craniovertebral junction and C1-C2: C1 fracture. No spinal canal stenosis.    C2-C3: Mild disc space narrowing with mild uncinate spurring on the right. Unremarkable facets. Mild to moderate spinal canal stenosis. No foraminal stenosis.     C3-C4: Mild disc space narrowing. No herniation. Unremarkable facets. Mild spinal canal stenosis. No foraminal stenosis.     C4-C5: Mild disc space narrowing. No herniation. Mild uncinate spurring. Unremarkable facets. Mild spinal canal stenosis. Mild bilateral foraminal stenoses.     C5-C6: Mild disc space narrowing with mild marginal osteophyte. Unremarkable facets. No spinal canal stenosis. No foraminal stenosis.     C6-C7: Mild disc space narrowing with mild marginal osteophyte. Fracture through the bulky ventral osteophyte. Unremarkable facets. No spinal canal stenosis. Mild foraminal stenosis.    C7-T1: Normal disc height without herniation. Unremarkable facets. No spinal canal or foraminal stenosis.      Impression    IMPRESSION:  1.  Redemonstrated C1 burst fracture with persistent marrow edema.  2.  Redemonstrated fracture through the ventral osteophyte at C6-C7 with mild prevertebral edema.  3.  Interval development of mild edema at the superior C3 endplate and anterior osteophyte, associated with very subtle fracture through the osteophyte at this level present on previous CT 03/26/2025.  4.  Congenital spinal canal stenosis with superimposed spondylosis resulting in mild to moderate spinal canal stenosis at C2-C3 and mild spinal canal stenosis at C3-C4 and C4-C5.  5.  Mild foraminal stenoses as detailed.         Discharge Medications   Current Discharge Medication List        CONTINUE these medications which have CHANGED    Details   oxyCODONE (ROXICODONE) 5 MG tablet Take 1 tablet (5 mg) by mouth every 6 hours as needed for severe pain.  Qty: 12 tablet, Refills: 0    Associated Diagnoses: Closed nondisplaced fracture of  first cervical vertebra, unspecified fracture morphology, initial encounter (H)      senna-docusate (SENOKOT-S/PERICOLACE) 8.6-50 MG tablet Take 1 tablet by mouth 2 times daily as needed for constipation.  Qty: 28 tablet, Refills: 0    Associated Diagnoses: Closed nondisplaced fracture of first cervical vertebra, unspecified fracture morphology, initial encounter (H)           CONTINUE these medications which have NOT CHANGED    Details   acamprosate (CAMPRAL) 333 MG EC tablet Take 2 tablets (666 mg) by mouth 3 times daily.  Qty: 180 tablet, Refills: 3    Associated Diagnoses: Alcohol dependence with uncomplicated intoxication (H)      acetaminophen (TYLENOL) 500 MG tablet Take 1,000 mg by mouth 3 times daily as needed for mild pain.      buPROPion (WELLBUTRIN XL) 300 MG 24 hr tablet Take 1 tablet (300 mg) by mouth every morning.  Qty: 90 tablet, Refills: 3    Associated Diagnoses: Major depressive disorder, recurrent episode, moderate (H)      carvedilol (COREG) 12.5 MG tablet Take 1 tablet (12.5 mg) by mouth 2 times daily (with meals)  Qty: 60 tablet, Refills: 11    Associated Diagnoses: Paroxysmal atrial fibrillation (H); Benign essential hypertension      fluticasone (FLONASE) 50 MCG/ACT nasal spray Spray 1 spray into both nostrils daily as needed for rhinitis or allergies.      folic acid (FOLVITE) 1 MG tablet Take 1 tablet (1 mg) by mouth daily.  Qty: 30 tablet, Refills: 1    Associated Diagnoses: Alcohol dependence with uncomplicated intoxication (H)      hydrOXYzine HCl (ATARAX) 50 MG tablet Take 50 mg by mouth every 6 hours as needed for other (Pain).      lisinopril (ZESTRIL) 20 MG tablet Take 1 tablet (20 mg) by mouth daily.  Qty: 90 tablet, Refills: 3    Associated Diagnoses: Essential hypertension, benign      metFORMIN (GLUCOPHAGE XR) 500 MG 24 hr tablet Take 2 tablets (1,000 mg) by mouth daily (with dinner).  Qty: 180 tablet, Refills: 3    Associated Diagnoses: Type 2 diabetes mellitus without  complication, without long-term current use of insulin (H)      methocarbamol (ROBAXIN) 500 MG tablet Take 500 mg by mouth 3 times daily as needed for muscle spasms.      multivitamin w/minerals (THERA-VIT-M) tablet Take 1 tablet by mouth daily.  Qty: 30 tablet, Refills: 1    Associated Diagnoses: Alcohol dependence with uncomplicated intoxication (H)      naloxone (NARCAN) 4 MG/0.1ML nasal spray Spray 1 spray (4 mg) into one nostril alternating nostrils as needed for opioid reversal. every 2-3 minutes until assistance arrives  Qty: 2 each, Refills: 0    Associated Diagnoses: Closed nondisplaced fracture of first cervical vertebra, unspecified fracture morphology, initial encounter (H)      naltrexone (DEPADE/REVIA) 50 MG tablet Take 50 mg by mouth daily.      sildenafil (VIAGRA) 100 MG tablet Take 1 tablet (100 mg) by mouth daily as needed (erectile dysfunction).  Qty: 30 tablet, Refills: 3    Associated Diagnoses: Erectile dysfunction due to arterial insufficiency      simvastatin (ZOCOR) 20 MG tablet Take 1 tablet (20 mg) by mouth at bedtime.  Qty: 90 tablet, Refills: 3    Associated Diagnoses: Pure hypercholesterolemia      thiamine (B-1) 100 MG tablet Take 1 tablet (100 mg) by mouth daily.  Qty: 30 tablet, Refills: 0    Associated Diagnoses: Alcohol dependence with uncomplicated intoxication (H)      warfarin ANTICOAGULANT (COUMADIN) 5 MG tablet Take 2.5mg on Wednesdays and 5mg all other days           Allergies   No Known Allergies

## 2025-04-14 NOTE — TELEPHONE ENCOUNTER
ANTICOAGULATION  MANAGEMENT: Discharge Review    Cody Bolton chart reviewed for anticoagulation continuity of care    Hospital Admission on 4/11/25-4/14/25 for fall + recent C1 & C6-7 osteophyte fractures (prior admission 3/26/25-3/31/25).    Discharge disposition: Home with Home Care    Results:    Recent labs: (last 7 days)     04/11/25  0412 04/12/25  0653 04/13/25  0640 04/14/25  0713   INR 1.38* 2.02* 1.88* 2.16*     Anticoagulation inpatient management:     anticoagulation calendar updated with inpatient dosing    Anticoagulation discharge instructions:     Warfarin dosing: home regimen continued   Bridging: No   INR goal change: No      Medication changes affecting anticoagulation: No    Additional factors affecting anticoagulation: No     PLAN     Agree with dosing adjustment on discharge  Recommend to check INR on 4/18/25 (in clinic or with HC)    Left a detailed message for Mu and requested callback tomorrow    Anticoagulation Calendar updated    Kera Deras, RN  4/14/2025  Anticoagulation Clinic  [facility] for routing messages: fransisco GARAY  [facility] patient phone line: [phone]

## 2025-04-14 NOTE — PROGRESS NOTES
Care Management Discharge Note    Discharge Date: 04/14/2025       Discharge Disposition: Home, Home Care    Discharge Services: Home Care    Discharge DME: None    Discharge Transportation: family or friend will provide    Private pay costs discussed: Not applicable    Does the patient's insurance plan have a 3 day qualifying hospital stay waiver?  No    PAS Confirmation Code: N/A    Patient/family educated on Medicare website which has current facility and service quality ratings: yes    Education Provided on the Discharge Plan: Yes    Persons Notified of Discharge Plans: Patient    Patient/Family in Agreement with the Plan: yes    Handoff Referral Completed: No, handoff not indicated or clinically appropriate    Additional Information:  Pt will be discharging home with home care this afternoon.   SW spoke with pt about preference for home care agency. Pt stated he has no preference. SW sent the referral to [email or web address] and [facility] was selected. SW sent them the discharge orders.  Pt stated his sister will be providing transport.   SW addressed all of pt's questions and concerns regarding the discharge plan at this time.    CORINNE Miranda  Inpatient Care Coordination   [facility]  [phone]

## 2025-04-14 NOTE — PLAN OF CARE
"Goal Outcome Evaluation:           Overall Patient Progress: no changeOverall Patient Progress: no change    Outcome Evaluation: A&Ox4. VSS. Pt c/o neck/back discomfort - expressing acceptable relief with PRN medications. CIWA - scoring low. C-collar remains in place. Pt up with SBA and walker - ambulating hallways. Tolerating PO intake. 1x bowel movement and voiding adequately. Reporting back itchiness - multiple scratches noted - lotion applied without relief - receiving order for atarax and given with mild relief. PIV SL. Plan to d/c to TCU at this time.      Problem: Adult Inpatient Plan of Care  Goal: Plan of Care Review  Description: The Plan of Care Review/Shift note should be completed every shift.  The Outcome Evaluation is a brief statement about your assessment that the patient is improving, declining, or no change.  This information will be displayed automatically on your shiftnote.  Outcome: Progressing  Flowsheets (Taken 4/13/2025 2044)  Outcome Evaluation: A&Ox4. VSS. Pt c/o neck/back discomfort - expressing acceptable relief with PRN medications. CIWA - scoring low. C-collar remains in place. Pt up with SBA and walker - ambulating hallways. Tolerating PO intake. 1x bowel movement and voiding adequately. Reporting back itchiness - multiple scratches noted - lotion applied without relief - receiving order for atarax and given with mild relief. PIV SL. Plan to d/c to TCU at this time.  Overall Patient Progress: no change  Goal: Patient-Specific Goal (Individualized)  Description: You can add care plan individualizations to a care plan. Examples of Individualization might be:  \"Parent requests to be called daily at 9am for status\", \"I have a hard time hearing out of my right ear\", or \"Do not touch me to wake me up as it startlesme\".  Outcome: Progressing  Goal: Absence of Hospital-Acquired Illness or Injury  Outcome: Progressing  Intervention: Identify and Manage Fall Risk  Recent Flowsheet " Documentation  Taken 4/13/2025 1614 by Brian Russell, RN  Safety Promotion/Fall Prevention:   activity supervised   assistive device/personal items within reach  Intervention: Prevent Skin Injury  Recent Flowsheet Documentation  Taken 4/13/2025 1614 by Brian Russell RN  Body Position: position changed independently  Goal: Optimal Comfort and Wellbeing  Outcome: Progressing  Intervention: Monitor Pain and Promote Comfort  Recent Flowsheet Documentation  Taken 4/13/2025 1716 by Brian Russell, RN  Pain Management Interventions: medication (see MAR)  Taken 4/13/2025 1614 by Brian Russell RN  Pain Management Interventions:   MD notified (comment)   repositioned   ambulation/increased activity  Goal: Readiness for Transition of Care  Outcome: Progressing

## 2025-04-14 NOTE — PLAN OF CARE
"Plan to discharge later today at 1645  [place] collar on at all times      Goal Outcome Evaluation:      Plan of Care Reviewed With: patient    Overall Patient Progress: improvingOverall Patient Progress: improving    Outcome Evaluation: Planning on discharging at 1645 today        Problem: Adult Inpatient Plan of Care  Goal: Plan of Care Review  Description: The Plan of Care Review/Shift note should be completed every shift.  The Outcome Evaluation is a brief statement about your assessment that the patient is improving, declining, or no change.  This information will be displayed automatically on your shiftnote.  Outcome: Progressing  Flowsheets (Taken 4/14/2025 1501)  Outcome Evaluation: Planning on discharging at 1645 today  Plan of Care Reviewed With: patient  Overall Patient Progress: improving  Goal: Patient-Specific Goal (Individualized)  Description: You can add care plan individualizations to a care plan. Examples of Individualization might be:  \"Parent requests to be called daily at 9am for status\", \"I have a hard time hearing out of my right ear\", or \"Do not touch me to wake me up as it startlesme\".  Outcome: Progressing  Goal: Absence of Hospital-Acquired Illness or Injury  Outcome: Progressing  Goal: Optimal Comfort and Wellbeing  Outcome: Progressing  Goal: Readiness for Transition of Care  Outcome: Progressing     Problem: Pain Acute  Goal: Optimal Pain Control and Function  Outcome: Progressing     Problem: Orthopaedic Fracture  Goal: Absence of Bleeding  Outcome: Progressing  Goal: Bowel Elimination  Outcome: Progressing  Goal: Absence of Embolism Signs and Symptoms  Outcome: Progressing  Goal: Fracture Stability  Outcome: Progressing  Goal: Optimal Functional Ability  Outcome: Progressing  Goal: Absence of Infection Signs and Symptoms  Outcome: Progressing  Goal: Effective Tissue Perfusion  Outcome: Progressing  Goal: Optimal Pain Control and Function  Outcome: Progressing  Goal: Effective " Oxygenation and Ventilation  Outcome: Progressing     Problem: Alcohol Withdrawal  Goal: Alcohol Withdrawal Symptom Control  Outcome: Progressing  Goal: Optimal Neurologic Function  Outcome: Progressing  Goal: Readiness for Change Identified  Outcome: Progressing

## 2025-04-14 NOTE — PLAN OF CARE
Goal Outcome Evaluation:    Patient discharged home with family, IV taken out. Home medications that were in the pharmacy given back to patient and signed script for medication. Staff escorted patient off the unit on a wheelchair.

## 2025-04-14 NOTE — PLAN OF CARE
"Goal Outcome Evaluation:      Plan of Care Reviewed With: patient    Overall Patient Progress: improvingOverall Patient Progress: improving    Outcome Evaluation: Pt is alert and oriented x4, assist of 1 stand by , cervical  collar on, PRN oxycodone, atarax and tylenol, CIWA scoring low, voiding without any issues, plan to d/c to TCU .        Problem: Adult Inpatient Plan of Care  Goal: Plan of Care Review  Description: The Plan of Care Review/Shift note should be completed every shift.  The Outcome Evaluation is a brief statement about your assessment that the patient is improving, declining, or no change.  This information will be displayed automatically on your shiftnote.  Outcome: Progressing  Flowsheets (Taken 4/14/2025 0645)  Outcome Evaluation: Pt is alert and oriented x4, assist of 1 stand by , cervical  collar on, PRN oxycodone, atarax and tylenol, CIWA scoring low, voiding without any issues, plan to d/c to TCU .  Plan of Care Reviewed With: patient  Overall Patient Progress: improving  Goal: Patient-Specific Goal (Individualized)  Description: You can add care plan individualizations to a care plan. Examples of Individualization might be:  \"Parent requests to be called daily at 9am for status\", \"I have a hard time hearing out of my right ear\", or \"Do not touch me to wake me up as it startlesme\".  Outcome: Progressing  Goal: Absence of Hospital-Acquired Illness or Injury  Outcome: Progressing  Intervention: Identify and Manage Fall Risk  Recent Flowsheet Documentation  Taken 4/14/2025 0030 by Marlen Amaya, RN  Safety Promotion/Fall Prevention:   activity supervised   assistive device/personal items within reach  Intervention: Prevent Skin Injury  Recent Flowsheet Documentation  Taken 4/14/2025 0030 by Marlen Amaya, RN  Body Position: position changed independently  Intervention: Prevent and Manage VTE (Venous Thromboembolism) Risk  Recent Flowsheet Documentation  Taken 4/14/2025 0030 by " Marlen Amaya RN  VTE Prevention/Management: SCDs off (sequential compression devices)  Goal: Optimal Comfort and Wellbeing  Outcome: Progressing  Goal: Readiness for Transition of Care  Outcome: Progressing     Problem: Pain Acute  Goal: Optimal Pain Control and Function  Outcome: Progressing  Intervention: Prevent or Manage Pain  Recent Flowsheet Documentation  Taken 4/14/2025 0030 by Marlen Amaya RN  Bowel Elimination Promotion:   adequate fluid intake promoted   ambulation promoted  Medication Review/Management:   medications reviewed   high-risk medications identified     Problem: Orthopaedic Fracture  Goal: Absence of Bleeding  Outcome: Progressing  Intervention: Monitor and Manage Fracture Bleeding  Recent Flowsheet Documentation  Taken 4/14/2025 0030 by Marlen Amaya RN  Fracture Immobilization: immobilization device maintained  Goal: Bowel Elimination  Outcome: Progressing  Intervention: Promote Effective Bowel Elimination  Recent Flowsheet Documentation  Taken 4/14/2025 0030 by Marlen Amaya RN  Bowel Elimination Promotion:   adequate fluid intake promoted   ambulation promoted  Goal: Absence of Embolism Signs and Symptoms  Outcome: Progressing  Intervention: Prevent or Manage Embolism Risk  Recent Flowsheet Documentation  Taken 4/14/2025 0030 by Marlen Amaya RN  VTE Prevention/Management: SCDs off (sequential compression devices)  Goal: Fracture Stability  Outcome: Progressing  Intervention: Promote Fracture Stability and Healing  Recent Flowsheet Documentation  Taken 4/14/2025 0030 by Marlen Amaya RN  Fracture Immobilization: immobilization device maintained  Goal: Optimal Functional Ability  Outcome: Progressing  Intervention: Optimize Functional Ability  Recent Flowsheet Documentation  Taken 4/14/2025 0030 by Marlen Amaya RN  Activity Management:   ambulated outside room   up in chair  Positioning/Transfer Devices:   pillows   in  use  Goal: Absence of Infection Signs and Symptoms  Outcome: Progressing  Goal: Effective Tissue Perfusion  Outcome: Progressing  Goal: Optimal Pain Control and Function  Outcome: Progressing  Goal: Effective Oxygenation and Ventilation  Outcome: Progressing  Intervention: Promote Airway Secretion Clearance  Recent Flowsheet Documentation  Taken 4/14/2025 0030 by Marlen Amaya, RN  Activity Management:   ambulated outside room   up in chair  Intervention: Optimize Oxygenation and Ventilation  Recent Flowsheet Documentation  Taken 4/14/2025 0030 by Marlen Amaya, RN  Head of Bed (HOB) Positioning: HOB at 20 degrees     Problem: Alcohol Withdrawal  Goal: Alcohol Withdrawal Symptom Control  Outcome: Progressing  Goal: Optimal Neurologic Function  Outcome: Progressing  Goal: Readiness for Change Identified  Outcome: Progressing

## 2025-04-15 ENCOUNTER — MEDICAL CORRESPONDENCE (OUTPATIENT)
Dept: HEALTH INFORMATION MANAGEMENT | Facility: CLINIC | Age: 45
End: 2025-04-15
Payer: COMMERCIAL

## 2025-04-15 ENCOUNTER — TELEPHONE (OUTPATIENT)
Dept: FAMILY MEDICINE | Facility: CLINIC | Age: 45
End: 2025-04-15
Payer: COMMERCIAL

## 2025-04-15 DIAGNOSIS — Z09 HOSPITAL DISCHARGE FOLLOW-UP: ICD-10-CM

## 2025-04-15 NOTE — TELEPHONE ENCOUNTER
Home Care is calling regarding an established patient with [facility].  Requesting orders from: Yovana Felipe  RN APPROVED: RN able to provide verbal orders.  Home Care will send orders for signature.  RN will close encounter.  Is this a request for a temporary pause in the home care episode?  No    Orders Requested    Skilled Nursing  Request for initial evaluation and treatment (one time)   RN gave verbal order: Yes      Physical Therapy  Request for initial certification (first set of orders)   Frequency: twice a week for 3 weeks for gait and transfers and balance.  RN gave verbal order: Yes      Occupational Therapy  Request for initial evaluation and treatment (one time)   RN gave verbal order: Yes      Social Work  Request for initial evaluation and treatment (one time) for community resources.  RN gave verbal order: Yes      HHA (Home Health Aide)  Request for initial certification (first set of orders)   Frequency: once a week for 4 weeks for bathing assistance.   RN gave verbal order: Yes        Phone number Home Care can be reached at: [phone]  Okay to leave a detailed message?: Yes    Contacts       Contact Date/Time Type Contact Phone/Fax    04/15/2025 03:08 PM CDT Phone (Incoming) Cody Bolton (Self) [phone] (M)          Maryann Sykes RN

## 2025-04-16 ENCOUNTER — PATIENT OUTREACH (OUTPATIENT)
Dept: CARE COORDINATION | Facility: CLINIC | Age: 45
End: 2025-04-16
Payer: COMMERCIAL

## 2025-04-16 ENCOUNTER — TELEPHONE (OUTPATIENT)
Dept: FAMILY MEDICINE | Facility: CLINIC | Age: 45
End: 2025-04-16
Payer: COMMERCIAL

## 2025-04-16 NOTE — TELEPHONE ENCOUNTER
"Spoke with pt for outreach.    Pt is figuring out pain management. Has a plan in place to ensure proper time frame for each pain medication. Pt is a \"little SOB during phone call due to being outside before phone call, recent hospitalization, and pain.\"    PT came out yesterday for assessment. Will have SN, PT, OT, SW, and HHA assisting pt. Pt stated work shortened STD and pt will be reaching out to find out why it was shortened and what is needed to have it extended as provider originally wrote for STD to end in May.    Pt is also wondering about [employer] paperwork. Advised that could be filled out at appointment tomorrow along with any further STD paperwork that is needed. Expressed understanding and acceptance of the plan. Had no further questions at this time.  Advised can call back to clinic at any time with concerns.     Jovana ESPINOSA, RN, PHN    "

## 2025-04-16 NOTE — PROGRESS NOTES
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  Fall, initial encounter - W19.XXXA  Alcoholic intoxication without complication - F10.920  Closed nondisplaced fracture of posterior arch of first cervical vertebra, initial encounter (H) - S12.031A     Most Recent Discharge Date: 4/14/2025   Most Recent Discharge Diagnosis: Alcoholic intoxication without complication - F10.920  Fall, initial encounter - W19.XXXA  Subtherapeutic international normalized ratio (INR) - R79.1  Hyperglycemia - R73.9  Elevated CPK - R74.8  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 (Red Flag symptoms escalate to triage hotline per guidelines): Improved  Do you know how to contact your clinic care team if you have future questions or changes to your health status? : Yes  Does the patient have their discharge instructions? : Yes  Does the patient have questions regarding their discharge instructions? : No  Were you started on any new medications or were there changes to any of your previous medications? : Yes  Does the patient have all of their medications?: Yes  Do you have questions regarding any of your " "medications? : No  Do you have all of your needed medical supplies or equipment (DME)?  (i.e. oxygen tank, CPAP, cane, etc.): Yes  Post-op (Clinicians Only)  Did the patient have surgery or a procedure: No  Fever: No  Chills: No  Eating & Drinking: eating and drinking without complaints/concerns  PO Intake: regular diet  Bowel Function: normal (\"Soft\")  Date of last BM: 04/16/25  Urinary Status: voiding without complaint/concerns    SW CC contacted patient for post-hospital follow up and to introduce Care Coordination. Post discharge assessment questions completed and listed above for reference. Pt has follow-up with PCP tomorrow, Thurs 04/17/25. Pt receiving HC RN, PT, OT, HA, and SW services. Full assessment not indicated as patient declined to have Care Coordination support at this time. The patient was agreeable to receiving an introduction letter with additional information on Care Coordination and that letter was sent via [employer]. Verified patient has access to [employer]. Encouraged pt to call back if questions, concerns, or needs arise. No further outreaches planned at this time. Please enter PCP CC referral if needs arise.     Follow up Plan     Discharge Follow-Up  Discharge follow up appointment scheduled in alignment with recommended follow up timeframe or Transitions of Risk Category? (Low = within 30 days; Moderate= within 14 days; High= within 7 days): Yes  Discharge Follow Up Appointment Date: 04/17/25  Discharge Follow Up Appointment Scheduled with?: Primary Care Provider    Future Appointments   Date Time Provider Department Center   4/17/2025  3:00 PM Yovana Felipe MD [facility]    4/25/2025 10:40 AM [id number] [facility] - [facility]   4/25/2025 11:00 AM Elian Hendrix PA-C [facility]   4/30/2025  2:00 PM Lili Kidd RP R[facility]   5/9/2025  9:30 AM Yovana Felipe MD [facility]      Outpatient Plan as outlined on AVS reviewed with patient.    For any urgent concerns, please " contact our 24 hour nurse triage line: [phone] ([phone])       Kaylie Martines, LICSW

## 2025-04-16 NOTE — LETTER
[facility] CARE COORDINATION  [id number]  [place] [address]    April 16, 2025    Cody Bolton  [address]  [place] [address]      Dear Cody,    I am a clinic care coordinator who works with Yovana Felipe MD with the [facility]. I wanted to thank you for spending the time to talk with me.  Below is a description of clinic care coordination and how I can further assist you.       The clinic care coordination team is made up of a registered nurse, , financial resource worker and community health worker who understand the health care system. The goal of clinic care coordination is to help you manage your health and improve access to the health care system. Our team works alongside your provider to assist you in determining your health and social needs. We can help you obtain health care and community resources, providing you with necessary information and education. We can work with you through any barriers and develop a care plan that helps coordinate and strengthen the communication between you and your care team.  Our services are voluntary and are offered without charge to you personally.    Please feel free to contact me with any questions or concerns regarding care coordination and what we can offer.      We are focused on providing you with the highest-quality healthcare experience possible.    Sincerely,     Kaylie Martines [facility]  [facility]   Primary Care Social Work Care Coordinator  [facility] [place]   Phone: [phone]

## 2025-04-17 ENCOUNTER — MEDICAL CORRESPONDENCE (OUTPATIENT)
Dept: HEALTH INFORMATION MANAGEMENT | Facility: CLINIC | Age: 45
End: 2025-04-17

## 2025-04-17 ENCOUNTER — ANTICOAGULATION THERAPY VISIT (OUTPATIENT)
Dept: ANTICOAGULATION | Facility: CLINIC | Age: 45
End: 2025-04-17

## 2025-04-17 ENCOUNTER — VIRTUAL VISIT (OUTPATIENT)
Dept: FAMILY MEDICINE | Facility: CLINIC | Age: 45
End: 2025-04-17
Payer: COMMERCIAL

## 2025-04-17 ENCOUNTER — TELEPHONE (OUTPATIENT)
Dept: ANTICOAGULATION | Facility: CLINIC | Age: 45
End: 2025-04-17

## 2025-04-17 ENCOUNTER — DOCUMENTATION ONLY (OUTPATIENT)
Dept: ANTICOAGULATION | Facility: CLINIC | Age: 45
End: 2025-04-17

## 2025-04-17 DIAGNOSIS — Z86.718 HISTORY OF DVT (DEEP VEIN THROMBOSIS): Primary | ICD-10-CM

## 2025-04-17 DIAGNOSIS — I26.99 ACUTE PULMONARY EMBOLISM WITHOUT ACUTE COR PULMONALE, UNSPECIFIED PULMONARY EMBOLISM TYPE (H): ICD-10-CM

## 2025-04-17 DIAGNOSIS — F10.920 ALCOHOLIC INTOXICATION WITHOUT COMPLICATION: ICD-10-CM

## 2025-04-17 DIAGNOSIS — F10.20 ALCOHOL USE DISORDER, SEVERE, DEPENDENCE (H): ICD-10-CM

## 2025-04-17 DIAGNOSIS — Z86.718 PERSONAL HISTORY OF DVT (DEEP VEIN THROMBOSIS): ICD-10-CM

## 2025-04-17 DIAGNOSIS — R79.1 SUBTHERAPEUTIC INTERNATIONAL NORMALIZED RATIO (INR): ICD-10-CM

## 2025-04-17 DIAGNOSIS — Z09 HOSPITAL DISCHARGE FOLLOW-UP: Primary | ICD-10-CM

## 2025-04-17 DIAGNOSIS — S12.001A CLOSED NONDISPLACED FRACTURE OF FIRST CERVICAL VERTEBRA, UNSPECIFIED FRACTURE MORPHOLOGY, INITIAL ENCOUNTER (H): ICD-10-CM

## 2025-04-17 DIAGNOSIS — Z86.711 HISTORY OF PULMONARY EMBOLISM: ICD-10-CM

## 2025-04-17 PROBLEM — S00.81XA FACIAL ABRASION, INITIAL ENCOUNTER: Status: RESOLVED | Noted: 2025-03-26 | Resolved: 2025-04-17
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
## 2025-04-17 PROBLEM — R74.8 ELEVATED CPK: Status: RESOLVED | Noted: 2025-04-11 | Resolved: 2025-04-17

## 2025-04-17 LAB — INR (EXTERNAL): 1.2 (ref 0.9–1.1)

## 2025-04-17 RX ORDER — AMOXICILLIN 250 MG
1 CAPSULE ORAL 2 TIMES DAILY PRN
Qty: 60 TABLET | Refills: 0 | Status: SHIPPED | OUTPATIENT
Start: 2025-04-17

## 2025-04-17 RX ORDER — OXYCODONE HYDROCHLORIDE 5 MG/1
5 TABLET ORAL EVERY 6 HOURS PRN
Qty: 20 TABLET | Refills: 0 | Status: SHIPPED | OUTPATIENT
Start: 2025-04-17

## 2025-04-17 RX ORDER — ENOXAPARIN SODIUM 150 MG/ML
120 INJECTION SUBCUTANEOUS EVERY 12 HOURS
Qty: 22.4 ML | Refills: 1 | Status: SHIPPED | OUTPATIENT
Start: 2025-04-17

## 2025-04-17 RX ORDER — METHOCARBAMOL 500 MG/1
500 TABLET, FILM COATED ORAL 3 TIMES DAILY PRN
Qty: 60 TABLET | Refills: 0 | Status: SHIPPED | OUTPATIENT
Start: 2025-04-17

## 2025-04-17 NOTE — PROGRESS NOTES
Cody is a 44 year old who is being evaluated via a billable video visit.    How would you like to obtain your AVS? MyChart  If the video visit is dropped, the invitation should be resent by: Text to cell phone: [phone]  Will anyone else be joining your video visit? No      Assessment & Plan     Hospital discharge follow-up    Closed nondisplaced fracture of first cervical vertebra, unspecified fracture morphology, initial encounter (H) - struggling with pain control at times. Refilled his opioid - he is using twice daily and using apap otherwise. Using mm relaxer prior to eating to help him chew with less pain. Wearing his brace always. Has NS appt next week.   - methocarbamol (ROBAXIN) 500 MG tablet; Take 1 tablet (500 mg) by mouth 3 times daily as needed for muscle spasms.  - oxyCODONE (ROXICODONE) 5 MG tablet; Take 1 tablet (5 mg) by mouth every 6 hours as needed for severe pain.  - senna-docusate (SENOKOT-S/PERICOLACE) 8.6-50 MG tablet; Take 1 tablet by mouth 2 times daily as needed for constipation.  - Primary Care - Care Coordination Referral; Future    Alcohol use disorder, severe, dependence (H) - has been abstinent since hospitalization. Has been doing treatment through [facility]. Notes some struggles when he's out of the house as he has to fight the urge to go the liquor store. Looking to arrange [employer] mobility and is relying on family/friends to help him remain abstinent.     Subtherapeutic international normalized ratio (INR) - working with INR team, will start lovenox until his INR becomes therapeutic.     Alcoholic intoxication without complication - see above    The longitudinal plan of care for the diagnosis(es)/condition(s) as documented were addressed during this visit. Due to the added complexity in care, I will continue to support Cody in the subsequent management and with ongoing continuity of care.          MED REC REQUIRED  Post Medication Reconciliation Status:   "Discharge medications reconciled and changed, see notes/orders  BMI  Estimated body mass index is 47.24 kg/m  as calculated from the following:    Height as of 4/11/25: 1.829 m (6').    Weight as of 4/11/25: 158 kg (348 lb 5.2 oz).         Suhail Ross is a 44 year old, presenting for the following health issues:  Hospital F/U (Was in the hospital after he broke his neck.  Fell again last week, did not re-injure anything. Needs to follow up. )        4/17/2025     2:46 PM   Additional Questions   Roomed by Ekaterina Lindsey CMA   Accompanied by Self     HPI          4/16/2025   Post Discharge Outreach   How are you doing now that you are home? \"In the middle of a pain spell, but it's \" per pt report   How are your symptoms? (Red Flag symptoms escalate to triage hotline per guidelines) Improved   Does the patient have their discharge instructions?  Yes   Does the patient have questions regarding their discharge instructions?  No   Were you started on any new medications or were there changes to any of your previous medications?  Yes   Does the patient have all of their medications? Yes   Do you have questions regarding any of your medications?  No   Do you have all of your needed medical supplies or equipment (DME)?  (i.e. oxygen tank, CPAP, cane, etc.) Yes   Discharge Follow Up Appointment Date 4/17/2025   Discharge Follow Up Appointment Scheduled with? 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  Fall, initial encounter - W19.XXXA  Alcoholic intoxication without complication - F10.920  Closed nondisplaced fracture of posterior arch of first cervical vertebra, initial encounter (H) - S12.031A     Most Recent Discharge " Date: 4/14/2025   Most Recent Discharge Diagnosis: Alcoholic intoxication without complication - F10.920  Fall, initial encounter - W19.XXXA  Subtherapeutic international normalized ratio (INR) - R79.1  Hyperglycemia - R73.9  Elevated CPK - R74.8  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  No  Do you have any other stressors you would like to discuss with your provider? Health Concerns    Problems taking medications regularly:  None  Medication changes since discharge: Oxycodone  Problems adhering to non-medication therapy:  None    Summary of hospitalization:  [facility] discharge summary reviewed  Diagnostic Tests/Treatments reviewed.  Follow up needed: none  Other Healthcare Providers Involved in Patient s Care:         Specialist appointment - NS next week  Update since discharge: stable.         Plan of care communicated with patient               Review of Systems  Constitutional, HEENT, cardiovascular, pulmonary, gi and gu systems are negative, except as otherwise noted.      Objective    Vitals - Patient Reported  Weight (Patient Reported): 163.3 kg (360 lb)  Height (Patient Reported): 182.9 cm (6')  BMI (Based on Pt Reported Ht/Wt): 48.82  Pain Score: Moderate Pain (5)  Pain Loc: Neck        Physical Exam   GENERAL: alert and no distress  EYES: Eyes grossly normal to inspection.  No discharge or erythema, or obvious scleral/conjunctival abnormalities.  RESP: No audible wheeze, cough, or visible cyanosis.    SKIN: Visible skin clear. No significant rash, abnormal  pigmentation or lesions.  NEURO: Cranial nerves grossly intact.  Mentation and speech appropriate for age.  PSYCH: Appropriate affect, tone, and pace of words        Video-Visit Details    Type of service:  Video Visit   Originating Location (pt. Location): Home    Distant Location (provider location):  Off-site  Platform used for Video Visit: David  Signed Electronically by: Yovana Felipe MD

## 2025-04-17 NOTE — TELEPHONE ENCOUNTER
"ACRN contacted [facility] to review current plan and provide INR orders. Per , patient was referred to [facility], but unfortunately his \"referral was declined due to no capacity.\"     ACRN requested callback from Intake team to review declined services as there are several notes from 4/14- 4/16/25 noting, \"pt receiving HC RN, PT, OT, HA, and SW services.\" ACRN will await call for clarification. Kera Deras, KENNETHN, RN    "

## 2025-04-17 NOTE — TELEPHONE ENCOUNTER
[facility] Intake nurse, Chris, called back and confirmed patient's homecare services was declined as noted below and was referred to [facility].    ACRN contacted [facility] (#[phone] / fax #[phone]) and verified patient's care was initiated on 4/15/25 - first RN intake appt is set for 4/18/25 or 4/19/25 - ACRN requested INR to be drawn at that time (ideally Friday vs Saturday) & standing orders faxed. HC provided [facility] direct [place] line for results. ACC tracker updated and changed AC management to [place] during homecare services. Kera Deras, KENNETHN, RN

## 2025-04-17 NOTE — PROGRESS NOTES
Attempted to reach both patient and DEMARCUS Muñoz RN, no answer, left detailed VM regarding recommendations to start Lovenox bridging now - rx sent to [facility] in [place]. Requested callback asap to discuss administration instructions and duration. Kera Deras, KENNETHN, RN

## 2025-04-17 NOTE — PROGRESS NOTES
ANTICOAGULATION MANAGEMENT     Cody WALLACE Young 44 year old male is on warfarin with subtherapeutic INR result. (Goal INR 2.0-3.0)    Recent labs: (last 7 days)     04/17/25  1325   INR 1.2*       ASSESSMENT     Source(s): Chart Review and Patient/Caregiver Call     Warfarin doses taken: Missed dose(s) may be affecting INR - patient believes he missed Mon-Wed doses this week  Diet: Appetite is returning now that he is home + sober since 4/11/25  Medication/supplement changes: Oxycodone started on 4/14/25 No interaction anticipated  Senna started on 4/14/25 No interaction anticipated  New illness, injury, or hospitalization: Yes: hospital admission on 4/11/25-4/14/25 for fall + recent C1 & C6-7 osteophyte fractures (prior admission 3/26/25-3/31/25)   New left toes neuropathy + new SOB this weekend with worsening sx since hospitalization, denies pain/redness/DVT sx at bilateral legs, but HC RN confirmed +1 pitting edema   Virtual OV with PCP this afternoon - sx noted above to be discussed - H consulted regarding bridging with Lovenox - preferred pharmacy [facility]  Signs or symptoms of bleeding or clotting: Yes: bruising noted on right lateral abdomen & under ribcage and hand from IV   Previous result: Therapeutic last visit; previously outside of goal range  Additional findings: Start Bridging with Enoxaparin until INR >= 2.0 - [employer] message sent with instructions/education       PLAN     Recommended plan for temporary change(s) and ongoing change(s) affecting INR     Dosing Instructions:  booster dose x 2 then continue your current warfarin dose with next INR in 5 days       Summary  As of 4/17/2025      Full warfarin instructions:  4/17: 10 mg; 4/18: 10 mg; Otherwise 2.5 mg every Wed; 5 mg all other days   Next INR check:  4/21/2025               Telephone call with Mikey home care nurse who agrees to plan and repeated back plan correctly    Orders given to  Homecare nurse/facility to  recheck    Education provided: Please call back if any changes to your diet, medications or how you've been taking warfarin  Symptom monitoring: monitoring for bleeding signs and symptoms, monitoring for clotting signs and symptoms, monitoring for stroke signs and symptoms, when to seek medical attention/emergency care, and if you hit your head or have a bad fall seek emergency care    Plan made with [facility] Pharmacist Tere Deras RN  4/17/2025  Anticoagulation Clinic  [facility] for routing messages: fransisco PABLO  [facility] patient phone line: [phone]        _______________________________________________________________________     Anticoagulation Episode Summary       Current INR goal:  2.0-3.0   TTR:  45.8% (10.3 mo)   Target end date:  Indefinite   Send INR reminders to:  KEO PABLO    Indications    Acute pulmonary embolism without acute cor pulmonale  unspecified pulmonary embolism type (H) (Resolved) [I26.99]  History of DVT (deep vein thrombosis) (Resolved) [Z86.718]             Comments:  --             Anticoagulation Care Providers       Provider Role Specialty Phone number    Aaseby-Aguilera, Ramona Ann, PA-C Referring Family Medicine [phone]

## 2025-04-17 NOTE — TELEPHONE ENCOUNTER
Wanda at [facility] called in to see if ok for POC on this patient today. Reviewed chart and do not see that a venous is required.    Called and informed Wanda that POC is ok.    She will call back with the INR when she is in the home.    Valerie Rose RN    [facility]ticoagulation [facility]

## 2025-04-17 NOTE — PROGRESS NOTES
Writer lul Muñoz RN this afternoon and reviewed updated recommendations to start lovenox bridging and HC RN confirmed patient received information as well. [facility] also sent a MyC to patient with detailed instructions. Recheck INR 4/21/25 if possible for HC team (backup date 4/22/25). Kera Deras, KENNETHN, RN

## 2025-04-17 NOTE — PROGRESS NOTES
Patient was bridged to therapeutic INR while IP. Will resume enoxaparin until INR >/= 2 given 3 missed doses and potential for ongoing abstinence from ETOH.     Estimated Creatinine Clearance: 186.2 mL/min (based on SCr of 0.77 mg/dL).  Estimated body mass index is 47.24 kg/m  as calculated from the following:    Height as of 4/11/25: 1.829 m (6').    Weight as of 4/11/25: 158 kg (348 lb 5.2 oz).    Enoxaparin 120 mg subcutaneous q12h (0.75 mg/kg for BMI >/= 40kg/m2) until INR >/=2.    Tere Sainz, PharmD, BCPS

## 2025-04-21 ENCOUNTER — ANTICOAGULATION THERAPY VISIT (OUTPATIENT)
Dept: ANTICOAGULATION | Facility: CLINIC | Age: 45
End: 2025-04-21
Payer: COMMERCIAL

## 2025-04-21 DIAGNOSIS — Z86.718 HISTORY OF DVT (DEEP VEIN THROMBOSIS): ICD-10-CM

## 2025-04-21 DIAGNOSIS — Z86.711 HISTORY OF PULMONARY EMBOLISM: Primary | ICD-10-CM

## 2025-04-21 NOTE — PROGRESS NOTES
Jo with home care calling to report patient called back to confirm appt for tomorrow and left a voicemail with  home care confirming he took 10 mg 4/17 and 4/18 and then 5 mg 4/19-21. He did not  the lovenox to bridge post procedure. Home care will see patient tomorrow but wondering if this will effect plan for dosing.  Will huddle with [facility] to discuss.    Per [facility], continue dosing and recheck INR tomorrow.  Phone line goes to voicemail. Left message with advisement to take 5 mg of warfarin tonight and what s/s to report for risk of clot/stroke since he is not bridging as advised. Asked patient to call back or respond to mychart to confirm no adverse signs and he understands directions. Advised patient not to miss his appointment tomorrow with home care.    Jessi Brown RN

## 2025-04-22 ENCOUNTER — PATIENT OUTREACH (OUTPATIENT)
Dept: FAMILY MEDICINE | Facility: CLINIC | Age: 45
End: 2025-04-22
Payer: COMMERCIAL

## 2025-04-22 ENCOUNTER — ANTICOAGULATION THERAPY VISIT (OUTPATIENT)
Dept: ANTICOAGULATION | Facility: CLINIC | Age: 45
End: 2025-04-22
Payer: COMMERCIAL

## 2025-04-22 DIAGNOSIS — Z86.711 HISTORY OF PULMONARY EMBOLISM: Primary | ICD-10-CM

## 2025-04-22 DIAGNOSIS — Z86.718 HISTORY OF DVT (DEEP VEIN THROMBOSIS): ICD-10-CM

## 2025-04-22 LAB — INR (EXTERNAL): 2.3 (ref 0.9–1.1)

## 2025-04-22 NOTE — TELEPHONE ENCOUNTER
"RN PAL outreach per request of Dr. Felipe--in regards to patient ETOH use.     Cody says group therapy is going to start in just a few minutes. Says pain is still present but he is doing better with this. Says he is not drinking and knows it \"would be a disaster\" if he was on oxy and drinking so he is trying to stay sober and doing well right now.     Informed him that is part of the reason we are reaching out, and this is what we want to hear! Informed him we will be making daily outreaches for a bit to make sure he is staying on track and try to support this in any way we can.     Cody was very receptive to this, informed him an RN will outreach to him again tomorrow.     Genevieve Noe R.N.    "

## 2025-04-22 NOTE — PROGRESS NOTES
ANTICOAGULATION MANAGEMENT     Cody WALLACE Young 44 year old male is on warfarin with therapeutic INR result. (Goal INR 2.0-3.0)    Recent labs: (last 7 days)     04/22/25  1256   INR 2.3*       ASSESSMENT     Source(s): Chart Review, Patient/Caregiver Call, and Home Care/Facility Nurse     Warfarin doses taken: Warfarin taken as instructed, still on lovenox  Diet: No new diet changes identified  Medication/supplement changes: None noted  New illness, injury, or hospitalization: No  Signs or symptoms of bleeding or clotting: No  Previous result: Subtherapeutic  Additional findings: None  Ok given to stop lovenox       PLAN     Recommended plan for no diet, medication or health factor changes affecting INR     Dosing Instructions: Continue your current warfarin dose with next INR in 3 days       Summary  As of 4/22/2025      Full warfarin instructions:  2.5 mg every Wed; 5 mg all other days   Next INR check:  4/25/2025               Telephone call with Kami LOW, Cindy home care nurse who verbalizes understanding and agrees to plan    Orders given to  Homecare nurse/facility to recheck    Education provided: None required    Plan made per [facility] anticoagulation protocol    Valerie Rose RN  4/22/2025  Anticoagulation Clinic  [facility] for routing messages: fransisco PABLO  [facility] patient phone line: [phone]        _______________________________________________________________________     Anticoagulation Episode Summary       Current INR goal:  2.0-3.0   TTR:  44.6% (10.3 mo)   Target end date:  Indefinite   Send INR reminders to:  KEO PABLO    Indications    Acute pulmonary embolism without acute cor pulmonale  unspecified pulmonary embolism type (H) (Resolved) [I26.99]  History of DVT (deep vein thrombosis) (Resolved) [Z86.718]  History of pulmonary embolism [Z86.711]  History of DVT (deep vein thrombosis) [Z86.718]             Comments:  --             Anticoagulation Care Providers       Provider  Role Specialty Phone number    Aaseby-Aguilera, Ramona Ann, PA-C Referring Family Medicine [phone]    Yovana Felipe MD Referring Family Medicine [phone]

## 2025-04-23 ENCOUNTER — TELEPHONE (OUTPATIENT)
Dept: FAMILY MEDICINE | Facility: CLINIC | Age: 45
End: 2025-04-23
Payer: COMMERCIAL

## 2025-04-23 NOTE — TELEPHONE ENCOUNTER
"Spoke with pt for daily outreach.    Pt has PT therapy this AM.     Pt struggled with pain yesterday as pt felt when it was getting close to being due for pain medication, the previous dose would wear off. Feels okay today. Advised to continue to monitor and let staff know if continuing to have break through pain.    Pt has great mindset regarding alcohol abuse and how to move forward without alcohol use. Has therapist and other supportive people to help pt move forward. Advised if needing anything, even a \"pep\" talk to reach out.    Pt grateful for outreach and will await RN outreach tomorrow.    Jovana ESPINOSA, RN, PHN    "

## 2025-04-24 ENCOUNTER — MEDICAL CORRESPONDENCE (OUTPATIENT)
Dept: HEALTH INFORMATION MANAGEMENT | Facility: CLINIC | Age: 45
End: 2025-04-24
Payer: COMMERCIAL

## 2025-04-25 ENCOUNTER — ANCILLARY PROCEDURE (OUTPATIENT)
Dept: GENERAL RADIOLOGY | Facility: CLINIC | Age: 45
End: 2025-04-25
Payer: COMMERCIAL

## 2025-04-25 ENCOUNTER — OFFICE VISIT (OUTPATIENT)
Dept: NEUROSURGERY | Facility: CLINIC | Age: 45
End: 2025-04-25
Payer: COMMERCIAL

## 2025-04-25 VITALS
BODY MASS INDEX: 42.66 KG/M2 | WEIGHT: 315 LBS | DIASTOLIC BLOOD PRESSURE: 81 MMHG | HEIGHT: 72 IN | SYSTOLIC BLOOD PRESSURE: 120 MMHG | OXYGEN SATURATION: 97 % | HEART RATE: 61 BPM

## 2025-04-25 DIAGNOSIS — S12.001A CLOSED NONDISPLACED FRACTURE OF FIRST CERVICAL VERTEBRA, UNSPECIFIED FRACTURE MORPHOLOGY, INITIAL ENCOUNTER (H): ICD-10-CM

## 2025-04-25 DIAGNOSIS — S12.401A CLOSED NONDISPLACED FRACTURE OF FIFTH CERVICAL VERTEBRA, UNSPECIFIED FRACTURE MORPHOLOGY, INITIAL ENCOUNTER (H): ICD-10-CM

## 2025-04-25 DIAGNOSIS — S12.001A CLOSED NONDISPLACED FRACTURE OF FIRST CERVICAL VERTEBRA, UNSPECIFIED FRACTURE MORPHOLOGY, INITIAL ENCOUNTER (H): Primary | ICD-10-CM

## 2025-04-25 DIAGNOSIS — S62.656A CLOSED NONDISPLACED FRACTURE OF MIDDLE PHALANX OF RIGHT LITTLE FINGER, INITIAL ENCOUNTER: ICD-10-CM

## 2025-04-25 PROCEDURE — 1125F AMNT PAIN NOTED PAIN PRSNT: CPT | Performed by: PHYSICIAN ASSISTANT

## 2025-04-25 PROCEDURE — 99213 OFFICE O/P EST LOW 20 MIN: CPT | Performed by: PHYSICIAN ASSISTANT

## 2025-04-25 PROCEDURE — 72040 X-RAY EXAM NECK SPINE 2-3 VW: CPT | Mod: TC | Performed by: RADIOLOGY

## 2025-04-25 PROCEDURE — 3074F SYST BP LT 130 MM HG: CPT | Performed by: PHYSICIAN ASSISTANT

## 2025-04-25 PROCEDURE — 3079F DIAST BP 80-89 MM HG: CPT | Performed by: PHYSICIAN ASSISTANT

## 2025-04-25 RX ORDER — OXYCODONE HYDROCHLORIDE 5 MG/1
5 TABLET ORAL EVERY 6 HOURS PRN
Qty: 30 TABLET | Refills: 0 | Status: SHIPPED | OUTPATIENT
Start: 2025-04-25

## 2025-04-25 ASSESSMENT — PAIN SCALES - GENERAL: PAINLEVEL_OUTOF10: SEVERE PAIN (8)

## 2025-04-25 NOTE — NURSING NOTE
Cody Bolton is a 44 year old male who presents for:  Chief Complaint   Patient presents with    RECHECK     C1 fracture follow up        Initial Vitals:  /81   Pulse 61   Ht 6' (1.829 m)   Wt (!) 348 lb 5.2 oz (158 kg)   SpO2 97%   BMI 47.24 kg/m   Estimated body mass index is 47.24 kg/m  as calculated from the following:    Height as of this encounter: 6' (1.829 m).    Weight as of this encounter: 348 lb 5.2 oz (158 kg).. Body surface area is 2.83 meters squared. BP completed using cuff size: large  Severe Pain (8)        Trina Rubi

## 2025-04-25 NOTE — LETTER
4/25/2025      Cody Bolton  [address] [address] [place] [id number]      Dear Colleague,    Thank you for referring your patient, Cody Bolton, to the [facility]UROS[facility]IC [place]. Please see a copy of my visit note below.    NEUROSURGERY CLINIC PROGRESS NOTE    DATE OF VISIT: 4/25/2025    HPI:     44 year old male who was admitted 3/26/25-3/31/25 for a ground level fall with a head injury resulting in a C1 anterior and posterior arch fracture and nondisplaced C6-C7 anterior osteophyte fracture in setting of DISH. We recommended a hard cervical collar at all times/sujatha collar for hygiene and follow up in outpatient NSGY clinic in 4-6 weeks with xrays prior.  Today, the patient reports that he continues to have intermittent and fluctuating pain in his neck. He has remained compliant with his collar.    Current Outpatient Medications   Medication Sig Dispense Refill     acamprosate (CAMPRAL) 333 MG EC tablet Take 2 tablets (666 mg) by mouth 3 times daily. 180 tablet 3     acetaminophen (TYLENOL) 500 MG tablet Take 1,000 mg by mouth 3 times daily as needed for mild pain.       buPROPion (WELLBUTRIN XL) 300 MG 24 hr tablet Take 1 tablet (300 mg) by mouth every morning. 90 tablet 3     carvedilol (COREG) 12.5 MG tablet Take 1 tablet (12.5 mg) by mouth 2 times daily (with meals) 60 tablet 11     enoxaparin ANTICOAGULANT (LOVENOX) 120 MG/0.8ML syringe Inject 0.8 mLs (120 mg) subcutaneously every 12 hours. 22.4 mL 1     fluticasone (FLONASE) 50 MCG/ACT nasal spray Spray 1 spray into both nostrils daily as needed for rhinitis or allergies.       folic acid (FOLVITE) 1 MG tablet Take 1 tablet (1 mg) by mouth daily. 30 tablet 1     hydrOXYzine HCl (ATARAX) 50 MG tablet Take 50 mg by mouth every 6 hours as needed for other (Pain).       lisinopril (ZESTRIL) 20 MG tablet Take 1 tablet (20 mg) by mouth daily. 90 tablet 3     metFORMIN (GLUCOPHAGE XR) 500 MG 24 hr tablet Take 2 tablets  (1,000 mg) by mouth daily (with dinner). 180 tablet 3     methocarbamol (ROBAXIN) 500 MG tablet Take 1 tablet (500 mg) by mouth 3 times daily as needed for muscle spasms. 60 tablet 0     multivitamin w/minerals (THERA-VIT-M) tablet Take 1 tablet by mouth daily. 30 tablet 1     naltrexone (DEPADE/REVIA) 50 MG tablet Take 50 mg by mouth daily.       oxyCODONE (ROXICODONE) 5 MG tablet Take 1 tablet (5 mg) by mouth every 6 hours as needed for severe pain. 30 tablet 0     oxyCODONE (ROXICODONE) 5 MG tablet Take 1 tablet (5 mg) by mouth every 6 hours as needed for severe pain. 20 tablet 0     senna-docusate (SENOKOT-S/PERICOLACE) 8.6-50 MG tablet Take 1 tablet by mouth 2 times daily as needed for constipation. 60 tablet 0     sildenafil (VIAGRA) 100 MG tablet Take 1 tablet (100 mg) by mouth daily as needed (erectile dysfunction). 30 tablet 3     simvastatin (ZOCOR) 20 MG tablet Take 1 tablet (20 mg) by mouth at bedtime. 90 tablet 3     thiamine (B-1) 100 MG tablet Take 1 tablet (100 mg) by mouth daily. 30 tablet 0     warfarin ANTICOAGULANT (COUMADIN) 5 MG tablet Take 2.5mg on Wednesdays and 5mg all other days       naloxone (NARCAN) 4 MG/0.1ML nasal spray Spray 1 spray (4 mg) into one nostril alternating nostrils as needed for opioid reversal. every 2-3 minutes until assistance arrives 2 each 0     No current facility-administered medications for this visit.     Facility-Administered Medications Ordered in Other Visits   Medication Dose Route Frequency Provider Last Rate Last Admin     Self Administer Medications: Behavioral Services   Does not apply See Admin Instructions Corrine Alvarenga MD         Self Administer Medications: Behavioral Services   Does not apply See Admin Instructions Corrine Alvarenga MD           No Known Allergies    Past Medical History:   Diagnosis Date     Alcohol use disorder      Depressive disorder      High cholesterol      History of gastric bypass      History of pulmonary embolism       Hypertension      Morbid obesity (H)      CARLIE (obstructive sleep apnea)      Paroxysmal atrial fibrillation (H)      Personal history of DVT (deep vein thrombosis)      Pulmonary embolism (H) 02/2024    unprovoked bilateral PE and RLE DVT 2/2024     Type 2 diabetes mellitus (H)      Uncomplicated asthma        Review Of Systems    Skin: negative  Eyes: negative  Ears/Nose/Throat: negative  Respiratory: No shortness of breath, dyspnea on exertion, cough, or hemoptysis  Cardiovascular: negative  Gastrointestinal: negative  Musculoskeletal: neck pain  Neurologic: negative  Psychiatric: negative  Hematologic/Lymphatic/Immunologic: negative  Endocrine: negative    OBJECTIVE:    /81   Pulse 61   Ht 1.829 m (6')   Wt (!) 158 kg (348 lb 5.2 oz)   SpO2 97%   BMI 47.24 kg/m      Imaging:    XR CERVICAL SPINE 2/3 VIEWS  LOCATION: [facility] [place]  DATE: 4/25/2025     INDICATION: Upright with collar on; open mouth view and AP lateral; C1 fracture, C6-C7 fracture.  COMPARISON: Cervical spine MRI 04/13/2025, radiograph 04/12/2025, CT 04/11/2025.                                                                   IMPRESSION: Fracture through the posterior C1 ring is probably visible on the lateral view. Subtle lucency through the anterior bridging osteophytes at C3-C4 consistent with fracture. The fracture through the anterior C6-C7 bridging osteophytes is not   well demonstrated radiographically. Normal alignment. No prevertebral soft tissue swelling.    Radiographic Findings: Full radiological report in chart. I personally reviewed the images with the patient today.    Exam:    Patient appears comfortable and in no apparent distress. Moving all extremities.  Gait is non-antalgic.  CN II-XII grossly intact, alert and appropriate with conversation and following  commands  Bilateral upper extremities with full strength including hand intrinsics and grasp.  Sensation intact  throughout.  Bilateral lower extremities 5/5 strength including plantar and dorsiflexion.  Normal sensation throughout bilaterally.  Collar intact.     ASSESSMENT:    1. Closed nondisplaced fracture of first cervical vertebra, unspecified fracture morphology, initial encounter (H)        PLAN:    Cody Bolton is six weeks out from a fall resulting in a C1 anterior and posterior arch fracture and nondisplaced C6-C7 anterior osteophyte fracture in setting of DISH. Today the patient reports that he continues to have intermittent and fluctuating pain in his neck. He has remained compliant with his collar.    The patient has remained compliant with the restrictions which included not lifting anything greater than 5-10 lbs. Today we discussed increasing activity from 10 lbs by 2-5 lbs per week, but encouraged continuing to avoid excessive bending, twisting, and turning at the neck and to avoid jostling and jarring activities. He will continue to wear the collar for another six weeks.     Mr. Bolton will return to the clinic in six weeks with repeat imaging.    The patient gave verbal understanding and is in agreement with the above plan. He  will call or return to the clinic for any worsening or changes in symptoms.    Respectfully,     JESS Garcia PA-C      Again, thank you for allowing me to participate in the care of your patient.        Sincerely,        Elian Hendrix PA-C    Electronically signed

## 2025-04-25 NOTE — PROGRESS NOTES
NEUROSURGERY CLINIC PROGRESS NOTE    DATE OF VISIT: 4/25/2025    HPI:     44 year old male who was admitted 3/26/25-3/31/25 for a ground level fall with a head injury resulting in a C1 anterior and posterior arch fracture and nondisplaced C6-C7 anterior osteophyte fracture in setting of DISH. We recommended a hard cervical collar at all times/sujatha collar for hygiene and follow up in outpatient NSGY clinic in 4-6 weeks with xrays prior.  Today, the patient reports that he continues to have intermittent and fluctuating pain in his neck. He has remained compliant with his collar.    Current Outpatient Medications   Medication Sig Dispense Refill    acamprosate (CAMPRAL) 333 MG EC tablet Take 2 tablets (666 mg) by mouth 3 times daily. 180 tablet 3    acetaminophen (TYLENOL) 500 MG tablet Take 1,000 mg by mouth 3 times daily as needed for mild pain.      buPROPion (WELLBUTRIN XL) 300 MG 24 hr tablet Take 1 tablet (300 mg) by mouth every morning. 90 tablet 3    carvedilol (COREG) 12.5 MG tablet Take 1 tablet (12.5 mg) by mouth 2 times daily (with meals) 60 tablet 11    enoxaparin ANTICOAGULANT (LOVENOX) 120 MG/0.8ML syringe Inject 0.8 mLs (120 mg) subcutaneously every 12 hours. 22.4 mL 1    fluticasone (FLONASE) 50 MCG/ACT nasal spray Spray 1 spray into both nostrils daily as needed for rhinitis or allergies.      folic acid (FOLVITE) 1 MG tablet Take 1 tablet (1 mg) by mouth daily. 30 tablet 1    hydrOXYzine HCl (ATARAX) 50 MG tablet Take 50 mg by mouth every 6 hours as needed for other (Pain).      lisinopril (ZESTRIL) 20 MG tablet Take 1 tablet (20 mg) by mouth daily. 90 tablet 3    metFORMIN (GLUCOPHAGE XR) 500 MG 24 hr tablet Take 2 tablets (1,000 mg) by mouth daily (with dinner). 180 tablet 3    methocarbamol (ROBAXIN) 500 MG tablet Take 1 tablet (500 mg) by mouth 3 times daily as needed for muscle spasms. 60 tablet 0    multivitamin w/minerals (THERA-VIT-M) tablet Take 1 tablet by mouth daily. 30 tablet 1     naltrexone (DEPADE/REVIA) 50 MG tablet Take 50 mg by mouth daily.      oxyCODONE (ROXICODONE) 5 MG tablet Take 1 tablet (5 mg) by mouth every 6 hours as needed for severe pain. 30 tablet 0    oxyCODONE (ROXICODONE) 5 MG tablet Take 1 tablet (5 mg) by mouth every 6 hours as needed for severe pain. 20 tablet 0    senna-docusate (SENOKOT-S/PERICOLACE) 8.6-50 MG tablet Take 1 tablet by mouth 2 times daily as needed for constipation. 60 tablet 0    sildenafil (VIAGRA) 100 MG tablet Take 1 tablet (100 mg) by mouth daily as needed (erectile dysfunction). 30 tablet 3    simvastatin (ZOCOR) 20 MG tablet Take 1 tablet (20 mg) by mouth at bedtime. 90 tablet 3    thiamine (B-1) 100 MG tablet Take 1 tablet (100 mg) by mouth daily. 30 tablet 0    warfarin ANTICOAGULANT (COUMADIN) 5 MG tablet Take 2.5mg on Wednesdays and 5mg all other days      naloxone (NARCAN) 4 MG/0.1ML nasal spray Spray 1 spray (4 mg) into one nostril alternating nostrils as needed for opioid reversal. every 2-3 minutes until assistance arrives 2 each 0     No current facility-administered medications for this visit.     Facility-Administered Medications Ordered in Other Visits   Medication Dose Route Frequency Provider Last Rate Last Admin    Self Administer Medications: Behavioral Services   Does not apply See Admin Instructions Corrine Alvarenga MD        Self Administer Medications: Behavioral Services   Does not apply See Admin Instructions Corrine Alvarenga MD           No Known Allergies    Past Medical History:   Diagnosis Date    Alcohol use disorder     Depressive disorder     High cholesterol     History of gastric bypass     History of pulmonary embolism     Hypertension     Morbid obesity (H)     CARLIE (obstructive sleep apnea)     Paroxysmal atrial fibrillation (H)     Personal history of DVT (deep vein thrombosis)     Pulmonary embolism (H) 02/2024    unprovoked bilateral PE and RLE DVT 2/2024    Type 2 diabetes mellitus (H)     Uncomplicated asthma         Review Of Systems    Skin: negative  Eyes: negative  Ears/Nose/Throat: negative  Respiratory: No shortness of breath, dyspnea on exertion, cough, or hemoptysis  Cardiovascular: negative  Gastrointestinal: negative  Musculoskeletal: neck pain  Neurologic: negative  Psychiatric: negative  Hematologic/Lymphatic/Immunologic: negative  Endocrine: negative    OBJECTIVE:    /81   Pulse 61   Ht 1.829 m (6')   Wt (!) 158 kg (348 lb 5.2 oz)   SpO2 97%   BMI 47.24 kg/m      Imaging:    XR CERVICAL SPINE 2/3 VIEWS  LOCATION: [facility] [place]  DATE: 4/25/2025     INDICATION: Upright with collar on; open mouth view and AP lateral; C1 fracture, C6-C7 fracture.  COMPARISON: Cervical spine MRI 04/13/2025, radiograph 04/12/2025, CT 04/11/2025.                                                                   IMPRESSION: Fracture through the posterior C1 ring is probably visible on the lateral view. Subtle lucency through the anterior bridging osteophytes at C3-C4 consistent with fracture. The fracture through the anterior C6-C7 bridging osteophytes is not   well demonstrated radiographically. Normal alignment. No prevertebral soft tissue swelling.    Radiographic Findings: Full radiological report in chart. I personally reviewed the images with the patient today.    Exam:    Patient appears comfortable and in no apparent distress. Moving all extremities.  Gait is non-antalgic.  CN II-XII grossly intact, alert and appropriate with conversation and following  commands  Bilateral upper extremities with full strength including hand intrinsics and grasp.  Sensation intact throughout.  Bilateral lower extremities 5/5 strength including plantar and dorsiflexion.  Normal sensation throughout bilaterally.  Collar intact.     ASSESSMENT:    1. Closed nondisplaced fracture of first cervical vertebra, unspecified fracture morphology, initial encounter (H)        PLAN:    Cody Bolton is six  weeks out from a fall resulting in a C1 anterior and posterior arch fracture and nondisplaced C6-C7 anterior osteophyte fracture in setting of DISH. Today the patient reports that he continues to have intermittent and fluctuating pain in his neck. He has remained compliant with his collar.    The patient has remained compliant with the restrictions which included not lifting anything greater than 5-10 lbs. Today we discussed increasing activity from 10 lbs by 2-5 lbs per week, but encouraged continuing to avoid excessive bending, twisting, and turning at the neck and to avoid jostling and jarring activities. He will continue to wear the collar for another six weeks.     Mr. Bolton will return to the clinic in six weeks with repeat imaging.    The patient gave verbal understanding and is in agreement with the above plan. He  will call or return to the clinic for any worsening or changes in symptoms.    Respectfully,     JESS Garcia, PA-C

## 2025-04-27 ENCOUNTER — HEALTH MAINTENANCE LETTER (OUTPATIENT)
Age: 45
End: 2025-04-27

## 2025-04-29 ENCOUNTER — MEDICAL CORRESPONDENCE (OUTPATIENT)
Dept: HEALTH INFORMATION MANAGEMENT | Facility: CLINIC | Age: 45
End: 2025-04-29
Payer: COMMERCIAL

## 2025-04-29 ENCOUNTER — PATIENT OUTREACH (OUTPATIENT)
Dept: FAMILY MEDICINE | Facility: CLINIC | Age: 45
End: 2025-04-29
Payer: COMMERCIAL

## 2025-04-29 NOTE — TELEPHONE ENCOUNTER
Call to Mu, he is doing okay but says but things are a struggle, he broke up with a girlfriend, but relationship was not healthy he says. He had a long therapy session yesterday. We discussed how even though not a healthy relationship it is still a loss and normal to feel the loss.     Says doesn't feel he has to urinate but then gets some urgency and then nothing comes out. Is new symptoms. Discussed doing e-visit to rule out UTI. He is in agreement and will start e-visit. .    Adds another level of accountability to him when he gets our calls. He wants us to reach out Thursday 2 days from now. Updated blue sticky note also.     Genevieve Noe R.N.

## 2025-04-29 NOTE — PROGRESS NOTES
Medication Therapy Management (MTM) Encounter    ASSESSMENT:                            Medication Adherence/Access: No issues identified.    Diabetes  Patient is meeting A1c goal of < 7%.    Hypertension   Stable.    Hyperlipidemia   Stable.    Allergic rhinitis   Stable.    Mental Health   Stable.    Pain  Improving.    Alcohol Abuse  Encouraged patient to continue following with therapy through [facility] and continue working on abstinence. Encouraged him to discuss with [facility] providers re-starting meds to help with abstinence.    Hx of DVT  Continue working with anticoag to get INR back in goal range.    PLAN:                            Refilled folic acid and thiamine for you today    Follow-up: as needed     SUBJECTIVE/OBJECTIVE:                          Cody Bolton is a 44 year old male seen for a transitions of care visit. He was discharged from [facility] on 4/14/25 for spine fracture and starvation ketoacidosis.      Reason for visit: med review.    Allergies/ADRs: Reviewed in chart  Past Medical History: Reviewed in chart  Tobacco: He reports that he has never smoked. He has never been exposed to tobacco smoke. He has never used smokeless tobacco.  Alcohol: reports he did have some alcohol last night, but reports he is back on track today    Medication Adherence/Access: Patient uses pill box(es).  Per patient, misses medication 0 time(s) per week.   Medication barriers: none.     Diabetes   Metformin XR 1000 mg daily  Patient is not experiencing side effects.  Current diabetes symptoms: none  Diet/Exercise: limited activity, is unsteady on feet, uses walker  Blood sugar monitoring: never     Eye exam is up to date  Foot exam: due  Lab Results   Component Value Date    A1C 6.7 01/07/2025    A1C 7.1 09/26/2024    A1C 7.2 06/11/2024    A1C 9.1 02/04/2024    A1C 5.6 08/15/2017       Hypertension   Carvedilol 12.5 mg twice daily   Lisinopril 20 mg daily  Patient reports the following medication side effects: some  lightheadedness when standing, using his walker to make sure he is safe  - thinks it might be from neck brace or how he is sitting not necessarily from blood pressure   Patient self monitors blood pressure.  Home BP monitoring good, 138/something, can't remember - checked through home care through [facility] .       BP Readings from Last 3 Encounters:   04/25/25 120/81   04/14/25 (!) 165/84   03/31/25 120/67       Hyperlipidemia   simvastatin 20 mg daily  Patient reports no significant myalgias or other side effects.  The ASCVD Risk score (Anastacia HASSAN, et al., 2019) failed to calculate for the following reasons:    Risk score cannot be calculated because patient has a medical history suggesting prior/existing ASCVD     Recent Labs   Lab Test 06/11/24  1037 03/11/24  0856   CHOL 183 178   HDL 49 47   * 86   TRIG 134 223*       Allergy   Flonase (fluticasone) nasal spray - 1 spray(s) each nostril as needed - not needed for a while  Patient reports no current medication side effects.     Patient feels that current therapy is effective.        Mental Health   Depression  Bupropion  mg once daily.   Patient reports no current medication side effects.  Reports mood has been better than how he thought. Reports despite breaking his neck he is actually feeling okay. Does think higher bupropion dose helped.  Follow with counselor through [facility], two therapists for both mood and alcohol (group and individual).       Pain  Acetaminophen 1000 mg three times daily as needed - using about once a day right now  Methocarbamol 500 mg three times daily as needed for muscle spasms  Oxycodone 5 mg as needed - hasn't taken for a couple days, using infrequently now  Hydroxyzine 50 mg as needed - not needing as much recently  Narcan as needed - not ever needed  Recent C1 fracture and a C6/C7 osteophyte fracture presented to [facility] from his home via EMS after a fall while getting up from his chair.    Alcohol  Abuse  Acamprosate 666 mg three times daily - still has some at home but not currently taking, reports no specific reason that he stopped this  Naltrexone 50 mg daily - hasn't taken this while on the opioids, is considering restarting this wwith acamprosate.  Folic acid 1 mg daily  Thiamine 100 mg daily  Multivitamin daily  Completing treatment through [facility]. Sees them about twice a week in therapy.  Reports he needs refills of thiamine and folic acid.    Hx of DVT  Warfarin 5 mg Sun/Tue/Thu and 7.5 mg ROW  Lovenox 120 mg twice daily bridging  Warfarin is being managed by [facility]. Net INR check today    Lab Results   Component Value Date    INR 1.6 04/30/2025    INR 1.3 04/25/2025    INR 2.3 04/22/2025    INR 1.2 04/17/2025    INR 2.16 04/14/2025         Today's Vitals: There were no vitals taken for this visit.  ----------------  Post Discharge Medication Reconciliation Status: discharge medications reconciled and changed, per note/orders.    I spent 19 minutes with this patient today. All changes were made via collaborative practice agreement with Yovana Felipe MD.     A summary of these recommendations was sent via [employer].    Lili Kidd, PharmD  Medication Therapy Management Pharmacist  Voicemail: [phone]      Telemedicine Visit Details  The patient's medications can be safely assessed via a telemedicine encounter.  Type of service:  Video Conference via [employer]  Originating Location (pt. Location): Home    Distant Location (provider location):  On-site  Joined the call at 4/30/2025, 1:56:01 pm.  Left the call at 4/30/2025, 2:14:32 pm.  You were on the call for 18 minutes 30 seconds     Medication Therapy Recommendations  No medication therapy recommendations to display

## 2025-04-30 ENCOUNTER — VIRTUAL VISIT (OUTPATIENT)
Dept: PHARMACY | Facility: CLINIC | Age: 45
End: 2025-04-30
Attending: EMERGENCY MEDICINE
Payer: COMMERCIAL

## 2025-04-30 ENCOUNTER — ANTICOAGULATION THERAPY VISIT (OUTPATIENT)
Dept: ANTICOAGULATION | Facility: CLINIC | Age: 45
End: 2025-04-30
Payer: COMMERCIAL

## 2025-04-30 ENCOUNTER — TELEPHONE (OUTPATIENT)
Dept: ANTICOAGULATION | Facility: CLINIC | Age: 45
End: 2025-04-30
Payer: COMMERCIAL

## 2025-04-30 DIAGNOSIS — E11.9 TYPE 2 DIABETES MELLITUS WITHOUT COMPLICATION, WITHOUT LONG-TERM CURRENT USE OF INSULIN (H): Primary | ICD-10-CM

## 2025-04-30 DIAGNOSIS — J30.2 SEASONAL ALLERGIC RHINITIS, UNSPECIFIED TRIGGER: ICD-10-CM

## 2025-04-30 DIAGNOSIS — F33.1 MAJOR DEPRESSIVE DISORDER, RECURRENT EPISODE, MODERATE (H): ICD-10-CM

## 2025-04-30 DIAGNOSIS — F10.20 ALCOHOL USE DISORDER, SEVERE, DEPENDENCE (H): ICD-10-CM

## 2025-04-30 DIAGNOSIS — K59.00 CONSTIPATION, UNSPECIFIED CONSTIPATION TYPE: ICD-10-CM

## 2025-04-30 DIAGNOSIS — R03.0 ELEVATED BP WITHOUT DIAGNOSIS OF HYPERTENSION: ICD-10-CM

## 2025-04-30 DIAGNOSIS — E78.5 HYPERLIPIDEMIA, UNSPECIFIED HYPERLIPIDEMIA TYPE: ICD-10-CM

## 2025-04-30 DIAGNOSIS — Z86.718 HISTORY OF DVT (DEEP VEIN THROMBOSIS): ICD-10-CM

## 2025-04-30 DIAGNOSIS — F10.220 ALCOHOL DEPENDENCE WITH UNCOMPLICATED INTOXICATION (H): ICD-10-CM

## 2025-04-30 DIAGNOSIS — Z86.711 HISTORY OF PULMONARY EMBOLISM: Primary | ICD-10-CM

## 2025-04-30 DIAGNOSIS — Z86.711 HISTORY OF PULMONARY EMBOLISM: ICD-10-CM

## 2025-04-30 DIAGNOSIS — R52 PAIN: ICD-10-CM

## 2025-04-30 LAB — INR (EXTERNAL): 1.6 (ref 0.9–1.1)

## 2025-04-30 PROCEDURE — 99607 MTMS BY PHARM ADDL 15 MIN: CPT | Mod: 95 | Performed by: PHARMACIST

## 2025-04-30 PROCEDURE — 99605 MTMS BY PHARM NP 15 MIN: CPT | Mod: 95 | Performed by: PHARMACIST

## 2025-04-30 PROCEDURE — 1111F DSCHRG MED/CURRENT MED MERGE: CPT | Mod: 95 | Performed by: PHARMACIST

## 2025-04-30 RX ORDER — FOLIC ACID 1 MG/1
1 TABLET ORAL DAILY
Qty: 30 TABLET | Refills: 1 | Status: SHIPPED | OUTPATIENT
Start: 2025-04-30

## 2025-04-30 RX ORDER — LANOLIN ALCOHOL/MO/W.PET/CERES
100 CREAM (GRAM) TOPICAL DAILY
Qty: 30 TABLET | Refills: 0 | Status: SHIPPED | OUTPATIENT
Start: 2025-04-30

## 2025-04-30 NOTE — PATIENT INSTRUCTIONS
"Recommendations from today's MTM visit:                                                    MTM (medication therapy management) is a service provided by a clinical pharmacist designed to help you get the most of out of your medicines.   Today we reviewed what your medicines are for, how to know if they are working, that your medicines are safe and how to make your medicine regimen as easy as possible.      Refilled folic acid and thiamine for you today    Follow-up: as needed     It was great speaking with you today.  I value your experience and would be very thankful for your time in providing feedback in our clinic survey. In the next few days, you may receive an email or text message from [employer] with a link to a survey related to your  clinical pharmacist.\"     To schedule another MTM appointment, please call the clinic directly or you may call the MTM scheduling line at [phone].    My Clinical Pharmacist's contact information:                                                      Please feel free to contact me with any questions or concerns you have.      Lili Kidd, PharmD  Medication Therapy Management Pharmacist  Voicemail: [phone]     "

## 2025-04-30 NOTE — TELEPHONE ENCOUNTER
Kami, nurse with [facility] left VM at [facility] wondering if pt needed to have platelet lab drawn as he is currently bridging with lovenox due to low INR. Returned call to Kami and informed that I did not see in patient chart that platelet lab was ordered. Home Care nurse should plan to draw INR when she sees patient today and call result into [facility].     Ernie Yost RN

## 2025-04-30 NOTE — PROGRESS NOTES
ANTICOAGULATION MANAGEMENT     Cody Bolton 44 year old male is on warfarin with subtherapeutic INR result. (Goal INR 2.0-3.0)    Recent labs: (last 7 days)     04/30/25  0000   INR 1.6*       ASSESSMENT     Source(s): Chart Review and Home Care/Facility Nurse     Warfarin doses taken: Less warfarin taken than planned which may be affecting INR  Diet: Change in alcohol intake may be affecting INR. 750ml of alcohol drank yesterday, plans to go back to abstaining form alcohol use    Medication/supplement changes: None noted  New illness, injury, or hospitalization: No  Signs or symptoms of bleeding or clotting: No  Previous result: Subtherapeutic  Additional findings: None and Bridging with Enoxaparin until INR >= 2.0       PLAN     Recommended plan for temporary change(s) affecting INR     Dosing Instructions: Continue your current warfarin dose with next INR in 5 days . Continue with bridge with lovenox (enoxaparin) injections.     Summary  As of 4/30/2025      Full warfarin instructions:  5 mg every Sun, Tue, Thu; 7.5 mg all other days   Next INR check:  5/5/2025               Telephone call with [facility] nurse who verbalizes understanding and agrees to plan and who agrees to plan and repeated back plan correctly  Detailed voice message left for Cody with dosing instructions and follow up date.   Sent [email or web address] message with dosing and follow up instructions    Orders given to  Homecare nurse/facility to recheck    Education provided: Goal range and lab monitoring: goal range and significance of current result, Importance of therapeutic range, and Importance of following up at instructed interval  Contact [phone] with any changes, questions or concerns.     Plan made with [facility] Pharmacist Izabella Yost RN  4/30/2025  Anticoagulation Clinic  [facility] for routing messages: fransisco PABLO  [facility] patient phone line:  [phone]        _______________________________________________________________________     Anticoagulation Episode Summary       Current INR goal:  2.0-3.0   TTR:  42.9% (10.3 mo)   Target end date:  Indefinite   Send INR reminders to:  KEO PABLO    Indications    Acute pulmonary embolism without acute cor pulmonale  unspecified pulmonary embolism type (H) (Resolved) [I26.99]  History of DVT (deep vein thrombosis) (Resolved) [Z86.718]  History of pulmonary embolism [Z86.711]  History of DVT (deep vein thrombosis) [Z86.718]             Comments:  --             Anticoagulation Care Providers       Provider Role Specialty Phone number    Aaseby-Aguilera, Ramona Ann, PA-C Referring Family Medicine [phone]    Yovana Felipe MD Referring Family Medicine [phone]

## 2025-05-01 ENCOUNTER — MYC MEDICAL ADVICE (OUTPATIENT)
Dept: FAMILY MEDICINE | Facility: CLINIC | Age: 45
End: 2025-05-01
Payer: COMMERCIAL

## 2025-05-05 ENCOUNTER — MEDICAL CORRESPONDENCE (OUTPATIENT)
Dept: HEALTH INFORMATION MANAGEMENT | Facility: CLINIC | Age: 45
End: 2025-05-05

## 2025-05-05 ENCOUNTER — TELEPHONE (OUTPATIENT)
Dept: ANTICOAGULATION | Facility: CLINIC | Age: 45
End: 2025-05-05

## 2025-05-05 ENCOUNTER — APPOINTMENT (OUTPATIENT)
Dept: CT IMAGING | Facility: CLINIC | Age: 45
End: 2025-05-05
Attending: EMERGENCY MEDICINE
Payer: COMMERCIAL

## 2025-05-05 ENCOUNTER — HOSPITAL ENCOUNTER (EMERGENCY)
Facility: CLINIC | Age: 45
Discharge: HOME | End: 2025-05-05
Attending: EMERGENCY MEDICINE | Admitting: EMERGENCY MEDICINE
Payer: COMMERCIAL

## 2025-05-05 ENCOUNTER — APPOINTMENT (OUTPATIENT)
Dept: GENERAL RADIOLOGY | Facility: CLINIC | Age: 45
End: 2025-05-05
Attending: EMERGENCY MEDICINE
Payer: COMMERCIAL

## 2025-05-05 ENCOUNTER — DOCUMENTATION ONLY (OUTPATIENT)
Dept: ANTICOAGULATION | Facility: CLINIC | Age: 45
End: 2025-05-05

## 2025-05-05 VITALS
SYSTOLIC BLOOD PRESSURE: 124 MMHG | DIASTOLIC BLOOD PRESSURE: 95 MMHG | RESPIRATION RATE: 20 BRPM | OXYGEN SATURATION: 98 % | HEART RATE: 65 BPM

## 2025-05-05 DIAGNOSIS — Z86.718 HISTORY OF DVT (DEEP VEIN THROMBOSIS): ICD-10-CM

## 2025-05-05 DIAGNOSIS — Z86.711 HISTORY OF PULMONARY EMBOLISM: Primary | ICD-10-CM

## 2025-05-05 DIAGNOSIS — R53.1 GENERAL WEAKNESS: ICD-10-CM

## 2025-05-05 LAB
ALBUMIN SERPL BCG-MCNC: 3.8 G/DL (ref 3.5–5.2)
ALBUMIN UR-MCNC: NEGATIVE MG/DL
ALP SERPL-CCNC: 91 U/L (ref 40–150)
ALT SERPL W P-5'-P-CCNC: 44 U/L (ref 0–70)
AMMONIA PLAS-SCNC: 11 UMOL/L (ref 16–60)
ANION GAP SERPL CALCULATED.3IONS-SCNC: 10 MMOL/L (ref 7–15)
APPEARANCE UR: CLEAR
AST SERPL W P-5'-P-CCNC: 49 U/L (ref 0–45)
BASE EXCESS BLDV CALC-SCNC: 1.7 MMOL/L (ref -3–3)
BASOPHILS # BLD AUTO: 0.1 10E3/UL (ref 0–0.2)
BASOPHILS NFR BLD AUTO: 1 %
BILIRUB DIRECT SERPL-MCNC: <0.08 MG/DL (ref 0–0.3)
BILIRUB SERPL-MCNC: 0.2 MG/DL
BILIRUB UR QL STRIP: NEGATIVE
BUN SERPL-MCNC: 10.8 MG/DL (ref 6–20)
CALCIUM SERPL-MCNC: 8.5 MG/DL (ref 8.8–10.4)
CHLORIDE SERPL-SCNC: 101 MMOL/L (ref 98–107)
COLOR UR AUTO: ABNORMAL
CREAT SERPL-MCNC: 0.75 MG/DL (ref 0.67–1.17)
EGFRCR SERPLBLD CKD-EPI 2021: >90 ML/MIN/1.73M2
EOSINOPHIL # BLD AUTO: 0.4 10E3/UL (ref 0–0.7)
EOSINOPHIL NFR BLD AUTO: 6 %
ERYTHROCYTE [DISTWIDTH] IN BLOOD BY AUTOMATED COUNT: 16.8 % (ref 10–15)
ETHANOL SERPL-MCNC: <0.01 G/DL
GLUCOSE SERPL-MCNC: 106 MG/DL (ref 70–99)
GLUCOSE UR STRIP-MCNC: 70 MG/DL
HCO3 BLDV-SCNC: 27 MMOL/L (ref 21–28)
HCO3 SERPL-SCNC: 24 MMOL/L (ref 22–29)
HCT VFR BLD AUTO: 34.9 % (ref 40–53)
HGB BLD-MCNC: 11.1 G/DL (ref 13.3–17.7)
HGB UR QL STRIP: NEGATIVE
IMM GRANULOCYTES # BLD: 0 10E3/UL
IMM GRANULOCYTES NFR BLD: 0 %
INR PPP: 2.57 (ref 0.85–1.15)
KETONES UR STRIP-MCNC: NEGATIVE MG/DL
LEUKOCYTE ESTERASE UR QL STRIP: ABNORMAL
LYMPHOCYTES # BLD AUTO: 2.4 10E3/UL (ref 0.8–5.3)
LYMPHOCYTES NFR BLD AUTO: 33 %
MAGNESIUM SERPL-MCNC: 2 MG/DL (ref 1.7–2.3)
MCH RBC QN AUTO: 27.2 PG (ref 26.5–33)
MCHC RBC AUTO-ENTMCNC: 31.8 G/DL (ref 31.5–36.5)
MCV RBC AUTO: 86 FL (ref 78–100)
MONOCYTES # BLD AUTO: 0.5 10E3/UL (ref 0–1.3)
MONOCYTES NFR BLD AUTO: 8 %
NEUTROPHILS # BLD AUTO: 3.7 10E3/UL (ref 1.6–8.3)
NEUTROPHILS NFR BLD AUTO: 52 %
NITRATE UR QL: NEGATIVE
NRBC # BLD AUTO: 0 10E3/UL
NRBC BLD AUTO-RTO: 0 /100
O2/TOTAL GAS SETTING VFR VENT: 97 %
OXYHGB MFR BLDV: 55 % (ref 70–75)
PCO2 BLDV: 46 MM HG (ref 40–50)
PH BLDV: 7.39 [PH] (ref 7.32–7.43)
PH UR STRIP: 5.5 [PH] (ref 5–7)
PLATELET # BLD AUTO: 255 10E3/UL (ref 150–450)
PO2 BLDV: 31 MM HG (ref 25–47)
POTASSIUM SERPL-SCNC: 5.3 MMOL/L (ref 3.4–5.3)
PROT SERPL-MCNC: 6.9 G/DL (ref 6.4–8.3)
PROTHROMBIN TIME: 27.2 SECONDS (ref 11.8–14.8)
RBC # BLD AUTO: 4.08 10E6/UL (ref 4.4–5.9)
RBC URINE: 2 /HPF
SAO2 % BLDV: 55.9 % (ref 70–75)
SODIUM SERPL-SCNC: 135 MMOL/L (ref 135–145)
SP GR UR STRIP: 1.01 (ref 1–1.03)
SQUAMOUS EPITHELIAL: <1 /HPF
UROBILINOGEN UR STRIP-MCNC: NORMAL MG/DL
WBC # BLD AUTO: 7.1 10E3/UL (ref 4–11)
WBC URINE: 16 /HPF

## 2025-05-05 PROCEDURE — 81003 URINALYSIS AUTO W/O SCOPE: CPT | Performed by: EMERGENCY MEDICINE

## 2025-05-05 PROCEDURE — 250N000011 HC RX IP 250 OP 636: Performed by: EMERGENCY MEDICINE

## 2025-05-05 PROCEDURE — 82805 BLOOD GASES W/O2 SATURATION: CPT | Performed by: EMERGENCY MEDICINE

## 2025-05-05 PROCEDURE — 72125 CT NECK SPINE W/O DYE: CPT

## 2025-05-05 PROCEDURE — 80048 BASIC METABOLIC PNL TOTAL CA: CPT | Performed by: EMERGENCY MEDICINE

## 2025-05-05 PROCEDURE — 93005 ELECTROCARDIOGRAM TRACING: CPT

## 2025-05-05 PROCEDURE — 84155 ASSAY OF PROTEIN SERUM: CPT | Performed by: EMERGENCY MEDICINE

## 2025-05-05 PROCEDURE — 82077 ASSAY SPEC XCP UR&BREATH IA: CPT | Performed by: EMERGENCY MEDICINE

## 2025-05-05 PROCEDURE — 70450 CT HEAD/BRAIN W/O DYE: CPT

## 2025-05-05 PROCEDURE — 99285 EMERGENCY DEPT VISIT HI MDM: CPT | Mod: 25

## 2025-05-05 PROCEDURE — 82140 ASSAY OF AMMONIA: CPT | Performed by: EMERGENCY MEDICINE

## 2025-05-05 PROCEDURE — 71046 X-RAY EXAM CHEST 2 VIEWS: CPT

## 2025-05-05 PROCEDURE — 85610 PROTHROMBIN TIME: CPT | Performed by: EMERGENCY MEDICINE

## 2025-05-05 PROCEDURE — 36415 COLL VENOUS BLD VENIPUNCTURE: CPT | Performed by: EMERGENCY MEDICINE

## 2025-05-05 PROCEDURE — 87186 SC STD MICRODIL/AGAR DIL: CPT | Performed by: EMERGENCY MEDICINE

## 2025-05-05 PROCEDURE — 96374 THER/PROPH/DIAG INJ IV PUSH: CPT

## 2025-05-05 PROCEDURE — 85004 AUTOMATED DIFF WBC COUNT: CPT | Performed by: EMERGENCY MEDICINE

## 2025-05-05 PROCEDURE — 83735 ASSAY OF MAGNESIUM: CPT | Performed by: EMERGENCY MEDICINE

## 2025-05-05 RX ORDER — NALOXONE HYDROCHLORIDE 0.4 MG/ML
0.2 INJECTION, SOLUTION INTRAMUSCULAR; INTRAVENOUS; SUBCUTANEOUS ONCE
Status: COMPLETED | OUTPATIENT
Start: 2025-05-05 | End: 2025-05-05

## 2025-05-05 RX ADMIN — NALOXONE HYDROCHLORIDE 0.2 MG: 0.4 INJECTION, SOLUTION INTRAMUSCULAR; INTRAVENOUS; SUBCUTANEOUS at 14:28

## 2025-05-05 ASSESSMENT — ACTIVITIES OF DAILY LIVING (ADL)
ADLS_ACUITY_SCORE: 59

## 2025-05-05 NOTE — ED NOTES
Bed: [facility]  Expected date:   Expected time:   Means of arrival:   Comments:  A [facility]- [facility]

## 2025-05-05 NOTE — PROGRESS NOTES
ANTICOAGULATION  MANAGEMENT: Discharge Review    Cody Bolton chart reviewed for anticoagulation continuity of care    Emergency room visit on 5/5/25 for Fatigue.    Discharge disposition: Home with Home Care    Results:    Recent labs: (last 7 days)     04/30/25  0000 05/05/25  1438   INR 1.6* 2.57*     Anticoagulation inpatient management:     not applicable     Anticoagulation discharge instructions:     Warfarin dosing: home regimen continued   Bridging: No   INR goal change: No      Medication changes affecting anticoagulation: No    Additional factors affecting anticoagulation: Yes: Head CT without intracranial hemorrhage, chest radiograph without lobar opacity, pneumothorax or pleural effusion. UTI possibility, may be started on antibiotic later this week.     PLAN     No adjustment to anticoagulation plan needed    Left a detailed message for Cody to continue Maintenance dose and that home care should come see him later this week especially if he starts antibiotic for UTI.     Spoke with [facility] nurse- [phone], who confirms she will see him Thursday/Friday this week.     Anticoagulation Calendar updated    Jessi Brown RN  5/5/2025  Anticoagulation Clinic  [facility] for routing messages: fransisco PABLO  [facility] patient phone line: [phone]

## 2025-05-05 NOTE — TELEPHONE ENCOUNTER
Call received from home care nurse reporting patient was sent to emergency room today 5/5 so no INR was drawn. [facility] will wait for hospital discharge notification and follow up upon discharge.    Maricruz Brooks RN  Anticoagulation Clinic  [phone]

## 2025-05-05 NOTE — ED PROVIDER NOTES
Emergency Department Note      History of Present Illness     Chief Complaint   Fatigue      HPI   Cody Bolton is a 44 year old male     From alcohol use disorder reports not having a drink since 4 days ago.  Also had a recent admission after a fall resulting in a C1 fracture has been in his cervical collar since that time.  Followed up with neurosurgery as detailed in the ED course.  Is supposed to be in a collar for another 6 weeks.    Here from home today as the home health nurse stated he was more difficult to arouse than normal.  Patient states he has been tired and not sleeping well for the last few days.  No alcohol use.  States he is not abusing his medications.  No street drugs.  Has felt dizzy and lightheaded at times.  No chest pain or shortness of breath.  No fever.  No change in his bowel or bladder function.  No new falls or trauma.    Independent Historian       Review of External Notes   See ed course     Past Medical History     Medical History and Problem List   Past Medical History:   Diagnosis Date    Alcohol use disorder     Depressive disorder     High cholesterol     History of gastric bypass     History of pulmonary embolism     Hypertension     Morbid obesity (H)     CARLIE (obstructive sleep apnea)     Paroxysmal atrial fibrillation (H)     Personal history of DVT (deep vein thrombosis)     Pulmonary embolism (H) 02/2024    Type 2 diabetes mellitus (H)     Uncomplicated asthma        Medications   acamprosate (CAMPRAL) 333 MG EC tablet  acetaminophen (TYLENOL) 500 MG tablet  buPROPion (WELLBUTRIN XL) 300 MG 24 hr tablet  carvedilol (COREG) 12.5 MG tablet  enoxaparin ANTICOAGULANT (LOVENOX) 120 MG/0.8ML syringe  fluticasone (FLONASE) 50 MCG/ACT nasal spray  folic acid (FOLVITE) 1 MG tablet  hydrOXYzine HCl (ATARAX) 50 MG tablet  lisinopril (ZESTRIL) 20 MG tablet  metFORMIN (GLUCOPHAGE XR) 500 MG 24 hr tablet  methocarbamol (ROBAXIN) 500 MG tablet  multivitamin w/minerals (THERA-VIT-M)  tablet  naloxone (NARCAN) 4 MG/0.1ML nasal spray  naltrexone (DEPADE/REVIA) 50 MG tablet  oxyCODONE (ROXICODONE) 5 MG tablet  sildenafil (VIAGRA) 100 MG tablet  simvastatin (ZOCOR) 20 MG tablet  thiamine (B-1) 100 MG tablet  warfarin ANTICOAGULANT (COUMADIN) 5 MG tablet        Surgical History   Past Surgical History:   Procedure Laterality Date    APPENDECTOMY  08/15/2017    Dr. Ferrer    GASTRIC BYPASS      MS LAP,APPENDECTOMY N/A 8/14/2017    Procedure: APPENDECTOMY, LAPAROSCOPIC;  Surgeon: Nba Ferrer MD;  [facility];  Service: General    REVISION AKANKSHA-EN-Y  2014    Dr. Ranjeet Espinal [facility]       Physical Exam     Patient Vitals for the past 24 hrs:   BP Pulse Resp SpO2   05/05/25 1330 (!) 159/94 65 20 98 %       Physical Exam    HENT:  mmm, no rhinorrhea, atraumatic, normocephalic  Eyes: periorbital tissues, pupils equal, no nystagmus, no scleral icterus  Neck: supple, no abnormal swelling, Aspen collar in place.  Lungs:  CTAB,  no resp distress  CV: rrr, no m/r/g, ppi  Abd: soft, nontender, nondistended, no rebound/masses/guarding/hsm  Ext: Mild pitting edema bilateral feet to the distal lower leg.  No open wounds, no erythema, no tenderness.  Skin: warm, dry, well perfused, no rashes/bruising/lesions on exposed skin  Neuro: Sleepy but awakens easily to voice and able to participate in conversation and answer simple commands.  Speech clear, face symmetric, 5 out of 5 strength bilateral upper extremities, plantarflexion dorsiflexion intact bilateral lower extremities, sensation intact light touch all extremities.  Psych: Normal mood, normal affect      Diagnostics     Lab Results   Labs Ordered and Resulted from Time of ED Arrival to Time of ED Departure   BASIC METABOLIC PANEL - Abnormal       Result Value    Sodium 135      Potassium 5.3      Chloride 101      Carbon Dioxide (CO2) 24      Anion Gap 10      Urea Nitrogen 10.8      Creatinine 0.75      GFR Estimate >90       Calcium 8.5 (*)     Glucose 106 (*)    HEPATIC FUNCTION PANEL - Abnormal    Protein Total 6.9      Albumin 3.8      Bilirubin Total 0.2      Alkaline Phosphatase 91      AST 49 (*)     ALT 44      Bilirubin Direct <0.08     BLOOD GAS VENOUS - Abnormal    pH Venous 7.39      pCO2 Venous 46      pO2 Venous 31      Bicarbonate Venous 27      Base Excess/Deficit Venous 1.7      FIO2 97      Oxyhemoglobin Venous 55 (*)     O2 Sat, Venous 55.9 (*)    AMMONIA - Abnormal    Ammonia 11 (*)    ROUTINE UA WITH MICROSCOPIC REFLEX TO CULTURE - Abnormal    Color Urine Light Yellow      Appearance Urine Clear      Glucose Urine 70 (*)     Bilirubin Urine Negative      Ketones Urine Negative      Specific Gravity Urine 1.012      Blood Urine Negative      pH Urine 5.5      Protein Albumin Urine Negative      Urobilinogen Urine Normal      Nitrite Urine Negative      Leukocyte Esterase Urine Trace (*)     RBC Urine 2      WBC Urine 16 (*)     Squamous Epithelials Urine <1     INR - Abnormal    INR 2.57 (*)     PT 27.2 (*)    CBC WITH PLATELETS AND DIFFERENTIAL - Abnormal    WBC Count 7.1      RBC Count 4.08 (*)     Hemoglobin 11.1 (*)     Hematocrit 34.9 (*)     MCV 86      MCH 27.2      MCHC 31.8      RDW 16.8 (*)     Platelet Count 255      % Neutrophils 52      % Lymphocytes 33      % Monocytes 8      % Eosinophils 6      % Basophils 1      % Immature Granulocytes 0      NRBCs per 100 WBC 0      Absolute Neutrophils 3.7      Absolute Lymphocytes 2.4      Absolute Monocytes 0.5      Absolute Eosinophils 0.4      Absolute Basophils 0.1      Absolute Immature Granulocytes 0.0      Absolute NRBCs 0.0     MAGNESIUM - Normal    Magnesium 2.0     ETHYL ALCOHOL LEVEL - Normal    Alcohol ethyl <0.01     URINE CULTURE       Imaging   XR Chest 2 Views   Final Result   IMPRESSION: Underpenetrated exam. No focal airspace opacity. No sizable pleural effusion or pneumothorax. Normal cardiomediastinal silhouette for technique. Normal pulmonary  vascularity.      CT Cervical Spine w/o Contrast   Final Result   IMPRESSION:   HEAD CT:   1.  No acute intracranial process.      CERVICAL SPINE CT:   1.  No CT evidence for acute fracture or post traumatic subluxation.   2.  Unchanged nonunited fractures of the anterior and posterior C1 arches on the right.   3.  Stable appearance of the anterior osteophyte at C6-C7 from previous fracture.   4.  No high-grade spinal canal or neural foraminal stenosis.         Head CT w/o contrast   Final Result   IMPRESSION:   HEAD CT:   1.  No acute intracranial process.      CERVICAL SPINE CT:   1.  No CT evidence for acute fracture or post traumatic subluxation.   2.  Unchanged nonunited fractures of the anterior and posterior C1 arches on the right.   3.  Stable appearance of the anterior osteophyte at C6-C7 from previous fracture.   4.  No high-grade spinal canal or neural foraminal stenosis.             EKG   See ed course       Independent Interpretation   Head CT without intracranial hemorrhage, chest radiograph without lobar opacity, pneumothorax or pleural effusion.    ED Course      Medications Administered   Medications   naloxone (NARCAN) injection 0.2 mg (0.2 mg Intravenous $Given 5/5/25 1428)       Procedures   Procedures     Discussion of Management       ED Course   ED Course as of 05/05/25 1653   Mon May 05, 2025   1345 I reviewed neurosurgery note from April 25, 2025.  This is summarizes his initial injury 3/26/2025 after a fall from ground-level.  C1 anterior posterior arch fracture and nondisplaced C6-C7 osteophyte fracture in the setting of DISH.  Recommendations were for a hard cervical collar at all times for 4 to 6 weeks.   1347 Recommendations and neurosurgery visit 4/25/2025 was to continue wearing the collar for another 6 weeks.   1405 EKG shows normal sinus rhythm, narrow complex, no prolongation of the QT segment.  No concerning ST changes for acute ischemia.  Read by me at 1337       Additional  Documentation      Medical Decision Making / Diagnosis     CMS Diagnoses:         MIPS          MDM   Cody Bolton is a 44 year old male     Presenting with concerns for home health care nurse related to lethargy.  Patient recently hospitalized after a fall and has a C1 fracture.  He was placed in an Rhodelia collar here and per neurosurgery's last note is to remain in neck collar until they see him again.  Patient verbalized understanding of that plan.  He denies any new falls or trauma.  Denies any resumption of alcohol over the last few days.  No street drugs.  States he has been taking his prescriptions appropriately.  Workup here is reassuring and that we do not see any intracranial hemorrhage or other acute abnormality on head CT, blood test do not show signs or symptoms of infection CO2 retention etc.  We did give him a small dose of Narcan there was no significant change here.  Mild pyuria no dysuria frequency urgency suggesting UTI.  Ammonia not elevated.  INR therapeutic.    Able to pass an ambulatory challenge here in the emergency department.  Risk ratified low enough for discharge back to his place of living.  Discussed findings with him he is comfortable agreeable plan discharge home.  Close follow-up with PCP.    Disposition   The patient was discharged.     Diagnosis     ICD-10-CM    1. General weakness  R53.1            Discharge Medications   New Prescriptions    No medications on file         MD Matias Jay, Bravo Clifton MD  05/05/25 [id number]

## 2025-05-06 ENCOUNTER — PATIENT OUTREACH (OUTPATIENT)
Dept: FAMILY MEDICINE | Facility: CLINIC | Age: 45
End: 2025-05-06
Payer: COMMERCIAL

## 2025-05-06 LAB
ATRIAL RATE - MUSE: 65 BPM
DIASTOLIC BLOOD PRESSURE - MUSE: NORMAL MMHG
INTERPRETATION ECG - MUSE: NORMAL
P AXIS - MUSE: 4 DEGREES
PR INTERVAL - MUSE: 150 MS
QRS DURATION - MUSE: 102 MS
QT - MUSE: 406 MS
QTC - MUSE: 422 MS
R AXIS - MUSE: 0 DEGREES
SYSTOLIC BLOOD PRESSURE - MUSE: NORMAL MMHG
T AXIS - MUSE: 14 DEGREES
VENTRICULAR RATE- MUSE: 65 BPM

## 2025-05-06 NOTE — TELEPHONE ENCOUNTER
Outreach call to uM, no answer. Advised can call back and will kitty down for outreach tomorrow since we did not reach him today.   Genevieve Noe R.N.

## 2025-05-07 ENCOUNTER — PATIENT OUTREACH (OUTPATIENT)
Dept: CARE COORDINATION | Facility: CLINIC | Age: 45
End: 2025-05-07
Payer: COMMERCIAL

## 2025-05-07 NOTE — PROGRESS NOTES
Clinic Care Coordination Contact  Community Health Worker Initial Outreach    CHW Initial Information Gathering:  Referral Source: ED Follow-Up  Current living arrangement:: Not Assessed  No PCP office visit in Past Year: No  CHW Additional Questions  If ED/Hospital discharge, follow-up appointment scheduled as recommended?: Yes  Medication changes made following ED/Hospital discharge?: Yes, patient to be scheduled with CC RN/SW within approx 1 business day  MyChart active?: Yes    Patient accepts CC: No, I'm okay . Patient will be sent Care Coordination introduction letter for future reference.     Lina Huizar  Care   Connected Care Resources   [facility]     *Connected Care Resources Team does NOT follow patient ongoing. Referrals are identified based on internal discharge report and the outreach is to ensure Patient has understanding of their discharge instructions.

## 2025-05-07 NOTE — LETTER
.  Cody Bolton  [address] [address] [place] [id number]    Dear Cody Bolton,      I am a team member within the [facility] with [facility]. I recently tried to reach you to ensure you were doing well following a recent visit within our health system. I also wanted to take this chance to introduce Clinic Care Coordination.     Below is a description of Clinic Care Coordination and how this team can further assist you:       The Clinic Care Coordination team is made up of a Registered Nurse, , Financial Resource Worker, and a Community Health Worker who understand and can help navigate the health care system. The goal of clinic care coordination is to help you manage your health, improve access to care, and achieve optimal health outcomes. They work alongside your provider to assist you in determining your health and social needs, obtain health care and community resources, and provide you with necessary information and education. Clinic Care Coordination can work with you through any barriers and develop a care plan that helps coordinate and strengthen the relationship between you and your care team.    If you wish to connect with the Clinic Care Coordination Team, please let your [facility] Primary Care Provider or Clinic Care Team know and they can place a referral. The Clinic Care Coordination team will then reach out by phone to further support you.    We are focused on providing you with the highest-quality healthcare experience possible.    Sincerely,   [facility]   [id number] ([phone]).

## 2025-05-07 NOTE — TELEPHONE ENCOUNTER
Spoke with pt who states he is doing better today. Quit drinking again. 5 days sober. Congratulated on sobriety and encouraged continued sobriety.     States he was in ER for fatigue 2 days ago and just feels like not getting enough sleep. Sleeping in 2 hour intervals due to unable to change positions with the neck collar. Advised to try using OTC Melatonin to aid in sleeping and report to provider at appt on Friday if helpful.    Taking Tylenol, Oxycodone, and Methocarbamol as instructed.    Reports needs a new CPAP machine and has not seen sleep medicine for over 10 years. Noted on appt for Friday to discuss referral/Rx with PCP. Expressed understanding and acceptance of the plan. Had no further questions at this time.  Advised can call back to clinic at any time with concerns.     Jovana ESPINOSA, RN, PHN

## 2025-05-08 ENCOUNTER — MEDICAL CORRESPONDENCE (OUTPATIENT)
Dept: HEALTH INFORMATION MANAGEMENT | Facility: CLINIC | Age: 45
End: 2025-05-08
Payer: COMMERCIAL

## 2025-05-08 ENCOUNTER — ANTICOAGULATION THERAPY VISIT (OUTPATIENT)
Dept: ANTICOAGULATION | Facility: CLINIC | Age: 45
End: 2025-05-08
Payer: COMMERCIAL

## 2025-05-08 ENCOUNTER — TELEPHONE (OUTPATIENT)
Dept: NURSING | Facility: CLINIC | Age: 45
End: 2025-05-08
Payer: COMMERCIAL

## 2025-05-08 DIAGNOSIS — Z86.718 HISTORY OF DVT (DEEP VEIN THROMBOSIS): ICD-10-CM

## 2025-05-08 DIAGNOSIS — Z86.711 HISTORY OF PULMONARY EMBOLISM: Primary | ICD-10-CM

## 2025-05-08 LAB
BACTERIA UR CULT: ABNORMAL
BACTERIA UR CULT: ABNORMAL
INR (EXTERNAL): 1.8 (ref 0.9–1.1)

## 2025-05-08 NOTE — PROGRESS NOTES
ANTICOAGULATION MANAGEMENT     Cody WALLACE Young 44 year old male is on warfarin with subtherapeutic INR result. (Goal INR 2.0-3.0)    Recent labs: (last 7 days)     05/08/25  1159   INR 1.8*       ASSESSMENT     Source(s): Chart Review, Patient/Caregiver Call, and Home Care/Facility Nurse     Warfarin doses taken: Less warfarin taken than planned which may be affecting INR  Diet: No new diet changes identified  Medication/supplement changes: None noted  New illness, injury, or hospitalization: Yes: ED visit on 5/5/25 for fatigue  Signs or symptoms of bleeding or clotting: No  Previous result: Therapeutic last visit; previously outside of goal range  Additional findings: None       PLAN     Recommended plan for temporary change(s) affecting INR     Dosing Instructions: booster dose then continue your current warfarin dose with next INR in 4 days       Summary  As of 5/8/2025      Full warfarin instructions:  5/8: 7.5 mg; Otherwise 5 mg every Sun, Tue, Thu; 7.5 mg all other days   Next INR check:  5/12/2025               Telephone call with [facility] care nurse who verbalizes understanding and agrees to plan    Orders given to  Homecare nurse/facility to recheck    Education provided: Lovenox/Heparin education provided: role of enoxaparin/heparin in bridge therapy and prescribed dose and frequency     Plan made with [facility] Pharmacist Izabella Cat, MARANDA  5/8/2025  Anticoagulation Clinic  [facility] for routing messages: fransisco PABLO  [facility] patient phone line: [phone]        _______________________________________________________________________     Anticoagulation Episode Summary       Current INR goal:  2.0-3.0   TTR:  44.1% (10.4 mo)   Target end date:  Indefinite   Send INR reminders to:  KEO PABLO    Indications    Acute pulmonary embolism without acute cor pulmonale  unspecified pulmonary embolism type (H) (Resolved) [I26.99]  History of DVT (deep vein thrombosis)  (Resolved) [Z86.718]  History of pulmonary embolism [Z86.711]  History of DVT (deep vein thrombosis) [Z86.718]             Comments:  --             Anticoagulation Care Providers       Provider Role Specialty Phone number    Aaseby-Aguilera, Ramona Ann, PA-C Referring Family Medicine [phone]    Yovana Felipe MD Referring [facility] Medicine [phone]

## 2025-05-08 NOTE — TELEPHONE ENCOUNTER
[facility]    Reason for call: Lab Result Notification     Lab Result (including Rx patient on, if applicable).  If culture, copy of lab report at bottom.  Lab Result: Urine Culture  5/5/25 No antibiotic prescribed    Creatinine Level (mg/dl)   Creatinine   Date Value Ref Range Status   05/05/2025 0.75 0.67 - 1.17 mg/dL Final    Creatinine clearance (ml/min), if applicable    Serum creatinine: 0.75 mg/dL 05/05/25 1438  Estimated creatinine clearance: 191.1 mL/min     RN Recommendations/Instructions per [facility] ED lab result protocol:   [facility] ED lab result protocol utilized: Urine Culture    5/5/25 Presented to ED with symptoms: fatigue    Patient's current Symptoms:   Pt states no UTI symptoms.  Pt states he has a follow up ED visit tomorrow 5/9/25 with his PCP and will discuss if urine should be treated or if he a retest of urine needed.    Patient/care giver notified to contact your PCP clinic or return to the Emergency department if your:  Symptoms return.  Symptoms worsen or other concerning symptoms.       Lia Rome RN

## 2025-05-09 PROBLEM — I10 BENIGN ESSENTIAL HYPERTENSION: Status: ACTIVE | Noted: 2025-05-09

## 2025-05-12 ENCOUNTER — RESULTS FOLLOW-UP (OUTPATIENT)
Dept: FAMILY MEDICINE | Facility: CLINIC | Age: 45
End: 2025-05-12

## 2025-05-12 ENCOUNTER — ANTICOAGULATION THERAPY VISIT (OUTPATIENT)
Dept: ANTICOAGULATION | Facility: CLINIC | Age: 45
End: 2025-05-12
Payer: COMMERCIAL

## 2025-05-12 DIAGNOSIS — Z86.718 HISTORY OF DVT (DEEP VEIN THROMBOSIS): ICD-10-CM

## 2025-05-12 DIAGNOSIS — Z86.711 HISTORY OF PULMONARY EMBOLISM: Primary | ICD-10-CM

## 2025-05-12 LAB — INR (EXTERNAL): 2.2 (ref 0.9–1.1)

## 2025-05-12 NOTE — PROGRESS NOTES
ANTICOAGULATION MANAGEMENT     Cody WALLACE Young 44 year old male is on warfarin with therapeutic INR result. (Goal INR 2.0-3.0)    Recent labs: (last 7 days)     05/12/25  1245   INR 2.2*       ASSESSMENT     Source(s): Chart Review, Patient/Caregiver Call, and Home Care/Facility Nurse     Warfarin doses taken: Warfarin taken as instructed  Diet: No new diet changes identified  Medication/supplement changes: increased methocarbamol dose. Did not take lovenox last week.  New illness, injury, or hospitalization: No  Signs or symptoms of bleeding or clotting: No  Previous result: Subtherapeutic  Additional findings: None       PLAN     Recommended plan for no diet, medication or health factor changes affecting INR     Dosing Instructions: Continue your current warfarin dose with next INR in 4 days       Summary  As of 5/12/2025      Full warfarin instructions:  5 mg every Sun, Tue, Thu; 7.5 mg all other days   Next INR check:  5/16/2025               Telephone call with Manisha LOW, [facility] home care nurse who agrees to plan and repeated back plan correctly    Orders given to  Homecare nurse/facility to recheck    Education provided: None required    Plan made per [facility] anticoagulation protocol    Valerie Rose RN  5/12/2025  Anticoagulation Clinic  [facility] for routing messages: fransisco PABLO  [facility] patient phone line: [phone]        _______________________________________________________________________     Anticoagulation Episode Summary       Current INR goal:  2.0-3.0   TTR:  43.8% (10.5 mo)   Target end date:  Indefinite   Send INR reminders to:  KEO PABLO    Indications    Acute pulmonary embolism without acute cor pulmonale  unspecified pulmonary embolism type (H) (Resolved) [I26.99]  History of DVT (deep vein thrombosis) (Resolved) [Z86.718]  History of pulmonary embolism [Z86.711]  History of DVT (deep vein thrombosis) [Z86.718]             Comments:  --             Anticoagulation Care  Providers       Provider Role Specialty Phone number    Aaseby-Aguilera, Ramona Ann, PA-C Referring Family Medicine [phone]    Yovana Felipe MD Referring Family Medicine [phone]

## 2025-05-13 ENCOUNTER — PATIENT OUTREACH (OUTPATIENT)
Dept: FAMILY MEDICINE | Facility: CLINIC | Age: 45
End: 2025-05-13
Payer: COMMERCIAL

## 2025-05-13 ENCOUNTER — MEDICAL CORRESPONDENCE (OUTPATIENT)
Dept: HEALTH INFORMATION MANAGEMENT | Facility: CLINIC | Age: 45
End: 2025-05-13
Payer: COMMERCIAL

## 2025-05-13 NOTE — TELEPHONE ENCOUNTER
Call to Cody, he says he is doing well. Says pain is getting better and not bothering him as much. Has periods of time where pain is at 0 for the first time since his injury.     Takes short breaks with brace off when is safe to do so and this helps the muscles.     He is keeping sober and we discussed some diversion techniques that can be helpful.     He would like call back on 5/16/25.     Genevieve Noe R.N.

## 2025-05-21 ENCOUNTER — TELEPHONE (OUTPATIENT)
Dept: BEHAVIORAL HEALTH | Facility: CLINIC | Age: 45
End: 2025-05-21

## 2025-05-21 ENCOUNTER — DOCUMENTATION ONLY (OUTPATIENT)
Dept: ANTICOAGULATION | Facility: CLINIC | Age: 45
End: 2025-05-21

## 2025-05-21 ENCOUNTER — HOSPITAL ENCOUNTER (INPATIENT)
Facility: CLINIC | Age: 45
LOS: 1 days | Discharge: OTHER INSTITUTION | DRG: 897 | End: 2025-05-21
Attending: EMERGENCY MEDICINE
Payer: COMMERCIAL

## 2025-05-21 ENCOUNTER — HOSPITAL ENCOUNTER (INPATIENT)
Facility: CLINIC | Age: 45
DRG: 897 | End: 2025-05-21
Attending: PSYCHIATRY & NEUROLOGY | Admitting: PSYCHIATRY & NEUROLOGY
Payer: COMMERCIAL

## 2025-05-21 VITALS
BODY MASS INDEX: 41.75 KG/M2 | HEIGHT: 73 IN | WEIGHT: 315 LBS | DIASTOLIC BLOOD PRESSURE: 59 MMHG | RESPIRATION RATE: 18 BRPM | HEART RATE: 84 BPM | SYSTOLIC BLOOD PRESSURE: 157 MMHG | OXYGEN SATURATION: 94 %

## 2025-05-21 DIAGNOSIS — S12.401A CLOSED NONDISPLACED FRACTURE OF FIFTH CERVICAL VERTEBRA, UNSPECIFIED FRACTURE MORPHOLOGY, INITIAL ENCOUNTER (H): ICD-10-CM

## 2025-05-21 DIAGNOSIS — F10.10 ETOH ABUSE: ICD-10-CM

## 2025-05-21 DIAGNOSIS — S12.591A OTHER CLOSED NONDISPLACED FRACTURE OF SIXTH CERVICAL VERTEBRA, INITIAL ENCOUNTER (H): Primary | ICD-10-CM

## 2025-05-21 DIAGNOSIS — Z86.718 HISTORY OF DVT (DEEP VEIN THROMBOSIS): ICD-10-CM

## 2025-05-21 DIAGNOSIS — S12.001A CLOSED NONDISPLACED FRACTURE OF FIRST CERVICAL VERTEBRA, UNSPECIFIED FRACTURE MORPHOLOGY, INITIAL ENCOUNTER (H): ICD-10-CM

## 2025-05-21 DIAGNOSIS — I48.0 PAROXYSMAL ATRIAL FIBRILLATION (H): ICD-10-CM

## 2025-05-21 DIAGNOSIS — F41.9 ANXIETY: Chronic | ICD-10-CM

## 2025-05-21 DIAGNOSIS — S12.031A CLOSED NONDISPLACED FRACTURE OF POSTERIOR ARCH OF FIRST CERVICAL VERTEBRA, INITIAL ENCOUNTER (H): ICD-10-CM

## 2025-05-21 DIAGNOSIS — F10.239 ALCOHOL DEPENDENCE WITH WITHDRAWAL WITH COMPLICATION (H): ICD-10-CM

## 2025-05-21 DIAGNOSIS — I26.99 ACUTE PULMONARY EMBOLISM WITHOUT ACUTE COR PULMONALE, UNSPECIFIED PULMONARY EMBOLISM TYPE (H): ICD-10-CM

## 2025-05-21 DIAGNOSIS — Z79.01 LONG TERM (CURRENT) USE OF ANTICOAGULANTS: ICD-10-CM

## 2025-05-21 DIAGNOSIS — R33.9 URINARY RETENTION: ICD-10-CM

## 2025-05-21 DIAGNOSIS — Z86.718 HX OF BLOOD CLOTS: ICD-10-CM

## 2025-05-21 PROBLEM — F10.939 ALCOHOL WITHDRAWAL, WITH UNSPECIFIED COMPLICATION (H): Status: ACTIVE | Noted: 2025-05-21

## 2025-05-21 PROBLEM — Z92.89 S/P ALCOHOL DETOXIFICATION: Status: ACTIVE | Noted: 2025-05-21

## 2025-05-21 LAB
ALBUMIN SERPL BCG-MCNC: 4.5 G/DL (ref 3.5–5.2)
ALP SERPL-CCNC: 121 U/L (ref 40–150)
ALT SERPL W P-5'-P-CCNC: 41 U/L (ref 0–70)
AMPHETAMINES UR QL SCN: NORMAL
ANION GAP SERPL CALCULATED.3IONS-SCNC: 22 MMOL/L (ref 7–15)
AST SERPL W P-5'-P-CCNC: 39 U/L (ref 0–45)
BARBITURATES UR QL SCN: NORMAL
BASOPHILS # BLD AUTO: 0.1 10E3/UL (ref 0–0.2)
BASOPHILS NFR BLD AUTO: 1 %
BENZODIAZ UR QL SCN: NORMAL
BILIRUB SERPL-MCNC: 0.5 MG/DL
BUN SERPL-MCNC: 13 MG/DL (ref 6–20)
BZE UR QL SCN: NORMAL
CALCIUM SERPL-MCNC: 8.6 MG/DL (ref 8.8–10.4)
CANNABINOIDS UR QL SCN: NORMAL
CHLORIDE SERPL-SCNC: 96 MMOL/L (ref 98–107)
CREAT SERPL-MCNC: 0.91 MG/DL (ref 0.67–1.17)
EGFRCR SERPLBLD CKD-EPI 2021: >90 ML/MIN/1.73M2
EOSINOPHIL # BLD AUTO: 0.1 10E3/UL (ref 0–0.7)
EOSINOPHIL NFR BLD AUTO: 1 %
ERYTHROCYTE [DISTWIDTH] IN BLOOD BY AUTOMATED COUNT: 16.2 % (ref 10–15)
ETHANOL SERPL-MCNC: 0.18 G/DL
FENTANYL UR QL: NORMAL
GLUCOSE BLDC GLUCOMTR-MCNC: 172 MG/DL (ref 70–99)
GLUCOSE BLDC GLUCOMTR-MCNC: 199 MG/DL (ref 70–99)
GLUCOSE SERPL-MCNC: 172 MG/DL (ref 70–99)
HCO3 SERPL-SCNC: 18 MMOL/L (ref 22–29)
HCT VFR BLD AUTO: 40.5 % (ref 40–53)
HGB BLD-MCNC: 13.3 G/DL (ref 13.3–17.7)
IMM GRANULOCYTES # BLD: 0 10E3/UL
IMM GRANULOCYTES NFR BLD: 0 %
INR PPP: 1.58 (ref 0.85–1.15)
LYMPHOCYTES # BLD AUTO: 2.1 10E3/UL (ref 0.8–5.3)
LYMPHOCYTES NFR BLD AUTO: 21 %
MAGNESIUM SERPL-MCNC: 1.7 MG/DL (ref 1.7–2.3)
MAGNESIUM SERPL-MCNC: 1.7 MG/DL (ref 1.7–2.3)
MCH RBC QN AUTO: 26.5 PG (ref 26.5–33)
MCHC RBC AUTO-ENTMCNC: 32.8 G/DL (ref 31.5–36.5)
MCV RBC AUTO: 81 FL (ref 78–100)
MONOCYTES # BLD AUTO: 0.7 10E3/UL (ref 0–1.3)
MONOCYTES NFR BLD AUTO: 7 %
NEUTROPHILS # BLD AUTO: 6.8 10E3/UL (ref 1.6–8.3)
NEUTROPHILS NFR BLD AUTO: 70 %
NRBC # BLD AUTO: 0 10E3/UL
NRBC BLD AUTO-RTO: 0 /100
OPIATES UR QL SCN: NORMAL
PCP QUAL URINE (ROCHE): NORMAL
PHOSPHATE SERPL-MCNC: 2.8 MG/DL (ref 2.5–4.5)
PLATELET # BLD AUTO: 300 10E3/UL (ref 150–450)
POTASSIUM SERPL-SCNC: 4 MMOL/L (ref 3.4–5.3)
PROT SERPL-MCNC: 7.6 G/DL (ref 6.4–8.3)
PROTHROMBIN TIME: 18.8 SECONDS (ref 11.8–14.8)
RBC # BLD AUTO: 5.01 10E6/UL (ref 4.4–5.9)
SODIUM SERPL-SCNC: 136 MMOL/L (ref 135–145)
WBC # BLD AUTO: 9.7 10E3/UL (ref 4–11)

## 2025-05-21 PROCEDURE — 80307 DRUG TEST PRSMV CHEM ANLYZR: CPT | Performed by: EMERGENCY MEDICINE

## 2025-05-21 PROCEDURE — 82565 ASSAY OF CREATININE: CPT | Performed by: EMERGENCY MEDICINE

## 2025-05-21 PROCEDURE — 84100 ASSAY OF PHOSPHORUS: CPT | Performed by: PSYCHIATRY & NEUROLOGY

## 2025-05-21 PROCEDURE — 250N000013 HC RX MED GY IP 250 OP 250 PS 637: Performed by: PSYCHIATRY & NEUROLOGY

## 2025-05-21 PROCEDURE — 36415 COLL VENOUS BLD VENIPUNCTURE: CPT | Performed by: PSYCHIATRY & NEUROLOGY

## 2025-05-21 PROCEDURE — 96360 HYDRATION IV INFUSION INIT: CPT

## 2025-05-21 PROCEDURE — 250N000013 HC RX MED GY IP 250 OP 250 PS 637: Performed by: EMERGENCY MEDICINE

## 2025-05-21 PROCEDURE — 85025 COMPLETE CBC W/AUTO DIFF WBC: CPT | Performed by: EMERGENCY MEDICINE

## 2025-05-21 PROCEDURE — 83735 ASSAY OF MAGNESIUM: CPT | Performed by: PSYCHIATRY & NEUROLOGY

## 2025-05-21 PROCEDURE — 36415 COLL VENOUS BLD VENIPUNCTURE: CPT | Performed by: EMERGENCY MEDICINE

## 2025-05-21 PROCEDURE — 85610 PROTHROMBIN TIME: CPT | Performed by: EMERGENCY MEDICINE

## 2025-05-21 PROCEDURE — 83735 ASSAY OF MAGNESIUM: CPT | Performed by: EMERGENCY MEDICINE

## 2025-05-21 PROCEDURE — 82077 ASSAY SPEC XCP UR&BREATH IA: CPT | Performed by: EMERGENCY MEDICINE

## 2025-05-21 PROCEDURE — 99285 EMERGENCY DEPT VISIT HI MDM: CPT | Mod: 25

## 2025-05-21 PROCEDURE — 250N000013 HC RX MED GY IP 250 OP 250 PS 637

## 2025-05-21 PROCEDURE — 258N000003 HC RX IP 258 OP 636: Performed by: EMERGENCY MEDICINE

## 2025-05-21 PROCEDURE — 128N000004 HC R&B CD ADULT

## 2025-05-21 PROCEDURE — 96361 HYDRATE IV INFUSION ADD-ON: CPT

## 2025-05-21 PROCEDURE — HZ2ZZZZ DETOXIFICATION SERVICES FOR SUBSTANCE ABUSE TREATMENT: ICD-10-PCS | Performed by: PSYCHIATRY & NEUROLOGY

## 2025-05-21 RX ORDER — LISINOPRIL 10 MG/1
20 TABLET ORAL ONCE
Status: COMPLETED | OUTPATIENT
Start: 2025-05-21 | End: 2025-05-21

## 2025-05-21 RX ORDER — GABAPENTIN 100 MG/1
100 CAPSULE ORAL EVERY 8 HOURS
Status: DISCONTINUED | OUTPATIENT
Start: 2025-05-28 | End: 2025-05-21 | Stop reason: HOSPADM

## 2025-05-21 RX ORDER — LORAZEPAM 1 MG/1
1-2 TABLET ORAL EVERY 30 MIN PRN
Status: DISCONTINUED | OUTPATIENT
Start: 2025-05-21 | End: 2025-05-21 | Stop reason: HOSPADM

## 2025-05-21 RX ORDER — TRAZODONE HYDROCHLORIDE 50 MG/1
50 TABLET ORAL
Status: DISCONTINUED | OUTPATIENT
Start: 2025-05-21 | End: 2025-05-23 | Stop reason: HOSPADM

## 2025-05-21 RX ORDER — CARVEDILOL 12.5 MG/1
12.5 TABLET ORAL ONCE
Status: COMPLETED | OUTPATIENT
Start: 2025-05-21 | End: 2025-05-21

## 2025-05-21 RX ORDER — AMOXICILLIN 250 MG
1 CAPSULE ORAL 2 TIMES DAILY PRN
Status: DISCONTINUED | OUTPATIENT
Start: 2025-05-21 | End: 2025-05-21 | Stop reason: HOSPADM

## 2025-05-21 RX ORDER — DIAZEPAM 5 MG/1
5-20 TABLET ORAL EVERY 30 MIN PRN
Status: DISCONTINUED | OUTPATIENT
Start: 2025-05-21 | End: 2025-05-23

## 2025-05-21 RX ORDER — ATENOLOL 50 MG/1
50 TABLET ORAL DAILY PRN
Status: DISCONTINUED | OUTPATIENT
Start: 2025-05-21 | End: 2025-05-22

## 2025-05-21 RX ORDER — AMOXICILLIN 250 MG
1 CAPSULE ORAL 2 TIMES DAILY PRN
Status: DISCONTINUED | OUTPATIENT
Start: 2025-05-21 | End: 2025-05-23 | Stop reason: HOSPADM

## 2025-05-21 RX ORDER — ACETAMINOPHEN 325 MG/1
650 TABLET ORAL EVERY 4 HOURS PRN
Status: DISCONTINUED | OUTPATIENT
Start: 2025-05-21 | End: 2025-05-21 | Stop reason: HOSPADM

## 2025-05-21 RX ORDER — TRAZODONE HYDROCHLORIDE 50 MG/1
50 TABLET ORAL
Status: DISCONTINUED | OUTPATIENT
Start: 2025-05-21 | End: 2025-05-21 | Stop reason: HOSPADM

## 2025-05-21 RX ORDER — GABAPENTIN 300 MG/1
600 CAPSULE ORAL EVERY 8 HOURS
Status: DISCONTINUED | OUTPATIENT
Start: 2025-05-24 | End: 2025-05-21 | Stop reason: HOSPADM

## 2025-05-21 RX ORDER — MAGNESIUM HYDROXIDE/ALUMINUM HYDROXICE/SIMETHICONE 120; 1200; 1200 MG/30ML; MG/30ML; MG/30ML
30 SUSPENSION ORAL EVERY 4 HOURS PRN
Status: DISCONTINUED | OUTPATIENT
Start: 2025-05-21 | End: 2025-05-23 | Stop reason: HOSPADM

## 2025-05-21 RX ORDER — ONDANSETRON 4 MG/1
4 TABLET, ORALLY DISINTEGRATING ORAL EVERY 6 HOURS PRN
Status: DISCONTINUED | OUTPATIENT
Start: 2025-05-21 | End: 2025-05-23 | Stop reason: HOSPADM

## 2025-05-21 RX ORDER — METFORMIN HYDROCHLORIDE 500 MG/1
1000 TABLET, EXTENDED RELEASE ORAL
Status: DISCONTINUED | OUTPATIENT
Start: 2025-05-21 | End: 2025-05-23 | Stop reason: HOSPADM

## 2025-05-21 RX ORDER — HYDROXYZINE HYDROCHLORIDE 25 MG/1
25 TABLET, FILM COATED ORAL EVERY 4 HOURS PRN
Status: DISCONTINUED | OUTPATIENT
Start: 2025-05-21 | End: 2025-05-23 | Stop reason: HOSPADM

## 2025-05-21 RX ORDER — HYDROXYZINE HYDROCHLORIDE 25 MG/1
25 TABLET, FILM COATED ORAL EVERY 4 HOURS PRN
Status: DISCONTINUED | OUTPATIENT
Start: 2025-05-21 | End: 2025-05-21 | Stop reason: HOSPADM

## 2025-05-21 RX ORDER — GABAPENTIN 300 MG/1
900 CAPSULE ORAL EVERY 8 HOURS
Status: DISCONTINUED | OUTPATIENT
Start: 2025-05-21 | End: 2025-05-21 | Stop reason: HOSPADM

## 2025-05-21 RX ORDER — GABAPENTIN 300 MG/1
300 CAPSULE ORAL EVERY 8 HOURS
Status: DISCONTINUED | OUTPATIENT
Start: 2025-05-26 | End: 2025-05-21 | Stop reason: HOSPADM

## 2025-05-21 RX ORDER — MAGNESIUM HYDROXIDE/ALUMINUM HYDROXICE/SIMETHICONE 120; 1200; 1200 MG/30ML; MG/30ML; MG/30ML
30 SUSPENSION ORAL EVERY 4 HOURS PRN
Status: DISCONTINUED | OUTPATIENT
Start: 2025-05-21 | End: 2025-05-21 | Stop reason: HOSPADM

## 2025-05-21 RX ORDER — FLUMAZENIL 0.1 MG/ML
0.2 INJECTION, SOLUTION INTRAVENOUS
Status: DISCONTINUED | OUTPATIENT
Start: 2025-05-21 | End: 2025-05-21 | Stop reason: HOSPADM

## 2025-05-21 RX ORDER — MULTIPLE VITAMINS W/ MINERALS TAB 9MG-400MCG
1 TAB ORAL DAILY
Status: DISCONTINUED | OUTPATIENT
Start: 2025-05-21 | End: 2025-05-23 | Stop reason: HOSPADM

## 2025-05-21 RX ORDER — LORAZEPAM 2 MG/ML
1-2 INJECTION INTRAMUSCULAR EVERY 30 MIN PRN
Status: DISCONTINUED | OUTPATIENT
Start: 2025-05-21 | End: 2025-05-21 | Stop reason: HOSPADM

## 2025-05-21 RX ORDER — ACETAMINOPHEN 325 MG/1
650 TABLET ORAL EVERY 4 HOURS PRN
Status: DISCONTINUED | OUTPATIENT
Start: 2025-05-21 | End: 2025-05-23 | Stop reason: HOSPADM

## 2025-05-21 RX ORDER — CLONIDINE HYDROCHLORIDE 0.1 MG/1
0.1 TABLET ORAL EVERY 8 HOURS
Status: DISCONTINUED | OUTPATIENT
Start: 2025-05-21 | End: 2025-05-21 | Stop reason: HOSPADM

## 2025-05-21 RX ORDER — GABAPENTIN 600 MG/1
1200 TABLET ORAL ONCE
Status: COMPLETED | OUTPATIENT
Start: 2025-05-21 | End: 2025-05-21

## 2025-05-21 RX ORDER — LOPERAMIDE HYDROCHLORIDE 2 MG/1
2 CAPSULE ORAL 4 TIMES DAILY PRN
Status: DISCONTINUED | OUTPATIENT
Start: 2025-05-21 | End: 2025-05-23 | Stop reason: HOSPADM

## 2025-05-21 RX ORDER — FOLIC ACID 1 MG/1
1 TABLET ORAL DAILY
Status: DISCONTINUED | OUTPATIENT
Start: 2025-05-21 | End: 2025-05-23 | Stop reason: HOSPADM

## 2025-05-21 RX ADMIN — LISINOPRIL 20 MG: 10 TABLET ORAL at 06:50

## 2025-05-21 RX ADMIN — THIAMINE HCL TAB 100 MG 100 MG: 100 TAB at 16:18

## 2025-05-21 RX ADMIN — ACETAMINOPHEN 650 MG: 325 TABLET, FILM COATED ORAL at 08:41

## 2025-05-21 RX ADMIN — SODIUM CHLORIDE 1000 ML: 0.9 INJECTION, SOLUTION INTRAVENOUS at 05:41

## 2025-05-21 RX ADMIN — CARVEDILOL 12.5 MG: 12.5 TABLET, FILM COATED ORAL at 06:50

## 2025-05-21 RX ADMIN — FOLIC ACID 1 MG: 1 TABLET ORAL at 16:18

## 2025-05-21 RX ADMIN — DIAZEPAM 10 MG: 5 TABLET ORAL at 16:19

## 2025-05-21 RX ADMIN — CLONIDINE HYDROCHLORIDE 0.1 MG: 0.1 TABLET ORAL at 08:05

## 2025-05-21 RX ADMIN — METFORMIN ER 500 MG 1000 MG: 500 TABLET ORAL at 21:31

## 2025-05-21 RX ADMIN — ACETAMINOPHEN 650 MG: 325 TABLET, FILM COATED ORAL at 23:22

## 2025-05-21 RX ADMIN — ACETAMINOPHEN 650 MG: 325 TABLET, FILM COATED ORAL at 13:27

## 2025-05-21 RX ADMIN — Medication 1 TABLET: at 16:18

## 2025-05-21 RX ADMIN — GABAPENTIN 1200 MG: 600 TABLET, FILM COATED ORAL at 08:05

## 2025-05-21 RX ADMIN — DIAZEPAM 10 MG: 5 TABLET ORAL at 20:10

## 2025-05-21 ASSESSMENT — ACTIVITIES OF DAILY LIVING (ADL)
DRESS: INDEPENDENT
DRESS: STREET CLOTHES
ORAL_HYGIENE: INDEPENDENT
ADLS_ACUITY_SCORE: 59
ADLS_ACUITY_SCORE: 48
ADLS_ACUITY_SCORE: 59
ORAL_HYGIENE: INDEPENDENT
ADLS_ACUITY_SCORE: 48
ADLS_ACUITY_SCORE: 74
ADLS_ACUITY_SCORE: 59
LAUNDRY: WITH SUPERVISION
ADLS_ACUITY_SCORE: 48
ADLS_ACUITY_SCORE: 48
ADLS_ACUITY_SCORE: 59
ADLS_ACUITY_SCORE: 59
ADLS_ACUITY_SCORE: 48
ADLS_ACUITY_SCORE: 48
LAUNDRY: UNABLE TO COMPLETE
ADLS_ACUITY_SCORE: 59
HYGIENE/GROOMING: INDEPENDENT
ADLS_ACUITY_SCORE: 48
ADLS_ACUITY_SCORE: 48
HYGIENE/GROOMING: INDEPENDENT

## 2025-05-21 ASSESSMENT — LIFESTYLE VARIABLES
ANXIETY: NO ANXIETY, AT EASE
ORIENTATION AND CLOUDING OF SENSORIUM: ORIENTED AND CAN DO SERIAL ADDITIONS
AGITATION: NORMAL ACTIVITY
TOTAL SCORE: 6
SKIP TO QUESTIONS 9-10: 0
PAROXYSMAL SWEATS: BARELY PERCEPTIBLE SWEATING, PALMS MOIST
AUDITORY DISTURBANCES: NOT PRESENT
VISUAL DISTURBANCES: NOT PRESENT
TREMOR: NOT VISIBLE, BUT CAN BE FELT FINGERTIP TO FINGERTIP
NAUSEA AND VOMITING: MILD NAUSEA WITH NO VOMITING
HEADACHE, FULLNESS IN HEAD: MODERATE

## 2025-05-21 ASSESSMENT — PATIENT HEALTH QUESTIONNAIRE - PHQ9: SUM OF ALL RESPONSES TO PHQ9 QUESTIONS 1 & 2: 4

## 2025-05-21 NOTE — PROGRESS NOTES
05/21/25 1455   Patient Belongings   Patient Belongings other (see comments)   Belongings Search Yes   Clothing Search Yes     Jacket, backpack in storage  Cell, wallet in med room  $15.00 cash, 4 Visa, 2 medical, [place] DL in security  A               Admission:  I am responsible for any personal items that are not sent to the safe or pharmacy.  [facility] is not responsible for loss, theft or damage of any property in my possession.    Signature:  _________________________________ Date: _______  Time: _____                                              Staff Signature:  ____________________________ Date: ________  Time: _____      2nd Staff person, if patient is unable/unwilling to sign:    Signature: ________________________________ Date: ________  Time: _____     Discharge:  [facility] has returned all of my personal belongings:    Signature: _________________________________ Date: ________  Time: _____                                          Staff Signature:  ____________________________ Date: ________  Time: _____

## 2025-05-21 NOTE — PLAN OF CARE
Goal Outcome Evaluation:    Plan of Care Reviewed With: patient      S-(situation): Voluntary admit from [facility] ED    B-(background): Patient presents here for alcohol detox.  Currently reports drinking every day, drinking beer and liquor for over a week.  Reports last use was prior to admission.  Patient denies history of seizures, denies history of DT's.  Arrived at ED with ETOH of 0.18.      Patient denied using other drugs; endorses  acute mental health or medical concerns.  History of spine fracture/neck fracture in March through fall.      Medical history of type 2 diabetes, high blood pressure and blood clot per patient report.  Assistive device , neck collar.    A-(assessment): Patient pleasant and cooperative upon arrived the unit.  Needed wheel chair/walker to ambulates.  Flat affect, denies SI/SIB, endorsed depression, denies anxiety.  MSSA on admission 5, no valium given.    COVID Symptoms: No, If yes, COVID test required    Utox: Negative    Magnesium: WNL    CMP: Abnormalities: Anion Gap 22; Glucose 172    CBC: WNL    HCG: NA    Vital Signs:  T  98.7  /  R  16  /  HR  85  /  B/P 171/84  /  02 sat 94% Ra    R-(recommendations): MSSA with valium    Lab draw 5/21: GGT     Patient uses CPAP machine at night    Psychiatry, Internal medicine, case management to meet with patient.

## 2025-05-21 NOTE — TELEPHONE ENCOUNTER
6:46am - Paged [facility] for possible placement to [facility]    6:57am - [facility] Provider accepts pt to Detox    7:03am - Notified unit of pt in the queue    7:06am - Notified [facility] ED of pt placement    Indicia Completed S.R    R: Patient Placement to Detox 3A/CD/Dr. Morton

## 2025-05-21 NOTE — ED PROVIDER NOTES
"  Emergency Department Note      History of Present Illness   Chief Complaint   Alcohol Intoxication      HPI   Cody Bolton is a 44 year old male with a history of alcohol use disorder who presents to the ED for evaluation of alcohol intoxication. He reports getting \"too drunk\" tonight. No falls or injuries. Patient has been wearing a neck collar due to a neck fracture he had since 3/25. He drinks because \"I like the feeling\". Patient states being in an out-patient program. Denies suicidal thoughts or homicidal thoughts.     History is limited due to intoxication.    Independent Historian   None    Review of External Notes   recent visits including multiple positive alcohol levels.  Past Medical History   Medical History and Problem List   Alcohol use disorder  Depressive disorder  Hyperlipidemia   PE  Hypertension  Morbid obesity  CARLIE   Paroxysmal atrial fibrillation   Personal history of DVT (deep vein thrombosis)  Type 2 diabetes mellitus   Asthma     Medications   Campral   Wellbutrin  Coreg  Warfarin  Atarax  Lisinopril  Metformin  Robaxin  Naltrexone  Oxycodone  Zocor    Surgical History   Appendectomy  Gastric bypass  Revision breanne-en-y  Physical Exam     Patient Vitals for the past 24 hrs:   BP Pulse Resp SpO2 Height Weight   05/21/25 0520 (!) 169/109 109 22 94 % 1.854 m (6' 1\") (!) 158.8 kg (350 lb)     Physical Exam  Vitals reviewed.   Constitutional:       Appearance: He is obese.   HENT:      Head: Normocephalic.      Right Ear: Tympanic membrane normal.      Left Ear: Tympanic membrane normal.      Nose: Nose normal.      Mouth/Throat:      Mouth: Mucous membranes are moist.   Eyes:      Pupils: Pupils are equal, round, and reactive to light.   Neck:      Comments:  C-spine collar on disheveled pads and poor position.  Cardiovascular:      Rate and Rhythm: Normal rate and regular rhythm.   Pulmonary:      Effort: Pulmonary effort is normal.   Abdominal:      General: Abdomen is flat. Bowel sounds " "are normal.   Musculoskeletal:         General: Normal range of motion.   Skin:     General: Skin is warm.   Neurological:      Mental Status: He is alert.      Comments:  mild intoxication.  Slurred words.  Answers questions appropriately.   Psychiatric:      Comments: Seems to giggle when he talks about his chronic alcoholism.  States that he is \"here again\".           Diagnostics   Lab Results   Labs Ordered and Resulted from Time of ED Arrival to Time of ED Departure   COMPREHENSIVE METABOLIC PANEL - Abnormal       Result Value    Sodium 136      Potassium 4.0      Carbon Dioxide (CO2) 18 (*)     Anion Gap 22 (*)     Urea Nitrogen 13.0      Creatinine 0.91      GFR Estimate >90      Calcium 8.6 (*)     Chloride 96 (*)     Glucose 172 (*)     Alkaline Phosphatase 121      AST 39      ALT 41      Protein Total 7.6      Albumin 4.5      Bilirubin Total 0.5     ETHANOL LEVEL BLOOD - Abnormal    Ethanol Level Blood 0.18 (*)    CBC WITH PLATELETS AND DIFFERENTIAL - Abnormal    WBC Count 9.7      RBC Count 5.01      Hemoglobin 13.3      Hematocrit 40.5      MCV 81      MCH 26.5      MCHC 32.8      RDW 16.2 (*)     Platelet Count 300      % Neutrophils 70      % Lymphocytes 21      % Monocytes 7      % Eosinophils 1      % Basophils 1      % Immature Granulocytes 0      NRBCs per 100 WBC 0      Absolute Neutrophils 6.8      Absolute Lymphocytes 2.1      Absolute Monocytes 0.7      Absolute Eosinophils 0.1      Absolute Basophils 0.1      Absolute Immature Granulocytes 0.0      Absolute NRBCs 0.0     INR - Abnormal    INR 1.58 (*)     PT 18.8 (*)    MAGNESIUM - Normal    Magnesium 1.7     MAGNESIUM - Normal    Magnesium 1.7     PHOSPHORUS - Normal    Phosphorus 2.8     URINE DRUG SCREEN PANEL - Normal    Amphetamines Urine Screen Negative      Barbituates Urine Screen Negative      Benzodiazepine Urine Screen Negative      Cannabinoids Urine Screen Negative      Cocaine Urine Screen Negative      Fentanyl Qual Urine " Screen Negative      Opiates Urine Screen Negative      PCP Urine Screen Negative         ED Course    Medications Administered   Medications   sodium chloride 0.9% BOLUS 1,000 mL (has no administration in time range)       Procedures   Procedures     Discussion of Management   Central intake for detox    ED Course   ED Course as of 05/21/25 0534   Wed May 21, 2025   0520 I obtained history and examined the patient as noted above.      Additional Documentation  None  Medical Decision Making / Diagnosis   CMS Diagnoses: None    MIPS       None    MDM   Cody Bolton is a 44 year old male Presents with concerns for intoxication.  No history of falls.  Well-appearing.  Lab work sent and is positive for an alcohol level of 0.18.  Patient unclear why he presents to the emergency room not clearly suicidal or homicidal.  Has a history of chronic alcoholism.  Patient was discussed what he is goals were and if visiting the emergency room today and it seemed like he was interested in detox though at this seems kind of passive rather than active.  However regardless I did call central intake you did have a bed for him to go to detox.  Patient was offered his home blood pressure medications that he is likely are not compliant with.  His c-collar was repadded.  And was transferred to [place] for detox.    Disposition   Transferred to [place] detox per patient reqiuest    Diagnosis     ICD-10-CM    1. ETOH abuse  F10.10       2. Hx of blood clots  Z86.718       3. Long term (current) use of anticoagulants  Z79.01            Discharge Medications   New Prescriptions    No medications on file     Scribe Disclosure:  I, Carol Brady, am serving as a scribe at 5:32 AM on 5/21/2025 to document services personally performed by Josiah Messer MD based on my observations and the provider's statements to me.      Josiah Messer MD  05/21/25 3452

## 2025-05-21 NOTE — ED PROVIDER NOTES
ETOH intoxication requesting detox    INR slightly subtherapeutic.  Did not take his Coumadin yesterday.  Spoke with ED pharmacy, pharmacist at [facility] will dose his Coumadin accordingly.      Patient comfortable with plan for transfer.  EMTALA signed     Sophia Solano,   05/21/25 [id number]

## 2025-05-21 NOTE — PLAN OF CARE
Problem: Alcohol Withdrawal  Goal: Alcohol Withdrawal Symptom Control  Outcome: Progressing    Patient alert and oriented x 4, communicate needs appropriately. Patient ambulate on the unit using walker or wheelchair, no fall noted. Patient wear neck collar due to recent neck fracture and has a medical bed. Patient reported anxiety 3/10 and depression 3/10, no aggressive or assaultive behavior noted. Patient blood sugar check was 172 and 199 this shift. Patient ate 100% of meal with adequate fluid intake. Patient denies SI/SIB/HI and hallucination, contract for safety and medication compliant. /101 and 160/102 this shift. Patient requested spiritual consult.          MSSA 8 and 9, received valium 10 mg twice.

## 2025-05-21 NOTE — PROGRESS NOTES
ANTICOAGULATION  MANAGEMENT: Discharge Review    Cody Bolton chart reviewed for anticoagulation continuity of care    ED transfer to IP Detox/MH Unit on 5/21 for ETOH.    Discharge disposition: Transferred to  Detox [facility]    Results:    Recent labs: (last 7 days)     05/16/25  0000 05/21/25  0800   INR 4.1 1.58*     Anticoagulation inpatient management:     not applicable     Anticoagulation discharge instructions:     Warfarin dosing: Hospital Pharm to dose   Bridging: No   INR goal change: No      Medication changes affecting anticoagulation: No    Additional factors affecting anticoagulation: Yes: Missed dose last evening and alcohol intake     PLAN     No adjustment to anticoagulation plan needed    Patient is currently IP    No adjustment to Anticoagulation Calendar was required    Jo Vidal, RN  5/21/2025  Anticoagulation Clinic  [facility] for routing messages: fransisco PABLO  [facility] patient phone line: [phone]

## 2025-05-21 NOTE — PROGRESS NOTES
Pt in pending admission however the [facility] ER has yet to collect the Utox. MARANDA Salgado updated [facility] ER staff that once the utox is collected to call and give nurse-nurse handoff report.

## 2025-05-21 NOTE — ED TRIAGE NOTES
Pt presents to ER for evaluation due to feeling unwell, weak, tired after drinking heavily for the past couple of days.  History of recent neck fx and presents wearing an aspen collar and walking with a walker

## 2025-05-21 NOTE — TELEPHONE ENCOUNTER
S: [facility] ED , Provider  calling at 6:37 AM with clinical on 44 year old/male presenting for alcohol detox.     B: Pt presents for ETOH detox.   Currently reports drinking every day, drinking beer and liquor for over a week.    Patient reports last use was PTA.  Pt IVAN: 0.18  Pt  denies hx of DT  Pt  denies hx of seizures. Last seizure: N/A  Pt endorsing the following symptoms of withdrawal: NA  MSSA Score: NA     Pt endorses acute mental health or medical concerns. Spine fracture in March  Pt denies other drug use: None Amount/frequency: N/A    Does Pt have a detox care plan in Epic? None  Does pt present with specific needs, assistive devices, or exclusionary criteria? Neck collar  Is the patient ambulating, eating and drinking in the ED? Yes    A: Pt meets criteria to be presented for IP detox admission. Patient is voluntary    COVID Symptoms: No  If yes, COVID test required   Utox: Ordered, not yet collected  Magnesium: WNL  CMP: Abnormalities: Anion Gap 22; Glucose 172   CBC: WNL  HCG: N/A     R: Patient cleared and ready for behavioral bed placement: Yes    Pt is meeting criteria for presentation to 3A/CD    Does Patient need a Transfer Center request created? Yes, writer completed Transfer Center request at:  6:44am

## 2025-05-22 ENCOUNTER — DOCUMENTATION ONLY (OUTPATIENT)
Dept: ANTICOAGULATION | Facility: CLINIC | Age: 45
End: 2025-05-22
Payer: COMMERCIAL

## 2025-05-22 VITALS
RESPIRATION RATE: 16 BRPM | OXYGEN SATURATION: 96 % | BODY MASS INDEX: 41.75 KG/M2 | WEIGHT: 315 LBS | HEIGHT: 73 IN | TEMPERATURE: 97.1 F | SYSTOLIC BLOOD PRESSURE: 139 MMHG | HEART RATE: 67 BPM | DIASTOLIC BLOOD PRESSURE: 90 MMHG

## 2025-05-22 PROBLEM — F10.239 ALCOHOL DEPENDENCE WITH WITHDRAWAL WITH COMPLICATION (H): Status: ACTIVE | Noted: 2025-05-21

## 2025-05-22 LAB
ALBUMIN UR-MCNC: 30 MG/DL
ANION GAP SERPL CALCULATED.3IONS-SCNC: 15 MMOL/L (ref 7–15)
APPEARANCE UR: ABNORMAL
BILIRUB UR QL STRIP: NEGATIVE
BUN SERPL-MCNC: 15.1 MG/DL (ref 6–20)
CALCIUM SERPL-MCNC: 9.2 MG/DL (ref 8.8–10.4)
CHLORIDE SERPL-SCNC: 98 MMOL/L (ref 98–107)
COLOR UR AUTO: ABNORMAL
CREAT SERPL-MCNC: 1.06 MG/DL (ref 0.67–1.17)
EGFRCR SERPLBLD CKD-EPI 2021: 88 ML/MIN/1.73M2
GGT SERPL-CCNC: 72 U/L (ref 8–61)
GLUCOSE BLDC GLUCOMTR-MCNC: 135 MG/DL (ref 70–99)
GLUCOSE BLDC GLUCOMTR-MCNC: 146 MG/DL (ref 70–99)
GLUCOSE BLDC GLUCOMTR-MCNC: 158 MG/DL (ref 70–99)
GLUCOSE BLDC GLUCOMTR-MCNC: 198 MG/DL (ref 70–99)
GLUCOSE SERPL-MCNC: 146 MG/DL (ref 70–99)
GLUCOSE UR STRIP-MCNC: NEGATIVE MG/DL
HCO3 SERPL-SCNC: 24 MMOL/L (ref 22–29)
HGB UR QL STRIP: ABNORMAL
HOLD SPECIMEN: NORMAL
HYALINE CASTS: 10 /LPF
INR PPP: 1.52 (ref 0.85–1.15)
KETONES UR STRIP-MCNC: ABNORMAL MG/DL
LEUKOCYTE ESTERASE UR QL STRIP: ABNORMAL
MUCOUS THREADS #/AREA URNS LPF: PRESENT /LPF
NITRATE UR QL: NEGATIVE
PH UR STRIP: 5.5 [PH] (ref 5–7)
POTASSIUM SERPL-SCNC: 3.7 MMOL/L (ref 3.4–5.3)
PROTHROMBIN TIME: 18.8 SECONDS (ref 11.8–14.8)
RBC URINE: 14 /HPF
SODIUM SERPL-SCNC: 137 MMOL/L (ref 135–145)
SP GR UR STRIP: 1.03 (ref 1–1.03)
SQUAMOUS EPITHELIAL: 2 /HPF
TRANSITIONAL EPI: <1 /HPF
TSH SERPL DL<=0.005 MIU/L-ACNC: 3.12 UIU/ML (ref 0.3–4.2)
UROBILINOGEN UR STRIP-MCNC: NORMAL MG/DL
WBC URINE: 12 /HPF

## 2025-05-22 PROCEDURE — 250N000013 HC RX MED GY IP 250 OP 250 PS 637: Performed by: PSYCHIATRY & NEUROLOGY

## 2025-05-22 PROCEDURE — 80048 BASIC METABOLIC PNL TOTAL CA: CPT | Performed by: PHYSICIAN ASSISTANT

## 2025-05-22 PROCEDURE — 99223 1ST HOSP IP/OBS HIGH 75: CPT | Performed by: PSYCHIATRY & NEUROLOGY

## 2025-05-22 PROCEDURE — 250N000013 HC RX MED GY IP 250 OP 250 PS 637: Performed by: PHYSICIAN ASSISTANT

## 2025-05-22 PROCEDURE — 250N000013 HC RX MED GY IP 250 OP 250 PS 637

## 2025-05-22 PROCEDURE — 87086 URINE CULTURE/COLONY COUNT: CPT | Performed by: PHYSICIAN ASSISTANT

## 2025-05-22 PROCEDURE — 99222 1ST HOSP IP/OBS MODERATE 55: CPT | Performed by: PHYSICIAN ASSISTANT

## 2025-05-22 PROCEDURE — 81001 URINALYSIS AUTO W/SCOPE: CPT | Performed by: PHYSICIAN ASSISTANT

## 2025-05-22 PROCEDURE — 82977 ASSAY OF GGT: CPT | Performed by: PSYCHIATRY & NEUROLOGY

## 2025-05-22 PROCEDURE — 85610 PROTHROMBIN TIME: CPT | Performed by: PSYCHIATRY & NEUROLOGY

## 2025-05-22 PROCEDURE — 84443 ASSAY THYROID STIM HORMONE: CPT | Performed by: PSYCHIATRY & NEUROLOGY

## 2025-05-22 PROCEDURE — 36415 COLL VENOUS BLD VENIPUNCTURE: CPT | Performed by: PSYCHIATRY & NEUROLOGY

## 2025-05-22 PROCEDURE — 250N000011 HC RX IP 250 OP 636: Performed by: PHYSICIAN ASSISTANT

## 2025-05-22 PROCEDURE — 128N000004 HC R&B CD ADULT

## 2025-05-22 RX ORDER — LISINOPRIL 20 MG/1
20 TABLET ORAL DAILY
Status: DISCONTINUED | OUTPATIENT
Start: 2025-05-22 | End: 2025-05-23 | Stop reason: HOSPADM

## 2025-05-22 RX ORDER — CEPHALEXIN 500 MG/1
500 CAPSULE ORAL EVERY 12 HOURS SCHEDULED
Status: DISCONTINUED | OUTPATIENT
Start: 2025-05-22 | End: 2025-05-23 | Stop reason: HOSPADM

## 2025-05-22 RX ORDER — CARVEDILOL 3.12 MG/1
12.5 TABLET ORAL 2 TIMES DAILY WITH MEALS
Status: DISCONTINUED | OUTPATIENT
Start: 2025-05-22 | End: 2025-05-23 | Stop reason: HOSPADM

## 2025-05-22 RX ORDER — WARFARIN SODIUM 5 MG/1
5 TABLET ORAL
Status: COMPLETED | OUTPATIENT
Start: 2025-05-22 | End: 2025-05-22

## 2025-05-22 RX ORDER — CLONIDINE HYDROCHLORIDE 0.1 MG/1
0.1 TABLET ORAL ONCE
Status: COMPLETED | OUTPATIENT
Start: 2025-05-22 | End: 2025-05-22

## 2025-05-22 RX ORDER — METHOCARBAMOL 500 MG/1
1000 TABLET, FILM COATED ORAL 4 TIMES DAILY
Status: DISCONTINUED | OUTPATIENT
Start: 2025-05-22 | End: 2025-05-23 | Stop reason: HOSPADM

## 2025-05-22 RX ORDER — IBUPROFEN 600 MG/1
600 TABLET, FILM COATED ORAL 3 TIMES DAILY PRN
Status: DISCONTINUED | OUTPATIENT
Start: 2025-05-22 | End: 2025-05-22

## 2025-05-22 RX ORDER — SIMVASTATIN 20 MG
20 TABLET ORAL AT BEDTIME
Status: DISCONTINUED | OUTPATIENT
Start: 2025-05-22 | End: 2025-05-23 | Stop reason: HOSPADM

## 2025-05-22 RX ORDER — FLUTICASONE PROPIONATE 50 MCG
1 SPRAY, SUSPENSION (ML) NASAL DAILY PRN
Status: DISCONTINUED | OUTPATIENT
Start: 2025-05-22 | End: 2025-05-23 | Stop reason: HOSPADM

## 2025-05-22 RX ORDER — BUPROPION HYDROCHLORIDE 300 MG/1
300 TABLET ORAL EVERY MORNING
Status: DISCONTINUED | OUTPATIENT
Start: 2025-05-22 | End: 2025-05-23

## 2025-05-22 RX ORDER — ENOXAPARIN SODIUM 150 MG/ML
0.75 INJECTION SUBCUTANEOUS EVERY 12 HOURS
Status: DISCONTINUED | OUTPATIENT
Start: 2025-05-22 | End: 2025-05-23 | Stop reason: HOSPADM

## 2025-05-22 RX ADMIN — CEPHALEXIN 500 MG: 500 CAPSULE ORAL at 20:20

## 2025-05-22 RX ADMIN — METFORMIN ER 500 MG 1000 MG: 500 TABLET ORAL at 17:41

## 2025-05-22 RX ADMIN — ACETAMINOPHEN 650 MG: 325 TABLET, FILM COATED ORAL at 04:52

## 2025-05-22 RX ADMIN — CARVEDILOL 12.5 MG: 3.12 TABLET, FILM COATED ORAL at 10:06

## 2025-05-22 RX ADMIN — Medication 1 TABLET: at 08:16

## 2025-05-22 RX ADMIN — METHOCARBAMOL 1000 MG: 500 TABLET ORAL at 16:28

## 2025-05-22 RX ADMIN — CARVEDILOL 12.5 MG: 3.12 TABLET, FILM COATED ORAL at 17:41

## 2025-05-22 RX ADMIN — FOLIC ACID 1 MG: 1 TABLET ORAL at 08:16

## 2025-05-22 RX ADMIN — SIMVASTATIN 20 MG: 20 TABLET, FILM COATED ORAL at 21:12

## 2025-05-22 RX ADMIN — THIAMINE HCL TAB 100 MG 100 MG: 100 TAB at 08:16

## 2025-05-22 RX ADMIN — CLONIDINE HYDROCHLORIDE 0.1 MG: 0.1 TABLET ORAL at 00:59

## 2025-05-22 RX ADMIN — METHOCARBAMOL 1000 MG: 500 TABLET ORAL at 12:38

## 2025-05-22 RX ADMIN — ACETAMINOPHEN 650 MG: 325 TABLET, FILM COATED ORAL at 14:56

## 2025-05-22 RX ADMIN — METHOCARBAMOL 1000 MG: 500 TABLET ORAL at 10:07

## 2025-05-22 RX ADMIN — METHOCARBAMOL 1000 MG: 500 TABLET ORAL at 20:20

## 2025-05-22 RX ADMIN — LISINOPRIL 20 MG: 20 TABLET ORAL at 10:07

## 2025-05-22 RX ADMIN — WARFARIN SODIUM 5 MG: 5 TABLET ORAL at 17:45

## 2025-05-22 RX ADMIN — ENOXAPARIN SODIUM 120 MG: 120 INJECTION SUBCUTANEOUS at 11:39

## 2025-05-22 RX ADMIN — ACETAMINOPHEN 650 MG: 325 TABLET, FILM COATED ORAL at 21:12

## 2025-05-22 RX ADMIN — ENOXAPARIN SODIUM 120 MG: 120 INJECTION SUBCUTANEOUS at 21:12

## 2025-05-22 ASSESSMENT — ACTIVITIES OF DAILY LIVING (ADL)
ADLS_ACUITY_SCORE: 48
ORAL_HYGIENE: INDEPENDENT
ADLS_ACUITY_SCORE: 48
DRESS: STREET CLOTHES
ADLS_ACUITY_SCORE: 48
LAUNDRY: WITH SUPERVISION
ADLS_ACUITY_SCORE: 48
HYGIENE/GROOMING: INDEPENDENT
ADLS_ACUITY_SCORE: 48

## 2025-05-22 NOTE — H&P
Psychiatry History and Physical    Cody Bolton MRN# [id number]   Age: 44 year old YOB: 1980     Date of Admission:  5/21/2025          Assessment:   This patient is a 44 year old male with history of depression and substance use who presented to ED seeking detox from alcohol. Medically cleared in ED, admitted to  as voluntary patient. Drinking at least 750mL vodka daily for past week, EtOH IVAN 0.18(H), UDS negative. MSSA with diazepam started for alcohol withdrawal. Withdrawal precautions in place, denies history of seizures. Admission labs ordered and reviewed those resulted. IM consult placed. Pt reporting stability in mood, denying safety concerns including SI and HI. PTA medications continued as appropriate, holding PTA bupropion due to seizure risk while in withdrawal. Pt reports goals for hospitalization are to complete detox then return home and return to [facility], plans to pursue residential CD tx if needed in June following work event.      Inpatient psychiatric hospitalization is warranted at this time for safety, stabilization, and possible adjustment in medications.         Diagnoses:     Alcohol use disorder, severe, dependence with withdrawal with complication   MDD, recurrent, mild  Unspecified personality disorder per hx  Unspecified eating disorder with morbid obestiy   Hx of DVT/PE  CARLIE  HTN  High anion gap metabolic acidosis  Type 2 DM  Hx of Afib  HLD  Recent cervical fracture, c1-c6-7 osteophyte fractures   Subtherapeutic INR  Urinary retention  S/p gastric bypass       Clinically Significant Risk Factors Present on Admission          # Hypochloremia: Lowest Cl = 96 mmol/L in last 2 days, will monitor as appropriate  # Hypocalcemia: Lowest Ca = 8.6 mg/dL in last 2 days, will monitor and replace as appropriate    # Anion Gap Metabolic Acidosis: Highest Anion Gap = 22 mmol/L in last 2 days, will monitor and treat as appropriate   # Drug Induced Coagulation Defect: home medication  "list includes an anticoagulant medication    # Hypertension: Noted on problem list          # DMII: A1C = 6.8 % (Ref range: 0.0 - 5.6 %) within past 6 months    # Morbid Obesity: Estimated body mass index is 46.18 kg/m  as calculated from the following:    Height as of this encounter: 1.854 m (6' 1\").    Weight as of this encounter: 158.8 kg (350 lb).       # Financial/Environmental Concerns:                     Plan:   Psychiatric treatment/inteventions:  Medications:   -MSSA with diazepam, thiamine, folic acid, multivitamin for alcohol withdrawal   -holding PTA bupropion XL 300mg daily for mood due to seizure risk, can resume on discharge, will plan re-titration   -PRN hydroxyzine 25mg every 4 hours for anxiety   -PRN trazodone 50mg at bedtime for sleep   -consider MAT for AUD prior to discharge, pt has reported naltrexone and acamprsoate ineffective  -patient interested in completing detox and returning to [facility]   -order placed for medical bed, wheelchair/walker and neck brace to be worn     Laboratory/Imaging: reviewed- EtOH IVAN 0.18(H), UDS negative; CMP with Cl 96(L), Co2 18(L), Ca 8.6(L), anion gap 22(H), glucose 172(H) otherwise WNL; Mg WNL; CBC with RDW 16.2(H) otherwise WNL; INR 1.58(H), PT 18.8(H), BG checks stable in 100's; Hgba1c last checked 5/9 at 6.8(H); TSH and GGT ordered to complete admission labs     Patient will be treated in therapeutic milieu with appropriate individual and group therapies as described.    Medical treatment/interventions:  Medical concerns:   1) Alcohol withdrawal  -MSSA as above  -PRN zofran and imodium for GI distress related to withdrawal   -withdrawal precautions    2) IM consult placed for management of other medical concerns, per consult note on 5/22/25:   Cody Bolton is a 44 year old male with  PMH significant for alcohol use disorder, unprovoked DVT/PE (on chronic warfarin), paroxysmal A fib, HTN, HLT< DMT2, morbid obesity s/p gastric bypass, CARLIE, MDD, anxiety who " was admitted on 5/21/2025 to inpatient detox for acute alcohol withdrawal.      Medicine was consulted for medical co-management.      Acute alcohol withdrawal   Alcohol use disorder   Presented with blood alcohol level of 0.18. Has been typically drinking ~750mL daily over the past 8 days, prior to that he had 8 days of sobreity. Last drink was just prior to arrival to ED. Denies a history of withdrawal seizures or Dts.   -Management per Psychiatry   -MSSA/CIWA with valium   -Thiamine/folate/MVI     History of unprovoked DVT, PE (02/2024  Paroxysmal atrial fibrillation   Subtherapeutic INR   CHADSVASc Score 2 (HTN and DM). Chronically on warfarin. There has previously been concerns raised with patient on warfarin and continues to drink alcohol and declines to stop drinking. Previously notes indicate consideration for IVC filter, but he would need Watchman device as well. Patient is now agreeable to working towards alcohol cessation. Presented with INR of 1.58, likely in the setting of medication non-compliance while drinking.    -Continue Warfarin, pharmacist to dose; INR goal 1-2   -Lovenox for no while bridging with warfarin   -Continue PTA carvedilol   -INR daily      HTN  -Continue PTA carvedilol 12.5mg BID (as above) and lisinopril 20mg daily      Recent C1-C6-7 osteophyte fractures   Hospitalized 03/26-03/21after a fall and C1 ring fracture and C6-7 osteophyte fracture. Injuries sustained in the setting of alcohol use resulting in a fall from a chair and fell forward. He was evaluated by Orthopedics and Neurosurgery. Recommended to wear cervical collar at all times, Phily collar for hygiene. Last seen by NSG in clinic on 04/25/2025. Patient notes that he has a crack in his aspen collar.   -Pain: PRN methocarbamol and acetaminophen   -Continue use of c-collar   -Orthotics consults for new aspen collar   -Follow up with Neurosurgery in clinic      Urinary retention   One episode overnight in the setting of  "withdrawal. Was straight cath x 1. Voided this AM without difficulty. No prior hx of retention. Did have some burning with urination this AM, but possibly as a result from straight cath. No hx of BPH.   -UA ordered  -Monitored for further episodes of retention       Diabetes mellitus, type 2, A1c 6.8% 05/12/2025  PTA Mounjaro weekly, metformin XR 1000mg daily, but hasn't actually started taking Mounjaro yet.   -Continue PTA metformin      Morbid obesity   S/p gastric bypass surgery   MDD  Anxiety: PTA Wellbutrin XL, defer management to Psychiatry with continued alcohol use   HLD: continue PA simvastatin   CARLIE: PTA CPAP      Resolved:   AGMA, resolved   Presented with . Low suspicion of DKA, greater suspicion that AGMA in the setting of alcohol use. Repeat showed this had resolved       Medicine will follow up on NSG thoughts on new collar.         The patient's care was discussed with the Bedside Nurse, Patient, and Primary team via this note.     Clinically Significant Risk Factors Present on Admission          # Hypochloremia: Lowest Cl = 96 mmol/L in last 2 days, will monitor as appropriate  # Hypocalcemia: Lowest Ca = 8.6 mg/dL in last 2 days, will monitor and replace as appropriate    # Anion Gap Metabolic Acidosis: Highest Anion Gap = 22 mmol/L in last 2 days, will monitor and treat as appropriate   # Drug Induced Coagulation Defect: home medication list includes an anticoagulant medication    # Hypertension: Noted on problem list            # DMII: A1C = 6.8 % (Ref range: 0.0 - 5.6 %) within past 6 months    # Morbid Obesity: Estimated body mass index is 46.18 kg/m  as calculated from the following:    Height as of this encounter: 1.854 m (6' 1\").    Weight as of this encounter: 158.8 kg (350 lb).       # Financial/Environmental Concerns:            KENNETH RiveroC  Hospitalist Service      Legal Status: Voluntary    Safety Assessment:   Behavioral Orders   Procedures    Code 1 - Restrict to Unit " "   Routine Programming     As clinically indicated    Status 15     Every 15 minutes.    Withdrawal precautions      Pt has not required locked seclusion or restraints in the past 24 hours to maintain safety, please refer to RN documentation for further details.    The risks, benefits, alternatives and side effects have been discussed and are understood by the patient.    Disposition: Pending clinical stabilization. Will likely discharge to home with IOP when stable.    >75 min total time that was spent in counseling and coordination of care with staff, reviewing medical record, educating patient about treatment options, side effects and benefits and alternative treatments for medications, providing supportive therapy and redirection regarding above symptoms.     This document is created with the help of Dragon dictation system.  All grammatical/typing errors or context distortion are unintentional and inherent to software.    Gabrielle Morton DO  [facility] Psychiatry         Chief Complaint:     Alcohol withdrawal          History of Present Illness:     Cody is a 44 year old male with history of depression and substance use who presented to ED seeking detox from alcohol.    Chart review completed including ED notes from this encounter; recent anticoagulation notes for INR checks/warfarin therapy, most recent 5/16; most recent PCP visit for Type 2 DM, HLD, HTN, CARLIE and cervical fracture history on 5/9/25; ED visit on 5/5/25 at [facility] for weakness; medical admissions on 3/26 for cervical fractures and fall and 4/11 for fall and weakness; previous admission to  2/24/25 where pt discharged home with IOP and 8/19/24 where pt discharged to [facility].     Per ED physician note: Cody Bolton is a 44 year old male with a history of alcohol use disorder who presents to the ED for evaluation of alcohol intoxication. He reports getting \"too drunk\" tonight. No falls or injuries. Patient has been " "wearing a neck collar due to a neck fracture he had since 3/25. He drinks because \"I like the feeling\". Patient states being in an out-patient program. Denies suicidal thoughts or homicidal thoughts.      History is limited due to intoxication.      Upon interview: Patient confirms information above, reports since he discharged from this unit in February that he started to engage in an outpatient program and he has had periods of short relapses on and off for the last month, he had 8 days of sobriety before last week when he started drinking vodka again.  He reports that he drinks at least 750 mL/day, denies any other substance use.  He has tolerance to alcohol, drinking more than intended, difficulty cutting down and withdrawal symptoms when he stops drinking including yesterday having some visual hallucinations that look like \"a movie on the wall\".  Denies any current hallucinations, he also was having some shaking, tremors and difficulty with his ambulation and has been using a wheelchair and then a walker for safe ambulation on the unit.  He reports his gait has been improving, does have history of falls which resulted in his cervical fracture that he currently has C-collar on for, reports he is to wear it for 3 months.  He denies any history of seizures or DTs.  He also notes history of depression and anxiety, reports that his mood has been \"good\" aside from some difficulty with concentration he notes his mood symptoms have been well managed with his PTA bupropion.  He was not aware of the seizure risk with this medication, he does feel like it is helpful and will want to resume when he is out of detox.  He denies having any SI or HI.  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            Psychiatric Review of Systems:   Depression:   Reports: poor appetite, poor concentration, low energy   Denies: depressed mood, suicidal ideation, decreased interest, changes in sleep, guilt, hopelessness, helplessness, irritability.  Pamela:   Reports: none  Denies: sleeplessness, impulsiveness, racing thoughts, increased goal-directed activities, pressured speech, increase in energy  Psychosis:   Reports: none  Denies: visual hallucinations, auditory hallucinations, paranoia, grandiosity, ideas of reference, thought insertions, thought broadcasting.  Anxiety:   Reports: anxiety that is well controlled with medications   Denies: excessive worries that are difficult to control, panic attacks  PTSD:   Reports: none  Denies: re-experiencing past trauma, nightmares, increased arousal, avoidance of traumatic stimuli, impaired function.  OCD:   Reports: none  Denies: obsessions, checking, symmetry, cleaning, skin picking.  ED:   Reports: none  Denies: restriction, binging, purging, excessive exercise, laxative abuse           Medical Review of Systems:     10 point review of systems is otherwise negative unless noted above.            Psychiatric History:   Psychiatric Hospitalizations: denies, has engaged in PHP   History of Psychosis: denies  Prior ECT: denies  Court Commitment: denies  Suicide Attempts: denies  Self-injurious Behavior: denies  Violence toward others: denies  Use of Psychotropics: currently taking bupropion for mood and wants to continue, finds it helpful; has taken naltrexone and acamprosate in past for AUD and did not find them helpful/effective          Substance Use History:   Alcohol: See HPI, first drink around age 13, problematic in 2023  Cannabis: none  Nicotine: none  Cocaine: none  Methamphetamine: none  Opiates/Heroin:  none  Benzodiazepines: none  Hallucinogens: none  Inhalants: none      Prior Chemical Dependency treatment: [facility], IOP with [facility]          Social History:   Upbringing: Born and raised in [place]  Educational History: College degree  Relationships:   Children: 2 ages 19 and 21  Current Living Situation: Lives in a triplex he bought with his wife before the divorce, his wife lives in 1 unit, he lives in one and one of his children lives in the other unit though in process of moving out  Occupational History: 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, reports has been on medical leave to attend [employer]  Financial Support: Employment  Legal History: Denies  Abuse/Trauma History: None reported         Family History:     H/o completed suicides in family: cousin  Mental Health- depression brother, as below  CD- brother, as below  Family History   Problem Relation Age of Onset    Substance Abuse Brother     Anxiety Disorder Sister     Depression Sister                Past Medical History:     Past Medical History:   Diagnosis Date    Alcohol use disorder     Depressive disorder     High cholesterol     History of gastric bypass     History of pulmonary embolism     Hypertension     Morbid obesity (H)     CARLIE (obstructive sleep apnea)     Paroxysmal atrial fibrillation (H)     Personal history of DVT (deep vein thrombosis)     Pulmonary embolism (H) 02/2024    unprovoked bilateral PE and RLE DVT 2/2024    Type 2 diabetes mellitus (H)     Uncomplicated asthma        History of seizures: denies         Past Surgical History:     Past Surgical History:   Procedure Laterality Date    APPENDECTOMY  08/15/2017    Dr. Ferrer    GASTRIC BYPASS      CO LAP,APPENDECTOMY N/A 8/14/2017    Procedure: APPENDECTOMY, LAPAROSCOPIC;  Surgeon: Nba Ferrer MD;  [facility]: [facility];  Service: General    REVISION AKANKSHA-EN-Y  2014    Dr. Ranjeet Espinal [email or web address]               Allergies:      No Known Allergies           Medications:   I have reviewed this patient's current medications    Medications Prior to Admission   Medication Sig Dispense Refill Last Dose/Taking    acamprosate (CAMPRAL) 333 MG EC tablet Take 2 tablets (666 mg) by mouth 3 times daily. 180 tablet 3     acetaminophen (TYLENOL) 500 MG tablet Take 1,000 mg by mouth 3 times daily as needed for mild pain.       buPROPion (WELLBUTRIN XL) 300 MG 24 hr tablet Take 1 tablet (300 mg) by mouth every morning. 90 tablet 3     carvedilol (COREG) 12.5 MG tablet Take 1 tablet (12.5 mg) by mouth 2 times daily (with meals). 180 tablet 3     enoxaparin ANTICOAGULANT (LOVENOX) 120 MG/0.8ML syringe Inject 0.8 mLs (120 mg) subcutaneously every 12 hours. (Patient not taking: Reported on 5/9/2025) 22.4 mL 1     fluticasone (FLONASE) 50 MCG/ACT nasal spray Spray 1 spray into both nostrils daily as needed for rhinitis or allergies.       folic acid (FOLVITE) 1 MG tablet Take 1 tablet (1 mg) by mouth daily. 30 tablet 1     hydrOXYzine HCl (ATARAX) 50 MG tablet Take 50 mg by mouth every 6 hours as needed for other (Pain).       lisinopril (ZESTRIL) 20 MG tablet Take 1 tablet (20 mg) by mouth daily. 90 tablet 3     metFORMIN (GLUCOPHAGE XR) 500 MG 24 hr tablet Take 2 tablets (1,000 mg) by mouth daily (with dinner). 180 tablet 3     methocarbamol (ROBAXIN) 500 MG tablet Take 2 tablets (1,000 mg) by mouth 4 times daily for 15 days. 120 tablet 0     multivitamin w/minerals (THERA-VIT-M) tablet Take 1 tablet by mouth daily. 30 tablet 1     naloxone (NARCAN) 4 MG/0.1ML nasal spray Spray 1 spray (4 mg) into one nostril alternating nostrils as needed for opioid reversal. every 2-3 minutes until assistance arrives 2 each 0     naltrexone (DEPADE/REVIA) 50 MG tablet Take 50 mg by mouth daily.       oxyCODONE (ROXICODONE) 5 MG tablet Take 1 tablet (5 mg) by mouth every 6 hours for 15 days. 60 tablet 0     sildenafil (VIAGRA) 100 MG tablet Take 1 tablet  "(100 mg) by mouth daily as needed (erectile dysfunction). 30 tablet 3     simvastatin (ZOCOR) 20 MG tablet Take 1 tablet (20 mg) by mouth at bedtime. 90 tablet 3     thiamine (B-1) 100 MG tablet Take 1 tablet (100 mg) by mouth daily. 30 tablet 0     tirzepatide (MOUNJARO) 5 MG/0.5ML SOAJ auto-injector pen Inject 0.5 mLs (5 mg) subcutaneously once a week. 2 mL 0     warfarin ANTICOAGULANT (COUMADIN) 5 MG tablet Take 2.5mg on Wednesdays and 5mg all other days                Labs:     Recent Results (from the past 24 hours)   INR    Collection Time: 05/21/25  8:00 AM   Result Value Ref Range    INR 1.58 (H) 0.85 - 1.15    PT 18.8 (H) 11.8 - 14.8 Seconds   Urine Drug Screen Panel    Collection Time: 05/21/25 10:52 AM   Result Value Ref Range    Amphetamines Urine Screen Negative Screen Negative    Barbituates Urine Screen Negative Screen Negative    Benzodiazepine Urine Screen Negative Screen Negative    Cannabinoids Urine Screen Negative Screen Negative    Cocaine Urine Screen Negative Screen Negative    Fentanyl Qual Urine Screen Negative Screen Negative    Opiates Urine Screen Negative Screen Negative    PCP Urine Screen Negative Screen Negative   Magnesium    Collection Time: 05/21/25 11:45 AM   Result Value Ref Range    Magnesium 1.7 1.7 - 2.3 mg/dL   Glucose by meter    Collection Time: 05/21/25  5:20 PM   Result Value Ref Range    GLUCOSE BY METER POCT 172 (H) 70 - 99 mg/dL   Glucose by meter    Collection Time: 05/21/25  9:36 PM   Result Value Ref Range    GLUCOSE BY METER POCT 199 (H) 70 - 99 mg/dL       BP (!) 146/88   Pulse 75   Temp 97.4  F (36.3  C) (Temporal)   Resp 16   Ht 1.854 m (6' 1\")   Wt (!) 158.8 kg (350 lb)   SpO2 96%   BMI 46.18 kg/m    Weight is 350 lbs 0 oz  Body mass index is 46.18 kg/m .           Psychiatric Mental Status Examination:   Appearance: awake, alert, untidy, wearing C-collar and glasses   Attitude: cooperative and pleasant  Eye Contact: good  Mood:  \"pretty good\"  Affect: " "mood congruent and full range  Speech:  clear, coherent and normal prosody  Language: fluent in English  Psychomotor Behavior:  no evidence of tardive dyskinesia, dystonia, or tics  Gait/Station: improving, ambulating with walker   Thought Process:  linear, logical, goal oriented  Associations:  no loose associations  Thought Content:  Denying SI/HI/AVH; no evidence of psychotic thinking  Insight:  fair  Judgement: intact  Oriented to:  time, person, and place  Attention Span and Concentration:  intact  Recent and Remote Memory:  intact  Fund of Knowledge: appropriate         Physical Exam:   Please refer to physical exam completed by ED provider, Josiah Messer MD, on 5/21/25 as below. I agree with the findings and assessment and have no additional findings to add at this time with exception that psychiatric findings now above in MSE.   Physical Exam  Vitals reviewed.   Constitutional:       Appearance: He is obese.   HENT:      Head: Normocephalic.      Right Ear: Tympanic membrane normal.      Left Ear: Tympanic membrane normal.      Nose: Nose normal.      Mouth/Throat:      Mouth: Mucous membranes are moist.   Eyes:      Pupils: Pupils are equal, round, and reactive to light.   Neck:      Comments:  C-spine collar on disheveled pads and poor position.  Cardiovascular:      Rate and Rhythm: Normal rate and regular rhythm.   Pulmonary:      Effort: Pulmonary effort is normal.   Abdominal:      General: Abdomen is flat. Bowel sounds are normal.   Musculoskeletal:         General: Normal range of motion.   Skin:     General: Skin is warm.   Neurological:      Mental Status: He is alert.      Comments:  mild intoxication.  Slurred words.  Answers questions appropriately.   Psychiatric:      Comments: Seems to giggle when he talks about his chronic alcoholism.  States that he is \"here again\".   "

## 2025-05-22 NOTE — PLAN OF CARE
Behavioral Team Discussion: (5/22/2025)    Continued Stay Criteria/Rationale: Patient admitted for Chemical Use Issues.  Plan: The following services will be provided to the patient; psychiatric assessment, medication management, therapeutic milieu, individual and group support, and skills groups.   Participants: 3A Provider: Dr. Gabrielle Morton MD; 3A RN: Naomi Vieyra RN; 3A CM's: ISA Garcia.  Summary/Recommendation: Providers will assess today for treatment recommendations, discharge planning, and aftercare plans. CM will meet with pt for discharge planning.   Medical/Physical: Per chart review pt has history of spine fracture/neck fracture in March through fall. No new medical concerns reported.  Precautions:   Behavioral Orders   Procedures    Code 1 - Restrict to Unit    Routine Programming     As clinically indicated    Status 15     Every 15 minutes.    Withdrawal precautions     Rationale for change in precautions or plan: N/A  Progress: Initial.    ASAM Dimension Scale Ratings:  Dimension 1: 2 Client has some difficulty tolerating and coping with withdrawal discomfort. Client's intoxication may be severe, but responds to support and treatment such that the client does not immediately endanger self or others. Client displays moderate signs and symptoms with moderate risk of severe withdrawal.  Dimension 2: 1 Client tolerates and susan with physical discomfort and is able to get the services that the client needs.  Dimension 3: 1 Client has impulse control and coping skills. Client presents a mild to moderate risk of harm to self or others or displays symptoms of emotional, behavioral or cognitive problems. Client has a mental health diagnosis and is stable. Client functions adequately in significant life areas.  Dimension 4: 2 Client displays verbal compliance, but lacks consistent behaviors; has low motivation for change; and is passively involved in treatment.  Dimension 5: 4 No awareness of the  negative impact of mental health problems or substance abuse. No coping skills to arrest mental health or addiction illnesses, or prevent relapse.  Dimension 6: 4 Client has (A) Chronically antagonistic significant other, living environment, family, peer group or long-term criminal justice involvement that is harmful to recovery or treatment progress, or (B) Client has an actively antagonistic significant other, family, work, or living environment with immediate threat to the client's safety and well-being.

## 2025-05-22 NOTE — PHARMACY-ANTICOAGULATION SERVICE
Clinical Pharmacy - Warfarin Dosing Consult     Pharmacy has been consulted to manage this patient s warfarin therapy.  Indication: DVT/PE Prophylaxis, Atrial Fibrillation   Therapy Goal: INR 2-3   [facility]  Warfarin Prior to Admission: Yes  Warfarin PTA Regimen: Otherwise 5 mg every Sun, Tue, Thu; 7.5 mg all other days  Significant drug interactions: Lovenox (increase bleed risk)  Recent documented change in oral intake/nutrition: Unknown    INR   Date Value Ref Range Status   05/22/2025 1.52 (H) 0.85 - 1.15 Final   05/21/2025 1.58 (H) 0.85 - 1.15 Final       Recommend warfarin 5 mg today.  Pharmacy will monitor Cody Bolton daily and order warfarin doses to achieve specified goal.      Please contact pharmacy as soon as possible if the warfarin needs to be held for a procedure or if the warfarin goals change.     Alissa, PharmD  *[phone]  
independent

## 2025-05-22 NOTE — CONSULTS
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     Medicine was consulted for medical co-management.     Acute alcohol withdrawal   Alcohol use disorder   Presented with blood alcohol level of 0.18. Has been typically drinking ~750mL daily over the past 8 days, prior to that he had 8 days of sobreity. Last drink was just prior to arrival to ED. Denies a history of withdrawal seizures or Dts.   -Management per Psychiatry   -MSSA/CIWA with valium   -Thiamine/folate/MVI    History of unprovoked DVT, PE (02/2024  Paroxysmal atrial fibrillation   Subtherapeutic INR   CHADSVASc Score 2 (HTN and DM). Chronically on warfarin. There has previously been concerns raised with patient on warfarin and continues to drink alcohol and declines to stop drinking. Previously notes indicate consideration for IVC filter, but he would need Watchman device as well. Patient is now agreeable to working towards alcohol cessation. Presented with INR of 1.58, likely in the setting of medication non-compliance while drinking.    -Continue Warfarin, pharmacist to dose; INR goal 1-2   -Lovenox for no while bridging with warfarin   -Continue PTA carvedilol   -INR daily     HTN  -Continue PTA carvedilol 12.5mg BID (as above) and lisinopril 20mg daily     Recent C1-C6-7 osteophyte fractures   Hospitalized 03/26-03/21after a fall and C1 ring fracture and C6-7 osteophyte fracture. Injuries sustained in the setting of alcohol use resulting in a fall from a chair and fell forward. He was evaluated by  Orthopedics and Neurosurgery. Recommended to wear cervical collar at all times, Phily collar for hygiene. Last seen by NSG in clinic on 04/25/2025. Patient notes that he has a crack in his aspen collar.   -Pain: PRN methocarbamol and acetaminophen   -Continue use of c-collar   -Orthotics consults for new aspen collar   -Follow up with Neurosurgery in clinic     Urinary retention   One episode overnight in the setting of withdrawal. Was straight cath x 1. Voided this AM without difficulty. No prior hx of retention. Did have some burning with urination this AM, but possibly as a result from straight cath. No hx of BPH.   -UA ordered  -Monitored for further episodes of retention      Addendum: UA with trace LE, WBC 16 therefore have started keflex 500mg BID with plans to adjust pending culture data.     Diabetes mellitus, type 2, A1c 6.8% 05/12/2025  PTA Mounjaro weekly, metformin XR 1000mg daily, but hasn't actually started taking Mounjaro yet.   -Continue PTA metformin     Morbid obesity   S/p gastric bypass surgery   MDD  Anxiety: PTA Wellbutrin XL, defer management to Psychiatry with continued alcohol use   HLD: continue PA simvastatin   CARLIE: PTA CPAP     Resolved:   AGMA, resolved   Presented with . Low suspicion of DKA, greater suspicion that AGMA in the setting of alcohol use. Repeat showed this had resolved      Medicine will follow up on NSG thoughts on new collar.      The patient's care was discussed with the Bedside Nurse, Patient, and Primary team via this note.    Clinically Significant Risk Factors Present on Admission          # Hypochloremia: Lowest Cl = 96 mmol/L in last 2 days, will monitor as appropriate  # Hypocalcemia: Lowest Ca = 8.6 mg/dL in last 2 days, will monitor and replace as appropriate    # Anion Gap Metabolic Acidosis: Highest Anion Gap = 22 mmol/L in last 2 days, will monitor and treat as appropriate   # Drug Induced Coagulation Defect: home medication list includes an  "anticoagulant medication    # Hypertension: Noted on problem list          # DMII: A1C = 6.8 % (Ref range: 0.0 - 5.6 %) within past 6 months    # Morbid Obesity: Estimated body mass index is 46.18 kg/m  as calculated from the following:    Height as of this encounter: 1.854 m (6' 1\").    Weight as of this encounter: 158.8 kg (350 lb).       # Financial/Environmental Concerns:           Cris Simpson PA-C  Hospitalist Service  Securely message with Vocera (more info)  Text page via 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     Medicine was consulted for medical co-management.     Denies any withdrawal symptoms. He notes that he had an episode of urinary retention overnight. Never experienced this before. Was able to void this AM without difficulty. Did have some dysuria, but notes the straight cath was uncomfortable. No other urinary sxs. Continues to report pain from his neck, but notes this is not new pain. Has been wearing his c-collar. No loss of sensation, numbness/tingling, just pain. He did break his c collar, there is a crack in the front of it. No chest pain, dyspena, cough, abdominal pain, nausea, vomiting. Notes that he has been pursuing outpatient treatment, but feels he now needs to pursue inpatient treatment and stop drinking.       Past Medical History    Past Medical History:   Diagnosis Date    Alcohol use disorder     Depressive disorder     High cholesterol     History of gastric bypass     History of pulmonary embolism     Hypertension     Morbid obesity " (H)     CARLIE (obstructive sleep apnea)     Paroxysmal atrial fibrillation (H)     Personal history of DVT (deep vein thrombosis)     Pulmonary embolism (H) 02/2024    unprovoked bilateral PE and RLE DVT 2/2024    Type 2 diabetes mellitus (H)     Uncomplicated asthma        Past Surgical History   Past Surgical History:   Procedure Laterality Date    APPENDECTOMY  08/15/2017    Dr. Ferrer    GASTRIC BYPASS      SC LAP,APPENDECTOMY N/A 8/14/2017    Procedure: APPENDECTOMY, LAPAROSCOPIC;  Surgeon: Nba Ferrer MD;  Location: [facility];  Service: General    REVISION AKANKSHA-EN-Y  2014    Dr. Ranjeet Espinal [facility]       Medications   I have reviewed this patient's current medications       Review of Systems    The 10 point Review of Systems is negative other than noted in the HPI or here.     Social History   I have reviewed this patient's social history and updated it with pertinent information if needed.  Social History     Tobacco Use    Smoking status: Never     Passive exposure: Never    Smokeless tobacco: Never   Vaping Use    Vaping status: Never Used   Substance Use Topics    Alcohol use: Yes     Comment: Binge drinking behavior.  Usually drinks a few days in a row 750 ml/d    Drug use: No         Family History   I have reviewed this patient's family history and updated it with pertinent information if needed.  Family History   Problem Relation Age of Onset    Substance Abuse Brother     Anxiety Disorder Sister     Depression Sister          Allergies   No Known Allergies     Physical Exam   Vital Signs: Temp: 97.4  F (36.3  C) Temp src: Temporal BP: (!) 146/88 Pulse: 75   Resp: 16 SpO2: 96 % O2 Device: None (Room air)    Weight: 350 lbs 0 oz    General: laying in bed, alert, cooperative, awake, in no acute distress  HEENT: normocephalic, atraumatic, anicteric sclera; c-collar in place   Respiratory: breathing comfortably on room air, clear to auscultation bilaterally without wheezing, crackles,  or rhonchi appreciated   Cardiac: regular rate and rhythm with normal S1/S2 without murmurs, clicks, rubs or gallops, 2+ radial pulse on LUE, no signs of peripheral edema bilaterally  GI: soft, non-distended, normoactive bowel sounds, non-tender per palpation  Neuro: grossly non-focal, alert and oriented, normal speech, sensation in tact on bilateral upper and lower extremities,  strength on bilateral upper extremities 5/5  MSK: no bony deformities, moving all extremities independently  Skin: no rashes or lesions on uncovered surfaces, no jaundice      Medical Decision Making       60 MINUTES SPENT BY ME on the date of service doing chart review, history, exam, documentation & further activities per the note.      Data   NOTE: Data reviewed over the past 24 hrs contributes toward MDM complexity

## 2025-05-22 NOTE — PROGRESS NOTES
[address]     Daily Encounter: Met with team, discussed patient progress, discharge plan and any impediments to discharge.    Pt shares he did start IOP at [facility] after his last admit to 3A in February however has relapsed and began drinking again 3-4 weeks ago. Pt shares he realized he is in need or residential MARCY paperwork. Pt shares he is unsure of where he would like to attend treatment. He reported attending [facility] in the past but reports it is to expensive due to board and lodge not being covered. Writer notified him I would complete his update and we could discuss options at a later time.     Insurance: [employer]     Legal Status:  Vol   County: NA  File Number: NA  Start and expiration date of commitment: NA    SUDs Assessment Status: Needs update for placement     ROIs on file: None at this time.      Living Situation: Lives in . Ex wife and children.     Current Providers, Supports & Collateral:  LADC at [facility], PCP: Yovana Felipe MD - [facility]  Therapist: Lucy Mendoza (virtual sessions)    Current Plan/Referral Status: Residential treatment.      Safety Plan Status: discussed with pt, gave paperwork, will continue safety plan in a future session      ISA Garcia  [address] - Adult Inpatient Addiction Psychiatry Unit

## 2025-05-22 NOTE — PLAN OF CARE
Goal Outcome Evaluation:    Patient pleasant and cooperative, visible in milieu.  Flat affect, denies SI/SIB, endorsed depression/anxiety.  MSSA 5 and 5, no valium given.   and 158 received scheduled medication with no problem.  Good appetite, medication compliant. Received prn tylenol per request for pain.  UA abnormal, patient on antibiotics for UTI.  Patient socialized and watched T.V with peers.  Attended group meetings and participated in activities.  Patient resting comfortably in room.  Will continue to monitor closely.

## 2025-05-22 NOTE — CONSULTS
"SPIRITUAL HEALTH SERVICES  SPIRITUAL ASSESSMENT consult Note  [facility] ([facility]     REFERRAL SOURCE: Routine consult    Cody is familiar to me from previous admission.     Met with Cody in his room, he shared that because \"I broke my neck, c1 fracture\" that \"it hurts to breath and talk after a while, and I really only get five chews when I eat\".     Cody has been getting back in to attending [employer] and wants \"to give my life over to God\" and has been working with a  to figure out what that means for him.     He has a group of friends that he games with online and enjoys talking to regularly.     Cody values conversation.     I oriented pt to spiritual health services and they know they can request a visit at any time.     PLAN: Spiritual health services remains available for any follow-up or requests. For further visits please place spiritual health consults.    Lizbeth Das [facility]  Associate   Pager: [phone]    "

## 2025-05-22 NOTE — PLAN OF CARE
"Goal Outcome Evaluation:    Problem: Alcohol Withdrawal  Goal: Alcohol Withdrawal Symptom Control  Outcome: Progressing     Patient appeared to sleep for 2 hours.    MSSA at midnight was 6.  BP (!) 170/96   Pulse 83   Temp 97.6  F (36.4  C) (Temporal)   Resp 18   Ht 1.854 m (6' 1\")   Wt (!) 158.8 kg (350 lb)   SpO2 95%   BMI 46.18 kg/m      [facility] informed regarding elevated BP; clonidine 0.1mg ordered and administered. Patient complained about neck pain and requested for a muscle spasm to be ordered, since is was to early to receive more tylenol. [facility] ordered ibuprofen instead, but it was discontinued due to patient's hx of gastric bypass. Patient continued to endorse neck pain 8/10; tylenol 650mg was administered at 4:50am.    MSSA at 4am was 5.  BP (!) 146/88   Pulse 75   Temp 97.4  F (36.3  C) (Temporal)   Resp 16   Ht 1.854 m (6' 1\")   Wt (!) 158.8 kg (350 lb)   SpO2 96%   BMI 46.18 kg/m      We'll continue to monitor.      "

## 2025-05-22 NOTE — DISCHARGE INSTRUCTIONS
Behavioral Discharge Planning and Instructions  THANK YOU FOR CHOOSING [address]  [phone]    Summary: You were admitted to [facility] on *** for detoxification from ***.  A medical exam was performed that included lab work. You have met with a [facility] Counselor and opted to ***.  Please take care and make your recovery a daily priority, Cody!  It was a pleasure working with you and the entire treatment team here wishes you the very best in your recovery!     Recommendation:  ***    Main Diagnoses:    {Substance Related Use Disorders:647454}    Major Treatments, Procedures and Findings:  You were treated for *** detoxification using ***. As an outpatient you will be prescribed ***, please take this medication as prescribed until it is gone. You have met with a [facility] counselor to develop a treatment plan for discharge. You had labs drawn and those results were reviewed with you. Please take a copy of your lab work with you to your next primary care provider appointment.    Symptoms to Report:  If you experience more anxiety, confusion, sleeplessness, deep sadness or thoughts of suicide, notify your treatment team or notify your primary care provider. IF ANY OF THE SYMPTOMS YOU ARE EXPERIENCING ARE A MEDICAL EMERGENCY CALL 911 IMMEDIATELY.     Lifestyle Adjustment: Adjust your lifestyle to get enough sleep, relaxation, exercise and good nutrition. Continue to develop healthy coping skills to decrease stress and promote a sober living environment. Do not use mood altering substances including alcohol, illegal drugs or addictive medications other than what is currently prescribed.     Disposition: ***    Facts about COVID19 at www.cdc.gov/COVID19 and www.[place].gov/covid19    Keeping hands clean is one of the most important steps we can take to avoid getting sick and spreading germs to others.  Please wash your hands frequently and lather with soap for at least 20 seconds!    Follow-up Appointment:    Appointment Date/Time: ***    Psychiatrist/Primary Care Giver: ***    Address: ***    Phone Number: ***      Recovery apps for your phone for educational purposes and to locate in person and zoom recovery meetings  Everything AA -  darrius is a great resource  12 Step Toolkit - educational purpose learning about the 12 steps to recovery  [place] Cloud - meeting darrius  AA  - meeting darrius  Meeting guide - meeting darrius  Quick NA meeting - meeting darrius  Joselito- has various apps    Patient Navigation Hub  [facility] Navigators work to be your point-of-contact for trustworthy and compassionate care from Inpatient services to [facility] Programmatic Care. We will provide resources and communication to help guide you into programmatic care. Ultimately, our goal is to be the one-stop-shop of communication, coordination, and support for your journey to programmatic care.  Phone: [phone]    Resources:  AA/NA meetings have returned to in-person or a hybrid combination of zoom/in-person therefore please check online to verify*  Need Support Now? If you or someone you know is struggling or in crisis, help is available. Call or text [employer]8 or chat [employer].OurStory  AA meetings search for them at: https://aa-intergroup.org (worldwide meeting listings)  AA meetings for [place] area can be found online at: https://a[place].org (click local online meetings listings)  NA meetings for [place] area can be found online at: https://www.nam[place].org  (click find a meeting)  AA and NA Sponsors are excellent resources for support and you can find one at any support group meeting.   Alcoholics Anonymous (https://aa.org/): for information 24 hours/day  AA Intergroup service office in [place] (http://www.aastpaul.org/) 155.941.6471  AA Intergroup service office in [facility]: 456.938.5592. (http://www.aamin[place].org/)  Narcotics Anonymous (www.nam[place].org) (470)  "262-0236  https://aafairviewriverside.org/meetings  SMART Recovery - self management for addiction recovery:  www.smartrecovery.org  Pathways ~ A Health Crisis Resource & Support Center:  828.795.9325.  https://prescribetoprevent.org/patient-education/videos/  http://www.harmreduction.org  Crisis Text Line  Text 862836  You will be connected with a trained live crisis counselor to provide support. Por espanol, texto  AMANDA a 585268 o texto a 442-AYUDAME en WhatsApp  [place] Hope Line  1.800.SUICIDE [7419406]  [facility] 227-111-8270  Support Group:  AA/NA and Sponsor/support.  Fast Tracker  Linking people to mental health and substance use disorder resources  LeeviackPlanHQn.org   [place] Mental Health Warm Line  Peer to peer support  Monday thru Saturday, 12 pm to 10 pm  643.732.8331 or 2.655.714.2613  Text \"Support\" to 38177  [place] on Mental Illness (LIZZETH)  235.143.4136 or 1.888.LIZZETH.HELPS  Alcoholics Anonymous (www.alcoholics-anonymous.org): Check your phone book for your local chapter.  Suicide Awareness Voices of Education (SAVE) (www.save.org): 303-516-EWIX (8083)  National Suicide Prevention Line (www.mentalhealthmn.org): 680-262-YJAV (7850)  Mental Health Consumer/Survivor Network of [place] (www.mhcsn.net): 506.534.9742 or 973-609-6983  Mental Health Association of [place] (www.mentalhealth.org): 801.125.2305 or 591-353-3320   Substance Abuse and Mental Health Services (www.samhsa.gov)  [place] Opioid Prevention Coalition: www.opioidcoalition.org    [place] Recovery Connection (MRC)  [facility] connects people seeking recovery to resources that help foster and sustain long-term recovery.  Whether you are seeking resources for treatment, transportation, housing, job training, education, health care or other pathways to recovery, [facility] is a great place to start.  457.324.1298.  www.SmartStudy.com.org    Great Pod casts for nutrition and wellness  Listen on Apple Podcasts  Dishing Up " Nutrition   N[employer] Well[employer]   Nutrition       Understand the connection between what you eat and how you feel. Hosted by licensed nutritionists and dietitians from [employer] we share practical, real-life solutions for healthier living through nutrition.     General Medication Instructions:   See your medication sheet(s) for instructions.   Take all medications as prescribed.  Make no changes unless your primary care provider suggests them.   Go to all your primary care provider visits.  Be sure to have all your required lab tests. This way, your medicines can be refilled on time.  Do not use any forms of alcohol.    Please Note: If you have any questions at anytime after you discharged please call [facility] detox unit 3A at [phone].    Here are a list of additional numbers you can call if you are wanting to resume services through [facility]:  [facility] Assessment Intake: [phone]  [facility] Adult M[facility]Y Intensive Outpatient  call: [phone]  [facility] Admissions [phone]    Recovery Clinic call: [phone]  [facility]: [phone]  Medical Records call: [phone]  Billing Department call: [phone]    Please remember to take all of your behavioral discharge planning and lab paperwork to any follow up appointments, it contains your lab results, diagnosis, medication list and discharge recommendations.      THANK YOU FOR CHOOSING [facility]    diagnosis, medication list and discharge recommendations.      THANK YOU FOR CHOOSING [facility]

## 2025-05-22 NOTE — PROGRESS NOTES
ANTICOAGULATION     Cody Bolton is overdue for an INR check.     Patient is currently inpatient, will postpone reminder one week.    Jessi Brown, RN  5/22/2025  Anticoagulation Clinic  [facility] for routing messages: fransisco [email or web address]  [facility] patient phone line: [phone]

## 2025-05-23 VITALS
SYSTOLIC BLOOD PRESSURE: 142 MMHG | TEMPERATURE: 98.3 F | RESPIRATION RATE: 16 BRPM | WEIGHT: 315 LBS | HEART RATE: 75 BPM | DIASTOLIC BLOOD PRESSURE: 85 MMHG | OXYGEN SATURATION: 95 % | BODY MASS INDEX: 41.75 KG/M2 | HEIGHT: 73 IN

## 2025-05-23 DIAGNOSIS — F10.220 ALCOHOL DEPENDENCE WITH UNCOMPLICATED INTOXICATION (H): ICD-10-CM

## 2025-05-23 LAB
BACTERIA UR CULT: NORMAL
GLUCOSE BLDC GLUCOMTR-MCNC: 167 MG/DL (ref 70–99)
HOLD SPECIMEN: NORMAL
INR PPP: 1.56 (ref 0.85–1.15)
PROTHROMBIN TIME: 19 SECONDS (ref 11.8–14.8)

## 2025-05-23 PROCEDURE — 250N000013 HC RX MED GY IP 250 OP 250 PS 637: Performed by: PSYCHIATRY & NEUROLOGY

## 2025-05-23 PROCEDURE — 250N000013 HC RX MED GY IP 250 OP 250 PS 637: Performed by: PHYSICIAN ASSISTANT

## 2025-05-23 PROCEDURE — 36415 COLL VENOUS BLD VENIPUNCTURE: CPT | Performed by: PSYCHIATRY & NEUROLOGY

## 2025-05-23 PROCEDURE — 250N000011 HC RX IP 250 OP 636: Performed by: PHYSICIAN ASSISTANT

## 2025-05-23 PROCEDURE — 99239 HOSP IP/OBS DSCHRG MGMT >30: CPT | Performed by: PSYCHIATRY & NEUROLOGY

## 2025-05-23 PROCEDURE — 99207 PR NO CHARGE LOS: CPT | Performed by: PHYSICIAN ASSISTANT

## 2025-05-23 PROCEDURE — 85610 PROTHROMBIN TIME: CPT | Performed by: PSYCHIATRY & NEUROLOGY

## 2025-05-23 RX ORDER — BUPROPION HYDROCHLORIDE 150 MG/1
150 TABLET ORAL EVERY MORNING
Qty: 5 TABLET | Refills: 0 | Status: SHIPPED | OUTPATIENT
Start: 2025-05-23

## 2025-05-23 RX ORDER — ENOXAPARIN SODIUM 150 MG/ML
120 INJECTION SUBCUTANEOUS EVERY 12 HOURS
Qty: 11.2 ML | Refills: 0 | Status: SHIPPED | OUTPATIENT
Start: 2025-05-23

## 2025-05-23 RX ORDER — ACAMPROSATE CALCIUM 333 MG/1
666 TABLET, DELAYED RELEASE ORAL 3 TIMES DAILY
Qty: 180 TABLET | Refills: 1 | Status: SHIPPED | OUTPATIENT
Start: 2025-05-23

## 2025-05-23 RX ORDER — NALTREXONE HYDROCHLORIDE 50 MG/1
50 TABLET, FILM COATED ORAL DAILY
Qty: 30 TABLET | Refills: 1 | Status: SHIPPED | OUTPATIENT
Start: 2025-05-23

## 2025-05-23 RX ORDER — WARFARIN SODIUM 7.5 MG/1
7.5 TABLET ORAL
Status: DISCONTINUED | OUTPATIENT
Start: 2025-05-23 | End: 2025-05-23 | Stop reason: HOSPADM

## 2025-05-23 RX ORDER — BUPROPION HYDROCHLORIDE 150 MG/1
150 TABLET ORAL EVERY MORNING
Status: DISCONTINUED | OUTPATIENT
Start: 2025-05-23 | End: 2025-05-23 | Stop reason: HOSPADM

## 2025-05-23 RX ORDER — CEPHALEXIN 500 MG/1
500 CAPSULE ORAL EVERY 12 HOURS
Qty: 8 CAPSULE | Refills: 0 | Status: SHIPPED | OUTPATIENT
Start: 2025-05-23 | End: 2025-05-23

## 2025-05-23 RX ADMIN — Medication 1 TABLET: at 08:15

## 2025-05-23 RX ADMIN — FOLIC ACID 1 MG: 1 TABLET ORAL at 08:15

## 2025-05-23 RX ADMIN — LISINOPRIL 20 MG: 20 TABLET ORAL at 08:15

## 2025-05-23 RX ADMIN — CARVEDILOL 12.5 MG: 3.12 TABLET, FILM COATED ORAL at 08:15

## 2025-05-23 RX ADMIN — THIAMINE HCL TAB 100 MG 100 MG: 100 TAB at 08:15

## 2025-05-23 RX ADMIN — ENOXAPARIN SODIUM 120 MG: 120 INJECTION SUBCUTANEOUS at 10:25

## 2025-05-23 RX ADMIN — METHOCARBAMOL 1000 MG: 500 TABLET ORAL at 08:50

## 2025-05-23 RX ADMIN — ACETAMINOPHEN 650 MG: 325 TABLET, FILM COATED ORAL at 10:29

## 2025-05-23 RX ADMIN — BUPROPION HYDROCHLORIDE 150 MG: 150 TABLET, EXTENDED RELEASE ORAL at 08:15

## 2025-05-23 RX ADMIN — CEPHALEXIN 500 MG: 500 CAPSULE ORAL at 08:15

## 2025-05-23 ASSESSMENT — ACTIVITIES OF DAILY LIVING (ADL)
ADLS_ACUITY_SCORE: 48
HYGIENE/GROOMING: INDEPENDENT
ORAL_HYGIENE: INDEPENDENT
ADLS_ACUITY_SCORE: 48
DRESS: INDEPENDENT
ADLS_ACUITY_SCORE: 48
ADLS_ACUITY_SCORE: 48
LAUNDRY: WITH SUPERVISION
ADLS_ACUITY_SCORE: 48

## 2025-05-23 NOTE — PROGRESS NOTES
Patient discharged at 1228 to home. Patient plans to take an Uber cab. Patient is out of detox monitoring status. Patient plans to seek residential treatment on his own and states he has work he needs to attend to before he figures it out. All patient belongings from room, locker, and security sent with patient. Patient escorted off the unit by Behavioral AssistantHenry.     This RN went over discharge instructions, teachings, patient's labs, and unit recommendations with patient. This RN went over patient's prescribed medications being sent home with patient from the discharge pharmacy. Patient states he has all of his other scheduled medications at home already. Patient verbalizes and demonstrates understanding of all teachings. Patient verbalizes understanding of medication instructions and indications. Patient demonstrated knowledge of how to administer Lovenox Injections. Patient denies thoughts of harm towards self or others. Patient denies suicidal ideation, plan or intent. Patient denies access to guns. Patient denies auditory/visual hallucinations. Pt denies any current medication side effects or medical issues at this time. Prior to discharge, patient stated that he felt happy and hopeful for the future.     Patient was encouraged to make a follow-up appointment with Primary Care Provider within one week of discharge. See patient AVS for appointments made.     Patient denies any further questions and is now discharged at this time.

## 2025-05-23 NOTE — PROGRESS NOTES
Brief Hospital Medicine Note:     Medicine team following up on Urine culture and INR. Nursing notes, vitals, and labs reviewed. 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      Medicine was consulted for medical co-management.    History of unprovoked DVT, PE (02/2024  Paroxysmal atrial fibrillation   Subtherapeutic INR   CHADSVASc Score 2 (HTN and DM). Chronically on warfarin. There has previously been concerns raised with patient on warfarin and continues to drink alcohol and declines to stop drinking. Previously notes indicate consideration for IVC filter, but he would need Watchman device as well. Patient is now agreeable to working towards alcohol cessation. Presented with INR of 1.58, likely in the setting of medication non-compliance while drinking. INR still subtherapeutic on discharge.   -Continue Warfarin, pharmacist to dose; INR goal 2-3   -Lovenox ordered on discharge (patient has performed with with RN and able to do independently, has also been prescribed this in the past)  -Continue PTA carvedilol   -INR to be next checked on 05/26    Urinary retention, resolved    One episode overnight in the setting of withdrawal. Was straight cath x 1. Voided this AM without difficulty. No prior hx of retention. Did have some burning with urination this AM, but possibly as a result from straight cath. No hx of BPH. UA initially returned as appearing infectious, but culture with growing < 10k mixed urogenital [place]. No further episodes of retention   -Discontinued Keflex     Medicine will sign off at this time. Thank you for the consult. If further questions arise, please  reach out to Medicine Team.    Cris Guerrero PA-C  Hospitalist Service  Contact information available via [facility] Paging/Directory

## 2025-05-23 NOTE — PROGRESS NOTES
Patient have not receive valium for the past 24 hours. Last valium for alcohol withdrawal was given yesterday at 2010. Total valium received during this admission was 20 mg. Hence, patient is out of detox . Staff will continue to monitor.

## 2025-05-23 NOTE — PROVIDER NOTIFICATION
"Patient refused noon blood sugar check. Patient stated, \"I am leaving in a half hour anyways and I don't do this at home.\" Internal medicine, Cris Guerrero, notified.   "

## 2025-05-23 NOTE — PLAN OF CARE
Problem: Sleep Disturbance  Goal: Adequate Sleep/Rest  Outcome: Progressing   Goal Outcome Evaluation:       Pt.continues to appear stable since after safely detoxifying from alcohol. He was rounded on Q15 minutes for safety. Pt.appeared asleep and comfortable through the night. Breathing was quiet and regular. No prn medication administered. No safety or behavioral concerns observed or reported. Will continue to monitor.

## 2025-05-23 NOTE — PLAN OF CARE
"Goal Outcome Evaluation:    Plan of Care Reviewed With: patient      Patient is up and visible in the milieu. Patient is pleasant and cooperative. Patient is ambulating independently, balanced and steady with a walker. Patient cervical collar in tact and continues to be worn. Patient is alert and oriented x 4. Patient is attending/participating in unit programming. Pt is social with peers. Affect is blunted/flat, mood is calm. Patient reports anxiety/depression are low today, rating anxiety a 2/10 and depression a 1/10 on the 1-10 severity scale. Patient denies SI/SIB/HI. Pt denies auditory/visual hallucinations. Patient verbally contracts for safety on the unit. Patient denies skin issues. Patient denies any issues voiding.     Patient is tolerating medications well, denies any current side effects.     Patient BG this am = 167.     Patient remains out of detox monitoring status. Patient reports a good appetite, and did not identify any issues with sleep. Patient has an order to discharge today. Rest, fluids, and food encouraged. Status 15 checks remain. Patient is able to make needs known appropriately. Patient denies any unmet needs at this time.     Blood pressure (!) 142/85, pulse 75, temperature 98.3  F (36.8  C), temperature source Oral, resp. rate 16, height 1.854 m (6' 1\"), weight (!) 158.8 kg (350 lb), SpO2 95%.        Update:   At 1029, patient requested Tylenol 650mg x 1 for a headache (see MAR).      "

## 2025-05-23 NOTE — DISCHARGE SUMMARY
"Psychiatric Discharge Summary    Cody Bolton MRN# [id number]   Age: 44 year old YOB: 1980     Date of Admission:  5/21/2025  Date of Discharge:  5/23/2025  Admitting Physician:  Gabrielle Morton DO  Discharge Physician:  Gabrielle Morton DO         Event Leading to Hospitalization:   Cody is a 44 year old male with history of depression and substance use who presented to ED seeking detox from alcohol.     Chart review completed including ED notes from this encounter; recent anticoagulation notes for INR checks/warfarin therapy, most recent 5/16; most recent PCP visit for Type 2 DM, HLD, HTN, CARLIE and cervical fracture history on 5/9/25; ED visit on 5/5/25 at [facility] for weakness; medical admissions on 3/26 for cervical fractures and fall and 4/11 for fall and weakness; previous admission to  2/24/25 where pt discharged home with IOP and 8/19/24 where pt discharged to [facility].      Per ED physician note: Cody Bolton is a 44 year old male with a history of alcohol use disorder who presents to the ED for evaluation of alcohol intoxication. He reports getting \"too drunk\" tonight. No falls or injuries. Patient has been wearing a neck collar due to a neck fracture he had since 3/25. He drinks because \"I like the feeling\". Patient states being in an out-patient program. Denies suicidal thoughts or homicidal thoughts.      History is limited due to intoxication.        Upon interview: Patient confirms information above, reports since he discharged from this unit in February that he started to engage in an outpatient program and he has had periods of short relapses on and off for the last month, he had 8 days of sobriety before last week when he started drinking vodka again.  He reports that he drinks at least 750 mL/day, denies any other substance use.  He has tolerance to alcohol, drinking more than intended, difficulty cutting down and withdrawal symptoms when he stops drinking " "including yesterday having some visual hallucinations that look like \"a movie on the wall\".  Denies any current hallucinations, he also was having some shaking, tremors and difficulty with his ambulation and has been using a wheelchair and then a walker for safe ambulation on the unit.  He reports his gait has been improving, does have history of falls which resulted in his cervical fracture that he currently has C-collar on for, reports he is to wear it for 3 months.  He denies any history of seizures or DTs.  He also notes history of depression and anxiety, reports that his mood has been \"good\" aside from some difficulty with concentration he notes his mood symptoms have been well managed with his PTA bupropion.  He was not aware of the seizure risk with this medication, he does feel like it is helpful and will want to resume when he is out of detox.  He denies having any SI or HI.  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       See Admission note by Gabrielle Morton DO on 5/22/25 for additional details.          Diagnoses:     Alcohol use disorder, severe, dependence with withdrawal with complication   MDD, recurrent, mild  Unspecified personality disorder per hx  Unspecified eating disorder with morbid obestiy   Hx of DVT/PE  CARLIE  HTN  High anion gap metabolic acidosis  Type 2 DM  Hx of Afib  HLD  Recent cervical fracture, c1-c6-7 osteophyte fractures   Subtherapeutic INR  Urinary retention  S/p gastric bypass        Clinically Significant Risk Factors                   # Hypertension: Noted on problem list     " "      # DMII: A1C = 6.8 % (Ref range: 0.0 - 5.6 %) within past 6 months, PRESENT ON ADMISSION  # Morbid Obesity: Estimated body mass index is 46.18 kg/m  as calculated from the following:    Height as of this encounter: 1.854 m (6' 1\").    Weight as of this encounter: 158.8 kg (350 lb)., PRESENT ON ADMISSION     # Financial/Environmental Concerns:                  Labs:     Recent Results (from the past week)   Comprehensive metabolic panel    Collection Time: 05/21/25  5:42 AM   Result Value Ref Range    Sodium 136 135 - 145 mmol/L    Potassium 4.0 3.4 - 5.3 mmol/L    Carbon Dioxide (CO2) 18 (L) 22 - 29 mmol/L    Anion Gap 22 (H) 7 - 15 mmol/L    Urea Nitrogen 13.0 6.0 - 20.0 mg/dL    Creatinine 0.91 0.67 - 1.17 mg/dL    GFR Estimate >90 >60 mL/min/1.73m2    Calcium 8.6 (L) 8.8 - 10.4 mg/dL    Chloride 96 (L) 98 - 107 mmol/L    Glucose 172 (H) 70 - 99 mg/dL    Alkaline Phosphatase 121 40 - 150 U/L    AST 39 0 - 45 U/L    ALT 41 0 - 70 U/L    Protein Total 7.6 6.4 - 8.3 g/dL    Albumin 4.5 3.5 - 5.2 g/dL    Bilirubin Total 0.5 <=1.2 mg/dL   Magnesium    Collection Time: 05/21/25  5:42 AM   Result Value Ref Range    Magnesium 1.7 1.7 - 2.3 mg/dL   Ethanol Level Blood    Collection Time: 05/21/25  5:42 AM   Result Value Ref Range    Ethanol Level Blood 0.18 (H) <=0.01 g/dL   CBC with platelets and differential    Collection Time: 05/21/25  5:42 AM   Result Value Ref Range    WBC Count 9.7 4.0 - 11.0 10e3/uL    RBC Count 5.01 4.40 - 5.90 10e6/uL    Hemoglobin 13.3 13.3 - 17.7 g/dL    Hematocrit 40.5 40.0 - 53.0 %    MCV 81 78 - 100 fL    MCH 26.5 26.5 - 33.0 pg    MCHC 32.8 31.5 - 36.5 g/dL    RDW 16.2 (H) 10.0 - 15.0 %    Platelet Count 300 150 - 450 10e3/uL    % Neutrophils 70 %    % Lymphocytes 21 %    % Monocytes 7 %    % Eosinophils 1 %    % Basophils 1 %    % Immature Granulocytes 0 %    NRBCs per 100 WBC 0 <1 /100    Absolute Neutrophils 6.8 1.6 - 8.3 10e3/uL    Absolute Lymphocytes 2.1 0.8 - 5.3 10e3/uL    " Absolute Monocytes 0.7 0.0 - 1.3 10e3/uL    Absolute Eosinophils 0.1 0.0 - 0.7 10e3/uL    Absolute Basophils 0.1 0.0 - 0.2 10e3/uL    Absolute Immature Granulocytes 0.0 <=0.4 10e3/uL    Absolute NRBCs 0.0 10e3/uL   Phosphorus    Collection Time: 05/21/25  5:42 AM   Result Value Ref Range    Phosphorus 2.8 2.5 - 4.5 mg/dL   INR    Collection Time: 05/21/25  8:00 AM   Result Value Ref Range    INR 1.58 (H) 0.85 - 1.15    PT 18.8 (H) 11.8 - 14.8 Seconds   Urine Drug Screen Panel    Collection Time: 05/21/25 10:52 AM   Result Value Ref Range    Amphetamines Urine Screen Negative Screen Negative    Barbituates Urine Screen Negative Screen Negative    Benzodiazepine Urine Screen Negative Screen Negative    Cannabinoids Urine Screen Negative Screen Negative    Cocaine Urine Screen Negative Screen Negative    Fentanyl Qual Urine Screen Negative Screen Negative    Opiates Urine Screen Negative Screen Negative    PCP Urine Screen Negative Screen Negative   Magnesium    Collection Time: 05/21/25 11:45 AM   Result Value Ref Range    Magnesium 1.7 1.7 - 2.3 mg/dL   Glucose by meter    Collection Time: 05/21/25  5:20 PM   Result Value Ref Range    GLUCOSE BY METER POCT 172 (H) 70 - 99 mg/dL   Glucose by meter    Collection Time: 05/21/25  9:36 PM   Result Value Ref Range    GLUCOSE BY METER POCT 199 (H) 70 - 99 mg/dL   GGT    Collection Time: 05/22/25  7:33 AM   Result Value Ref Range    GGT 72 (H) 8 - 61 U/L   TSH with free T4 reflex    Collection Time: 05/22/25  7:33 AM   Result Value Ref Range    TSH 3.12 0.30 - 4.20 uIU/mL   INR    Collection Time: 05/22/25  7:33 AM   Result Value Ref Range    INR 1.52 (H) 0.85 - 1.15    PT 18.8 (H) 11.8 - 14.8 Seconds   Basic metabolic panel    Collection Time: 05/22/25  7:33 AM   Result Value Ref Range    Sodium 137 135 - 145 mmol/L    Potassium 3.7 3.4 - 5.3 mmol/L    Chloride 98 98 - 107 mmol/L    Carbon Dioxide (CO2) 24 22 - 29 mmol/L    Anion Gap 15 7 - 15 mmol/L    Urea Nitrogen 15.1  6.0 - 20.0 mg/dL    Creatinine 1.06 0.67 - 1.17 mg/dL    GFR Estimate 88 >60 mL/min/1.73m2    Calcium 9.2 8.8 - 10.4 mg/dL    Glucose 146 (H) 70 - 99 mg/dL   Extra Purple Top Tube    Collection Time: 05/22/25  7:33 AM   Result Value Ref Range    Hold Specimen JIC    Glucose by meter    Collection Time: 05/22/25  8:04 AM   Result Value Ref Range    GLUCOSE BY METER POCT 198 (H) 70 - 99 mg/dL   Glucose by meter    Collection Time: 05/22/25 11:47 AM   Result Value Ref Range    GLUCOSE BY METER POCT 158 (H) 70 - 99 mg/dL   UA with Microscopic reflex to Culture    Collection Time: 05/22/25 12:27 PM    Specimen: Urine, Midstream   Result Value Ref Range    Color Urine Orange (A) Colorless, Straw, Light Yellow, Yellow    Appearance Urine Slightly Cloudy (A) Clear    Glucose Urine Negative Negative mg/dL    Bilirubin Urine Negative Negative    Ketones Urine Trace (A) Negative mg/dL    Specific Gravity Urine 1.028 1.003 - 1.035    Blood Urine Small (A) Negative    pH Urine 5.5 5.0 - 7.0    Protein Albumin Urine 30 (A) Negative mg/dL    Urobilinogen Urine Normal Normal mg/dL    Nitrite Urine Negative Negative    Leukocyte Esterase Urine Trace (A) Negative    Mucus Urine Present (A) None Seen /LPF    RBC Urine 14 (H) <=2 /HPF    WBC Urine 12 (H) <=5 /HPF    Squamous Epithelials Urine 2 (H) <=1 /HPF    Transitional Epithelials Urine <1 <=1 /HPF    Hyaline Casts Urine 10 (H) <=2 /LPF   Urine Culture    Collection Time: 05/22/25 12:27 PM    Specimen: Urine, Midstream   Result Value Ref Range    Culture <10,000 CFU/mL Mixture of urogenital austin    Glucose by meter    Collection Time: 05/22/25  5:29 PM   Result Value Ref Range    GLUCOSE BY METER POCT 146 (H) 70 - 99 mg/dL   Glucose by meter    Collection Time: 05/22/25 11:08 PM   Result Value Ref Range    GLUCOSE BY METER POCT 135 (H) 70 - 99 mg/dL   Glucose by meter    Collection Time: 05/23/25  7:49 AM   Result Value Ref Range    GLUCOSE BY METER POCT 167 (H) 70 - 99 mg/dL    INR    Collection Time: 05/23/25  8:55 AM   Result Value Ref Range    INR 1.56 (H) 0.85 - 1.15    PT 19.0 (H) 11.8 - 14.8 Seconds   Extra Green Top (Lithium Heparin) Tube    Collection Time: 05/23/25  8:55 AM   Result Value Ref Range    Hold Specimen JIC    Extra Purple Top Tube    Collection Time: 05/23/25  8:55 AM   Result Value Ref Range    Hold Specimen JIC    Extra Purple Top Tube    Collection Time: 05/23/25  8:55 AM   Result Value Ref Range    Hold Specimen JIC               Consults:   IM consult placed for management of other medical concerns, per consult note on 5/22/25:   Cody Bolton is a 44 year old male with  PMH significant for alcohol use disorder, unprovoked DVT/PE (on chronic warfarin), paroxysmal A fib, HTN, HLT< DMT2, morbid obesity s/p gastric bypass, CARLIE, MDD, anxiety who was admitted on 5/21/2025 to inpatient detox for acute alcohol withdrawal.      Medicine was consulted for medical co-management.      Acute alcohol withdrawal   Alcohol use disorder   Presented with blood alcohol level of 0.18. Has been typically drinking ~750mL daily over the past 8 days, prior to that he had 8 days of sobreity. Last drink was just prior to arrival to ED. Denies a history of withdrawal seizures or Dts.   -Management per Psychiatry   -MSSA/CIWA with valium   -Thiamine/folate/MVI     History of unprovoked DVT, PE (02/2024  Paroxysmal atrial fibrillation   Subtherapeutic INR   CHADSVASc Score 2 (HTN and DM). Chronically on warfarin. There has previously been concerns raised with patient on warfarin and continues to drink alcohol and declines to stop drinking. Previously notes indicate consideration for IVC filter, but he would need Watchman device as well. Patient is now agreeable to working towards alcohol cessation. Presented with INR of 1.58, likely in the setting of medication non-compliance while drinking.    -Continue Warfarin, pharmacist to dose; INR goal 1-2   -Lovenox for no while bridging with  warfarin   -Continue PTA carvedilol   -INR daily      HTN  -Continue PTA carvedilol 12.5mg BID (as above) and lisinopril 20mg daily      Recent C1-C6-7 osteophyte fractures   Hospitalized 03/26-03/21after a fall and C1 ring fracture and C6-7 osteophyte fracture. Injuries sustained in the setting of alcohol use resulting in a fall from a chair and fell forward. He was evaluated by Orthopedics and Neurosurgery. Recommended to wear cervical collar at all times, Phily collar for hygiene. Last seen by NSG in clinic on 04/25/2025. Patient notes that he has a crack in his aspen collar.   -Pain: PRN methocarbamol and acetaminophen   -Continue use of c-collar   -Orthotics consults for new aspen collar   -Follow up with Neurosurgery in clinic      Urinary retention   One episode overnight in the setting of withdrawal. Was straight cath x 1. Voided this AM without difficulty. No prior hx of retention. Did have some burning with urination this AM, but possibly as a result from straight cath. No hx of BPH.   -UA ordered  -Monitored for further episodes of retention       Diabetes mellitus, type 2, A1c 6.8% 05/12/2025  PTA Mounjaro weekly, metformin XR 1000mg daily, but hasn't actually started taking Mounjaro yet.   -Continue PTA metformin      Morbid obesity   S/p gastric bypass surgery   MDD  Anxiety: PTA Wellbutrin XL, defer management to Psychiatry with continued alcohol use   HLD: continue PA simvastatin   CARLIE: PTA CPAP      Resolved:   AGMA, resolved   Presented with . Low suspicion of DKA, greater suspicion that AGMA in the setting of alcohol use. Repeat showed this had resolved       Medicine will follow up on NSG thoughts on new collar.         The patient's care was discussed with the Bedside Nurse, Patient, and Primary team via this note.     Clinically Significant Risk Factors Present on Admission          # Hypochloremia: Lowest Cl = 96 mmol/L in last 2 days, will monitor as appropriate  # Hypocalcemia: Lowest  "Ca = 8.6 mg/dL in last 2 days, will monitor and replace as appropriate    # Anion Gap Metabolic Acidosis: Highest Anion Gap = 22 mmol/L in last 2 days, will monitor and treat as appropriate   # Drug Induced Coagulation Defect: home medication list includes an anticoagulant medication    # Hypertension: Noted on problem list            # DMII: A1C = 6.8 % (Ref range: 0.0 - 5.6 %) within past 6 months    # Morbid Obesity: Estimated body mass index is 46.18 kg/m  as calculated from the following:    Height as of this encounter: 1.854 m (6' 1\").    Weight as of this encounter: 158.8 kg (350 lb).       # Financial/Environmental Concerns:            Cris Simpson PA-C  Hospitalist Service    Brief Hospital Medicine Note:      Medicine team following up on Urine culture and INR. Nursing notes, vitals, and labs reviewed. 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      Medicine was consulted for medical co-management.     History of unprovoked DVT, PE (02/2024  Paroxysmal atrial fibrillation   Subtherapeutic INR   CHADSVASc Score 2 (HTN and DM). Chronically on warfarin. There has previously been concerns raised with patient on warfarin and continues to drink alcohol and declines to stop drinking. Previously notes indicate consideration for IVC filter, but he would need Watchman device as well. Patient is now agreeable to working towards alcohol cessation. Presented with INR of 1.58, likely in the setting of medication non-compliance while drinking. INR still subtherapeutic on discharge.   -Continue Warfarin, pharmacist to dose; INR goal 2-3   -Lovenox ordered on discharge (patient " has performed with with RN and able to do independently, has also been prescribed this in the past)  -Continue PTA carvedilol   -INR to be next checked on 05/26     Urinary retention, resolved    One episode overnight in the setting of withdrawal. Was straight cath x 1. Voided this AM without difficulty. No prior hx of retention. Did have some burning with urination this AM, but possibly as a result from straight cath. No hx of BPH. UA initially returned as appearing infectious, but culture with growing < 10k mixed urogenital austin. No further episodes of retention   -Discontinued Keflex      Medicine will sign off at this time. Thank you for the consult. If further questions arise, please reach out to Medicine Team.     Cris Guerrero PA-C  Hospitalist Service           Hospital Course:   Cody Bolton is a 44 year old male with history of depression and substance use who presented to ED seeking detox from alcohol. Medically cleared in ED, admitted to  as voluntary patient. Drinking at least 750mL vodka daily for past week, EtOH IVAN 0.18(H), UDS negative. MSSA with diazepam started for alcohol withdrawal. Withdrawal precautions in place, denies history of seizures. Admission labs ordered and reviewed those resulted. IM consult placed. Pt reporting stability in mood, denying safety concerns including SI and HI. PTA medications continued as appropriate, holding PTA bupropion due to seizure risk while in withdrawal. Pt reports goals for hospitalization are to complete detox then return home and return to [facility], plans to pursue residential CD tx if needed in June following work event.     Cody Bolton did participate in groups and was visible in the milieu.     The patient's symptoms of alcohol withdrawal improved. IM continued to follow for additional medical concerns as above. He was out of detox on 5/23 and requested to discharge home with plan to follow up with residential CD tx in the near future after he  completes a work event. He was agreeable to restart acamprosate and naltrexone for AUD MAT on discharge, also ordered a few days of lower bupropion dose to retitrate upon completing detox.     Today Cody Bolton reports having no thoughts of harming self or others. In addition, he has notable risk factors for self-harm, including single status, anxiety, and substance abuse. However, risk is mitigated by commitment to family, absence of past attempts, ability to volunteer a safety plan, history of seeking help when needed, and pt is future oriented and denying SI. Therefore, based on all available evidence including the factors cited above, he does not appear to be at imminent risk for self-harm, does not meet criteria for a 72-hr hold, and therefore remains appropriate for ongoing outpatient level of care.     Cody Bolton was discharged to home. At the time of discharge Cody Bolton was determined to not be a danger to himself or others.          Discharge Medications:     Discharge Medication List as of 5/23/2025 12:05 PM        CONTINUE these medications which have CHANGED    Details   acamprosate (CAMPRAL) 333 MG EC tablet Take 2 tablets (666 mg) by mouth 3 times daily., Disp-180 tablet, R-1, E-Prescribe      !! buPROPion (WELLBUTRIN XL) 150 MG 24 hr tablet Take 1 tablet (150 mg) by mouth every morning. Take for 5 days then resume previous dose of 300mg daily, Disp-5 tablet, R-0, E-Prescribe      enoxaparin ANTICOAGULANT (LOVENOX) 120 MG/0.8ML syringe Inject 0.8 mLs (120 mg) subcutaneously every 12 hours., Disp-11.2 mL, R-0, E-Prescribe      naltrexone (DEPADE/REVIA) 50 MG tablet Take 1 tablet (50 mg) by mouth daily., Disp-30 tablet, R-1, E-Prescribe       !! - Potential duplicate medications found. Please discuss with provider.        CONTINUE these medications which have NOT CHANGED    Details   acetaminophen (TYLENOL) 500 MG tablet Take 1,000 mg by mouth 3 times daily as needed for mild pain.,  Historical      !! buPROPion (WELLBUTRIN XL) 300 MG 24 hr tablet Take 1 tablet (300 mg) by mouth every morning., Disp-90 tablet, R-3, E-Prescribe      carvedilol (COREG) 12.5 MG tablet Take 1 tablet (12.5 mg) by mouth 2 times daily (with meals)., Disp-180 tablet, R-3, E-Prescribe      fluticasone (FLONASE) 50 MCG/ACT nasal spray Spray 1 spray into both nostrils daily as needed for rhinitis or allergies., Historical      folic acid (FOLVITE) 1 MG tablet Take 1 tablet (1 mg) by mouth daily., Disp-30 tablet, R-1, E-Prescribe      hydrOXYzine HCl (ATARAX) 50 MG tablet Take 50 mg by mouth every 6 hours as needed for other (Pain)., Historical      lisinopril (ZESTRIL) 20 MG tablet Take 1 tablet (20 mg) by mouth daily., Disp-90 tablet, R-3, E-Prescribe      metFORMIN (GLUCOPHAGE XR) 500 MG 24 hr tablet Take 2 tablets (1,000 mg) by mouth daily (with dinner)., Disp-180 tablet, R-3, E-Prescribe      methocarbamol (ROBAXIN) 500 MG tablet Take 2 tablets (1,000 mg) by mouth 4 times daily for 15 days., Disp-120 tablet, R-0, E-Prescribe      multivitamin w/minerals (THERA-VIT-M) tablet Take 1 tablet by mouth daily., Disp-30 tablet, R-1, E-Prescribe      naloxone (NARCAN) 4 MG/0.1ML nasal spray Spray 1 spray (4 mg) into one nostril alternating nostrils as needed for opioid reversal. every 2-3 minutes until assistance arrives, Disp-2 each, R-0, E-Prescribe      sildenafil (VIAGRA) 100 MG tablet Take 1 tablet (100 mg) by mouth daily as needed (erectile dysfunction)., Disp-30 tablet, R-3, E-Prescribe      simvastatin (ZOCOR) 20 MG tablet Take 1 tablet (20 mg) by mouth at bedtime., Disp-90 tablet, R-3, E-Prescribe      thiamine (B-1) 100 MG tablet Take 1 tablet (100 mg) by mouth daily., Disp-30 tablet, R-0, E-Prescribe      tirzepatide (MOUNJARO) 5 MG/0.5ML SOAJ auto-injector pen Inject 0.5 mLs (5 mg) subcutaneously once a week., Disp-2 mL, R-0, E-Prescribe      warfarin ANTICOAGULANT (COUMADIN) 5 MG tablet Otherwise 5 mg every Sun,  "Tue, Thu; 7.5 mg all other days, Historical       !! - Potential duplicate medications found. Please discuss with provider.        STOP taking these medications       cephALEXin (KEFLEX) 500 MG capsule Comments:   Reason for Stopping:         oxyCODONE (ROXICODONE) 5 MG tablet Comments:   Reason for Stopping:                    Psychiatric Examination:   Appearance:  awake, alert, adequately groomed, and notable for c-collar in place   Attitude:  cooperative  Eye Contact:  good  Mood:  \"doing okay\" appears euthymic  Affect:  appropriate and in normal range and mood congruent  Speech:  clear, coherent and normal prosody  Psychomotor Behavior:  no evidence of tardive dyskinesia, dystonia, or tics  Thought Process:  logical, linear, and goal oriented  Associations:  no loose associations  Thought Content:  no evidence of suicidal ideation or homicidal ideation and no evidence of psychotic thought  Insight:  fair  Judgment:  intact  Oriented to:  time, person, and place  Attention Span and Concentration:  intact  Recent and Remote Memory:  intact  Language: English fluent  Fund of Knowledge: appropriate  Muscle Strength and Tone: normal  Gait and Station: ambulating with walker intermittently          Discharge Plan:   Recommendation:  Please abstain from the use of all mood altering chemicals.      Please contact [facility] at [phone] to obtain a list of treatment centers that are covered by your insurance.     Disposition: Home     Follow-up Appointment:   PCP and Center    Primary Care Provider  Phone Center     Yovana Felipe [phone] [id number] [facility] [id number]      Please make a follow-up appointment with Primary Care Provider within one week of discharge.        Attestation:  Patient has been seen and evaluated by me, Gabrielle Morton DO on day of discharge. 36 minutes were spent in coordination of discharge planning.         "

## 2025-05-23 NOTE — PLAN OF CARE
Problem: Depressive Symptoms  Goal: Social and Therapeutic (Depression)  Description: Signs and symptoms of listed problems will be absent or manageable.  Outcome: Progressing     Problem: Alcohol Withdrawal  Goal: Alcohol Withdrawal Symptom Control  Outcome: Progressing   Goal Outcome Evaluation:    Plan of Care Reviewed With: patient      Patient pleasant and cooperative, visible in milieu.  Flat affect, denies SI/SIB, endorsed depression/anxiety.  MSSA 4 and 2 no valium given.   and 135 received scheduled medication as ordered.  Good appetite, medication compliant. Received prn tylenol per request for pain.  UA abnormal, patient on antibiotics for UTI.  Patient attended and participated in the group therapy. socialized and watched T.V with peers. Patient on medical bed. Uses CPAP for sleep. Will continue to monitor closely.

## 2025-05-23 NOTE — PROGRESS NOTES
"[address]     Daily Encounter: Met with team, discussed patient progress, discharge plan and any impediments to discharge.    5/23/25: Pt has decided to return home and seek out residential treatment on his own due to him having a scheduled  \"gig\"  on 6/2/25. Pt was encouraged to contact his insurance to find out which treatment centers are covered. Pt and writer updated Safety Plan.    Pt shares he did start IOP at [facility] after his last admit to 3A in February however has relapsed and began drinking again 3-4 weeks ago. Pt shares he realized he is in need or residential MARCY paperwork. Pt shares he is unsure of where he would like to attend treatment. He reported attending [facility] in the past but reports it is to expensive due to board and lodge not being covered. Writer notified him I would complete his update and we could discuss options at a later time.     Insurance: [employer]     Legal Status:  Vol   County: NA  File Number: NA  Start and expiration date of commitment: NA    SUDs Assessment Status: Needs update for placement     ROIs on file: None at this time.      Living Situation: Lives in . Ex wife and children.     Current Providers, Supports & Collateral:  LADC at [facility], PCP: Yovana Felipe MD - [facility]  Therapist: Lucy Mendoza (virtual sessions)    Current Plan/Referral Status: Residential treatment.      Safety Plan Status: reviewed and updated with patient      Tere Villa, ISA  [facility] - Adult Inpatient Addiction Psychiatry Unit           "

## 2025-05-24 ENCOUNTER — PATIENT OUTREACH (OUTPATIENT)
Dept: CARE COORDINATION | Facility: CLINIC | Age: 45
End: 2025-05-24
Payer: COMMERCIAL

## 2025-05-24 NOTE — PROGRESS NOTES
[facility]: Transitions of Care Outreach  Chief Complaint   Patient presents with    Clinic Care Coordination - Post Hospital       Most Recent Admission Date: 5/21/2025   Most Recent Admission Diagnosis: Substance use disorder - F19.90     Most Recent Discharge Date: 5/23/2025   Most Recent Discharge Diagnosis: Other closed nondisplaced fracture of sixth cervical vertebra, initial encounter (H) - S12.591A  Closed nondisplaced fracture of fifth cervical vertebra, unspecified fracture morphology, initial encounter (H) - S12.401A  Closed nondisplaced fracture of first cervical vertebra, unspecified fracture morphology, initial encounter (H) - S12.001A  Closed nondisplaced fracture of posterior arch of first cervical vertebra, initial encounter (H) - S12.031A  Anxiety - F41.9  Alcohol dependence with withdrawal with complication (H) - F10.239  History of DVT (deep vein thrombosis) - Z86.718  Acute pulmonary embolism without acute cor pulmonale, unspecified pulmonary embolism type (H) - I26.99  Paroxysmal atrial fibrillation (H) - I48.0  Urinary retention - R33.9     Transitions of Care Assessment    Discharge Assessment  How are you doing now that you are home?: Patient states he is feeling some pain but trying to manage it. Better overall outside of the pain, pain is about the same.  How are your symptoms? (Red Flag symptoms escalate to triage hotline per guidelines): Unchanged  Do you know how to contact your clinic care team if you have future questions or changes to your health status? : Yes  Does the patient have their discharge instructions? : Yes  Does the patient have questions regarding their discharge instructions? : No  Were you started on any new medications or were there changes to any of your previous medications? : Yes  Does the patient have all of their medications?: Yes  Do you have questions regarding any of your medications? : No  Do you have all of your needed medical supplies  or equipment (DME)?  (i.e. oxygen tank, CPAP, cane, etc.): Yes         Post-op (Clinicians Only)  Did the patient have surgery or a procedure: No  Fever: No  Chills: No  Eating & Drinking: eating and drinking without complaints/concerns  Additional Symptoms:  (Denies)  Bowel Function: loose stools  Date of last BM: 05/24/25  Urinary Status: voiding without complaint/concerns    [facility] Explained and offered Care Coordination support to eligible patients: Yes    Patient accepted? Yes    Follow up Plan     Discharge Follow-Up  Discharge follow up appointment scheduled in alignment with recommended follow up timeframe or Transitions of Risk Category? (Low = within 30 days; Moderate= within 14 days; High= within 7 days): Yes  Discharge Follow Up Appointment Date: 06/10/25  Discharge Follow Up Appointment Scheduled with?: Specialty Care Provider    Future Appointments   Date Time Provider Department Center   6/10/2025 12:30 PM [id number]   6/10/2025  1:00 PM Elian Hendrix PA-C [facility]   8/12/2025  4:30 PM Yovana Felipe MD [facility]       Outpatient Plan as outlined on AVS reviewed with patient.    For any urgent concerns, please contact our 24 hour nurse triage line: [phone] ([phone])       Skylar Mckinney RN

## 2025-05-27 ENCOUNTER — DOCUMENTATION ONLY (OUTPATIENT)
Dept: SLEEP MEDICINE | Facility: CLINIC | Age: 45
End: 2025-05-27
Payer: COMMERCIAL

## 2025-05-27 DIAGNOSIS — G47.33 OBSTRUCTIVE SLEEP APNEA: Primary | ICD-10-CM

## 2025-05-27 DIAGNOSIS — Z09 HOSPITAL DISCHARGE FOLLOW-UP: ICD-10-CM

## 2025-05-27 RX ORDER — FOLIC ACID 1 MG/1
1 TABLET ORAL DAILY
Qty: 30 TABLET | Refills: 11 | Status: SHIPPED | OUTPATIENT
Start: 2025-05-27

## 2025-05-27 NOTE — PROGRESS NOTES
Patient was offered choice of vendor and chose [facility].  Patient Cody Bolton was set up at [place] on May 27, 2025. Patient received a Resmed Airsense 10 Pressures were set at 5-15 cm H2O.   Patient s ramp is 5 cm H2O for Auto and FLEX/EPR is EPR, 2.  Patient received a Resmed Mask name: AIRFIT F20  Full Face mask size Medium, heated tubing and heated humidifier.  Patient has the following compliance requirements: none .    Helena Klein

## 2025-05-28 ENCOUNTER — ANTICOAGULATION THERAPY VISIT (OUTPATIENT)
Dept: ANTICOAGULATION | Facility: CLINIC | Age: 45
End: 2025-05-28
Payer: COMMERCIAL

## 2025-05-28 ENCOUNTER — TELEPHONE (OUTPATIENT)
Dept: FAMILY MEDICINE | Facility: CLINIC | Age: 45
End: 2025-05-28
Payer: COMMERCIAL

## 2025-05-28 ENCOUNTER — TELEPHONE (OUTPATIENT)
Dept: ANTICOAGULATION | Facility: CLINIC | Age: 45
End: 2025-05-28
Payer: COMMERCIAL

## 2025-05-28 DIAGNOSIS — Z86.711 HISTORY OF PULMONARY EMBOLISM: Primary | ICD-10-CM

## 2025-05-28 DIAGNOSIS — Z86.718 HISTORY OF DVT (DEEP VEIN THROMBOSIS): ICD-10-CM

## 2025-05-28 LAB — INR (EXTERNAL): 2 (ref 0.9–1.1)

## 2025-05-28 NOTE — PROGRESS NOTES
ANTICOAGULATION MANAGEMENT     Cody WALLACE Young 44 year old male is on warfarin with therapeutic INR result. (Goal INR 2.0-3.0)    Recent labs: (last 7 days)     05/28/25  1029   INR 2.0*       ASSESSMENT     Source(s): Chart Review and Home Care/Facility Nurse     Warfarin doses taken: Warfarin taken as instructed  Diet: Patient is planning to abstain from alcohol going forward, will be going to inpatient treatment possibly 6/10 at next follow up appt  Medication/supplement changes: Keflex during admission, stopped before discharge  New illness, injury, or hospitalization: Yes: hospitalized on 5/21-5/23 for alcohol dependence with withdrawal with complication  Reporting diarrhea multiple episodes per day  Signs or symptoms of bleeding or clotting: No  Previous result: Subtherapeutic  Additional findings: home care plans to be seeing patient through 6/13 and then plans to discharge due to inpatient treatment plan.  Due to diarrhea reported, will continue Maintenance dose, may need to adjust Maintenance dose going forward if patient is not using alcohol.        PLAN     Recommended plan for temporary change(s) and ongoing change(s) affecting INR     Dosing Instructions: Continue your current warfarin dose with next INR in 1 week       Summary  As of 5/28/2025      Full warfarin instructions:  5 mg every Sun, Tue, Thu; 7.5 mg all other days   Next INR check:  6/4/2025               Telephone call with [facility] home care nurse who verbalizes understanding and agrees to plan    Orders given to  Homecare nurse/facility to recheck    Education provided: Please call back if any changes to your diet, medications or how you've been taking warfarin    Plan made per [facility] anticoagulation protocol    Jessi Brown RN  5/28/2025  Anticoagulation Clinic  Serviceful for routing messages: fransisco PABLO  [facility] patient phone line: [phone]        _______________________________________________________________________      Anticoagulation Episode Summary       Current INR goal:  2.0-3.0   TTR:  42.4% (10.3 mo)   Target end date:  Indefinite   Send INR reminders to:  KEO PABLO    Indications    Acute pulmonary embolism without acute cor pulmonale  unspecified pulmonary embolism type (H) (Resolved) [I26.99]  History of DVT (deep vein thrombosis) (Resolved) [Z86.718]  History of pulmonary embolism [Z86.711]  History of DVT (deep vein thrombosis) [Z86.718]             Comments:  --             Anticoagulation Care Providers       Provider Role Specialty Phone number    Aaseby-Aguilera, Ramona Ann, PA-C Referring Family Medicine [phone]    Yovana Felipe MD Referring Family Medicine [phone]

## 2025-05-28 NOTE — TELEPHONE ENCOUNTER
Manisha LOW is with the patient. The patient had a recent discharge from the hospital. Informed OK to do the INR and call back with INR so that the INR can get back on the home care schedule.    Manisha LOW agrees with the plan.    Valerie Rose RN    [facility]ticoagulation [facility]

## 2025-05-28 NOTE — TELEPHONE ENCOUNTER
"Pt is \"not too bad\". Started on new medications and feels good about them. Has been experiencing diarrhea intermittently. Discussed use of probiotic and/or yogurt with active cultures.    \"I don't feel like I am functioning as an adult\". Feels that since incident with breaking neck, not able to function as an adult due to needing additional help with everything and getting fatigued so quickly/easily. Advised to talk with provider at follow up appointment tomorrow.     States 8-9 days sober and doing well with that currently. Has HHA and HH RN coming out today.    Advised to call clinic with any questions or concerns. Expressed understanding and acceptance of the plan. Had no further questions at this time.    Jovana ESPINOSA, RN, PHN    "

## 2025-05-29 ENCOUNTER — OFFICE VISIT (OUTPATIENT)
Dept: FAMILY MEDICINE | Facility: CLINIC | Age: 45
End: 2025-05-29
Payer: COMMERCIAL

## 2025-05-29 ENCOUNTER — TELEPHONE (OUTPATIENT)
Dept: PHARMACY | Facility: CLINIC | Age: 45
End: 2025-05-29

## 2025-05-29 VITALS
WEIGHT: 315 LBS | HEART RATE: 68 BPM | OXYGEN SATURATION: 96 % | BODY MASS INDEX: 41.75 KG/M2 | DIASTOLIC BLOOD PRESSURE: 86 MMHG | SYSTOLIC BLOOD PRESSURE: 133 MMHG | HEIGHT: 73 IN | RESPIRATION RATE: 16 BRPM | TEMPERATURE: 98.2 F

## 2025-05-29 DIAGNOSIS — G47.33 OBSTRUCTIVE SLEEP APNEA SYNDROME: ICD-10-CM

## 2025-05-29 DIAGNOSIS — F10.20 ALCOHOL USE DISORDER, SEVERE, DEPENDENCE (H): ICD-10-CM

## 2025-05-29 DIAGNOSIS — S12.001A CLOSED NONDISPLACED FRACTURE OF FIRST CERVICAL VERTEBRA, UNSPECIFIED FRACTURE MORPHOLOGY, INITIAL ENCOUNTER (H): ICD-10-CM

## 2025-05-29 DIAGNOSIS — I10 BENIGN ESSENTIAL HYPERTENSION: ICD-10-CM

## 2025-05-29 DIAGNOSIS — E66.01 MORBID OBESITY (H): ICD-10-CM

## 2025-05-29 DIAGNOSIS — Z09 HOSPITAL DISCHARGE FOLLOW-UP: Primary | ICD-10-CM

## 2025-05-29 DIAGNOSIS — E11.9 TYPE 2 DIABETES MELLITUS WITHOUT COMPLICATION, WITHOUT LONG-TERM CURRENT USE OF INSULIN (H): ICD-10-CM

## 2025-05-29 DIAGNOSIS — F10.920 ALCOHOLIC INTOXICATION WITHOUT COMPLICATION: ICD-10-CM

## 2025-05-29 PROBLEM — Z92.89 S/P ALCOHOL DETOXIFICATION: Status: RESOLVED | Noted: 2025-05-21 | Resolved: 2025-05-29

## 2025-05-29 PROBLEM — Z86.718 HISTORY OF DVT (DEEP VEIN THROMBOSIS): Status: RESOLVED | Noted: 2025-04-17 | Resolved: 2025-05-29

## 2025-05-29 PROBLEM — E88.89 ALCOHOLIC KETOSIS (H): Status: RESOLVED | Noted: 2025-04-13 | Resolved: 2025-05-29

## 2025-05-29 PROBLEM — F10.239 ALCOHOL DEPENDENCE WITH WITHDRAWAL WITH COMPLICATION (H): Status: RESOLVED | Noted: 2025-05-21 | Resolved: 2025-05-29

## 2025-05-29 PROBLEM — S62.656A CLOSED NONDISPLACED FRACTURE OF MIDDLE PHALANX OF RIGHT LITTLE FINGER, INITIAL ENCOUNTER: Status: RESOLVED | Noted: 2025-03-26 | Resolved: 2025-05-29

## 2025-05-29 PROBLEM — R03.0 ELEVATED BP WITHOUT DIAGNOSIS OF HYPERTENSION: Status: RESOLVED | Noted: 2024-02-09 | Resolved: 2025-05-29

## 2025-05-29 PROBLEM — R73.9 HYPERGLYCEMIA: Status: RESOLVED | Noted: 2025-04-11 | Resolved: 2025-05-29

## 2025-05-29 RX ORDER — LANCING DEVICE
EACH MISCELLANEOUS
Qty: 1 EACH | Refills: 0 | Status: SHIPPED | OUTPATIENT
Start: 2025-05-29

## 2025-05-29 NOTE — TELEPHONE ENCOUNTER
MTM referral from: Transitions of Care (recent hospital discharge, TCU discharge, or ED visit)    MTM referral outreach attempt #2 on May 29, 2025 at 1:01 PM      Outcome: Left Message    Use Bartolo kerr , psych PREET for the carrier/Plan on the flowsheet      MyChart Message Sent    Adriana Lima [facility]  [phone]

## 2025-05-29 NOTE — PATIENT INSTRUCTIONS
I will contact Dr. Hendrix    Continue muscle relaxers as needed    Start Mounjaro    Follow up in August for DM visit    Lancing device was sent to pharmacy

## 2025-05-29 NOTE — Clinical Note
Mary Kate Hendrix, I saw Cody in clinic today. His next visit with you is on 6/10. He is looking into inpatient addiction treatment options and may be able to go rather soon. He continues to have mostly temporal shooting pain with talking and chewing. He has mild right neck pain as well. He continues to wear his collar but reports it is broken, and at times the chin support falls down.  Can you help him get a new collar and/or is he able to see you sooner for clearance so he can do to treatment? Thanks for your consideration.   Yovana Felipe MD [facility]

## 2025-05-29 NOTE — PROGRESS NOTES
"  Assessment & Plan     Hospital discharge follow-up    Alcoholic intoxication without complication - looking into inpatient options, working with [facility]    Alcohol use disorder, severe, dependence (H) - reports he is taking naltrexone and acamprosate. Was dismissed from addiction therapist for missing too many visits.     Type 2 diabetes mellitus without complication, without long-term current use of insulin (H)  - blood glucose (NO BRAND SPECIFIED) lancing device; Device to be used with lancets.    Morbid obesity (H) - encouraged he start Mounjaro    Obstructive sleep apnea syndrome - has used his new CPAP the past 2 nights, getting about 5-6 hours of sleep.    Benign essential hypertension -  controlled, continue current    Closed nondisplaced fracture of first cervical vertebra, unspecified fracture morphology, initial encounter (H) - still wearing halo collar but reports its broken. Next visit with NS 6/10. Not sure how he will be able to follow up if he is inpatient in treatment.     The longitudinal plan of care for the diagnosis(es)/condition(s) as documented were addressed during this visit. Due to the added complexity in care, I will continue to support Cdoy in the subsequent management and with ongoing continuity of care.    MED REC REQUIRED  Post Medication Reconciliation Status:  Discharge medications reconciled, continue medications without change  BMI  Estimated body mass index is 48.02 kg/m  as calculated from the following:    Height as of this encounter: 1.854 m (6' 1\").    Weight as of this encounter: 165.1 kg (364 lb).       Subjective   Cody is a 44 year old, presenting for the following health issues:  Hospital F/U        5/29/2025     2:01 PM   Additional Questions   Roomed by Monico Cramer   Accompanied by self     HPI          5/24/2025   Post Discharge Outreach   How are you doing now that you are home? Patient states he is feeling some pain but trying to manage it. " Better overall outside of the pain, pain is about the same.   How are your symptoms? (Red Flag symptoms escalate to triage hotline per guidelines) Unchanged   Does the patient have their discharge instructions?  Yes   Does the patient have questions regarding their discharge instructions?  No   Were you started on any new medications or were there changes to any of your previous medications?  Yes   Does the patient have all of their medications? Yes   Do you have questions regarding any of your medications?  No   Do you have all of your needed medical supplies or equipment (DME)?  (i.e. oxygen tank, CPAP, cane, etc.) Yes   Discharge Follow Up Appointment Date 6/10/2025   Discharge Follow Up Appointment Scheduled with? Specialty Care Provider       Hospital Follow-up Visit:    Hospital/Nursing Home/IP Rehab Facility: [facility]  Most Recent Admission Date: 5/21/2025   Most Recent Admission Diagnosis: Substance use disorder - F19.90     Most Recent Discharge Date: 5/23/2025   Most Recent Discharge Diagnosis: Other closed nondisplaced fracture of sixth cervical vertebra, initial encounter (H) - S12.591A  Closed nondisplaced fracture of fifth cervical vertebra, unspecified fracture morphology, initial encounter (H) - S12.401A  Closed nondisplaced fracture of first cervical vertebra, unspecified fracture morphology, initial encounter (H) - S12.001A  Closed nondisplaced fracture of posterior arch of first cervical vertebra, initial encounter (H) - S12.031A  Anxiety - F41.9  Alcohol dependence with withdrawal with complication (H) - F10.239  History of DVT (deep vein thrombosis) - Z86.718  Acute pulmonary embolism without acute cor pulmonale, unspecified pulmonary embolism type (H) - I26.99  Paroxysmal atrial fibrillation (H) - I48.0  Urinary retention - R33.9   Do you have any other stressors you would like to discuss with your provider? No    Problems taking medications  "regularly:  None  Medication changes since discharge: None  Problems adhering to non-medication therapy:  None    Summary of hospitalization:  [facility] discharge summary reviewed  Diagnostic Tests/Treatments reviewed.  Follow up needed: none  Other Healthcare Providers Involved in Patient s Care:         None  Update since discharge: stable.         Plan of care communicated with patient               Review of Systems  Constitutional, HEENT, cardiovascular, pulmonary, gi and gu systems are negative, except as otherwise noted.      Objective    /86 (BP Location: Right arm, Patient Position: Chair, Cuff Size: Thigh)   Pulse 68   Temp 98.2  F (36.8  C) (Oral)   Resp 16   Ht 1.854 m (6' 1\")   Wt (!) 165.1 kg (364 lb)   SpO2 96%   BMI 48.02 kg/m    Body mass index is 48.02 kg/m .  Physical Exam   GENERAL: alert and no distress  PSYCH: mentation appears normal, affect normal/bright          Signed Electronically by: Yovana Felipe MD    "

## 2025-06-02 ENCOUNTER — TELEPHONE (OUTPATIENT)
Dept: FAMILY MEDICINE | Facility: CLINIC | Age: 45
End: 2025-06-02
Payer: COMMERCIAL

## 2025-06-02 NOTE — TELEPHONE ENCOUNTER
PAL outreach to pt    Continues not to drink at this time.  He is using both the naltrexone and composite daily with no issues.   Continues to look for an in patient treatment program with the help of mental calderon therapist.     He is over all doing well at this time.       Outreach will continue weekly at this time     Sariah Thibodeaux RN    normal

## 2025-06-04 ENCOUNTER — ANTICOAGULATION THERAPY VISIT (OUTPATIENT)
Dept: ANTICOAGULATION | Facility: CLINIC | Age: 45
End: 2025-06-04
Payer: COMMERCIAL

## 2025-06-04 DIAGNOSIS — Z86.711 HISTORY OF PULMONARY EMBOLISM: Primary | ICD-10-CM

## 2025-06-04 LAB — INR (EXTERNAL): 1.5

## 2025-06-04 NOTE — PROGRESS NOTES
ANTICOAGULATION MANAGEMENT     Cody WALLACE Young 44 year old male is on warfarin with subtherapeutic INR result. (Goal INR 2.0-3.0)    Recent labs: (last 7 days)     06/04/25  1318   INR 1.5       ASSESSMENT     Source(s): Chart Review and Home Care/Facility Nurse     Warfarin doses taken: Warfarin taken as instructed  Diet: No new diet changes identified  Medication/supplement changes: starting Mounjaro? Has not started yet.   New illness, injury, or hospitalization: No  Signs or symptoms of bleeding or clotting: No  Previous result: Therapeutic last visit; previously outside of goal range  Additional findings: Continues to sustain from alcohol. Next week will be patient's last Home care visit as patient is no longer home bound.  Also looking at inpatient treatment for alcohol dependency possibly at the end of June in [place] per home care.        PLAN     Recommended plan for temporary change(s) and ongoing change(s) affecting INR     Dosing Instructions: booster dose then Increase your warfarin dose (5.6% change) with next INR in 1 week       Summary  As of 6/4/2025      Full warfarin instructions:  6/4: 10 mg; Otherwise 5 mg every Sun, Thu; 7.5 mg all other days   Next INR check:  6/11/2025               Telephone call with [facility] care nurse who verbalizes understanding and agrees to plan and who agrees to plan and repeated back plan correctly    Orders given to  Homecare nurse/facility to recheck    Education provided: Please call back if any changes to your diet, medications or how you've been taking warfarin    Plan made per [facility] anticoagulation protocol    Jo Vidal RN  6/4/2025  Anticoagulation Clinic  Nexercise for routing messages: fransisco PABLO  [facility] patient phone line: [phone]        _______________________________________________________________________     Anticoagulation Episode Summary       Current INR goal:  2.0-3.0   TTR:  42.4% (10.3 mo)   Target end date:  Indefinite   Send  INR reminders to:  [facility] [place]    Indications    Acute pulmonary embolism without acute cor pulmonale  unspecified pulmonary embolism type (H) (Resolved) [I26.99]  History of DVT (deep vein thrombosis) (Resolved) [Z86.718]  History of pulmonary embolism [Z86.711]  History of DVT (deep vein thrombosis) (Resolved) [Z86.718]             Comments:  --             Anticoagulation Care Providers       Provider Role Specialty Phone number    Aaseby-Aguilera, Ramona Ann, PA-C Referring Family Medicine [phone]    Yovana Felipe MD Referring Family Medicine [phone]

## 2025-06-05 ENCOUNTER — MEDICAL CORRESPONDENCE (OUTPATIENT)
Dept: HEALTH INFORMATION MANAGEMENT | Facility: CLINIC | Age: 45
End: 2025-06-05
Payer: COMMERCIAL

## 2025-06-10 ENCOUNTER — ANCILLARY PROCEDURE (OUTPATIENT)
Dept: GENERAL RADIOLOGY | Facility: CLINIC | Age: 45
End: 2025-06-10
Attending: PHYSICIAN ASSISTANT
Payer: COMMERCIAL

## 2025-06-10 ENCOUNTER — OFFICE VISIT (OUTPATIENT)
Dept: NEUROSURGERY | Facility: CLINIC | Age: 45
End: 2025-06-10
Attending: PHYSICIAN ASSISTANT
Payer: COMMERCIAL

## 2025-06-10 VITALS — OXYGEN SATURATION: 96 % | HEART RATE: 74 BPM | SYSTOLIC BLOOD PRESSURE: 146 MMHG | DIASTOLIC BLOOD PRESSURE: 89 MMHG

## 2025-06-10 DIAGNOSIS — S12.001A CLOSED NONDISPLACED FRACTURE OF FIRST CERVICAL VERTEBRA, UNSPECIFIED FRACTURE MORPHOLOGY, INITIAL ENCOUNTER (H): ICD-10-CM

## 2025-06-10 DIAGNOSIS — S12.001A CLOSED NONDISPLACED FRACTURE OF FIRST CERVICAL VERTEBRA, UNSPECIFIED FRACTURE MORPHOLOGY, INITIAL ENCOUNTER (H): Primary | ICD-10-CM

## 2025-06-10 PROCEDURE — 3079F DIAST BP 80-89 MM HG: CPT | Performed by: PHYSICIAN ASSISTANT

## 2025-06-10 PROCEDURE — 3077F SYST BP >= 140 MM HG: CPT | Performed by: PHYSICIAN ASSISTANT

## 2025-06-10 PROCEDURE — 99213 OFFICE O/P EST LOW 20 MIN: CPT | Performed by: PHYSICIAN ASSISTANT

## 2025-06-10 PROCEDURE — 1125F AMNT PAIN NOTED PAIN PRSNT: CPT | Performed by: PHYSICIAN ASSISTANT

## 2025-06-10 ASSESSMENT — PAIN SCALES - GENERAL: PAINLEVEL_OUTOF10: MILD PAIN (2)

## 2025-06-10 NOTE — LETTER
6/10/2025      Cody Bolton  [address] [address] [place] [id number]      Dear Colleague,    Thank you for referring your patient, Cody Bolton, to the [facility] NEUROSURGERY [facility]INIC [place]. Please see a copy of my visit note below.    NEUROSURGERY CLINIC PROGRESS NOTE    DATE OF VISIT: 6/10/2025    HPI:     Cody Bolton is a pleasant 44 year old male who presents to the clinic today for a six-week follow-up visit. On 03/27/2025 we initially evaluated Mr. Bolton after sustaining a ground-level fall and hit his face resulting in a C1 burst fracture as well as a  fracture through the ventral osteophyte at C6-C7 in the setting of DISH. Subsequently we placed him into a hard cervical collar at all times. Today he again states he has neck pain with intermittent shooting pain into his shoulders, albeit improving. He denies numbness, paresthesia or weakness in his BUE/BLE. The most concerning for him is the pain in his temples that seem to improve with removing his collar.       Current Outpatient Medications   Medication Sig Dispense Refill     acamprosate (CAMPRAL) 333 MG EC tablet Take 2 tablets (666 mg) by mouth 3 times daily. 180 tablet 1     acetaminophen (TYLENOL) 500 MG tablet Take 1,000 mg by mouth 3 times daily as needed for mild pain.       blood glucose (NO BRAND SPECIFIED) lancing device Device to be used with lancets. 1 each 0     buPROPion (WELLBUTRIN XL) 150 MG 24 hr tablet Take 1 tablet (150 mg) by mouth every morning. Take for 5 days then resume previous dose of 300mg daily 5 tablet 0     buPROPion (WELLBUTRIN XL) 300 MG 24 hr tablet Take 1 tablet (300 mg) by mouth every morning. 90 tablet 3     carvedilol (COREG) 12.5 MG tablet Take 1 tablet (12.5 mg) by mouth 2 times daily (with meals). 180 tablet 3     enoxaparin ANTICOAGULANT (LOVENOX) 120 MG/0.8ML syringe Inject 0.8 mLs (120 mg) subcutaneously every 12 hours. 11.2 mL 0     fluticasone (FLONASE) 50 MCG/ACT nasal spray  Spray 1 spray into both nostrils daily as needed for rhinitis or allergies.       folic acid (FOLVITE) 1 MG tablet Take 1 tablet (1 mg) by mouth daily. 30 tablet 11     hydrOXYzine HCl (ATARAX) 50 MG tablet Take 50 mg by mouth every 6 hours as needed for other (Pain).       lisinopril (ZESTRIL) 20 MG tablet Take 1 tablet (20 mg) by mouth daily. 90 tablet 3     metFORMIN (GLUCOPHAGE XR) 500 MG 24 hr tablet Take 2 tablets (1,000 mg) by mouth daily (with dinner). 180 tablet 3     multivitamin w/minerals (THERA-VIT-M) tablet Take 1 tablet by mouth daily. 30 tablet 1     naloxone (NARCAN) 4 MG/0.1ML nasal spray Spray 1 spray (4 mg) into one nostril alternating nostrils as needed for opioid reversal. every 2-3 minutes until assistance arrives 2 each 0     naltrexone (DEPADE/REVIA) 50 MG tablet Take 1 tablet (50 mg) by mouth daily. 30 tablet 1     sildenafil (VIAGRA) 100 MG tablet Take 1 tablet (100 mg) by mouth daily as needed (erectile dysfunction). 30 tablet 3     simvastatin (ZOCOR) 20 MG tablet Take 1 tablet (20 mg) by mouth at bedtime. 90 tablet 3     thiamine (B-1) 100 MG tablet Take 1 tablet (100 mg) by mouth daily. 30 tablet 0     tirzepatide (MOUNJARO) 5 MG/0.5ML SOAJ auto-injector pen Inject 0.5 mLs (5 mg) subcutaneously once a week. 2 mL 0     warfarin ANTICOAGULANT (COUMADIN) 5 MG tablet Otherwise 5 mg every Sun, Tue, Thu; 7.5 mg all other days       No current facility-administered medications for this visit.     Facility-Administered Medications Ordered in Other Visits   Medication Dose Route Frequency Provider Last Rate Last Admin     Self Administer Medications: Behavioral Services   Does not apply See Admin Instructions Corrine Alvarenga MD         Self Administer Medications: Behavioral Services   Does not apply See Admin Instructions Corrine Alvarenga MD           Allergies   Allergen Reactions     Trazodone      Loose stools        Past Medical History:   Diagnosis Date     Alcohol use disorder       Depressive disorder      High cholesterol      History of gastric bypass      History of pulmonary embolism      Hypertension      Morbid obesity (H)      CARLIE (obstructive sleep apnea)      Paroxysmal atrial fibrillation (H)      Personal history of DVT (deep vein thrombosis)      Pulmonary embolism (H) 02/2024    unprovoked bilateral PE and RLE DVT 2/2024     Type 2 diabetes mellitus (H)      Uncomplicated asthma        Review Of Systems    Skin: negative  Eyes: negative  Ears/Nose/Throat: negative  Respiratory: No shortness of breath, dyspnea on exertion, cough, or hemoptysis  Cardiovascular: negative  Gastrointestinal: negative  Musculoskeletal: neck pain  Neurologic: negative  Psychiatric: negative  Hematologic/Lymphatic/Immunologic: negative  Endocrine: negative    OBJECTIVE:    BP (!) 146/89   Pulse 74   SpO2 96%     Imaging:    XR CERVICAL SPINE 2/3 VIEWS  LOCATION: [facility]OP[facility] [place]  DATE: 6/10/2025     INDICATION: C1 fx  COMPARISON: CT 05/05/2025.                                                                  IMPRESSION: Known C1 fractures are not well demonstrated. Anterior bridging osteophytes throughout the cervical spine. Multilevel disc height loss and facet arthropathy. Right convex curvature of the upper thoracic spine. Normal extraspinal structures.    Radiographic Findings: Full radiological report in chart. I personally reviewed the images with the patient today.    Exam:    Patient appears comfortable and in no apparent distress. Moving all extremities.  Gait is non-antalgic.  CN II-XII grossly intact, alert and appropriate with conversation and following  commands  Bilateral upper extremities with full strength including hand intrinsics and grasp.  Sensation intact throughout.  Bilateral lower extremities 5/5 strength including plantar and dorsiflexion.  Normal sensation throughout bilaterally.  Collar intact.     ASSESSMENT:    1. Closed nondisplaced fracture of  first cervical vertebra, unspecified fracture morphology, initial encounter (H)        PLAN:    Cody Bolton is six weeks out from a traumatic C1 burst fracture as well as a  fracture through the ventral osteophyte at C6-C7 in the setting of DISH. Today the patient reports he has neck pain with intermittent shooting pain into his shoulders, albeit improving. He denies numbness, paresthesia or weakness in his BUE/BLE. The most concerning for him is the pain in his temples that seem to improve with removing his collar.     Imaging was reviewed today and all questions were answered.    Today we discussed increasing activity from 10 lbs by 2-5 lbs per week, but encouraged continuing to avoid excessive bending, twisting, and turning at the neck and to avoid jostling and jarring activities. We will take him out of the collar while sleeping to see if that alleviates his pain.     A referral was placed to physical therapy for additional strengthening and core muscle exercises. This was declined as he will most likely be entering an inpatient rehab for alcoholism.      Mr. Bolton will return to the clinic in six weeks with repeat imaging.    The patient gave verbal understanding and is in agreement with the above plan. He will call or return to the clinic for any worsening or changes in symptoms.      Respectfully,     JESS Garcia PA-C    Again, thank you for allowing me to participate in the care of your patient.        Sincerely,        Elian Hendrix PA-C    Electronically signed

## 2025-06-10 NOTE — NURSING NOTE
"Cody Bolton is a 44 year old male who presents for:  Chief Complaint   Patient presents with    RECHECK     4 wk follow up - Closed nondisplaced fracture of first cervical vertebra. XR prior        Initial Vitals:  BP (!) 146/89   Pulse 74   SpO2 96%  Estimated body mass index is 48.02 kg/m  as calculated from the following:    Height as of 5/29/25: 6' 1\" (1.854 m).    Weight as of 5/29/25: 364 lb (165.1 kg).. There is no height or weight on file to calculate BSA. BP completed using cuff size: large  Mild Pain (2)    Nursing Comments:       Winsome Benson    "

## 2025-06-10 NOTE — PROGRESS NOTES
NEUROSURGERY CLINIC PROGRESS NOTE    DATE OF VISIT: 6/10/2025    HPI:     Cody Bolton is a pleasant 44 year old male who presents to the clinic today for a six-week follow-up visit. On 03/27/2025 we initially evaluated Mr. Bolton after sustaining a ground-level fall and hit his face resulting in a C1 burst fracture as well as a  fracture through the ventral osteophyte at C6-C7 in the setting of DISH. Subsequently we placed him into a hard cervical collar at all times. Today he again states he has neck pain with intermittent shooting pain into his shoulders, albeit improving. He denies numbness, paresthesia or weakness in his BUE/BLE. The most concerning for him is the pain in his temples that seem to improve with removing his collar.       Current Outpatient Medications   Medication Sig Dispense Refill    acamprosate (CAMPRAL) 333 MG EC tablet Take 2 tablets (666 mg) by mouth 3 times daily. 180 tablet 1    acetaminophen (TYLENOL) 500 MG tablet Take 1,000 mg by mouth 3 times daily as needed for mild pain.      blood glucose (NO BRAND SPECIFIED) lancing device Device to be used with lancets. 1 each 0    buPROPion (WELLBUTRIN XL) 150 MG 24 hr tablet Take 1 tablet (150 mg) by mouth every morning. Take for 5 days then resume previous dose of 300mg daily 5 tablet 0    buPROPion (WELLBUTRIN XL) 300 MG 24 hr tablet Take 1 tablet (300 mg) by mouth every morning. 90 tablet 3    carvedilol (COREG) 12.5 MG tablet Take 1 tablet (12.5 mg) by mouth 2 times daily (with meals). 180 tablet 3    enoxaparin ANTICOAGULANT (LOVENOX) 120 MG/0.8ML syringe Inject 0.8 mLs (120 mg) subcutaneously every 12 hours. 11.2 mL 0    fluticasone (FLONASE) 50 MCG/ACT nasal spray Spray 1 spray into both nostrils daily as needed for rhinitis or allergies.      folic acid (FOLVITE) 1 MG tablet Take 1 tablet (1 mg) by mouth daily. 30 tablet 11    hydrOXYzine HCl (ATARAX) 50 MG tablet Take 50 mg by mouth every 6 hours as needed for other (Pain).       lisinopril (ZESTRIL) 20 MG tablet Take 1 tablet (20 mg) by mouth daily. 90 tablet 3    metFORMIN (GLUCOPHAGE XR) 500 MG 24 hr tablet Take 2 tablets (1,000 mg) by mouth daily (with dinner). 180 tablet 3    multivitamin w/minerals (THERA-VIT-M) tablet Take 1 tablet by mouth daily. 30 tablet 1    naloxone (NARCAN) 4 MG/0.1ML nasal spray Spray 1 spray (4 mg) into one nostril alternating nostrils as needed for opioid reversal. every 2-3 minutes until assistance arrives 2 each 0    naltrexone (DEPADE/REVIA) 50 MG tablet Take 1 tablet (50 mg) by mouth daily. 30 tablet 1    sildenafil (VIAGRA) 100 MG tablet Take 1 tablet (100 mg) by mouth daily as needed (erectile dysfunction). 30 tablet 3    simvastatin (ZOCOR) 20 MG tablet Take 1 tablet (20 mg) by mouth at bedtime. 90 tablet 3    thiamine (B-1) 100 MG tablet Take 1 tablet (100 mg) by mouth daily. 30 tablet 0    tirzepatide (MOUNJARO) 5 MG/0.5ML SOAJ auto-injector pen Inject 0.5 mLs (5 mg) subcutaneously once a week. 2 mL 0    warfarin ANTICOAGULANT (COUMADIN) 5 MG tablet Otherwise 5 mg every Sun, Tue, Thu; 7.5 mg all other days       No current facility-administered medications for this visit.     Facility-Administered Medications Ordered in Other Visits   Medication Dose Route Frequency Provider Last Rate Last Admin    Self Administer Medications: Behavioral Services   Does not apply See Admin Instructions Corrine Alvarenga MD        Self Administer Medications: Behavioral Services   Does not apply See Admin Instructions Corrine Alvarenga MD           Allergies   Allergen Reactions    Trazodone      Loose stools        Past Medical History:   Diagnosis Date    Alcohol use disorder     Depressive disorder     High cholesterol     History of gastric bypass     History of pulmonary embolism     Hypertension     Morbid obesity (H)     CARLIE (obstructive sleep apnea)     Paroxysmal atrial fibrillation (H)     Personal history of DVT (deep vein thrombosis)     Pulmonary embolism  (H) 02/2024    unprovoked bilateral PE and RLE DVT 2/2024    Type 2 diabetes mellitus (H)     Uncomplicated asthma        Review Of Systems    Skin: negative  Eyes: negative  Ears/Nose/Throat: negative  Respiratory: No shortness of breath, dyspnea on exertion, cough, or hemoptysis  Cardiovascular: negative  Gastrointestinal: negative  Musculoskeletal: neck pain  Neurologic: negative  Psychiatric: negative  Hematologic/Lymphatic/Immunologic: negative  Endocrine: negative    OBJECTIVE:    BP (!) 146/89   Pulse 74   SpO2 96%     Imaging:    XR CERVICAL SPINE 2/3 VIEWS  LOCATION: [facility]OP[facility] [place]  DATE: 6/10/2025     INDICATION: C1 fx  COMPARISON: CT 05/05/2025.                                                                  IMPRESSION: Known C1 fractures are not well demonstrated. Anterior bridging osteophytes throughout the cervical spine. Multilevel disc height loss and facet arthropathy. Right convex curvature of the upper thoracic spine. Normal extraspinal structures.    Radiographic Findings: Full radiological report in chart. I personally reviewed the images with the patient today.    Exam:    Patient appears comfortable and in no apparent distress. Moving all extremities.  Gait is non-antalgic.  CN II-XII grossly intact, alert and appropriate with conversation and following  commands  Bilateral upper extremities with full strength including hand intrinsics and grasp.  Sensation intact throughout.  Bilateral lower extremities 5/5 strength including plantar and dorsiflexion.  Normal sensation throughout bilaterally.  Collar intact.     ASSESSMENT:    1. Closed nondisplaced fracture of first cervical vertebra, unspecified fracture morphology, initial encounter (H)        PLAN:    Cody Bolton is six weeks out from a traumatic C1 burst fracture as well as a  fracture through the ventral osteophyte at C6-C7 in the setting of DISH. Today the patient reports he has neck pain with  intermittent shooting pain into his shoulders, albeit improving. He denies numbness, paresthesia or weakness in his BUE/BLE. The most concerning for him is the pain in his temples that seem to improve with removing his collar.     Imaging was reviewed today and all questions were answered.    Today we discussed increasing activity from 10 lbs by 2-5 lbs per week, but encouraged continuing to avoid excessive bending, twisting, and turning at the neck and to avoid jostling and jarring activities. We will take him out of the collar while sleeping to see if that alleviates his pain.     A referral was placed to physical therapy for additional strengthening and core muscle exercises. This was declined as he will most likely be entering an inpatient rehab for alcoholism.      Mr. Bolton will return to the clinic in six weeks with repeat imaging.    The patient gave verbal understanding and is in agreement with the above plan. He will call or return to the clinic for any worsening or changes in symptoms.      Respectfully,     JESS Garcia PA-C

## 2025-06-11 ENCOUNTER — ANTICOAGULATION THERAPY VISIT (OUTPATIENT)
Dept: ANTICOAGULATION | Facility: CLINIC | Age: 45
End: 2025-06-11
Payer: COMMERCIAL

## 2025-06-11 ENCOUNTER — TELEPHONE (OUTPATIENT)
Dept: FAMILY MEDICINE | Facility: CLINIC | Age: 45
End: 2025-06-11
Payer: COMMERCIAL

## 2025-06-11 DIAGNOSIS — Z86.711 HISTORY OF PULMONARY EMBOLISM: Primary | ICD-10-CM

## 2025-06-11 LAB — INR (EXTERNAL): 3 (ref 0.9–1.1)

## 2025-06-11 NOTE — PROGRESS NOTES
ANTICOAGULATION MANAGEMENT     Cody Bolton 44 year old male is on warfarin with therapeutic INR result. (Goal INR 2.0-3.0)    Recent labs: (last 7 days)     06/11/25  1051   INR 3.0*       ASSESSMENT     Source(s): Chart Review and Home Care/Facility Nurse     Warfarin doses taken: Warfarin taken as instructed  Diet: No new diet changes identified  Medication/supplement changes: None noted  New illness, injury, or hospitalization: No  Signs or symptoms of bleeding or clotting: No  Previous result: Subtherapeutic  Additional findings: discharge from home care today - back to [facility]       PLAN     Recommended plan for no diet, medication or health factor changes affecting INR     Dosing Instructions: Continue your current warfarin dose with next INR in 1 week       Summary  As of 6/11/2025      Full warfarin instructions:  5 mg every Sun, Thu; 7.5 mg all other days   Next INR check:  6/18/2025               Telephone call with [facility] home care nurse who verbalizes understanding and agrees to plan and who agrees to plan and repeated back plan correctly  Sent [employer] App message with dosing and follow up instructions  DVM x2 left for patient to call back to schedule follow up lab appointment.       Orders given to  Homecare nurse/facility to recheck - MyChart sent to patient to call [facility] to schedule next INR visit - DVM left on patient phone with recheck info.     Education provided: Please call back if any changes to your diet, medications or how you've been taking warfarin  Contact [phone] with any changes, questions or concerns.     Plan made per [facility] anticoagulation protocol    Lucy Matias RN  6/11/2025  Anticoagulation Clinic  [employer] for routing messages: fransisco CROWLEY [facility] patient phone line: [phone]        _______________________________________________________________________     Anticoagulation Episode Summary       Current INR goal:  2.0-3.0   TTR:  43.9% (10.3 mo)   Target end  date:  Indefinite   Send INR reminders to:  [facility]    Indications    Acute pulmonary embolism without acute cor pulmonale  unspecified pulmonary embolism type (H) (Resolved) [I26.99]  History of DVT (deep vein thrombosis) (Resolved) [Z86.718]  History of pulmonary embolism [Z86.711]  History of DVT (deep vein thrombosis) (Resolved) [Z86.718]             Comments:  --             Anticoagulation Care Providers       Provider Role Specialty Phone number    Aaseby-Aguilera, Ramona Ann, PA-C Referring Family Medicine [phone]    Yovana Felipe MD Referring Family Medicine [phone]

## 2025-06-11 NOTE — TELEPHONE ENCOUNTER
[facility] for PAL check in or send MyChart.    Checking in to see how pt is doing. Had visit with neurosurgery yesterday; see below. Is pt abstaining from alcohol?    Cody Bolton is six weeks out from a traumatic C1 burst fracture as well as a  fracture through the ventral osteophyte at C6-C7 in the setting of DISH. Today the patient reports he has neck pain with intermittent shooting pain into his shoulders, albeit improving. He denies numbness, paresthesia or weakness in his BUE/BLE. The most concerning for him is the pain in his temples that seem to improve with removing his collar.      Imaging was reviewed today and all questions were answered.     Today we discussed increasing activity from 10 lbs by 2-5 lbs per week, but encouraged continuing to avoid excessive bending, twisting, and turning at the neck and to avoid jostling and jarring activities. We will take him out of the collar while sleeping to see if that alleviates his pain.      A referral was placed to physical therapy for additional strengthening and core muscle exercises. This was declined as he will most likely be entering an inpatient rehab for alcoholism.       Mr. Bolton will return to the clinic in six weeks with repeat imaging.     The patient gave verbal understanding and is in agreement with the above plan. He will call or return to the clinic for any worsening or changes in symptoms.      Jovana ESPINOSA, RN, PHN

## 2025-06-19 ENCOUNTER — MYC MEDICAL ADVICE (OUTPATIENT)
Dept: FAMILY MEDICINE | Facility: CLINIC | Age: 45
End: 2025-06-19

## 2025-06-19 ENCOUNTER — VIRTUAL VISIT (OUTPATIENT)
Dept: FAMILY MEDICINE | Facility: CLINIC | Age: 45
End: 2025-06-19
Payer: COMMERCIAL

## 2025-06-19 ENCOUNTER — TELEPHONE (OUTPATIENT)
Dept: ANTICOAGULATION | Facility: CLINIC | Age: 45
End: 2025-06-19

## 2025-06-19 DIAGNOSIS — F10.20 ALCOHOL USE DISORDER, SEVERE, DEPENDENCE (H): Primary | ICD-10-CM

## 2025-06-19 ASSESSMENT — PATIENT HEALTH QUESTIONNAIRE - PHQ9
SUM OF ALL RESPONSES TO PHQ QUESTIONS 1-9: 14
SUM OF ALL RESPONSES TO PHQ QUESTIONS 1-9: 14
10. IF YOU CHECKED OFF ANY PROBLEMS, HOW DIFFICULT HAVE THESE PROBLEMS MADE IT FOR YOU TO DO YOUR WORK, TAKE CARE OF THINGS AT HOME, OR GET ALONG WITH OTHER PEOPLE: EXTREMELY DIFFICULT

## 2025-06-19 NOTE — TELEPHONE ENCOUNTER
01/07/22 1230   Provider Notification   Reason for Communication Other (comment)  (Discharge)   Provider Name Brooke Webb MD   Method of Communication Page   Response Waiting for response   Notification Time [phone]   Room 531.  All consults have signed ANTICOAGULATION     Cody Bolton is overdue for an INR check.     Care Everywhere updated: yes     Discharged from homecare last week.    Left message for patient to call and schedule lab appointment as soon as possible. If returning call, please schedule.     Catalina Begum RN  6/19/2025  Anticoagulation Clinic  [facility] for routing messages: fransisco CROWLEY [place]  [facility] patient phone line: [phone]

## 2025-06-19 NOTE — PROGRESS NOTES
"Cody is a 44 year old who is being evaluated via a billable video visit.    How would you like to obtain your AVS? MyChart  If the video visit is dropped, the invitation should be resent by: Text to cell phone: [phone]  Will anyone else be joining your video visit? No      Assessment & Plan     Alcohol use disorder, severe, dependence (H) - starting an inpatient treatment program next week in [place]. Feeling like he needs to do this and needs to get away from [place]      Needs updated medical records to support his short term disability leave. Include all hospital visits (ER notes or discharge summaries) and my office notes. Fax to:  [employer]  [phone]  Claim number: [id number]        BMI  Estimated body mass index is 48.02 kg/m  as calculated from the following:    Height as of 5/29/25: 1.854 m (6' 1\").    Weight as of 5/29/25: 165.1 kg (364 lb).       Subjective   Cody is a 44 year old, presenting for the following health issues:  RECHECK (Leave of absence from work extension looking for medical record starting June 1, consent on file.)        6/19/2025    11:12 AM   Additional Questions   Roomed by Zari   Accompanied by self     History of Present Illness       Reason for visit:  Leave of absence from work extension    He eats 0-1 servings of fruits and vegetables daily.He consumes 0 sweetened beverage(s) daily.He exercises with enough effort to increase his heart rate 10 to 19 minutes per day.  He exercises with enough effort to increase his heart rate 3 or less days per week.   He is taking medications regularly.          Review of Systems  Constitutional, HEENT, cardiovascular, pulmonary, gi and gu systems are negative, except as otherwise noted.      Objective           Vitals:  No vitals were obtained today due to virtual visit.    Physical Exam   GENERAL: alert and no distress  EYES: Eyes grossly normal to inspection.  No discharge or erythema, or obvious scleral/conjunctival " abnormalities.  RESP: No audible wheeze, cough, or visible cyanosis.    SKIN: Visible skin clear. No significant rash, abnormal pigmentation or lesions.  NEURO: Cranial nerves grossly intact.  Mentation and speech appropriate for age.  PSYCH: Appropriate affect, tone, and pace of words        Video-Visit Details    Type of service:  Video Visit   Originating Location (pt. Location): Home    Distant Location (provider location):  Off-site  Platform used for Video Visit: David  Signed Electronically by: Yovana Felipe MD

## 2025-06-19 NOTE — Clinical Note
Please see my A/P from today's visit. Let's try to help him with this rather than sending to medical records - thanks!

## 2025-06-26 ENCOUNTER — TELEPHONE (OUTPATIENT)
Dept: ANTICOAGULATION | Facility: CLINIC | Age: 45
End: 2025-06-26
Payer: COMMERCIAL

## 2025-06-26 NOTE — TELEPHONE ENCOUNTER
ANTICOAGULATION     Cody Bolton is overdue for an INR check.     Care Everywhere updated: yes    Left message for patient to call and schedule lab appointment as soon as possible. If returning call, please schedule.     Kera Deras RN  6/26/2025  Anticoagulation Clinic  [facility] for routing messages: fransisco GARAY  [facility] patient phone line: [phone]

## 2025-07-02 ENCOUNTER — ANTICOAGULATION THERAPY VISIT (OUTPATIENT)
Dept: ANTICOAGULATION | Facility: CLINIC | Age: 45
End: 2025-07-02
Payer: COMMERCIAL

## 2025-07-02 ENCOUNTER — MYC MEDICAL ADVICE (OUTPATIENT)
Dept: ANTICOAGULATION | Facility: CLINIC | Age: 45
End: 2025-07-02
Payer: COMMERCIAL

## 2025-07-02 DIAGNOSIS — Z86.711 HISTORY OF PULMONARY EMBOLISM: Primary | ICD-10-CM

## 2025-07-02 LAB — INR (EXTERNAL): 1.82

## 2025-07-02 NOTE — TELEPHONE ENCOUNTER
Asked patient assessment questions on MyChart and advised booster dose of warfarin for tonight.   Also, see [facility] encounter 7/2/25.  Catalina WALLACE RN  Anticoagulation Team

## 2025-07-02 NOTE — PROGRESS NOTES
"ANTICOAGULATION MANAGEMENT     Cody WALLACE Young 44 year old male is on warfarin with subtherapeutic INR result. (Goal INR 2.0-3.0)    Recent labs: (last 7 days)     07/01/25  1337   INR 1.82       ASSESSMENT     Source(s): Chart Review and My Chart message.     Warfarin doses taken: Less warfarin taken than planned which may be affecting INR  Diet: unable to assess  Medication/supplement changes: unable to assess  New illness, injury, or hospitalization: No, however patient is getting treatment for his alcohol addition in [place], assessment questions sent in Master Route, unsure if this is inpatient or day-treatment.  Signs or symptoms of bleeding or clotting: No  Previous result: Therapeutic last visit; previously outside of goal range  Additional findings: See SnapMD from earlier today with faxed outside INR lab results from yesterday stating: \"I m down in [place] for treatment. I just had INR test last night. Came back at 1.8. I ve been here for 9 days, I ve been alternating 5 and 7.5 every other day. I did miss a couple days before coming here. Please advise. I don t have access to phone, can check in later for guidance.\"      PLAN     Unable to reach Cody today.    Left message on Master Route to take a booster dose of warfarin,  10 mg tonight. Request call back for assessment. Will await Master Route response.    Follow up required to discuss out of range result     Catalina Begum, RN  7/2/2025  Anticoagulation Clinic  [facility] for routing messages: fransisco CROWLEY [facility] patient phone line: [phone]      "

## 2025-07-03 ENCOUNTER — TELEPHONE (OUTPATIENT)
Dept: FAMILY MEDICINE | Facility: CLINIC | Age: 45
End: 2025-07-03
Payer: COMMERCIAL

## 2025-07-03 DIAGNOSIS — Z86.711 HISTORY OF PULMONARY EMBOLISM: Primary | ICD-10-CM

## 2025-07-03 NOTE — TELEPHONE ENCOUNTER
General Call    Contacts       Contact Date/Time Type Contact Phone/Fax    07/03/2025 05:36 PM CDT Phone (Incoming) Kae (Care Coordinator) [phone]     URGENT          Reason for Call:  Calling due to warfarin dosages seeming off - need clarification - please call and advise - URGENT    What are your questions or concerns:  Calling due to warfarin dosages seeming off - need clarification - please call and advise - URGENT    Date of last appointment with provider: [id number]    Could we send this information to you in [place] or would you prefer to receive a phone call?:   Patient would prefer a phone call   Okay to leave a detailed message?: Yes at Other phone number:      Kae (CARE COORDIN) [phone]

## 2025-07-03 NOTE — PROGRESS NOTES
"ANTICOAGULATION MANAGEMENT     Cody WALLACE Young 44 year old male is on warfarin with subtherapeutic INR result. (Goal INR 2.0-3.0)    Recent labs: (last 7 days)     07/01/25  1337   INR 1.82       ASSESSMENT     Source(s): Chart Review and Patient/Caregiver My Chart messaging     Warfarin doses taken: Less warfarin taken than planned which may be affecting INR  Diet: Decreased greens/vitamin K in diet; ongoing change  Medication/supplement changes: gabapentin started at treatment facility, no interaction with warfarin  New illness, injury, or hospitalization: No; however, patient is now completely abstinent from alcohol and will be at inpatient treatment facility at [place], [place], for the next 30-45 days  Signs or symptoms of bleeding or clotting: No  Previous result: Therapeutic last visit; previously outside of goal range  Additional findings: Patient states in [employer] message: \"Are you able to put orders in at a lab down here that can do same day? I m in [place], [place]\"     PLAN     Recommended plan for ongoing change(s) affecting INR     Dosing Instructions: booster dose then continue your current warfarin dose with next INR in 2 weeks       Summary  As of 7/2/2025      Full warfarin instructions:  7/2: 10 mg; Otherwise 5 mg every Sun, Thu; 7.5 mg all other days   Next INR check:  7/16/2025               Sent [employer] message with dosing and follow up instructions    Patient using outside facility for labs    Education provided: Please call back if any changes to your diet, medications or how you've been taking warfarin  Taking warfarin: importance of following [facility] instructions vs instructions on the prescription bottle and Importance of taking warfarin as instructed  Dietary considerations: importance of consistent vitamin K intake, impact of vitamin K foods on INR, and importance of notifying [facility] to changes in diet  Healthy lifestyle considerations: potential interaction between warfarin and alcohol  Interaction IS " NOT anticipated between warfarin and gabapentin  Symptom monitoring: monitoring for bleeding signs and symptoms, monitoring for clotting signs and symptoms, and when to seek medical attention/emergency care  Written instructions provided  Contact [phone] with any changes, questions or concerns.     Plan made per [facility] anticoagulation protocol    Catalina Begum RN  7/3/2025  Anticoagulation Clinic  [facility] for routing messages: fransisco JACKDEXTER JARROD  [facility] patient phone line: [phone]        _______________________________________________________________________     Anticoagulation Episode Summary       Current INR goal:  2.0-3.0   TTR:  49.7% (10.2 mo)   Target end date:  Indefinite   Send INR reminders to:  KEO GARAY    Indications    Acute pulmonary embolism without acute cor pulmonale  unspecified pulmonary embolism type (H) (Resolved) [I26.99]  History of DVT (deep vein thrombosis) (Resolved) [Z86.718]  History of pulmonary embolism [Z86.711]  History of DVT (deep vein thrombosis) (Resolved) [Z86.718]             Comments:  --             Anticoagulation Care Providers       Provider Role Specialty Phone number    Aaseby-Aguilera, Ramona Ann, PA-C Referring Family Medicine [phone]    Yovana Felipe MD Referring Family Medicine [phone]

## 2025-07-03 NOTE — TELEPHONE ENCOUNTER
Talked with Kae at 550 pm on Thursday. Was able to review new warfarin dosing. Per response from Cody he will take 10 mg today and then 5 mg Sunday and Thursday and 7.5 mg all other days of the week. Will call back on Monday to send fax orders for inr. Please call Kae's # as listed

## 2025-07-07 NOTE — TELEPHONE ENCOUNTER
ACRN verified rightfax went through appropriately. Patient also notified this was taken care of via MyChart. Kera Deras, KENNETHN, RN

## 2025-07-07 NOTE — TELEPHONE ENCOUNTER
Writer contacted Kae - who is currently with another patient and requested to call [facility] team back - THOMAS provided personal # for callback to discuss orders and patient's care. Kera Deras, KENNETHN, RN

## 2025-07-07 NOTE — TELEPHONE ENCOUNTER
Spoke with Kae, patient's [facility] inpatient treatment facility  in [place] - fax obtained and standing INR orders sent. Currently rehab team is taking patient to a nearby  to have INRs drawn, Kae understands ACC team needs INR levels faxed promptly in order to advise dosing and recheck date. Kae will be point contact and can take verbal warfarin dosing orders with each INR check. Writer faxed standing INR orders to #[phone]. Kera Deras, KENNETHN, RN

## 2025-07-17 ENCOUNTER — MYC MEDICAL ADVICE (OUTPATIENT)
Dept: ANTICOAGULATION | Facility: CLINIC | Age: 45
End: 2025-07-17
Payer: COMMERCIAL

## 2025-07-18 ENCOUNTER — ANTICOAGULATION THERAPY VISIT (OUTPATIENT)
Dept: ANTICOAGULATION | Facility: CLINIC | Age: 45
End: 2025-07-18
Payer: COMMERCIAL

## 2025-07-18 DIAGNOSIS — Z86.711 HISTORY OF PULMONARY EMBOLISM: Primary | ICD-10-CM

## 2025-07-18 LAB — INR (EXTERNAL): 2.52 (ref 0.9–1.1)

## 2025-07-18 NOTE — PROGRESS NOTES
ANTICOAGULATION MANAGEMENT     Cody WALLACE Young 44 year old male is on warfarin with therapeutic INR result. (Goal INR 2.0-3.0)    Recent labs: (last 7 days)     07/16/25  0000   INR 2.52*     Called [facility] inpatient treatment facility , who is to be the point person we give verbal orders on warfarin dosing. ACC RN reached out the Cody since his INR was not faxed to us on the expected date of lab. Cody was able to respond via My Chart with an image of his INR at 2.52 on 7/16/25.    Left vm for Yadira to return call to ACC RN, notify her we are still not receiving faxed results for INR (two days late after ACC RN outreach).

## 2025-07-18 NOTE — PROGRESS NOTES
ANTICOAGULATION MANAGEMENT     Cody Bolton 44 year old male is on warfarin with therapeutic INR result. (Goal INR 2.0-3.0)    Recent labs: (last 7 days)     07/16/25  0000   INR 2.52*       ASSESSMENT     Source(s): Chart Review  Previous INR was Subtherapeutic, advised to continue with same maintenance dose   Medication, diet, health changes since last INR: chart reviewed; none identified; however, patient is out of state at treatment facility and we were expecting faxed INR results, but Cody sent results via Yugma after ACC RN contacted him that labs were overdue.  Unable to reach Kae, [facility] inpatient treatment facility  in [place] at [phone]. RACHANA does not even identify her name, facility, etc. Hesitant to leave PHI.     PLAN     Unable to reach Yadira today.    Returned Yugma message to Cody with weekend dosing: to continue 7.5 mg Fri and Sat, 5 mg Sun. ACC RN will follow up again on Monday.     Follow up required to confirm warfarin dose taken and assess for changes    Catalina Begum, RN  7/18/2025  Anticoagulation Clinic  [facility] for routing messages: fransisco GARAY  [facility] patient phone line: [phone]

## 2025-07-21 NOTE — PROGRESS NOTES
ANTICOAGULATION MANAGEMENT     Cody WALLACE Young 44 year old male is on warfarin with therapeutic INR result. (Goal INR 2.0-3.0)    Recent labs: (last 7 days)     07/16/25  0000   INR 2.52*       ASSESSMENT     Source(s): Chart Review  Previous INR was Subtherapeutic  Medication, diet, health changes since last INR: chart reviewed; none identified         PLAN     Recommended plan for no diet, medication or health factor changes affecting INR     Dosing Instructions: Continue your current warfarin dose with next INR in 3 weeks       Summary  As of 7/18/2025      Full warfarin instructions:  5 mg every Sun, Thu; 7.5 mg all other days   Next INR check:  8/6/2025               Sent [employer] message with dosing and follow up instructions to Cody. Was never able to get in touch with Yadira by phone.    Faxed dosing and follow up instructions to [facility] treatment facility  in [place], Fax#: [phone]     Patient using outside facility for labs    Education provided: Please call back if any changes to your diet, medications or how you've been taking warfarin  Contact [phone] with any changes, questions or concerns.     Plan made per [facility] anticoagulation protocol    Catalina Begum, RN  7/21/2025  Anticoagulation Clinic  [facility] for routing messages: fransisco GARAY  [facility] patient phone line: [phone]        _______________________________________________________________________     Anticoagulation Episode Summary       Current INR goal:  2.0-3.0   TTR:  50.7% (10.1 mo)   Target end date:  Indefinite   Send INR reminders to:  KEO GARAY    Indications    Acute pulmonary embolism without acute cor pulmonale  unspecified pulmonary embolism type (H) (Resolved) [I26.99]  History of DVT (deep vein thrombosis) (Resolved) [Z86.718]  History of pulmonary embolism [Z86.711]  History of DVT (deep vein thrombosis) (Resolved) [Z86.718]             Comments:  --             Anticoagulation Care  Providers       Provider Role Specialty Phone number    Aaseby-Aguilera, Ramona Ann, PA-C Referring Family Medicine [phone]    Yovana Felipe MD Referring Family Medicine [phone]

## 2025-07-22 ENCOUNTER — TELEPHONE (OUTPATIENT)
Dept: FAMILY MEDICINE | Facility: CLINIC | Age: 45
End: 2025-07-22
Payer: COMMERCIAL

## 2025-07-22 NOTE — TELEPHONE ENCOUNTER
Patient Returning Call    Reason for call:  Per charge nurse: I was calling to follow up on the warfarin dosage, his inr was done on 7/16.     Information relayed to patient:  Will send te for a call back    Patient has additional questions:  Yes    What are your questions/concerns:  Please contact charge nurse Kae, [phone]     Who does the patient want to speak with:  RN    Is an  needed?:  No      Could we send this information to you in [place] or would you prefer to receive a phone call?:   No preference   Okay to leave a detailed message?: Yes at Other phone number:  Kae [phone]

## 2025-07-22 NOTE — TELEPHONE ENCOUNTER
Talked with Kae who states she was able to review the fax sent with dosing for 7/16/25 INR results, next inr due 8/6/25. No further questions or concerns

## 2025-07-31 ENCOUNTER — VIRTUAL VISIT (OUTPATIENT)
Dept: FAMILY MEDICINE | Facility: CLINIC | Age: 45
End: 2025-07-31
Payer: COMMERCIAL

## 2025-07-31 DIAGNOSIS — S12.401A CLOSED NONDISPLACED FRACTURE OF FIFTH CERVICAL VERTEBRA, UNSPECIFIED FRACTURE MORPHOLOGY, INITIAL ENCOUNTER (H): ICD-10-CM

## 2025-07-31 DIAGNOSIS — F10.21 ALCOHOL DEPENDENCE IN REMISSION (H): Primary | ICD-10-CM

## 2025-07-31 RX ORDER — METHOCARBAMOL 750 MG/1
750 TABLET, FILM COATED ORAL 3 TIMES DAILY PRN
Qty: 90 TABLET | Refills: 1 | Status: SHIPPED | OUTPATIENT
Start: 2025-07-31

## 2025-07-31 ASSESSMENT — PATIENT HEALTH QUESTIONNAIRE - PHQ9
SUM OF ALL RESPONSES TO PHQ QUESTIONS 1-9: 7
SUM OF ALL RESPONSES TO PHQ QUESTIONS 1-9: 7
10. IF YOU CHECKED OFF ANY PROBLEMS, HOW DIFFICULT HAVE THESE PROBLEMS MADE IT FOR YOU TO DO YOUR WORK, TAKE CARE OF THINGS AT HOME, OR GET ALONG WITH OTHER PEOPLE: VERY DIFFICULT

## 2025-07-31 ASSESSMENT — ANXIETY QUESTIONNAIRES
6. BECOMING EASILY ANNOYED OR IRRITABLE: SEVERAL DAYS
GAD7 TOTAL SCORE: 4
7. FEELING AFRAID AS IF SOMETHING AWFUL MIGHT HAPPEN: NOT AT ALL
GAD7 TOTAL SCORE: 4
IF YOU CHECKED OFF ANY PROBLEMS ON THIS QUESTIONNAIRE, HOW DIFFICULT HAVE THESE PROBLEMS MADE IT FOR YOU TO DO YOUR WORK, TAKE CARE OF THINGS AT HOME, OR GET ALONG WITH OTHER PEOPLE: VERY DIFFICULT
8. IF YOU CHECKED OFF ANY PROBLEMS, HOW DIFFICULT HAVE THESE MADE IT FOR YOU TO DO YOUR WORK, TAKE CARE OF THINGS AT HOME, OR GET ALONG WITH OTHER PEOPLE?: VERY DIFFICULT
1. FEELING NERVOUS, ANXIOUS, OR ON EDGE: SEVERAL DAYS
3. WORRYING TOO MUCH ABOUT DIFFERENT THINGS: SEVERAL DAYS
4. TROUBLE RELAXING: NOT AT ALL
GAD7 TOTAL SCORE: 4
5. BEING SO RESTLESS THAT IT IS HARD TO SIT STILL: SEVERAL DAYS
2. NOT BEING ABLE TO STOP OR CONTROL WORRYING: NOT AT ALL
7. FEELING AFRAID AS IF SOMETHING AWFUL MIGHT HAPPEN: NOT AT ALL

## 2025-07-31 NOTE — PROGRESS NOTES
"Cody is a 44 year old who is being evaluated via a billable video visit.    How would you like to obtain your AVS? MyChart  If the video visit is dropped, the invitation should be resent by: Text to cell phone: [phone]  Will anyone else be joining your video visit? No      Assessment & Plan     Alcohol dependence in remission (H) - completed treatment at inpatient facility in [place]. Now having virtual appointments for maintenance. Feeling \"okay\" about being back in his previous environment. Reports he feels like he needs to return to work because he's been off too long. We discussed continuing his leave until his neck symptoms improve.     Closed nondisplaced fracture of fifth cervical vertebra, unspecified fracture morphology, initial encounter (H) - should be presumed to be healed at this point, but he has not followed up with NS yet. Encouraged he schedule an appointment. Is not wearing his brace. Reports significant pain at times - taking tylenol scheduled during the day. Unable to use NSAIDs. Hasn't been taking his naltrexone. Suspect his pain is muscular, will trial a mm relaxer and reach out to NS for their advice.   - methocarbamol (ROBAXIN) 750 MG tablet; Take 1 tablet (750 mg) by mouth 3 times daily as needed for muscle spasms.    The longitudinal plan of care for the diagnosis(es)/condition(s) as documented were addressed during this visit. Due to the added complexity in care, I will continue to support Cody in the subsequent management and with ongoing continuity of care.      BMI  Estimated body mass index is 48.02 kg/m  as calculated from the following:    Height as of 5/29/25: 1.854 m (6' 1\").    Weight as of 5/29/25: 165.1 kg (364 lb).       Subjective   Cody is a 44 year old, presenting for the following health issues:  Forms (Pt would like to get document to get back to work. ) and Neck Pain (Pt reports pain on Neck from Injury he had 2 months. )        7/31/2025     8:09 AM   Additional " "Questions   Roomed by Brandy Capone MA Extern   Accompanied by Self     History of Present Illness       Reason for visit:  Work form and neck injury pain   He is taking medications regularly.          Review of Systems  Constitutional, HEENT, cardiovascular, pulmonary, gi and gu systems are negative, except as otherwise noted.      Objective    Vitals - Patient Reported  Weight (Patient Reported): 165.1 kg (364 lb)  Height (Patient Reported): 185.4 cm (6' 1\")  BMI (Based on Pt Reported Ht/Wt): 48.02  Pain Score: Severe Pain (7)  Pain Loc: Neck        Physical Exam   GENERAL: alert and no distress  EYES: Eyes grossly normal to inspection.  No discharge or erythema, or obvious scleral/conjunctival abnormalities.  RESP: No audible wheeze, cough, or visible cyanosis.    SKIN: Visible skin clear. No significant rash, abnormal pigmentation or lesions.  NEURO: Cranial nerves grossly intact.  Mentation and speech appropriate for age.  PSYCH: Appropriate affect, tone, and pace of words        Video-Visit Details    Type of service:  Video Visit   Originating Location (pt. Location): Home    Distant Location (provider location):  Off-site  Platform used for Video Visit: David  Signed Electronically by: Yovana Felipe MD    "

## 2025-08-04 ENCOUNTER — PATIENT OUTREACH (OUTPATIENT)
Dept: CARE COORDINATION | Facility: CLINIC | Age: 45
End: 2025-08-04
Payer: COMMERCIAL

## 2025-08-07 ENCOUNTER — MYC MEDICAL ADVICE (OUTPATIENT)
Dept: ANTICOAGULATION | Facility: CLINIC | Age: 45
End: 2025-08-07
Payer: COMMERCIAL

## 2025-08-08 ENCOUNTER — APPOINTMENT (OUTPATIENT)
Dept: GENERAL RADIOLOGY | Facility: CLINIC | Age: 45
End: 2025-08-08
Attending: EMERGENCY MEDICINE
Payer: COMMERCIAL

## 2025-08-08 ENCOUNTER — APPOINTMENT (OUTPATIENT)
Dept: CT IMAGING | Facility: CLINIC | Age: 45
End: 2025-08-08
Attending: EMERGENCY MEDICINE
Payer: COMMERCIAL

## 2025-08-08 ENCOUNTER — HOSPITAL ENCOUNTER (INPATIENT)
Facility: CLINIC | Age: 45
LOS: 4 days | Discharge: HOME | End: 2025-08-13
Attending: EMERGENCY MEDICINE | Admitting: INTERNAL MEDICINE
Payer: COMMERCIAL
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
## 2025-08-08 DIAGNOSIS — F10.920 ALCOHOLIC INTOXICATION WITHOUT COMPLICATION: ICD-10-CM

## 2025-08-08 DIAGNOSIS — F10.139 ALCOHOL ABUSE WITH WITHDRAWAL (H): ICD-10-CM

## 2025-08-08 DIAGNOSIS — R79.1 SUBTHERAPEUTIC INTERNATIONAL NORMALIZED RATIO (INR): ICD-10-CM

## 2025-08-08 DIAGNOSIS — F10.930 ALCOHOL WITHDRAWAL SYNDROME WITHOUT COMPLICATION (H): ICD-10-CM

## 2025-08-08 LAB
ALBUMIN SERPL BCG-MCNC: 3.7 G/DL (ref 3.5–5.2)
ALP SERPL-CCNC: 118 U/L (ref 40–150)
ALT SERPL W P-5'-P-CCNC: 56 U/L (ref 0–70)
AMPHETAMINES UR QL SCN: NORMAL
ANION GAP SERPL CALCULATED.3IONS-SCNC: 20 MMOL/L (ref 7–15)
ANION GAP SERPL CALCULATED.3IONS-SCNC: 26 MMOL/L (ref 7–15)
AST SERPL W P-5'-P-CCNC: 52 U/L (ref 0–45)
ATRIAL RATE - MUSE: 89 BPM
B-OH-BUTYR SERPL-SCNC: <0.18 MMOL/L
BARBITURATES UR QL SCN: NORMAL
BASOPHILS # BLD AUTO: 0.1 10E3/UL (ref 0–0.2)
BASOPHILS NFR BLD AUTO: 1 %
BENZODIAZ UR QL SCN: NORMAL
BILIRUB DIRECT SERPL-MCNC: 0.23 MG/DL (ref 0–0.3)
BILIRUB SERPL-MCNC: 0.6 MG/DL
BUN SERPL-MCNC: 12.3 MG/DL (ref 6–20)
BUN SERPL-MCNC: 12.7 MG/DL (ref 6–20)
BZE UR QL SCN: NORMAL
CALCIUM SERPL-MCNC: 7.6 MG/DL (ref 8.8–10.4)
CALCIUM SERPL-MCNC: 7.7 MG/DL (ref 8.8–10.4)
CANNABINOIDS UR QL SCN: NORMAL
CHLORIDE SERPL-SCNC: 103 MMOL/L (ref 98–107)
CHLORIDE SERPL-SCNC: 98 MMOL/L (ref 98–107)
CK SERPL-CCNC: 393 U/L (ref 39–308)
CREAT SERPL-MCNC: 0.76 MG/DL (ref 0.67–1.17)
CREAT SERPL-MCNC: 0.83 MG/DL (ref 0.67–1.17)
DIASTOLIC BLOOD PRESSURE - MUSE: NORMAL MMHG
EGFRCR SERPLBLD CKD-EPI 2021: >90 ML/MIN/1.73M2
EGFRCR SERPLBLD CKD-EPI 2021: >90 ML/MIN/1.73M2
EOSINOPHIL # BLD AUTO: 0.1 10E3/UL (ref 0–0.7)
EOSINOPHIL NFR BLD AUTO: 2 %
ERYTHROCYTE [DISTWIDTH] IN BLOOD BY AUTOMATED COUNT: 16.1 % (ref 10–15)
ETHANOL SERPL-MCNC: 0.28 G/DL
FENTANYL UR QL: NORMAL
GLUCOSE BLDC GLUCOMTR-MCNC: 159 MG/DL (ref 70–99)
GLUCOSE BLDC GLUCOMTR-MCNC: 170 MG/DL (ref 70–99)
GLUCOSE SERPL-MCNC: 204 MG/DL (ref 70–99)
GLUCOSE SERPL-MCNC: 263 MG/DL (ref 70–99)
HCO3 SERPL-SCNC: 14 MMOL/L (ref 22–29)
HCO3 SERPL-SCNC: 20 MMOL/L (ref 22–29)
HCT VFR BLD AUTO: 37.7 % (ref 40–53)
HGB BLD-MCNC: 12 G/DL (ref 13.3–17.7)
HOLD SPECIMEN: NORMAL
IMM GRANULOCYTES # BLD: 0.1 10E3/UL
IMM GRANULOCYTES NFR BLD: 1 %
INR PPP: 2.03 (ref 0.85–1.15)
INR PPP: 2.31 (ref 0.85–1.15)
INTERPRETATION ECG - MUSE: NORMAL
LYMPHOCYTES # BLD AUTO: 1.9 10E3/UL (ref 0.8–5.3)
LYMPHOCYTES NFR BLD AUTO: 21 %
MAGNESIUM SERPL-MCNC: 1.5 MG/DL (ref 1.7–2.3)
MAGNESIUM SERPL-MCNC: 1.7 MG/DL (ref 1.7–2.3)
MAGNESIUM SERPL-MCNC: 1.7 MG/DL (ref 1.7–2.3)
MCH RBC QN AUTO: 26 PG (ref 26.5–33)
MCHC RBC AUTO-ENTMCNC: 31.8 G/DL (ref 31.5–36.5)
MCV RBC AUTO: 82 FL (ref 78–100)
MONOCYTES # BLD AUTO: 0.5 10E3/UL (ref 0–1.3)
MONOCYTES NFR BLD AUTO: 6 %
NEUTROPHILS # BLD AUTO: 6.1 10E3/UL (ref 1.6–8.3)
NEUTROPHILS NFR BLD AUTO: 70 %
NRBC # BLD AUTO: 0 10E3/UL
NRBC BLD AUTO-RTO: 0 /100
OPIATES UR QL SCN: NORMAL
P AXIS - MUSE: 13 DEGREES
PCP QUAL URINE (ROCHE): NORMAL
PHOSPHATE SERPL-MCNC: 2.7 MG/DL (ref 2.5–4.5)
PHOSPHATE SERPL-MCNC: 3.4 MG/DL (ref 2.5–4.5)
PLATELET # BLD AUTO: 224 10E3/UL (ref 150–450)
POTASSIUM SERPL-SCNC: 4 MMOL/L (ref 3.4–5.3)
POTASSIUM SERPL-SCNC: 4 MMOL/L (ref 3.4–5.3)
PR INTERVAL - MUSE: 150 MS
PROT SERPL-MCNC: 6.4 G/DL (ref 6.4–8.3)
PROTHROMBIN TIME: 22.7 SECONDS (ref 11.8–14.8)
PROTHROMBIN TIME: 25 SECONDS (ref 11.8–14.8)
QRS DURATION - MUSE: 102 MS
QT - MUSE: 380 MS
QTC - MUSE: 462 MS
R AXIS - MUSE: -3 DEGREES
RBC # BLD AUTO: 4.62 10E6/UL (ref 4.4–5.9)
SODIUM SERPL-SCNC: 138 MMOL/L (ref 135–145)
SODIUM SERPL-SCNC: 143 MMOL/L (ref 135–145)
SYSTOLIC BLOOD PRESSURE - MUSE: NORMAL MMHG
T AXIS - MUSE: 18 DEGREES
TROPONIN T SERPL HS-MCNC: 10 NG/L
TROPONIN T SERPL HS-MCNC: 10 NG/L
VENTRICULAR RATE- MUSE: 89 BPM
WBC # BLD AUTO: 8.7 10E3/UL (ref 4–11)

## 2025-08-08 PROCEDURE — 36415 COLL VENOUS BLD VENIPUNCTURE: CPT | Performed by: EMERGENCY MEDICINE

## 2025-08-08 PROCEDURE — 74177 CT ABD & PELVIS W/CONTRAST: CPT

## 2025-08-08 PROCEDURE — 99285 EMERGENCY DEPT VISIT HI MDM: CPT | Mod: 25 | Performed by: EMERGENCY MEDICINE

## 2025-08-08 PROCEDURE — 96374 THER/PROPH/DIAG INJ IV PUSH: CPT

## 2025-08-08 PROCEDURE — 84484 ASSAY OF TROPONIN QUANT: CPT | Performed by: EMERGENCY MEDICINE

## 2025-08-08 PROCEDURE — 83735 ASSAY OF MAGNESIUM: CPT | Performed by: INTERNAL MEDICINE

## 2025-08-08 PROCEDURE — 72128 CT CHEST SPINE W/O DYE: CPT

## 2025-08-08 PROCEDURE — 73610 X-RAY EXAM OF ANKLE: CPT | Mod: LT

## 2025-08-08 PROCEDURE — 70450 CT HEAD/BRAIN W/O DYE: CPT

## 2025-08-08 PROCEDURE — 96376 TX/PRO/DX INJ SAME DRUG ADON: CPT

## 2025-08-08 PROCEDURE — 82010 KETONE BODYS QUAN: CPT | Performed by: EMERGENCY MEDICINE

## 2025-08-08 PROCEDURE — 96361 HYDRATE IV INFUSION ADD-ON: CPT

## 2025-08-08 PROCEDURE — 84100 ASSAY OF PHOSPHORUS: CPT | Performed by: INTERNAL MEDICINE

## 2025-08-08 PROCEDURE — 93005 ELECTROCARDIOGRAM TRACING: CPT

## 2025-08-08 PROCEDURE — 72125 CT NECK SPINE W/O DYE: CPT

## 2025-08-08 PROCEDURE — 82077 ASSAY SPEC XCP UR&BREATH IA: CPT | Performed by: EMERGENCY MEDICINE

## 2025-08-08 PROCEDURE — 250N000009 HC RX 250: Performed by: EMERGENCY MEDICINE

## 2025-08-08 PROCEDURE — 36415 COLL VENOUS BLD VENIPUNCTURE: CPT | Performed by: INTERNAL MEDICINE

## 2025-08-08 PROCEDURE — 250N000011 HC RX IP 250 OP 636: Performed by: INTERNAL MEDICINE

## 2025-08-08 PROCEDURE — 250N000011 HC RX IP 250 OP 636: Mod: JW | Performed by: EMERGENCY MEDICINE

## 2025-08-08 PROCEDURE — 258N000003 HC RX IP 258 OP 636: Performed by: EMERGENCY MEDICINE

## 2025-08-08 PROCEDURE — 82248 BILIRUBIN DIRECT: CPT | Performed by: EMERGENCY MEDICINE

## 2025-08-08 PROCEDURE — G0378 HOSPITAL OBSERVATION PER HR: HCPCS

## 2025-08-08 PROCEDURE — 85004 AUTOMATED DIFF WBC COUNT: CPT | Performed by: EMERGENCY MEDICINE

## 2025-08-08 PROCEDURE — 84100 ASSAY OF PHOSPHORUS: CPT | Performed by: EMERGENCY MEDICINE

## 2025-08-08 PROCEDURE — 82962 GLUCOSE BLOOD TEST: CPT

## 2025-08-08 PROCEDURE — 72131 CT LUMBAR SPINE W/O DYE: CPT

## 2025-08-08 PROCEDURE — 258N000003 HC RX IP 258 OP 636: Performed by: INTERNAL MEDICINE

## 2025-08-08 PROCEDURE — 85610 PROTHROMBIN TIME: CPT | Performed by: EMERGENCY MEDICINE

## 2025-08-08 PROCEDURE — 80307 DRUG TEST PRSMV CHEM ANLYZR: CPT | Performed by: EMERGENCY MEDICINE

## 2025-08-08 PROCEDURE — 250N000011 HC RX IP 250 OP 636: Performed by: EMERGENCY MEDICINE

## 2025-08-08 PROCEDURE — 83735 ASSAY OF MAGNESIUM: CPT | Performed by: EMERGENCY MEDICINE

## 2025-08-08 PROCEDURE — 85610 PROTHROMBIN TIME: CPT | Performed by: INTERNAL MEDICINE

## 2025-08-08 PROCEDURE — 80051 ELECTROLYTE PANEL: CPT | Performed by: EMERGENCY MEDICINE

## 2025-08-08 PROCEDURE — 250N000013 HC RX MED GY IP 250 OP 250 PS 637: Performed by: EMERGENCY MEDICINE

## 2025-08-08 PROCEDURE — 82550 ASSAY OF CK (CPK): CPT | Performed by: EMERGENCY MEDICINE

## 2025-08-08 PROCEDURE — 99223 1ST HOSP IP/OBS HIGH 75: CPT | Performed by: INTERNAL MEDICINE

## 2025-08-08 PROCEDURE — 250N000013 HC RX MED GY IP 250 OP 250 PS 637: Performed by: INTERNAL MEDICINE

## 2025-08-08 RX ORDER — HALOPERIDOL 5 MG/ML
2.5-5 INJECTION INTRAMUSCULAR EVERY 6 HOURS PRN
Status: DISCONTINUED | OUTPATIENT
Start: 2025-08-08 | End: 2025-08-12

## 2025-08-08 RX ORDER — HALOPERIDOL 5 MG/ML
2.5-5 INJECTION INTRAMUSCULAR EVERY 6 HOURS PRN
Status: DISCONTINUED | OUTPATIENT
Start: 2025-08-08 | End: 2025-08-08

## 2025-08-08 RX ORDER — GABAPENTIN 300 MG/1
900 CAPSULE ORAL EVERY 8 HOURS
Status: COMPLETED | OUTPATIENT
Start: 2025-08-09 | End: 2025-08-11

## 2025-08-08 RX ORDER — DIAZEPAM 10 MG/1
10 TABLET ORAL EVERY 30 MIN PRN
Status: DISCONTINUED | OUTPATIENT
Start: 2025-08-08 | End: 2025-08-08

## 2025-08-08 RX ORDER — DEXTROSE MONOHYDRATE 25 G/50ML
25-50 INJECTION, SOLUTION INTRAVENOUS
Status: DISCONTINUED | OUTPATIENT
Start: 2025-08-08 | End: 2025-08-13 | Stop reason: HOSPADM

## 2025-08-08 RX ORDER — LISINOPRIL 20 MG/1
20 TABLET ORAL DAILY
Status: DISCONTINUED | OUTPATIENT
Start: 2025-08-08 | End: 2025-08-13 | Stop reason: HOSPADM

## 2025-08-08 RX ORDER — CLONIDINE HYDROCHLORIDE 0.1 MG/1
0.1 TABLET ORAL EVERY 8 HOURS
Status: DISCONTINUED | OUTPATIENT
Start: 2025-08-08 | End: 2025-08-13 | Stop reason: HOSPADM

## 2025-08-08 RX ORDER — MULTIPLE VITAMINS W/ MINERALS TAB 9MG-400MCG
1 TAB ORAL DAILY
Status: DISCONTINUED | OUTPATIENT
Start: 2025-08-09 | End: 2025-08-13 | Stop reason: HOSPADM

## 2025-08-08 RX ORDER — SODIUM CHLORIDE 9 MG/ML
INJECTION, SOLUTION INTRAVENOUS CONTINUOUS
Status: DISCONTINUED | OUTPATIENT
Start: 2025-08-08 | End: 2025-08-09

## 2025-08-08 RX ORDER — FOLIC ACID 1 MG/1
1 TABLET ORAL DAILY
Status: DISCONTINUED | OUTPATIENT
Start: 2025-08-09 | End: 2025-08-13 | Stop reason: HOSPADM

## 2025-08-08 RX ORDER — ONDANSETRON 4 MG/1
4 TABLET, ORALLY DISINTEGRATING ORAL EVERY 6 HOURS PRN
Status: DISCONTINUED | OUTPATIENT
Start: 2025-08-08 | End: 2025-08-13 | Stop reason: HOSPADM

## 2025-08-08 RX ORDER — NICOTINE POLACRILEX 4 MG
15-30 LOZENGE BUCCAL
Status: DISCONTINUED | OUTPATIENT
Start: 2025-08-08 | End: 2025-08-13 | Stop reason: HOSPADM

## 2025-08-08 RX ORDER — PROCHLORPERAZINE MALEATE 5 MG/1
10 TABLET ORAL EVERY 6 HOURS PRN
Status: DISCONTINUED | OUTPATIENT
Start: 2025-08-08 | End: 2025-08-13 | Stop reason: HOSPADM

## 2025-08-08 RX ORDER — DIAZEPAM 10 MG/2ML
5-10 INJECTION, SOLUTION INTRAMUSCULAR; INTRAVENOUS EVERY 30 MIN PRN
Status: DISCONTINUED | OUTPATIENT
Start: 2025-08-08 | End: 2025-08-12

## 2025-08-08 RX ORDER — NALOXONE HYDROCHLORIDE 0.4 MG/ML
0.4 INJECTION, SOLUTION INTRAMUSCULAR; INTRAVENOUS; SUBCUTANEOUS
Status: DISCONTINUED | OUTPATIENT
Start: 2025-08-08 | End: 2025-08-13 | Stop reason: HOSPADM

## 2025-08-08 RX ORDER — NALOXONE HYDROCHLORIDE 0.4 MG/ML
0.2 INJECTION, SOLUTION INTRAMUSCULAR; INTRAVENOUS; SUBCUTANEOUS
Status: DISCONTINUED | OUTPATIENT
Start: 2025-08-08 | End: 2025-08-13 | Stop reason: HOSPADM

## 2025-08-08 RX ORDER — ACETAMINOPHEN 325 MG/1
650 TABLET ORAL EVERY 4 HOURS PRN
Status: DISCONTINUED | OUTPATIENT
Start: 2025-08-08 | End: 2025-08-09

## 2025-08-08 RX ORDER — ONDANSETRON 2 MG/ML
4 INJECTION INTRAMUSCULAR; INTRAVENOUS EVERY 6 HOURS PRN
Status: DISCONTINUED | OUTPATIENT
Start: 2025-08-08 | End: 2025-08-13 | Stop reason: HOSPADM

## 2025-08-08 RX ORDER — GABAPENTIN 100 MG/1
100 CAPSULE ORAL EVERY 8 HOURS
Status: DISCONTINUED | OUTPATIENT
Start: 2025-08-16 | End: 2025-08-12

## 2025-08-08 RX ORDER — DIAZEPAM 10 MG/1
10 TABLET ORAL EVERY 30 MIN PRN
Status: DISCONTINUED | OUTPATIENT
Start: 2025-08-08 | End: 2025-08-12

## 2025-08-08 RX ORDER — GABAPENTIN 300 MG/1
600 CAPSULE ORAL EVERY 8 HOURS
Status: DISCONTINUED | OUTPATIENT
Start: 2025-08-12 | End: 2025-08-12

## 2025-08-08 RX ORDER — CLONIDINE HYDROCHLORIDE 0.1 MG/1
0.1 TABLET ORAL EVERY 8 HOURS
Status: DISCONTINUED | OUTPATIENT
Start: 2025-08-08 | End: 2025-08-08

## 2025-08-08 RX ORDER — HYDROMORPHONE HYDROCHLORIDE 2 MG/1
2 TABLET ORAL EVERY 4 HOURS PRN
Status: DISCONTINUED | OUTPATIENT
Start: 2025-08-08 | End: 2025-08-09

## 2025-08-08 RX ORDER — ACETAMINOPHEN 650 MG/1
650 SUPPOSITORY RECTAL EVERY 4 HOURS PRN
Status: DISCONTINUED | OUTPATIENT
Start: 2025-08-08 | End: 2025-08-09

## 2025-08-08 RX ORDER — MAGNESIUM OXIDE 400 MG/1
800 TABLET ORAL ONCE
Status: COMPLETED | OUTPATIENT
Start: 2025-08-08 | End: 2025-08-08

## 2025-08-08 RX ORDER — CALCIUM CARBONATE 500(1250)
500 TABLET ORAL 2 TIMES DAILY WITH MEALS
Status: DISCONTINUED | OUTPATIENT
Start: 2025-08-08 | End: 2025-08-13 | Stop reason: HOSPADM

## 2025-08-08 RX ORDER — FLUMAZENIL 0.1 MG/ML
0.2 INJECTION, SOLUTION INTRAVENOUS
Status: DISCONTINUED | OUTPATIENT
Start: 2025-08-08 | End: 2025-08-08

## 2025-08-08 RX ORDER — OLANZAPINE 5 MG/1
5-10 TABLET, ORALLY DISINTEGRATING ORAL EVERY 6 HOURS PRN
Status: DISCONTINUED | OUTPATIENT
Start: 2025-08-08 | End: 2025-08-12

## 2025-08-08 RX ORDER — GABAPENTIN 300 MG/1
300 CAPSULE ORAL EVERY 8 HOURS
Status: DISCONTINUED | OUTPATIENT
Start: 2025-08-14 | End: 2025-08-12

## 2025-08-08 RX ORDER — BUPROPION HYDROCHLORIDE 150 MG/1
300 TABLET ORAL EVERY MORNING
Status: DISCONTINUED | OUTPATIENT
Start: 2025-08-09 | End: 2025-08-13 | Stop reason: HOSPADM

## 2025-08-08 RX ORDER — DIAZEPAM 10 MG/2ML
5-10 INJECTION, SOLUTION INTRAMUSCULAR; INTRAVENOUS EVERY 30 MIN PRN
Status: DISCONTINUED | OUTPATIENT
Start: 2025-08-08 | End: 2025-08-08

## 2025-08-08 RX ORDER — FLUMAZENIL 0.1 MG/ML
0.2 INJECTION, SOLUTION INTRAVENOUS
Status: DISCONTINUED | OUTPATIENT
Start: 2025-08-08 | End: 2025-08-13 | Stop reason: HOSPADM

## 2025-08-08 RX ORDER — THERA TABS 400 MCG
1 TAB ORAL ONCE
Status: COMPLETED | OUTPATIENT
Start: 2025-08-08 | End: 2025-08-08

## 2025-08-08 RX ORDER — OLANZAPINE 5 MG/1
5-10 TABLET, ORALLY DISINTEGRATING ORAL EVERY 6 HOURS PRN
Status: DISCONTINUED | OUTPATIENT
Start: 2025-08-08 | End: 2025-08-08

## 2025-08-08 RX ORDER — IOPAMIDOL 755 MG/ML
500 INJECTION, SOLUTION INTRAVASCULAR ONCE
Status: COMPLETED | OUTPATIENT
Start: 2025-08-08 | End: 2025-08-08

## 2025-08-08 RX ORDER — FLUTICASONE PROPIONATE 50 MCG
1 SPRAY, SUSPENSION (ML) NASAL DAILY PRN
Status: DISCONTINUED | OUTPATIENT
Start: 2025-08-08 | End: 2025-08-13 | Stop reason: HOSPADM

## 2025-08-08 RX ORDER — CARVEDILOL 12.5 MG/1
12.5 TABLET ORAL 2 TIMES DAILY WITH MEALS
Status: DISCONTINUED | OUTPATIENT
Start: 2025-08-08 | End: 2025-08-13 | Stop reason: HOSPADM

## 2025-08-08 RX ORDER — FOLIC ACID 1 MG/1
1 TABLET ORAL ONCE
Status: COMPLETED | OUTPATIENT
Start: 2025-08-08 | End: 2025-08-08

## 2025-08-08 RX ORDER — GABAPENTIN 600 MG/1
1200 TABLET ORAL ONCE
Status: COMPLETED | OUTPATIENT
Start: 2025-08-08 | End: 2025-08-08

## 2025-08-08 RX ADMIN — IOPAMIDOL 100 ML: 755 INJECTION, SOLUTION INTRAVENOUS at 08:50

## 2025-08-08 RX ADMIN — THIAMINE HCL TAB 100 MG 100 MG: 100 TAB at 11:04

## 2025-08-08 RX ADMIN — FOLIC ACID 1 MG: 1 TABLET ORAL at 11:04

## 2025-08-08 RX ADMIN — Medication 800 MG: at 11:04

## 2025-08-08 RX ADMIN — DIAZEPAM 5 MG: 10 INJECTION, SOLUTION INTRAMUSCULAR; INTRAVENOUS at 16:35

## 2025-08-08 RX ADMIN — LISINOPRIL 20 MG: 20 TABLET ORAL at 19:46

## 2025-08-08 RX ADMIN — CARVEDILOL 12.5 MG: 12.5 TABLET, FILM COATED ORAL at 20:03

## 2025-08-08 RX ADMIN — WARFARIN SODIUM 7.5 MG: 5 TABLET ORAL at 19:46

## 2025-08-08 RX ADMIN — CALCIUM 500 MG: 500 TABLET ORAL at 22:33

## 2025-08-08 RX ADMIN — THERA TABS 1 TABLET: TAB at 11:04

## 2025-08-08 RX ADMIN — DIAZEPAM 10 MG: 10 INJECTION, SOLUTION INTRAMUSCULAR; INTRAVENOUS at 19:54

## 2025-08-08 RX ADMIN — DIAZEPAM 10 MG: 10 TABLET ORAL at 21:11

## 2025-08-08 RX ADMIN — SODIUM CHLORIDE 1000 ML: 9 INJECTION, SOLUTION INTRAVENOUS at 10:58

## 2025-08-08 RX ADMIN — GABAPENTIN 1200 MG: 600 TABLET, FILM COATED ORAL at 19:45

## 2025-08-08 RX ADMIN — CLONIDINE HYDROCHLORIDE 0.1 MG: 0.1 TABLET ORAL at 19:46

## 2025-08-08 RX ADMIN — SODIUM CHLORIDE: 0.9 INJECTION, SOLUTION INTRAVENOUS at 20:05

## 2025-08-08 RX ADMIN — SODIUM CHLORIDE 1000 ML: 0.9 INJECTION, SOLUTION INTRAVENOUS at 16:39

## 2025-08-08 RX ADMIN — SODIUM CHLORIDE 65 ML: 9 INJECTION, SOLUTION INTRAVENOUS at 08:50

## 2025-08-08 ASSESSMENT — LIFESTYLE VARIABLES
AUDITORY DISTURBANCES: NOT PRESENT
PAROXYSMAL SWEATS: 3
AUDITORY DISTURBANCES: MILD HARSHNESS OR ABILITY TO FRIGHTEN
NAUSEA AND VOMITING: NO NAUSEA AND NO VOMITING
ANXIETY: NO ANXIETY, AT EASE
HEADACHE, FULLNESS IN HEAD: NOT PRESENT
AGITATION: NORMAL ACTIVITY
PAROXYSMAL SWEATS: BARELY PERCEPTIBLE SWEATING, PALMS MOIST
TOTAL SCORE: 8
VISUAL DISTURBANCES: MODERATE SENSITIVITY
TREMOR: MODERATE, WITH PATIENT'S ARMS EXTENDED
VISUAL DISTURBANCES: NOT PRESENT
TREMOR: 3
TOTAL SCORE: 10
ORIENTATION AND CLOUDING OF SENSORIUM: ORIENTED AND CAN DO SERIAL ADDITIONS
ANXIETY: 2
NAUSEA AND VOMITING: NO NAUSEA AND NO VOMITING
HEADACHE, FULLNESS IN HEAD: NOT PRESENT
AGITATION: NORMAL ACTIVITY
ORIENTATION AND CLOUDING OF SENSORIUM: ORIENTED AND CAN DO SERIAL ADDITIONS

## 2025-08-08 ASSESSMENT — ACTIVITIES OF DAILY LIVING (ADL)
ADLS_ACUITY_SCORE: 67
ADLS_ACUITY_SCORE: 58
ADLS_ACUITY_SCORE: 67
ADLS_ACUITY_SCORE: 58
ADLS_ACUITY_SCORE: 67
ADLS_ACUITY_SCORE: 58

## 2025-08-09 ENCOUNTER — APPOINTMENT (OUTPATIENT)
Dept: PHYSICAL THERAPY | Facility: CLINIC | Age: 45
End: 2025-08-09
Attending: INTERNAL MEDICINE
Payer: COMMERCIAL

## 2025-08-09 PROBLEM — F10.139 ALCOHOL ABUSE WITH WITHDRAWAL (H): Status: ACTIVE | Noted: 2025-08-09

## 2025-08-09 LAB
ALBUMIN SERPL BCG-MCNC: 3.3 G/DL (ref 3.5–5.2)
ALP SERPL-CCNC: 119 U/L (ref 40–150)
ALT SERPL W P-5'-P-CCNC: 49 U/L (ref 0–70)
ANION GAP SERPL CALCULATED.3IONS-SCNC: 11 MMOL/L (ref 7–15)
AST SERPL W P-5'-P-CCNC: 51 U/L (ref 0–45)
BILIRUB SERPL-MCNC: 1.2 MG/DL
BUN SERPL-MCNC: 7.9 MG/DL (ref 6–20)
CALCIUM SERPL-MCNC: 7.9 MG/DL (ref 8.8–10.4)
CHLORIDE SERPL-SCNC: 104 MMOL/L (ref 98–107)
CK SERPL-CCNC: 325 U/L (ref 39–308)
CREAT SERPL-MCNC: 0.76 MG/DL (ref 0.67–1.17)
EGFRCR SERPLBLD CKD-EPI 2021: >90 ML/MIN/1.73M2
ERYTHROCYTE [DISTWIDTH] IN BLOOD BY AUTOMATED COUNT: 15.9 % (ref 10–15)
GLUCOSE BLDC GLUCOMTR-MCNC: 147 MG/DL (ref 70–99)
GLUCOSE BLDC GLUCOMTR-MCNC: 156 MG/DL (ref 70–99)
GLUCOSE BLDC GLUCOMTR-MCNC: 160 MG/DL (ref 70–99)
GLUCOSE BLDC GLUCOMTR-MCNC: 164 MG/DL (ref 70–99)
GLUCOSE BLDC GLUCOMTR-MCNC: 172 MG/DL (ref 70–99)
GLUCOSE BLDC GLUCOMTR-MCNC: 180 MG/DL (ref 70–99)
GLUCOSE SERPL-MCNC: 146 MG/DL (ref 70–99)
HCO3 SERPL-SCNC: 25 MMOL/L (ref 22–29)
HCT VFR BLD AUTO: 33.4 % (ref 40–53)
HGB BLD-MCNC: 10.5 G/DL (ref 13.3–17.7)
INR PPP: 2.28 (ref 0.85–1.15)
MCH RBC QN AUTO: 25.7 PG (ref 26.5–33)
MCHC RBC AUTO-ENTMCNC: 31.4 G/DL (ref 31.5–36.5)
MCV RBC AUTO: 82 FL (ref 78–100)
PLATELET # BLD AUTO: 164 10E3/UL (ref 150–450)
POTASSIUM SERPL-SCNC: 3.6 MMOL/L (ref 3.4–5.3)
PROT SERPL-MCNC: 5.7 G/DL (ref 6.4–8.3)
PROTHROMBIN TIME: 24.8 SECONDS (ref 11.8–14.8)
RBC # BLD AUTO: 4.08 10E6/UL (ref 4.4–5.9)
SODIUM SERPL-SCNC: 140 MMOL/L (ref 135–145)
WBC # BLD AUTO: 6.2 10E3/UL (ref 4–11)

## 2025-08-09 PROCEDURE — 250N000012 HC RX MED GY IP 250 OP 636 PS 637: Performed by: INTERNAL MEDICINE

## 2025-08-09 PROCEDURE — 99232 SBSQ HOSP IP/OBS MODERATE 35: CPT | Performed by: INTERNAL MEDICINE

## 2025-08-09 PROCEDURE — 97161 PT EVAL LOW COMPLEX 20 MIN: CPT | Mod: GP

## 2025-08-09 PROCEDURE — 258N000003 HC RX IP 258 OP 636: Performed by: INTERNAL MEDICINE

## 2025-08-09 PROCEDURE — 97530 THERAPEUTIC ACTIVITIES: CPT | Mod: GP

## 2025-08-09 PROCEDURE — 36415 COLL VENOUS BLD VENIPUNCTURE: CPT | Performed by: INTERNAL MEDICINE

## 2025-08-09 PROCEDURE — G0378 HOSPITAL OBSERVATION PER HR: HCPCS

## 2025-08-09 PROCEDURE — 250N000013 HC RX MED GY IP 250 OP 250 PS 637: Performed by: INTERNAL MEDICINE

## 2025-08-09 PROCEDURE — 85610 PROTHROMBIN TIME: CPT | Performed by: INTERNAL MEDICINE

## 2025-08-09 PROCEDURE — 120N000001 HC R&B MED SURG/OB

## 2025-08-09 PROCEDURE — 84450 TRANSFERASE (AST) (SGOT): CPT | Performed by: INTERNAL MEDICINE

## 2025-08-09 PROCEDURE — 97116 GAIT TRAINING THERAPY: CPT | Mod: GP

## 2025-08-09 PROCEDURE — 96361 HYDRATE IV INFUSION ADD-ON: CPT

## 2025-08-09 PROCEDURE — 82962 GLUCOSE BLOOD TEST: CPT

## 2025-08-09 PROCEDURE — 85027 COMPLETE CBC AUTOMATED: CPT | Performed by: INTERNAL MEDICINE

## 2025-08-09 PROCEDURE — 82550 ASSAY OF CK (CPK): CPT | Performed by: INTERNAL MEDICINE

## 2025-08-09 RX ORDER — METHOCARBAMOL 500 MG/1
500 TABLET, FILM COATED ORAL 4 TIMES DAILY
Status: DISCONTINUED | OUTPATIENT
Start: 2025-08-09 | End: 2025-08-12

## 2025-08-09 RX ORDER — ACETAMINOPHEN 325 MG/1
975 TABLET ORAL 3 TIMES DAILY
Status: DISCONTINUED | OUTPATIENT
Start: 2025-08-09 | End: 2025-08-13 | Stop reason: HOSPADM

## 2025-08-09 RX ORDER — TRAMADOL HYDROCHLORIDE 50 MG/1
50 TABLET ORAL EVERY 6 HOURS PRN
Status: DISCONTINUED | OUTPATIENT
Start: 2025-08-09 | End: 2025-08-12

## 2025-08-09 RX ORDER — LIDOCAINE 4 G/G
1-2 PATCH TOPICAL
Status: DISCONTINUED | OUTPATIENT
Start: 2025-08-09 | End: 2025-08-13 | Stop reason: HOSPADM

## 2025-08-09 RX ADMIN — WARFARIN SODIUM 7.5 MG: 5 TABLET ORAL at 17:28

## 2025-08-09 RX ADMIN — FOLIC ACID 1 MG: 1 TABLET ORAL at 08:30

## 2025-08-09 RX ADMIN — DICLOFENAC SODIUM 2 G: 10 GEL TOPICAL at 12:37

## 2025-08-09 RX ADMIN — GABAPENTIN 900 MG: 300 CAPSULE ORAL at 01:02

## 2025-08-09 RX ADMIN — METHOCARBAMOL 500 MG: 500 TABLET ORAL at 17:27

## 2025-08-09 RX ADMIN — ACETAMINOPHEN 975 MG: 325 TABLET ORAL at 11:43

## 2025-08-09 RX ADMIN — CLONIDINE HYDROCHLORIDE 0.1 MG: 0.1 TABLET ORAL at 04:30

## 2025-08-09 RX ADMIN — CARVEDILOL 12.5 MG: 12.5 TABLET, FILM COATED ORAL at 17:27

## 2025-08-09 RX ADMIN — METHOCARBAMOL 500 MG: 500 TABLET ORAL at 21:37

## 2025-08-09 RX ADMIN — CALCIUM 500 MG: 500 TABLET ORAL at 08:30

## 2025-08-09 RX ADMIN — INSULIN ASPART 1 UNITS: 100 INJECTION, SOLUTION INTRAVENOUS; SUBCUTANEOUS at 12:38

## 2025-08-09 RX ADMIN — CARVEDILOL 12.5 MG: 12.5 TABLET, FILM COATED ORAL at 08:30

## 2025-08-09 RX ADMIN — GABAPENTIN 900 MG: 300 CAPSULE ORAL at 08:30

## 2025-08-09 RX ADMIN — INSULIN ASPART 1 UNITS: 100 INJECTION, SOLUTION INTRAVENOUS; SUBCUTANEOUS at 17:24

## 2025-08-09 RX ADMIN — CLONIDINE HYDROCHLORIDE 0.1 MG: 0.1 TABLET ORAL at 11:42

## 2025-08-09 RX ADMIN — DICLOFENAC SODIUM 2 G: 10 GEL TOPICAL at 17:30

## 2025-08-09 RX ADMIN — DICLOFENAC SODIUM 2 G: 10 GEL TOPICAL at 21:37

## 2025-08-09 RX ADMIN — LIDOCAINE 1 PATCH: 4 PATCH TOPICAL at 11:46

## 2025-08-09 RX ADMIN — METHOCARBAMOL 500 MG: 500 TABLET ORAL at 11:42

## 2025-08-09 RX ADMIN — CALCIUM 500 MG: 500 TABLET ORAL at 17:27

## 2025-08-09 RX ADMIN — GABAPENTIN 900 MG: 300 CAPSULE ORAL at 17:27

## 2025-08-09 RX ADMIN — LISINOPRIL 20 MG: 20 TABLET ORAL at 08:30

## 2025-08-09 RX ADMIN — SODIUM CHLORIDE: 0.9 INJECTION, SOLUTION INTRAVENOUS at 05:50

## 2025-08-09 RX ADMIN — THIAMINE HCL TAB 100 MG 100 MG: 100 TAB at 08:29

## 2025-08-09 RX ADMIN — ACETAMINOPHEN 975 MG: 325 TABLET ORAL at 21:36

## 2025-08-09 RX ADMIN — INSULIN ASPART 1 UNITS: 100 INJECTION, SOLUTION INTRAVENOUS; SUBCUTANEOUS at 08:39

## 2025-08-09 RX ADMIN — ACETAMINOPHEN 650 MG: 325 TABLET ORAL at 01:02

## 2025-08-09 RX ADMIN — ACETAMINOPHEN 975 MG: 325 TABLET ORAL at 17:27

## 2025-08-09 RX ADMIN — Medication 1 TABLET: at 08:30

## 2025-08-09 RX ADMIN — ACETAMINOPHEN 650 MG: 325 TABLET ORAL at 08:36

## 2025-08-09 RX ADMIN — CLONIDINE HYDROCHLORIDE 0.1 MG: 0.1 TABLET ORAL at 21:37

## 2025-08-09 RX ADMIN — BUPROPION HYDROCHLORIDE 300 MG: 150 TABLET, EXTENDED RELEASE ORAL at 08:30

## 2025-08-09 ASSESSMENT — ACTIVITIES OF DAILY LIVING (ADL)
ADLS_ACUITY_SCORE: 72
ADLS_ACUITY_SCORE: 73
ADLS_ACUITY_SCORE: 74
ADLS_ACUITY_SCORE: 69
ADLS_ACUITY_SCORE: 73
ADLS_ACUITY_SCORE: 67
ADLS_ACUITY_SCORE: 69
ADLS_ACUITY_SCORE: 67
ADLS_ACUITY_SCORE: 73
ADLS_ACUITY_SCORE: 74
ADLS_ACUITY_SCORE: 69
ADLS_ACUITY_SCORE: 67
ADLS_ACUITY_SCORE: 74
ADLS_ACUITY_SCORE: 69
ADLS_ACUITY_SCORE: 72
ADLS_ACUITY_SCORE: 74
ADLS_ACUITY_SCORE: 72
ADLS_ACUITY_SCORE: 74
ADLS_ACUITY_SCORE: 69
ADLS_ACUITY_SCORE: 67
ADLS_ACUITY_SCORE: 74
ADLS_ACUITY_SCORE: 72
ADLS_ACUITY_SCORE: 72

## 2025-08-10 ENCOUNTER — HEALTH MAINTENANCE LETTER (OUTPATIENT)
Age: 45
End: 2025-08-10

## 2025-08-10 ENCOUNTER — APPOINTMENT (OUTPATIENT)
Dept: PHYSICAL THERAPY | Facility: CLINIC | Age: 45
End: 2025-08-10
Payer: COMMERCIAL

## 2025-08-10 LAB
ANION GAP SERPL CALCULATED.3IONS-SCNC: 10 MMOL/L (ref 7–15)
BUN SERPL-MCNC: 7.4 MG/DL (ref 6–20)
CALCIUM SERPL-MCNC: 8.3 MG/DL (ref 8.8–10.4)
CHLORIDE SERPL-SCNC: 103 MMOL/L (ref 98–107)
CK SERPL-CCNC: 265 U/L (ref 39–308)
CREAT SERPL-MCNC: 0.84 MG/DL (ref 0.67–1.17)
EGFRCR SERPLBLD CKD-EPI 2021: >90 ML/MIN/1.73M2
ERYTHROCYTE [DISTWIDTH] IN BLOOD BY AUTOMATED COUNT: 16 % (ref 10–15)
GLUCOSE BLDC GLUCOMTR-MCNC: 132 MG/DL (ref 70–99)
GLUCOSE BLDC GLUCOMTR-MCNC: 141 MG/DL (ref 70–99)
GLUCOSE BLDC GLUCOMTR-MCNC: 146 MG/DL (ref 70–99)
GLUCOSE BLDC GLUCOMTR-MCNC: 149 MG/DL (ref 70–99)
GLUCOSE BLDC GLUCOMTR-MCNC: 151 MG/DL (ref 70–99)
GLUCOSE SERPL-MCNC: 135 MG/DL (ref 70–99)
HCO3 SERPL-SCNC: 26 MMOL/L (ref 22–29)
HCT VFR BLD AUTO: 33.9 % (ref 40–53)
HGB BLD-MCNC: 10.8 G/DL (ref 13.3–17.7)
INR PPP: 3.04 (ref 0.85–1.15)
MCH RBC QN AUTO: 26.4 PG (ref 26.5–33)
MCHC RBC AUTO-ENTMCNC: 31.9 G/DL (ref 31.5–36.5)
MCV RBC AUTO: 83 FL (ref 78–100)
PLATELET # BLD AUTO: 143 10E3/UL (ref 150–450)
POTASSIUM SERPL-SCNC: 3.6 MMOL/L (ref 3.4–5.3)
PROTHROMBIN TIME: 30.8 SECONDS (ref 11.8–14.8)
RBC # BLD AUTO: 4.09 10E6/UL (ref 4.4–5.9)
SODIUM SERPL-SCNC: 139 MMOL/L (ref 135–145)
WBC # BLD AUTO: 4.6 10E3/UL (ref 4–11)

## 2025-08-10 PROCEDURE — 36415 COLL VENOUS BLD VENIPUNCTURE: CPT | Performed by: INTERNAL MEDICINE

## 2025-08-10 PROCEDURE — 97530 THERAPEUTIC ACTIVITIES: CPT | Mod: GP

## 2025-08-10 PROCEDURE — 85610 PROTHROMBIN TIME: CPT | Performed by: INTERNAL MEDICINE

## 2025-08-10 PROCEDURE — 85014 HEMATOCRIT: CPT | Performed by: INTERNAL MEDICINE

## 2025-08-10 PROCEDURE — 120N000001 HC R&B MED SURG/OB

## 2025-08-10 PROCEDURE — 82550 ASSAY OF CK (CPK): CPT | Performed by: INTERNAL MEDICINE

## 2025-08-10 PROCEDURE — 99232 SBSQ HOSP IP/OBS MODERATE 35: CPT | Performed by: HOSPITALIST

## 2025-08-10 PROCEDURE — 250N000013 HC RX MED GY IP 250 OP 250 PS 637: Performed by: HOSPITALIST

## 2025-08-10 PROCEDURE — 250N000013 HC RX MED GY IP 250 OP 250 PS 637: Performed by: INTERNAL MEDICINE

## 2025-08-10 PROCEDURE — 97116 GAIT TRAINING THERAPY: CPT | Mod: GP

## 2025-08-10 PROCEDURE — 82374 ASSAY BLOOD CARBON DIOXIDE: CPT | Performed by: INTERNAL MEDICINE

## 2025-08-10 RX ADMIN — CALCIUM 500 MG: 500 TABLET ORAL at 17:04

## 2025-08-10 RX ADMIN — MENTHOL 1 PATCH: 205.5 PATCH TOPICAL at 12:05

## 2025-08-10 RX ADMIN — GABAPENTIN 900 MG: 300 CAPSULE ORAL at 08:17

## 2025-08-10 RX ADMIN — METHOCARBAMOL 500 MG: 500 TABLET ORAL at 17:04

## 2025-08-10 RX ADMIN — CARVEDILOL 12.5 MG: 12.5 TABLET, FILM COATED ORAL at 08:18

## 2025-08-10 RX ADMIN — DICLOFENAC SODIUM 2 G: 10 GEL TOPICAL at 22:25

## 2025-08-10 RX ADMIN — TRAMADOL HYDROCHLORIDE 25 MG: 50 TABLET ORAL at 14:22

## 2025-08-10 RX ADMIN — GABAPENTIN 900 MG: 300 CAPSULE ORAL at 17:04

## 2025-08-10 RX ADMIN — TRAMADOL HYDROCHLORIDE 25 MG: 50 TABLET ORAL at 22:22

## 2025-08-10 RX ADMIN — CLONIDINE HYDROCHLORIDE 0.1 MG: 0.1 TABLET ORAL at 07:26

## 2025-08-10 RX ADMIN — Medication 1 TABLET: at 08:18

## 2025-08-10 RX ADMIN — DICLOFENAC SODIUM 2 G: 10 GEL TOPICAL at 17:23

## 2025-08-10 RX ADMIN — CARVEDILOL 12.5 MG: 12.5 TABLET, FILM COATED ORAL at 17:04

## 2025-08-10 RX ADMIN — METHOCARBAMOL 500 MG: 500 TABLET ORAL at 12:04

## 2025-08-10 RX ADMIN — DIAZEPAM 10 MG: 10 TABLET ORAL at 17:17

## 2025-08-10 RX ADMIN — FOLIC ACID 1 MG: 1 TABLET ORAL at 08:18

## 2025-08-10 RX ADMIN — METHOCARBAMOL 500 MG: 500 TABLET ORAL at 08:18

## 2025-08-10 RX ADMIN — INSULIN ASPART 1 UNITS: 100 INJECTION, SOLUTION INTRAVENOUS; SUBCUTANEOUS at 12:04

## 2025-08-10 RX ADMIN — CLONIDINE HYDROCHLORIDE 0.1 MG: 0.1 TABLET ORAL at 22:22

## 2025-08-10 RX ADMIN — DICLOFENAC SODIUM 2 G: 10 GEL TOPICAL at 08:19

## 2025-08-10 RX ADMIN — DICLOFENAC SODIUM 2 G: 10 GEL TOPICAL at 12:05

## 2025-08-10 RX ADMIN — ACETAMINOPHEN 975 MG: 325 TABLET ORAL at 17:04

## 2025-08-10 RX ADMIN — CLONIDINE HYDROCHLORIDE 0.1 MG: 0.1 TABLET ORAL at 13:31

## 2025-08-10 RX ADMIN — LIDOCAINE 1 PATCH: 4 PATCH TOPICAL at 08:17

## 2025-08-10 RX ADMIN — CALCIUM 500 MG: 500 TABLET ORAL at 08:18

## 2025-08-10 RX ADMIN — METHOCARBAMOL 500 MG: 500 TABLET ORAL at 22:22

## 2025-08-10 RX ADMIN — GABAPENTIN 900 MG: 300 CAPSULE ORAL at 02:03

## 2025-08-10 RX ADMIN — INSULIN ASPART 1 UNITS: 100 INJECTION, SOLUTION INTRAVENOUS; SUBCUTANEOUS at 17:59

## 2025-08-10 RX ADMIN — ACETAMINOPHEN 975 MG: 325 TABLET ORAL at 22:22

## 2025-08-10 RX ADMIN — ACETAMINOPHEN 975 MG: 325 TABLET ORAL at 08:17

## 2025-08-10 RX ADMIN — THIAMINE HCL TAB 100 MG 100 MG: 100 TAB at 08:18

## 2025-08-10 RX ADMIN — LISINOPRIL 20 MG: 20 TABLET ORAL at 08:18

## 2025-08-10 RX ADMIN — BUPROPION HYDROCHLORIDE 300 MG: 150 TABLET, EXTENDED RELEASE ORAL at 08:18

## 2025-08-10 ASSESSMENT — ACTIVITIES OF DAILY LIVING (ADL)
ADLS_ACUITY_SCORE: 54
ADLS_ACUITY_SCORE: 74
ADLS_ACUITY_SCORE: 54
ADLS_ACUITY_SCORE: 74
ADLS_ACUITY_SCORE: 54
ADLS_ACUITY_SCORE: 74
ADLS_ACUITY_SCORE: 54
ADLS_ACUITY_SCORE: 73
ADLS_ACUITY_SCORE: 74
ADLS_ACUITY_SCORE: 54
ADLS_ACUITY_SCORE: 74
ADLS_ACUITY_SCORE: 54
ADLS_ACUITY_SCORE: 74
ADLS_ACUITY_SCORE: 54
ADLS_ACUITY_SCORE: 54
ADLS_ACUITY_SCORE: 74
ADLS_ACUITY_SCORE: 74
ADLS_ACUITY_SCORE: 73
ADLS_ACUITY_SCORE: 73
DEPENDENT_IADLS:: CLEANING

## 2025-08-11 ENCOUNTER — APPOINTMENT (OUTPATIENT)
Dept: OCCUPATIONAL THERAPY | Facility: CLINIC | Age: 45
DRG: 896 | End: 2025-08-11
Attending: INTERNAL MEDICINE
Payer: COMMERCIAL

## 2025-08-11 ENCOUNTER — APPOINTMENT (OUTPATIENT)
Dept: PHYSICAL THERAPY | Facility: CLINIC | Age: 45
DRG: 896 | End: 2025-08-11
Payer: COMMERCIAL

## 2025-08-11 LAB
BASOPHILS # BLD AUTO: 0 10E3/UL (ref 0–0.2)
BASOPHILS NFR BLD AUTO: 0 %
EOSINOPHIL # BLD AUTO: 0.3 10E3/UL (ref 0–0.7)
EOSINOPHIL NFR BLD AUTO: 6 %
ERYTHROCYTE [DISTWIDTH] IN BLOOD BY AUTOMATED COUNT: 16.2 % (ref 10–15)
GLUCOSE BLDC GLUCOMTR-MCNC: 111 MG/DL (ref 70–99)
GLUCOSE BLDC GLUCOMTR-MCNC: 132 MG/DL (ref 70–99)
GLUCOSE BLDC GLUCOMTR-MCNC: 133 MG/DL (ref 70–99)
GLUCOSE BLDC GLUCOMTR-MCNC: 139 MG/DL (ref 70–99)
GLUCOSE BLDC GLUCOMTR-MCNC: 186 MG/DL (ref 70–99)
HCT VFR BLD AUTO: 34.5 % (ref 40–53)
HGB BLD-MCNC: 10.7 G/DL (ref 13.3–17.7)
IMM GRANULOCYTES # BLD: 0 10E3/UL
IMM GRANULOCYTES NFR BLD: 1 %
INR PPP: 2.98 (ref 0.85–1.15)
LYMPHOCYTES # BLD AUTO: 1.6 10E3/UL (ref 0.8–5.3)
LYMPHOCYTES NFR BLD AUTO: 32 %
MCH RBC QN AUTO: 26.1 PG (ref 26.5–33)
MCHC RBC AUTO-ENTMCNC: 31 G/DL (ref 31.5–36.5)
MCV RBC AUTO: 84 FL (ref 78–100)
MONOCYTES # BLD AUTO: 0.3 10E3/UL (ref 0–1.3)
MONOCYTES NFR BLD AUTO: 6 %
NEUTROPHILS # BLD AUTO: 2.6 10E3/UL (ref 1.6–8.3)
NEUTROPHILS NFR BLD AUTO: 54 %
NRBC # BLD AUTO: 0 10E3/UL
NRBC BLD AUTO-RTO: 0 /100
PLATELET # BLD AUTO: 150 10E3/UL (ref 150–450)
PROTHROMBIN TIME: 30.4 SECONDS (ref 11.8–14.8)
RBC # BLD AUTO: 4.1 10E6/UL (ref 4.4–5.9)
WBC # BLD AUTO: 4.9 10E3/UL (ref 4–11)

## 2025-08-11 PROCEDURE — 99232 SBSQ HOSP IP/OBS MODERATE 35: CPT | Performed by: HOSPITALIST

## 2025-08-11 PROCEDURE — 97535 SELF CARE MNGMENT TRAINING: CPT | Mod: GO | Performed by: OCCUPATIONAL THERAPIST

## 2025-08-11 PROCEDURE — 97530 THERAPEUTIC ACTIVITIES: CPT | Mod: GP | Performed by: PHYSICAL THERAPIST

## 2025-08-11 PROCEDURE — 120N000001 HC R&B MED SURG/OB

## 2025-08-11 PROCEDURE — 85610 PROTHROMBIN TIME: CPT | Performed by: INTERNAL MEDICINE

## 2025-08-11 PROCEDURE — 97165 OT EVAL LOW COMPLEX 30 MIN: CPT | Mod: GO | Performed by: OCCUPATIONAL THERAPIST

## 2025-08-11 PROCEDURE — 250N000013 HC RX MED GY IP 250 OP 250 PS 637: Performed by: INTERNAL MEDICINE

## 2025-08-11 PROCEDURE — 36415 COLL VENOUS BLD VENIPUNCTURE: CPT | Performed by: HOSPITALIST

## 2025-08-11 PROCEDURE — 85018 HEMOGLOBIN: CPT | Performed by: HOSPITALIST

## 2025-08-11 PROCEDURE — 250N000013 HC RX MED GY IP 250 OP 250 PS 637: Performed by: HOSPITALIST

## 2025-08-11 PROCEDURE — 97116 GAIT TRAINING THERAPY: CPT | Mod: GP | Performed by: PHYSICAL THERAPIST

## 2025-08-11 RX ORDER — HYDROXYZINE HYDROCHLORIDE 25 MG/1
25 TABLET, FILM COATED ORAL EVERY 6 HOURS PRN
Status: DISCONTINUED | OUTPATIENT
Start: 2025-08-11 | End: 2025-08-13 | Stop reason: HOSPADM

## 2025-08-11 RX ORDER — HYDROXYZINE HYDROCHLORIDE 50 MG/1
50 TABLET, FILM COATED ORAL EVERY 6 HOURS PRN
Status: DISCONTINUED | OUTPATIENT
Start: 2025-08-11 | End: 2025-08-13 | Stop reason: HOSPADM

## 2025-08-11 RX ORDER — WARFARIN SODIUM 5 MG/1
5 TABLET ORAL
Status: COMPLETED | OUTPATIENT
Start: 2025-08-11 | End: 2025-08-11

## 2025-08-11 RX ORDER — WARFARIN SODIUM 5 MG/1
5 TABLET ORAL EVERY EVENING
Qty: 45 TABLET | Refills: 0 | Status: SHIPPED | OUTPATIENT
Start: 2025-08-11

## 2025-08-11 RX ADMIN — GABAPENTIN 900 MG: 300 CAPSULE ORAL at 08:37

## 2025-08-11 RX ADMIN — METHOCARBAMOL 500 MG: 500 TABLET ORAL at 13:46

## 2025-08-11 RX ADMIN — METHOCARBAMOL 500 MG: 500 TABLET ORAL at 18:23

## 2025-08-11 RX ADMIN — GABAPENTIN 900 MG: 300 CAPSULE ORAL at 02:05

## 2025-08-11 RX ADMIN — ACETAMINOPHEN 975 MG: 325 TABLET ORAL at 18:23

## 2025-08-11 RX ADMIN — DICLOFENAC SODIUM 2 G: 10 GEL TOPICAL at 22:35

## 2025-08-11 RX ADMIN — DICLOFENAC SODIUM 2 G: 10 GEL TOPICAL at 13:46

## 2025-08-11 RX ADMIN — LIDOCAINE 2 PATCH: 4 PATCH TOPICAL at 08:36

## 2025-08-11 RX ADMIN — CALCIUM 500 MG: 500 TABLET ORAL at 08:37

## 2025-08-11 RX ADMIN — CARVEDILOL 12.5 MG: 12.5 TABLET, FILM COATED ORAL at 08:37

## 2025-08-11 RX ADMIN — THIAMINE HCL TAB 100 MG 100 MG: 100 TAB at 08:37

## 2025-08-11 RX ADMIN — CALCIUM 500 MG: 500 TABLET ORAL at 18:23

## 2025-08-11 RX ADMIN — DICLOFENAC SODIUM 2 G: 10 GEL TOPICAL at 18:22

## 2025-08-11 RX ADMIN — Medication 1 TABLET: at 08:37

## 2025-08-11 RX ADMIN — TRAMADOL HYDROCHLORIDE 50 MG: 50 TABLET, COATED ORAL at 10:18

## 2025-08-11 RX ADMIN — CLONIDINE HYDROCHLORIDE 0.1 MG: 0.1 TABLET ORAL at 13:46

## 2025-08-11 RX ADMIN — WARFARIN SODIUM 5 MG: 5 TABLET ORAL at 18:23

## 2025-08-11 RX ADMIN — FOLIC ACID 1 MG: 1 TABLET ORAL at 08:37

## 2025-08-11 RX ADMIN — CARVEDILOL 12.5 MG: 12.5 TABLET, FILM COATED ORAL at 18:23

## 2025-08-11 RX ADMIN — CLONIDINE HYDROCHLORIDE 0.1 MG: 0.1 TABLET ORAL at 06:24

## 2025-08-11 RX ADMIN — METHOCARBAMOL 500 MG: 500 TABLET ORAL at 22:34

## 2025-08-11 RX ADMIN — TRAMADOL HYDROCHLORIDE 50 MG: 50 TABLET, COATED ORAL at 18:23

## 2025-08-11 RX ADMIN — CLONIDINE HYDROCHLORIDE 0.1 MG: 0.1 TABLET ORAL at 22:34

## 2025-08-11 RX ADMIN — ACETAMINOPHEN 975 MG: 325 TABLET ORAL at 08:36

## 2025-08-11 RX ADMIN — ACETAMINOPHEN 975 MG: 325 TABLET ORAL at 22:34

## 2025-08-11 RX ADMIN — METHOCARBAMOL 500 MG: 500 TABLET ORAL at 08:37

## 2025-08-11 RX ADMIN — LISINOPRIL 20 MG: 20 TABLET ORAL at 08:37

## 2025-08-11 RX ADMIN — DICLOFENAC SODIUM 2 G: 10 GEL TOPICAL at 08:41

## 2025-08-11 RX ADMIN — BUPROPION HYDROCHLORIDE 300 MG: 150 TABLET, EXTENDED RELEASE ORAL at 08:37

## 2025-08-11 ASSESSMENT — ACTIVITIES OF DAILY LIVING (ADL)
ADLS_ACUITY_SCORE: 47
ADLS_ACUITY_SCORE: 54
ADLS_ACUITY_SCORE: 47
ADLS_ACUITY_SCORE: 54
ADLS_ACUITY_SCORE: 47
ADLS_ACUITY_SCORE: 54
ADLS_ACUITY_SCORE: 54

## 2025-08-12 ENCOUNTER — APPOINTMENT (OUTPATIENT)
Dept: OCCUPATIONAL THERAPY | Facility: CLINIC | Age: 45
DRG: 896 | End: 2025-08-12
Payer: COMMERCIAL

## 2025-08-12 ENCOUNTER — APPOINTMENT (OUTPATIENT)
Dept: PHYSICAL THERAPY | Facility: CLINIC | Age: 45
DRG: 896 | End: 2025-08-12
Payer: COMMERCIAL

## 2025-08-12 LAB
GLUCOSE BLDC GLUCOMTR-MCNC: 104 MG/DL (ref 70–99)
GLUCOSE BLDC GLUCOMTR-MCNC: 111 MG/DL (ref 70–99)
GLUCOSE BLDC GLUCOMTR-MCNC: 123 MG/DL (ref 70–99)
GLUCOSE BLDC GLUCOMTR-MCNC: 131 MG/DL (ref 70–99)
GLUCOSE BLDC GLUCOMTR-MCNC: 133 MG/DL (ref 70–99)
GLUCOSE BLDC GLUCOMTR-MCNC: 141 MG/DL (ref 70–99)
HOLD SPECIMEN: NORMAL
HOLD SPECIMEN: NORMAL
INR PPP: 2.73 (ref 0.85–1.15)
PROTHROMBIN TIME: 28.4 SECONDS (ref 11.8–14.8)

## 2025-08-12 PROCEDURE — 97116 GAIT TRAINING THERAPY: CPT | Mod: GP

## 2025-08-12 PROCEDURE — 120N000001 HC R&B MED SURG/OB

## 2025-08-12 PROCEDURE — 97535 SELF CARE MNGMENT TRAINING: CPT | Mod: GO | Performed by: OCCUPATIONAL THERAPIST

## 2025-08-12 PROCEDURE — 250N000013 HC RX MED GY IP 250 OP 250 PS 637: Performed by: HOSPITALIST

## 2025-08-12 PROCEDURE — 97530 THERAPEUTIC ACTIVITIES: CPT | Mod: GO | Performed by: OCCUPATIONAL THERAPIST

## 2025-08-12 PROCEDURE — 250N000013 HC RX MED GY IP 250 OP 250 PS 637: Performed by: INTERNAL MEDICINE

## 2025-08-12 PROCEDURE — 36415 COLL VENOUS BLD VENIPUNCTURE: CPT | Performed by: INTERNAL MEDICINE

## 2025-08-12 PROCEDURE — 99232 SBSQ HOSP IP/OBS MODERATE 35: CPT | Performed by: HOSPITALIST

## 2025-08-12 PROCEDURE — 85610 PROTHROMBIN TIME: CPT | Performed by: INTERNAL MEDICINE

## 2025-08-12 RX ORDER — WARFARIN SODIUM 5 MG/1
5 TABLET ORAL
Status: COMPLETED | OUTPATIENT
Start: 2025-08-12 | End: 2025-08-12

## 2025-08-12 RX ADMIN — DICLOFENAC SODIUM 2 G: 10 GEL TOPICAL at 18:23

## 2025-08-12 RX ADMIN — CLONIDINE HYDROCHLORIDE 0.1 MG: 0.1 TABLET ORAL at 06:09

## 2025-08-12 RX ADMIN — WARFARIN SODIUM 5 MG: 5 TABLET ORAL at 18:21

## 2025-08-12 RX ADMIN — DICLOFENAC SODIUM 2 G: 10 GEL TOPICAL at 22:26

## 2025-08-12 RX ADMIN — ACETAMINOPHEN 975 MG: 325 TABLET ORAL at 22:26

## 2025-08-12 RX ADMIN — GABAPENTIN 600 MG: 300 CAPSULE ORAL at 01:14

## 2025-08-12 RX ADMIN — ACETAMINOPHEN 975 MG: 325 TABLET ORAL at 15:36

## 2025-08-12 RX ADMIN — METHOCARBAMOL 500 MG: 500 TABLET ORAL at 08:25

## 2025-08-12 RX ADMIN — GABAPENTIN 600 MG: 300 CAPSULE ORAL at 08:35

## 2025-08-12 RX ADMIN — CLONIDINE HYDROCHLORIDE 0.1 MG: 0.1 TABLET ORAL at 15:36

## 2025-08-12 RX ADMIN — CARVEDILOL 12.5 MG: 12.5 TABLET, FILM COATED ORAL at 08:24

## 2025-08-12 RX ADMIN — ACETAMINOPHEN 975 MG: 325 TABLET ORAL at 08:21

## 2025-08-12 RX ADMIN — DICLOFENAC SODIUM 2 G: 10 GEL TOPICAL at 14:09

## 2025-08-12 RX ADMIN — LIDOCAINE 1 PATCH: 4 PATCH TOPICAL at 08:21

## 2025-08-12 RX ADMIN — CARVEDILOL 12.5 MG: 12.5 TABLET, FILM COATED ORAL at 18:21

## 2025-08-12 RX ADMIN — CLONIDINE HYDROCHLORIDE 0.1 MG: 0.1 TABLET ORAL at 22:26

## 2025-08-12 RX ADMIN — CALCIUM 500 MG: 500 TABLET ORAL at 18:21

## 2025-08-12 RX ADMIN — THIAMINE HCL TAB 100 MG 100 MG: 100 TAB at 08:22

## 2025-08-12 RX ADMIN — CALCIUM 500 MG: 500 TABLET ORAL at 08:25

## 2025-08-12 RX ADMIN — FOLIC ACID 1 MG: 1 TABLET ORAL at 08:25

## 2025-08-12 RX ADMIN — BUPROPION HYDROCHLORIDE 300 MG: 150 TABLET, EXTENDED RELEASE ORAL at 08:22

## 2025-08-12 RX ADMIN — DICLOFENAC SODIUM 2 G: 10 GEL TOPICAL at 09:01

## 2025-08-12 RX ADMIN — LISINOPRIL 20 MG: 20 TABLET ORAL at 08:23

## 2025-08-12 RX ADMIN — TRAMADOL HYDROCHLORIDE 50 MG: 50 TABLET, COATED ORAL at 01:14

## 2025-08-12 RX ADMIN — Medication 1 TABLET: at 08:25

## 2025-08-12 ASSESSMENT — ACTIVITIES OF DAILY LIVING (ADL)
ADLS_ACUITY_SCORE: 47

## 2025-08-13 ENCOUNTER — APPOINTMENT (OUTPATIENT)
Dept: OCCUPATIONAL THERAPY | Facility: CLINIC | Age: 45
DRG: 896 | End: 2025-08-13
Payer: COMMERCIAL

## 2025-08-13 ENCOUNTER — TELEPHONE (OUTPATIENT)
Dept: ANTICOAGULATION | Facility: CLINIC | Age: 45
End: 2025-08-13
Payer: COMMERCIAL

## 2025-08-13 VITALS
OXYGEN SATURATION: 97 % | WEIGHT: 315 LBS | HEIGHT: 72 IN | DIASTOLIC BLOOD PRESSURE: 73 MMHG | BODY MASS INDEX: 42.66 KG/M2 | SYSTOLIC BLOOD PRESSURE: 149 MMHG | RESPIRATION RATE: 16 BRPM | HEART RATE: 62 BPM | TEMPERATURE: 98.2 F

## 2025-08-13 DIAGNOSIS — Z86.711 HISTORY OF PULMONARY EMBOLISM: Primary | ICD-10-CM

## 2025-08-13 LAB
ANION GAP SERPL CALCULATED.3IONS-SCNC: 12 MMOL/L (ref 7–15)
BASOPHILS # BLD AUTO: 0.03 10E3/UL (ref 0–0.2)
BASOPHILS NFR BLD AUTO: 0.6 %
BUN SERPL-MCNC: 6.6 MG/DL (ref 6–20)
CALCIUM SERPL-MCNC: 8.6 MG/DL (ref 8.8–10.4)
CHLORIDE SERPL-SCNC: 105 MMOL/L (ref 98–107)
CREAT SERPL-MCNC: 0.7 MG/DL (ref 0.67–1.17)
EGFRCR SERPLBLD CKD-EPI 2021: >90 ML/MIN/1.73M2
EOSINOPHIL # BLD AUTO: 0.28 10E3/UL (ref 0–0.7)
EOSINOPHIL NFR BLD AUTO: 5.8 %
ERYTHROCYTE [DISTWIDTH] IN BLOOD BY AUTOMATED COUNT: 17.8 % (ref 10–15)
GLUCOSE BLDC GLUCOMTR-MCNC: 106 MG/DL (ref 70–99)
GLUCOSE BLDC GLUCOMTR-MCNC: 115 MG/DL (ref 70–99)
GLUCOSE BLDC GLUCOMTR-MCNC: 130 MG/DL (ref 70–99)
GLUCOSE BLDC GLUCOMTR-MCNC: 139 MG/DL (ref 70–99)
GLUCOSE SERPL-MCNC: 112 MG/DL (ref 70–99)
HCO3 SERPL-SCNC: 23 MMOL/L (ref 22–29)
HCT VFR BLD AUTO: 35 % (ref 40–53)
HGB BLD-MCNC: 10.8 G/DL (ref 13.3–17.7)
IMM GRANULOCYTES # BLD: 0.04 10E3/UL
IMM GRANULOCYTES NFR BLD: 0.8 %
INR PPP: 2.17 (ref 0.85–1.15)
LYMPHOCYTES # BLD AUTO: 1.63 10E3/UL (ref 0.8–5.3)
LYMPHOCYTES NFR BLD AUTO: 33.9 %
MAGNESIUM SERPL-MCNC: 1.9 MG/DL (ref 1.7–2.3)
MCH RBC QN AUTO: 26.2 PG (ref 26.5–33)
MCHC RBC AUTO-ENTMCNC: 30.9 G/DL (ref 31.5–36.5)
MCV RBC AUTO: 84.7 FL (ref 78–100)
MONOCYTES # BLD AUTO: 0.35 10E3/UL (ref 0–1.3)
MONOCYTES NFR BLD AUTO: 7.3 %
NEUTROPHILS # BLD AUTO: 2.48 10E3/UL (ref 1.6–8.3)
NEUTROPHILS NFR BLD AUTO: 51.6 %
NRBC # BLD AUTO: 0 10E3/UL
NRBC BLD AUTO-RTO: 0 /100
PLATELET # BLD AUTO: 149 10E3/UL (ref 150–450)
POTASSIUM SERPL-SCNC: 3.5 MMOL/L (ref 3.4–5.3)
PROTHROMBIN TIME: 23.9 SECONDS (ref 11.8–14.8)
RBC # BLD AUTO: 4.13 10E6/UL (ref 4.4–5.9)
SODIUM SERPL-SCNC: 140 MMOL/L (ref 135–145)
WBC # BLD AUTO: 4.81 10E3/UL (ref 4–11)

## 2025-08-13 PROCEDURE — 36415 COLL VENOUS BLD VENIPUNCTURE: CPT | Performed by: INTERNAL MEDICINE

## 2025-08-13 PROCEDURE — 99239 HOSP IP/OBS DSCHRG MGMT >30: CPT | Performed by: HOSPITALIST

## 2025-08-13 PROCEDURE — 250N000013 HC RX MED GY IP 250 OP 250 PS 637: Performed by: INTERNAL MEDICINE

## 2025-08-13 PROCEDURE — 83735 ASSAY OF MAGNESIUM: CPT | Performed by: HOSPITALIST

## 2025-08-13 PROCEDURE — 97530 THERAPEUTIC ACTIVITIES: CPT | Mod: GO

## 2025-08-13 PROCEDURE — 82374 ASSAY BLOOD CARBON DIOXIDE: CPT | Performed by: HOSPITALIST

## 2025-08-13 PROCEDURE — 85610 PROTHROMBIN TIME: CPT | Performed by: INTERNAL MEDICINE

## 2025-08-13 PROCEDURE — 85004 AUTOMATED DIFF WBC COUNT: CPT | Performed by: HOSPITALIST

## 2025-08-13 PROCEDURE — 36415 COLL VENOUS BLD VENIPUNCTURE: CPT | Performed by: HOSPITALIST

## 2025-08-13 RX ADMIN — BUPROPION HYDROCHLORIDE 300 MG: 150 TABLET, EXTENDED RELEASE ORAL at 08:06

## 2025-08-13 RX ADMIN — CARVEDILOL 12.5 MG: 12.5 TABLET, FILM COATED ORAL at 08:06

## 2025-08-13 RX ADMIN — CLONIDINE HYDROCHLORIDE 0.1 MG: 0.1 TABLET ORAL at 06:20

## 2025-08-13 RX ADMIN — FOLIC ACID 1 MG: 1 TABLET ORAL at 08:05

## 2025-08-13 RX ADMIN — LIDOCAINE 2 PATCH: 4 PATCH TOPICAL at 08:08

## 2025-08-13 RX ADMIN — LISINOPRIL 20 MG: 20 TABLET ORAL at 08:06

## 2025-08-13 RX ADMIN — HYDROXYZINE HYDROCHLORIDE 25 MG: 25 TABLET, FILM COATED ORAL at 01:16

## 2025-08-13 RX ADMIN — DICLOFENAC SODIUM 2 G: 10 GEL TOPICAL at 08:08

## 2025-08-13 RX ADMIN — THIAMINE HCL TAB 100 MG 100 MG: 100 TAB at 08:05

## 2025-08-13 RX ADMIN — CLONIDINE HYDROCHLORIDE 0.1 MG: 0.1 TABLET ORAL at 13:51

## 2025-08-13 RX ADMIN — ACETAMINOPHEN 975 MG: 325 TABLET ORAL at 08:05

## 2025-08-13 RX ADMIN — Medication 1 TABLET: at 08:05

## 2025-08-13 RX ADMIN — CALCIUM 500 MG: 500 TABLET ORAL at 08:05

## 2025-08-13 RX ADMIN — ACETAMINOPHEN 975 MG: 325 TABLET ORAL at 16:11

## 2025-08-13 RX ADMIN — DICLOFENAC SODIUM 2 G: 10 GEL TOPICAL at 12:28

## 2025-08-13 ASSESSMENT — ACTIVITIES OF DAILY LIVING (ADL)
ADLS_ACUITY_SCORE: 47
ADLS_ACUITY_SCORE: 46
ADLS_ACUITY_SCORE: 47
ADLS_ACUITY_SCORE: 46
ADLS_ACUITY_SCORE: 47
ADLS_ACUITY_SCORE: 46
ADLS_ACUITY_SCORE: 47
ADLS_ACUITY_SCORE: 46

## 2025-08-18 ENCOUNTER — TELEPHONE (OUTPATIENT)
Dept: ANTICOAGULATION | Facility: CLINIC | Age: 45
End: 2025-08-18
Payer: COMMERCIAL

## 2025-08-25 ENCOUNTER — MYC MEDICAL ADVICE (OUTPATIENT)
Dept: ANTICOAGULATION | Facility: CLINIC | Age: 45
End: 2025-08-25
Payer: COMMERCIAL